# Patient Record
Sex: FEMALE | Race: WHITE | NOT HISPANIC OR LATINO | URBAN - METROPOLITAN AREA
[De-identification: names, ages, dates, MRNs, and addresses within clinical notes are randomized per-mention and may not be internally consistent; named-entity substitution may affect disease eponyms.]

---

## 2018-08-08 ENCOUNTER — INPATIENT (INPATIENT)
Facility: HOSPITAL | Age: 58
LOS: 12 days | Discharge: PSYCHIATRIC OR SUBSTANCE USE FACILITY | DRG: 948 | End: 2018-08-21
Attending: INTERNAL MEDICINE | Admitting: INTERNAL MEDICINE
Payer: COMMERCIAL

## 2018-08-08 VITALS
OXYGEN SATURATION: 97 % | RESPIRATION RATE: 18 BRPM | HEART RATE: 66 BPM | DIASTOLIC BLOOD PRESSURE: 72 MMHG | SYSTOLIC BLOOD PRESSURE: 132 MMHG | TEMPERATURE: 99 F

## 2018-08-08 DIAGNOSIS — R41.82 ALTERED MENTAL STATUS, UNSPECIFIED: ICD-10-CM

## 2018-08-08 DIAGNOSIS — I10 ESSENTIAL (PRIMARY) HYPERTENSION: ICD-10-CM

## 2018-08-08 DIAGNOSIS — R60.0 LOCALIZED EDEMA: ICD-10-CM

## 2018-08-08 DIAGNOSIS — E87.1 HYPO-OSMOLALITY AND HYPONATREMIA: ICD-10-CM

## 2018-08-08 LAB
ALBUMIN SERPL ELPH-MCNC: 4.4 G/DL — SIGNIFICANT CHANGE UP (ref 3.3–5)
ALP SERPL-CCNC: 97 U/L — SIGNIFICANT CHANGE UP (ref 40–120)
ALT FLD-CCNC: 34 U/L — SIGNIFICANT CHANGE UP (ref 10–45)
ANION GAP SERPL CALC-SCNC: 15 MMOL/L — SIGNIFICANT CHANGE UP (ref 5–17)
APAP SERPL-MCNC: <15 UG/ML — SIGNIFICANT CHANGE UP (ref 10–30)
APPEARANCE UR: CLEAR — SIGNIFICANT CHANGE UP
AST SERPL-CCNC: 29 U/L — SIGNIFICANT CHANGE UP (ref 10–40)
BACTERIA # UR AUTO: ABNORMAL /HPF
BASOPHILS # BLD AUTO: 0 K/UL — SIGNIFICANT CHANGE UP (ref 0–0.2)
BASOPHILS NFR BLD AUTO: 0.4 % — SIGNIFICANT CHANGE UP (ref 0–2)
BILIRUB SERPL-MCNC: 0.6 MG/DL — SIGNIFICANT CHANGE UP (ref 0.2–1.2)
BILIRUB UR-MCNC: NEGATIVE — SIGNIFICANT CHANGE UP
BUN SERPL-MCNC: 8 MG/DL — SIGNIFICANT CHANGE UP (ref 7–23)
CALCIUM SERPL-MCNC: 9.9 MG/DL — SIGNIFICANT CHANGE UP (ref 8.4–10.5)
CHLORIDE SERPL-SCNC: 91 MMOL/L — LOW (ref 96–108)
CO2 SERPL-SCNC: 23 MMOL/L — SIGNIFICANT CHANGE UP (ref 22–31)
COLOR SPEC: YELLOW — SIGNIFICANT CHANGE UP
CREAT SERPL-MCNC: 0.77 MG/DL — SIGNIFICANT CHANGE UP (ref 0.5–1.3)
DIFF PNL FLD: NEGATIVE — SIGNIFICANT CHANGE UP
EOSINOPHIL # BLD AUTO: 0.2 K/UL — SIGNIFICANT CHANGE UP (ref 0–0.5)
EOSINOPHIL NFR BLD AUTO: 2 % — SIGNIFICANT CHANGE UP (ref 0–6)
EPI CELLS # UR: SIGNIFICANT CHANGE UP /HPF
ETHANOL SERPL-MCNC: SIGNIFICANT CHANGE UP MG/DL (ref 0–10)
GLUCOSE SERPL-MCNC: 111 MG/DL — HIGH (ref 70–99)
GLUCOSE UR QL: NEGATIVE — SIGNIFICANT CHANGE UP
HCT VFR BLD CALC: 36.5 % — SIGNIFICANT CHANGE UP (ref 34.5–45)
HGB BLD-MCNC: 12.5 G/DL — SIGNIFICANT CHANGE UP (ref 11.5–15.5)
HYALINE CASTS # UR AUTO: ABNORMAL
KETONES UR-MCNC: NEGATIVE — SIGNIFICANT CHANGE UP
LEUKOCYTE ESTERASE UR-ACNC: NEGATIVE — SIGNIFICANT CHANGE UP
LYMPHOCYTES # BLD AUTO: 1.4 K/UL — SIGNIFICANT CHANGE UP (ref 1–3.3)
LYMPHOCYTES # BLD AUTO: 13.4 % — SIGNIFICANT CHANGE UP (ref 13–44)
MCHC RBC-ENTMCNC: 30.1 PG — SIGNIFICANT CHANGE UP (ref 27–34)
MCHC RBC-ENTMCNC: 34.2 GM/DL — SIGNIFICANT CHANGE UP (ref 32–36)
MCV RBC AUTO: 88 FL — SIGNIFICANT CHANGE UP (ref 80–100)
MONOCYTES # BLD AUTO: 1 K/UL — HIGH (ref 0–0.9)
MONOCYTES NFR BLD AUTO: 9.5 % — SIGNIFICANT CHANGE UP (ref 2–14)
NEUTROPHILS # BLD AUTO: 8 K/UL — HIGH (ref 1.8–7.4)
NEUTROPHILS NFR BLD AUTO: 74.8 % — SIGNIFICANT CHANGE UP (ref 43–77)
NITRITE UR-MCNC: NEGATIVE — SIGNIFICANT CHANGE UP
PCP SPEC-MCNC: SIGNIFICANT CHANGE UP
PH UR: 6 — SIGNIFICANT CHANGE UP (ref 5–8)
PLATELET # BLD AUTO: 406 K/UL — HIGH (ref 150–400)
POTASSIUM SERPL-MCNC: 3.4 MMOL/L — LOW (ref 3.5–5.3)
POTASSIUM SERPL-SCNC: 3.4 MMOL/L — LOW (ref 3.5–5.3)
PROT SERPL-MCNC: 7.7 G/DL — SIGNIFICANT CHANGE UP (ref 6–8.3)
PROT UR-MCNC: SIGNIFICANT CHANGE UP
RBC # BLD: 4.14 M/UL — SIGNIFICANT CHANGE UP (ref 3.8–5.2)
RBC # FLD: 12.6 % — SIGNIFICANT CHANGE UP (ref 10.3–14.5)
RBC CASTS # UR COMP ASSIST: SIGNIFICANT CHANGE UP /HPF (ref 0–2)
SALICYLATES SERPL-MCNC: <2 MG/DL — LOW (ref 15–30)
SODIUM SERPL-SCNC: 129 MMOL/L — LOW (ref 135–145)
SP GR SPEC: 1.01 — SIGNIFICANT CHANGE UP (ref 1.01–1.02)
UROBILINOGEN FLD QL: NEGATIVE — SIGNIFICANT CHANGE UP
WBC # BLD: 10.7 K/UL — HIGH (ref 3.8–10.5)
WBC # FLD AUTO: 10.7 K/UL — HIGH (ref 3.8–10.5)
WBC UR QL: SIGNIFICANT CHANGE UP /HPF (ref 0–5)

## 2018-08-08 PROCEDURE — 99285 EMERGENCY DEPT VISIT HI MDM: CPT | Mod: 25

## 2018-08-08 PROCEDURE — 71045 X-RAY EXAM CHEST 1 VIEW: CPT | Mod: 26

## 2018-08-08 PROCEDURE — 70450 CT HEAD/BRAIN W/O DYE: CPT | Mod: 26

## 2018-08-08 PROCEDURE — 93010 ELECTROCARDIOGRAM REPORT: CPT

## 2018-08-08 RX ORDER — HALOPERIDOL DECANOATE 100 MG/ML
5 INJECTION INTRAMUSCULAR ONCE
Qty: 0 | Refills: 0 | Status: COMPLETED | OUTPATIENT
Start: 2018-08-08 | End: 2018-08-08

## 2018-08-08 RX ORDER — POTASSIUM CHLORIDE 20 MEQ
20 PACKET (EA) ORAL
Qty: 0 | Refills: 0 | Status: COMPLETED | OUTPATIENT
Start: 2018-08-08 | End: 2018-08-09

## 2018-08-08 RX ORDER — ENOXAPARIN SODIUM 100 MG/ML
40 INJECTION SUBCUTANEOUS DAILY
Qty: 0 | Refills: 0 | Status: DISCONTINUED | OUTPATIENT
Start: 2018-08-08 | End: 2018-08-21

## 2018-08-08 RX ADMIN — Medication 2 MILLIGRAM(S): at 16:26

## 2018-08-08 RX ADMIN — HALOPERIDOL DECANOATE 5 MILLIGRAM(S): 100 INJECTION INTRAMUSCULAR at 16:23

## 2018-08-08 NOTE — H&P ADULT - RS GEN PE MLT RESP DETAILS PC
no intercostal retractions/no chest wall tenderness/no rales/breath sounds equal/normal/airway patent/good air movement/clear to auscultation bilaterally/respirations non-labored/no rhonchi

## 2018-08-08 NOTE — ED BEHAVIORAL HEALTH NOTE - BEHAVIORAL HEALTH NOTE
Pt evaluated at bedside. Pt is lethargic, sedated and unable to cooperate for interview. She is only oriented to herself. She received Haldol 5mg IM and Ativan 2mg IM at 16:15. Her psychiatrist, Dr. Vahe Russ, is unavailable. No contact information in sunrise except for her own number. Pt is being admitted to medicine for AMS. Psych will follow for full assessment once pt is able to participate in interview. Pt evaluated at bedside. Pt is lethargic, sedated and unable to cooperate for interview. She is only oriented to herself. She received Haldol 5mg IM and Ativan 2mg IM at 16:15. Her psychiatrist, Dr. Vahe Russ, is unavailable. No contact information in sunrise except for her own number. Pt is being admitted to medicine for AMS. Psych will follow for full assessment once pt is able to participate in interview.    Please check TSH, RPR, Vit D level.

## 2018-08-08 NOTE — ED PROVIDER NOTE - MEDICAL DECISION MAKING DETAILS
57F with altered mental status.  Patient unable to provide history and confused.  Will workup delirium for organic cause.  If labs, urine, and imaging within normal limits, then will consult Psych for eval.

## 2018-08-08 NOTE — ED PROVIDER NOTE - PROGRESS NOTE DETAILS
Patient seems to have flight of ideas and walking around ED.  Patient is however redirectable back to bed.  Call placed to Dr. Vahe Russ, prescriber noted on patient's Ziprasidone bottle.  Case discussed with Dr. Paige covering.  Dr. Paige does not know patient and unable to pull the records.  Dr. Paige reports Dr. Russ unavailable. Patient agitated and aggressive with 1:1 staff.  Patient treated with 5mg IM Haloperidol and 2mg IV Lorazepam. Patient sedated.  Imaging and labs reviewed.  Case discussed with Dr. Steve who requests neurology and psychiatry consultation before admission. Case discussed with neurology resident and psychiatrist who will evaluate patient in the ED. Psychiatry reports patient to sedated for evaluation.  Case discussed with Dr. Steve who accepts patient for admission.

## 2018-08-08 NOTE — CONSULT NOTE ADULT - SUBJECTIVE AND OBJECTIVE BOX
Neurology Consult    Name: SINDHU MUÑOZ    HPI: 58 yo woman who is brought into [facility] via police after patient was found neighbor's yard naked, reported picking out tomatoes from a vegetable garden. Per police, patient has a history of declining mental function since 's death 5 years ago. History and ROS otherwise limited due to patient's current cognitive condition. Neurology consulted at the request of admitting hospitalist for further evaluation. Psych consulted as well.     PMH/PSH:   DM2T     MEDICATIONS  (home):   * Outpatient Medication Status not yet specified    Allergies  Allergy Status Unknown    Objective:   Vital Signs Last 24 Hrs  T(C): 36.4 (08 Aug 2018 18:14), Max: 37.1 (08 Aug 2018 14:58)  T(F): 97.5 (08 Aug 2018 18:14), Max: 98.8 (08 Aug 2018 14:58)  HR: 51 (08 Aug 2018 18:14) (51 - 66)  BP: 124/73 (08 Aug 2018 18:14) (124/73 - 132/72)  RR: 18 (08 Aug 2018 18:14) (18 - 18)  SpO2: 96% (08 Aug 2018 18:14) (96% - 98%)    General Exam:   General appearance: No acute distress                   Neurological Exam:  Mental Status: AAOx3, fluent speech, follows commands    Cranial Nerves: EOMI, PERRL, V1-V3 intact, facial symmetry intact, no dysarthria, tongue midline, VFF    Motor: 5/5 throughout. No drift x4    Sensation: Intact to LT throughout    Coordination: FTN intact b/l    Reflexes: 1+ bilateral biceps, brachioradialis, patellar and ankle    Gait: normal and stable.      Labs:    08-08    129<L>  |  91<L>  |  8   ----------------------------<  111<H>  3.4<L>   |  23  |  0.77    Ca    9.9      08 Aug 2018 16:18    TPro  7.7  /  Alb  4.4  /  TBili  0.6  /  DBili  x   /  AST  29  /  ALT  34  /  AlkPhos  97  08-08    LIVER FUNCTIONS - ( 08 Aug 2018 16:18 )  Alb: 4.4 g/dL / Pro: 7.7 g/dL / ALK PHOS: 97 U/L / ALT: 34 U/L / AST: 29 U/L / GGT: x           CBC Full  -  ( 08 Aug 2018 16:18 )  WBC Count : 10.7 K/uL  Hemoglobin : 12.5 g/dL  Hematocrit : 36.5 %  Platelet Count - Automated : 406 K/uL  Mean Cell Volume : 88.0 fl  Mean Cell Hemoglobin : 30.1 pg  Mean Cell Hemoglobin Concentration : 34.2 gm/dL  Auto Neutrophil # : 8.0 K/uL  Auto Lymphocyte # : 1.4 K/uL  Auto Monocyte # : 1.0 K/uL  Auto Eosinophil # : 0.2 K/uL  Auto Basophil # : 0.0 K/uL  Auto Neutrophil % : 74.8 %  Auto Lymphocyte % : 13.4 %  Auto Monocyte % : 9.5 %  Auto Eosinophil % : 2.0 %  Auto Basophil % : 0.4 %    Radiology  < from: CT Head No Cont (08.08.18 @ 16:58) >  CLINICAL INDICATION:  Altered mental status, confusion    TECHNIQUE : Axial CT scanning of the brain was obtained from the skull   base to the vertex without the administration of intravenous contrast.      COMPARISON: None available    FINDINGS:  The ventricles are mildly prominent, this may be on the basis   of age-appropriate atrophy. The presence of mild normal pressure   hydrocephalus is not entirely excluded.    No focal mass effect, midline shift, vasogenic edema, or evidence of   acute territorial infarct. Nonspecific moderate to severe white matter   lucency.    The visualized paranasal sinuses and mastoid air cells are clear.    IMPRESSION: No acute intracranial hemorrhage, mass effect, vasogenic   edema, or evidence of acute territorial infarct.    Ventricles mildly prominent, this may be on the basis of age-appropriate   atrophy. The presence of mild normal pressure hydrocephalus is not   entirely excluded.    Nonspecific moderate to severe white matter lucency, differential   diagnosis includes white matter microvascular ischemic disease (advanced   for the patient's stated age), demyelinating disease, as well as   infectious, inflammatory, post infectious/inflammatory processes.    < end of copied text > Neurology Consult    Name: SINDHU MUÑOZ    HPI: 56 yo woman who is brought into [facility] via police after patient was found neighbor's yard naked, reported picking out tomatoes from a vegetable garden. Per police, patient has a history of declining mental function since 's death 5 years ago. History and ROS otherwise limited due to patient's current cognitive condition. Neurology consulted at the request of admitting hospitalist for further evaluation. Psych consulted as well.     PMH/PSH:   DM2T     MEDICATIONS  (home):   * Outpatient Medication Status not yet specified    Allergies  Allergy Status Unknown    Objective:   Vital Signs Last 24 Hrs  T(C): 36.4 (08 Aug 2018 18:14), Max: 37.1 (08 Aug 2018 14:58)  T(F): 97.5 (08 Aug 2018 18:14), Max: 98.8 (08 Aug 2018 14:58)  HR: 51 (08 Aug 2018 18:14) (51 - 66)  BP: 124/73 (08 Aug 2018 18:14) (124/73 - 132/72)  RR: 18 (08 Aug 2018 18:14) (18 - 18)  SpO2: 96% (08 Aug 2018 18:14) (96% - 98%)    General Exam:   General appearance: No acute distress                   Neurological Exam:  Mental Status: AAOx2 (person, place only), speaks coherently, follows simple commands    Cranial Nerves: EOMI no nystagmus, PERRL, facial symmetry intact, no dysarthria, b/l bink to threat intact     Motor: antigravity throughout. No drift x4    Sensation: Intact to LT throughout    Coordination: does not follow commands     Reflexes: 2+ bilateral biceps, brachioradialis, patellar, mild ankle clonus fatigable b/l     Gait: normal and stable.      Labs:    08-08    129<L>  |  91<L>  |  8   ----------------------------<  111<H>  3.4<L>   |  23  |  0.77    Ca    9.9      08 Aug 2018 16:18    TPro  7.7  /  Alb  4.4  /  TBili  0.6  /  DBili  x   /  AST  29  /  ALT  34  /  AlkPhos  97  08-08    LIVER FUNCTIONS - ( 08 Aug 2018 16:18 )  Alb: 4.4 g/dL / Pro: 7.7 g/dL / ALK PHOS: 97 U/L / ALT: 34 U/L / AST: 29 U/L / GGT: x           CBC Full  -  ( 08 Aug 2018 16:18 )  WBC Count : 10.7 K/uL  Hemoglobin : 12.5 g/dL  Hematocrit : 36.5 %  Platelet Count - Automated : 406 K/uL  Mean Cell Volume : 88.0 fl  Mean Cell Hemoglobin : 30.1 pg  Mean Cell Hemoglobin Concentration : 34.2 gm/dL  Auto Neutrophil # : 8.0 K/uL  Auto Lymphocyte # : 1.4 K/uL  Auto Monocyte # : 1.0 K/uL  Auto Eosinophil # : 0.2 K/uL  Auto Basophil # : 0.0 K/uL  Auto Neutrophil % : 74.8 %  Auto Lymphocyte % : 13.4 %  Auto Monocyte % : 9.5 %  Auto Eosinophil % : 2.0 %  Auto Basophil % : 0.4 %    Radiology  < from: CT Head No Cont (08.08.18 @ 16:58) >  CLINICAL INDICATION:  Altered mental status, confusion    TECHNIQUE : Axial CT scanning of the brain was obtained from the skull   base to the vertex without the administration of intravenous contrast.      COMPARISON: None available    FINDINGS:  The ventricles are mildly prominent, this may be on the basis   of age-appropriate atrophy. The presence of mild normal pressure   hydrocephalus is not entirely excluded.    No focal mass effect, midline shift, vasogenic edema, or evidence of   acute territorial infarct. Nonspecific moderate to severe white matter   lucency.    The visualized paranasal sinuses and mastoid air cells are clear.    IMPRESSION: No acute intracranial hemorrhage, mass effect, vasogenic   edema, or evidence of acute territorial infarct.    Ventricles mildly prominent, this may be on the basis of age-appropriate   atrophy. The presence of mild normal pressure hydrocephalus is not   entirely excluded.    Nonspecific moderate to severe white matter lucency, differential   diagnosis includes white matter microvascular ischemic disease (advanced   for the patient's stated age), demyelinating disease, as well as   infectious, inflammatory, post infectious/inflammatory processes.    < end of copied text > Neurology Consult    Name: SINDHU MUÑOZ    HPI: 56 yo woman who is brought into [facility] via police after patient was found neighbor's yard naked, reported picking out tomatoes from a vegetable garden. Per police, patient has a history of declining mental function since 's death 5 years ago. History and ROS otherwise limited due to patient's current cognitive condition. Neurology consulted at the request of admitting hospitalist for further evaluation. Psych consulted as well.     PMH/PSH:   DM2T     MEDICATIONS  (home):   * Outpatient Medication Status not yet specified    Allergies  Allergy Status Unknown    Objective:   Vital Signs Last 24 Hrs  T(C): 36.4 (08 Aug 2018 18:14), Max: 37.1 (08 Aug 2018 14:58)  T(F): 97.5 (08 Aug 2018 18:14), Max: 98.8 (08 Aug 2018 14:58)  HR: 51 (08 Aug 2018 18:14) (51 - 66)  BP: 124/73 (08 Aug 2018 18:14) (124/73 - 132/72)  RR: 18 (08 Aug 2018 18:14) (18 - 18)  SpO2: 96% (08 Aug 2018 18:14) (96% - 98%)    General Exam:   General appearance: No acute distress                   Neurological Exam:  Mental Status: AAOx2 (person, place only), speaks coherently, follows simple commands    Cranial Nerves: EOMI no nystagmus, PERRL, facial symmetry intact, no dysarthria, b/l bink to threat intact     Motor: antigravity throughout. No drift x4    Sensation: Intact to LT throughout    Coordination: F to N intact    Reflexes: 2+ bilateral biceps, brachioradialis, patellar, mild ankle clonus fatigable b/l     Gait: normal and stable.      Labs:    08-08    129<L>  |  91<L>  |  8   ----------------------------<  111<H>  3.4<L>   |  23  |  0.77    Ca    9.9      08 Aug 2018 16:18    TPro  7.7  /  Alb  4.4  /  TBili  0.6  /  DBili  x   /  AST  29  /  ALT  34  /  AlkPhos  97  08-08    LIVER FUNCTIONS - ( 08 Aug 2018 16:18 )  Alb: 4.4 g/dL / Pro: 7.7 g/dL / ALK PHOS: 97 U/L / ALT: 34 U/L / AST: 29 U/L / GGT: x           CBC Full  -  ( 08 Aug 2018 16:18 )  WBC Count : 10.7 K/uL  Hemoglobin : 12.5 g/dL  Hematocrit : 36.5 %  Platelet Count - Automated : 406 K/uL  Mean Cell Volume : 88.0 fl  Mean Cell Hemoglobin : 30.1 pg  Mean Cell Hemoglobin Concentration : 34.2 gm/dL  Auto Neutrophil # : 8.0 K/uL  Auto Lymphocyte # : 1.4 K/uL  Auto Monocyte # : 1.0 K/uL  Auto Eosinophil # : 0.2 K/uL  Auto Basophil # : 0.0 K/uL  Auto Neutrophil % : 74.8 %  Auto Lymphocyte % : 13.4 %  Auto Monocyte % : 9.5 %  Auto Eosinophil % : 2.0 %  Auto Basophil % : 0.4 %    Radiology  < from: CT Head No Cont (08.08.18 @ 16:58) >  CLINICAL INDICATION:  Altered mental status, confusion    TECHNIQUE : Axial CT scanning of the brain was obtained from the skull   base to the vertex without the administration of intravenous contrast.      COMPARISON: None available    FINDINGS:  The ventricles are mildly prominent, this may be on the basis   of age-appropriate atrophy. The presence of mild normal pressure   hydrocephalus is not entirely excluded.    No focal mass effect, midline shift, vasogenic edema, or evidence of   acute territorial infarct. Nonspecific moderate to severe white matter   lucency.    The visualized paranasal sinuses and mastoid air cells are clear.    IMPRESSION: No acute intracranial hemorrhage, mass effect, vasogenic   edema, or evidence of acute territorial infarct.    Ventricles mildly prominent, this may be on the basis of age-appropriate   atrophy. The presence of mild normal pressure hydrocephalus is not   entirely excluded.    Nonspecific moderate to severe white matter lucency, differential   diagnosis includes white matter microvascular ischemic disease (advanced   for the patient's stated age), demyelinating disease, as well as   infectious, inflammatory, post infectious/inflammatory processes.    < end of copied text >

## 2018-08-08 NOTE — ED PROVIDER NOTE - OBJECTIVE STATEMENT
57F presents with altered mental status.  Patient was found in her neighbor's garden naked picking tomatoes.  Police report patient's mental status has been declining since her 's death 5 years ago.  Patient is not answering any questions appropriately and seems to have flight of ideas.  Patient is walking 57F presents with altered mental status.  Patient was found in her neighbor's garden naked picking tomatoes.  Police report patient's mental status has been declining since her 's death 5 years ago.  Patient is not answering any questions appropriately and seems to have flight of ideas. 57F presents with altered mental status.  Patient was found in her neighbor's garden naked picking tomatoes.  Police report patient's mental status has been declining since her 's death 5 years ago.  Patient is not answering any questions appropriately and seems to have flight of ideas. No fever no sz by history
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
## 2018-08-08 NOTE — CONSULT NOTE ADULT - PROBLEM SELECTOR RECOMMENDATION 9
altered mental status, unspecified. Otherwise alert. Low suspicion for encephalopathy (no alteration in level of consciousness).  Plan:   -treat underlying metabolic/infectious triggers if any   -MRI brain w/ and w/o contrast   -Utox, U/a   -psych consult (patient's baseline unknown at this time)   -contact family for further information regarding PMH.   -low suspicion for seizure at this time. altered mental status, unspecified. Otherwise alert. Low suspicion for encephalopathy (no alteration in level of consciousness).  Plan:   -treat underlying metabolic/infectious triggers if any (including ammonia levels, however no asterixes on exam)   -MRI brain w/ and w/o contrast   -Utox, U/a   -psych consult (patient's baseline unknown at this time)   -contact family for further information regarding PMH.   -low suspicion for seizure at this time.
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
## 2018-08-08 NOTE — H&P ADULT - GASTROINTESTINAL DETAILS
no masses palpable/soft/nontender/no distention/no rebound tenderness/no bruit/bowel sounds normal/normal

## 2018-08-08 NOTE — H&P ADULT - ASSESSMENT
57F presents with altered mental status.  Patient was found in her neighbor's garden naked picking tomatoes.  Police report patient's mental status has been declining since her 's death 5 years ago.  Patient is not answering any questions appropriately and seems to have flight of ideas.

## 2018-08-08 NOTE — ED PROVIDER NOTE - ATTENDING CONTRIBUTION TO CARE
I have seen and evaluated this patient with the resident.   I agree with the findings  unless other wise stated.  I have made appropriate changes in documentations where needed, After my face to face bedside evaluation, I am further  notinF with altered mental status. BIBEMS unkempt no family to contact confused no signs of meningeal irritation non focal neuro exam possible organic v/s psych flare CT head no bleed no apparent sign of infection   Patient unable to provide history and confused.  Will workup delirium for organic cause.  If labs, urine, and imaging within normal limits, then will consult Psych for eval. requested 1:1 obs for pt safety will admit in hospital for definitive eval and treatment --Salazar

## 2018-08-08 NOTE — H&P ADULT - PROBLEM SELECTOR PLAN 1
Exact cause not known. Poor Historian and under sedation now. Neurology and Psychiatry following . Will Order MRI in Am . CT head noted  ?NPH .

## 2018-08-09 ENCOUNTER — TRANSCRIPTION ENCOUNTER (OUTPATIENT)
Age: 58
End: 2018-08-09

## 2018-08-09 DIAGNOSIS — E87.6 HYPOKALEMIA: ICD-10-CM

## 2018-08-09 DIAGNOSIS — F05 DELIRIUM DUE TO KNOWN PHYSIOLOGICAL CONDITION: ICD-10-CM

## 2018-08-09 LAB
ANION GAP SERPL CALC-SCNC: 13 MMOL/L — SIGNIFICANT CHANGE UP (ref 5–17)
ANION GAP SERPL CALC-SCNC: 16 MMOL/L — SIGNIFICANT CHANGE UP (ref 5–17)
APPEARANCE UR: CLEAR — SIGNIFICANT CHANGE UP
BACTERIA # UR AUTO: NEGATIVE — SIGNIFICANT CHANGE UP
BILIRUB UR-MCNC: NEGATIVE — SIGNIFICANT CHANGE UP
BUN SERPL-MCNC: 10 MG/DL — SIGNIFICANT CHANGE UP (ref 7–23)
BUN SERPL-MCNC: 8 MG/DL — SIGNIFICANT CHANGE UP (ref 7–23)
CALCIUM SERPL-MCNC: 8.8 MG/DL — SIGNIFICANT CHANGE UP (ref 8.4–10.5)
CALCIUM SERPL-MCNC: 9 MG/DL — SIGNIFICANT CHANGE UP (ref 8.4–10.5)
CHLORIDE SERPL-SCNC: 93 MMOL/L — LOW (ref 96–108)
CHLORIDE SERPL-SCNC: 94 MMOL/L — LOW (ref 96–108)
CHLORIDE UR-SCNC: <35 MMOL/L — SIGNIFICANT CHANGE UP
CO2 SERPL-SCNC: 21 MMOL/L — LOW (ref 22–31)
CO2 SERPL-SCNC: 23 MMOL/L — SIGNIFICANT CHANGE UP (ref 22–31)
COLOR SPEC: YELLOW — SIGNIFICANT CHANGE UP
CREAT SERPL-MCNC: 0.57 MG/DL — SIGNIFICANT CHANGE UP (ref 0.5–1.3)
CREAT SERPL-MCNC: 0.81 MG/DL — SIGNIFICANT CHANGE UP (ref 0.5–1.3)
DIFF PNL FLD: NEGATIVE — SIGNIFICANT CHANGE UP
EPI CELLS # UR: 1 /HPF — SIGNIFICANT CHANGE UP (ref 0–5)
GLUCOSE BLDC GLUCOMTR-MCNC: 119 MG/DL — HIGH (ref 70–99)
GLUCOSE BLDC GLUCOMTR-MCNC: 125 MG/DL — HIGH (ref 70–99)
GLUCOSE BLDC GLUCOMTR-MCNC: 134 MG/DL — HIGH (ref 70–99)
GLUCOSE BLDC GLUCOMTR-MCNC: 134 MG/DL — HIGH (ref 70–99)
GLUCOSE SERPL-MCNC: 139 MG/DL — HIGH (ref 70–99)
GLUCOSE SERPL-MCNC: 145 MG/DL — HIGH (ref 70–99)
GLUCOSE UR QL: NEGATIVE MG/DL — SIGNIFICANT CHANGE UP
HCT VFR BLD CALC: 31.1 % — LOW (ref 34.5–45)
HGB BLD-MCNC: 11 G/DL — LOW (ref 11.5–15.5)
HYALINE CASTS # UR AUTO: 0 /LPF — SIGNIFICANT CHANGE UP (ref 0–7)
KETONES UR-MCNC: NEGATIVE — SIGNIFICANT CHANGE UP
LEUKOCYTE ESTERASE UR-ACNC: ABNORMAL
MCHC RBC-ENTMCNC: 30.5 PG — SIGNIFICANT CHANGE UP (ref 27–34)
MCHC RBC-ENTMCNC: 35.4 GM/DL — SIGNIFICANT CHANGE UP (ref 32–36)
MCV RBC AUTO: 86.1 FL — SIGNIFICANT CHANGE UP (ref 80–100)
NITRITE UR-MCNC: NEGATIVE — SIGNIFICANT CHANGE UP
OSMOLALITY SERPL: 272 MOS/KG — LOW (ref 275–300)
OSMOLALITY UR: 60 MOS/KG — LOW (ref 300–900)
PH UR: 7 — SIGNIFICANT CHANGE UP (ref 5–8)
PLATELET # BLD AUTO: 251 K/UL — SIGNIFICANT CHANGE UP (ref 150–400)
POTASSIUM SERPL-MCNC: 3.5 MMOL/L — SIGNIFICANT CHANGE UP (ref 3.5–5.3)
POTASSIUM SERPL-MCNC: 4.9 MMOL/L — SIGNIFICANT CHANGE UP (ref 3.5–5.3)
POTASSIUM SERPL-SCNC: 3.5 MMOL/L — SIGNIFICANT CHANGE UP (ref 3.5–5.3)
POTASSIUM SERPL-SCNC: 4.9 MMOL/L — SIGNIFICANT CHANGE UP (ref 3.5–5.3)
POTASSIUM UR-SCNC: 5 MMOL/L — SIGNIFICANT CHANGE UP
PROT UR-MCNC: ABNORMAL MG/DL
RBC # BLD: 3.61 M/UL — LOW (ref 3.8–5.2)
RBC # FLD: 12.9 % — SIGNIFICANT CHANGE UP (ref 10.3–14.5)
RBC CASTS # UR COMP ASSIST: 1 /HPF — SIGNIFICANT CHANGE UP (ref 0–4)
SODIUM SERPL-SCNC: 129 MMOL/L — LOW (ref 135–145)
SODIUM SERPL-SCNC: 131 MMOL/L — LOW (ref 135–145)
SODIUM UR-SCNC: <20 MMOL/L — SIGNIFICANT CHANGE UP
SP GR SPEC: 1.01 — SIGNIFICANT CHANGE UP (ref 1.01–1.02)
TSH SERPL-MCNC: 1.15 UIU/ML — SIGNIFICANT CHANGE UP (ref 0.27–4.2)
TSH SERPL-MCNC: 2.62 UIU/ML — SIGNIFICANT CHANGE UP (ref 0.27–4.2)
UROBILINOGEN FLD QL: NEGATIVE MG/DL — SIGNIFICANT CHANGE UP
WBC # BLD: 8.51 K/UL — SIGNIFICANT CHANGE UP (ref 3.8–10.5)
WBC # FLD AUTO: 8.51 K/UL — SIGNIFICANT CHANGE UP (ref 3.8–10.5)
WBC UR QL: 6 /HPF — HIGH (ref 0–5)

## 2018-08-09 PROCEDURE — 99223 1ST HOSP IP/OBS HIGH 75: CPT | Mod: GC

## 2018-08-09 PROCEDURE — 99221 1ST HOSP IP/OBS SF/LOW 40: CPT

## 2018-08-09 PROCEDURE — 93970 EXTREMITY STUDY: CPT | Mod: 26

## 2018-08-09 RX ORDER — INSULIN LISPRO 100/ML
VIAL (ML) SUBCUTANEOUS
Qty: 0 | Refills: 0 | Status: DISCONTINUED | OUTPATIENT
Start: 2018-08-09 | End: 2018-08-13

## 2018-08-09 RX ORDER — INSULIN LISPRO 100/ML
VIAL (ML) SUBCUTANEOUS AT BEDTIME
Qty: 0 | Refills: 0 | Status: DISCONTINUED | OUTPATIENT
Start: 2018-08-09 | End: 2018-08-13

## 2018-08-09 RX ORDER — HALOPERIDOL DECANOATE 100 MG/ML
1 INJECTION INTRAMUSCULAR ONCE
Qty: 0 | Refills: 0 | Status: DISCONTINUED | OUTPATIENT
Start: 2018-08-09 | End: 2018-08-09

## 2018-08-09 RX ORDER — ZIPRASIDONE HYDROCHLORIDE 20 MG/1
60 CAPSULE ORAL
Qty: 0 | Refills: 0 | Status: DISCONTINUED | OUTPATIENT
Start: 2018-08-10 | End: 2018-08-13

## 2018-08-09 RX ORDER — FUROSEMIDE 40 MG
40 TABLET ORAL
Qty: 0 | Refills: 0 | Status: DISCONTINUED | OUTPATIENT
Start: 2018-08-09 | End: 2018-08-21

## 2018-08-09 RX ORDER — DEXTROSE 50 % IN WATER 50 %
15 SYRINGE (ML) INTRAVENOUS ONCE
Qty: 0 | Refills: 0 | Status: DISCONTINUED | OUTPATIENT
Start: 2018-08-09 | End: 2018-08-21

## 2018-08-09 RX ORDER — GLUCAGON INJECTION, SOLUTION 0.5 MG/.1ML
1 INJECTION, SOLUTION SUBCUTANEOUS ONCE
Qty: 0 | Refills: 0 | Status: DISCONTINUED | OUTPATIENT
Start: 2018-08-09 | End: 2018-08-21

## 2018-08-09 RX ORDER — HALOPERIDOL DECANOATE 100 MG/ML
2 INJECTION INTRAMUSCULAR ONCE
Qty: 0 | Refills: 0 | Status: COMPLETED | OUTPATIENT
Start: 2018-08-09 | End: 2018-08-09

## 2018-08-09 RX ORDER — SODIUM CHLORIDE 9 MG/ML
1000 INJECTION, SOLUTION INTRAVENOUS
Qty: 0 | Refills: 0 | Status: DISCONTINUED | OUTPATIENT
Start: 2018-08-09 | End: 2018-08-21

## 2018-08-09 RX ORDER — POTASSIUM CHLORIDE 20 MEQ
20 PACKET (EA) ORAL DAILY
Qty: 0 | Refills: 0 | Status: DISCONTINUED | OUTPATIENT
Start: 2018-08-09 | End: 2018-08-21

## 2018-08-09 RX ORDER — DEXTROSE 50 % IN WATER 50 %
12.5 SYRINGE (ML) INTRAVENOUS ONCE
Qty: 0 | Refills: 0 | Status: DISCONTINUED | OUTPATIENT
Start: 2018-08-09 | End: 2018-08-21

## 2018-08-09 RX ORDER — SODIUM CHLORIDE 9 MG/ML
1 INJECTION INTRAMUSCULAR; INTRAVENOUS; SUBCUTANEOUS THREE TIMES A DAY
Qty: 0 | Refills: 0 | Status: DISCONTINUED | OUTPATIENT
Start: 2018-08-09 | End: 2018-08-11

## 2018-08-09 RX ORDER — DEXTROSE 50 % IN WATER 50 %
25 SYRINGE (ML) INTRAVENOUS ONCE
Qty: 0 | Refills: 0 | Status: DISCONTINUED | OUTPATIENT
Start: 2018-08-09 | End: 2018-08-21

## 2018-08-09 RX ORDER — HALOPERIDOL DECANOATE 100 MG/ML
2.5 INJECTION INTRAMUSCULAR EVERY 6 HOURS
Qty: 0 | Refills: 0 | Status: DISCONTINUED | OUTPATIENT
Start: 2018-08-09 | End: 2018-08-13

## 2018-08-09 RX ORDER — LEVOTHYROXINE SODIUM 125 MCG
50 TABLET ORAL DAILY
Qty: 0 | Refills: 0 | Status: DISCONTINUED | OUTPATIENT
Start: 2018-08-09 | End: 2018-08-21

## 2018-08-09 RX ADMIN — Medication 2 MILLIGRAM(S): at 06:30

## 2018-08-09 RX ADMIN — HALOPERIDOL DECANOATE 2 MILLIGRAM(S): 100 INJECTION INTRAMUSCULAR at 05:51

## 2018-08-09 RX ADMIN — Medication 40 MILLIGRAM(S): at 16:11

## 2018-08-09 RX ADMIN — Medication 20 MILLIEQUIVALENT(S): at 03:56

## 2018-08-09 RX ADMIN — HALOPERIDOL DECANOATE 2.5 MILLIGRAM(S): 100 INJECTION INTRAMUSCULAR at 16:10

## 2018-08-09 RX ADMIN — Medication 20 MILLIEQUIVALENT(S): at 16:11

## 2018-08-09 RX ADMIN — ENOXAPARIN SODIUM 40 MILLIGRAM(S): 100 INJECTION SUBCUTANEOUS at 16:11

## 2018-08-09 RX ADMIN — SODIUM CHLORIDE 1 GRAM(S): 9 INJECTION INTRAMUSCULAR; INTRAVENOUS; SUBCUTANEOUS at 22:21

## 2018-08-09 RX ADMIN — Medication 20 MILLIEQUIVALENT(S): at 04:13

## 2018-08-09 NOTE — DISCHARGE NOTE ADULT - MEDICATION SUMMARY - MEDICATIONS TO TAKE
I will START or STAY ON the medications listed below when I get home from the hospital:    mirtazapine 7.5 mg oral tablet  -- 1 tab(s) by mouth once a day (at bedtime), As needed, insomnia  -- Indication: For Schizophrenia, unspecified type    OLANZapine 5 mg oral tablet  -- 5 tab(s) by mouth once a day (at bedtime)  -- Indication: For Schizophrenia, unspecified type    haloperidol  -- 5 milligram(s) intramuscular every 6 hours, As Needed  -- Indication: For Schizophrenia, unspecified type    metoprolol succinate 100 mg oral tablet, extended release  -- 1 tab(s) by mouth once a day  -- Indication: For HTN (hypertension)    furosemide 40 mg oral tablet  -- 1 tab(s) by mouth 2 times a day  -- Indication: For HTN (hypertension)    potassium chloride 20 mEq oral tablet, extended release  -- 1 tab(s) by mouth once a day  -- Indication: For supplement    melatonin 3 mg oral tablet  -- 1 tab(s) by mouth once a day (at bedtime), As needed, Insomnia  -- Indication: For insomnia    levothyroxine 50 mcg (0.05 mg) oral tablet  -- 1 tab(s) by mouth once a day  -- Indication: For Hyppthyroidism I will START or STAY ON the medications listed below when I get home from the hospital:    mirtazapine 7.5 mg oral tablet  -- 1 tab(s) by mouth once a day (at bedtime), As needed, insomnia  -- Indication: For Schizophrenia, unspecified type    OLANZapine 5 mg oral tablet  -- 5 tab(s) by mouth once a day (at bedtime)  -- Indication: For Schizophrenia, unspecified type    haloperidol  -- 5 milligram(s) intramuscular every 6 hours, As Needed  -- Indication: For Schizophrenia, unspecified type    metoprolol succinate 100 mg oral tablet, extended release  -- 1 tab(s) by mouth once a day  -- Indication: For HTN (hypertension)    furosemide 40 mg oral tablet  -- 1 tab(s) by mouth 2 times a day  -- Indication: For HTN (hypertension)    potassium chloride 20 mEq oral tablet, extended release  -- 1 tab(s) by mouth once a day  -- Indication: For Supplement    melatonin 3 mg oral tablet  -- 1 tab(s) by mouth once a day (at bedtime), As needed, Insomnia  -- Indication: For insomnia    levothyroxine 50 mcg (0.05 mg) oral tablet  -- 1 tab(s) by mouth once a day  -- Indication: For Hypothroid

## 2018-08-09 NOTE — CONSULT NOTE ADULT - SUBJECTIVE AND OBJECTIVE BOX
[facility] - Merle / June S /Gal/ TWILA Zuniga/ TWILA Gray/ Mat Brannon / BLANCHE Njeru  -------------------------------------------------------------------------------------------------------  The patient seen and examined today.  HPI:  57 year old female with hx of edema feet for few  months on ? " water pills" , found by police in neighbour garden in er with ams - noted to have low serum na level 129 mmol, with bilat lower ext sweliling and confusiong  history of present illness, past medical history ,family and social history can not be obtained from the patient . Information taken from chart.   overnight serum na level similar    PAST MEDICAL & SURGICAL HISTORY:  Diabetes mellitus of other type without complication    Allergies :- Allergy Status Unknown    Home Medications Reviewed  Hospital Medications:   MEDICATIONS  (STANDING):  enoxaparin Injectable 40 milliGRAM(s) SubCutaneous daily  furosemide    Tablet 40 milliGRAM(s) Oral two times a day  sodium chloride 1 Gram(s) Oral three times a day    SOCIAL HISTORY:  Denies ETOh,Smoking,   FAMILY HISTORY:    REVIEW OF SYSTEMS: not reliable  CONSTITUTIONAL: + weakness, no fever  EYES/ENT: No visual changes;  No vertigo or throat pain   NECK: No pain or stiffness  RESPIRATORY: No cough, wheezing, hemoptysis; No shortness of breath  CARDIOVASCULAR: No chest pain or palpitations.  GASTROINTESTINAL: No abdominal or epigastric pain. No nausea, vomiting, or hematemesis; No diarrhea or constipation. No melena or hematochezia.  GENITOURINARY: No dysuria, frequency, foamy urine, urinary urgency, incontinence or hematuria  NEUROLOGICAL: No numbness or weakness  SKIN: No itching, burning, rashes, or lesions   VASCULAR: + bilateral lower extremity edema.   All other review of systems is negative unless indicated above.    VITALS:  T(F): 97.9 (18 @ 07:18), Max: 98.8 (18 @ 14:58)  HR: 61 (18 @ 07:18)  BP: 130/72 (18 @ 07:18)  RR: 18 (18 @ 07:18)  SpO2: 99% (18 @ 07:18)    PHYSICAL EXAM:  Constitutional: NAD  HEENT: anicteric sclera, oropharynx clear, MMM  Neck: supple.   Respiratory: Bilateral equal breath sounds ,+ crackles  Cardiovascular: S1, S2, Regular, Murmur present.  Gastrointestinal: Bowel Sound present, soft, NT/ND  Extremities: No cyanosis or clubbing. No peripheral edema  Neurological: Alert , no focal deficits  Psychiatric: flat affact     Data:      129<L>  |  93<L>  |  10  ----------------------------<  145<H>  3.5   |  23  |  0.81    Ca    8.8      09 Aug 2018 06:32    TPro  7.7  /  Alb  4.4  /  TBili  0.6  /  DBili      /  AST  29  /  ALT  34  /  AlkPhos  97  -    Creatinine Trend: 0.81 <--, 0.77 <--                        11.0   8.51  )-----------( 251      ( 09 Aug 2018 08:27 )             31.1     Urine Studies:  Urinalysis Basic - ( 08 Aug 2018 16:18 )    Color: Yellow / Appearance: Clear / S.014 / pH:   Gluc:  / Ketone: Negative  / Bili: Negative / Urobili: Negative   Blood:  / Protein: Trace / Nitrite: Negative   Leuk Esterase: Negative / RBC: 3-5 /HPF / WBC 3-5 /HPF   Sq Epi:  / Non Sq Epi: OCC /HPF / Bacteria: Few /HPF

## 2018-08-09 NOTE — DISCHARGE NOTE ADULT - CARE PLAN
Principal Discharge DX:	Altered mental status, unspecified altered mental status type  Goal:	resolution of symptoms  Assessment and plan of treatment:	transfer to [facility] for in patient treatment  Secondary Diagnosis:	HTN (hypertension)  Assessment and plan of treatment:	Follow up with your medical doctor to establish long term blood pressure treatment goals.  Secondary Diagnosis:	Hypokalemia  Assessment and plan of treatment:	continue potassium supplements  Secondary Diagnosis:	Hyponatremia  Assessment and plan of treatment:	maintain fluid restriction of 800 ml/24 hrs  Secondary Diagnosis:	Schizophrenia, unspecified type  Assessment and plan of treatment:	in patient treatment at [facility]

## 2018-08-09 NOTE — DISCHARGE NOTE ADULT - MEDICATION SUMMARY - MEDICATIONS TO STOP TAKING
I will STOP taking the medications listed below when I get home from the hospital:    sertraline 100 mg oral tablet  -- 1 tab(s) by mouth once a day    busPIRone 10 mg oral tablet  -- 1 tab(s) by mouth 3 times a day    Cartia  mg/24 hours oral capsule, extended release  -- 1 cap(s) by mouth once a day    ziprasidone 60 mg oral capsule  -- 1 cap(s) by mouth once a day    furosemide  -- dose unknown, pharmacy states "patient uses occasionally"    metoprolol succinate 100 mg oral tablet, extended release  -- 1 tab(s) by mouth once a day    simvastatin 20 mg oral tablet  -- 1 tab(s) by mouth once a day (at bedtime)    metFORMIN 750 mg oral tablet, extended release  -- 1 tab(s) by mouth 2 times a day    losartan-hydroCHLOROthiazide 100 mg-25 mg oral tablet  -- 1 tab(s) by mouth once a day I will STOP taking the medications listed below when I get home from the hospital:    sertraline 100 mg oral tablet  -- 1 tab(s) by mouth once a day    busPIRone 10 mg oral tablet  -- 1 tab(s) by mouth 3 times a day    Cartia  mg/24 hours oral capsule, extended release  -- 1 cap(s) by mouth once a day    ziprasidone 60 mg oral capsule  -- 1 cap(s) by mouth once a day    metFORMIN 750 mg oral tablet, extended release  -- 1 tab(s) by mouth 2 times a day    losartan-hydroCHLOROthiazide 100 mg-25 mg oral tablet  -- 1 tab(s) by mouth once a day

## 2018-08-09 NOTE — CONSULT NOTE ADULT - SUBJECTIVE AND OBJECTIVE BOX
HISTORY OF PRESENT ILLNESS:  57 year old female with h/o DM with no known h/o CAD/MI presents with altered mental status.   Patient was brought in yesterday by police after being found in her neighbor's garden naked picking tomatoes.  Her mental status has reportedly been declining since her 's death 5 years ago.  Of note, though the patient is ambulatory, she had a witnessed mechanical fall in the ER when she slipped from a rolling chair and slid to the floor. There was no loss of consciousness and no apparent prodromal symptoms.  She received Ativan at 6am and when this interview took place at 8am she was calm and answering questions. Cardiology is called to assess for possible aortic stenosis murmur on exam. The patient denies any anginal symptoms, dyspnea, palpitations, syncope, near syncope or previous cardiac work up.      PAST MEDICAL & SURGICAL HISTORY:  Diabetes mellitus of other type without complication        MEDICATIONS  (STANDING):  enoxaparin Injectable 40 milliGRAM(s) SubCutaneous daily      Allergies  Allergy Status Unknown      FAMILY HISTORY:  Non-contributary for premature coronary disease or sudden cardiac death    SOCIAL HISTORY:    [ x] Non-smoker  [ ] Smoker  [x ] Alcohol      REVIEW OF SYSTEMS:  [ ]chest pain  [  ]shortness of breath  [  ]palpitations  [  ]syncope  [ ]near syncope [ ]upper extremity weakness   [ ] lower extremity weakness  [  ]diplopia  [ x ]altered mental status  [x] fall   [  ]fevers  [ ]chills [ ]nausea  [ ]vomitting  [  ]dysphagia    [ ]abdominal pain  [ ]melena  [ ]BRBPR    [  ]epistaxis  [  ]rash    [ ]lower extremity edema        [ x] All others negative	  [ ] Unable to obtain    PHYSICAL EXAM:  T(C): 36.6 (08-09-18 @ 07:18), Max: 37.1 (08-08-18 @ 14:58)  HR: 61 (08-09-18 @ 07:18) (51 - 69)  BP: 130/72 (08-09-18 @ 07:18) (110/62 - 134/66)  RR: 18 (08-09-18 @ 07:18) (17 - 18)  SpO2: 99% (08-09-18 @ 07:18) (96% - 99%)  Wt(kg): --    Appearance: Normal	  HEENT:   Normal oral mucosa, PERRL, EOMI	  Lymphatic: No lymphadenopathy , no edema  Cardiovascular: Normal S1 S2, No JVD, 2/6 JOSE ANGEL LSB , Peripheral pulses palpable 2+ bilaterally  Respiratory: Lungs clear to auscultation, normal effort 	  Gastrointestinal:  Soft, Non-tender, + BS	  Skin: No rashes, No ecchymoses, No cyanosis, warm to touch  Musculoskeletal: Normal range of motion, normal strength  Psychiatry:  Calm , responding to questions appropriately      TELEMETRY:  n/a	    ECG:  	NSR no ischemia narrow QRS    Echo: pending   NST: n/a  Cath: n/a  	  	  LABS:	 	                          12.5   10.7  )-----------( 406      ( 08 Aug 2018 16:18 )             36.5     08-09    129<L>  |  93<L>  |  10  ----------------------------<  145<H>  3.5   |  23  |  0.81    Ca    8.8      09 Aug 2018 06:32    TPro  7.7  /  Alb  4.4  /  TBili  0.6  /  DBili  x   /  AST  29  /  ALT  34  /  AlkPhos  97  08-08      TSH:  pending    CT Head No Cont (08.08.18 @ 16:58) >  IMPRESSION: No acute intracranial hemorrhage, mass effect, vasogenic   edema, or evidence of acute territorial infarct.    Ventricles mildly prominent, this may be on the basis of age-appropriate   atrophy. The presence of mild normal pressure hydrocephalus is not   entirely excluded.    Nonspecific moderate to severe white matter lucency, differential   diagnosis includes white matter microvascular ischemic disease (advanced   for the patient's stated age), demyelinating disease, as well as   infectious, inflammatory, post infectious/inflammatory processes.       Xray Chest 1 View AP/PA (08.08.18 @ 17:57) >  PRELIMINARY   INTERPRETATION:  clear lungs            ASSESSMENT/PLAN:  57 year old female with h/o DM with no known h/o CAD/MI presents with altered mental status.   Patient was brought in yesterday by police after being found in her neighbor's garden naked picking tomatoes.  Her mental status has reportedly been declining since her 's death 5 years ago.  Of note, though the patient is ambulatory, she had a witnessed mechanical fall in the ER when she slipped from a rolling chair and slid to the floor. There was no loss of consciousness and no apparent prodromal symptoms.  She received Ativan at 6am and when this interview took place at 8am she was calm and answering questions. Cardiology is called to assess for possible aortic stenosis murmur on exam. The patient denies any anginal symptoms, dyspnea, palpitations, syncope, near syncope or previous cardiac work up.        -- EKG with NSR and narrow QRS  -- check TTE to r/o structural heart disease  -- CT head with no acute/chronic CVA/mass/bleed  -- HD stable with no evidence of CHF  -- f/u lower extremity dopplers   -- final recs pending above HISTORY OF PRESENT ILLNESS:  57 year old female with h/o DM with no known h/o CAD/MI presents with altered mental status.   Patient was brought in yesterday by police after being found in her neighbor's garden naked picking tomatoes.  Her mental status has reportedly been declining since her 's death 5 years ago.  Of note, though the patient is ambulatory, she had a witnessed mechanical fall in the ER when she slipped from a rolling chair and slid to the floor. There was no loss of consciousness and no apparent prodromal symptoms.  She received Ativan at 6am and when this interview took place at 8am she was calm and answering questions. Cardiology is called to assess for possible aortic stenosis murmur on exam. The patient denies any anginal symptoms, dyspnea, palpitations, syncope, near syncope or previous cardiac work up.      PAST MEDICAL & SURGICAL HISTORY:  Diabetes mellitus of other type without complication        MEDICATIONS  (STANDING):  enoxaparin Injectable 40 milliGRAM(s) SubCutaneous daily      Allergies  Allergy Status Unknown      FAMILY HISTORY:  Non-contributary for premature coronary disease or sudden cardiac death    SOCIAL HISTORY:    [ x] Non-smoker  [ ] Smoker  [x ] Alcohol      REVIEW OF SYSTEMS:  [ ]chest pain  [  ]shortness of breath  [  ]palpitations  [  ]syncope  [ ]near syncope [ ]upper extremity weakness   [ ] lower extremity weakness  [  ]diplopia  [ x ]altered mental status  [x] fall   [  ]fevers  [ ]chills [ ]nausea  [ ]vomitting  [  ]dysphagia    [ ]abdominal pain  [ ]melena  [ ]BRBPR    [  ]epistaxis  [  ]rash    [ ]lower extremity edema        [ x] All others negative	  [ ] Unable to obtain    PHYSICAL EXAM:  T(C): 36.6 (08-09-18 @ 07:18), Max: 37.1 (08-08-18 @ 14:58)  HR: 61 (08-09-18 @ 07:18) (51 - 69)  BP: 130/72 (08-09-18 @ 07:18) (110/62 - 134/66)  RR: 18 (08-09-18 @ 07:18) (17 - 18)  SpO2: 99% (08-09-18 @ 07:18) (96% - 99%)  Wt(kg): --    Appearance: Normal	  HEENT:   Normal oral mucosa, PERRL, EOMI	  Lymphatic: No lymphadenopathy , no edema  Cardiovascular: Normal S1 S2, No JVD, 2/6 JOSE ANGEL LSB , Peripheral pulses palpable 2+ bilaterally  Respiratory: Lungs clear to auscultation, normal effort 	  Gastrointestinal:  Soft, Non-tender, + BS	  Skin: No rashes, No ecchymoses, No cyanosis, warm to touch  Musculoskeletal: Normal range of motion, normal strength  Psychiatry:  Calm , responding to questions appropriately      TELEMETRY:  n/a	    ECG:  	NSR no ischemia narrow QRS    Echo: pending   NST: n/a  Cath: n/a  	  	  LABS:	 	                          12.5   10.7  )-----------( 406      ( 08 Aug 2018 16:18 )             36.5     08-09    129<L>  |  93<L>  |  10  ----------------------------<  145<H>  3.5   |  23  |  0.81    Ca    8.8      09 Aug 2018 06:32    TPro  7.7  /  Alb  4.4  /  TBili  0.6  /  DBili  x   /  AST  29  /  ALT  34  /  AlkPhos  97  08-08      TSH:  pending    CT Head No Cont (08.08.18 @ 16:58) >  IMPRESSION: No acute intracranial hemorrhage, mass effect, vasogenic   edema, or evidence of acute territorial infarct.    Ventricles mildly prominent, this may be on the basis of age-appropriate   atrophy. The presence of mild normal pressure hydrocephalus is not   entirely excluded.    Nonspecific moderate to severe white matter lucency, differential   diagnosis includes white matter microvascular ischemic disease (advanced   for the patient's stated age), demyelinating disease, as well as   infectious, inflammatory, post infectious/inflammatory processes.       Xray Chest 1 View AP/PA (08.08.18 @ 17:57) >  PRELIMINARY   INTERPRETATION:  clear lungs            ASSESSMENT/PLAN:  57 year old female with h/o DM with no known h/o CAD/MI presents with altered mental status.   Patient was brought in yesterday by police after being found in her neighbor's garden naked picking tomatoes.  Her mental status has reportedly been declining since her 's death 5 years ago.  Of note, though the patient is ambulatory, she had a witnessed mechanical fall in the ER when she slipped from a rolling chair and slid to the floor. There was no loss of consciousness and no apparent prodromal symptoms.  She received Ativan at 6am and when this interview took place at 8am she was calm and answering questions. Cardiology is called to assess for possible aortic stenosis murmur on exam. The patient denies any anginal symptoms, dyspnea, palpitations, syncope, near syncope or previous cardiac work up.        -- EKG with NSR and narrow QRS  -- check TTE to r/o structural heart disease  -- CT head with no acute/chronic CVA/mass/bleed  -- HD stable with no evidence of CHF  -- f/u lower extremity dopplers   -- tox screen negative   -- hyponatremia- f/u medicine   -- final recs pending above

## 2018-08-09 NOTE — BEHAVIORAL HEALTH ASSESSMENT NOTE - SUMMARY
58 y/o  female, , childless, retired as a  at [employer], domiciled alone in a private residence in [place], with PPH of , no currently substance abuse, no suicide attempts or self injurious behaviors as reported by patient, with PMHx of DM, bib police with altered mental status.  Patient was found in her neighbor's garden naked picking tomatoes.  Police report patient's mental status has been declining since her 's death 5 years ago.  Patient is not answering any questions appropriately and seems to have flight of ideas.  Over the course of the night, patient was agitated and aggressive with 1:1 staff requiring doses of Haldol 5mg and Ativan 2mg x 2 doses.  Psychiatry consulted to assess for altered mental status.    Patient seen and evaluated in the ED, awake and alert, oriented to person and place ([facility]), disoriented to time- states it's Oct 20, 2018, when told correct date she responds with "not it's not!"  Reports that her neighbor "Derek" called the police on her to bring her into the hospital "because he was worried about me."  Patient unable to state events leading up to her ED visit and unable to state reasoning for her neighbor's concerns to call the police.  She reports living in a residence in [place], when asked if she lived with anyone, she states "sometimes".  Able to confirm that her  passed away 5 years ago.  Denies h/o suicide attempts and currently denies S/H/I/I/P, A/V/H, or thoughts of paranoia that people are after her.  She reports occasionally drinking 1 vodka, denies h/o withdrawals or DTs.  States that she sees an outpatient psychiatrist Dr. Russ, states she has followed with him for several years.  Patient unable to state what her psychiatric dx is.  Reports 1 prior inpatient psychiatric hospitalization, however is unable to give details about this and for what reason.  Attention/concentration impaired, unable to spell WORLD backwards.  Able to state current president is Yesi.  Patient is a poor historian at this time, need collateral information from patient's outpt psychiatrist to determine patient's baseline.  Likely this is a delirium d/t medical condition and electrolyte abnormalities.  Consider neurology workup to determine if this is cause by any organic causes her change in patient's mental status. 58 y/o  female, , childless, retired as a  at [employer], domiciled alone in a private residence in [place], with PPH of , no currently substance abuse, no suicide attempts or self injurious behaviors as reported by patient, with PMHx of DM, bib police with altered mental status.  Patient was found in her neighbor's garden naked picking tomatoes.  Police report patient's mental status has been declining since her 's death 5 years ago.  Patient is not answering any questions appropriately and seems to have flight of ideas.  Over the course of the night, patient was agitated and aggressive with 1:1 staff requiring doses of Haldol 5mg and Ativan 2mg x 2 doses.  Psychiatry consulted to assess for altered mental status.      Patient seen and evaluated in the ED, awake and alert, oriented to person and place ([facility]), disoriented to time- states it's Oct 20, 2018, when told correct date she responds with "not it's not!"  Reports that her neighbor "Derek" called the police on her to bring her into the hospital "because he was worried about me."  Patient unable to state events leading up to her ED visit and unable to state reasoning for her neighbor's concerns to call the police.  She reports living in a residence in [place], when asked if she lived with anyone, she states "sometimes".  Able to confirm that her  passed away 5 years ago.  Denies h/o suicide attempts and currently denies S/H/I/I/P, A/V/H, or thoughts of paranoia that people are after her.  She reports occasionally drinking 1 vodka, denies h/o withdrawals or DTs.  States that she sees an outpatient psychiatrist Dr. Russ, states she has followed with him for several years.  Patient unable to state what her psychiatric dx is.  Reports 1 prior inpatient psychiatric hospitalization, however is unable to give details about this and for what reason.  Attention/concentration impaired, unable to spell WORLD backwards.  Able to state current president is Yesi.  Patient is a poor historian at this time, need collateral information from patient's outpt psychiatrist to determine patient's baseline.  Likely this is a delirium d/t medical condition and electrolyte abnormalities.  Consider neurology workup to determine if this is cause by any organic causes her change in patient's mental status.

## 2018-08-09 NOTE — DISCHARGE NOTE ADULT - PLAN OF CARE
maintain fluid restriction of 800 ml/24 hrs in patient treatment at [facility] resolution of symptoms transfer to [facility] in patient treatment Follow up with your medical doctor to establish long term blood pressure treatment goals. continue potassium supplements

## 2018-08-09 NOTE — PROVIDER CONTACT NOTE (FALL NOTIFICATION) - ACTION/TREATMENT ORDERED:
VINNY Palacios notified and made aware. Louie, admin notified. will follow fall protocol. pt assisted back to bed safely with staff. will continue to monitor.

## 2018-08-09 NOTE — BEHAVIORAL HEALTH ASSESSMENT NOTE - CASE SUMMARY
58 y/o  female, , childless, retired  at [employer], domiciled alone in a private residence in [place], with unclear PPH on Geodon and Zoloft/Buspar by outpatient psychiatrist Dr. Vahe Russ, no currently substance abuse, no suicide attempts or self injurious behaviors as reported by patient, with PMHx of DM, bib police with altered mental status.  Patient was found in her neighbor's garden naked picking tomatoes.  Pt is dissheveled, alert and oriented to person and place but not date, poorly attentive, disorganized, wandering around ER.  Pt is prescribed Geodon/Zoloft/Buspar as outpatient, psychiatrist is on vacation.  No collaterals available.  Denies SI/HI, AVH, or paranoia. Was agitated in ER, required PRN medications.  Dx: Delirium 2/2 C.  Recs: 1. Hold Zoloft/Buspar.  C/w Geodon 60mg PO qhS.  2. PRN: Haldol 2.5mg IV q6h PRN severe agitation.  Monitor for QTc<500.  Melatonin 3mg PO qHS PRN insomnia.  3. Check: TSH, B12, folate, RPR, MRI brain, EEG.  F/u neuro recs.  4. Minimize use of benzos, opioids, anticholinergics, or other deliriogenic agents when possible.  Maintain sleep wake cycle.  Provide frequent reorientation and redirection.  5. CL psych will follow

## 2018-08-09 NOTE — DISCHARGE NOTE ADULT - PATIENT PORTAL LINK FT
You can access the [facility] Patient Portal, offered by [facility], by registering with the following website: [email or web address]

## 2018-08-09 NOTE — BEHAVIORAL HEALTH ASSESSMENT NOTE - HPI (INCLUDE ILLNESS QUALITY, SEVERITY, DURATION, TIMING, CONTEXT, MODIFYING FACTORS, ASSOCIATED SIGNS AND SYMPTOMS)
58 y/o  female, , childless, retired as a  at [employer], domiciled alone in a private residence in [place], with PPH of , no currently substance abuse, no suicide attempts or self injurious behaviors as reported by patient, with PMHx of DM, bib police with altered mental status.  Patient was found in her neighbor's garden naked picking tomatoes.  Police report patient's mental status has been declining since her 's death 5 years ago.  Patient is not answering any questions appropriately and seems to have flight of ideas.  Over the course of the night, patient was agitated and aggressive with 1:1 staff requiring doses of Haldol 5mg and Ativan 2mg x 2 doses.  Psychiatry consulted to assess for altered mental status.    Patient seen and evaluated in the ED, awake and alert, oriented to person and place ([facility]), disoriented to time- states it's Oct 20, 2018, when told correct date she responds with "not it's not!"  Reports that her neighbor "Derek" called the police on her to bring her into the hospital "because he was worried about me."  Patient unable to state events leading up to her ED visit and unable to state reasoning for her neighbor's concerns to call the police.  She reports living in a residence in [place], when asked if she lived with anyone, she states "sometimes".  Able to confirm that her  passed away 5 years ago.  Denies h/o suicide attempts and currently denies S/H/I/I/P, A/V/H, or thoughts of paranoia that people are after her.  She reports occasionally drinking 1 vodka, denies h/o withdrawals or DTs.  States that she sees an outpatient psychiatrist Dr. Russ, states she has followed with him for several years.  Patient unable to state what her psychiatric dx is.  Reports 1 prior inpatient psychiatric hospitalization, however is unable to give details about this and for what reason.  Attention/concentration impaired, unable to spell WORLD backwards.  Able to state current president is Yesi.    Patient provided a friend's name, Luciano Willoughby ([phone]) whom we called for collateral, however number is disconnected.  Attempted to call patient's outpt psychiatrist Dr. Vahe Russ ([phone]), however message states he is away until Aug 15th.  Left message for his covering doctor, Dr. Maria Esther Lester at [phone] for call back for collateral. 58 y/o  female, , childless, retired  at [employer], domiciled alone in a private residence in [place], with unclear PPH on Geodon and Zoloft/Buspar by outpatient psychiatrist Dr. Vahe Russ, no currently substance abuse, no suicide attempts or self injurious behaviors as reported by patient, with PMHx of DM, bib police with altered mental status.  Patient was found in her neighbor's garden naked picking tomatoes.  Per chart, police report patient's mental status has been declining since her 's death 5 years ago.  Patient is not answering any questions appropriately and seems to have flight of ideas.  Over the course of the night, patient was agitated and aggressive with 1:1 staff requiring doses of Haldol 5mg and Ativan 2mg x 2 doses.  Psychiatry consulted to assess for altered mental status.      Patient seen and evaluated in the ED, awake and alert, oriented to person and place ([facility]), disoriented to time- states it's Oct 20, 2018, when told correct date she responds with "not it's not!"  Reports that her neighbor "Derek" called the police on her to bring her into the hospital "because he was worried about me."  Patient unable to state events leading up to her ED visit and unable to state reasoning for her neighbor's concerns to call the police.  She reports living in a residence in [place], when asked if she lived with anyone, she states "sometimes".  Able to confirm that her  passed away 5 years ago.  Denies h/o suicide attempts and currently denies S/H/I/I/P, A/V/H, or thoughts of paranoia that people are after her.  She reports occasionally drinking 1 vodka, denies h/o withdrawals or DTs.  States that she sees an outpatient psychiatrist Dr. Russ, states she has followed with him for several years.  Patient unable to state what her psychiatric dx is.  Reports 1 prior inpatient psychiatric hospitalization, however is unable to give details about this and for what reason.  Attention/concentration impaired, unable to spell WORLD backwards.  Able to state current president is Trhector.      Patient provided a friend's name, Luciano Willoughby ([phone]) whom we called for collateral, however number is disconnected.  Attempted to call patient's outpt psychiatrist Dr. Vahe Russ ([phone]), however message states he is away until Aug 15th.  Left message for his covering doctor, Dr. Maria Esther Lester at [phone] for call back for collateral.

## 2018-08-09 NOTE — BEHAVIORAL HEALTH ASSESSMENT NOTE - OTHER
disheveled hair, wearing sunglasses inside, with necklace on with all tags attached and holding landline telephones brought from home

## 2018-08-09 NOTE — BEHAVIORAL HEALTH ASSESSMENT NOTE - NSBHCHARTREVIEWIMAGING_PSY_A_CORE FT
< from: CT Head No Cont (08.08.18 @ 16:58) >    EXAM:  CT BRAIN                            PROCEDURE DATE:  08/08/2018            INTERPRETATION:  Noncontrast CT of the brain.    CLINICAL INDICATION:  Altered mental status, confusion    TECHNIQUE : Axial CT scanning of the brain was obtained from the skull   base to the vertex without the administration of intravenous contrast.      COMPARISON: None available    FINDINGS:  The ventricles are mildly prominent, this may be on the basis   of age-appropriate atrophy. The presence of mild normal pressure   hydrocephalus is not entirely excluded.    No focal mass effect, midline shift, vasogenic edema, or evidence of   acute territorial infarct. Nonspecific moderate to severe white matter   lucency.    The visualized paranasal sinuses and mastoid air cells are clear.    IMPRESSION: No acute intracranial hemorrhage, mass effect, vasogenic   edema, or evidence of acute territorial infarct.    Ventricles mildly prominent, this may be on the basis of age-appropriate   atrophy. The presence of mild normal pressure hydrocephalus is not   entirely excluded.    Nonspecific moderate to severe white matter lucency, differential   diagnosis includes white matter microvascular ischemic disease (advanced   for the patient's stated age), demyelinating disease, as well as   infectious, inflammatory, post infectious/inflammatory processes.          < end of copied text >

## 2018-08-09 NOTE — PROVIDER CONTACT NOTE (FALL NOTIFICATION) - SITUATION
pt walking with PCA on 1:1 observation and sat on chair and slid on to floor. denies LOC or pain. in NAD. pt assisted back to bed w/ staff.

## 2018-08-09 NOTE — BEHAVIORAL HEALTH ASSESSMENT NOTE - OTHER PAST PSYCHIATRIC HISTORY (INCLUDE DETAILS REGARDING ONSET, COURSE OF ILLNESS, INPATIENT/OUTPATIENT TREATMENT)
follows with outpt psychiatrist Dr. Vahe Russ follows with outpt psychiatrist Dr. Vahe Russ  -- unclear history

## 2018-08-09 NOTE — PROGRESS NOTE ADULT - ASSESSMENT
57F presents with altered mental status.  Patient was found in her neighbor's garden naked picking tomatoes.  Police report patient's mental status has been declining since her 's death 5 years ago.  Patient is not answering any questions appropriately and seems to have flight of ideas.     Problem/Plan - 1:  ·  Problem:  Altered mental status, unspecified altered mental status type.  Plan: Exact cause not known.  Neurology and Psychiatry following . Will Order MRI in Am . CT head noted  ?NPH .      Problem/Plan - 2:  ·  Problem: HTN (hypertension).  Plan: Bp readings fine.      Problem/Plan - 3:  ·  Problem: Edema leg.  Plan: Will check TTE and Doppler legs to R/O DVT . Started on Lasix .      Problem/Plan - 4:  ·  Problem: Hyponatremia.  Plan: Renal helping.

## 2018-08-09 NOTE — BEHAVIORAL HEALTH ASSESSMENT NOTE - NSBHCONSULTMEDS_PSY_A_CORE FT
Continue Geodon 60mg po daily (monitor qtc <500ms on ekg) Continue Geodon 60mg po daily (monitor qtc <500ms on ekg), hold Zoloft and Buspar

## 2018-08-09 NOTE — BEHAVIORAL HEALTH ASSESSMENT NOTE - NSBHCHARTREVIEWINVESTIGATE_PSY_A_CORE FT
< from: 12 Lead ECG (08.08.18 @ 15:55) >      Ventricular Rate 63 BPM    Atrial Rate 63 BPM    P-R Interval 154 ms    QRS Duration 78 ms    Q-T Interval 426 ms    QTC Calculation(Bezet) 435 ms    R Axis 4 degrees    T Axis 34 degrees    Diagnosis Line NORMAL SINUS RHYTHM  NORMAL ECG    Confirmed by ATTENDING, ED ([id number]),  TARUN REYEZ ([id number]) on 8/9/2018 5:14:16 AM    < end of copied text >

## 2018-08-09 NOTE — BEHAVIORAL HEALTH ASSESSMENT NOTE - NSBHCONSULTRECOMMENDOTHER_PSY_A_CORE FT
1. Continue Geodon 60mg po daily (monitor qtc <500ms on ekg)    2. PRN: Haldol 2.5mg po/im/iv prn q6 for agitation (monitor qtc <500ms on ekg)    3. Check:  B12, folate, RPR; MRI of head    4. Neurology work up; eeg     6. Minimize use of benzos, opioids, anticholinergics, or other deliriogenic agents when possible.  Maintain sleep wake cycle.  Provide frequent reorientation and redirection.  Family member at bedside if possible. Assess for need for glasses and hearing aid (if applicable).

## 2018-08-09 NOTE — BEHAVIORAL HEALTH ASSESSMENT NOTE - NSBHCHARTREVIEWVS_PSY_A_CORE FT
Vital Signs Last 24 Hrs  T(C): 36.6 (09 Aug 2018 07:18), Max: 37.1 (08 Aug 2018 14:58)  T(F): 97.9 (09 Aug 2018 07:18), Max: 98.8 (08 Aug 2018 14:58)  HR: 61 (09 Aug 2018 07:18) (51 - 69)  BP: 130/72 (09 Aug 2018 07:18) (110/62 - 134/66)  BP(mean): --  RR: 18 (09 Aug 2018 07:18) (17 - 18)  SpO2: 99% (09 Aug 2018 07:18) (96% - 99%)

## 2018-08-09 NOTE — CHART NOTE - NSCHARTNOTEFT_GEN_A_CORE
Notified by RN for pt s/p fall this AM. Pt seen and examined with PCA at bedside. Pt presented to ER overnight with AMS, found in neighbors yard w/o clothes picking tomatoes. Pt now s/p mechanical fall after walking with PCA and attempting to sit in a chair that rolled out from under her. Pt fell over chair and slid down to the floor. Pt did not strike her head or extremitie Reliability of ROS limited 2/2 underlying AMS (baseline from presentation A&O1-2) Pt denies LOC, HA, change in vision, CP, back pain, SOB, difficulty ambulating, fever, and chills.    Vital Signs Last 24 Hrs  T(C): 36.6 (09 Aug 2018 06:11), Max: 37.1 (08 Aug 2018 14:58)  T(F): 97.9 (09 Aug 2018 06:11), Max: 98.8 (08 Aug 2018 14:58)  HR: 55 (09 Aug 2018 06:11) (51 - 69)  BP: 111/67 (09 Aug 2018 06:11) (110/62 - 134/66)  RR: 18 (09 Aug 2018 06:11) (17 - 18)  SpO2: 96% (09 Aug 2018 06:11) (96% - 99%)      PE    Genearl: Notified by RN for pt s/p fall this AM. Pt seen and examined with PCA at bedside. Pt presented to ER overnight with AMS, found in neighbors yard w/o clothes picking tomatoes. Pt now s/p mechanical fall after walking with PCA and attempting to sit in a chair that rolled out from under her. Pt states she has "pain" on her right foot.  Pt fell over chair and slid down to the floor. Pt did not strike her head or extremities Reliability of ROS limited 2/2 underlying AMS (baseline from presentation A&O1-2) Pt denies LOC, HA, change in vision, CP, back pain, SOB, difficulty ambulating, fever, and chills.    Vital Signs Last 24 Hrs  T(C): 36.6 (09 Aug 2018 06:11), Max: 37.1 (08 Aug 2018 14:58)  T(F): 97.9 (09 Aug 2018 06:11), Max: 98.8 (08 Aug 2018 14:58)  HR: 55 (09 Aug 2018 06:11) (51 - 69)  BP: 111/67 (09 Aug 2018 06:11) (110/62 - 134/66)  RR: 18 (09 Aug 2018 06:11) (17 - 18)  SpO2: 96% (09 Aug 2018 06:11) (96% - 99%)      PE    General: NAD, Sitting upright in bed  Cardiac: S1,S2, No murmurs appreciated  GI: Soft, nontender, +BS x 4  Neuro: Nonfocal  Skin: L thigh hematoma approximately 7cm. Compartments soft. Warm, dry.  Extremities: Full ROM all 4.       A/P    57F presents with altered mental status.  Patient was found in her neighbor's garden naked picking tomatoes.  Police report patient's mental status has been declining since her 's death 5 years ago.  Pt now s/p fall this AM. Pt did not strike her head / extremities. Found to have L thigh hematoma ( ? chronic ) and now c/o R foot pain along the lateral aspect.     1) S/p fall  - Fall precautions  - X-ray hip / pelvis, L femur, and R foot  - Frequent reorientation   - Continue 1:1  - No contact information for family members in [place], please f/u   - Case discussed with Dr. Steve  - f/u psych recommendations this AM    Vahe Sharma PA-C  Department of Medicine  [id number]

## 2018-08-09 NOTE — CONSULT NOTE ADULT - PROBLEM SELECTOR RECOMMENDATION 9
labs reviewed. na level 129  considiering highvol status , serum cl low - add salt tab 1 gm three times per day with lasix 40mg two times per day,   - recheck basic metabolic panel, serum osm, urine na, urine osm, tsh   - avoid hctz

## 2018-08-09 NOTE — BEHAVIORAL HEALTH ASSESSMENT NOTE - NSBHMEDSOTHERFT_PSY_A_CORE
Zoloft 100mgpo daily, Buspar 10mg tid, Geodon 60mg daily, Cardizem XR 300mg daily, Lasix 20mg daily, Toprol XL 100mg daily, Zocor 20mg daily, Synthroid 50mcg daily, metformin  bid- verified by patient's pharmacy [facility] [phone]

## 2018-08-09 NOTE — CONSULT NOTE ADULT - ASSESSMENT
·	hyponatremia with hypokalemia- data- serum na 129, Volume Status : high- bilat lower ext swelling, ? use of diuretics- ? hctz not sure, differential includes but not limited due to hypervolemic type hyponatremia, ? congestive heart failure, low thyroid, ? not sure if take antidepressent-  ·	hypokalemia- ? poor intake vs diuretics
58 yo woman who is brought into [facility] via police after patient was found neighbor's yard naked, reported picking out tomatoes from a vegetable garden. Per police, patient has a history of declining mental function since 's death 5 years ago. History and ROS otherwise limited due to patient's current cognitive condition. Neurology consulted at the request of admitting hospitalist for further evaluation. Psych consulted as well.

## 2018-08-09 NOTE — DISCHARGE NOTE ADULT - HOSPITAL COURSE
Summary (include case differential, formulation and patient response to therapy): 56 y/o  female, , childless, retired  at [employer], domiciled alone in a private residence in [place], with PPH schizophrenia, alcohol abuse stable on OP psych meds for many years per psychiatrist, last psych admission >15 y/a, also with h/o ETOH abuse with suspected relapse per friend, no suicide attempts or self injurious behaviors as reported by patient, with PMHx of DM, bib police with altered mental status.  Patient was found in her neighbor's garden naked picking tomatoes.      Spoke with OP psychiatrist Dr. Russ who saw pt last month, describes her as chronic schizophrenic stable on meds for years, no baseline dementia, living independently, and states he has received multiple calls from her here in the hospital, this is an acute change in mental status.      Per nursing, patient continues to have poor sleep and wanders around her room/in the hallways. She required PRN Ativan and Haldol this AM for agitation. Today, patient continues to be floridly psychotic, disorganized, with paranoid delusions towards hospital staff and irritability. She is frustrated that she is still in the hospital. Patient is currently on Zyprexa 15mg QHS.  Will recommend increasing Zyprexa to 25mg (if QTC <470) and switching to Zyprexa Zydis due to concern for potentially cheeking meds in the hospital. Recommend ordering lipid panel. If patient continues to have poor sleep, can start Remeron 7.5mg PO qHS. After stabilizing medical issues, recommend patient be transferred to inpatient psychiatry.     Risk Assessment (consider static vs modifiable risk factors and protective factors; comment on level of risk for dangerous behavior): Risk factors: h/o unclear psychiatric history, possible noncompliance with treatment, unable to care for self 2/2 psychiatric illness, , no children, lives alone, acute and chronic medical conditions.    Protective factors: no current SIIP/HIIP, h/o of outpt psychiatric treatment.    CONSULT OR INPATIENT:    Consult Follow Up or Inpatient Progress:  · Level of Observation	Constant observation  · Reason For Recommendation	Patient is grossly disorganized, intermittently agitated  · Further Diagnostic Workup Recommended	yes  · Recommended	labs; Lipid panel  · Psychiatric Standing Medications	Recommendations:     1. Continue CO 1:1 for disorganization    2. Increase Zyprexa to 25mg QHS (if QTC <470) and change to Zyprexa Zydis.     3. Start Klonopin 0.5mg PO bid.  Start Remeron 7.5mg QHS     4. F/u FLP, EKG.  F/u neuro recs.    5. PRN: Haldol 5mg IV q6h PRN severe agitation, Ativan 2mg PO PRN for agitation. Monitor for QTc<500.  Melatonin 3mg PO qHS PRN insomnia.      6. CL psych will f/u

## 2018-08-09 NOTE — CONSULT NOTE ADULT - PROBLEM SELECTOR RECOMMENDATION 2
supplement Potassium Chloride 40 meq x 1 dose, monitor serum k  repleat Potassium Chloride 20 meq daily

## 2018-08-10 LAB
ANION GAP SERPL CALC-SCNC: 12 MMOL/L — SIGNIFICANT CHANGE UP (ref 5–17)
BASOPHILS # BLD AUTO: 0.01 K/UL — SIGNIFICANT CHANGE UP (ref 0–0.2)
BASOPHILS NFR BLD AUTO: 0.1 % — SIGNIFICANT CHANGE UP (ref 0–2)
BUN SERPL-MCNC: 5 MG/DL — LOW (ref 7–23)
CALCIUM SERPL-MCNC: 9.2 MG/DL — SIGNIFICANT CHANGE UP (ref 8.4–10.5)
CHLORIDE SERPL-SCNC: 94 MMOL/L — LOW (ref 96–108)
CO2 SERPL-SCNC: 24 MMOL/L — SIGNIFICANT CHANGE UP (ref 22–31)
CREAT SERPL-MCNC: 0.56 MG/DL — SIGNIFICANT CHANGE UP (ref 0.5–1.3)
EOSINOPHIL # BLD AUTO: 0.03 K/UL — SIGNIFICANT CHANGE UP (ref 0–0.5)
EOSINOPHIL NFR BLD AUTO: 0.4 % — SIGNIFICANT CHANGE UP (ref 0–6)
GLUCOSE BLDC GLUCOMTR-MCNC: 116 MG/DL — HIGH (ref 70–99)
GLUCOSE BLDC GLUCOMTR-MCNC: 95 MG/DL — SIGNIFICANT CHANGE UP (ref 70–99)
GLUCOSE BLDC GLUCOMTR-MCNC: 96 MG/DL — SIGNIFICANT CHANGE UP (ref 70–99)
GLUCOSE BLDC GLUCOMTR-MCNC: 96 MG/DL — SIGNIFICANT CHANGE UP (ref 70–99)
GLUCOSE SERPL-MCNC: 113 MG/DL — HIGH (ref 70–99)
HBA1C BLD-MCNC: 5.6 % — SIGNIFICANT CHANGE UP (ref 4–5.6)
HCT VFR BLD CALC: 36.1 % — SIGNIFICANT CHANGE UP (ref 34.5–45)
HGB BLD-MCNC: 12.1 G/DL — SIGNIFICANT CHANGE UP (ref 11.5–15.5)
IMM GRANULOCYTES NFR BLD AUTO: 0.7 % — SIGNIFICANT CHANGE UP (ref 0–1.5)
LYMPHOCYTES # BLD AUTO: 0.66 K/UL — LOW (ref 1–3.3)
LYMPHOCYTES # BLD AUTO: 9.6 % — LOW (ref 13–44)
MAGNESIUM SERPL-MCNC: 1.6 MG/DL — SIGNIFICANT CHANGE UP (ref 1.6–2.6)
MCHC RBC-ENTMCNC: 29 PG — SIGNIFICANT CHANGE UP (ref 27–34)
MCHC RBC-ENTMCNC: 33.5 GM/DL — SIGNIFICANT CHANGE UP (ref 32–36)
MCV RBC AUTO: 86.6 FL — SIGNIFICANT CHANGE UP (ref 80–100)
MONOCYTES # BLD AUTO: 0.61 K/UL — SIGNIFICANT CHANGE UP (ref 0–0.9)
MONOCYTES NFR BLD AUTO: 8.9 % — SIGNIFICANT CHANGE UP (ref 2–14)
NEUTROPHILS # BLD AUTO: 5.48 K/UL — SIGNIFICANT CHANGE UP (ref 1.8–7.4)
NEUTROPHILS NFR BLD AUTO: 80.3 % — HIGH (ref 43–77)
PLATELET # BLD AUTO: 319 K/UL — SIGNIFICANT CHANGE UP (ref 150–400)
POTASSIUM SERPL-MCNC: 3.7 MMOL/L — SIGNIFICANT CHANGE UP (ref 3.5–5.3)
POTASSIUM SERPL-SCNC: 3.7 MMOL/L — SIGNIFICANT CHANGE UP (ref 3.5–5.3)
RBC # BLD: 4.17 M/UL — SIGNIFICANT CHANGE UP (ref 3.8–5.2)
RBC # FLD: 13.3 % — SIGNIFICANT CHANGE UP (ref 10.3–14.5)
SODIUM SERPL-SCNC: 130 MMOL/L — LOW (ref 135–145)
T PALLIDUM AB TITR SER: NEGATIVE — SIGNIFICANT CHANGE UP
WBC # BLD: 6.84 K/UL — SIGNIFICANT CHANGE UP (ref 3.8–10.5)
WBC # FLD AUTO: 6.84 K/UL — SIGNIFICANT CHANGE UP (ref 3.8–10.5)

## 2018-08-10 PROCEDURE — 99232 SBSQ HOSP IP/OBS MODERATE 35: CPT

## 2018-08-10 PROCEDURE — 73521 X-RAY EXAM HIPS BI 2 VIEWS: CPT | Mod: 26

## 2018-08-10 PROCEDURE — 73630 X-RAY EXAM OF FOOT: CPT | Mod: 26,RT

## 2018-08-10 PROCEDURE — 73551 X-RAY EXAM OF FEMUR 1: CPT | Mod: 26,LT

## 2018-08-10 RX ORDER — LANOLIN ALCOHOL/MO/W.PET/CERES
3 CREAM (GRAM) TOPICAL AT BEDTIME
Qty: 0 | Refills: 0 | Status: DISCONTINUED | OUTPATIENT
Start: 2018-08-10 | End: 2018-08-21

## 2018-08-10 RX ORDER — LANOLIN ALCOHOL/MO/W.PET/CERES
3 CREAM (GRAM) TOPICAL ONCE
Qty: 0 | Refills: 0 | Status: COMPLETED | OUTPATIENT
Start: 2018-08-10 | End: 2018-08-10

## 2018-08-10 RX ADMIN — Medication 20 MILLIEQUIVALENT(S): at 12:26

## 2018-08-10 RX ADMIN — Medication 40 MILLIGRAM(S): at 05:08

## 2018-08-10 RX ADMIN — ZIPRASIDONE HYDROCHLORIDE 60 MILLIGRAM(S): 20 CAPSULE ORAL at 10:31

## 2018-08-10 RX ADMIN — Medication 50 MICROGRAM(S): at 05:08

## 2018-08-10 RX ADMIN — ENOXAPARIN SODIUM 40 MILLIGRAM(S): 100 INJECTION SUBCUTANEOUS at 12:26

## 2018-08-10 RX ADMIN — Medication 40 MILLIGRAM(S): at 17:04

## 2018-08-10 RX ADMIN — Medication 3 MILLIGRAM(S): at 00:41

## 2018-08-10 RX ADMIN — SODIUM CHLORIDE 1 GRAM(S): 9 INJECTION INTRAMUSCULAR; INTRAVENOUS; SUBCUTANEOUS at 16:03

## 2018-08-10 RX ADMIN — SODIUM CHLORIDE 1 GRAM(S): 9 INJECTION INTRAMUSCULAR; INTRAVENOUS; SUBCUTANEOUS at 05:08

## 2018-08-10 RX ADMIN — SODIUM CHLORIDE 1 GRAM(S): 9 INJECTION INTRAMUSCULAR; INTRAVENOUS; SUBCUTANEOUS at 21:19

## 2018-08-10 RX ADMIN — Medication 3 MILLIGRAM(S): at 21:19

## 2018-08-10 NOTE — PROGRESS NOTE ADULT - ASSESSMENT
57F presents with altered mental status.  Patient was found in her neighbor's garden naked picking tomatoes.  Police report patient's mental status has been declining since her 's death 5 years ago.  Patient is not answering any questions appropriately and seems to have flight of ideas.     Problem/Plan - 1:  ·  Problem:  Altered mental status, unspecified altered mental status type.  Plan: Exact cause not known.  Neurology and Psychiatry following .MRI pending. . CT head noted  ?NPH .      Problem/Plan - 2:  ·  Problem: HTN (hypertension).  Plan: Bp readings fine.      Problem/Plan - 3:  ·  Problem: Edema leg.  Plan: Will check TTE and Doppler legs to R/O DVT . Started on Lasix .      Problem/Plan - 4:  ·  Problem: Hyponatremia.  Plan: Renal helping.

## 2018-08-10 NOTE — PROGRESS NOTE ADULT - SUBJECTIVE AND OBJECTIVE BOX
Patient seen and examined  no complaints  per pca pt keeps drinking water    Allergy Status Unknown    Hospital Medications:   MEDICATIONS  (STANDING):  dextrose 5%. 1000 milliLiter(s) (50 mL/Hr) IV Continuous <Continuous>  dextrose 50% Injectable 12.5 Gram(s) IV Push once  dextrose 50% Injectable 25 Gram(s) IV Push once  dextrose 50% Injectable 25 Gram(s) IV Push once  enoxaparin Injectable 40 milliGRAM(s) SubCutaneous daily  furosemide    Tablet 40 milliGRAM(s) Oral two times a day  insulin lispro (HumaLOG) corrective regimen sliding scale   SubCutaneous three times a day before meals  insulin lispro (HumaLOG) corrective regimen sliding scale   SubCutaneous at bedtime  levothyroxine 50 MICROGram(s) Oral daily  potassium chloride    Tablet ER 20 milliEquivalent(s) Oral daily  sodium chloride 1 Gram(s) Oral three times a day  ziprasidone 60 milliGRAM(s) Oral <User Schedule>        VITALS:  T(F): 98.1 (08-10-18 @ 12:05), Max: 98.1 (18 @ 21:17)  HR: 86 (08-10-18 @ 12:05)  BP: 144/85 (08-10-18 @ 12:05)  RR: 18 (08-10-18 @ 12:05)  SpO2: 100% (08-10-18 @ 12:05)  Wt(kg): --     @ 07:  -  08-10 @ 07:00  --------------------------------------------------------  IN: 1970 mL / OUT: 0 mL / NET: 1970 mL    08-10 @ 07:01  -  08-10 @ 12:54  --------------------------------------------------------  IN: 720 mL / OUT: 0 mL / NET: 720 mL      REVIEW OF SYSTEMS: not reliable  CONSTITUTIONAL: + weakness, no fever  EYES/ENT: No visual changes;  No vertigo or throat pain   NECK: No pain or stiffness  RESPIRATORY: No cough, wheezing, hemoptysis; No shortness of breath  CARDIOVASCULAR: No chest pain or palpitations.  GASTROINTESTINAL: No abdominal or epigastric pain. No nausea, vomiting, or hematemesis; No diarrhea or constipation. No melena or hematochezia.  GENITOURINARY: No dysuria, frequency, foamy urine, urinary urgency, incontinence or hematuria  NEUROLOGICAL: No numbness or weakness  SKIN: No itching, burning, rashes, or lesions   VASCULAR: + bilateral lower extremity edema.   All other review of systems is negative unless indicated above.    LABS:  08-10    130<L>  |  94<L>  |  5<L>  ----------------------------<  113<H>  3.7   |  24  |  0.56    Ca    9.2      10 Aug 2018 11:37  Mg     1.6     08-10    TPro  7.7  /  Alb  4.4  /  TBili  0.6  /  DBili      /  AST  29  /  ALT  34  /  AlkPhos  97      Creatinine Trend: 0.56 <--, 0.57 <--, 0.81 <--, 0.77 <--                        11.0   8.51  )-----------( 251      ( 09 Aug 2018 08:27 )             31.1     Urine Studies:  Urinalysis Basic - ( 09 Aug 2018 14:04 )    Color: Yellow / Appearance: Clear / S.010 / pH:   Gluc:  / Ketone: Negative  / Bili: Negative / Urobili: Negative mg/dL   Blood:  / Protein: Trace mg/dL / Nitrite: Negative   Leuk Esterase: Large / RBC: 1 /HPF / WBC 6 /HPF   Sq Epi:  / Non Sq Epi: 1 /HPF / Bacteria: Negative      Sodium, Random Urine: <20 mmol/L ( @ 19:15)  Osmolality, Random Urine: 60 mos/kg ( @ 19:15)  Potassium, Random Urine: 5 mmol/L ( @ 19:15)  Chloride, Random Urine: <35 mmol/L ( @ 19:15)    RADIOLOGY & ADDITIONAL STUDIES:

## 2018-08-10 NOTE — PROGRESS NOTE BEHAVIORAL HEALTH - OTHER
disheveled hair, wearing sunglasses inside, with necklace on with all tags attached and holding landline telephones brought from home disheveled hair, wearing sunglasses on her head

## 2018-08-10 NOTE — PROGRESS NOTE ADULT - ASSESSMENT
·	hyponatremia with hypokalemia- data- serum na 129, Volume Status : high- bilat lower ext swelling, ? use of diuretics- ? hctz not sure, differential includes but not limited due to hypervolemic type hyponatremia, ? congestive heart failure, low thyroid, ? not sure if take antidepressent-  ·	hypokalemia- ? poor intake vs diuretics

## 2018-08-10 NOTE — PROGRESS NOTE ADULT - SUBJECTIVE AND OBJECTIVE BOX
Subjective:  pt seen and examined, no complaints on exam.   no chest pain or palpitation on exam     dextrose 40% Gel 15 Gram(s) Oral once PRN  dextrose 5%. 1000 milliLiter(s) IV Continuous <Continuous>  dextrose 50% Injectable 12.5 Gram(s) IV Push once  dextrose 50% Injectable 25 Gram(s) IV Push once  dextrose 50% Injectable 25 Gram(s) IV Push once  enoxaparin Injectable 40 milliGRAM(s) SubCutaneous daily  furosemide    Tablet 40 milliGRAM(s) Oral two times a day  glucagon  Injectable 1 milliGRAM(s) IntraMuscular once PRN  haloperidol    Injectable 2.5 milliGRAM(s) IV Push every 6 hours PRN  insulin lispro (HumaLOG) corrective regimen sliding scale   SubCutaneous three times a day before meals  insulin lispro (HumaLOG) corrective regimen sliding scale   SubCutaneous at bedtime  levothyroxine 50 MICROGram(s) Oral daily  potassium chloride    Tablet ER 20 milliEquivalent(s) Oral daily  sodium chloride 1 Gram(s) Oral three times a day  ziprasidone 60 milliGRAM(s) Oral <User Schedule>                            11.0   8.51  )-----------( 251      ( 09 Aug 2018 08:27 )             31.1       Hemoglobin: 11.0 g/dL (08-09 @ 08:27)  Hemoglobin: 12.5 g/dL (08-08 @ 16:18)      08-09    131<L>  |  94<L>  |  8   ----------------------------<  139<H>  4.9   |  21<L>  |  0.57    Ca    9.0      09 Aug 2018 19:15    TPro  7.7  /  Alb  4.4  /  TBili  0.6  /  DBili  x   /  AST  29  /  ALT  34  /  AlkPhos  97  08-08    Creatinine Trend: 0.57<--, 0.81<--, 0.77<--    COAGS:           T(C): 36.7 (08-10-18 @ 04:51), Max: 36.7 (08-09-18 @ 21:17)  HR: 76 (08-10-18 @ 04:51) (55 - 76)  BP: 151/77 (08-10-18 @ 04:51) (97/61 - 151/77)  RR: 18 (08-10-18 @ 04:51) (18 - 18)  SpO2: 100% (08-10-18 @ 04:51) (96% - 100%)  Wt(kg): --    I&O's Summary    09 Aug 2018 07:01  -  10 Aug 2018 06:04  --------------------------------------------------------  IN: 1320 mL / OUT: 0 mL / NET: 1320 mL      Appearance: Normal	  HEENT:   Normal oral mucosa, PERRL, EOMI	  Lymphatic: No lymphadenopathy , no edema  Cardiovascular: Normal S1 S2, No JVD, No murmurs , Peripheral pulses palpable 2+ bilaterally  Respiratory: Lungs clear to auscultation, normal effort 	  Gastrointestinal:  Soft, Non-tender, + BS	  Skin: No rashes, No ecchymoses, No cyanosis, warm to touch  Musculoskeletal: Normal range of motion, normal strength  Psychiatry:  Mood & affect appropriate    TELEMETRY: 	  none    [ ] Echocardiogram:   [ ]  Catheterization:  [ ] Stress Test:    OTHER: 	  Duplex : < from: [facility] Duplex Lower Ext Vein Scan, Bilat (08.09.18 @ 08:48) >  IMPRESSION:     No evidenceof bilateral lower extremity deep venous thrombosis.      < end of copied text >        ASSESSMENT/PLAN: 	57y Female with h/o DM with no known h/o CAD/MI presents with altered mental status.   Patient was brought in yesterday by police after being found in her neighbor's garden naked picking tomatoes.  Her mental status has reportedly been declining since her 's death 5 years ago.  Of note, though the patient is ambulatory, she had a witnessed mechanical fall in the ER when she slipped from a rolling chair and slid to the floor. There was no loss of consciousness and no apparent prodromal symptoms.  She received Ativan at 6am and when this interview took place at 8am she was calm and answering questions. Cardiology is called to assess for possible aortic stenosis murmur on exam. The patient denies any anginal symptoms, dyspnea, palpitations, syncope, near syncope or previous cardiac work up.        -- check TTE to r/o structural heart disease pending   -- CT head with no acute/chronic CVA/mass/bleed  -- HD stable with no evidence of CHF  --  GI / DVT prophylaxis,  keep K>4, mag >2.0   -- further plans post echo   --  cont synthroid   -- D/W Dr Givens

## 2018-08-10 NOTE — PROGRESS NOTE BEHAVIORAL HEALTH - NSBHCHARTREVIEWVS_PSY_A_CORE FT
Vital Signs Last 24 Hrs  T(C): 36.7 (10 Aug 2018 12:05), Max: 36.7 (09 Aug 2018 21:17)  T(F): 98.1 (10 Aug 2018 12:05), Max: 98.1 (09 Aug 2018 21:17)  HR: 86 (10 Aug 2018 12:05) (63 - 86)  BP: 144/85 (10 Aug 2018 12:05) (97/61 - 151/77)  BP(mean): --  RR: 18 (10 Aug 2018 12:05) (18 - 18)  SpO2: 100% (10 Aug 2018 12:05) (99% - 100%)

## 2018-08-10 NOTE — PROGRESS NOTE BEHAVIORAL HEALTH - NSBHCONSULTRECOMMENDOTHER_PSY_A_CORE FT
1. Continue Geodon 60mg po daily (monitor qtc <500ms on ekg)    2. PRN: Haldol 2.5mg po/im/iv prn q6 for agitation (monitor qtc <500ms on ekg)    3. Check:  B12, folate, RPR; MRI of head    4. Neurology work up; eeg     6. Minimize use of benzos, opioids, anticholinergics, or other deliriogenic agents when possible.  Maintain sleep wake cycle.  Provide frequent reorientation and redirection.  Family member at bedside if possible. Assess for need for glasses and hearing aid (if applicable). 1. Continue Geodon 60mg po daily (monitor qtc <500ms on ekg)    2. PRN: Haldol 2.5mg po/im/iv prn q6 for agitation (monitor qtc <500ms on ekg)    3. Check:  B12, folate- pending results, RPR; MRI of head    4. Neurology work up; eeg     6. Minimize use of benzos, opioids, anticholinergics, or other deliriogenic agents when possible.  Maintain sleep wake cycle.  Provide frequent reorientation and redirection.  Family member at bedside if possible. Assess for need for glasses and hearing aid (if applicable).

## 2018-08-10 NOTE — PROGRESS NOTE ADULT - SUBJECTIVE AND OBJECTIVE BOX
Seen yesterday but note was missed   INTERVAL HPI/OVERNIGHT EVENTS: No new concerns.     Hospital Medications:   MEDICATIONS  (STANDING):  dextrose 5%. 1000 milliLiter(s) (50 mL/Hr) IV Continuous <Continuous>  dextrose 50% Injectable 12.5 Gram(s) IV Push once  dextrose 50% Injectable 25 Gram(s) IV Push once  dextrose 50% Injectable 25 Gram(s) IV Push once  enoxaparin Injectable 40 milliGRAM(s) SubCutaneous daily  furosemide    Tablet 40 milliGRAM(s) Oral two times a day  insulin lispro (HumaLOG) corrective regimen sliding scale   SubCutaneous three times a day before meals  insulin lispro (HumaLOG) corrective regimen sliding scale   SubCutaneous at bedtime  levothyroxine 50 MICROGram(s) Oral daily  potassium chloride    Tablet ER 20 milliEquivalent(s) Oral daily  sodium chloride 1 Gram(s) Oral three times a day  ziprasidone 60 milliGRAM(s) Oral <User Schedule>        VITALS:  T(F): 98.1 (08-10-18 @ 12:05), Max: 98.1 (18 @ 21:17)  HR: 86 (08-10-18 @ 12:05)  BP: 144/85 (08-10-18 @ 12:05)  RR: 18 (08-10-18 @ 12:05)  SpO2: 100% (08-10-18 @ 12:05)  Wt(kg): --     @ 07:  -  08-10 @ 07:00  --------------------------------------------------------  IN: 1970 mL / OUT: 0 mL / NET: 1970 mL    08-10 @ 07:01  -  08-10 @ 12:54  --------------------------------------------------------  IN: 720 mL / OUT: 0 mL / NET: 720 mL      REVIEW OF SYSTEMS: not reliable  CONSTITUTIONAL: + weakness, no fever  EYES/ENT: No visual changes;  No vertigo or throat pain   NECK: No pain or stiffness  RESPIRATORY: No cough, wheezing, hemoptysis; No shortness of breath  CARDIOVASCULAR: No chest pain or palpitations.  GASTROINTESTINAL: No abdominal or epigastric pain. No nausea, vomiting, or hematemesis; No diarrhea or constipation. No melena or hematochezia.  GENITOURINARY: No dysuria, frequency, foamy urine, urinary urgency, incontinence or hematuria  NEUROLOGICAL: No numbness or weakness  SKIN: No itching, burning, rashes, or lesions   VASCULAR: + bilateral lower extremity edema.   All other review of systems is negative unless indicated above.    LABS:  08-10    130<L>  |  94<L>  |  5<L>  ----------------------------<  113<H>  3.7   |  24  |  0.56    Ca    9.2      10 Aug 2018 11:37  Mg     1.6     08-10    TPro  7.7  /  Alb  4.4  /  TBili  0.6  /  DBili      /  AST  29  /  ALT  34  /  AlkPhos  97      Creatinine Trend: 0.56 <--, 0.57 <--, 0.81 <--, 0.77 <--                        11.0   8.51  )-----------( 251      ( 09 Aug 2018 08:27 )             31.1     Urine Studies:  Urinalysis Basic - ( 09 Aug 2018 14:04 )    Color: Yellow / Appearance: Clear / S.010 / pH:   Gluc:  / Ketone: Negative  / Bili: Negative / Urobili: Negative mg/dL   Blood:  / Protein: Trace mg/dL / Nitrite: Negative   Leuk Esterase: Large / RBC: 1 /HPF / WBC 6 /HPF   Sq Epi:  / Non Sq Epi: 1 /HPF / Bacteria: Negative      Sodium, Random Urine: <20 mmol/L ( @ 19:15)  Osmolality, Random Urine: 60 mos/kg ( @ 19:15)  Potassium, Random Urine: 5 mmol/L ( @ :15)  Chloride, Random Urine: <35 mmol/L ( @ :15)      Consultant(s) Notes Reviewed:  [x ] YES  [ ] NO    PHYSICAL EXAM:  GENERAL: NAD, well-groomed, well-developed, not in any distress ,  HEAD:  Atraumatic, Normocephalic  EYES: EOMI, PERRLA, conjunctiva and sclera clear  ENMT: No tonsillar erythema, exudates, or enlargement; Moist mucous membranes, Good dentition, No lesions  NECK: Supple, No JVD, Normal thyroid  NERVOUS SYSTEM:  Alert & Oriented X2, No focal deficit   CHEST/LUNG: Good air entry bilateral with no  rales, rhonchi, wheezing, or rubs  HEART: Regular rate and rhythm; SSM+  ABDOMEN: Soft, Nontender, Nondistended; Bowel sounds present  EXTREMITIES:  2+ Peripheral Pulses, No clubbing, cyanosis, but 2+edema  SKIN: No rashes or lesions    Care Discussed with Consultants/Other Providers [ x] YES  [ ] NO

## 2018-08-10 NOTE — PROGRESS NOTE BEHAVIORAL HEALTH - NSBHCHARTREVIEWINVESTIGATE_PSY_A_CORE FT
< from: 12 Lead ECG (08.08.18 @ 15:55) >      Ventricular Rate 63 BPM    Atrial Rate 63 BPM    P-R Interval 154 ms    QRS Duration 78 ms    Q-T Interval 426 ms    QTC Calculation(Bezet) 435 ms    R Axis 4 degrees    T Axis 34 degrees    Diagnosis Line NORMAL SINUS RHYTHM  NORMAL ECG    Confirmed by ATTENDING, ED ([id number]),  TARUN REYEZ ([id number]) on 8/9/2018 5:14:16 AM    < end of copied text >

## 2018-08-10 NOTE — PROGRESS NOTE BEHAVIORAL HEALTH - NSBHFUPINTERVALHXFT_PSY_A_CORE
Patient seen and evaluated, awake and alert, oriented to person, place ([facility]), disoriented to time (October 2018), reports this date although patient states prior to this that it's hot and not snowing outside.  Continues to report that she was brought into the hospital because "my neighbors called the " states she was taken out of her home and unable to state what reason  came to her home.  Patient provided team again with her friend "Luciano's" phone number- same number as the one provided yesterday, however then tells writer, not to contact him, "I don't give you permission, he's on vacation."  Reports we can continue to try to contact her psychiatrist.  Patient reports sleeping well and appetite to be good during breakfast.  Denies S/H/I/I/P, A/V/H, or thoughts of paranoia reports feeling safe here in the hospital.  Patient's outpt psychiatrist Dr. Russ out of the office until Aug 15 per his voicemail; spoke with his covering psychiatrist Dr. Lester who reports having no knowledge about this patient or being able to access Dr. Villarreal's records.  She reports she will email Dr. Russ to obtain information and will pass along [facility] phone number so he may contact directly for collateral. Patient seen and evaluated, awake and alert, oriented to person, place ([facility]), disoriented to time (October 2018), reports this date although patient states prior to this that it's hot and not snowing outside.  States that the current [place] President is Yesi.  Continues to report that she was brought into the hospital because "my neighbors called the " states she was taken out of her home and unable to state what reason  came to her home.  Patient provided writer again with her friend "Luciano's" phone number- same number as the one provided yesterday that was disconnected, however then tells writer, not to contact him, "I don't give you permission, he's on vacation don't bother him."  Reports we can continue to try to contact her psychiatrist.  Patient reports sleeping well and appetite to be good during breakfast.  Denies S/H/I/I/P, A/V/H, or thoughts of paranoia reports feeling safe here in the hospital.  Patient's outpt psychiatrist Dr. Russ out of the office until Aug 15 per his voicemail; spoke with his covering psychiatrist Dr. Lester who reports having no knowledge about this patient or being able to access Dr. Villarreal's records.  She reports she will email Dr. Russ to obtain information and will pass along [facility] phone number so he may contact directly for collateral. Patient seen and evaluated, awake and alert, oriented to person, place ([facility]), disoriented to time (October 2018), reports this date although patient states prior to this that it's hot and not snowing outside.  States that the current [place] President is Yesi.  Continues to report that she was brought into the hospital because "my neighbors called the " states she was taken out of her home and unable to state what reason  came to her home.  Patient provided writer again with her friend "Evons" phone number- same number as the one provided yesterday that was disconnected, however then tells writer, not to contact him, "I don't give you permission, he's on vacation don't bother him."  Reports we can continue to try to contact her psychiatrist.  Patient reports sleeping well and appetite to be good during breakfast.  Denies S/H/I/I/P, A/V/H, or thoughts of paranoia reports feeling safe here in the hospital.  Patient's outpt psychiatrist Dr. Russ out of the office until Aug 15 per his voicemail; spoke with his covering psychiatrist Dr. Lester who reports having no knowledge about this patient or being able to access Dr. Villarreal's records.  She reports she will email Dr. Russ to obtain information and will pass along [facility] phone number so he may contact directly for collateral.  Spoke with patient's outpatient endocrinologist, Dr. Luis Henriquez ([phone]) who reports that patient at baseline is "very anxious" reports that patient lives on her own, states she is at baseline not confused but "slowed", states patient has been able to care for herself at home for years following the death of her .  Reports since the passing of her  has been "a home body" but recently met a new friend whom she endorsed to him has been happy going out to meet with this friend.  Does not believe patient has any cognitive impairments at baseline.  Reports patient is able to drive to her appointments and keeps her appointments at the office. Patient seen and evaluated, awake and alert, oriented to person, place ([facility]), disoriented to time (October 2018), reports this date although patient states prior to this that it's hot and not snowing outside.  States that the current [place] President is Yesi.  Continues to report that she was brought into the hospital because "my neighbors called the " states she was taken out of her home and unable to state what reason  came to her home.  Patient provided writer again with her friend "Evons" phone number- same number as the one provided yesterday that was disconnected, however then tells writer, not to contact him, "I don't give you permission, he's on vacation don't bother him."  Reports we can continue to try to contact her psychiatrist.  Patient reports sleeping well and appetite to be good during breakfast.  Denies S/H/I/I/P, A/V/H, or thoughts of paranoia reports feeling safe here in the hospital.  Patient's outpt psychiatrist Dr. Russ out of the office until Aug 15 per his voicemail; spoke with his covering psychiatrist Dr. Lester who reports having no knowledge about this patient or being able to access Dr. Villarreal's records.  She reports she will email Dr. Russ to obtain information and will pass along [facility] phone number so he may contact directly for collateral.  Spoke with patient's outpatient endocrinologist, Dr. Luis Henriquez ([phone]) who reports that patient at baseline is "very anxious" reports that patient lives on her own, states she is at baseline not confused but "slowed" but is able to converse, states patient has been able to care for herself by herself at home for years following the death of her .  States patient has no immediate family in the area, reports he believes patient may have family in [place].  Reports since the passing of her  has been "a home body" but recently met a new friend whom she endorsed to him has been happy going out to meet with this friend.  Does not believe patient has any cognitive impairments at baseline.  Reports patient is able to drive to her appointments and keeps her appointments at the office. Patient seen and evaluated, awake and alert, oriented to person, place ([facility]), disoriented to time (October 2018), reports this date although patient states prior to this that it's hot and not snowing outside.  States that the current [place] President is Yesi.  Continues to report that she was brought into the hospital because "my neighbors called the " states she was taken out of her home and unable to state what reason  came to her home.  Patient provided writer again with her friend "Evons" phone number- same number as the one provided yesterday that was disconnected, however then tells writer, not to contact him, "I don't give you permission, he's on vacation don't bother him."  Reports we can continue to try to contact her psychiatrist.  Patient reports sleeping well and appetite to be good during breakfast.  Denies S/H/I/I/P, A/V/H, or thoughts of paranoia reports feeling safe here in the hospital.    Patient's outpt psychiatrist Dr. Russ out of the office until Aug 15 per his voicemail; spoke with his covering psychiatrist Dr. Lester who reports having no knowledge about this patient or being able to access Dr. Villarreal's records.  She reports she will email Dr. Russ to obtain information and will pass along [facility] phone number so he may contact directly for collateral.     Spoke with patient's outpatient endocrinologist, Dr. Luis Henriquez ([phone]) who reports that patient at baseline is "very anxious" reports that patient lives on her own, states she is at baseline not confused but "slowed" but is able to converse, states patient has been able to care for herself by herself at home for years following the death of her .  States patient has no immediate family in the area, reports he believes patient may have family in [place].  Reports since the passing of her  has been "a home body" but recently met a new friend whom she endorsed to him has been happy going out to meet with this friend.  Does not believe patient has any cognitive impairments at baseline.  Reports patient is able to drive to her appointments and keeps her appointments at the office.

## 2018-08-10 NOTE — PROGRESS NOTE BEHAVIORAL HEALTH - CASE SUMMARY
56 y/o  female, , childless, retired  at [employer], domiciled alone in a private residence in [place], with unclear PPH on Geodon and Zoloft/Buspar by outpatient psychiatrist Dr. Vahe Russ, no currently substance abuse, no suicide attempts or self injurious behaviors as reported by patient, with PMHx of DM, bib police with altered mental status.  Patient was found in her neighbor's garden naked picking tomatoes.  Pt is dissheveled, alert and oriented to person and place but not date, poorly attentive, disorganized, wandering around ER.  Pt is prescribed Geodon/Zoloft/Buspar as outpatient, psychiatrist is on vacation.  No collaterals available.  Denies SI/HI, AVH, or paranoia. Today pt is confused, disoriented, dismissive, poorly engaged in interview with writer.  Collateral above from OP endocrinologist suggests this is an acute deviation from baseline.  Dx: Delirium 2/2 GMC.  Recs: 1. Hold Zoloft/Buspar.  C/w Geodon 60mg PO qhS.  2. PRN: Haldol 2.5mg IV q6h PRN severe agitation.  Monitor for QTc<500.  Melatonin 3mg PO qHS PRN insomnia.  3. F/u TSH, B12, folate, RPR, MRI brain, EEG.  F/u neuro recs.  4. Minimize use of benzos, opioids, anticholinergics, or other deliriogenic agents when possible.  Maintain sleep wake cycle.  Provide frequent reorientation and redirection.  5. CL psych will follow

## 2018-08-10 NOTE — PROGRESS NOTE BEHAVIORAL HEALTH - SUMMARY
56 y/o  female, , childless, retired as a  at [employer], domiciled alone in a private residence in [place], with PPH of , no currently substance abuse, no suicide attempts or self injurious behaviors as reported by patient, with PMHx of DM, bib police with altered mental status.  Patient was found in her neighbor's garden naked picking tomatoes.  Police report patient's mental status has been declining since her 's death 5 years ago.  Patient is not answering any questions appropriately and seems to have flight of ideas.  Over the course of the night, patient was agitated and aggressive with 1:1 staff requiring doses of Haldol 5mg and Ativan 2mg x 2 doses.  Psychiatry consulted to assess for altered mental status.      Patient seen and evaluated in the ED, awake and alert, oriented to person and place ([facility]), disoriented to time- states it's Oct 20, 2018, when told correct date she responds with "not it's not!"  Reports that her neighbor "Derek" called the police on her to bring her into the hospital "because he was worried about me."  Patient unable to state events leading up to her ED visit and unable to state reasoning for her neighbor's concerns to call the police.  She reports living in a residence in [place], when asked if she lived with anyone, she states "sometimes".  Able to confirm that her  passed away 5 years ago.  Denies h/o suicide attempts and currently denies S/H/I/I/P, A/V/H, or thoughts of paranoia that people are after her.  She reports occasionally drinking 1 vodka, denies h/o withdrawals or DTs.  States that she sees an outpatient psychiatrist Dr. Russ, states she has followed with him for several years.  Patient unable to state what her psychiatric dx is.  Reports 1 prior inpatient psychiatric hospitalization, however is unable to give details about this and for what reason.  Attention/concentration impaired, unable to spell WORLD backwards.  Able to state current president is Yesi.  Patient is a poor historian at this time, need collateral information from patient's outpt psychiatrist to determine patient's baseline.  Likely this is a delirium d/t medical condition and electrolyte abnormalities.  Consider neurology workup to determine if this is cause by any organic causes her change in patient's mental status. 56 y/o  female, , childless, retired as a  at [employer], domiciled alone in a private residence in [place], with PPH of , no currently substance abuse, no suicide attempts or self injurious behaviors as reported by patient, with PMHx of DM, bib police with altered mental status.  Patient was found in her neighbor's garden naked picking tomatoes.  Police report patient's mental status has been declining since her 's death 5 years ago.  Patient is not answering any questions appropriately and seems to have flight of ideas.  Over the course of the night, patient was agitated and aggressive with 1:1 staff requiring doses of Haldol 5mg and Ativan 2mg x 2 doses.  Psychiatry consulted to assess for altered mental status.      Patient seen and evaluated in the ED, continues to be disoriented, oddly related, not able to state reason for admission.  Per staff patient is restless however not violent.  Continue same medications.  Likely this is a delirium d/t medical condition and electrolyte abnormalities.  Consider neurology workup to determine if this is cause by any organic causes her change in patient's mental status. Continue Geodon 60mg daily with PRN Haldol 2.5mg po/im/iv (monitor qtc <500ms) 56 y/o  female, , childless, retired as a  at [employer], domiciled alone in a private residence in [place], with PPH of , no currently substance abuse, no suicide attempts or self injurious behaviors as reported by patient, with PMHx of DM, bib police with altered mental status.  Patient was found in her neighbor's garden naked picking tomatoes.  Police report patient's mental status has been declining since her 's death 5 years ago.  Patient is not answering any questions appropriately and seems to have flight of ideas.  Over the course of the night, patient was agitated and aggressive with 1:1 staff requiring doses of Haldol 5mg and Ativan 2mg x 2 doses.  Psychiatry consulted to assess for altered mental status.      Patient seen and evaluated, continues to be disoriented, oddly related, not able to state reason for admission.  Per staff patient is restless however not violent.  Denies S/H/I/I/P, A/V/H or thoughts of paranoia, reports feeling safe in the hospital.  Continue same medications.  Likely this is a delirium d/t medical condition and electrolyte abnormalities.  Consider neurology workup to determine if this is cause by any organic causes her change in patient's mental status. Continue Geodon 60mg daily with PRN Haldol 2.5mg po/im/iv (monitor qtc <500ms)

## 2018-08-11 LAB
AMPHET UR-MCNC: NEGATIVE — SIGNIFICANT CHANGE UP
ANION GAP SERPL CALC-SCNC: 18 MMOL/L — HIGH (ref 5–17)
BARBITURATES, URINE.: NEGATIVE — SIGNIFICANT CHANGE UP
BENZODIAZ UR-MCNC: NEGATIVE — SIGNIFICANT CHANGE UP
BUN SERPL-MCNC: 8 MG/DL — SIGNIFICANT CHANGE UP (ref 7–23)
CALCIUM SERPL-MCNC: 9.8 MG/DL — SIGNIFICANT CHANGE UP (ref 8.4–10.5)
CHLORIDE SERPL-SCNC: 98 MMOL/L — SIGNIFICANT CHANGE UP (ref 96–108)
CO2 SERPL-SCNC: 21 MMOL/L — LOW (ref 22–31)
COCAINE METAB.OTHER UR-MCNC: NEGATIVE — SIGNIFICANT CHANGE UP
CREAT SERPL-MCNC: 0.69 MG/DL — SIGNIFICANT CHANGE UP (ref 0.5–1.3)
CREATININE, URINE THERAPEUTIC: 107.5 MG/DL — SIGNIFICANT CHANGE UP
DRUG SCREEN, SERUM: SIGNIFICANT CHANGE UP
GLUCOSE BLDC GLUCOMTR-MCNC: 108 MG/DL — HIGH (ref 70–99)
GLUCOSE BLDC GLUCOMTR-MCNC: 137 MG/DL — HIGH (ref 70–99)
GLUCOSE BLDC GLUCOMTR-MCNC: 77 MG/DL — SIGNIFICANT CHANGE UP (ref 70–99)
GLUCOSE BLDC GLUCOMTR-MCNC: 93 MG/DL — SIGNIFICANT CHANGE UP (ref 70–99)
GLUCOSE SERPL-MCNC: 111 MG/DL — HIGH (ref 70–99)
METHADONE UR-MCNC: NEGATIVE — SIGNIFICANT CHANGE UP
METHAQUALONE UR QL: NEGATIVE — SIGNIFICANT CHANGE UP
METHAQUALONE UR-MCNC: NEGATIVE — SIGNIFICANT CHANGE UP
OPIATES UR-MCNC: NEGATIVE — SIGNIFICANT CHANGE UP
PCP UR-MCNC: NEGATIVE — SIGNIFICANT CHANGE UP
POTASSIUM SERPL-MCNC: 3.5 MMOL/L — SIGNIFICANT CHANGE UP (ref 3.5–5.3)
POTASSIUM SERPL-SCNC: 3.5 MMOL/L — SIGNIFICANT CHANGE UP (ref 3.5–5.3)
PROPOXYPH UR QL: NEGATIVE — SIGNIFICANT CHANGE UP
SODIUM SERPL-SCNC: 137 MMOL/L — SIGNIFICANT CHANGE UP (ref 135–145)
THC UR QL: NEGATIVE — SIGNIFICANT CHANGE UP

## 2018-08-11 PROCEDURE — 99232 SBSQ HOSP IP/OBS MODERATE 35: CPT

## 2018-08-11 RX ADMIN — ENOXAPARIN SODIUM 40 MILLIGRAM(S): 100 INJECTION SUBCUTANEOUS at 12:05

## 2018-08-11 RX ADMIN — SODIUM CHLORIDE 1 GRAM(S): 9 INJECTION INTRAMUSCULAR; INTRAVENOUS; SUBCUTANEOUS at 05:28

## 2018-08-11 RX ADMIN — Medication 40 MILLIGRAM(S): at 17:19

## 2018-08-11 RX ADMIN — Medication 50 MICROGRAM(S): at 05:29

## 2018-08-11 RX ADMIN — Medication 40 MILLIGRAM(S): at 05:29

## 2018-08-11 RX ADMIN — Medication 20 MILLIEQUIVALENT(S): at 12:05

## 2018-08-11 RX ADMIN — ZIPRASIDONE HYDROCHLORIDE 60 MILLIGRAM(S): 20 CAPSULE ORAL at 08:57

## 2018-08-11 NOTE — PROGRESS NOTE BEHAVIORAL HEALTH - NSBHCHARTREVIEWINVESTIGATE_PSY_A_CORE FT
< from: 12 Lead ECG (08.08.18 @ 15:55) >      Ventricular Rate 63 BPM    Atrial Rate 63 BPM    P-R Interval 154 ms    QRS Duration 78 ms    Q-T Interval 426 ms    QTC Calculation(Bezet) 435 ms    R Axis 4 degrees    T Axis 34 degrees    Diagnosis Line NORMAL SINUS RHYTHM  NORMAL ECG    Confirmed by ATTENDING, ED ([id number]),  TARUN REYEZ ([id number]) on 8/9/2018 5:14:16 AM    < end of copied text >

## 2018-08-11 NOTE — PROGRESS NOTE BEHAVIORAL HEALTH - SUMMARY
56 y/o  female, , childless, retired as a  at [employer], domiciled alone in a private residence in [place], with PPH of , no currently substance abuse, no suicide attempts or self injurious behaviors as reported by patient, with PMHx of DM, bib police with altered mental status.  Patient was found in her neighbor's garden naked picking tomatoes.  Police report patient's mental status has been declining since her 's death 5 years ago.  Patient is not answering any questions appropriately and seems to have flight of ideas.  Over the course of the night, patient was agitated and aggressive with 1:1 staff requiring doses of Haldol 5mg and Ativan 2mg x 2 doses.  Psychiatry consulted to assess for altered mental status.      Patient seen and evaluated, continues to be disoriented, oddly related, not able to state reason for admission.  Per staff patient is restless however not violent.  Denies S/H/I/I/P, A/V/H or thoughts of paranoia, reports feeling safe in the hospital.  Continue same medications.  Likely this is a delirium d/t medical condition and electrolyte abnormalities.  Consider neurology workup to determine if this is cause by any organic causes her change in patient's mental status. Continue Geodon 60mg daily with PRN Haldol 2.5mg po/im/iv (monitor qtc <500ms)

## 2018-08-11 NOTE — PROGRESS NOTE BEHAVIORAL HEALTH - NSBHFUPINTERVALHXFT_PSY_A_CORE
Pt remains confused, disorganized.  Knows her name, the hospital name, but not the date or season (states it is the fall).  Currently calm, eating breakfast.  Denies AVH, parnaoia ,or SIIP/HIIP.  States she has a dog [place] the pound" but does not know whether a friend or neighbor took her dog there, states her dog may still be at home.

## 2018-08-11 NOTE — PROGRESS NOTE BEHAVIORAL HEALTH - NSBHCONSULTMEDS_PSY_A_CORE FT
1. C/w Geodon 60mg PO qhS.      2. PRN: Haldol 2.5mg IV q6h PRN severe agitation.  Monitor for QTc<500.  Melatonin 3mg PO qHS PRN insomnia.      3. F/u TSH, B12, folate, RPR, MRI brain, EEG.  F/u neuro recs.      4. Minimize use of benzos, opioids, anticholinergics, or other deliriogenic agents when possible.  Maintain sleep wake cycle.  Provide frequent reorientation and redirection.   5. CL psych will follow    6. Please have SW f/u regarding pt's pet dog at home alone

## 2018-08-11 NOTE — PROGRESS NOTE ADULT - SUBJECTIVE AND OBJECTIVE BOX
Patient seen and examined  no complaints    Allergy Status Unknown    Hospital Medications:   MEDICATIONS  (STANDING):  dextrose 5%. 1000 milliLiter(s) (50 mL/Hr) IV Continuous <Continuous>  dextrose 50% Injectable 12.5 Gram(s) IV Push once  dextrose 50% Injectable 25 Gram(s) IV Push once  dextrose 50% Injectable 25 Gram(s) IV Push once  enoxaparin Injectable 40 milliGRAM(s) SubCutaneous daily  furosemide    Tablet 40 milliGRAM(s) Oral two times a day  insulin lispro (HumaLOG) corrective regimen sliding scale   SubCutaneous three times a day before meals  insulin lispro (HumaLOG) corrective regimen sliding scale   SubCutaneous at bedtime  levothyroxine 50 MICROGram(s) Oral daily  potassium chloride    Tablet ER 20 milliEquivalent(s) Oral daily  sodium chloride 1 Gram(s) Oral three times a day  ziprasidone 60 milliGRAM(s) Oral <User Schedule>        VITALS:  T(F): 98 (18 @ 04:21), Max: 99 (08-10-18 @ 20:41)  HR: 67 (18 @ 04:21)  BP: 141/96 (18 @ 04:21)  RR: 18 (18 @ 04:21)  SpO2: 100% (18 @ 04:21)  Wt(kg): --    08-10 @ 07:01  -   @ 07:00  --------------------------------------------------------  IN: 1440 mL / OUT: 0 mL / NET: 1440 mL        PHYSICAL EXAM:  Constitutional: NAD  HEENT: anicteric sclera, oropharynx clear, MMM  Neck: No JVD  Respiratory: CTAB, no wheezes, rales or rhonchi  Cardiovascular: S1, S2, RRR  Gastrointestinal: BS+, soft, NT/ND  Extremities: 3+ peripheral edema    LABS:      137  |  98  |  8   ----------------------------<  111<H>  3.5   |  21<L>  |  0.69    Ca    9.8      11 Aug 2018 09:41  Mg     1.6     08-10      Creatinine Trend: 0.69 <--, 0.56 <--, 0.57 <--, 0.81 <--, 0.77 <--                        12.1   6.84  )-----------( 319      ( 10 Aug 2018 13:59 )             36.1     Urine Studies:  Urinalysis Basic - ( 09 Aug 2018 14:04 )    Color: Yellow / Appearance: Clear / S.010 / pH:   Gluc:  / Ketone: Negative  / Bili: Negative / Urobili: Negative mg/dL   Blood:  / Protein: Trace mg/dL / Nitrite: Negative   Leuk Esterase: Large / RBC: 1 /HPF / WBC 6 /HPF   Sq Epi:  / Non Sq Epi: 1 /HPF / Bacteria: Negative      Sodium, Random Urine: <20 mmol/L ( @ 19:15)  Osmolality, Random Urine: 60 mos/kg ( @ 19:15)  Potassium, Random Urine: 5 mmol/L ( @ 19:15)  Chloride, Random Urine: <35 mmol/L ( @ 19:15)    RADIOLOGY & ADDITIONAL STUDIES:

## 2018-08-11 NOTE — PROGRESS NOTE ADULT - ASSESSMENT
57F presents with altered mental status.  Patient was found in her neighbor's garden naked picking tomatoes.  Police report patient's mental status has been declining since her 's death 5 years ago.  Patient is not answering any questions appropriately and seems to have flight of ideas.     Problem/Plan - 1:  ·  Problem:  Altered mental status, unspecified altered mental status type.  Plan: Exact cause not known.  Neurology and Psychiatry following . MRI Head pending.      Problem/Plan - 2:  ·  Problem: HTN (hypertension).  Plan: Bp readings fine.      Problem/Plan - 3:  ·  Problem: Edema leg.  Plan: Doppler negative for DVT . TTE pending.  Started on Lasix .      Problem/Plan - 4:  ·  Problem: Hyponatremia.  Plan: Renal helping.

## 2018-08-11 NOTE — CHART NOTE - NSCHARTNOTEFT_GEN_A_CORE
Medicine NP        Patient to remain on one to one for safety precautions. No injury noted at this time.    Continuous monitoring in place. Will endorse to  primary team in the Am.   Andrei Camejo [facility]-BC # [id number]

## 2018-08-11 NOTE — PROGRESS NOTE ADULT - SUBJECTIVE AND OBJECTIVE BOX
INTERVAL HPI/OVERNIGHT EVENTS: No new concerns   Vital Signs Last 24 Hrs  T(C): 36.7 (11 Aug 2018 04:21), Max: 37.2 (10 Aug 2018 20:41)  T(F): 98 (11 Aug 2018 04:21), Max: 99 (10 Aug 2018 20:41)  HR: 67 (11 Aug 2018 04:21) (67 - 92)  BP: 141/96 (11 Aug 2018 04:21) (141/96 - 150/89)  BP(mean): --  RR: 18 (11 Aug 2018 04:21) (18 - 18)  SpO2: 100% (11 Aug 2018 04:21) (100% - 100%)  I&O's Summary    10 Aug 2018 07:01  -  11 Aug 2018 07:00  --------------------------------------------------------  IN: 1440 mL / OUT: 0 mL / NET: 1440 mL    11 Aug 2018 07:01  -  11 Aug 2018 14:09  --------------------------------------------------------  IN: 480 mL / OUT: 0 mL / NET: 480 mL      MEDICATIONS  (STANDING):  dextrose 5%. 1000 milliLiter(s) (50 mL/Hr) IV Continuous <Continuous>  dextrose 50% Injectable 12.5 Gram(s) IV Push once  dextrose 50% Injectable 25 Gram(s) IV Push once  dextrose 50% Injectable 25 Gram(s) IV Push once  enoxaparin Injectable 40 milliGRAM(s) SubCutaneous daily  furosemide    Tablet 40 milliGRAM(s) Oral two times a day  insulin lispro (HumaLOG) corrective regimen sliding scale   SubCutaneous three times a day before meals  insulin lispro (HumaLOG) corrective regimen sliding scale   SubCutaneous at bedtime  levothyroxine 50 MICROGram(s) Oral daily  potassium chloride    Tablet ER 20 milliEquivalent(s) Oral daily  ziprasidone 60 milliGRAM(s) Oral <User Schedule>    MEDICATIONS  (PRN):  dextrose 40% Gel 15 Gram(s) Oral once PRN Blood Glucose LESS THAN 70 milliGRAM(s)/deciliter  glucagon  Injectable 1 milliGRAM(s) IntraMuscular once PRN Glucose LESS THAN 70 milligrams/deciliter  haloperidol    Injectable 2.5 milliGRAM(s) IV Push every 6 hours PRN agitiation  melatonin 3 milliGRAM(s) Oral at bedtime PRN Insomnia    LABS:                        12.1   6.84  )-----------( 319      ( 10 Aug 2018 13:59 )             36.1     08-11    137  |  98  |  8   ----------------------------<  111<H>  3.5   |  21<L>  |  0.69    Ca    9.8      11 Aug 2018 09:41  Mg     1.6     08-10          CAPILLARY BLOOD GLUCOSE      POCT Blood Glucose.: 77 mg/dL (11 Aug 2018 12:06)  POCT Blood Glucose.: 93 mg/dL (11 Aug 2018 08:23)  POCT Blood Glucose.: 95 mg/dL (10 Aug 2018 21:08)  POCT Blood Glucose.: 96 mg/dL (10 Aug 2018 16:46)          Consultant(s) Notes Reviewed:  [x ] YES  [ ] NO    PHYSICAL EXAM:  GENERAL: NAD, well-groomed, well-developed,not in any distress ,  HEAD:  Atraumatic, Normocephalic  EYES: EOMI, PERRLA, conjunctiva and sclera clear  ENMT: No tonsillar erythema, exudates, or enlargement; Moist mucous membranes, Good dentition, No lesions  NECK: Supple, No JVD, Normal thyroid  NERVOUS SYSTEM:  Alert & Oriented X2, No focal deficit   CHEST/LUNG: Good air entry bilateral with no  rales, rhonchi, wheezing, or rubs  HEART: Regular rate and rhythm; No murmurs, rubs, or gallops  ABDOMEN: Soft, Nontender, Nondistended; Bowel sounds present  EXTREMITIES:  2+ Peripheral Pulses, No clubbing, cyanosis, but 2+ edema  SKIN: No rashes or lesions    Care Discussed with Consultants/Other Providers [ x] YES  [ ] NO

## 2018-08-11 NOTE — PROGRESS NOTE ADULT - SUBJECTIVE AND OBJECTIVE BOX
Subjective:  pt seen and examined, no complaints on exam.   no chest pain or palpitation on exam     dextrose 40% Gel 15 Gram(s) Oral once PRN  dextrose 5%. 1000 milliLiter(s) IV Continuous <Continuous>  dextrose 50% Injectable 12.5 Gram(s) IV Push once  dextrose 50% Injectable 25 Gram(s) IV Push once  dextrose 50% Injectable 25 Gram(s) IV Push once  enoxaparin Injectable 40 milliGRAM(s) SubCutaneous daily  furosemide    Tablet 40 milliGRAM(s) Oral two times a day  glucagon  Injectable 1 milliGRAM(s) IntraMuscular once PRN  haloperidol    Injectable 2.5 milliGRAM(s) IV Push every 6 hours PRN  insulin lispro (HumaLOG) corrective regimen sliding scale   SubCutaneous three times a day before meals  insulin lispro (HumaLOG) corrective regimen sliding scale   SubCutaneous at bedtime  levothyroxine 50 MICROGram(s) Oral daily  melatonin 3 milliGRAM(s) Oral at bedtime PRN  potassium chloride    Tablet ER 20 milliEquivalent(s) Oral daily  sodium chloride 1 Gram(s) Oral three times a day  ziprasidone 60 milliGRAM(s) Oral <User Schedule>                            12.1   6.84  )-----------( 319      ( 10 Aug 2018 13:59 )             36.1       Hemoglobin: 12.1 g/dL (08-10 @ 13:59)  Hemoglobin: 11.0 g/dL (08-09 @ 08:27)  Hemoglobin: 12.5 g/dL (08-08 @ 16:18)      08-10    130<L>  |  94<L>  |  5<L>  ----------------------------<  113<H>  3.7   |  24  |  0.56    Ca    9.2      10 Aug 2018 11:37  Mg     1.6     08-10      Creatinine Trend: 0.56<--, 0.57<--, 0.81<--, 0.77<--    COAGS:           T(C): 37.2 (08-10-18 @ 20:41), Max: 37.2 (08-10-18 @ 20:41)  HR: 92 (08-10-18 @ 20:41) (76 - 92)  BP: 144/86 (08-10-18 @ 21:11) (144/85 - 151/77)  RR: 18 (08-10-18 @ 20:41) (18 - 18)  SpO2: 100% (08-10-18 @ 20:41) (100% - 100%)  Wt(kg): --    I&O's Summary    09 Aug 2018 07:01  -  10 Aug 2018 07:00  --------------------------------------------------------  IN: 1970 mL / OUT: 0 mL / NET: 1970 mL    10 Aug 2018 07:01  -  11 Aug 2018 03:12  --------------------------------------------------------  IN: 1440 mL / OUT: 0 mL / NET: 1440 mL    Appearance: Normal	  HEENT:   Normal oral mucosa, PERRL, EOMI	  Lymphatic: No lymphadenopathy , no edema  Cardiovascular: Normal S1 S2, No JVD, No murmurs , Peripheral pulses palpable 2+ bilaterally  Respiratory: Lungs clear to auscultation, normal effort 	  Gastrointestinal:  Soft, Non-tender, + BS	  Skin: No rashes, No ecchymoses, No cyanosis, warm to touch  Musculoskeletal: Normal range of motion, normal strength  Psychiatry:  Mood & affect appropriate    TELEMETRY: 	  none    [ ] Echocardiogram:   [ ]  Catheterization:  [ ] Stress Test:    OTHER: 	  Duplex : < from: [facility] Duplex Lower Ext Vein Scan, Bilat (08.09.18 @ 08:48) >  IMPRESSION:     No evidenceof bilateral lower extremity deep venous thrombosis.      < end of copied text >        ASSESSMENT/PLAN: 	57y Female with h/o DM with no known h/o CAD/MI presents with altered mental status.   Patient was brought in yesterday by police after being found in her neighbor's garden naked picking tomatoes.  Her mental status has reportedly been declining since her 's death 5 years ago.  Of note, though the patient is ambulatory, she had a witnessed mechanical fall in the ER when she slipped from a rolling chair and slid to the floor. There was no loss of consciousness and no apparent prodromal symptoms.  She received Ativan at 6am and when this interview took place at 8am she was calm and answering questions. Cardiology is called to assess for possible aortic stenosis murmur on exam. The patient denies any anginal symptoms, dyspnea, palpitations, syncope, near syncope or previous cardiac work up.        -- check TTE to r/o structural heart disease pending , if neg no further CV work up needed,   -- CT head with no acute/chronic CVA/mass/bleed  -- HD stable with no evidence of CHF  --  GI / DVT prophylaxis,  keep K>4, mag >2.0   -- further plans post echo   --  cont synthroid   -- D/W Dr Givens

## 2018-08-11 NOTE — PROGRESS NOTE BEHAVIORAL HEALTH - NSBHCHARTREVIEWVS_PSY_A_CORE FT
T(C): 36.7 (08-11-18 @ 04:21), Max: 37.2 (08-10-18 @ 20:41)  HR: 67 (08-11-18 @ 04:21) (67 - 92)  BP: 141/96 (08-11-18 @ 04:21) (141/96 - 150/89)  RR: 18 (08-11-18 @ 04:21) (18 - 18)  SpO2: 100% (08-11-18 @ 04:21) (100% - 100%)  Wt(kg): --

## 2018-08-12 LAB
FOLATE SERPL-MCNC: >20 NG/ML — SIGNIFICANT CHANGE UP
GLUCOSE BLDC GLUCOMTR-MCNC: 115 MG/DL — HIGH (ref 70–99)
GLUCOSE BLDC GLUCOMTR-MCNC: 128 MG/DL — HIGH (ref 70–99)
GLUCOSE BLDC GLUCOMTR-MCNC: 91 MG/DL — SIGNIFICANT CHANGE UP (ref 70–99)
GLUCOSE BLDC GLUCOMTR-MCNC: 99 MG/DL — SIGNIFICANT CHANGE UP (ref 70–99)
VIT B12 SERPL-MCNC: 1498 PG/ML — HIGH (ref 232–1245)

## 2018-08-12 RX ORDER — METOPROLOL TARTRATE 50 MG
100 TABLET ORAL DAILY
Qty: 0 | Refills: 0 | Status: DISCONTINUED | OUTPATIENT
Start: 2018-08-12 | End: 2018-08-21

## 2018-08-12 RX ORDER — HALOPERIDOL DECANOATE 100 MG/ML
2 INJECTION INTRAMUSCULAR ONCE
Qty: 0 | Refills: 0 | Status: COMPLETED | OUTPATIENT
Start: 2018-08-12 | End: 2018-08-12

## 2018-08-12 RX ORDER — HALOPERIDOL DECANOATE 100 MG/ML
2.5 INJECTION INTRAMUSCULAR ONCE
Qty: 0 | Refills: 0 | Status: DISCONTINUED | OUTPATIENT
Start: 2018-08-12 | End: 2018-08-13

## 2018-08-12 RX ORDER — HALOPERIDOL DECANOATE 100 MG/ML
2.5 INJECTION INTRAMUSCULAR ONCE
Qty: 0 | Refills: 0 | Status: DISCONTINUED | OUTPATIENT
Start: 2018-08-12 | End: 2018-08-12

## 2018-08-12 RX ADMIN — Medication 50 MICROGRAM(S): at 08:12

## 2018-08-12 RX ADMIN — Medication 20 MILLIEQUIVALENT(S): at 11:05

## 2018-08-12 RX ADMIN — Medication 40 MILLIGRAM(S): at 07:24

## 2018-08-12 RX ADMIN — Medication 100 MILLIGRAM(S): at 17:00

## 2018-08-12 RX ADMIN — ENOXAPARIN SODIUM 40 MILLIGRAM(S): 100 INJECTION SUBCUTANEOUS at 11:05

## 2018-08-12 RX ADMIN — ZIPRASIDONE HYDROCHLORIDE 60 MILLIGRAM(S): 20 CAPSULE ORAL at 11:05

## 2018-08-12 RX ADMIN — Medication 40 MILLIGRAM(S): at 17:00

## 2018-08-12 RX ADMIN — HALOPERIDOL DECANOATE 2 MILLIGRAM(S): 100 INJECTION INTRAMUSCULAR at 07:24

## 2018-08-12 NOTE — PROGRESS NOTE ADULT - ASSESSMENT
57F presents with altered mental status.  Patient was found in her neighbor's garden naked picking tomatoes.  Police report patient's mental status has been declining since her 's death 5 years ago.  Patient is not answering any questions appropriately and seems to have flight of ideas.     Problem/Plan - 1:  ·  Problem:  Altered mental status, unspecified altered mental status type.  Plan: Exact cause not known.  Neurology and Psychiatry following . MRI Head and EEG  pending.      Problem/Plan - 2:  ·  Problem: HTN (hypertension).  Plan: Bp readings high so restarting her BP meds.      Problem/Plan - 3:  ·  Problem: Edema leg.  Plan: Doppler negative for DVT . TTE pending.  Started on Lasix .      Problem/Plan - 4:  ·  Problem: Hyponatremia.  Plan: Renal helping.

## 2018-08-12 NOTE — CHART NOTE - NSCHARTNOTEFT_GEN_A_CORE
Informed by RN of pt's refusal to allow staff to re-insert IV since this morning.  Pt had not received Haldol 2.5 mg IVP PRN as  recommended by Behavioral Health; RN called to try either IM or PO route.  Pt did not agree to IM route, but told RN she would take PO, after which she refused as well.    Pt seen in her room, on the telephone, heard asking the party on the other line, if she should go home tonight or stay in hosp. She agreed to stay in hosp. based on response of other party.  I had a discussion with pt., was polite, introduced herself, is alert x oriented X3. She denied having cp, sob, dizziness, abd. pain, n/v.   She then began to cry, worried about her dog, but then she stated she thinks dog is dead.  Discussed with pt. importance of taking her medication as prescribed, but when told of taking Haldol PO, she refused it because she is "pregnant", based on pregnancy test she took at home. She acknowledged feeling "good" after PO Haldol this morning, but med. makes her "trip".     Pt has flight of ideas, mood is labile, with reported periods of agitation, confusion, & is a flight risk.   In light of above, will continue Constant Observation, & await further recs. per behavioral health in AM.    Will endorse to day team  Follow Up with Attending in AM    Lucia Thrasher PA-C  #[id number]

## 2018-08-12 NOTE — PROGRESS NOTE ADULT - SUBJECTIVE AND OBJECTIVE BOX
Subjective:  pt seen and examined, no CP or SOB.  There is a 1:1 at bedside.    MEDICATIONS  (STANDING):  enoxaparin Injectable 40 milliGRAM(s) SubCutaneous daily  furosemide    Tablet 40 milliGRAM(s) Oral two times a day  insulin lispro (HumaLOG) corrective regimen sliding scale   SubCutaneous three times a day before meals  insulin lispro (HumaLOG) corrective regimen sliding scale   SubCutaneous at bedtime  levothyroxine 50 MICROGram(s) Oral daily  potassium chloride    Tablet ER 20 milliEquivalent(s) Oral daily  ziprasidone 60 milliGRAM(s) Oral <User Schedule>    MEDICATIONS  (PRN):  dextrose 40% Gel 15 Gram(s) Oral once PRN Blood Glucose LESS THAN 70 milliGRAM(s)/deciliter  glucagon  Injectable 1 milliGRAM(s) IntraMuscular once PRN Glucose LESS THAN 70 milligrams/deciliter  haloperidol    Injectable 2.5 milliGRAM(s) IV Push every 6 hours PRN agitiation  melatonin 3 milliGRAM(s) Oral at bedtime PRN Insomnia      LABS:                        12.1   6.84  )-----------( 319      ( 10 Aug 2018 13:59 )             36.1   137  |  98  |  8   ----------------------------<  111<H>  3.5   |  21<L>  |  0.69    Ca    9.8      11 Aug 2018 09:41  Mg     1.6     08-10    Creatinine Trend: 0.69<--, 0.56<--, 0.57<--, 0.81<--, 0.77<--    PHYSICAL EXAM  Vital Signs Last 24 Hrs  T(C): 37.2 (12 Aug 2018 05:37), Max: 37.2 (12 Aug 2018 05:37)  T(F): 99 (12 Aug 2018 05:37), Max: 99 (12 Aug 2018 05:37)  HR: 80 (12 Aug 2018 05:37) (80 - 94)  BP: 180/83 (12 Aug 2018 05:37) (154/85 - 180/83)  RR: 18 (12 Aug 2018 05:37) (18 - 18)  SpO2: 100% (12 Aug 2018 05:37) (97% - 100%)  	  HEENT:   Normal oral mucosa, PERRL, EOMI	  Lymphatic: No lymphadenopathy , no edema  Cardiovascular: Normal S1 S2, No JVD, No murmurs , Peripheral pulses palpable 2+ bilaterally  Respiratory: Lungs clear to auscultation, normal effort 	  Gastrointestinal:  Soft, Non-tender, + BS	  Skin: No rashes, No ecchymoses, No cyanosis, warm to touch  Musculoskeletal: Normal range of motion, normal strength  Psychiatry:  Mood & affect appropriate    TELEMETRY: 	  none  	  < from: [facility] Duplex Lower Ext Vein Scan, Bilat (08.09.18 @ 08:48) >  IMPRESSION:     No evidence of bilateral lower extremity deep venous thrombosis.    < end of copied text >    ASSESSMENT/PLAN: 	57y Female with h/o DM with no known h/o CAD/MI presents with altered mental status. Patient was brought in yesterday by police after being found in her neighbor's garden naked picking tomatoes.  Her mental status has reportedly been declining since her 's death 5 years ago.  Of note, though the patient is ambulatory, she had a witnessed mechanical fall in the ER when she slipped from a rolling chair and slid to the floor. There was no loss of consciousness and no apparent prodromal symptoms. Cardiology is called to assess for possible aortic stenosis murmur on exam. The patient denies any anginal symptoms, dyspnea, palpitations, syncope, near syncope or previous cardiac work up.      -- check TTE to r/o structural heart disease pending , if neg no further CV work up needed,   -- CT head with no acute/chronic CVA/mass/bleed  -- HD stable with no evidence of CHF  --  GI / DVT prophylaxis,  keep K>4, mag >2.0   -- further plans post echo

## 2018-08-12 NOTE — PROVIDER CONTACT NOTE (MEDICATION) - NAME OF MD/NP/PA/DO NOTIFIED:
EDIE Jamie
SLP f/u for diet consistency tolerance as needed/no significant expected improvement in functional status

## 2018-08-12 NOTE — PROGRESS NOTE ADULT - SUBJECTIVE AND OBJECTIVE BOX
INTERVAL HPI/OVERNIGHT EVENTS:No new concerns.   Vital Signs Last 24 Hrs  T(C): 37.2 (12 Aug 2018 05:37), Max: 37.2 (12 Aug 2018 05:37)  T(F): 99 (12 Aug 2018 05:37), Max: 99 (12 Aug 2018 05:37)  HR: 80 (12 Aug 2018 05:37) (80 - 94)  BP: 180/83 (12 Aug 2018 05:37) (164/98 - 180/83)  BP(mean): --  RR: 18 (12 Aug 2018 05:37) (18 - 18)  SpO2: 100% (12 Aug 2018 05:37) (100% - 100%)  I&O's Summary    11 Aug 2018 07:01  -  12 Aug 2018 07:00  --------------------------------------------------------  IN: 2760 mL / OUT: 0 mL / NET: 2760 mL    12 Aug 2018 07:01  -  12 Aug 2018 16:05  --------------------------------------------------------  IN: 480 mL / OUT: 0 mL / NET: 480 mL      MEDICATIONS  (STANDING):  dextrose 5%. 1000 milliLiter(s) (50 mL/Hr) IV Continuous <Continuous>  dextrose 50% Injectable 12.5 Gram(s) IV Push once  dextrose 50% Injectable 25 Gram(s) IV Push once  dextrose 50% Injectable 25 Gram(s) IV Push once  enoxaparin Injectable 40 milliGRAM(s) SubCutaneous daily  furosemide    Tablet 40 milliGRAM(s) Oral two times a day  insulin lispro (HumaLOG) corrective regimen sliding scale   SubCutaneous three times a day before meals  insulin lispro (HumaLOG) corrective regimen sliding scale   SubCutaneous at bedtime  levothyroxine 50 MICROGram(s) Oral daily  potassium chloride    Tablet ER 20 milliEquivalent(s) Oral daily  ziprasidone 60 milliGRAM(s) Oral <User Schedule>    MEDICATIONS  (PRN):  dextrose 40% Gel 15 Gram(s) Oral once PRN Blood Glucose LESS THAN 70 milliGRAM(s)/deciliter  glucagon  Injectable 1 milliGRAM(s) IntraMuscular once PRN Glucose LESS THAN 70 milligrams/deciliter  haloperidol    Injectable 2.5 milliGRAM(s) IV Push every 6 hours PRN agitiation  melatonin 3 milliGRAM(s) Oral at bedtime PRN Insomnia    LABS:    08-11    137  |  98  |  8   ----------------------------<  111<H>  3.5   |  21<L>  |  0.69    Ca    9.8      11 Aug 2018 09:41          CAPILLARY BLOOD GLUCOSE      POCT Blood Glucose.: 99 mg/dL (12 Aug 2018 12:17)  POCT Blood Glucose.: 128 mg/dL (12 Aug 2018 07:39)  POCT Blood Glucose.: 108 mg/dL (11 Aug 2018 21:33)  POCT Blood Glucose.: 137 mg/dL (11 Aug 2018 16:34)        Consultant(s) Notes Reviewed:  [x ] YES  [ ] NO    PHYSICAL EXAM:  GENERAL: NAD, well-groomed, well-developed,not in any distress ,  HEAD:  Atraumatic, Normocephalic  EYES: EOMI, PERRLA, conjunctiva and sclera clear  ENMT: No tonsillar erythema, exudates, or enlargement; Moist mucous membranes, Good dentition, No lesions  NECK: Supple, No JVD, Normal thyroid  NERVOUS SYSTEM:  Alert & Oriented X2, No focal deficit   CHEST/LUNG: Good air entry bilateral with no  rales, rhonchi, wheezing, or rubs  HEART: Regular rate and rhythm; No murmurs, rubs, or gallops  ABDOMEN: Soft, Nontender, Nondistended; Bowel sounds present  EXTREMITIES:  2+ Peripheral Pulses, No clubbing, cyanosis, but 2+ edema  SKIN: No rashes or lesions    Care Discussed with Consultants/Other Providers [ x] YES  [ ] NO

## 2018-08-13 LAB — GLUCOSE BLDC GLUCOMTR-MCNC: 111 MG/DL — HIGH (ref 70–99)

## 2018-08-13 PROCEDURE — 99232 SBSQ HOSP IP/OBS MODERATE 35: CPT | Mod: GC

## 2018-08-13 PROCEDURE — 93010 ELECTROCARDIOGRAM REPORT: CPT

## 2018-08-13 PROCEDURE — 95819 EEG AWAKE AND ASLEEP: CPT | Mod: 26

## 2018-08-13 RX ORDER — OLANZAPINE 15 MG/1
10 TABLET, FILM COATED ORAL AT BEDTIME
Qty: 0 | Refills: 0 | Status: DISCONTINUED | OUTPATIENT
Start: 2018-08-13 | End: 2018-08-16

## 2018-08-13 RX ORDER — THIAMINE MONONITRATE (VIT B1) 100 MG
500 TABLET ORAL EVERY 8 HOURS
Qty: 0 | Refills: 0 | Status: COMPLETED | OUTPATIENT
Start: 2018-08-13 | End: 2018-08-16

## 2018-08-13 RX ORDER — HALOPERIDOL DECANOATE 100 MG/ML
5 INJECTION INTRAMUSCULAR ONCE
Qty: 0 | Refills: 0 | Status: COMPLETED | OUTPATIENT
Start: 2018-08-13 | End: 2018-08-13

## 2018-08-13 RX ORDER — HALOPERIDOL DECANOATE 100 MG/ML
2.5 INJECTION INTRAMUSCULAR ONCE
Qty: 0 | Refills: 0 | Status: COMPLETED | OUTPATIENT
Start: 2018-08-13 | End: 2018-08-13

## 2018-08-13 RX ORDER — HALOPERIDOL DECANOATE 100 MG/ML
5 INJECTION INTRAMUSCULAR EVERY 6 HOURS
Qty: 0 | Refills: 0 | Status: DISCONTINUED | OUTPATIENT
Start: 2018-08-13 | End: 2018-08-21

## 2018-08-13 RX ADMIN — Medication 2 MILLIGRAM(S): at 14:55

## 2018-08-13 RX ADMIN — ENOXAPARIN SODIUM 40 MILLIGRAM(S): 100 INJECTION SUBCUTANEOUS at 12:33

## 2018-08-13 RX ADMIN — Medication 40 MILLIGRAM(S): at 08:01

## 2018-08-13 RX ADMIN — ZIPRASIDONE HYDROCHLORIDE 60 MILLIGRAM(S): 20 CAPSULE ORAL at 08:43

## 2018-08-13 RX ADMIN — Medication 100 MILLIGRAM(S): at 08:01

## 2018-08-13 RX ADMIN — Medication 105 MILLIGRAM(S): at 18:06

## 2018-08-13 RX ADMIN — Medication 3 MILLIGRAM(S): at 22:14

## 2018-08-13 RX ADMIN — Medication 40 MILLIGRAM(S): at 15:57

## 2018-08-13 RX ADMIN — OLANZAPINE 10 MILLIGRAM(S): 15 TABLET, FILM COATED ORAL at 23:33

## 2018-08-13 RX ADMIN — Medication 50 MICROGRAM(S): at 08:01

## 2018-08-13 RX ADMIN — Medication 20 MILLIEQUIVALENT(S): at 12:30

## 2018-08-13 RX ADMIN — HALOPERIDOL DECANOATE 5 MILLIGRAM(S): 100 INJECTION INTRAMUSCULAR at 14:54

## 2018-08-13 RX ADMIN — HALOPERIDOL DECANOATE 2.5 MILLIGRAM(S): 100 INJECTION INTRAMUSCULAR at 07:27

## 2018-08-13 NOTE — EEG REPORT - NS EEG TEXT BOX
[facility]   COMPREHENSIVE EPILEPSY CENTER   REPORT OF ROUTINE VIDEO EEG     [facility]: [address] [place] [address] , [address], [place] [address], Ph#: [phone]  [facility]: [id number], [place] [address], Ph#: [phone]  Office: [address] [place] [address], [facility] [address], [place] [address] Ph#: [phone]    Patient Name: SINDHU MUÑOZ  Age and : 57y ([birth year])  MRN #: [id number]  Location: [address]    Study Date: 18    _____________________________________________________________  TECHNICAL INFORMATION    EEG Placement and Labeling of Electrodes:  The EEG was performed utilizing 20 channels referential EEG connections (coronal over temporal over parasagittal montage) using all standard 10-20 electrode placements with EKG.  Recording was at a sampling rate of 256 samples per second per channel.  Time synchronized digital video recording was done simultaneously with EEG recording.  A low light infrared camera was used for low light recording.  Wily and seizure detection algorithms were utilized.    _____________________________________________________________  HISTORY:  Patient is a 57y old  Female who presents with a chief complaint of AMS. concern for seizures.    PERTINENT MEDICATION:  MEDICATIONS  (STANDING):  furosemide    Tablet 40 milliGRAM(s) Oral two times a day  levothyroxine 50 MICROGram(s) Oral daily  metoprolol succinate  milliGRAM(s) Oral daily  OLANZapine 10 milliGRAM(s) Oral at bedtime  potassium chloride    Tablet ER 20 milliEquivalent(s) Oral daily  thiamine IVPB 500 milliGRAM(s) IV Intermittent every 8 hours    _____________________________________________________________  STUDY INTERPRETATION    Background:  The background was continuous, spontaneously variable, reactive, and predominantly consisted of delta activities with admixed diffuse alpha and low beta activity. No wakefulness, no PDR observed.    Sleep:  Sleep was characterized by the presence of symmetric and normal vertex waves, symmetric spindles, and K-complexes.    Non-epileptiform Paroxysmal Abnormalities:  None    Interictal Epileptiform Abnormalities:   None    Ictal Abnormalities:   No clinical events.  No electrographic seizures.    Activation Procedures:   Hyperventilation was not performed.    Photic stimulation was not performed.     Artifacts:  Intermittent myogenic and movement artifacts were noted.    ECG:  The heart rate on single channel ECG was predominantly between 50-70 BPM.    _____________________________________________________________  EEG CLASSIFICATION/SUMMARY:    Normal EEG in the asleep state.  Cannot rule out diffuse slowing because no wakefulness was captured.  _____________________________________________________________  CLINICAL IMPRESSION:    Normal EEG study in the asleep state.  Diffuse slowing would indicate non-specific, diffuse cerebral dysfunction. This cannot be ruled out since the record contained only sleep.  No epileptic pattern or seizure seen.    Trung Kerr MD PhD  Attending Physician, [facility] [place]

## 2018-08-13 NOTE — PROGRESS NOTE BEHAVIORAL HEALTH - NSBHFUPINTERVALHXFT_PSY_A_CORE
Patient seen and evaluated, awake and alert to person, time (Aug 13, 2018), disoriented to place (states we are in [place]).  Attention and concentration impaired, States having a bad weekend because "they didn't give me my medication."  When asked which medication she is referring to, she states "call my doctor and find out."  Patient irritable, telling writer to get out "because it's going to snow.. no it's going to thunderstorm", telling writer to leave now so writer can make it home safely before the weather starts up.  Patient endorses good appetite.  Later in the afternoon, when psych. team met with patient, patient noted to be pacing and restless, doing jumping jacks without pants on, noted to be extremely disorganized and agitated.  Per primary patient is restless and agitated attempting to hit staff.  They report patient has had impaired sleep.  Unable to obtain MRI 2/2 patient's uncooperativeness and agitation.

## 2018-08-13 NOTE — PROGRESS NOTE BEHAVIORAL HEALTH - NSBHCHARTREVIEWINVESTIGATE_PSY_A_CORE FT
< from: 12 Lead ECG (08.08.18 @ 15:55) >      Ventricular Rate 63 BPM    Atrial Rate 63 BPM    P-R Interval 154 ms    QRS Duration 78 ms    Q-T Interval 426 ms    QTC Calculation(Bezet) 435 ms    R Axis 4 degrees    T Axis 34 degrees    Diagnosis Line NORMAL SINUS RHYTHM  NORMAL ECG    Confirmed by ATTENDING, ED ([id number]),  TARUN REYEZ ([id number]) on 8/9/2018 5:14:16 AM    < end of copied text >

## 2018-08-13 NOTE — PROGRESS NOTE ADULT - SUBJECTIVE AND OBJECTIVE BOX
Patient seen and examined  no complaints    Allergy Status Unknown    Hospital Medications:   MEDICATIONS  (STANDING):  dextrose 5%. 1000 milliLiter(s) (50 mL/Hr) IV Continuous <Continuous>  dextrose 50% Injectable 12.5 Gram(s) IV Push once  dextrose 50% Injectable 25 Gram(s) IV Push once  dextrose 50% Injectable 25 Gram(s) IV Push once  enoxaparin Injectable 40 milliGRAM(s) SubCutaneous daily  furosemide    Tablet 40 milliGRAM(s) Oral two times a day  levothyroxine 50 MICROGram(s) Oral daily  metoprolol succinate  milliGRAM(s) Oral daily  potassium chloride    Tablet ER 20 milliEquivalent(s) Oral daily        VITALS:  T(F): 98.2 (18 @ 07:52), Max: 99.1 (18 @ 16:52)  HR: 65 (18 @ 07:52)  BP: 189/95 (18 @ 07:52)  RR: 18 (18 @ 07:52)  SpO2: 100% (18 @ 07:52)  Wt(kg): --     @ 07:01  -   @ 07:00  --------------------------------------------------------  IN: 1370 mL / OUT: 0 mL / NET: 1370 mL      PHYSICAL EXAM:  Constitutional: NAD  HEENT: anicteric sclera, oropharynx clear, MMM  Neck: No JVD  Respiratory: CTAB, no wheezes, rales or rhonchi  Cardiovascular: S1, S2, RRR  Gastrointestinal: BS+, soft, NT/ND  Extremities: 3+ peripheral edema    LABS:        Creatinine Trend: 0.69 <--, 0.56 <--, 0.57 <--, 0.81 <--, 0.77 <--    Urine Studies:  Urinalysis Basic - ( 09 Aug 2018 14:04 )    Color: Yellow / Appearance: Clear / S.010 / pH:   Gluc:  / Ketone: Negative  / Bili: Negative / Urobili: Negative mg/dL   Blood:  / Protein: Trace mg/dL / Nitrite: Negative   Leuk Esterase: Large / RBC: 1 /HPF / WBC 6 /HPF   Sq Epi:  / Non Sq Epi: 1 /HPF / Bacteria: Negative      Sodium, Random Urine: <20 mmol/L ( @ 19:15)  Osmolality, Random Urine: 60 mos/kg ( @ 19:15)  Potassium, Random Urine: 5 mmol/L ( @ 19:15)  Chloride, Random Urine: <35 mmol/L ( @ :15)    RADIOLOGY & ADDITIONAL STUDIES:

## 2018-08-13 NOTE — PROGRESS NOTE BEHAVIORAL HEALTH - CASE SUMMARY
56 y/o  female, , childless, retired  at [employer], domiciled alone in a private residence in [place], with unclear PPH on Geodon and Zoloft/Buspar by outpatient psychiatrist Dr. Vahe Russ, no currently substance abuse, no suicide attempts or self injurious behaviors as reported by patient, with PMHx of DM, bib police with altered mental status.  Patient was found in her neighbor's garden naked picking tomatoes.  Pt is dissheveled, alert and oriented to person and place but not date, poorly attentive, disorganized, wandering around ER.  Pt is prescribed Geodon/Zoloft/Buspar as outpatient, psychiatrist is on vacation.  No collaterals available.  Denies SI/HI, AVH, or paranoia. Today pt is confused, disoriented, dismissive, poorly engaged in interview with writer.  Collateral above from OP endocrinologist suggests this is an acute deviation from baseline.  Pt remains disoriented, agitated, restless, inappropriate, requiring PRN medications.  Per chart review, has a prior dx of schizophrenia, alcohol dependence, and MJ abuse.  Dx: Delirium 2/2 [facility].  Recs: 1. D/c Geodon.  Start Zyprexa 10mg PO qhS.  Start Ativan 2mg PO tid.  2. PRN: Haldol 5mg IV q6h PRN severe agitation.  Monitor for QTc<500.  Melatonin 3mg PO qHS PRN insomnia.  3. MRI brain, EEG.  F/u neuro recs.  4. Minimize use of benzos, opioids, anticholinergics, or other deliriogenic agents when possible.  Maintain sleep wake cycle.  Provide frequent reorientation and redirection.  5. Start Thiamine 500mg IV tid x9 doses.  6. CL psych will f/u

## 2018-08-13 NOTE — PROGRESS NOTE BEHAVIORAL HEALTH - SUMMARY
58 y/o  female, , childless, retired as a  at [employer], domiciled alone in a private residence in [place], with PPH of , no currently substance abuse, no suicide attempts or self injurious behaviors as reported by patient, with PMHx of DM, bib police with altered mental status.  Patient was found in her neighbor's garden naked picking tomatoes.  Police report patient's mental status has been declining since her 's death 5 years ago.  Patient is not answering any questions appropriately and seems to have flight of ideas.  Over the course of the night, patient was agitated and aggressive with 1:1 staff requiring doses of Haldol 5mg and Ativan 2mg x 2 doses.  Psychiatry consulted to assess for altered mental status.      Patient seen and evaluated, continues to be disoriented, oddly related, not able to state reason for admission.  Per staff patient is restless however not violent.  Denies S/H/I/I/P, A/V/H or thoughts of paranoia, reports feeling safe in the hospital.  Continue same medications.  Likely this is a delirium d/t medical condition and electrolyte abnormalities.  Consider neurology workup to determine if this is cause by any organic causes her change in patient's mental status. Discontinue Geodon 60mg; start Zyprexa 10mg po qhs with PRN Haldol 5mg po/im/iv for agitation (monitor qtc <500ms) and Ativan 2mg po/im/iv prn q12 for agitation.

## 2018-08-13 NOTE — PROGRESS NOTE BEHAVIORAL HEALTH - NSBHCHARTREVIEWVS_PSY_A_CORE FT
Vital Signs Last 24 Hrs  T(C): 36.8 (13 Aug 2018 07:52), Max: 37.3 (12 Aug 2018 16:52)  T(F): 98.2 (13 Aug 2018 07:52), Max: 99.1 (12 Aug 2018 16:52)  HR: 65 (13 Aug 2018 07:52) (65 - 78)  BP: 189/95 (13 Aug 2018 07:52) (164/78 - 189/95)  BP(mean): --  RR: 18 (13 Aug 2018 07:52) (18 - 18)  SpO2: 100% (13 Aug 2018 07:52) (99% - 100%)

## 2018-08-13 NOTE — PROGRESS NOTE ADULT - SUBJECTIVE AND OBJECTIVE BOX
Subjective:  pt seen and examined, no CP or SOB.  There is a 1:1 at bedside.    MEDICATIONS  (STANDING):  dextrose 5%. 1000 milliLiter(s) (50 mL/Hr) IV Continuous <Continuous>  dextrose 50% Injectable 12.5 Gram(s) IV Push once  dextrose 50% Injectable 25 Gram(s) IV Push once  dextrose 50% Injectable 25 Gram(s) IV Push once  enoxaparin Injectable 40 milliGRAM(s) SubCutaneous daily  furosemide    Tablet 40 milliGRAM(s) Oral two times a day  insulin lispro (HumaLOG) corrective regimen sliding scale   SubCutaneous three times a day before meals  insulin lispro (HumaLOG) corrective regimen sliding scale   SubCutaneous at bedtime  levothyroxine 50 MICROGram(s) Oral daily  metoprolol succinate  milliGRAM(s) Oral daily  potassium chloride    Tablet ER 20 milliEquivalent(s) Oral daily  ziprasidone 60 milliGRAM(s) Oral <User Schedule>    MEDICATIONS  (PRN):  dextrose 40% Gel 15 Gram(s) Oral once PRN Blood Glucose LESS THAN 70 milliGRAM(s)/deciliter  glucagon  Injectable 1 milliGRAM(s) IntraMuscular once PRN Glucose LESS THAN 70 milligrams/deciliter  haloperidol    Injectable 2.5 milliGRAM(s) IV Push every 6 hours PRN agitiation  melatonin 3 milliGRAM(s) Oral at bedtime PRN Insomnia      LABS: PENDING    PHYSICAL EXAM  Vital Signs Last 24 Hrs  T(C): 36.8 (13 Aug 2018 07:52), Max: 37.3 (12 Aug 2018 16:52)  T(F): 98.2 (13 Aug 2018 07:52), Max: 99.1 (12 Aug 2018 16:52)  HR: 65 (13 Aug 2018 07:52) (65 - 78)  BP: 189/95 (13 Aug 2018 07:52) (164/78 - 189/95)  RR: 18 (13 Aug 2018 07:52) (18 - 18)  SpO2: 100% (13 Aug 2018 07:52) (99% - 100%)  	  HEENT:   Normal oral mucosa, PERRL, EOMI	  Lymphatic: No lymphadenopathy , no edema  Cardiovascular: Normal S1 S2, No JVD, No murmurs , Peripheral pulses palpable 2+ bilaterally  Respiratory: Lungs clear to auscultation, normal effort 	  Gastrointestinal:  Soft, Non-tender, + BS	  Skin: No rashes, No ecchymoses, No cyanosis, warm to touch  	  < from: VA Duplex Lower Ext Vein Scan, Bilat (08.09.18 @ 08:48) >  IMPRESSION:     No evidence of bilateral lower extremity deep venous thrombosis.    < end of copied text >    ASSESSMENT/PLAN: 	57y Female with h/o DM with no known h/o CAD/MI presents with altered mental status. Patient was brought in yesterday by police after being found in her neighbor's garden naked picking tomatoes.  Her mental status has reportedly been declining since her 's death 5 years ago.  Of note, though the patient is ambulatory, she had a witnessed mechanical fall in the ER when she slipped from a rolling chair and slid to the floor. There was no loss of consciousness and no apparent prodromal symptoms. Cardiology is called to assess for possible aortic stenosis murmur on exam. The patient denies any anginal symptoms, dyspnea, palpitations, syncope, near syncope or previous cardiac work up.      -- check TTE to r/o structural heart disease pending , if neg no further CV work up needed,   -- CT head with no acute/chronic CVA/mass/bleed  -- HD stable with no evidence of CHF   -- further plans post echo Subjective:  pt seen and examined, no CP or SOB.  There is a 1:1 at bedside.    MEDICATIONS  (STANDING):  dextrose 5%. 1000 milliLiter(s) (50 mL/Hr) IV Continuous <Continuous>  dextrose 50% Injectable 12.5 Gram(s) IV Push once  dextrose 50% Injectable 25 Gram(s) IV Push once  dextrose 50% Injectable 25 Gram(s) IV Push once  enoxaparin Injectable 40 milliGRAM(s) SubCutaneous daily  furosemide    Tablet 40 milliGRAM(s) Oral two times a day  insulin lispro (HumaLOG) corrective regimen sliding scale   SubCutaneous three times a day before meals  insulin lispro (HumaLOG) corrective regimen sliding scale   SubCutaneous at bedtime  levothyroxine 50 MICROGram(s) Oral daily  metoprolol succinate  milliGRAM(s) Oral daily  potassium chloride    Tablet ER 20 milliEquivalent(s) Oral daily  ziprasidone 60 milliGRAM(s) Oral <User Schedule>    MEDICATIONS  (PRN):  dextrose 40% Gel 15 Gram(s) Oral once PRN Blood Glucose LESS THAN 70 milliGRAM(s)/deciliter  glucagon  Injectable 1 milliGRAM(s) IntraMuscular once PRN Glucose LESS THAN 70 milligrams/deciliter  haloperidol    Injectable 2.5 milliGRAM(s) IV Push every 6 hours PRN agitiation  melatonin 3 milliGRAM(s) Oral at bedtime PRN Insomnia      LABS: PENDING    PHYSICAL EXAM  Vital Signs Last 24 Hrs  T(C): 36.8 (13 Aug 2018 07:52), Max: 37.3 (12 Aug 2018 16:52)  T(F): 98.2 (13 Aug 2018 07:52), Max: 99.1 (12 Aug 2018 16:52)  HR: 65 (13 Aug 2018 07:52) (65 - 78)  BP: 189/95 (13 Aug 2018 07:52) (164/78 - 189/95)  RR: 18 (13 Aug 2018 07:52) (18 - 18)  SpO2: 100% (13 Aug 2018 07:52) (99% - 100%)  	  HEENT:   Normal oral mucosa, PERRL, EOMI	  Lymphatic: No lymphadenopathy , no edema  Cardiovascular: Normal S1 S2, No JVD, No murmurs , Peripheral pulses palpable 2+ bilaterally  Respiratory: Lungs clear to auscultation, normal effort 	  Gastrointestinal:  Soft, Non-tender, + BS	  Skin: No rashes, No ecchymoses, No cyanosis, warm to touch  	  < from: VA Duplex Lower Ext Vein Scan, Bilat (08.09.18 @ 08:48) >  IMPRESSION:     No evidence of bilateral lower extremity deep venous thrombosis.    < end of copied text >    ASSESSMENT/PLAN: 	57y Female with h/o DM with no known h/o CAD/MI presents with altered mental status. Patient was brought in yesterday by police after being found in her neighbor's garden naked picking tomatoes.  Her mental status has reportedly been declining since her 's death 5 years ago.  Of note, though the patient is ambulatory, she had a witnessed mechanical fall in the ER when she slipped from a rolling chair and slid to the floor. There was no loss of consciousness and no apparent prodromal symptoms. Cardiology is called to assess for possible aortic stenosis murmur on exam. The patient denies any anginal symptoms, dyspnea, palpitations, syncope, near syncope or previous cardiac work up.      -- check TTE to r/o structural heart disease pending , if neg no further CV work up needed,   -- CT head with no acute/chronic CVA/mass/bleed  -- HD stable with no evidence of CHF

## 2018-08-13 NOTE — PROGRESS NOTE BEHAVIORAL HEALTH - NSBHCONSULTMEDS_PSY_A_CORE FT
1. Discontinue Geodon 60mg; start Zyprexa 10mg po qhs (monitor qtc <500ms on ekg)    2. PRN: Haldol 5mg PO/IM/IV q6h PRN severe agitation.  Monitor for QTc<500; Ativan 2mg po prn q12 for agitation; Melatonin 3mg PO qHS PRN insomnia.      3. F/u TSH, B12, folate, RPR;  MRI brain, EEG- please premedicate with PRN Haldol and Ativan.  F/u neuro recs.      4. Minimize use of benzos, opioids, anticholinergics, or other deliriogenic agents when possible.  Maintain sleep wake cycle.  Provide frequent reorientation and redirection.     5. CL psych will follow    6. Please have SW f/u regarding pt's pet dog at home alone

## 2018-08-13 NOTE — PROGRESS NOTE ADULT - SUBJECTIVE AND OBJECTIVE BOX
INTERVAL HPI/OVERNIGHT EVENTS: No new concerns.   Vital Signs Last 24 Hrs  T(C): 36.8 (13 Aug 2018 07:52), Max: 37.3 (12 Aug 2018 16:52)  T(F): 98.2 (13 Aug 2018 07:52), Max: 99.1 (12 Aug 2018 16:52)  HR: 65 (13 Aug 2018 07:52) (65 - 78)  BP: 113/64 (13 Aug 2018 16:00) (113/64 - 189/95)  BP(mean): --  RR: 17 (13 Aug 2018 16:00) (17 - 18)  SpO2: 100% (13 Aug 2018 07:52) (99% - 100%)  I&O's Summary    12 Aug 2018 07:01  -  13 Aug 2018 07:00  --------------------------------------------------------  IN: 1370 mL / OUT: 0 mL / NET: 1370 mL      MEDICATIONS  (STANDING):  dextrose 5%. 1000 milliLiter(s) (50 mL/Hr) IV Continuous <Continuous>  dextrose 50% Injectable 12.5 Gram(s) IV Push once  dextrose 50% Injectable 25 Gram(s) IV Push once  dextrose 50% Injectable 25 Gram(s) IV Push once  enoxaparin Injectable 40 milliGRAM(s) SubCutaneous daily  furosemide    Tablet 40 milliGRAM(s) Oral two times a day  levothyroxine 50 MICROGram(s) Oral daily  metoprolol succinate  milliGRAM(s) Oral daily  potassium chloride    Tablet ER 20 milliEquivalent(s) Oral daily  thiamine IVPB 500 milliGRAM(s) IV Intermittent every 8 hours    MEDICATIONS  (PRN):  dextrose 40% Gel 15 Gram(s) Oral once PRN Blood Glucose LESS THAN 70 milliGRAM(s)/deciliter  glucagon  Injectable 1 milliGRAM(s) IntraMuscular once PRN Glucose LESS THAN 70 milligrams/deciliter  haloperidol    Injectable 5 milliGRAM(s) IntraMuscular every 6 hours PRN agitation  LORazepam     Tablet 2 milliGRAM(s) Oral every 12 hours PRN severe agitation  melatonin 3 milliGRAM(s) Oral at bedtime PRN Insomnia    LABS:              CAPILLARY BLOOD GLUCOSE      POCT Blood Glucose.: 111 mg/dL (13 Aug 2018 07:50)  POCT Blood Glucose.: 115 mg/dL (12 Aug 2018 21:47)  POCT Blood Glucose.: 91 mg/dL (12 Aug 2018 16:48)          REVIEW OF SYSTEMS:  CONSTITUTIONAL: No fever, weight loss, or fatigue  EYES: No eye pain, visual disturbances, or discharge  ENMT:  No difficulty hearing, tinnitus, vertigo; No sinus or throat pain  NECK: No pain or stiffness  BREASTS: No pain, masses, or nipple discharge  RESPIRATORY: No cough, wheezing, chills or hemoptysis; No shortness of breath  CARDIOVASCULAR: No chest pain, palpitations, dizziness, or leg swelling  GASTROINTESTINAL: No abdominal or epigastric pain. No nausea, vomiting, or hematemesis; No diarrhea or constipation. No melena or hematochezia.  GENITOURINARY: No dysuria, frequency, hematuria, or incontinence  NEUROLOGICAL: confused   Consultant(s) Notes Reviewed:  [x ] YES  [ ] NO    PHYSICAL EXAM:  GENERAL: NAD, well-groomed, well-developed, not in any distress ,  HEAD:  Atraumatic, Normocephalic  EYES: EOMI, PERRLA, conjunctiva and sclera clear  ENMT: No tonsillar erythema, exudates, or enlargement; Moist mucous membranes, Good dentition, No lesions  NECK: Supple, No JVD, Normal thyroid  NERVOUS SYSTEM:  Alert & Oriented X1, No focal deficit   CHEST/LUNG: Good air entry bilateral with no  rales, rhonchi, wheezing, or rubs  HEART: Regular rate and rhythm; No murmurs, rubs, or gallops  ABDOMEN: Soft, Nontender, Nondistended; Bowel sounds present  EXTREMITIES:  2+ Peripheral Pulses, No clubbing, cyanosis, but 2+ edema      Care Discussed with Consultants/Other Providers [ x] YES  [ ] NO

## 2018-08-14 PROCEDURE — 77074 RADEX OSSEOUS SURVEY LMTD: CPT | Mod: 26

## 2018-08-14 PROCEDURE — 99232 SBSQ HOSP IP/OBS MODERATE 35: CPT | Mod: GC

## 2018-08-14 RX ADMIN — Medication 40 MILLIGRAM(S): at 06:13

## 2018-08-14 RX ADMIN — Medication 20 MILLIEQUIVALENT(S): at 12:24

## 2018-08-14 RX ADMIN — OLANZAPINE 10 MILLIGRAM(S): 15 TABLET, FILM COATED ORAL at 21:36

## 2018-08-14 RX ADMIN — ENOXAPARIN SODIUM 40 MILLIGRAM(S): 100 INJECTION SUBCUTANEOUS at 12:24

## 2018-08-14 RX ADMIN — Medication 3 MILLIGRAM(S): at 22:27

## 2018-08-14 RX ADMIN — Medication 105 MILLIGRAM(S): at 02:42

## 2018-08-14 RX ADMIN — Medication 100 MILLIGRAM(S): at 06:13

## 2018-08-14 RX ADMIN — Medication 2 MILLIGRAM(S): at 08:48

## 2018-08-14 RX ADMIN — Medication 2 MILLIGRAM(S): at 04:48

## 2018-08-14 RX ADMIN — Medication 105 MILLIGRAM(S): at 17:44

## 2018-08-14 RX ADMIN — Medication 40 MILLIGRAM(S): at 17:44

## 2018-08-14 RX ADMIN — Medication 50 MICROGRAM(S): at 05:14

## 2018-08-14 RX ADMIN — HALOPERIDOL DECANOATE 5 MILLIGRAM(S): 100 INJECTION INTRAMUSCULAR at 08:18

## 2018-08-14 RX ADMIN — HALOPERIDOL DECANOATE 5 MILLIGRAM(S): 100 INJECTION INTRAMUSCULAR at 17:53

## 2018-08-14 RX ADMIN — Medication 105 MILLIGRAM(S): at 09:48

## 2018-08-14 NOTE — DIETITIAN INITIAL EVALUATION ADULT. - PT NOT SOURCE
Pt admitted with AMS, answering questions, but question validity of answers received. Pt is agitated and confused, placed on 1:1, AAOx 1-2, poor historian, psych following.

## 2018-08-14 NOTE — PROGRESS NOTE BEHAVIORAL HEALTH - NSBHFUPINTERVALHXFT_PSY_A_CORE
Patient seen and evaluated, awake and alert to person, time (Aug 14, 2018), and place ([facility]).  Attention and concentration impaired, unable to spell WORLD backwards, able to state Yesi is the President, however is unable to recall 2 preceding Presidents or any current events, states "I don't watch the news."  Patient reports sleeping well and eating well.  Patient still unable to state events leading up to admission except "the  picked me up."  Patient at times tearful, states wanting to go home and that "they told me yesterday I can go."  Patient refuses to go into detail about her psychiatric history, states she sees her psychiatrist Dr. Russ ever 6 months.  Denies A/V/H or thoughts of paranoia.  Denies si/hi.  Patient then states, "I don't need to talk to you without my  here" and motions writer to leave the room.  Per PCA at bedside, patient is restless, pacing around the unit.  Reports she has been drinking large amounts of coffee today.  Per nursing staff, patient has poor sleep.  Restless today, reports patient has not been violent.

## 2018-08-14 NOTE — PROGRESS NOTE BEHAVIORAL HEALTH - NSBHCONSULTMEDS_PSY_A_CORE FT
1. Discontinue Geodon 60mg; start Zyprexa 10mg po qhs (monitor qtc <500ms on ekg)    2. PRN: Haldol 5mg PO/IM/IV q6h PRN severe agitation.  Monitor for QTc<500; Ativan 2mg po prn q12 for agitation; Melatonin 3mg PO qHS PRN insomnia.      3. MRI brain- please premedicate with PRN Haldol and Ativan.  F/u neuro recs.      4. Minimize use of benzos, opioids, anticholinergics, or other deliriogenic agents when possible.  Maintain sleep wake cycle.  Provide frequent reorientation and redirection.     5. CL psych will follow

## 2018-08-14 NOTE — PROGRESS NOTE BEHAVIORAL HEALTH - SUMMARY
56 y/o  female, , childless, retired as a  at [employer], domiciled alone in a private residence in [place], with PPH of , no currently substance abuse, no suicide attempts or self injurious behaviors as reported by patient, with PMHx of DM, bib police with altered mental status.  Patient was found in her neighbor's garden naked picking tomatoes.  Police report patient's mental status has been declining since her 's death 5 years ago.  Patient is not answering any questions appropriately and seems to have flight of ideas.  Over the course of the night, patient was agitated and aggressive with 1:1 staff requiring doses of Haldol 5mg and Ativan 2mg x 2 doses.  Psychiatry consulted to assess for altered mental status.      Patient seen and evaluated, continues to be disoriented, oddly related, not able to state reason for admission.  Per staff patient is restless however not violent.  Denies S/H/I/I/P, A/V/H or thoughts of paranoia, reports feeling safe in the hospital.  Continue same medications.  Likely this is a delirium d/t medical condition and electrolyte abnormalities.  Consider neurology workup to determine if this is cause by any organic causes her change in patient's mental status. Discontinue Geodon 60mg; start Zyprexa 10mg po qhs with PRN Haldol 5mg po/im/iv for agitation (monitor qtc <500ms) and Ativan 2mg po/im/iv prn q12 for agitation.

## 2018-08-14 NOTE — PROGRESS NOTE BEHAVIORAL HEALTH - CASE SUMMARY
56 y/o  female, , childless, retired  at [employer], domiciled alone in a private residence in [place], with unclear PPH on Geodon and Zoloft/Buspar by outpatient psychiatrist Dr. Vahe Russ, no currently substance abuse, no suicide attempts or self injurious behaviors as reported by patient, with PMHx of DM, bib police with altered mental status.  Patient was found in her neighbor's garden naked picking tomatoes.  Pt is dissheveled, alert and oriented to person and place but not date, poorly attentive, disorganized, wandering around ER.  Pt is prescribed Geodon/Zoloft/Buspar as outpatient, psychiatrist is on vacation.  No collaterals available.  Denies SI/HI, AVH, or paranoia. Today pt is confused, disoriented, dismissive, poorly engaged in interview with writer.  Collateral above from OP endocrinologist suggests this is an acute deviation from baseline.  Pt remains disoriented, agitated, restless, inappropriate, requiring PRN medications.  Per chart review, has a prior dx of schizophrenia, alcohol dependence, and MJ abuse.  Pt remains agitated and restless today.  Spoke with pt's friend Luciano who states he is caring for pt's dog.  States pt is "normal" when compliant with medications, but becomes paranoid off of them.  He found multiple liquor bottles scattered throughout her home.  He states she is not at baseline.  Dx: Delirium 2/2 [facility].  Recs: 1. C/w Zyprexa 10mg PO qhS.  Start Ativan 2mg PO tid.  2. PRN: Haldol 5mg IV q6h PRN severe agitation.  Monitor for QTc<500.  Melatonin 3mg PO qHS PRN insomnia.  3. MRI brain, EEG.  F/u neuro recs.  4. Minimize use of benzos, opioids, anticholinergics, or other deliriogenic agents when possible.  Maintain sleep wake cycle.  Provide frequent reorientation and redirection.  5. Thiamine 500mg IV tid x9 doses.  6. CL psych will f/u

## 2018-08-14 NOTE — PROGRESS NOTE ADULT - SUBJECTIVE AND OBJECTIVE BOX
Subjective:  pt seen and examined, no CP or SOB.  There is a 1:1 at bedside.    MEDICATIONS  (STANDING):  enoxaparin Injectable 40 milliGRAM(s) SubCutaneous daily  furosemide    Tablet 40 milliGRAM(s) Oral two times a day  levothyroxine 50 MICROGram(s) Oral daily  metoprolol succinate  milliGRAM(s) Oral daily  OLANZapine 10 milliGRAM(s) Oral at bedtime  potassium chloride    Tablet ER 20 milliEquivalent(s) Oral daily  thiamine IVPB 500 milliGRAM(s) IV Intermittent every 8 hours    MEDICATIONS  (PRN):  dextrose 40% Gel 15 Gram(s) Oral once PRN Blood Glucose LESS THAN 70 milliGRAM(s)/deciliter  glucagon  Injectable 1 milliGRAM(s) IntraMuscular once PRN Glucose LESS THAN 70 milligrams/deciliter  haloperidol    Injectable 5 milliGRAM(s) IntraMuscular every 6 hours PRN agitation  LORazepam     Tablet 2 milliGRAM(s) Oral two times a day PRN severe agittaion  melatonin 3 milliGRAM(s) Oral at bedtime PRN Insomnia      LABS:    Hemoglobin: 12.1 g/dL (08-10 @ 13:59)  Creatinine Trend: 0.69<--, 0.56<--, 0.57<--, 0.81<--, 0.77<--    PHYSICAL EXAM  Vital Signs Last 24 Hrs  T(C): 36.7 (14 Aug 2018 14:02), Max: 37.2 (13 Aug 2018 22:21)  T(F): 98.1 (14 Aug 2018 14:02), Max: 98.9 (13 Aug 2018 22:21)  HR: 72 (14 Aug 2018 14:02) (69 - 84)  BP: 137/78 (14 Aug 2018 14:02) (113/64 - 137/78)  RR: 18 (14 Aug 2018 14:02) (17 - 18)  SpO2: 100% (14 Aug 2018 14:02) (98% - 100%)      	  HEENT:   Normal oral mucosa, PERRL, EOMI	  Lymphatic: No lymphadenopathy , no edema  Cardiovascular: Normal S1 S2, No JVD, No murmurs , Peripheral pulses palpable 2+ bilaterally  Respiratory: Lungs clear to auscultation, normal effort 	  Gastrointestinal:  Soft, Non-tender, + BS	  Skin: No rashes, No ecchymoses, No cyanosis, warm to touch    DATA:  	  < from: VA Duplex Lower Ext Vein Scan, Bilat (08.09.18 @ 08:48) >  IMPRESSION:     No evidence of bilateral lower extremity deep venous thrombosis.  < end of copied text >      ASSESSMENT/PLAN: 	57y Female with h/o DM with no known h/o CAD/MI presents with altered mental status. Patient was brought in yesterday by police after being found in her neighbor's garden naked picking tomatoes.  Her mental status has reportedly been declining since her 's death 5 years ago.  Of note, though the patient is ambulatory, she had a witnessed mechanical fall in the ER when she slipped from a rolling chair and slid to the floor. There was no loss of consciousness and no apparent prodromal symptoms. Cardiology is called to assess for possible aortic stenosis murmur on exam. The patient denies any anginal symptoms, dyspnea, palpitations, syncope, near syncope or previous cardiac work up.      -- check TTE to r/o structural heart disease, if neg no further CV work up needed,   -- CT head with no acute/chronic CVA/mass/bleed  -- HD stable with no evidence of CHF

## 2018-08-14 NOTE — DIETITIAN INITIAL EVALUATION ADULT. - ENERGY NEEDS
Height: 65 inches (stated), Weight: 163 pounds (today)  BMI: 27 kg/m2 IBW: 125 pounds (+/-10%), %IBW: 130%  Pertinent Info: Per chart, 58 y/o female BIB police for AMS - found wondering neighbors garden naked and picking tomato. Per chart, pt with EtOH history started on IV thiamine r/o Wernicke, s/p mechanical fall in ED, remains confused, psych following. +1 bilateral feet/legs edema, no pressure ulcers noted at this time.

## 2018-08-14 NOTE — PROGRESS NOTE BEHAVIORAL HEALTH - NSBHCHARTREVIEWVS_PSY_A_CORE FT
Vital Signs Last 24 Hrs  T(C): 37 (14 Aug 2018 05:19), Max: 37.2 (13 Aug 2018 22:21)  T(F): 98.6 (14 Aug 2018 05:19), Max: 98.9 (13 Aug 2018 22:21)  HR: 84 (14 Aug 2018 05:19) (69 - 84)  BP: 133/71 (14 Aug 2018 05:19) (113/64 - 133/71)  BP(mean): --  RR: 18 (14 Aug 2018 05:19) (17 - 18)  SpO2: 98% (14 Aug 2018 05:19) (98% - 100%)

## 2018-08-14 NOTE — PROGRESS NOTE BEHAVIORAL HEALTH - NSBHCHARTREVIEWINVESTIGATE_PSY_A_CORE FT
< from: 12 Lead ECG (08.08.18 @ 15:55) >      Ventricular Rate 63 BPM    Atrial Rate 63 BPM    P-R Interval 154 ms    QRS Duration 78 ms    Q-T Interval 426 ms    QTC Calculation(Bezet) 435 ms    R Axis 4 degrees    T Axis 34 degrees    Diagnosis Line NORMAL SINUS RHYTHM  NORMAL ECG    Confirmed by ATTENDING, ED ([id number]),  TARUN REYEZ ([id number]) on 8/9/2018 5:14:16 AM    < end of copied text > In chart- EKG 8/13/18:  qtc 447ms

## 2018-08-14 NOTE — PROGRESS NOTE ADULT - ASSESSMENT
57F presents with altered mental status.  Patient was found in her neighbor's garden naked picking tomatoes.  Police report patient's mental status has been declining since her 's death 5 years ago.  Patient is not answering any questions appropriately and seems to have flight of ideas.     Problem/Plan - 1:  ·  Problem:  Altered mental status, unspecified altered mental status type.  Plan: Exact cause not known.  Neurology and Psychiatry following . MRI Head pending and EEG  noted.     Problem/Plan - 2:  ·  Problem: HTN (hypertension).  Plan: Bp readings high so restarting her BP meds.      Problem/Plan - 3:  ·  Problem: Edema leg.  Plan: Doppler negative for DVT . TTE pending.  Started on Lasix .      Problem/Plan - 4:  ·  Problem: Hyponatremia.  Plan: Renal helping.     Disposition :  Awaiting guardianship.

## 2018-08-14 NOTE — DIETITIAN INITIAL EVALUATION ADULT. - OTHER INFO
Pt seen for LOS initial assessment. Limited subjective information obtained at this time. Pt reports she had intentional wt loss of 20 pounds for wt management since 2 months ago, reports current wt as 110 pounds. Pt with no current dosing wt or ht in chart. Pt looks well nourished, bedscale calibrated with PCA at bedside- current wt measured 163 pounds today. Pt reports Ht as 5'5". Pt with good appetite and po intakes, noted 0-100% po intakes per flowsheet. PCA confirms pt eating well. No GI distress, +BM yesterday. No chewing/swallowing difficulty noted. Pt denies having food allergy, but unable to confirm given current mental status.

## 2018-08-15 DIAGNOSIS — F20.9 SCHIZOPHRENIA, UNSPECIFIED: ICD-10-CM

## 2018-08-15 DIAGNOSIS — F10.10 ALCOHOL ABUSE, UNCOMPLICATED: ICD-10-CM

## 2018-08-15 PROCEDURE — 99232 SBSQ HOSP IP/OBS MODERATE 35: CPT | Mod: GC

## 2018-08-15 RX ADMIN — Medication 100 MILLIGRAM(S): at 06:28

## 2018-08-15 RX ADMIN — Medication 50 MICROGRAM(S): at 05:53

## 2018-08-15 RX ADMIN — Medication 105 MILLIGRAM(S): at 09:08

## 2018-08-15 RX ADMIN — Medication 105 MILLIGRAM(S): at 02:40

## 2018-08-15 RX ADMIN — Medication 105 MILLIGRAM(S): at 17:38

## 2018-08-15 RX ADMIN — Medication 40 MILLIGRAM(S): at 16:21

## 2018-08-15 RX ADMIN — OLANZAPINE 10 MILLIGRAM(S): 15 TABLET, FILM COATED ORAL at 21:33

## 2018-08-15 RX ADMIN — Medication 40 MILLIGRAM(S): at 06:28

## 2018-08-15 RX ADMIN — Medication 20 MILLIEQUIVALENT(S): at 11:16

## 2018-08-15 RX ADMIN — ENOXAPARIN SODIUM 40 MILLIGRAM(S): 100 INJECTION SUBCUTANEOUS at 11:16

## 2018-08-15 RX ADMIN — Medication 2 MILLIGRAM(S): at 02:39

## 2018-08-15 NOTE — PROGRESS NOTE ADULT - SUBJECTIVE AND OBJECTIVE BOX
INTERVAL HPI/OVERNIGHT EVENTS: Confused   Vital Signs Last 24 Hrs  T(C): 36.8 (15 Aug 2018 21:05), Max: 36.8 (15 Aug 2018 05:56)  T(F): 98.2 (15 Aug 2018 21:05), Max: 98.3 (15 Aug 2018 05:56)  HR: 77 (15 Aug 2018 21:05) (73 - 79)  BP: 136/80 (15 Aug 2018 21:05) (104/61 - 136/80)  BP(mean): --  RR: 17 (15 Aug 2018 21:05) (17 - 20)  SpO2: 99% (15 Aug 2018 21:05) (97% - 100%)  I&O's Summary    14 Aug 2018 07:01  -  15 Aug 2018 07:00  --------------------------------------------------------  IN: 3940 mL / OUT: 0 mL / NET: 3940 mL    15 Aug 2018 07:01  -  15 Aug 2018 21:47  --------------------------------------------------------  IN: 1470 mL / OUT: 0 mL / NET: 1470 mL      MEDICATIONS  (STANDING):  dextrose 5%. 1000 milliLiter(s) (50 mL/Hr) IV Continuous <Continuous>  dextrose 50% Injectable 12.5 Gram(s) IV Push once  dextrose 50% Injectable 25 Gram(s) IV Push once  dextrose 50% Injectable 25 Gram(s) IV Push once  enoxaparin Injectable 40 milliGRAM(s) SubCutaneous daily  furosemide    Tablet 40 milliGRAM(s) Oral two times a day  levothyroxine 50 MICROGram(s) Oral daily  metoprolol succinate  milliGRAM(s) Oral daily  OLANZapine 10 milliGRAM(s) Oral at bedtime  potassium chloride    Tablet ER 20 milliEquivalent(s) Oral daily  thiamine IVPB 500 milliGRAM(s) IV Intermittent every 8 hours    MEDICATIONS  (PRN):  dextrose 40% Gel 15 Gram(s) Oral once PRN Blood Glucose LESS THAN 70 milliGRAM(s)/deciliter  glucagon  Injectable 1 milliGRAM(s) IntraMuscular once PRN Glucose LESS THAN 70 milligrams/deciliter  haloperidol    Injectable 5 milliGRAM(s) IntraMuscular every 6 hours PRN agitation  LORazepam     Tablet 2 milliGRAM(s) Oral two times a day PRN severe agittaion  melatonin 3 milliGRAM(s) Oral at bedtime PRN Insomnia    LABS:              CAPILLARY BLOOD GLUCOSE              Consultant(s) Notes Reviewed:  [x ] YES  [ ] NO    PHYSICAL EXAM:  GENERAL: NAD, ,not in any distress ,  HEAD:  Atraumatic, Normocephalic  EYES: EOMI, PERRLA, conjunctiva and sclera clear  ENMT: No tonsillar erythema, exudates, or enlargement; Moist mucous membranes, Good dentition, No lesions  NECK: Supple, No JVD, Normal thyroid  NERVOUS SYSTEM:  Alert & confused   CHEST/LUNG: Good air entry bilateral with no  rales, rhonchi, wheezing, or rubs  HEART: Regular rate and rhythm; No murmurs, rubs, or gallops  ABDOMEN: Soft, Nontender, Nondistended; Bowel sounds present  EXTREMITIES:  2+ Peripheral Pulses, No clubbing, cyanosis, but 2+edema  SKIN: No rashes or lesions    Care Discussed with Consultants/Other Providers [ x] YES  [ ] NO

## 2018-08-15 NOTE — PROGRESS NOTE BEHAVIORAL HEALTH - NSBHCONSULTMEDS_PSY_A_CORE FT
1. Discontinue Geodon 60mg; start Zyprexa 10mg po qhs (monitor qtc <500ms on ekg)    2. PRN: Haldol 5mg PO/IM/IV q6h PRN severe agitation.  Monitor for QTc<500; Ativan 2mg po prn q12 for agitation; Melatonin 3mg PO qHS PRN insomnia.      3. MRI brain- please premedicate with PRN Haldol and Ativan.  F/u neuro recs.      4. Minimize use of benzos, opioids, anticholinergics, or other deliriogenic agents when possible.  Maintain sleep wake cycle.  Provide frequent reorientation and redirection.     5. CL psych will follow 1. Zyprexa 10mg po qhs (monitor qtc <500ms on ekg)    2. PRN: Haldol 5mg PO/IM/IV q6h PRN severe agitation.  Monitor for QTc<500; Ativan 2mg po prn q12 for agitation; Melatonin 3mg PO qHS PRN insomnia.      3. MRI brain- please premedicate with PRN Haldol and Ativan.  F/u neuro recs.      4. Minimize use of benzos, opioids, anticholinergics, or other deliriogenic agents when possible.  Maintain sleep wake cycle.  Provide frequent reorientation and redirection.     5. CL psych will follow

## 2018-08-15 NOTE — PROGRESS NOTE ADULT - SUBJECTIVE AND OBJECTIVE BOX
Subjective:  pt seen and examined, no CP or SOB.  There is a 1:1 at bedside.    MEDICATIONS  (STANDING):  enoxaparin Injectable 40 milliGRAM(s) SubCutaneous daily  furosemide    Tablet 40 milliGRAM(s) Oral two times a day  levothyroxine 50 MICROGram(s) Oral daily  metoprolol succinate  milliGRAM(s) Oral daily  OLANZapine 10 milliGRAM(s) Oral at bedtime  potassium chloride    Tablet ER 20 milliEquivalent(s) Oral daily  thiamine IVPB 500 milliGRAM(s) IV Intermittent every 8 hours    MEDICATIONS  (PRN):  dextrose 40% Gel 15 Gram(s) Oral once PRN Blood Glucose LESS THAN 70 milliGRAM(s)/deciliter  glucagon  Injectable 1 milliGRAM(s) IntraMuscular once PRN Glucose LESS THAN 70 milligrams/deciliter  haloperidol    Injectable 5 milliGRAM(s) IntraMuscular every 6 hours PRN agitation  LORazepam     Tablet 2 milliGRAM(s) Oral two times a day PRN severe agittaion  melatonin 3 milliGRAM(s) Oral at bedtime PRN Insomnia      LABS:    PHYSICAL EXAM  Vital Signs Last 24 Hrs  T(C): 36.8 (15 Aug 2018 05:56), Max: 36.8 (14 Aug 2018 21:21)  T(F): 98.3 (15 Aug 2018 05:56), Max: 98.3 (15 Aug 2018 05:56)  HR: 79 (15 Aug 2018 05:56) (72 - 79)  BP: 125/80 (15 Aug 2018 05:56) (125/80 - 138/76)  RR: 20 (15 Aug 2018 05:56) (17 - 20)  SpO2: 97% (15 Aug 2018 05:56) (97% - 100%)  	  Cardiovascular: Normal S1 S2, No JVD, No murmurs  Respiratory: Lungs clear to auscultation, normal effort 	  Gastrointestinal:  Soft, Non-tender, + BS	  Ext: No C/C/E BL LE    DATA:  	  < from: VA Duplex Lower Ext Vein Scan, Bilat (08.09.18 @ 08:48) >  IMPRESSION:     No evidence of bilateral lower extremity deep venous thrombosis.  < end of copied text >      ASSESSMENT/PLAN: 	57y Female with h/o DM with no known h/o CAD/MI presents with altered mental status. Patient was brought in yesterday by police after being found in her neighbor's garden naked picking tomatoes.  Her mental status has reportedly been declining since her 's death 5 years ago.  Of note, though the patient is ambulatory, she had a witnessed mechanical fall in the ER when she slipped from a rolling chair and slid to the floor. There was no loss of consciousness and no apparent prodromal symptoms. Cardiology is called to assess for possible aortic stenosis murmur on exam. The patient denies any anginal symptoms, dyspnea, palpitations, syncope, near syncope or previous cardiac work up.      -- Echo can be done as OP  -- CT head with no acute/chronic CVA/mass/bleed  -- HD stable with no evidence of CHF

## 2018-08-15 NOTE — PROGRESS NOTE BEHAVIORAL HEALTH - OTHER
wearing sunglasses inside wearing sunglasses inside, with necklace with tags on them, cordless telephone from patient's home at bedside

## 2018-08-15 NOTE — PROGRESS NOTE BEHAVIORAL HEALTH - NSBHFUPINTERVALHXFT_PSY_A_CORE
Patient seen and evaluated, with acquaintance (Jazzy) at bedside  awake and alert to person, time (Aug  2018), and place (hospital).  Patient Patient seen and evaluated, with acquaintance (Jazzy) at bedside  awake and alert to person, time (Aug  2018), and place (hospital).  No side effects reports from Zyprexa, patient reports that "I am pregnant."  When asked how she had confirmed this patient reports "I took a pregnancy test but it's not showing up positive yet."  Discussed with patient about events leading up to her hospitalization and how she was found in neighbor's yard picking tomatoes.  Patient has no insight into this reports, "I wasn't naked, I was wearing shorty shorts!"  Patient denies S/H/I/I/P, A/V/H, or thoughts of paranoia.  Delusional that she is pregnant.  Discussed with patient about conversation psychiatry team had with her outpt psychiatrist about starting her on Zyprexa, patient admantly states she is not going to take this medication and feels that she needs to continue Geodon.  Patient goes on to accuse doctor that "you're not a psychiatrist! I'm not taking anything until I speak to Dr. Russ."  Patient encouraged to reach out to her outpt psychiatrist.  Patient goes from being agitated to extremely tearful reports wanting to get home to her dog who "ran away."  Patient reassured by Jazzy that her dog is being cared for by him while she is in the hospital, but patient continues to state her concerns that the dog ran away.    Jazzy, patient's friend at bedside, reports patient has liquor bottles all over the house and that patient has been drink up to 10 drinks a day, goes to sleep and wakes up to drink again.  Reports to patient "you need to go to AA."  Patient does not acknowledge the severity of her drinking, states "I only drink one vodka!  Psychiatry team spoke with patient's outpt psychiatrist, Dr. Russ, reports following patient for the last 2 years.  Denies any cognitive impairments or dementia, reports patient is a chronic schizophrenic who has been generally functioning well on her own.  Reports patient has been on Geodon for several years, along with Buspar and Zoloft.  Reports patient has been on Zoloft for a year and recently was increased to manage her OCD sx.  Reports patient's current presentation is an acute change, reports last seeing patient a month ago and was stable at that time.  Dr. Russ unaware of patient's alcohol use. Patient seen and evaluated, with acquaintance (Jazzy) at bedside  awake and alert to person, time (Aug  2018), and place (hospital).  No side effects reports from Zyprexa, patient reports that "I am pregnant."  When asked how she had confirmed this patient reports "I took a pregnancy test but it's not showing up positive yet."  Discussed with patient about events leading up to her hospitalization and how she was found in neighbor's yard picking tomatoes.  Patient has no insight into this reports, "I wasn't naked, I was wearing shorty shorts!"  Patient denies S/H/I/I/P, A/V/H, or thoughts of paranoia.  Delusional that she is pregnant.  Discussed with patient about conversation psychiatry team had with her outpt psychiatrist about starting her on Zyprexa, patient adamantly states she is not going to take this medication and feels that she needs to continue Geodon.  Patient goes on to accuse doctor that "you're not a psychiatrist! I'm not taking anything until I speak to Dr. Russ."  Patient encouraged to reach out to her outpt psychiatrist.  Patient goes from being agitated to extremely tearful reports wanting to get home to her dog who "ran away."  Patient reassured by Jazzy that her dog is being cared for by him while she is in the hospital, but patient continues to state her concerns that the dog ran away.      Jazzy, patient's friend at bedside, reports patient has liquor bottles all over the house and that patient has been drink up to 10 drinks a day, goes to sleep and wakes up to drink again.  Reports to patient "you need to go to AA."  Patient does not acknowledge the severity of her drinking, states "I only drink one vodka!    Psychiatry team spoke with patient's outpt psychiatrist, Dr. Russ, reports following patient for the last 2 years.  Denies any cognitive impairments or dementia, reports patient is a chronic schizophrenic who has been generally functioning well on her own.  Reports patient has been on Geodon for several years, along with Buspar and Zoloft.  Reports patient has been on Zoloft for a year and recently was increased to manage her OCD sx.  Reports patient's current presentation is an acute change, reports last seeing patient a month ago and was stable at that time.  Dr. Russ unaware of patient's alcohol use.

## 2018-08-15 NOTE — PROGRESS NOTE BEHAVIORAL HEALTH - SUMMARY
58 y/o  female, , childless, retired as a  at [employer], domiciled alone in a private residence in [place], with PPH of , no currently substance abuse, no suicide attempts or self injurious behaviors as reported by patient, with PMHx of DM, bib police with altered mental status.  Patient was found in her neighbor's garden naked picking tomatoes.  Police report patient's mental status has been declining since her 's death 5 years ago.  Patient is not answering any questions appropriately and seems to have flight of ideas.  Over the course of the night, patient was agitated and aggressive with 1:1 staff requiring doses of Haldol 5mg and Ativan 2mg x 2 doses.  Psychiatry consulted to assess for altered mental status.      Patient seen and evaluated, continues to be disoriented, oddly related, not able to state reason for admission.  Per staff patient is restless however not violent.  Denies S/H/I/I/P, A/V/H or thoughts of paranoia, reports feeling safe in the hospital.  Continue same medications.  Likely this is a delirium d/t medical condition and electrolyte abnormalities.  Consider neurology workup to determine if this is cause by any organic causes her change in patient's mental status. Discontinue Geodon 60mg; start Zyprexa 10mg po qhs with PRN Haldol 5mg po/im/iv for agitation (monitor qtc <500ms) and Ativan 2mg po/im/iv prn q12 for agitation. 58 y/o  female, , childless, retired as a  at [employer], domiciled alone in a private residence in [place], with PPH of schizophrenia and alcohol abuse, no currently substance abuse, no suicide attempts or self injurious behaviors as reported by patient, with PMHx of DM, bib police with altered mental status.  Patient was found in her neighbor's garden naked picking tomatoes.  Police report patient's mental status has been declining since her 's death 5 years ago.  Patient is not answering any questions appropriately and seems to have flight of ideas.  Over the course of the night, patient was agitated and aggressive with 1:1 staff requiring doses of Haldol 5mg and Ativan 2mg x 2 doses.  Psychiatry consulted to assess for altered mental status.      Patient seen and evaluated, continues to be disoriented, oddly related, not able to state reason for admission.  Per staff patient is restless however not violent.  Denies S/H/I/I/P, A/V/H or thoughts of paranoia, reports feeling safe in the hospital.  Continue same medications.  Likely this is a delirium d/t medical condition and electrolyte abnormalities.  Consider neurology workup to determine if this is cause by any organic causes her change in patient's mental status. Discontinue Geodon 60mg; start Zyprexa 10mg po qhs with PRN Haldol 5mg po/im/iv for agitation (monitor qtc <500ms) and Ativan 2mg po/im/iv prn q12 for agitation.

## 2018-08-15 NOTE — PROGRESS NOTE BEHAVIORAL HEALTH - NSBHCHARTREVIEWVS_PSY_A_CORE FT
Vital Signs Last 24 Hrs  T(C): 37 (14 Aug 2018 05:19), Max: 37.2 (13 Aug 2018 22:21)  T(F): 98.6 (14 Aug 2018 05:19), Max: 98.9 (13 Aug 2018 22:21)  HR: 84 (14 Aug 2018 05:19) (69 - 84)  BP: 133/71 (14 Aug 2018 05:19) (113/64 - 133/71)  BP(mean): --  RR: 18 (14 Aug 2018 05:19) (17 - 18)  SpO2: 98% (14 Aug 2018 05:19) (98% - 100%) Vital Signs Last 24 Hrs  T(C): 36.8 (15 Aug 2018 05:56), Max: 36.8 (14 Aug 2018 21:21)  T(F): 98.3 (15 Aug 2018 05:56), Max: 98.3 (15 Aug 2018 05:56)  HR: 79 (15 Aug 2018 05:56) (79 - 79)  BP: 125/80 (15 Aug 2018 05:56) (125/80 - 138/76)  BP(mean): --  RR: 20 (15 Aug 2018 05:56) (17 - 20)  SpO2: 97% (15 Aug 2018 05:56) (97% - 98%)

## 2018-08-15 NOTE — PROGRESS NOTE BEHAVIORAL HEALTH - PRN MEDS
Haldol 5mg and Ativan 2mg x 1 dose today Haldol 5mg IM 8/14 at 1700 and Ativan 2mg x 1 dose 8/15 at 0200 for agitation

## 2018-08-15 NOTE — PROGRESS NOTE BEHAVIORAL HEALTH - CASE SUMMARY
56 y/o  female, , childless, retired  at [employer], domiciled alone in a private residence in [place], with PPH schizophrenia, alcohol abuse stable on OP psych meds for many years per psychiatrist, last psych admission >15 y/a, also with h/o ETOH abuse with suspected relapse per friend, no suicide attempts or self injurious behaviors as reported by patient, with PMHx of DM, bib police with altered mental status.  Patient was found in her neighbor's garden naked picking tomatoes.  Pt is dissheveled, alert and oriented to person and place but not date, poorly attentive, disorganized, wandering around ER.  Denies SI/HI, AVH, or paranoia.  Today pt is restless, disorganized, perseverative but slightly more calm.  Spoke with OP psychiatrist Dr. Russ who saw pt last month, describes her as chronic schizophrenic stable on meds for years, no baseline dementia, living independently, and states he has received multiple calls from her here in the hospital, this is an acute change in mental status.  Dx: [place] 2[place]2 [facility]  Recs: 1. C/w Zyprexa 10mg PO qhS.  2. PRN: Haldol 5mg IV q6h PRN severe agitation.  Monitor for QTc<500.  Melatonin 3mg PO qHS PRN insomnia.  3. F/u MRI brain, F/u neuro recs.  4. Minimize use of benzos, opioids, anticholinergics, or other deliriogenic agents when possible.  Maintain sleep wake cycle.  Provide frequent reorientation and redirection.  5. Thiamine 500mg IV tid x9 doses.  6. CL psych will f/u

## 2018-08-16 PROCEDURE — 99232 SBSQ HOSP IP/OBS MODERATE 35: CPT | Mod: GC

## 2018-08-16 RX ORDER — OLANZAPINE 15 MG/1
15 TABLET, FILM COATED ORAL AT BEDTIME
Qty: 0 | Refills: 0 | Status: DISCONTINUED | OUTPATIENT
Start: 2018-08-16 | End: 2018-08-19

## 2018-08-16 RX ADMIN — Medication 20 MILLIEQUIVALENT(S): at 11:40

## 2018-08-16 RX ADMIN — Medication 100 MILLIGRAM(S): at 05:18

## 2018-08-16 RX ADMIN — HALOPERIDOL DECANOATE 5 MILLIGRAM(S): 100 INJECTION INTRAMUSCULAR at 17:17

## 2018-08-16 RX ADMIN — HALOPERIDOL DECANOATE 5 MILLIGRAM(S): 100 INJECTION INTRAMUSCULAR at 08:04

## 2018-08-16 RX ADMIN — OLANZAPINE 15 MILLIGRAM(S): 15 TABLET, FILM COATED ORAL at 23:07

## 2018-08-16 RX ADMIN — Medication 50 MICROGRAM(S): at 05:18

## 2018-08-16 RX ADMIN — Medication 40 MILLIGRAM(S): at 16:33

## 2018-08-16 RX ADMIN — Medication 2 MILLIGRAM(S): at 05:18

## 2018-08-16 RX ADMIN — Medication 40 MILLIGRAM(S): at 05:18

## 2018-08-16 RX ADMIN — ENOXAPARIN SODIUM 40 MILLIGRAM(S): 100 INJECTION SUBCUTANEOUS at 11:40

## 2018-08-16 RX ADMIN — Medication 105 MILLIGRAM(S): at 09:41

## 2018-08-16 RX ADMIN — Medication 105 MILLIGRAM(S): at 03:07

## 2018-08-16 NOTE — PROGRESS NOTE BEHAVIORAL HEALTH - NSBHFUPINTERVALHXFT_PSY_A_CORE
Patient seen and evaluated, awake and alert, oriented to person, place ([place]) and partially to time (August 2018- end of month).  Patient wearing sunglasses while inside, patient continues to report feeling that she is pregnant sometimes.  Restless, asking to go home.  Patient denies S/H/I/I/P, A/V/H, or thoughts of paranoia.  Reports good appetite.  1:1 CO report patient was combative in the morning, restless, pacing back and forth on the unit.  Patient required Haldol and Ativan prns doses this morning for agitation.

## 2018-08-16 NOTE — PROGRESS NOTE BEHAVIORAL HEALTH - CASE SUMMARY
58 y/o  female, , childless, retired  at [employer], domiciled alone in a private residence in [place], with PPH schizophrenia, alcohol abuse stable on OP psych meds for many years per psychiatrist, last psych admission >15 y/a, also with h/o ETOH abuse with suspected relapse per friend, no suicide attempts or self injurious behaviors as reported by patient, with PMHx of DM, bib police with altered mental status.  Patient was found in her neighbor's garden naked picking tomatoes.  Pt is dissheveled, alert and oriented to person and place but not date, poorly attentive, disorganized, wandering around ER.  Denies SI/HI, AVH, or paranoia.  Today pt is restless, disorganized, perseverative but slightly more calm.  Spoke with OP psychiatrist Dr. Russ who saw pt last month, describes her as chronic schizophrenic stable on meds for years, no baseline dementia, living independently, and states he has received multiple calls from her here in the hospital, this is an acute change in mental status.  Today pt states she is pregnant, Zyprexa causes cancer.  Appears more calm as compared to yesterday, but required multiple PRNs for agitation this AM.  Dx: Delirium 2/2 [facility].  Schizophrenia, ETOH use d/o.  Recs: 1. Increase Zyprexa to 15mg PO qhS.  2. PRN: Haldol 5mg IV q6h PRN severe agitation.  Monitor for QTc<500.  Melatonin 3mg PO qHS PRN insomnia.  3. F/u MRI brain, F/u neuro recs.  4. Minimize use of benzos, opioids, anticholinergics, or other deliriogenic agents when possible.  Maintain sleep wake cycle.  Provide frequent reorientation and redirection.  5. Thiamine 500mg IV tid x9 doses.  6. CL psych will f/u

## 2018-08-16 NOTE — PROGRESS NOTE ADULT - SUBJECTIVE AND OBJECTIVE BOX
Subjective:  pt seen and examined, no acute distress on exam      dextrose 40% Gel 15 Gram(s) Oral once PRN  dextrose 5%. 1000 milliLiter(s) IV Continuous <Continuous>  dextrose 50% Injectable 12.5 Gram(s) IV Push once  dextrose 50% Injectable 25 Gram(s) IV Push once  dextrose 50% Injectable 25 Gram(s) IV Push once  enoxaparin Injectable 40 milliGRAM(s) SubCutaneous daily  furosemide    Tablet 40 milliGRAM(s) Oral two times a day  glucagon  Injectable 1 milliGRAM(s) IntraMuscular once PRN  haloperidol    Injectable 5 milliGRAM(s) IntraMuscular every 6 hours PRN  levothyroxine 50 MICROGram(s) Oral daily  LORazepam     Tablet 2 milliGRAM(s) Oral two times a day PRN  melatonin 3 milliGRAM(s) Oral at bedtime PRN  metoprolol succinate  milliGRAM(s) Oral daily  OLANZapine 10 milliGRAM(s) Oral at bedtime  potassium chloride    Tablet ER 20 milliEquivalent(s) Oral daily  thiamine IVPB 500 milliGRAM(s) IV Intermittent every 8 hours      Creatinine Trend: 0.69<--, 0.56<--, 0.57<--, 0.81<--, 0.77<--    COAGS:           T(C): 36.8 (08-15-18 @ 21:05), Max: 36.8 (08-15-18 @ 05:56)  HR: 77 (08-15-18 @ 21:05) (73 - 79)  BP: 136/80 (08-15-18 @ 21:05) (104/61 - 136/80)  RR: 17 (08-15-18 @ 21:05) (17 - 20)  SpO2: 99% (08-15-18 @ 21:05) (97% - 100%)  Wt(kg): --    I&O's Summary    14 Aug 2018 07:01  -  15 Aug 2018 07:00  --------------------------------------------------------  IN: 3940 mL / OUT: 0 mL / NET: 3940 mL    15 Aug 2018 07:01  -  16 Aug 2018 04:46  --------------------------------------------------------  IN: 1990 mL / OUT: 0 mL / NET: 1990 mL    	  Cardiovascular: Normal S1 S2, No JVD, No murmurs  Respiratory: Lungs clear to auscultation, normal effort 	  Gastrointestinal:  Soft, Non-tender, + BS	  Ext: No C/C/E BL LE    DATA:  	  < from: [facility] Duplex Lower Ext Vein Scan, Bilat (08.09.18 @ 08:48) >  IMPRESSION:     No evidence of bilateral lower extremity deep venous thrombosis.  < end of copied text >      ASSESSMENT/PLAN: 	57y Female with h/o DM with no known h/o CAD/MI presents with altered mental status. Patient was brought in yesterday by police after being found in her neighbor's garden naked picking tomatoes.  Her mental status has reportedly been declining since her 's death 5 years ago.  Of note, though the patient is ambulatory, she had a witnessed mechanical fall in the ER when she slipped from a rolling chair and slid to the floor. There was no loss of consciousness and no apparent prodromal symptoms. Cardiology is called to assess for possible aortic stenosis murmur on exam. The patient denies any anginal symptoms, dyspnea, palpitations, syncope, near syncope or previous cardiac work up.      -- Echo can be done as OP  -- CT head with no acute/chronic CVA/mass/bleed  -- HD stable with no evidence of CHF   --  GI / DVT prophylaxis.,  keep K>4, mag >2.0   -- cont Haldol, Ativan per medicine   D/W Dr Smart

## 2018-08-16 NOTE — PROGRESS NOTE BEHAVIORAL HEALTH - NSBHCHARTREVIEWINVESTIGATE_PSY_A_CORE FT
< from: 12 Lead ECG (08.13.18 @ 13:04) >      Ventricular Rate 55 BPM    Atrial Rate 55 BPM    P-R Interval 142 ms    QRS Duration 76 ms    Q-T Interval 456 ms    QTC Calculation(Bezet) 436 ms    P Axis 43 degrees    R Axis 15 degrees    T Axis 41 degrees    Diagnosis Line SINUS BRADYCARDIA  OTHERWISE NORMAL ECG    Confirmed by NELSON GILLIAM MD ([id number]) on 8/14/2018 7:26:07 PM    < end of copied text >

## 2018-08-16 NOTE — PROGRESS NOTE BEHAVIORAL HEALTH - SUMMARY
58 y/o  female, , childless, retired as a  at [employer], domiciled alone in a private residence in [place], with PPH of schizophrenia and alcohol abuse, no currently substance abuse, no suicide attempts or self injurious behaviors as reported by patient, with PMHx of DM, bib police with altered mental status.  Patient was found in her neighbor's garden naked picking tomatoes.  Police report patient's mental status has been declining since her 's death 5 years ago.  Patient is not answering any questions appropriately and seems to have flight of ideas.  Over the course of the night, patient was agitated and aggressive with 1:1 staff requiring doses of Haldol 5mg and Ativan 2mg x 2 doses.  Psychiatry consulted to assess for altered mental status.      Patient seen and evaluated, continues to be disoriented, oddly related, not able to state reason for admission.  Per staff patient is restless however not violent.  Denies S/H/I/I/P, A/V/H or thoughts of paranoia, reports feeling safe in the hospital.  Continue same medications.  Likely this is a delirium d/t medical condition and electrolyte abnormalities.  Consider neurology workup to determine if this is cause by any organic causes her change in patient's mental status. Increase Zyprexa to 15mg po at hs from 10mg with PRN Haldol 5mg po/im/iv for agitation (monitor qtc <500ms) and Ativan 2mg po/im/iv prn q12 for agitation.  Thiamine 500mg IV tid x9 doses

## 2018-08-16 NOTE — PROGRESS NOTE BEHAVIORAL HEALTH - NSBHCONSULTMEDS_PSY_A_CORE FT
1. Increase Zyprexa to 15mg po hs from 10mg  (monitor qtc <500ms on ekg); Thiamine 500mg IV tid x9 doses    2. PRN: Haldol 5mg PO/IM/IV q6h PRN severe agitation.  Monitor for QTc<500; Ativan 2mg po prn q12 for agitation; Melatonin 3mg PO qHS PRN insomnia.      3. MRI brain- please premedicate with PRN Haldol and Ativan.  F/u neuro recs.      4. Minimize use of benzos, opioids, anticholinergics, or other deliriogenic agents when possible.  Maintain sleep wake cycle.  Provide frequent reorientation and redirection.     5. CL psych will follow 1. Increase Zyprexa to 15mg po hs from 10mg  (monitor qtc <500ms on ekg); Thiamine 500mg IV tid x9 doses    2. PRN: Haldol 5mg PO/IM/IV q6h PRN severe agitation.  Monitor for QTc<500; Ativan 2mg po prn q12 for agitation; Melatonin 3mg PO qHS PRN insomnia.      3. MRI brain- can premedicate with PRN Haldol and Ativan.  F/u neuro recs.      4. Minimize use of benzos, opioids, anticholinergics, or other deliriogenic agents when possible.  Maintain sleep wake cycle.  Provide frequent reorientation and redirection.     5. CL psych will follow

## 2018-08-16 NOTE — PROGRESS NOTE BEHAVIORAL HEALTH - NSBHCHARTREVIEWVS_PSY_A_CORE FT
Vital Signs Last 24 Hrs  T(C): 37.2 (16 Aug 2018 05:11), Max: 37.2 (16 Aug 2018 05:11)  T(F): 99 (16 Aug 2018 05:11), Max: 99 (16 Aug 2018 05:11)  HR: 69 (16 Aug 2018 05:11) (69 - 77)  BP: 139/85 (16 Aug 2018 05:11) (104/61 - 139/85)  BP(mean): --  RR: 17 (16 Aug 2018 05:11) (17 - 20)  SpO2: 100% (16 Aug 2018 05:11) (99% - 100%)

## 2018-08-16 NOTE — PROGRESS NOTE BEHAVIORAL HEALTH - NSBHCONSULTMEDAGITATION_PSY_A_CORE FT
Haldol 5mg po/im/iv prn q6 for agitation (monitor qtc <500ms on ekg); Ativan 2mg po/iv/im prn q12 for agitation
Haldol 2.5mg po/im/iv prn q6 for agitation (monitor qtc <500ms on ekg)
Haldol 5mg po/im/iv prn q6 for agitaton (monitor qtc <500ms on ekg); Ativan 2mg po/iv/im prn q12 for agitation
Haldol 5mg po/im/iv prn q6 for agitation (monitor qtc <500ms on ekg); Ativan 2mg po/iv/im prn q12 for agitation
Haldol 5mg po/im/iv prn q6 for agitation (monitor qtc <500ms on ekg); Ativan 2mg po/iv/im prn q12 for agitation

## 2018-08-16 NOTE — PROGRESS NOTE ADULT - SUBJECTIVE AND OBJECTIVE BOX
INTERVAL HPI/OVERNIGHT EVENTS: No new concersn   Vital Signs Last 24 Hrs  T(C): 36.8 (16 Aug 2018 14:09), Max: 37.2 (16 Aug 2018 05:11)  T(F): 98.2 (16 Aug 2018 14:09), Max: 99 (16 Aug 2018 05:11)  HR: 75 (16 Aug 2018 14:09) (69 - 77)  BP: 127/87 (16 Aug 2018 14:09) (127/87 - 139/85)  BP(mean): --  RR: 18 (16 Aug 2018 14:09) (17 - 18)  SpO2: 100% (16 Aug 2018 14:09) (99% - 100%)  I&O's Summary    15 Aug 2018 07:01  -  16 Aug 2018 07:00  --------------------------------------------------------  IN: 2210 mL / OUT: 0 mL / NET: 2210 mL    16 Aug 2018 07:01  -  16 Aug 2018 18:59  --------------------------------------------------------  IN: 1600 mL / OUT: 0 mL / NET: 1600 mL      MEDICATIONS  (STANDING):  dextrose 5%. 1000 milliLiter(s) (50 mL/Hr) IV Continuous <Continuous>  dextrose 50% Injectable 12.5 Gram(s) IV Push once  dextrose 50% Injectable 25 Gram(s) IV Push once  dextrose 50% Injectable 25 Gram(s) IV Push once  enoxaparin Injectable 40 milliGRAM(s) SubCutaneous daily  furosemide    Tablet 40 milliGRAM(s) Oral two times a day  levothyroxine 50 MICROGram(s) Oral daily  metoprolol succinate  milliGRAM(s) Oral daily  OLANZapine 15 milliGRAM(s) Oral at bedtime  potassium chloride    Tablet ER 20 milliEquivalent(s) Oral daily    MEDICATIONS  (PRN):  dextrose 40% Gel 15 Gram(s) Oral once PRN Blood Glucose LESS THAN 70 milliGRAM(s)/deciliter  glucagon  Injectable 1 milliGRAM(s) IntraMuscular once PRN Glucose LESS THAN 70 milligrams/deciliter  haloperidol    Injectable 5 milliGRAM(s) IntraMuscular every 6 hours PRN agitation  LORazepam     Tablet 2 milliGRAM(s) Oral two times a day PRN severe agittaion  melatonin 3 milliGRAM(s) Oral at bedtime PRN Insomnia    LABS:              CAPILLARY BLOOD GLUCOSE              REVIEW OF SYSTEMS:  CONSTITUTIONAL: No fever, weight loss, or fatigue  EYES: No eye pain, visual disturbances, or discharge  ENMT:  No difficulty hearing, tinnitus, vertigo; No sinus or throat pain  NECK: No pain or stiffness  RESPIRATORY: No cough, wheezing, chills or hemoptysis; No shortness of breath  CARDIOVASCULAR: No chest pain, palpitations, dizziness, or leg swelling  GASTROINTESTINAL: No abdominal or epigastric pain. No nausea, vomiting, or hematemesis; No diarrhea or constipation. No melena or hematochezia.  GENITOURINARY: No dysuria, frequency, hematuria, or incontinence  NEUROLOGICAL: confused S:    Consultant(s) Notes Reviewed:  [x ] YES  [ ] NO    PHYSICAL EXAM:  GENERAL: NAD, well-groomed, well-developed, not in any distress ,  HEAD:  Atraumatic, Normocephalic  EYES: EOMI, PERRLA, conjunctiva and sclera clear  ENMT: No tonsillar erythema, exudates, or enlargement; Moist mucous membranes, Good dentition, No lesions  NECK: Supple, No JVD, Normal thyroid  NERVOUS SYSTEM:  Alert & Oriented X0, No focal deficit   CHEST/LUNG: Good air entry bilateral with no  rales, rhonchi, wheezing, or rubs  HEART: Regular rate and rhythm; No murmurs, rubs, or gallops  ABDOMEN: Soft, Nontender, Nondistended; Bowel sounds present  EXTREMITIES:  2+ Peripheral Pulses, No clubbing, cyanosis, or edema  SKIN: No rashes or lesions    Care Discussed with Consultants/Other Providers [ x] YES  [ ] NO

## 2018-08-17 LAB
ALBUMIN SERPL ELPH-MCNC: 4.4 G/DL — SIGNIFICANT CHANGE UP (ref 3.3–5)
ALP SERPL-CCNC: 81 U/L — SIGNIFICANT CHANGE UP (ref 40–120)
ALT FLD-CCNC: 17 U/L — SIGNIFICANT CHANGE UP (ref 10–45)
ANION GAP SERPL CALC-SCNC: 13 MMOL/L — SIGNIFICANT CHANGE UP (ref 5–17)
AST SERPL-CCNC: 25 U/L — SIGNIFICANT CHANGE UP (ref 10–40)
BILIRUB SERPL-MCNC: 0.2 MG/DL — SIGNIFICANT CHANGE UP (ref 0.2–1.2)
BUN SERPL-MCNC: 18 MG/DL — SIGNIFICANT CHANGE UP (ref 7–23)
CALCIUM SERPL-MCNC: 9.2 MG/DL — SIGNIFICANT CHANGE UP (ref 8.4–10.5)
CHLORIDE SERPL-SCNC: 97 MMOL/L — SIGNIFICANT CHANGE UP (ref 96–108)
CO2 SERPL-SCNC: 22 MMOL/L — SIGNIFICANT CHANGE UP (ref 22–31)
CREAT SERPL-MCNC: 0.64 MG/DL — SIGNIFICANT CHANGE UP (ref 0.5–1.3)
GLUCOSE SERPL-MCNC: 96 MG/DL — SIGNIFICANT CHANGE UP (ref 70–99)
HCT VFR BLD CALC: 35.5 % — SIGNIFICANT CHANGE UP (ref 34.5–45)
HGB BLD-MCNC: 11.9 G/DL — SIGNIFICANT CHANGE UP (ref 11.5–15.5)
MCHC RBC-ENTMCNC: 29.8 PG — SIGNIFICANT CHANGE UP (ref 27–34)
MCHC RBC-ENTMCNC: 33.5 GM/DL — SIGNIFICANT CHANGE UP (ref 32–36)
MCV RBC AUTO: 88.8 FL — SIGNIFICANT CHANGE UP (ref 80–100)
PLATELET # BLD AUTO: 254 K/UL — SIGNIFICANT CHANGE UP (ref 150–400)
POTASSIUM SERPL-MCNC: 3.9 MMOL/L — SIGNIFICANT CHANGE UP (ref 3.5–5.3)
POTASSIUM SERPL-SCNC: 3.9 MMOL/L — SIGNIFICANT CHANGE UP (ref 3.5–5.3)
PROT SERPL-MCNC: 7 G/DL — SIGNIFICANT CHANGE UP (ref 6–8.3)
RBC # BLD: 4 M/UL — SIGNIFICANT CHANGE UP (ref 3.8–5.2)
RBC # FLD: 13.5 % — SIGNIFICANT CHANGE UP (ref 10.3–14.5)
SODIUM SERPL-SCNC: 132 MMOL/L — LOW (ref 135–145)
WBC # BLD: 5.25 K/UL — SIGNIFICANT CHANGE UP (ref 3.8–10.5)
WBC # FLD AUTO: 5.25 K/UL — SIGNIFICANT CHANGE UP (ref 3.8–10.5)

## 2018-08-17 PROCEDURE — 99232 SBSQ HOSP IP/OBS MODERATE 35: CPT

## 2018-08-17 PROCEDURE — 70553 MRI BRAIN STEM W/O & W/DYE: CPT | Mod: 26

## 2018-08-17 RX ORDER — HALOPERIDOL DECANOATE 100 MG/ML
5 INJECTION INTRAMUSCULAR ONCE
Qty: 0 | Refills: 0 | Status: COMPLETED | OUTPATIENT
Start: 2018-08-17 | End: 2018-08-17

## 2018-08-17 RX ADMIN — Medication 50 MICROGRAM(S): at 05:12

## 2018-08-17 RX ADMIN — Medication 2 MILLIGRAM(S): at 02:49

## 2018-08-17 RX ADMIN — ENOXAPARIN SODIUM 40 MILLIGRAM(S): 100 INJECTION SUBCUTANEOUS at 11:21

## 2018-08-17 RX ADMIN — Medication 40 MILLIGRAM(S): at 05:11

## 2018-08-17 RX ADMIN — HALOPERIDOL DECANOATE 5 MILLIGRAM(S): 100 INJECTION INTRAMUSCULAR at 04:08

## 2018-08-17 RX ADMIN — Medication 100 MILLIGRAM(S): at 05:11

## 2018-08-17 RX ADMIN — Medication 2 MILLIGRAM(S): at 18:03

## 2018-08-17 RX ADMIN — Medication 20 MILLIEQUIVALENT(S): at 11:21

## 2018-08-17 RX ADMIN — Medication 40 MILLIGRAM(S): at 16:44

## 2018-08-17 RX ADMIN — HALOPERIDOL DECANOATE 5 MILLIGRAM(S): 100 INJECTION INTRAMUSCULAR at 18:03

## 2018-08-17 NOTE — PROGRESS NOTE ADULT - SUBJECTIVE AND OBJECTIVE BOX
Patient seen and examined  no complaints  has been drinking water    Allergy Status Unknown    Hospital Medications:   MEDICATIONS  (STANDING):  dextrose 5%. 1000 milliLiter(s) (50 mL/Hr) IV Continuous <Continuous>  dextrose 50% Injectable 12.5 Gram(s) IV Push once  dextrose 50% Injectable 25 Gram(s) IV Push once  dextrose 50% Injectable 25 Gram(s) IV Push once  enoxaparin Injectable 40 milliGRAM(s) SubCutaneous daily  furosemide    Tablet 40 milliGRAM(s) Oral two times a day  levothyroxine 50 MICROGram(s) Oral daily  metoprolol succinate  milliGRAM(s) Oral daily  OLANZapine 15 milliGRAM(s) Oral at bedtime  potassium chloride    Tablet ER 20 milliEquivalent(s) Oral daily      VITALS:  T(F): 97.6 (08-17-18 @ 11:48), Max: 98.9 (08-17-18 @ 04:33)  HR: 88 (08-17-18 @ 11:48)  BP: 120/73 (08-17-18 @ 11:48)  RR: 18 (08-17-18 @ 11:48)  SpO2: 99% (08-17-18 @ 11:48)  Wt(kg): --    08-16 @ 07:01  -  08-17 @ 07:00  --------------------------------------------------------  IN: 1780 mL / OUT: 0 mL / NET: 1780 mL    08-17 @ 07:01  -  08-17 @ 13:58  --------------------------------------------------------  IN: 1200 mL / OUT: 0 mL / NET: 1200 mL        PHYSICAL EXAM:  Constitutional: NAD  HEENT: anicteric sclera, oropharynx clear, MMM  Neck: No JVD  Respiratory: CTAB, no wheezes, rales or rhonchi  Cardiovascular: S1, S2, RRR  Gastrointestinal: BS+, soft, NT/ND  Extremities: 3+ peripheral edema    LABS:  08-17    132<L>  |  97  |  18  ----------------------------<  96  3.9   |  22  |  0.64    Ca    9.2      17 Aug 2018 07:15    TPro  7.0  /  Alb  4.4  /  TBili  0.2  /  DBili      /  AST  25  /  ALT  17  /  AlkPhos  81  08-17    Creatinine Trend: 0.64 <--, 0.69 <--                        11.9   5.25  )-----------( 254      ( 17 Aug 2018 08:09 )             35.5     Urine Studies:      RADIOLOGY & ADDITIONAL STUDIES:

## 2018-08-17 NOTE — PROGRESS NOTE BEHAVIORAL HEALTH - SUMMARY
58 y/o  female, , childless, retired  at [employer], domiciled alone in a private residence in [place], with PPH schizophrenia, alcohol abuse stable on OP psych meds for many years per psychiatrist, last psych admission >15 y/a, also with h/o ETOH abuse with suspected relapse per friend, no suicide attempts or self injurious behaviors as reported by patient, with PMHx of DM, bib police with altered mental status.  Patient was found in her neighbor's garden naked picking tomatoes.  Pt is dissheveled, alert and oriented to person and place but not date, poorly attentive, disorganized, wandering around ER.  Denies SI/HI, AVH, or paranoia.  Today pt is restless, disorganized, perseverative but slightly more calm.  Spoke with OP psychiatrist Dr. Russ who saw pt last month, describes her as chronic schizophrenic stable on meds for years, no baseline dementia, living independently, and states he has received multiple calls from her here in the hospital, this is an acute change in mental status.  Today pt states she is pregnant, Zyprexa causes cancer.  Appears more calm as compared to yesterday, but required PRNs for agitation again this AM.  Dx: Delirium 2/2 G[facility].  Schizophrenia, ETOH use d/o.

## 2018-08-17 NOTE — PROGRESS NOTE ADULT - SUBJECTIVE AND OBJECTIVE BOX
INTERVAL HPI/OVERNIGHT EVENTS: Very confused   Vital Signs Last 24 Hrs  T(C): 36.4 (17 Aug 2018 11:48), Max: 37.2 (17 Aug 2018 04:33)  T(F): 97.6 (17 Aug 2018 11:48), Max: 98.9 (17 Aug 2018 04:33)  HR: 88 (17 Aug 2018 11:48) (76 - 88)  BP: 120/73 (17 Aug 2018 11:48) (120/73 - 146/76)  BP(mean): --  RR: 18 (17 Aug 2018 11:48) (17 - 18)  SpO2: 99% (17 Aug 2018 11:48) (98% - 99%)  I&O's Summary    16 Aug 2018 07:01  -  17 Aug 2018 07:00  --------------------------------------------------------  IN: 1780 mL / OUT: 0 mL / NET: 1780 mL    17 Aug 2018 07:01  -  17 Aug 2018 15:56  --------------------------------------------------------  IN: 1920 mL / OUT: 0 mL / NET: 1920 mL      MEDICATIONS  (STANDING):  dextrose 5%. 1000 milliLiter(s) (50 mL/Hr) IV Continuous <Continuous>  dextrose 50% Injectable 12.5 Gram(s) IV Push once  dextrose 50% Injectable 25 Gram(s) IV Push once  dextrose 50% Injectable 25 Gram(s) IV Push once  enoxaparin Injectable 40 milliGRAM(s) SubCutaneous daily  furosemide    Tablet 40 milliGRAM(s) Oral two times a day  levothyroxine 50 MICROGram(s) Oral daily  metoprolol succinate  milliGRAM(s) Oral daily  OLANZapine 15 milliGRAM(s) Oral at bedtime  potassium chloride    Tablet ER 20 milliEquivalent(s) Oral daily    MEDICATIONS  (PRN):  dextrose 40% Gel 15 Gram(s) Oral once PRN Blood Glucose LESS THAN 70 milliGRAM(s)/deciliter  glucagon  Injectable 1 milliGRAM(s) IntraMuscular once PRN Glucose LESS THAN 70 milligrams/deciliter  haloperidol    Injectable 5 milliGRAM(s) IntraMuscular every 6 hours PRN agitation  LORazepam     Tablet 2 milliGRAM(s) Oral two times a day PRN severe agittaion  melatonin 3 milliGRAM(s) Oral at bedtime PRN Insomnia    LABS:                        11.9   5.25  )-----------( 254      ( 17 Aug 2018 08:09 )             35.5     08-17    132<L>  |  97  |  18  ----------------------------<  96  3.9   |  22  |  0.64    Ca    9.2      17 Aug 2018 07:15    TPro  7.0  /  Alb  4.4  /  TBili  0.2  /  DBili  x   /  AST  25  /  ALT  17  /  AlkPhos  81  08-17      Consultant(s) Notes Reviewed:  [x ] YES  [ ] NO    PHYSICAL EXAM:  GENERAL: NAD, well-groomed, well-developed ,not in any distress ,  HEAD:  Atraumatic, Normocephalic  EYES: EOMI, PERRLA, conjunctiva and sclera clear  ENMT: No tonsillar erythema, exudates, or enlargement; Moist mucous membranes, Good dentition, No lesions  NECK: Supple, No JVD, Normal thyroid  NERVOUS SYSTEM:  Alert & Oriented X0, No focal deficit   CHEST/LUNG: Good air entry bilateral with no  rales, rhonchi, wheezing, or rubs  HEART: Regular rate and rhythm; No murmurs, rubs, or gallops  ABDOMEN: Soft, Nontender, Nondistended; Bowel sounds present  EXTREMITIES:  2+ Peripheral Pulses, No clubbing, cyanosis, or edema  SKIN: No rashes or lesions    Care Discussed with Consultants/Other Providers [ x] YES  [ ] NO

## 2018-08-17 NOTE — PROGRESS NOTE BEHAVIORAL HEALTH - NSBHCHARTREVIEWVS_PSY_A_CORE FT
T(C): 36.4 (08-17-18 @ 11:48), Max: 37.2 (08-17-18 @ 04:33)  HR: 88 (08-17-18 @ 11:48) (76 - 88)  BP: 120/73 (08-17-18 @ 11:48) (120/73 - 146/76)  RR: 18 (08-17-18 @ 11:48) (17 - 18)  SpO2: 99% (08-17-18 @ 11:48) (98% - 99%)  Wt(kg): --

## 2018-08-17 NOTE — PROGRESS NOTE BEHAVIORAL HEALTH - NSBHCONSULTMEDS_PSY_A_CORE FT
1. C/w Zyprexa 15mg PO qhS.      2. PRN: Haldol 5mg IV q6h PRN severe agitation.  Monitor for QTc<500.  Melatonin 3mg PO qHS PRN insomnia.      3. F/u MRI brain, F/u neuro recs.      4. Minimize use of benzos, opioids, anticholinergics, or other deliriogenic agents when possible.  Maintain sleep wake cycle.  Provide frequent reorientation and redirection.    5. [facility] psych will f/u

## 2018-08-17 NOTE — PROGRESS NOTE ADULT - SUBJECTIVE AND OBJECTIVE BOX
Subjective:  pt seen and examined, no acute distress on exam    dextrose 40% Gel 15 Gram(s) Oral once PRN  dextrose 5%. 1000 milliLiter(s) IV Continuous <Continuous>  dextrose 50% Injectable 12.5 Gram(s) IV Push once  dextrose 50% Injectable 25 Gram(s) IV Push once  dextrose 50% Injectable 25 Gram(s) IV Push once  enoxaparin Injectable 40 milliGRAM(s) SubCutaneous daily  furosemide    Tablet 40 milliGRAM(s) Oral two times a day  glucagon  Injectable 1 milliGRAM(s) IntraMuscular once PRN  haloperidol    Injectable 5 milliGRAM(s) IntraMuscular every 6 hours PRN  levothyroxine 50 MICROGram(s) Oral daily  LORazepam     Tablet 2 milliGRAM(s) Oral two times a day PRN  melatonin 3 milliGRAM(s) Oral at bedtime PRN  metoprolol succinate  milliGRAM(s) Oral daily  OLANZapine 15 milliGRAM(s) Oral at bedtime  potassium chloride    Tablet ER 20 milliEquivalent(s) Oral daily      Creatinine Trend: 0.69<--, 0.56<--, 0.57<--, 0.81<--, 0.77<--    COAGS:           T(C): 37.1 (08-16-18 @ 21:35), Max: 37.2 (08-16-18 @ 05:11)  HR: 76 (08-16-18 @ 21:35) (69 - 76)  BP: 146/76 (08-16-18 @ 21:35) (127/87 - 146/76)  RR: 17 (08-16-18 @ 21:35) (17 - 18)  SpO2: 99% (08-16-18 @ 21:35) (99% - 100%)  Wt(kg): --    I&O's Summary    15 Aug 2018 07:01  -  16 Aug 2018 07:00  --------------------------------------------------------  IN: 2210 mL / OUT: 0 mL / NET: 2210 mL    16 Aug 2018 07:01  -  17 Aug 2018 02:18  --------------------------------------------------------  IN: 1780 mL / OUT: 0 mL / NET: 1780 mL      	  Cardiovascular: Normal S1 S2, No JVD, No murmurs  Respiratory: Lungs clear to auscultation, normal effort 	  Gastrointestinal:  Soft, Non-tender, + BS	  Ext: No C/C/E BL LE    DATA:  	  < from: [facility] Duplex Lower Ext Vein Scan, Bilat (08.09.18 @ 08:48) >  IMPRESSION:     No evidence of bilateral lower extremity deep venous thrombosis.  < end of copied text >      ASSESSMENT/PLAN: 	57y Female with h/o DM with no known h/o CAD/MI presents with altered mental status. Patient was brought in yesterday by police after being found in her neighbor's garden naked picking tomatoes.  Her mental status has reportedly been declining since her 's death 5 years ago.  Of note, though the patient is ambulatory, she had a witnessed mechanical fall in the ER when she slipped from a rolling chair and slid to the floor. There was no loss of consciousness and no apparent prodromal symptoms. Cardiology is called to assess for possible aortic stenosis murmur on exam. The patient denies any anginal symptoms, dyspnea, palpitations, syncope, near syncope or previous cardiac work up.      -- Echo can be done as Outpatient   -- CT head with no acute/chronic CVA/mass/bleed  -- HD stable with no evidence of CHF   -- cont BB - bp stable   --  GI / DVT prophylaxis.,  keep K>4, mag >2.0   -- cont Haldol, Ativan per medicine   D/W Dr Smart

## 2018-08-18 LAB
ANION GAP SERPL CALC-SCNC: 13 MMOL/L — SIGNIFICANT CHANGE UP (ref 5–17)
BUN SERPL-MCNC: 20 MG/DL — SIGNIFICANT CHANGE UP (ref 7–23)
CALCIUM SERPL-MCNC: 9.7 MG/DL — SIGNIFICANT CHANGE UP (ref 8.4–10.5)
CHLORIDE SERPL-SCNC: 98 MMOL/L — SIGNIFICANT CHANGE UP (ref 96–108)
CO2 SERPL-SCNC: 25 MMOL/L — SIGNIFICANT CHANGE UP (ref 22–31)
CREAT SERPL-MCNC: 0.59 MG/DL — SIGNIFICANT CHANGE UP (ref 0.5–1.3)
GLUCOSE SERPL-MCNC: 101 MG/DL — HIGH (ref 70–99)
POTASSIUM SERPL-MCNC: 4.2 MMOL/L — SIGNIFICANT CHANGE UP (ref 3.5–5.3)
POTASSIUM SERPL-SCNC: 4.2 MMOL/L — SIGNIFICANT CHANGE UP (ref 3.5–5.3)
SODIUM SERPL-SCNC: 136 MMOL/L — SIGNIFICANT CHANGE UP (ref 135–145)

## 2018-08-18 RX ADMIN — ENOXAPARIN SODIUM 40 MILLIGRAM(S): 100 INJECTION SUBCUTANEOUS at 12:53

## 2018-08-18 RX ADMIN — Medication 100 MILLIGRAM(S): at 05:59

## 2018-08-18 RX ADMIN — Medication 40 MILLIGRAM(S): at 15:34

## 2018-08-18 RX ADMIN — Medication 50 MICROGRAM(S): at 05:59

## 2018-08-18 RX ADMIN — Medication 20 MILLIEQUIVALENT(S): at 12:53

## 2018-08-18 RX ADMIN — OLANZAPINE 15 MILLIGRAM(S): 15 TABLET, FILM COATED ORAL at 01:13

## 2018-08-18 RX ADMIN — OLANZAPINE 15 MILLIGRAM(S): 15 TABLET, FILM COATED ORAL at 21:26

## 2018-08-18 RX ADMIN — Medication 3 MILLIGRAM(S): at 21:27

## 2018-08-18 RX ADMIN — Medication 40 MILLIGRAM(S): at 05:59

## 2018-08-18 NOTE — PROGRESS NOTE ADULT - SUBJECTIVE AND OBJECTIVE BOX
INTERVAL HPI/OVERNIGHT EVENTS: I feel fine so going home .   Vital Signs Last 24 Hrs  T(C): 36.8 (18 Aug 2018 12:35), Max: 37 (17 Aug 2018 22:00)  T(F): 98.3 (18 Aug 2018 12:35), Max: 98.6 (17 Aug 2018 22:00)  HR: 78 (18 Aug 2018 12:35) (78 - 79)  BP: 133/81 (18 Aug 2018 12:35) (133/81 - 138/82)  BP(mean): --  RR: 18 (18 Aug 2018 12:35) (18 - 18)  SpO2: 96% (18 Aug 2018 12:35) (96% - 98%)  I&O's Summary    17 Aug 2018 07:01  -  18 Aug 2018 07:00  --------------------------------------------------------  IN: 2360 mL / OUT: 0 mL / NET: 2360 mL      MEDICATIONS  (STANDING):  dextrose 5%. 1000 milliLiter(s) (50 mL/Hr) IV Continuous <Continuous>  dextrose 50% Injectable 12.5 Gram(s) IV Push once  dextrose 50% Injectable 25 Gram(s) IV Push once  dextrose 50% Injectable 25 Gram(s) IV Push once  enoxaparin Injectable 40 milliGRAM(s) SubCutaneous daily  furosemide    Tablet 40 milliGRAM(s) Oral two times a day  levothyroxine 50 MICROGram(s) Oral daily  metoprolol succinate  milliGRAM(s) Oral daily  OLANZapine 15 milliGRAM(s) Oral at bedtime  potassium chloride    Tablet ER 20 milliEquivalent(s) Oral daily    MEDICATIONS  (PRN):  dextrose 40% Gel 15 Gram(s) Oral once PRN Blood Glucose LESS THAN 70 milliGRAM(s)/deciliter  glucagon  Injectable 1 milliGRAM(s) IntraMuscular once PRN Glucose LESS THAN 70 milligrams/deciliter  haloperidol    Injectable 5 milliGRAM(s) IntraMuscular every 6 hours PRN agitation  LORazepam     Tablet 2 milliGRAM(s) Oral two times a day PRN severe agittaion  melatonin 3 milliGRAM(s) Oral at bedtime PRN Insomnia    LABS:                        11.9   5.25  )-----------( 254      ( 17 Aug 2018 08:09 )             35.5     08-18    136  |  98  |  20  ----------------------------<  101<H>  4.2   |  25  |  0.59    Ca    9.7      18 Aug 2018 06:15    TPro  7.0  /  Alb  4.4  /  TBili  0.2  /  DBili  x   /  AST  25  /  ALT  17  /  AlkPhos  81  08-17          Consultant(s) Notes Reviewed:  [x ] YES  [ ] NO    PHYSICAL EXAM:  GENERAL: NAD, well-groomed, well-developed,not in any distress ,  HEAD:  Atraumatic, Normocephalic  EYES: EOMI, PERRLA, conjunctiva and sclera clear  ENMT: No tonsillar erythema, exudates, or enlargement; Moist mucous membranes, Good dentition, No lesions  NECK: Supple, No JVD, Normal thyroid  NERVOUS SYSTEM:  Alert & confused   CHEST/LUNG: Good air entry bilateral with no  rales, rhonchi, wheezing, or rubs  HEART: Regular rate and rhythm; No murmurs, rubs, or gallops  ABDOMEN: Soft, Nontender, Nondistended; Bowel sounds present  EXTREMITIES:  2+ Peripheral Pulses, No clubbing, cyanosis, or edema  SKIN: No rashes or lesions    Care Discussed with Consultants/Other Providers [ x] YES  [ ] NO

## 2018-08-18 NOTE — PROGRESS NOTE BEHAVIORAL HEALTH - NSBHCHARTREVIEWINVESTIGATE_PSY_A_CORE FT
< from: 12 Lead ECG (08.13.18 @ 13:04) >      Ventricular Rate 55 BPM    Atrial Rate 55 BPM    P-R Interval 142 ms    QRS Duration 76 ms    Q-T Interval 456 ms    QTC Calculation(Bezet) 436 ms    P Axis 43 degrees    R Axis 15 degrees    T Axis 41 degrees    Diagnosis Line SINUS BRADYCARDIA  OTHERWISE NORMAL ECG    Confirmed by NELSON GILLIAM MD ([id number]) on 8/14/2018 7:26:07 PM    < end of copied text > < from: 12 Lead ECG (08.13.18 @ 13:04) >      Ventricular Rate 55 BPM    Atrial Rate 55 BPM    P-R Interval 142 ms    QRS Duration 76 ms    Q-T Interval 456 ms    QTC Calculation(Bezet) 436 ms    P Axis 43 degrees    R Axis 15 degrees    T Axis 41 degrees    Diagnosis Line SINUS BRADYCARDIA  OTHERWISE NORMAL ECG    Confirmed by NELSON GILLIAM MD ([id number]) on 8/14/2018 7:26:07 PM        < end of copied text >

## 2018-08-18 NOTE — PROGRESS NOTE BEHAVIORAL HEALTH - CASE SUMMARY
sj: "See my legs.  I as crucified"  Talks about have a twin sister 4 years older than herself.  unclear about sleep or appetite.  Some discomfort in ankles.  Talks about a cemetary plot for her and her () .  Mentions past use of Ziprazidone (Geodon ; had taken it at 60mg).  Doesn't understand why the police took her to the hospital when she was getting dressed (was unclothed in her garden?)  Claims she was seeing her psychiatrist Dr. Russ but then talks about [facility] giving the writer a phone number and mentioning something to the effect of trouble getting her medication.  Rambling and rapid speech make content unclear at times.  states that had difficulty falling asleep.  wakes up in the middle of the night; appetite is good. Obj: seen with consulting resident  MSE:  sitting in assigned room; avoids eye contact; appears disheveled; alert, oriented, cognition intact; denies AVH or s/h i/i/p;  no tremor or rigidity.  irritable and labile.   complains of anxiety; anxious.  tc/tp no peculiarities of language use; Thinking is incoherent and disorganized   very labile becoming tearful at mention of .  ; i&j: poor; accepts treatment; however not reflective on sxs or situation; feels that others provoked the patient; ambivalent about treatments.  Very delusional with derailment and bizarre ideas  (e.g. twin is four years older than her);  circumstantial and tangential.  Speech is pressured ; has flight of ideas.  A&P:  see addendum . sj: "See my legs.  I as crucified"  Talks about have a twin sister 4 years older than herself.  unclear about sleep or appetite.  Some discomfort in ankles.  Talks about a cemetery plot for her and her () .  Mentions past use of Ziprasidone (Geodon ; had taken it at 60mg).  Doesn't understand why the police took her to the hospital when she was getting dressed (was unclothed in her garden?)  Claims she was seeing her psychiatrist Dr. Russ but then talks about [facility] giving the writer a phone number and mentioning something to the effect of trouble getting her medication.  Rambling and rapid speech make content unclear at times.  states that had difficulty falling asleep.  wakes up in the middle of the night; appetite is good. Obj: seen with consulting resident  MSE:  sitting in assigned room; avoids eye contact; appears disheveled; alert, oriented, cognition intact; denies AVH or s/h i/i/p;  no tremor or rigidity.  irritable and labile.   complains of anxiety; anxious.  tc/tp no peculiarities of language use; Thinking is incoherent and disorganized   very labile becoming tearful at mention of .  ; i&j: poor; accepts treatment; however not reflective on sxs or situation; feels that others provoked the patient; ambivalent about treatments.  Very delusional with derailment and bizarre ideas  (e.g. twin is four years older than her);  circumstantial and tangential.  Speech is pressured ; has flight of ideas.  A&P:  see addendum .

## 2018-08-18 NOTE — PROGRESS NOTE BEHAVIORAL HEALTH - SUMMARY
56 y/o  female, , childless, retired  at [employer], domiciled alone in a private residence in [place], with PPH schizophrenia, alcohol abuse stable on OP psych meds for many years per psychiatrist, last psych admission >15 y/a, also with h/o ETOH abuse with suspected relapse per friend, no suicide attempts or self injurious behaviors as reported by patient, with PMHx of DM, bib police with altered mental status.  Patient was found in her neighbor's garden naked picking tomatoes.  Pt is dissheveled, alert and oriented to person and place but not date, poorly attentive, disorganized, wandering around ER.  Denies SI/HI, AVH, or paranoia.  Today pt is restless, disorganized, perseverative but slightly more calm.  Spoke with OP psychiatrist Dr. Russ who saw pt last month, describes her as chronic schizophrenic stable on meds for years, no baseline dementia, living independently, and states he has received multiple calls from her here in the hospital, this is an acute change in mental status.  Today pt states she is pregnant, Zyprexa causes cancer.  Appears more calm as compared to yesterday, but required PRNs for agitation again this AM.  Dx: Delirium 2/2 GMC.  Schizophrenia, ETOH use d/o. 58 y/o  female, , childless, retired  at [employer], domiciled alone in a private residence in [place], with PPH schizophrenia, alcohol abuse stable on OP psych meds for many years per psychiatrist, last psych admission >15 y/a, also with h/o ETOH abuse with suspected relapse per friend, no suicide attempts or self injurious behaviors as reported by patient, with PMHx of DM, bib police with altered mental status.  Patient was found in her neighbor's garden naked picking tomatoes.      Spoke with OP psychiatrist Dr. Russ who saw pt last month, describes her as chronic schizophrenic stable on meds for years, no baseline dementia, living independently, and states he has received multiple calls from her here in the hospital, this is an acute change in mental status.      Per nursing, patient had poor sleep last night and was wandering around her room all night. She required PRN Haldol this AM for agitation. Today, patient continues to be disorganized, with paranoid delusions and mood lability; she cries when talking about her dog Shukes and about how she believes police officers are partying in her house. She complains about leg pain and about being in the hospital. Patient is currently on Zyprexa 15mg QHS. 56 y/o  female, , childless, retired  at [employer], domiciled alone in a private residence in [place], with PPH schizophrenia, alcohol abuse stable on OP psych meds for many years per psychiatrist, last psych admission >15 y/a, also with h/o ETOH abuse with suspected relapse per friend, no suicide attempts or self injurious behaviors as reported by patient, with PMHx of DM, bib police with altered mental status.  Patient was found in her neighbor's garden naked picking tomatoes.      Spoke with OP psychiatrist Dr. Russ who saw pt last month, describes her as chronic schizophrenic stable on meds for years, no baseline dementia, living independently, and states he has received multiple calls from her here in the hospital, this is an acute change in mental status.      Per nursing, patient had poor sleep last night and was wandering around her room all night. She required PRN Ativan this AM for agitation. Today, patient continues to be floridly psychotic, disorganized, with paranoid delusions and mood lability. She complains about leg pain and about being in the hospital. Patient is currently on Zyprexa 15mg QHS.  Will recommend increasing Zyprexa to 25mg (if QTC <470) and switching to Zyprexa Zydis due to concern for potentially cheeking meds in the hospital. Recommend ordering lipid panel. Considering patient's poor sleep, will recommend starting Remeron QHS. After stabilizing medical issues, recommend patient be transferred to inpatient psychiatry.

## 2018-08-18 NOTE — PROGRESS NOTE BEHAVIORAL HEALTH - NSBHFUPINTERVALHXFT_PSY_A_CORE
xxx Upon interview this AM, patient reports that she slept over 12 hours last night and today feels well-rested and good; however, per nursing, patient did not sleep at all last night, was walking around. This AM she required PRN Haldol for agitation. She continues to be disorganized, now with paranoid delusions that police are having a party in her house, that she needs a guard for her house, and that people in the hospital may try to hurt her. She does not want to say her psychiatric diagnosis out loud but says she will write it down, because she does not want anyone nearby to know her business. She believes that her roommate in hospital room has  (patient was moved into chair in hallway) and asks about her . Patient is tangential, with labile mood, and cries when talking about her dog "Karrie" who she is concerned about and believes is in the pound. She states that psychiatrist looks like her sister, repeatedly asks for psychiatrist name, and says she is "freaked out." Patient asks when she can go home to see her dog Karrie, says she does not have Schizophrenia anymore, and that Zyprexa causes cancer. She says she likes Ziprasidone. She is also concerned about her DM2 and concerned that she is not getting home medication Metformin in the hospital. Upon interview this AM, patient reports that she slept over 12 hours last night and today feels well-rested and good; however, per nursing, patient did not sleep at all last night, was walking around. This AM she required PRN Ativan 2mg PO for agitation. She continues to be floridly psychotic, disorganized, now with paranoid delusions that police are having a party in her house, that she needs a guard for her house, and that people in the hospital may try to hurt her. She does not want to say her psychiatric diagnosis out loud but says she will write it down, because she does not want anyone nearby to know her business. She believes that her roommate in hospital room has  (patient was moved into chair in hallway) and asks about her . Patient is tangential, with labile mood, and cries when talking about her dog "Karrie" who she is concerned about and believes is in the pound. She states that psychiatrist looks like her sister, repeatedly asks for psychiatrist name, and says she is "freaked out." Patient asks when she can go home to see her dog Karrie, says she does not have Schizophrenia anymore, and that Zyprexa causes cancer. She says she likes Ziprasidone. She is also concerned about her DM2 and concerned that she is not getting  Metformin in the hospital. She believes that her leg pain is due to the fact that people tried to crucify her.

## 2018-08-18 NOTE — PROGRESS NOTE BEHAVIORAL HEALTH - NSBHCONSULTMEDS_PSY_A_CORE FT
1. C/w Zyprexa 15mg PO qhS.      2. PRN: Haldol 5mg IV q6h PRN severe agitation.  Monitor for QTc<500.  Melatonin 3mg PO qHS PRN insomnia.      3. F/u MRI brain, F/u neuro recs.      4. Minimize use of benzos, opioids, anticholinergics, or other deliriogenic agents when possible.  Maintain sleep wake cycle.  Provide frequent reorientation and redirection.    5. CL psych will f/u 1. C/w Zyprexa 15mg PO qhS.      2. PRN: Haldol 5mg IV q6h PRN severe agitation.  Monitor for QTc<500.  Melatonin 3mg PO qHS PRN insomnia.      3. MRI Brain 8/17/18 WNL     4. Minimize use of benzos, opioids, anticholinergics, or other deliriogenic agents when possible.  Maintain sleep wake cycle.  Provide frequent reorientation and redirection.    5. CL psych will f/u Recommendations:     1. Continue CO: patient with history of recent PRNs for agitation and continues to be floridly psychotic     2. Increase Zyprexa to 25mg QHS (if QTC <470) and change to Zyprexa Zydis.     3. Add Remeron 7.5mg QHS for sleep.     4. Order Lipid panel; patient with history of statin use in 2014 and now starting Zyprexa.    5. PRN: Haldol 5mg IV q6h PRN severe agitation, Ativan 2mg PO PRN for agitation. Monitor for QTc<500.  Melatonin 3mg PO qHS PRN insomnia.      6. MRI Brain 8/17/18 WNL     7. Minimize use of benzos, opioids, anticholinergics, or other deliriogenic agents when possible.  Maintain sleep wake cycle.  Provide frequent reorientation and redirection.    8. CL psych will f/u, recommend transfer to inpatient psychiatry after stabilizing medical issues

## 2018-08-18 NOTE — PROGRESS NOTE BEHAVIORAL HEALTH - NSBHCHARTREVIEWVS_PSY_A_CORE FT
Vital Signs Last 24 Hrs  T(C): 37 (17 Aug 2018 22:00), Max: 37 (17 Aug 2018 22:00)  T(F): 98.6 (17 Aug 2018 22:00), Max: 98.6 (17 Aug 2018 22:00)  HR: 79 (17 Aug 2018 22:00) (79 - 88)  BP: 138/82 (17 Aug 2018 22:00) (120/73 - 138/82)  BP(mean): --  RR: 18 (17 Aug 2018 22:00) (18 - 18)  SpO2: 98% (17 Aug 2018 22:00) (98% - 99%)

## 2018-08-18 NOTE — PROGRESS NOTE ADULT - SUBJECTIVE AND OBJECTIVE BOX
Subjective:  pt seen and examined, no acute distress on exam    ROS -     dextrose 40% Gel 15 Gram(s) Oral once PRN  dextrose 5%. 1000 milliLiter(s) IV Continuous <Continuous>  dextrose 50% Injectable 12.5 Gram(s) IV Push once  dextrose 50% Injectable 25 Gram(s) IV Push once  dextrose 50% Injectable 25 Gram(s) IV Push once  enoxaparin Injectable 40 milliGRAM(s) SubCutaneous daily  furosemide    Tablet 40 milliGRAM(s) Oral two times a day  glucagon  Injectable 1 milliGRAM(s) IntraMuscular once PRN  haloperidol    Injectable 5 milliGRAM(s) IntraMuscular every 6 hours PRN  levothyroxine 50 MICROGram(s) Oral daily  LORazepam     Tablet 2 milliGRAM(s) Oral two times a day PRN  melatonin 3 milliGRAM(s) Oral at bedtime PRN  metoprolol succinate  milliGRAM(s) Oral daily  OLANZapine 15 milliGRAM(s) Oral at bedtime  potassium chloride    Tablet ER 20 milliEquivalent(s) Oral daily                            11.9   5.25  )-----------( 254      ( 17 Aug 2018 08:09 )             35.5       Hemoglobin: 11.9 g/dL (08-17 @ 08:09)      08-17    132<L>  |  97  |  18  ----------------------------<  96  3.9   |  22  |  0.64    Ca    9.2      17 Aug 2018 07:15    TPro  7.0  /  Alb  4.4  /  TBili  0.2  /  DBili  x   /  AST  25  /  ALT  17  /  AlkPhos  81  08-17    Creatinine Trend: 0.64<--, 0.69<--, 0.56<--, 0.57<--, 0.81<--, 0.77<--    COAGS:           T(C): 37 (08-17-18 @ 22:00), Max: 37 (08-17-18 @ 22:00)  HR: 79 (08-17-18 @ 22:00) (79 - 88)  BP: 138/82 (08-17-18 @ 22:00) (120/73 - 138/82)  RR: 18 (08-17-18 @ 22:00) (18 - 18)  SpO2: 98% (08-17-18 @ 22:00) (98% - 99%)  Wt(kg): --    I&O's Summary    16 Aug 2018 07:01  -  17 Aug 2018 07:00  --------------------------------------------------------  IN: 1780 mL / OUT: 0 mL / NET: 1780 mL    17 Aug 2018 07:01  -  18 Aug 2018 05:34  --------------------------------------------------------  IN: 2360 mL / OUT: 0 mL / NET: 2360 mL    	  Cardiovascular: Normal S1 S2, No JVD, No murmurs  Respiratory: Lungs clear to auscultation, normal effort 	  Gastrointestinal:  Soft, Non-tender, + BS	  Ext: No C/C/E BL LE    DATA:  	  < from: [facility] Duplex Lower Ext Vein Scan, Bilat (08.09.18 @ 08:48) >  IMPRESSION:     No evidence of bilateral lower extremity deep venous thrombosis.  < end of copied text >      ASSESSMENT/PLAN: 	57y Female with h/o DM with no known h/o CAD/MI presents with altered mental status. Patient was brought in yesterday by police after being found in her neighbor's garden naked picking tomatoes.  Her mental status has reportedly been declining since her 's death 5 years ago.  Of note, though the patient is ambulatory, she had a witnessed mechanical fall in the ER when she slipped from a rolling chair and slid to the floor. There was no loss of consciousness and no apparent prodromal symptoms. Cardiology is called to assess for possible aortic stenosis murmur on exam. The patient denies any anginal symptoms, dyspnea, palpitations, syncope, near syncope or previous cardiac work up.      -- Echo can be done as Outpatient   -- CT head with no acute/chronic CVA/mass/bleed  -- HD stable with no evidence of CHF   -- cont BB - bp stable   --  GI / DVT prophylaxis.,  keep K>4, mag >2.0   -- cont Haldol, Ativan per medicine   D/W Dr Smart

## 2018-08-18 NOTE — PROGRESS NOTE ADULT - ASSESSMENT
57F presents with altered mental status.  Patient was found in her neighbor's garden naked picking tomatoes.  Police report patient's mental status has been declining since her 's death 5 years ago.  Patient is not answering any questions appropriately and seems to have flight of ideas.     Problem/Plan - 1:  ·  Problem:  Altered mental status, unspecified altered mental status type.  Plan: Exact cause not known.  Neurology and Psychiatry following . MRI Head negative  and EEG  noted.     Problem/Plan - 2:  ·  Problem: HTN (hypertension).  Plan: Bp readings high so restarting her BP meds.      Problem/Plan - 3:  ·  Problem: Edema leg.  Plan: Doppler negative for DVT . TTE pending.  Started on Lasix .      Problem/Plan - 4:  ·  Problem: Hyponatremia.  Plan: Renal helping.     Disposition :  Awaiting guardianship.

## 2018-08-18 NOTE — PROGRESS NOTE ADULT - SUBJECTIVE AND OBJECTIVE BOX
[facility] - Merle / June MATTSON /Gal/ TWILA Zuniga/ TWILA Gray/ Mat Brannon / BLANCHE Chanu  ---------------------------------------------------------------------------------------------------------------  seen and examined today for hyponatremia  Interval : improved serum na level   VITALS:  T(F): 98.6 (08-17-18 @ 22:00), Max: 98.6 (08-17-18 @ 22:00)  HR: 79 (08-17-18 @ 22:00)  BP: 138/82 (08-17-18 @ 22:00)  RR: 18 (08-17-18 @ 22:00)  SpO2: 98% (08-17-18 @ 22:00)    08-17 @ 07:01  -  08-18 @ 07:00  --------------------------------------------------------  IN: 2360 mL / OUT: 0 mL / NET: 2360 mL    Physical Exam :-  Constitutional: NAD  Neck: Supple.  Respiratory: Bilateral equal breath sounds, + Crackles present.  Cardiovascular: S1, S2 normal, positive Murmur  Gastrointestinal: Bowel Sounds present, soft, non tender.  Extremities: + Edema Feet  Neurological: Alert and Oriented x 3, no focal deficits  Psychiatric: Normal mood, normal affect  Data:-  Allergies :   Allergy Status Unknown    Hospital Medications:   MEDICATIONS  (STANDING):  dextrose 5%. 1000 milliLiter(s) (50 mL/Hr) IV Continuous <Continuous>  dextrose 50% Injectable 12.5 Gram(s) IV Push once  dextrose 50% Injectable 25 Gram(s) IV Push once  dextrose 50% Injectable 25 Gram(s) IV Push once  enoxaparin Injectable 40 milliGRAM(s) SubCutaneous daily  furosemide    Tablet 40 milliGRAM(s) Oral two times a day  levothyroxine 50 MICROGram(s) Oral daily  metoprolol succinate  milliGRAM(s) Oral daily  OLANZapine 15 milliGRAM(s) Oral at bedtime  potassium chloride    Tablet ER 20 milliEquivalent(s) Oral daily    08-18    136  |  98  |  20  ----------------------------<  101<H>  4.2   |  25  |  0.59    Ca    9.7      18 Aug 2018 06:15    TPro  7.0  /  Alb  4.4  /  TBili  0.2  /  DBili      /  AST  25  /  ALT  17  /  AlkPhos  81  08-17    Creatinine Trend: 0.59 <--, 0.64 <--                        11.9   5.25  )-----------( 254      ( 17 Aug 2018 08:09 )             35.5

## 2018-08-19 PROCEDURE — 99232 SBSQ HOSP IP/OBS MODERATE 35: CPT | Mod: GC

## 2018-08-19 PROCEDURE — 93010 ELECTROCARDIOGRAM REPORT: CPT

## 2018-08-19 RX ORDER — MIRTAZAPINE 45 MG/1
7.5 TABLET, ORALLY DISINTEGRATING ORAL AT BEDTIME
Qty: 0 | Refills: 0 | Status: DISCONTINUED | OUTPATIENT
Start: 2018-08-19 | End: 2018-08-21

## 2018-08-19 RX ORDER — OLANZAPINE 15 MG/1
25 TABLET, FILM COATED ORAL AT BEDTIME
Qty: 0 | Refills: 0 | Status: DISCONTINUED | OUTPATIENT
Start: 2018-08-19 | End: 2018-08-21

## 2018-08-19 RX ADMIN — Medication 50 MICROGRAM(S): at 07:51

## 2018-08-19 RX ADMIN — Medication 20 MILLIEQUIVALENT(S): at 12:41

## 2018-08-19 RX ADMIN — Medication 3 MILLIGRAM(S): at 22:17

## 2018-08-19 RX ADMIN — Medication 2 MILLIGRAM(S): at 12:47

## 2018-08-19 RX ADMIN — Medication 2 MILLIGRAM(S): at 00:40

## 2018-08-19 RX ADMIN — Medication 40 MILLIGRAM(S): at 18:48

## 2018-08-19 RX ADMIN — ENOXAPARIN SODIUM 40 MILLIGRAM(S): 100 INJECTION SUBCUTANEOUS at 12:42

## 2018-08-19 RX ADMIN — Medication 100 MILLIGRAM(S): at 07:51

## 2018-08-19 RX ADMIN — Medication 40 MILLIGRAM(S): at 07:50

## 2018-08-19 RX ADMIN — OLANZAPINE 25 MILLIGRAM(S): 15 TABLET, FILM COATED ORAL at 21:34

## 2018-08-19 RX ADMIN — HALOPERIDOL DECANOATE 5 MILLIGRAM(S): 100 INJECTION INTRAMUSCULAR at 07:09

## 2018-08-19 NOTE — PROGRESS NOTE BEHAVIORAL HEALTH - MODIFICATIONS
Modifications as above and below
Pt d/w Dr Trinidad, agree with above.
see below; lipid panel; Zydis; Remeron prn.  continue CO.  For inpatient psychiatry after stabilization.

## 2018-08-19 NOTE — PROGRESS NOTE BEHAVIORAL HEALTH - NSBHFUPINTERVALHXFT_PSY_A_CORE
Patient was seen as follow up for ongoing delirium secondary to medical condition and schizophrenia. Chart was reviewed. Per nursing report, patient required Haldol and Ativan prns in the early morning due to physical disorganization. She would intermittently get out of bed and wander the unit, getting agitated, demanding to leave. She was unable to be verbally redirected. Patient took her Zyprexa with no reported or observed side effects.     Patient was irritable and distracted on interview today. At multiple points during her interview patient got out of bed and left her room to wander the hallways. She endorsed being suspicious of the treatment team and the writer stating - "I'm not going to tell you these things, you could be an actor." She denied AVH and S/I/I/P. She denied aggressive thoughts however per nursing report and chart review that patient has been aggressive with staff, requiring intervention from security. Patient was seen as follow up for ongoing delirium secondary to medical condition and schizophrenia. Chart was reviewed. Per nursing report, patient required Haldol and Ativan prns in the early morning due to physical disorganization. She would intermittently get out of bed and wander the unit, getting agitated, demanding to leave. She was unable to be verbally redirected. Patient took her Zyprexa with no reported or observed side effects.     Patient was irritable and distracted on interview today. At multiple points during her interview patient got out of bed and left her room to wander the hallways. She endorsed being suspicious of the treatment team and the writer stating - "I'm not going to tell you these things, you could be an actor." She denied AVH and S/I/I/P. She denied current or history of aggression thoughts however per nursing report and chart review that patient has been aggressive with staff in the emergency department, requiring intervention from security.

## 2018-08-19 NOTE — PROGRESS NOTE BEHAVIORAL HEALTH - NSBHCHARTREVIEWINVESTIGATE_PSY_A_CORE FT
< from: 12 Lead ECG (08.13.18 @ 13:04) >      Ventricular Rate 55 BPM    Atrial Rate 55 BPM    P-R Interval 142 ms    QRS Duration 76 ms    Q-T Interval 456 ms    QTC Calculation(Bezet) 436 ms    P Axis 43 degrees    R Axis 15 degrees    T Axis 41 degrees    Diagnosis Line SINUS BRADYCARDIA  OTHERWISE NORMAL ECG    Confirmed by NELSON GILLIAM MD ([id number]) on 8/14/2018 7:26:07 PM        < end of copied text >

## 2018-08-19 NOTE — PROGRESS NOTE ADULT - SUBJECTIVE AND OBJECTIVE BOX
INTERVAL HPI/OVERNIGHT EVENTS:  feel fine and want to go home.   Vital Signs Last 24 Hrs  T(C): 37 (19 Aug 2018 12:42), Max: 37 (19 Aug 2018 12:42)  T(F): 98.6 (19 Aug 2018 12:42), Max: 98.6 (19 Aug 2018 12:42)  HR: 86 (19 Aug 2018 12:42) (82 - 86)  BP: 130/82 (19 Aug 2018 12:42) (130/82 - 157/88)  BP(mean): --  RR: 18 (19 Aug 2018 12:42) (18 - 18)  SpO2: 100% (19 Aug 2018 12:42) (100% - 100%)  I&O's Summary    18 Aug 2018 07:01  -  19 Aug 2018 07:00  --------------------------------------------------------  IN: 720 mL / OUT: 0 mL / NET: 720 mL      MEDICATIONS  (STANDING):  dextrose 5%. 1000 milliLiter(s) (50 mL/Hr) IV Continuous <Continuous>  dextrose 50% Injectable 12.5 Gram(s) IV Push once  dextrose 50% Injectable 25 Gram(s) IV Push once  dextrose 50% Injectable 25 Gram(s) IV Push once  enoxaparin Injectable 40 milliGRAM(s) SubCutaneous daily  furosemide    Tablet 40 milliGRAM(s) Oral two times a day  levothyroxine 50 MICROGram(s) Oral daily  metoprolol succinate  milliGRAM(s) Oral daily  OLANZapine 15 milliGRAM(s) Oral at bedtime  potassium chloride    Tablet ER 20 milliEquivalent(s) Oral daily    MEDICATIONS  (PRN):  dextrose 40% Gel 15 Gram(s) Oral once PRN Blood Glucose LESS THAN 70 milliGRAM(s)/deciliter  glucagon  Injectable 1 milliGRAM(s) IntraMuscular once PRN Glucose LESS THAN 70 milligrams/deciliter  haloperidol    Injectable 5 milliGRAM(s) IntraMuscular every 6 hours PRN agitation  LORazepam     Tablet 2 milliGRAM(s) Oral two times a day PRN severe agittaion  melatonin 3 milliGRAM(s) Oral at bedtime PRN Insomnia    LABS:    08-18    136  |  98  |  20  ----------------------------<  101<H>  4.2   |  25  |  0.59    Ca    9.7      18 Aug 2018 06:15      RADIOLOGY & ADDITIONAL TESTS:    Consultant(s) Notes Reviewed:  [x ] YES  [ ] NO    PHYSICAL EXAM:  GENERAL: NAD, well-groomed, well-developed, not in any distress ,  HEAD:  Atraumatic, Normocephalic  EYES: EOMI, PERRLA, conjunctiva and sclera clear  ENMT: No tonsillar erythema, exudates, or enlargement; Moist mucous membranes, Good dentition, No lesions  NECK: Supple, No JVD, Normal thyroid  NERVOUS SYSTEM:  Alert & confused   CHEST/LUNG: Good air entry bilateral with no  rales, rhonchi, wheezing, or rubs  HEART: Regular rate and rhythm; No murmurs, rubs, or gallops  ABDOMEN: Soft, Nontender, Nondistended; Bowel sounds present  EXTREMITIES:  2+ Peripheral Pulses, No clubbing, cyanosis, or edema  SKIN: No rashes or lesions    Care Discussed with Consultants/Other Providers [ x] YES  [ ] NO

## 2018-08-19 NOTE — PROGRESS NOTE BEHAVIORAL HEALTH - NSBHCONSULTMEDS_PSY_A_CORE FT
Recommendations:     1. Continue CO: patient with history of recent PRNs for agitation and continues to be floridly psychotic     2. Increase Zyprexa to 25mg QHS (if QTC <470) and change to Zyprexa Zydis.     3. Consider Remeron 7.5mg QHS if patient continues to have poor sleep.     4. Order Lipid panel; patient with history of statin use in 2014 and now starting Zyprexa.    5. PRN: Haldol 5mg IV q6h PRN severe agitation, Ativan 2mg PO PRN for agitation. Monitor for QTc<500.  Melatonin 3mg PO qHS PRN insomnia.      6. Minimize use of benzos, opioids, anticholinergics, or other deliriogenic agents when possible.  Maintain sleep wake cycle.  Provide frequent reorientation and redirection.    7. CL psych will f/u, recommend transfer to inpatient psychiatry after stabilizing medical issues Recommendations:     1. Continue CO: patient with history of recent PRNs for agitation and continues to be floridly psychotic     2. Increase Zyprexa to 25mg QHS (if QTC <470) and change to Zyprexa Zydis.     3. Consider Remeron 7.5mg QHS if patient continues to have poor sleep.     4. Order Lipid panel; patient with history of statin use in 2014 and now starting Zyprexa.    5. Follow EKG    5. PRN: Haldol 5mg IV q6h PRN severe agitation, Ativan 2mg PO PRN for agitation. Monitor for QTc<500.  Melatonin 3mg PO qHS PRN insomnia.      6. Minimize use of benzos, opioids, anticholinergics, or other deliriogenic agents when possible.  Maintain sleep wake cycle.  Provide frequent reorientation and redirection.    7. CL psych will f/u, recommend transfer to inpatient psychiatry after stabilizing medical issues

## 2018-08-19 NOTE — PROGRESS NOTE ADULT - ASSESSMENT
57F presents with altered mental status.  Patient was found in her neighbor's garden naked picking tomatoes.  Police report patient's mental status has been declining since her 's death 5 years ago.  Patient is not answering any questions appropriately and seems to have flight of ideas.     Problem/Plan - 1:  ·  Problem:  Altered mental status, unspecified altered mental status type.  Plan: Exact cause not known.  Neurology and Psychiatry following . MRI Head negative  and EEG  noted.     Problem/Plan - 2:  ·  Problem: HTN (hypertension).  Plan: Bp readings high so restarting her BP meds.      Problem/Plan - 3:  ·  Problem: Edema leg.  Plan: Doppler negative for DVT . TTE pending.  Started on Lasix .      Problem/Plan - 4:  ·  Problem: Hyponatremia.  Plan: Corrected. Renal helping.     Disposition :  Awaiting guardianship.

## 2018-08-19 NOTE — PROGRESS NOTE BEHAVIORAL HEALTH - CASE SUMMARY
Per nursing, patient continues to have poor sleep and wanders around her room/in the hallways. She required PRN Ativan and Haldol this AM for agitation. Today, patient continues to be floridly psychotic, disorganized, with paranoid delusions towards hospital staff and irritability. She is frustrated that she is still in the hospital. Patient is currently on Zyprexa 15mg QHS.  Will recommend increasing Zyprexa to 25mg (if QTC <470) and switching to Zyprexa Zydis due to concern for potentially cheeking meds in the hospital. Recommend ordering lipid panel. If patient continues to have poor sleep, continue starting Remeron QHS.

## 2018-08-19 NOTE — PROGRESS NOTE BEHAVIORAL HEALTH - SUMMARY
58 y/o  female, , childless, retired  at [employer], domiciled alone in a private residence in [place], with PPH schizophrenia, alcohol abuse stable on OP psych meds for many years per psychiatrist, last psych admission >15 y/a, also with h/o ETOH abuse with suspected relapse per friend, no suicide attempts or self injurious behaviors as reported by patient, with PMHx of DM, bib police with altered mental status.  Patient was found in her neighbor's garden naked picking tomatoes.      Spoke with OP psychiatrist Dr. Russ who saw pt last month, describes her as chronic schizophrenic stable on meds for years, no baseline dementia, living independently, and states he has received multiple calls from her here in the hospital, this is an acute change in mental status.      Per nursing, patient had poor sleep last night and was wandering around her room all night. She required PRN Ativan this AM for agitation. Today, patient continues to be floridly psychotic, disorganized, with paranoid delusions and mood lability. She complains about leg pain and about being in the hospital. Patient is currently on Zyprexa 15mg QHS.  Will recommend increasing Zyprexa to 25mg (if QTC <470) and switching to Zyprexa Zydis due to concern for potentially cheeking meds in the hospital. Recommend ordering lipid panel. Considering patient's poor sleep, will recommend starting Remeron QHS. After stabilizing medical issues, recommend patient be transferred to inpatient psychiatry. 58 y/o  female, , childless, retired  at [employer], domiciled alone in a private residence in [place], with PPH schizophrenia, alcohol abuse stable on OP psych meds for many years per psychiatrist, last psych admission >15 y/a, also with h/o ETOH abuse with suspected relapse per friend, no suicide attempts or self injurious behaviors as reported by patient, with PMHx of DM, bib police with altered mental status.  Patient was found in her neighbor's garden naked picking tomatoes.      Spoke with OP psychiatrist Dr. Russ who saw pt last month, describes her as chronic schizophrenic stable on meds for years, no baseline dementia, living independently, and states he has received multiple calls from her here in the hospital, this is an acute change in mental status.      Per nursing, patient continues to have poor sleep and wanders around her room/in the hallways. She required PRN Ativan and Haldol this AM for agitation. Today, patient continues to be floridly psychotic, disorganized, with paranoid delusions towards hospital staff and irritability. She is frustrated that she is still in the hospital. Patient is currently on Zyprexa 15mg QHS.  Will recommend increasing Zyprexa to 25mg (if QTC <470) and switching to Zyprexa Zydis due to concern for potentially cheeking meds in the hospital. Recommend ordering lipid panel. If patient continues to have poor sleep, continue starting Remeron QHS. After stabilizing medical issues, recommend patient be transferred to inpatient psychiatry.

## 2018-08-19 NOTE — PROGRESS NOTE BEHAVIORAL HEALTH - NSBHCONSULTMEDSEVERE_PSY_A_CORE FT
PRN: Haldol 5mg IV q6h PRN severe agitation, Ativan 2mg PO PRN for agitation. Monitor for QTc<500.  Melatonin 3mg PO qHS PRN insomnia.

## 2018-08-19 NOTE — PROGRESS NOTE BEHAVIORAL HEALTH - NSBHCHARTREVIEWVS_PSY_A_CORE FT
Vital Signs Last 24 Hrs  T(C): 37 (17 Aug 2018 22:00), Max: 37 (17 Aug 2018 22:00)  T(F): 98.6 (17 Aug 2018 22:00), Max: 98.6 (17 Aug 2018 22:00)  HR: 79 (17 Aug 2018 22:00) (79 - 88)  BP: 138/82 (17 Aug 2018 22:00) (120/73 - 138/82)  BP(mean): --  RR: 18 (17 Aug 2018 22:00) (18 - 18)  SpO2: 98% (17 Aug 2018 22:00) (98% - 99%) Vital Signs Last 24 Hrs  Temp (F): 97.2 Degrees F  Temp (C) Temp (C): 36.2 Degrees C  Temp site Temp Site: oral    Heart Rate  Heart Rate Heart Rate (beats/min): 82 /min  Heart Rate Method: noninvasive blood pressure monitor    Noninvasive Blood Pressure  BP Systolic Systolic: 137 mm Hg  BP Diastolic Diastolic (mm Hg): 88 mm Hg  Blood Pressure - Site Site: right upper arm  Blood Pressure - Method Method: electronic    Respiratory/Pulse Oximetry  Respiration Rate (breaths/min) Respiration Rate (breaths/min): 18 /min  SpO2 (%) SpO2 (%): 100 %  O2 delivery Patient On: room air

## 2018-08-19 NOTE — PROGRESS NOTE ADULT - SUBJECTIVE AND OBJECTIVE BOX
[facility] - Merle / June MATTSON /Gal/ TWILA Zuniga/ TWILA Gray/ Mat Brannon / BLANCHE Chanu  ---------------------------------------------------------------------------------------------------------------  seen and examined today for low Na level   Interval : improve sodium level   VITALS:  T(F): 98.6 (08-19-18 @ 12:42), Max: 98.6 (08-19-18 @ 12:42)  HR: 86 (08-19-18 @ 12:42)  BP: 130/82 (08-19-18 @ 12:42)  RR: 18 (08-19-18 @ 12:42)  SpO2: 100% (08-19-18 @ 12:42)    08-18 @ 07:01  -  08-19 @ 07:00  --------------------------------------------------------  IN: 720 mL / OUT: 0 mL / NET: 720 mL    Physical Exam :-  Constitutional: NAD  Neck: Supple.  Respiratory: Bilateral equal breath sounds, few Crackles present.  Cardiovascular: S1, S2 normal, positive Murmur  Gastrointestinal: Bowel Sounds present, soft, non tender.  Extremities: +2 Edema Feet  Neurological: Alert   Psychiatric: Normal mood, normal affect  Data:-  Allergies :   Allergy Status Unknown    Hospital Medications:   MEDICATIONS  (STANDING):  dextrose 5%. 1000 milliLiter(s) (50 mL/Hr) IV Continuous <Continuous>  dextrose 50% Injectable 12.5 Gram(s) IV Push once  dextrose 50% Injectable 25 Gram(s) IV Push once  dextrose 50% Injectable 25 Gram(s) IV Push once  enoxaparin Injectable 40 milliGRAM(s) SubCutaneous daily  furosemide    Tablet 40 milliGRAM(s) Oral two times a day  levothyroxine 50 MICROGram(s) Oral daily  metoprolol succinate  milliGRAM(s) Oral daily  OLANZapine 15 milliGRAM(s) Oral at bedtime  potassium chloride    Tablet ER 20 milliEquivalent(s) Oral daily    08-18    136  |  98  |  20  ----------------------------<  101<H>  4.2   |  25  |  0.59    Ca    9.7      18 Aug 2018 06:15      Creatinine Trend: 0.59 <--, 0.64 <--

## 2018-08-19 NOTE — PROGRESS NOTE ADULT - SUBJECTIVE AND OBJECTIVE BOX
Subjective:  pt seen and examined, no acute distress on exam    ROS -     dextrose 40% Gel 15 Gram(s) Oral once PRN  dextrose 5%. 1000 milliLiter(s) IV Continuous <Continuous>  dextrose 50% Injectable 12.5 Gram(s) IV Push once  dextrose 50% Injectable 25 Gram(s) IV Push once  dextrose 50% Injectable 25 Gram(s) IV Push once  enoxaparin Injectable 40 milliGRAM(s) SubCutaneous daily  furosemide    Tablet 40 milliGRAM(s) Oral two times a day  glucagon  Injectable 1 milliGRAM(s) IntraMuscular once PRN  haloperidol    Injectable 5 milliGRAM(s) IntraMuscular every 6 hours PRN  levothyroxine 50 MICROGram(s) Oral daily  LORazepam     Tablet 2 milliGRAM(s) Oral two times a day PRN  melatonin 3 milliGRAM(s) Oral at bedtime PRN  metoprolol succinate  milliGRAM(s) Oral daily  OLANZapine 15 milliGRAM(s) Oral at bedtime  potassium chloride    Tablet ER 20 milliEquivalent(s) Oral daily                            11.9   5.25  )-----------( 254      ( 17 Aug 2018 08:09 )             35.5       Hemoglobin: 11.9 g/dL (08-17 @ 08:09)      08-18    136  |  98  |  20  ----------------------------<  101<H>  4.2   |  25  |  0.59    Ca    9.7      18 Aug 2018 06:15    TPro  7.0  /  Alb  4.4  /  TBili  0.2  /  DBili  x   /  AST  25  /  ALT  17  /  AlkPhos  81  08-17    Creatinine Trend: 0.59<--, 0.64<--, 0.69<--, 0.56<--, 0.57<--, 0.81<--    COAGS:           T(C): 36.5 (08-18-18 @ 21:08), Max: 36.8 (08-18-18 @ 12:35)  HR: 84 (08-18-18 @ 21:08) (78 - 84)  BP: 157/88 (08-18-18 @ 21:08) (133/81 - 157/88)  RR: 18 (08-18-18 @ 21:08) (18 - 18)  SpO2: 100% (08-18-18 @ 21:08) (96% - 100%)  Wt(kg): --    I&O's Summary    17 Aug 2018 07:01  -  18 Aug 2018 07:00  --------------------------------------------------------  IN: 2360 mL / OUT: 0 mL / NET: 2360 mL    18 Aug 2018 07:01  -  19 Aug 2018 06:00  --------------------------------------------------------  IN: 720 mL / OUT: 0 mL / NET: 720 mL    	  Cardiovascular: Normal S1 S2, No JVD, No murmurs  Respiratory: Lungs clear to auscultation, normal effort 	  Gastrointestinal:  Soft, Non-tender, + BS	  Ext: No C/C/E BL LE    DATA:  	  < from: [facility] Duplex Lower Ext Vein Scan, Bilat (08.09.18 @ 08:48) >  IMPRESSION:     No evidence of bilateral lower extremity deep venous thrombosis.  < end of copied text >      ASSESSMENT/PLAN: 	57y Female with h/o DM with no known h/o CAD/MI presents with altered mental status. Patient was brought in yesterday by police after being found in her neighbor's garden naked picking tomatoes.  Her mental status has reportedly been declining since her 's death 5 years ago.  Of note, though the patient is ambulatory, she had a witnessed mechanical fall in the ER when she slipped from a rolling chair and slid to the floor. There was no loss of consciousness and no apparent prodromal symptoms. Cardiology is called to assess for possible aortic stenosis murmur on exam. The patient denies any anginal symptoms, dyspnea, palpitations, syncope, near syncope or previous cardiac work up.      -- Echo can be done as Outpatient   -- CT head with no acute/chronic CVA/mass/bleed  -- HD stable with no evidence of CHF   -- cont BB - bp stable   --  GI / DVT prophylaxis.,  keep K>4, mag >2.0   -- cont Haldol, Ativan per medicine   -- Behavioral health follow up   D/W Dr Correa

## 2018-08-20 LAB
ANION GAP SERPL CALC-SCNC: 15 MMOL/L — SIGNIFICANT CHANGE UP (ref 5–17)
BUN SERPL-MCNC: 16 MG/DL — SIGNIFICANT CHANGE UP (ref 7–23)
CALCIUM SERPL-MCNC: 10 MG/DL — SIGNIFICANT CHANGE UP (ref 8.4–10.5)
CHLORIDE SERPL-SCNC: 96 MMOL/L — SIGNIFICANT CHANGE UP (ref 96–108)
CO2 SERPL-SCNC: 23 MMOL/L — SIGNIFICANT CHANGE UP (ref 22–31)
CREAT SERPL-MCNC: 0.59 MG/DL — SIGNIFICANT CHANGE UP (ref 0.5–1.3)
GLUCOSE SERPL-MCNC: 96 MG/DL — SIGNIFICANT CHANGE UP (ref 70–99)
HCT VFR BLD CALC: 35.6 % — SIGNIFICANT CHANGE UP (ref 34.5–45)
HGB BLD-MCNC: 11.7 G/DL — SIGNIFICANT CHANGE UP (ref 11.5–15.5)
MCHC RBC-ENTMCNC: 29.1 PG — SIGNIFICANT CHANGE UP (ref 27–34)
MCHC RBC-ENTMCNC: 32.9 GM/DL — SIGNIFICANT CHANGE UP (ref 32–36)
MCV RBC AUTO: 88.6 FL — SIGNIFICANT CHANGE UP (ref 80–100)
PLATELET # BLD AUTO: 280 K/UL — SIGNIFICANT CHANGE UP (ref 150–400)
POTASSIUM SERPL-MCNC: 3.7 MMOL/L — SIGNIFICANT CHANGE UP (ref 3.5–5.3)
POTASSIUM SERPL-SCNC: 3.7 MMOL/L — SIGNIFICANT CHANGE UP (ref 3.5–5.3)
RBC # BLD: 4.02 M/UL — SIGNIFICANT CHANGE UP (ref 3.8–5.2)
RBC # FLD: 13.5 % — SIGNIFICANT CHANGE UP (ref 10.3–14.5)
SODIUM SERPL-SCNC: 134 MMOL/L — LOW (ref 135–145)
WBC # BLD: 7 K/UL — SIGNIFICANT CHANGE UP (ref 3.8–10.5)
WBC # FLD AUTO: 7 K/UL — SIGNIFICANT CHANGE UP (ref 3.8–10.5)

## 2018-08-20 PROCEDURE — 99232 SBSQ HOSP IP/OBS MODERATE 35: CPT

## 2018-08-20 RX ADMIN — ENOXAPARIN SODIUM 40 MILLIGRAM(S): 100 INJECTION SUBCUTANEOUS at 11:58

## 2018-08-20 RX ADMIN — HALOPERIDOL DECANOATE 5 MILLIGRAM(S): 100 INJECTION INTRAMUSCULAR at 07:45

## 2018-08-20 RX ADMIN — Medication 50 MICROGRAM(S): at 05:14

## 2018-08-20 RX ADMIN — Medication 20 MILLIEQUIVALENT(S): at 11:59

## 2018-08-20 RX ADMIN — Medication 40 MILLIGRAM(S): at 17:37

## 2018-08-20 RX ADMIN — Medication 3 MILLIGRAM(S): at 21:07

## 2018-08-20 RX ADMIN — OLANZAPINE 25 MILLIGRAM(S): 15 TABLET, FILM COATED ORAL at 21:07

## 2018-08-20 RX ADMIN — Medication 100 MILLIGRAM(S): at 05:14

## 2018-08-20 RX ADMIN — Medication 40 MILLIGRAM(S): at 05:14

## 2018-08-20 RX ADMIN — Medication 2 MILLIGRAM(S): at 01:27

## 2018-08-20 RX ADMIN — Medication 2 MILLIGRAM(S): at 12:13

## 2018-08-20 NOTE — PROGRESS NOTE BEHAVIORAL HEALTH - NSBHCONSULTMEDS_PSY_A_CORE FT
Recommendations:     1. Continue CO 1:1 for disorganization    2. Increase Zyprexa to 25mg QHS (if QTC <470) and change to Zyprexa Zydis.     3. Start Klonopin 0.5mg PO bid.  Start Remeron 7.5mg QHS     4. F/u FLP, EKG.  F/u neuro recs.    5. PRN: Haldol 5mg IV q6h PRN severe agitation, Ativan 2mg PO PRN for agitation. Monitor for QTc<500.  Melatonin 3mg PO qHS PRN insomnia.      6. CL psych will f/u

## 2018-08-20 NOTE — PROGRESS NOTE BEHAVIORAL HEALTH - NSBHFUPINTERVALHXFT_PSY_A_CORE
Pt received Ativan PO overnight and haloperidol IM this morning for agitation. Per staff pt remains agitated, requiring intermittent PRNs. She continues to express desire to be discharged tonight so that she could go home to take care of her home and her dog.  Pt believes that Zyprexa, along with Haldol, causes cancer, because that’s what her psychiatrist told her, and says that she does not take Zyprexa (charts show that she has been adherent to Zyprexa during hospitalization). She says that she only takes Geodon. Pt denies any suicidal or homicidal ideation.  1:1 at bedside state pt intermittent insists she is pregnant.  AOX3.

## 2018-08-20 NOTE — PROGRESS NOTE BEHAVIORAL HEALTH - NSBHCHARTREVIEWINVESTIGATE_PSY_A_CORE FT
< from: 12 Lead ECG (08.13.18 @ 13:04) >      Ventricular Rate 55 BPM    Atrial Rate 55 BPM    P-R Interval 142 ms    QRS Duration 76 ms    Q-T Interval 456 ms    QTC Calculation(Bezet) 436 ms    P Axis 43 degrees    R Axis 15 degrees    T Axis 41 degrees    Diagnosis Line SINUS BRADYCARDIA  OTHERWISE NORMAL ECG    Confirmed by NELSON GILLIAM MD ([id number]) on 8/14/2018 7:26:07 PM        < end of copied text >

## 2018-08-20 NOTE — PROGRESS NOTE ADULT - PROBLEM SELECTOR PLAN 1
fluid restriction  - off salt tab  - cont monitor serum na  - cont lasix
Multifactorial  c/w lasix for hypervolemia  off nacl tabs  hypokalemia resolved  pt has been drinking a lot of water- needs to be strictly on 800cc free h20 restriction  watch na closely  will consider nacl tabs again
Multifactorial  na improving  off naxl tabs  f/u na today  c/w lasix   trend na daily  hypokalemia resolved
Multifactorial  na now better  d/c nacl tabs  c/w lasix   trend na daily  hypokalemia resolved
appears multifactorial  c/w nacl tabs  c/w lasix 40mg po bid given clinical volume overload  pt has been drinking jugs of water- please place on 1 liter free h20 restriction  u lytes noticed- if na not better by tomm then will likely d/c diuretics  check urine osm  correct hypokalemia
fluid restriction  - off salt tab  - cont monitor serum na  - cont lasix
fluid restriction  - off salt tab  - cont monitor serum na  - cont lasix

## 2018-08-20 NOTE — PROGRESS NOTE ADULT - SUBJECTIVE AND OBJECTIVE BOX
INTERVAL HPI/OVERNIGHT EVENTS:No new concerns.   Vital Signs Last 24 Hrs  T(C): 37 (20 Aug 2018 12:02), Max: 37 (20 Aug 2018 12:02)  T(F): 98.6 (20 Aug 2018 12:02), Max: 98.6 (20 Aug 2018 12:02)  HR: 88 (20 Aug 2018 12:02) (78 - 88)  BP: 146/72 (20 Aug 2018 12:02) (121/81 - 146/72)  BP(mean): --  RR: 18 (20 Aug 2018 12:02) (18 - 18)  SpO2: 96% (20 Aug 2018 12:02) (96% - 100%)  I&O's Summary    19 Aug 2018 07:01  -  20 Aug 2018 07:00  --------------------------------------------------------  IN: 1960 mL / OUT: 0 mL / NET: 1960 mL      MEDICATIONS  (STANDING):  dextrose 5%. 1000 milliLiter(s) (50 mL/Hr) IV Continuous <Continuous>  dextrose 50% Injectable 12.5 Gram(s) IV Push once  dextrose 50% Injectable 25 Gram(s) IV Push once  dextrose 50% Injectable 25 Gram(s) IV Push once  enoxaparin Injectable 40 milliGRAM(s) SubCutaneous daily  furosemide    Tablet 40 milliGRAM(s) Oral two times a day  levothyroxine 50 MICROGram(s) Oral daily  metoprolol succinate  milliGRAM(s) Oral daily  OLANZapine 25 milliGRAM(s) Oral at bedtime  potassium chloride    Tablet ER 20 milliEquivalent(s) Oral daily    MEDICATIONS  (PRN):  dextrose 40% Gel 15 Gram(s) Oral once PRN Blood Glucose LESS THAN 70 milliGRAM(s)/deciliter  glucagon  Injectable 1 milliGRAM(s) IntraMuscular once PRN Glucose LESS THAN 70 milligrams/deciliter  haloperidol    Injectable 5 milliGRAM(s) IntraMuscular every 6 hours PRN agitation  melatonin 3 milliGRAM(s) Oral at bedtime PRN Insomnia  mirtazapine 7.5 milliGRAM(s) Oral at bedtime PRN insomnia    LABS:                        11.7   7.00  )-----------( 280      ( 20 Aug 2018 07:52 )             35.6     08-20    134<L>  |  96  |  16  ----------------------------<  96  3.7   |  23  |  0.59    Ca    10.0      20 Aug 2018 06:38            Consultant(s) Notes Reviewed:  [x ] YES  [ ] NO    PHYSICAL EXAM:  GENERAL: NAD, well-groomed, well-developed ,not in any distress ,  HEAD:  Atraumatic, Normocephalic  EYES: EOMI, PERRLA, conjunctiva and sclera clear  ENMT: No tonsillar erythema, exudates, or enlargement; Moist mucous membranes, Good dentition, No lesions  NECK: Supple, No JVD, Normal thyroid  NERVOUS SYSTEM:  Alert & confused , No focal deficit   CHEST/LUNG: Good air entry bilateral with no  rales, rhonchi, wheezing, or rubs  HEART: Regular rate and rhythm; No murmurs, rubs, or gallops  ABDOMEN: Soft, Nontender, Nondistended; Bowel sounds present  EXTREMITIES:  2+ Peripheral Pulses, No clubbing, cyanosis, or edema  SKIN: No rashes or lesions    Care Discussed with Consultants/Other Providers [ x] YES  [ ] NO

## 2018-08-20 NOTE — PROGRESS NOTE BEHAVIORAL HEALTH - SUMMARY
58 y/o  female, , childless, retired  at [employer], domiciled alone in a private residence in [place], with PPH schizophrenia, alcohol abuse stable on OP psych meds for many years per psychiatrist, last psych admission >15 y/a, also with h/o ETOH abuse with suspected relapse per friend, no suicide attempts or self injurious behaviors as reported by patient, with PMHx of DM, bib police with altered mental status.  Patient was found in her neighbor's garden naked picking tomatoes.      Spoke with OP psychiatrist Dr. Russ who saw pt last month, describes her as chronic schizophrenic stable on meds for years, no baseline dementia, living independently, and states he has received multiple calls from her here in the hospital, this is an acute change in mental status.      Per nursing, patient continues to have poor sleep and wanders around her room/in the hallways. She required PRN Ativan and Haldol this AM for agitation. Today, patient continues to be floridly psychotic, disorganized, with paranoid delusions towards hospital staff and irritability. She is frustrated that she is still in the hospital. Patient is currently on Zyprexa 15mg QHS.  Will recommend increasing Zyprexa to 25mg (if QTC <470) and switching to Zyprexa Zydis due to concern for potentially cheeking meds in the hospital. Recommend ordering lipid panel. If patient continues to have poor sleep, can start Remeron 7.5mg PO qHS. After stabilizing medical issues, recommend patient be transferred to inpatient psychiatry.

## 2018-08-20 NOTE — PROGRESS NOTE ADULT - SUBJECTIVE AND OBJECTIVE BOX
Subjective:  pt seen and examined, no acute distress on exam  no chest pain or sob on exam    dextrose 40% Gel 15 Gram(s) Oral once PRN  dextrose 5%. 1000 milliLiter(s) IV Continuous <Continuous>  dextrose 50% Injectable 12.5 Gram(s) IV Push once  dextrose 50% Injectable 25 Gram(s) IV Push once  dextrose 50% Injectable 25 Gram(s) IV Push once  enoxaparin Injectable 40 milliGRAM(s) SubCutaneous daily  furosemide    Tablet 40 milliGRAM(s) Oral two times a day  glucagon  Injectable 1 milliGRAM(s) IntraMuscular once PRN  haloperidol    Injectable 5 milliGRAM(s) IntraMuscular every 6 hours PRN  levothyroxine 50 MICROGram(s) Oral daily  LORazepam     Tablet 2 milliGRAM(s) Oral two times a day PRN  melatonin 3 milliGRAM(s) Oral at bedtime PRN  metoprolol succinate  milliGRAM(s) Oral daily  mirtazapine 7.5 milliGRAM(s) Oral at bedtime PRN  OLANZapine 25 milliGRAM(s) Oral at bedtime  potassium chloride    Tablet ER 20 milliEquivalent(s) Oral daily          Hemoglobin: 11.9 g/dL (08-17 @ 08:09)            Creatinine Trend: 0.59<--, 0.64<--, 0.69<--, 0.56<--, 0.57<--, 0.81<--    COAGS:           T(C): 36.9 (08-20-18 @ 04:30), Max: 37 (08-19-18 @ 12:42)  HR: 86 (08-20-18 @ 04:30) (78 - 86)  BP: 142/77 (08-20-18 @ 04:30) (121/81 - 142/77)  RR: 18 (08-20-18 @ 04:30) (18 - 18)  SpO2: 100% (08-20-18 @ 04:30) (99% - 100%)  Wt(kg): --    I&O's Summary    18 Aug 2018 07:01  -  19 Aug 2018 07:00  --------------------------------------------------------  IN: 720 mL / OUT: 0 mL / NET: 720 mL    19 Aug 2018 07:01  -  20 Aug 2018 06:38  --------------------------------------------------------  IN: 1960 mL / OUT: 0 mL / NET: 1960 mL      	  Cardiovascular: Normal S1 S2, No JVD, No murmurs  Respiratory: Lungs clear to auscultation, normal effort 	  Gastrointestinal:  Soft, Non-tender, + BS	  Ext: No C/C/E BL LE    DATA:  	  < from: [facility] Duplex Lower Ext Vein Scan, Bilat (08.09.18 @ 08:48) >  IMPRESSION:     No evidence of bilateral lower extremity deep venous thrombosis.  < end of copied text >      ASSESSMENT/PLAN: 	57y Female with h/o DM with no known h/o CAD/MI presents with altered mental status. Patient was brought in yesterday by police after being found in her neighbor's garden naked picking tomatoes.  Her mental status has reportedly been declining since her 's death 5 years ago.  Of note, though the patient is ambulatory, she had a witnessed mechanical fall in the ER when she slipped from a rolling chair and slid to the floor. There was no loss of consciousness and no apparent prodromal symptoms. Cardiology is called to assess for possible aortic stenosis murmur on exam. The patient denies any anginal symptoms, dyspnea, palpitations, syncope, near syncope or previous cardiac work up.      -- Echo can be done as Outpatient   -- cont BB - bp stable   --  GI / DVT prophylaxis.,  keep K>4, mag >2.0   -- cont Haldol, Ativan per medicine   -- Behavioral health follow up   D/W Dr Correa

## 2018-08-20 NOTE — PROGRESS NOTE ADULT - PROBLEM SELECTOR PLAN 2
on po Potassium Chloride 20 meq daily  monitor serum k
improved  trend k
on po Potassium Chloride 20 meq daily  monitor serum k
on po Potassium Chloride 20 meq daily  monitor serum k

## 2018-08-20 NOTE — PROGRESS NOTE BEHAVIORAL HEALTH - NSBHCHARTREVIEWVS_PSY_A_CORE FT
T(C): 37 (08-20-18 @ 12:02), Max: 37 (08-20-18 @ 12:02)  HR: 88 (08-20-18 @ 12:02) (78 - 88)  BP: 146/72 (08-20-18 @ 12:02) (121/81 - 146/72)  RR: 18 (08-20-18 @ 12:02) (18 - 18)  SpO2: 96% (08-20-18 @ 12:02) (96% - 100%)  Wt(kg): --

## 2018-08-20 NOTE — PROGRESS NOTE ADULT - SUBJECTIVE AND OBJECTIVE BOX
Patient seen and examined  no complaints      Allergy Status Unknown    Hospital Medications:   MEDICATIONS  (STANDING):  dextrose 5%. 1000 milliLiter(s) (50 mL/Hr) IV Continuous <Continuous>  dextrose 50% Injectable 12.5 Gram(s) IV Push once  dextrose 50% Injectable 25 Gram(s) IV Push once  dextrose 50% Injectable 25 Gram(s) IV Push once  enoxaparin Injectable 40 milliGRAM(s) SubCutaneous daily  furosemide    Tablet 40 milliGRAM(s) Oral two times a day  levothyroxine 50 MICROGram(s) Oral daily  metoprolol succinate  milliGRAM(s) Oral daily  OLANZapine 25 milliGRAM(s) Oral at bedtime  potassium chloride    Tablet ER 20 milliEquivalent(s) Oral daily      VITALS:  T(F): 98.6 (08-20-18 @ 12:02), Max: 98.6 (08-20-18 @ 12:02)  HR: 88 (08-20-18 @ 12:02)  BP: 146/72 (08-20-18 @ 12:02)  RR: 18 (08-20-18 @ 12:02)  SpO2: 96% (08-20-18 @ 12:02)  Wt(kg): --    08-19 @ 07:01  -  08-20 @ 07:00  --------------------------------------------------------  IN: 1960 mL / OUT: 0 mL / NET: 1960 mL      Physical Exam :-  Constitutional: NAD  Neck: Supple.  Respiratory: Bilateral equal breath sounds, few Crackles present.  Cardiovascular: S1, S2 normal, positive Murmur  Gastrointestinal: Bowel Sounds present, soft, non tender.  Extremities: +2 Edema Feet  Neurological: Alert   Psychiatric: Normal mood, normal affect  LABS:  08-20    134<L>  |  96  |  16  ----------------------------<  96  3.7   |  23  |  0.59    Ca    10.0      20 Aug 2018 06:38      Creatinine Trend: 0.59 <--, 0.59 <--, 0.64 <--                        11.7   7.00  )-----------( 280      ( 20 Aug 2018 07:52 )             35.6     Urine Studies:      RADIOLOGY & ADDITIONAL STUDIES:

## 2018-08-20 NOTE — PROGRESS NOTE ADULT - PROBLEM SELECTOR PLAN 3
on lasix 40mg po bid  monitor

## 2018-08-20 NOTE — PROGRESS NOTE ADULT - ASSESSMENT
57F presents with altered mental status.  Patient was found in her neighbor's garden naked picking tomatoes.  Police report patient's mental status has been declining since her 's death 5 years ago.  Patient is not answering any questions appropriately and seems to have flight of ideas.     Problem/Plan - 1:  ·  Problem:  Altered mental status, unspecified altered mental status type.  Plan: Exact cause not known.  Neurology and Psychiatry following . MRI Head negative  and EEG  noted.     Problem/Plan - 2:  ·  Problem: HTN (hypertension).  Plan: Bp readings high so restarting her BP meds.      Problem/Plan - 3:  ·  Problem: Edema leg.  Plan: Doppler negative for DVT . TTE pending and can be done outpt .  Started on Lasix .      Problem/Plan - 4:  ·  Problem: Hyponatremia.  Plan: Corrected. Renal helping.     Disposition :  Medically stable for Discharge .

## 2018-08-21 ENCOUNTER — INPATIENT (INPATIENT)
Facility: HOSPITAL | Age: 58
LOS: 75 days | Discharge: HOME | End: 2018-11-05
Attending: PSYCHIATRY & NEUROLOGY | Admitting: PSYCHIATRY & NEUROLOGY
Payer: COMMERCIAL

## 2018-08-21 VITALS
OXYGEN SATURATION: 98 % | HEART RATE: 77 BPM | RESPIRATION RATE: 18 BRPM | DIASTOLIC BLOOD PRESSURE: 69 MMHG | SYSTOLIC BLOOD PRESSURE: 121 MMHG | TEMPERATURE: 98 F

## 2018-08-21 VITALS — WEIGHT: 166.01 LBS | HEIGHT: 64 IN | TEMPERATURE: 99 F

## 2018-08-21 DIAGNOSIS — F29 UNSPECIFIED PSYCHOSIS NOT DUE TO A SUBSTANCE OR KNOWN PHYSIOLOGICAL CONDITION: ICD-10-CM

## 2018-08-21 DIAGNOSIS — F20.0 PARANOID SCHIZOPHRENIA: ICD-10-CM

## 2018-08-21 PROBLEM — E13.9 OTHER SPECIFIED DIABETES MELLITUS WITHOUT COMPLICATIONS: Chronic | Status: ACTIVE | Noted: 2018-08-08

## 2018-08-21 LAB
ANION GAP SERPL CALC-SCNC: 13 MMOL/L — SIGNIFICANT CHANGE UP (ref 5–17)
BUN SERPL-MCNC: 14 MG/DL — SIGNIFICANT CHANGE UP (ref 7–23)
CALCIUM SERPL-MCNC: 9.7 MG/DL — SIGNIFICANT CHANGE UP (ref 8.4–10.5)
CHLORIDE SERPL-SCNC: 101 MMOL/L — SIGNIFICANT CHANGE UP (ref 96–108)
CO2 SERPL-SCNC: 25 MMOL/L — SIGNIFICANT CHANGE UP (ref 22–31)
CREAT SERPL-MCNC: 0.58 MG/DL — SIGNIFICANT CHANGE UP (ref 0.5–1.3)
GLUCOSE SERPL-MCNC: 98 MG/DL — SIGNIFICANT CHANGE UP (ref 70–99)
POTASSIUM SERPL-MCNC: 3.6 MMOL/L — SIGNIFICANT CHANGE UP (ref 3.5–5.3)
POTASSIUM SERPL-SCNC: 3.6 MMOL/L — SIGNIFICANT CHANGE UP (ref 3.5–5.3)
SODIUM SERPL-SCNC: 139 MMOL/L — SIGNIFICANT CHANGE UP (ref 135–145)

## 2018-08-21 PROCEDURE — 73521 X-RAY EXAM HIPS BI 2 VIEWS: CPT

## 2018-08-21 PROCEDURE — 86780 TREPONEMA PALLIDUM: CPT

## 2018-08-21 PROCEDURE — 83930 ASSAY OF BLOOD OSMOLALITY: CPT

## 2018-08-21 PROCEDURE — 80053 COMPREHEN METABOLIC PANEL: CPT

## 2018-08-21 PROCEDURE — 82607 VITAMIN B-12: CPT

## 2018-08-21 PROCEDURE — 77074 RADEX OSSEOUS SURVEY LMTD: CPT

## 2018-08-21 PROCEDURE — 99285 EMERGENCY DEPT VISIT HI MDM: CPT | Mod: 25

## 2018-08-21 PROCEDURE — 73630 X-RAY EXAM OF FOOT: CPT

## 2018-08-21 PROCEDURE — 73551 X-RAY EXAM OF FEMUR 1: CPT

## 2018-08-21 PROCEDURE — 90792 PSYCH DIAG EVAL W/MED SRVCS: CPT

## 2018-08-21 PROCEDURE — 80307 DRUG TEST PRSMV CHEM ANLYZR: CPT

## 2018-08-21 PROCEDURE — 70553 MRI BRAIN STEM W/O & W/DYE: CPT

## 2018-08-21 PROCEDURE — 82962 GLUCOSE BLOOD TEST: CPT

## 2018-08-21 PROCEDURE — 96372 THER/PROPH/DIAG INJ SC/IM: CPT | Mod: XU

## 2018-08-21 PROCEDURE — 93005 ELECTROCARDIOGRAM TRACING: CPT

## 2018-08-21 PROCEDURE — 83735 ASSAY OF MAGNESIUM: CPT

## 2018-08-21 PROCEDURE — 83935 ASSAY OF URINE OSMOLALITY: CPT

## 2018-08-21 PROCEDURE — 71045 X-RAY EXAM CHEST 1 VIEW: CPT

## 2018-08-21 PROCEDURE — 84443 ASSAY THYROID STIM HORMONE: CPT

## 2018-08-21 PROCEDURE — 85027 COMPLETE CBC AUTOMATED: CPT

## 2018-08-21 PROCEDURE — 82746 ASSAY OF FOLIC ACID SERUM: CPT

## 2018-08-21 PROCEDURE — 80048 BASIC METABOLIC PNL TOTAL CA: CPT

## 2018-08-21 PROCEDURE — 82436 ASSAY OF URINE CHLORIDE: CPT

## 2018-08-21 PROCEDURE — 84300 ASSAY OF URINE SODIUM: CPT

## 2018-08-21 PROCEDURE — 84133 ASSAY OF URINE POTASSIUM: CPT

## 2018-08-21 PROCEDURE — 93970 EXTREMITY STUDY: CPT

## 2018-08-21 PROCEDURE — 99232 SBSQ HOSP IP/OBS MODERATE 35: CPT

## 2018-08-21 PROCEDURE — 51701 INSERT BLADDER CATHETER: CPT

## 2018-08-21 PROCEDURE — 70450 CT HEAD/BRAIN W/O DYE: CPT

## 2018-08-21 PROCEDURE — 81001 URINALYSIS AUTO W/SCOPE: CPT

## 2018-08-21 PROCEDURE — A9585: CPT

## 2018-08-21 PROCEDURE — 95819 EEG AWAKE AND ASLEEP: CPT

## 2018-08-21 PROCEDURE — 83036 HEMOGLOBIN GLYCOSYLATED A1C: CPT

## 2018-08-21 RX ORDER — INSULIN LISPRO 100/ML
VIAL (ML) SUBCUTANEOUS AT BEDTIME
Qty: 0 | Refills: 0 | Status: DISCONTINUED | OUTPATIENT
Start: 2018-08-21 | End: 2018-09-05

## 2018-08-21 RX ORDER — FUROSEMIDE 40 MG
1 TABLET ORAL
Qty: 0 | Refills: 0 | COMMUNITY
Start: 2018-08-21

## 2018-08-21 RX ORDER — INSULIN LISPRO 100/ML
VIAL (ML) SUBCUTANEOUS
Qty: 0 | Refills: 0 | Status: DISCONTINUED | OUTPATIENT
Start: 2018-08-21 | End: 2018-09-18

## 2018-08-21 RX ORDER — SIMVASTATIN 20 MG/1
1 TABLET, FILM COATED ORAL
Qty: 0 | Refills: 0 | COMMUNITY

## 2018-08-21 RX ORDER — FUROSEMIDE 40 MG
40 TABLET ORAL
Qty: 0 | Refills: 0 | Status: DISCONTINUED | OUTPATIENT
Start: 2018-08-21 | End: 2018-08-21

## 2018-08-21 RX ORDER — MIRTAZAPINE 45 MG/1
1 TABLET, ORALLY DISINTEGRATING ORAL
Qty: 0 | Refills: 0 | COMMUNITY
Start: 2018-08-21

## 2018-08-21 RX ORDER — HALOPERIDOL DECANOATE 100 MG/ML
5 INJECTION INTRAMUSCULAR
Qty: 0 | Refills: 0 | COMMUNITY
Start: 2018-08-21

## 2018-08-21 RX ORDER — LEVOTHYROXINE SODIUM 125 MCG
50 TABLET ORAL DAILY
Qty: 0 | Refills: 0 | Status: DISCONTINUED | OUTPATIENT
Start: 2018-08-21 | End: 2018-11-05

## 2018-08-21 RX ORDER — LEVOTHYROXINE SODIUM 125 MCG
1 TABLET ORAL
Qty: 0 | Refills: 0 | COMMUNITY
Start: 2018-08-21

## 2018-08-21 RX ORDER — METFORMIN HYDROCHLORIDE 850 MG/1
1 TABLET ORAL
Qty: 0 | Refills: 0 | COMMUNITY

## 2018-08-21 RX ORDER — ZIPRASIDONE HYDROCHLORIDE 20 MG/1
1 CAPSULE ORAL
Qty: 0 | Refills: 0 | COMMUNITY

## 2018-08-21 RX ORDER — DILTIAZEM HCL 120 MG
1 CAPSULE, EXT RELEASE 24 HR ORAL
Qty: 0 | Refills: 0 | COMMUNITY

## 2018-08-21 RX ORDER — SERTRALINE 25 MG/1
1 TABLET, FILM COATED ORAL
Qty: 0 | Refills: 0 | COMMUNITY

## 2018-08-21 RX ORDER — OLANZAPINE 15 MG/1
25 TABLET, FILM COATED ORAL AT BEDTIME
Qty: 0 | Refills: 0 | Status: DISCONTINUED | OUTPATIENT
Start: 2018-08-21 | End: 2018-08-22

## 2018-08-21 RX ORDER — LEVOTHYROXINE SODIUM 125 MCG
1 TABLET ORAL
Qty: 0 | Refills: 0 | COMMUNITY

## 2018-08-21 RX ORDER — MIRTAZAPINE 45 MG/1
7.5 TABLET, ORALLY DISINTEGRATING ORAL AT BEDTIME
Qty: 0 | Refills: 0 | Status: DISCONTINUED | OUTPATIENT
Start: 2018-08-21 | End: 2018-08-21

## 2018-08-21 RX ORDER — LOSARTAN/HYDROCHLOROTHIAZIDE 100MG-25MG
1 TABLET ORAL
Qty: 0 | Refills: 0 | COMMUNITY

## 2018-08-21 RX ORDER — FUROSEMIDE 40 MG
0 TABLET ORAL
Qty: 0 | Refills: 0 | COMMUNITY

## 2018-08-21 RX ORDER — LANOLIN ALCOHOL/MO/W.PET/CERES
1 CREAM (GRAM) TOPICAL
Qty: 0 | Refills: 0 | COMMUNITY
Start: 2018-08-21

## 2018-08-21 RX ORDER — POTASSIUM CHLORIDE 20 MEQ
1 PACKET (EA) ORAL
Qty: 0 | Refills: 0 | COMMUNITY
Start: 2018-08-21

## 2018-08-21 RX ORDER — FUROSEMIDE 40 MG
40 TABLET ORAL
Qty: 0 | Refills: 0 | Status: DISCONTINUED | OUTPATIENT
Start: 2018-08-21 | End: 2018-08-29

## 2018-08-21 RX ORDER — METOPROLOL TARTRATE 50 MG
100 TABLET ORAL DAILY
Qty: 0 | Refills: 0 | Status: DISCONTINUED | OUTPATIENT
Start: 2018-08-21 | End: 2018-08-22

## 2018-08-21 RX ORDER — OLANZAPINE 15 MG/1
5 TABLET, FILM COATED ORAL
Qty: 0 | Refills: 0 | COMMUNITY
Start: 2018-08-21

## 2018-08-21 RX ADMIN — Medication 50 MICROGRAM(S): at 05:59

## 2018-08-21 RX ADMIN — Medication 100 MILLIGRAM(S): at 05:59

## 2018-08-21 RX ADMIN — Medication 40 MILLIGRAM(S): at 21:03

## 2018-08-21 RX ADMIN — MIRTAZAPINE 7.5 MILLIGRAM(S): 45 TABLET, ORALLY DISINTEGRATING ORAL at 00:55

## 2018-08-21 RX ADMIN — Medication 20 MILLIEQUIVALENT(S): at 12:19

## 2018-08-21 RX ADMIN — ENOXAPARIN SODIUM 40 MILLIGRAM(S): 100 INJECTION SUBCUTANEOUS at 12:19

## 2018-08-21 RX ADMIN — Medication 40 MILLIGRAM(S): at 05:59

## 2018-08-21 RX ADMIN — OLANZAPINE 25 MILLIGRAM(S): 15 TABLET, FILM COATED ORAL at 21:03

## 2018-08-21 NOTE — PROGRESS NOTE BEHAVIORAL HEALTH - NSBHCONSULTMEDS_PSY_A_CORE FT
Recommendations:     1. Continue CO 1:1 for disorganization    2. C/w Zyprexa 25mg QHS (if QTC <470) and change to Zyprexa Zydis.     3. C/w Klonopin 0.5mg PO bid    4. PRN: Haldol 5mg IV q6h PRN severe agitation, Ativan 2mg PO PRN for agitation. Monitor for QTc<500.  Melatonin 3mg PO qHS PRN insomnia.      5. 2PC to inpatient psychiatry

## 2018-08-21 NOTE — PROGRESS NOTE BEHAVIORAL HEALTH - NSBHCHARTREVIEWLAB_PSY_A_CORE FT
11.9   5.25  )-----------( 254      ( 17 Aug 2018 08:09 )             35.5     08-18    136  |  98  |  20  ----------------------------<  101<H>  4.2   |  25  |  0.59    Ca    9.7      18 Aug 2018 06:15    TPro  7.0  /  Alb  4.4  /  TBili  0.2  /  DBili  x   /  AST  25  /  ALT  17  /  AlkPhos  81  08-17
11.7   7.00  )-----------( 280      ( 20 Aug 2018 07:52 )             35.6     08-20    134<L>  |  96  |  16  ----------------------------<  96  3.7   |  23  |  0.59    Ca    10.0      20 Aug 2018 06:38
11.7   7.00  )-----------( 280      ( 20 Aug 2018 07:52 )             35.6     08-21    139  |  101  |  14  ----------------------------<  98  3.6   |  25  |  0.58    Ca    9.7      21 Aug 2018 06:49
11.9   5.25  )-----------( 254      ( 17 Aug 2018 08:09 )             35.5     08-17    132<L>  |  97  |  18  ----------------------------<  96  3.9   |  22  |  0.64    Ca    9.2      17 Aug 2018 07:15    TPro  7.0  /  Alb  4.4  /  TBili  0.2  /  DBili  x   /  AST  25  /  ALT  17  /  AlkPhos  81  08-17
11.9   5.25  )-----------( 254      ( 17 Aug 2018 08:09 )             35.5     08-18    136  |  98  |  20  ----------------------------<  101<H>  4.2   |  25  |  0.59    Ca    9.7      18 Aug 2018 06:15    TPro  7.0  /  Alb  4.4  /  TBili  0.2  /  DBili  x   /  AST  25  /  ALT  17  /  AlkPhos  81  08-17
11.0   8.51  )-----------( 251      ( 09 Aug 2018 08:27 )             31.1     08-10    130<L>  |  94<L>  |  5<L>  ----------------------------<  113<H>  3.7   |  24  |  0.56    Ca    9.2      10 Aug 2018 11:37  Mg     1.6     08-10    TPro  7.7  /  Alb  4.4  /  TBili  0.6  /  DBili  x   /  AST  29  /  ALT  34  /  AlkPhos  97  08-08    Urinalysis Basic - ( 09 Aug 2018 14:04 )    Color: Yellow / Appearance: Clear / S.010 / pH: x  Gluc: x / Ketone: Negative  / Bili: Negative / Urobili: Negative mg/dL   Blood: x / Protein: Trace mg/dL / Nitrite: Negative   Leuk Esterase: Large / RBC: 1 /HPF / WBC 6 /HPF   Sq Epi: x / Non Sq Epi: 1 /HPF / Bacteria: Negative
12.1   6.84  )-----------( 319      ( 10 Aug 2018 13:59 )             36.1     08-10    130<L>  |  94<L>  |  5<L>  ----------------------------<  113<H>  3.7   |  24  |  0.56    Ca    9.2      10 Aug 2018 11:37  Mg     1.6     08-10      Urinalysis Basic - ( 09 Aug 2018 14:04 )    Color: Yellow / Appearance: Clear / S.010 / pH: x  Gluc: x / Ketone: Negative  / Bili: Negative / Urobili: Negative mg/dL   Blood: x / Protein: Trace mg/dL / Nitrite: Negative   Leuk Esterase: Large / RBC: 1 /HPF / WBC 6 /HPF   Sq Epi: x / Non Sq Epi: 1 /HPF / Bacteria: Negative

## 2018-08-21 NOTE — PROGRESS NOTE ADULT - ASSESSMENT
57F presents with altered mental status.  Patient was found in her neighbor's garden naked picking tomatoes.  Police report patient's mental status has been declining since her 's death 5 years ago.  Patient is not answering any questions appropriately and seems to have flight of ideas.     Problem/Plan - 1:  ·  Problem:  Altered mental status, unspecified altered mental status type.  Plan: Exact cause not known.  Neurology and Psychiatry following . MRI Head negative  and EEG  noted.     Problem/Plan - 2:  ·  Problem: HTN (hypertension).  Plan: BP readings fine.      Problem/Plan - 3:  ·  Problem: Edema leg.  Plan: Doppler negative for DVT . TTE pending and can be done outpt . Started on Lasix .      Problem/Plan - 4:  ·  Problem: Hyponatremia.  Plan: Corrected. Renal helping.     Disposition :  Medically stable for Discharge .

## 2018-08-21 NOTE — PROGRESS NOTE ADULT - ATTENDING COMMENTS
Agree with above assessment and plan as outlined above.    - awaiting TTE    Baudilio Givens MD, FACC
CARDIOLOGY ATTENDING    Agree with above. Awaiting echo r/o structural heart disease. If echo unremarkable then no further cardiac workup needed. Echo can be done as OUTPATIENT if needed.
CARDIOLOGY ATTENDING    Agree with above. No further inpatient cardiac workup needed. Echo can be done as outpatient
CARDIOLOGY ATTENDING    Agree with above. No further inpatient cardiac workup needed. Echo can be done as outpatient.
CARDIOLOGY ATTENDING    Agree with above. No further inpatient cardiac workup needed. Echo can be done as outpatient.
CARDIOLOGY ATTENDING    Patient seen and examined. Agree with above. No further inpatient cardiac workup needed if echo unremarkable.
CARDIOLOGY ATTENDING    Patient seen and examined. Agree with above. No further inpatient cardiac workup needed. Echo can be done as outpatient.
CARDIOLOGY ATTENDING    Patient seen and examined. Get outpatient echo. No further inpatient cardiac workup needed.
Patient seen and examined, agree with above assessment and plan as transcribed above.    - Awaiting echo    Baudilio Givens MD, FACC
Patient seen and examined.  Agree with above.   -check TTE - can be done as outpatient    Lamar Smart MD
CARDIOLOGY ATTENDING    Patient seen and examined. Agree with above. F/U echo given murmur on exam. If echo unremarkable then no further inpatient cardiac workup needed. Echo can be done as OUTPATIENT if needed.

## 2018-08-21 NOTE — PROGRESS NOTE BEHAVIORAL HEALTH - NSBHATTESTSEENBY_PSY_A_CORE
Attending Psychiatrist supervising NP/Trainee, meeting pt...
attending Psychiatrist without NP/Trainee
Attending Psychiatrist supervising NP/Trainee, meeting pt...

## 2018-08-21 NOTE — PROGRESS NOTE BEHAVIORAL HEALTH - RISK ASSESSMENT
Risk factors: h/o unclear psychiatric history, possible noncompliance with treatment, unable to care for self 2/2 psychiatric illness, , no children, lives alone, acute and chronic medical conditions.    Protective factors: no current SIIP/HIIP, h/o of outpt psychiatric treatment

## 2018-08-21 NOTE — PROGRESS NOTE BEHAVIORAL HEALTH - NSBHCHARTREVIEWIMAGING_PSY_A_CORE FT
< from: CT Head No Cont (08.08.18 @ 16:58) >    IMPRESSION: No acute intracranial hemorrhage, mass effect, vasogenic   edema, or evidence of acute territorial infarct.    Ventricles mildly prominent, this may be on the basis of age-appropriate   atrophy. The presence of mild normal pressure hydrocephalus is not   entirely excluded.    Nonspecific moderate to severe white matter lucency, differential   diagnosis includes white matter microvascular ischemic disease (advanced   for the patient's stated age), demyelinating disease, as well as   infectious, inflammatory, post infectious/inflammatory processes.    < end of copied text >    EXAM:  MR BRAIN WAW IC                          PROCEDURE DATE:  08/17/2018        INTERPRETATION:  HISTORY: Change in mental status. Rule out CVA..    Description: MRI of the brain with and without gadolinium contrast was   performed.    COMPARISON: Head CT 08/08/2018..    Sagittal T1, axial T1, T2, FLAIR, SWI, and diffusion-weighted series were   obtained before contrast. After intravenous gadolinium contrast   administration, sagittal, coronal, and axial T1 postcontrast series were   obtained.    7.5 cc intravenous Gadovist gadolinium contrast was administered, 0 cc   contrast was discarded.    The study is markedly limited by motion. There is no gross evidence for   large intracranial enhancing mass, large acute infarct, large acute   hemorrhage, or large subdural collection. Nonspecific prominence of the   ventricular system is unchanged.    Mild to moderate patchy nonenhancing increased T2 and FLAIR signal   involves the white matter, most consistent with chronic microvascular   ischemic changes given the patient's age. Generalized cerebral volume   loss is noted.    IMPRESSION:    Motion limited study without gross evidence for large intracranial mass,   infarct, or hemorrhage.
< from: MR Head w/wo IV Cont (08.17.18 @ 19:34) >    EXAM:  MR BRAIN WAW IC                            PROCEDURE DATE:  08/17/2018            INTERPRETATION:  HISTORY: Change in mental status. Rule out CVA..    Description: MRI of the brain with and without gadolinium contrast was   performed.    COMPARISON: Head CT 08/08/2018..    Sagittal T1, axial T1, T2, FLAIR, SWI, and diffusion-weighted series were   obtained before contrast. After intravenous gadolinium contrast   administration, sagittal, coronal, and axial T1 postcontrast series were   obtained.    7.5 cc intravenous Gadovist gadolinium contrast was administered, 0 cc   contrast was discarded.    The study is markedly limited by motion. There is no gross evidence for   large intracranial enhancing mass, large acute infarct, large acute   hemorrhage, or large subdural collection. Nonspecific prominence of the   ventricular system is unchanged.    Mild to moderate patchy nonenhancing increased T2 and FLAIR signal   involves the white matter, most consistent with chronic microvascular   ischemic changes given the patient's age. Generalized cerebral volume   loss is noted.    IMPRESSION:    Motion limited study without gross evidence for large intracranial mass,   infarct, or hemorrhage.    < end of copied text >
< from: CT Head No Cont (08.08.18 @ 16:58) >    IMPRESSION: No acute intracranial hemorrhage, mass effect, vasogenic   edema, or evidence of acute territorial infarct.    Ventricles mildly prominent, this may be on the basis of age-appropriate   atrophy. The presence of mild normal pressure hydrocephalus is not   entirely excluded.    Nonspecific moderate to severe white matter lucency, differential   diagnosis includes white matter microvascular ischemic disease (advanced   for the patient's stated age), demyelinating disease, as well as   infectious, inflammatory, post infectious/inflammatory processes.      < end of copied text >
< from: MR Head w/wo IV Cont (08.17.18 @ 19:34) >    EXAM:  MR BRAIN WAW IC                            PROCEDURE DATE:  08/17/2018            INTERPRETATION:  HISTORY: Change in mental status. Rule out CVA..    Description: MRI of the brain with and without gadolinium contrast was   performed.    COMPARISON: Head CT 08/08/2018..    Sagittal T1, axial T1, T2, FLAIR, SWI, and diffusion-weighted series were   obtained before contrast. After intravenous gadolinium contrast   administration, sagittal, coronal, and axial T1 postcontrast series were   obtained.    7.5 cc intravenous Gadovist gadolinium contrast was administered, 0 cc   contrast was discarded.    The study is markedly limited by motion. There is no gross evidence for   large intracranial enhancing mass, large acute infarct, large acute   hemorrhage, or large subdural collection. Nonspecific prominence of the   ventricular system is unchanged.    Mild to moderate patchy nonenhancing increased T2 and FLAIR signal   involves the white matter, most consistent with chronic microvascular   ischemic changes given the patient's age. Generalized cerebral volume   loss is noted.    IMPRESSION:    Motion limited study without gross evidence for large intracranial mass,   infarct, or hemorrhage.    < end of copied text >
< from: CT Head No Cont (08.08.18 @ 16:58) >    IMPRESSION: No acute intracranial hemorrhage, mass effect, vasogenic   edema, or evidence of acute territorial infarct.    Ventricles mildly prominent, this may be on the basis of age-appropriate   atrophy. The presence of mild normal pressure hydrocephalus is not   entirely excluded.    Nonspecific moderate to severe white matter lucency, differential   diagnosis includes white matter microvascular ischemic disease (advanced   for the patient's stated age), demyelinating disease, as well as   infectious, inflammatory, post infectious/inflammatory processes.    < end of copied text >    EXAM:  MR BRAIN WAW IC                          PROCEDURE DATE:  08/17/2018        INTERPRETATION:  HISTORY: Change in mental status. Rule out CVA..    Description: MRI of the brain with and without gadolinium contrast was   performed.    COMPARISON: Head CT 08/08/2018..    Sagittal T1, axial T1, T2, FLAIR, SWI, and diffusion-weighted series were   obtained before contrast. After intravenous gadolinium contrast   administration, sagittal, coronal, and axial T1 postcontrast series were   obtained.    7.5 cc intravenous Gadovist gadolinium contrast was administered, 0 cc   contrast was discarded.    The study is markedly limited by motion. There is no gross evidence for   large intracranial enhancing mass, large acute infarct, large acute   hemorrhage, or large subdural collection. Nonspecific prominence of the   ventricular system is unchanged.    Mild to moderate patchy nonenhancing increased T2 and FLAIR signal   involves the white matter, most consistent with chronic microvascular   ischemic changes given the patient's age. Generalized cerebral volume   loss is noted.    IMPRESSION:    Motion limited study without gross evidence for large intracranial mass,   infarct, or hemorrhage.
< from: CT Head No Cont (08.08.18 @ 16:58) >    XAM:  CT BRAIN                            PROCEDURE DATE:  08/08/2018            INTERPRETATION:  Noncontrast CT of the brain.    CLINICAL INDICATION:  Altered mental status, confusion    TECHNIQUE : Axial CT scanning of the brain was obtained from the skull   base to the vertex without the administration of intravenous contrast.      COMPARISON: None available    FINDINGS:  The ventricles are mildly prominent, this may be on the basis   of age-appropriate atrophy. The presence of mild normal pressure   hydrocephalus is not entirely excluded.    No focal mass effect, midline shift, vasogenic edema, or evidence of   acute territorial infarct. Nonspecific moderate to severe white matter   lucency.    The visualized paranasal sinuses and mastoid air cells are clear.    IMPRESSION: No acute intracranial hemorrhage, mass effect, vasogenic   edema, or evidence of acute territorial infarct.    Ventricles mildly prominent, this may be on the basis of age-appropriate   atrophy. The presence of mild normal pressure hydrocephalus is not   entirely excluded.    Nonspecific moderate to severe white matter lucency, differential   diagnosis includes white matter microvascular ischemic disease (advanced   for the patient's stated age), demyelinating disease, as well as   infectious, inflammatory, post infectious/inflammatory processes.      < end of copied text >
EEG REPORT:   EEG Report:  · EEG Report	    Patient Name: SINDHU MUÑOZ  Age and : 57y ([birth year])  MRN #: [id number]  Location: [place] [address]    Study Date: 18    CLINICAL IMPRESSION:    Normal EEG study in the asleep state.  Diffuse slowing would indicate non-specific, diffuse cerebral dysfunction. This cannot be ruled out since the record contained only sleep.  No epileptic pattern or seizure seen.    Trung Kerr MD PhD  Attending Physician, [facility] [place]
< from: CT Head No Cont (08.08.18 @ 16:58) >    IMPRESSION: No acute intracranial hemorrhage, mass effect, vasogenic   edema, or evidence of acute territorial infarct.    Ventricles mildly prominent, this may be on the basis of age-appropriate   atrophy. The presence of mild normal pressure hydrocephalus is not   entirely excluded.    Nonspecific moderate to severe white matter lucency, differential   diagnosis includes white matter microvascular ischemic disease (advanced   for the patient's stated age), demyelinating disease, as well as   infectious, inflammatory, post infectious/inflammatory processes.      < end of copied text >
EEG REPORT:   EEG Report:  · EEG Report	    Patient Name: SINDHU MUÑOZ  Age and : 57y ([birth year])  MRN #: [id number]  Location: [place] [address]    Study Date: 18    CLINICAL IMPRESSION:    Normal EEG study in the asleep state.  Diffuse slowing would indicate non-specific, diffuse cerebral dysfunction. This cannot be ruled out since the record contained only sleep.  No epileptic pattern or seizure seen.    Trung Kerr MD PhD  Attending Physician, [facility] [place]

## 2018-08-21 NOTE — PROGRESS NOTE BEHAVIORAL HEALTH - SECONDARY DX1
Schizophrenia, unspecified type

## 2018-08-21 NOTE — PROGRESS NOTE BEHAVIORAL HEALTH - THOUGHT CONTENT
Unremarkable
Unremarkable
Delusions
Unremarkable
Delusions
Unremarkable

## 2018-08-21 NOTE — PROGRESS NOTE ADULT - PROBLEM SELECTOR PROBLEM 1
Hyponatremia
R/O Altered mental status, unspecified altered mental status type

## 2018-08-21 NOTE — PROGRESS NOTE ADULT - ASSESSMENT
58 yo woman who is brought into [facility] via police after patient was found neighbor's yard naked, reported picking out tomatoes from a vegetable garden. Per police, patient has a history of declining mental function since 's death 5 years ago. History and ROS otherwise limited due to patient's current cognitive condition. Neurology consulted for confusion.   Neurological exam is normal - tangential speech. MRI Brain shows no changes. No further neurological tests indicated. Neuro signing off, please call with questions.   Seen by psychiatry - adjusting antipsychotics. Psych recommends inpatient admission to psychiatry.  Case discussed with attending. 58 yo woman who is brought into [facility] via police after patient was found neighbor's yard naked, reported picking out tomatoes from neighbors vegetable garden. Per police, patient has a history of declining mental function since 's death 5 years ago. History and ROS otherwise limited due to patient's current cognitive condition. Neurology consulted for confusion.   Neurological exam is normal - tangential speech. MRI Brain shows no changes. No further neurological tests indicated. Neuro signing off, please call with questions.   Seen by psychiatry - adjusting antipsychotics. Psych recommends inpatient admission to psychiatry.  Case discussed with attending.

## 2018-08-21 NOTE — PROGRESS NOTE ADULT - SUBJECTIVE AND OBJECTIVE BOX
NEUROLOGY PROGRESS NOTE     56 yo woman who is brought into [facility] via police after patient was found neighbor's yard naked, reported picking out tomatoes from a vegetable garden. Per police, patient has a history of declining mental function since 's death 5 years ago. Neurology consulted at the request of admitting hospitalist for further evaluation.     S: Pt unchanged neurologically. No complaints today.       MEDICATIONS  (STANDING):  dextrose 5%. 1000 milliLiter(s) (50 mL/Hr) IV Continuous <Continuous>  dextrose 50% Injectable 12.5 Gram(s) IV Push once  dextrose 50% Injectable 25 Gram(s) IV Push once  dextrose 50% Injectable 25 Gram(s) IV Push once  enoxaparin Injectable 40 milliGRAM(s) SubCutaneous daily  furosemide    Tablet 40 milliGRAM(s) Oral two times a day  levothyroxine 50 MICROGram(s) Oral daily  metoprolol succinate  milliGRAM(s) Oral daily  OLANZapine 25 milliGRAM(s) Oral at bedtime  potassium chloride    Tablet ER 20 milliEquivalent(s) Oral daily    MEDICATIONS  (PRN):  dextrose 40% Gel 15 Gram(s) Oral once PRN Blood Glucose LESS THAN 70 milliGRAM(s)/deciliter  glucagon  Injectable 1 milliGRAM(s) IntraMuscular once PRN Glucose LESS THAN 70 milligrams/deciliter  haloperidol    Injectable 5 milliGRAM(s) IntraMuscular every 6 hours PRN agitation  melatonin 3 milliGRAM(s) Oral at bedtime PRN Insomnia  mirtazapine 7.5 milliGRAM(s) Oral at bedtime PRN insomnia        Vital Signs Last 24 Hrs  T(C): 36.4 (21 Aug 2018 05:56), Max: 37 (20 Aug 2018 12:02)  T(F): 97.6 (21 Aug 2018 05:56), Max: 98.6 (20 Aug 2018 12:02)  HR: 89 (21 Aug 2018 05:56) (82 - 89)  BP: 144/83 (21 Aug 2018 05:56) (118/85 - 146/72)  BP(mean): --  RR: 18 (21 Aug 2018 05:56) (17 - 18)  SpO2: 100% (21 Aug 2018 05:56) (96% - 100%)      General Exam:   General appearance: No acute distress                   Neurological Exam:  Mental Status: AAOx2 (person, place only), speaks coherently, follows simple commands    Cranial Nerves: EOMI no nystagmus, PERRL, facial symmetry intact, no dysarthria, b/l bink to threat intact     Motor: antigravity throughout. No drift x4    Sensation: Intact to LT throughout    Coordination: F to N intact    Reflexes: 2+ bilateral biceps, brachioradialis, patellar, mild ankle clonus fatigable b/l     Gait: normal and stable.      Labs:                          11.7   7.00  )-----------( 280      ( 20 Aug 2018 07:52 )             35.6       08-21    139  |  101  |  14  ----------------------------<  98  3.6   |  25  |  0.58    Ca    9.7      21 Aug 2018 06:49          Radiology  < from: CT Head No Cont (08.08.18 @ 16:58) >  CLINICAL INDICATION:  Altered mental status, confusion    TECHNIQUE : Axial CT scanning of the brain was obtained from the skull   base to the vertex without the administration of intravenous contrast.      COMPARISON: None available    FINDINGS:  The ventricles are mildly prominent, this may be on the basis   of age-appropriate atrophy. The presence of mild normal pressure   hydrocephalus is not entirely excluded.    No focal mass effect, midline shift, vasogenic edema, or evidence of   acute territorial infarct. Nonspecific moderate to severe white matter   lucency.    The visualized paranasal sinuses and mastoid air cells are clear.    IMPRESSION: No acute intracranial hemorrhage, mass effect, vasogenic   edema, or evidence of acute territorial infarct.    Ventricles mildly prominent, this may be on the basis of age-appropriate   atrophy. The presence of mild normal pressure hydrocephalus is not   entirely excluded.    Nonspecific moderate to severe white matter lucency, differential   diagnosis includes white matter microvascular ischemic disease (advanced   for the patient's stated age), demyelinating disease, as well as   infectious, inflammatory, post infectious/inflammatory processes.    < end of copied text >         MRI BRAIN:    The study is markedly limited by motion. There is no gross evidence for   large intracranial enhancing mass, large acute infarct, large acute   hemorrhage, or large subdural collection. Nonspecific prominence of the   ventricular system is unchanged.    Mild to moderate patchy nonenhancing increased T2 and FLAIR signal   involves the white matter, most consistent with chronic microvascular   ischemic changes given the patient's age. Generalized cerebral volume   loss is noted.    IMPRESSION:    Motion limited study without gross evidence for large intracranial mass,   infarct, or hemorrhage.

## 2018-08-21 NOTE — PROGRESS NOTE BEHAVIORAL HEALTH - NSBHFUPINTERVALHXFT_PSY_A_CORE
Pt AOx3, restless, pacing around unit.  Less agitated as compared to yesterday, still disorganized, impulsive.  States she believes she is on the "wrong medication."

## 2018-08-21 NOTE — PROGRESS NOTE BEHAVIORAL HEALTH - THOUGHT PROCESS
Disorganized/Illogical/Impaired reasoning
Illogical/Impaired reasoning/Disorganized
Disorganized/Tangential/Illogical/Impaired reasoning
Disorganized/Impaired reasoning/Illogical
Tangential/Illogical/Impaired reasoning/Disorganized
Tangential/Disorganized/Illogical/Impaired reasoning
Illogical/Impaired reasoning/Tangential/Disorganized
Disorganized/Illogical/Impaired reasoning/Tangential
Disorganized/Illogical/Impaired reasoning
Illogical/Impaired reasoning/Disorganized
Disorganized/Illogical/Impaired reasoning

## 2018-08-21 NOTE — PROGRESS NOTE ADULT - SUBJECTIVE AND OBJECTIVE BOX
INTERVAL HPI/OVERNIGHT EVENTS: No new concerns.   Vital Signs Last 24 Hrs  T(C): 36.4 (21 Aug 2018 05:56), Max: 37 (20 Aug 2018 12:02)  T(F): 97.6 (21 Aug 2018 05:56), Max: 98.6 (20 Aug 2018 12:02)  HR: 89 (21 Aug 2018 05:56) (82 - 89)  BP: 144/83 (21 Aug 2018 05:56) (118/85 - 146/72)  BP(mean): --  RR: 18 (21 Aug 2018 05:56) (17 - 18)  SpO2: 100% (21 Aug 2018 05:56) (96% - 100%)  I&O's Summary    20 Aug 2018 07:01  -  21 Aug 2018 07:00  --------------------------------------------------------  IN: 890 mL / OUT: 0 mL / NET: 890 mL      MEDICATIONS  (STANDING):  dextrose 5%. 1000 milliLiter(s) (50 mL/Hr) IV Continuous <Continuous>  dextrose 50% Injectable 12.5 Gram(s) IV Push once  dextrose 50% Injectable 25 Gram(s) IV Push once  dextrose 50% Injectable 25 Gram(s) IV Push once  enoxaparin Injectable 40 milliGRAM(s) SubCutaneous daily  furosemide    Tablet 40 milliGRAM(s) Oral two times a day  levothyroxine 50 MICROGram(s) Oral daily  metoprolol succinate  milliGRAM(s) Oral daily  OLANZapine 25 milliGRAM(s) Oral at bedtime  potassium chloride    Tablet ER 20 milliEquivalent(s) Oral daily    MEDICATIONS  (PRN):  dextrose 40% Gel 15 Gram(s) Oral once PRN Blood Glucose LESS THAN 70 milliGRAM(s)/deciliter  glucagon  Injectable 1 milliGRAM(s) IntraMuscular once PRN Glucose LESS THAN 70 milligrams/deciliter  haloperidol    Injectable 5 milliGRAM(s) IntraMuscular every 6 hours PRN agitation  melatonin 3 milliGRAM(s) Oral at bedtime PRN Insomnia  mirtazapine 7.5 milliGRAM(s) Oral at bedtime PRN insomnia    LABS:                        11.7   7.00  )-----------( 280      ( 20 Aug 2018 07:52 )             35.6     08-21    139  |  101  |  14  ----------------------------<  98  3.6   |  25  |  0.58    Ca    9.7      21 Aug 2018 06:49          CAPILLARY BLOOD GLUCOSE                  Consultant(s) Notes Reviewed:  [x ] YES  [ ] NO    PHYSICAL EXAM:  GENERAL: NAD, well-groomed, well-developed,not in any distress ,  HEAD:  Atraumatic, Normocephalic  EYES: EOMI, PERRLA, conjunctiva and sclera clear  ENMT: No tonsillar erythema, exudates, or enlargement; Moist mucous membranes, Good dentition, No lesions  NECK: Supple, No JVD, Normal thyroid  NERVOUS SYSTEM:  Alert & confused , No focal deficit   CHEST/LUNG: Good air entry bilateral with no  rales, rhonchi, wheezing, or rubs  HEART: Regular rate and rhythm; No murmurs, rubs, or gallops  ABDOMEN: Soft, Nontender, Nondistended; Bowel sounds present  EXTREMITIES:  2+ Peripheral Pulses, No clubbing, cyanosis, or edema  SKIN: No rashes or lesions    Care Discussed with Consultants/Other Providers [ x] YES  [ ] NO

## 2018-08-21 NOTE — PROGRESS NOTE ADULT - SUBJECTIVE AND OBJECTIVE BOX
Subjective:  pt seen and examined, no acute distress on exam    ROS -     dextrose 40% Gel 15 Gram(s) Oral once PRN  dextrose 5%. 1000 milliLiter(s) IV Continuous <Continuous>  dextrose 50% Injectable 12.5 Gram(s) IV Push once  dextrose 50% Injectable 25 Gram(s) IV Push once  dextrose 50% Injectable 25 Gram(s) IV Push once  enoxaparin Injectable 40 milliGRAM(s) SubCutaneous daily  furosemide    Tablet 40 milliGRAM(s) Oral two times a day  glucagon  Injectable 1 milliGRAM(s) IntraMuscular once PRN  haloperidol    Injectable 5 milliGRAM(s) IntraMuscular every 6 hours PRN  levothyroxine 50 MICROGram(s) Oral daily  melatonin 3 milliGRAM(s) Oral at bedtime PRN  metoprolol succinate  milliGRAM(s) Oral daily  mirtazapine 7.5 milliGRAM(s) Oral at bedtime PRN  OLANZapine 25 milliGRAM(s) Oral at bedtime  potassium chloride    Tablet ER 20 milliEquivalent(s) Oral daily                            11.7   7.00  )-----------( 280      ( 20 Aug 2018 07:52 )             35.6       Hemoglobin: 11.7 g/dL (08-20 @ 07:52)  Hemoglobin: 11.9 g/dL (08-17 @ 08:09)      08-20    134<L>  |  96  |  16  ----------------------------<  96  3.7   |  23  |  0.59    Ca    10.0      20 Aug 2018 06:38      Creatinine Trend: 0.59<--, 0.59<--, 0.64<--, 0.69<--, 0.56<--, 0.57<--    COAGS:           T(C): 36.4 (08-21-18 @ 05:56), Max: 37 (08-20-18 @ 12:02)  HR: 89 (08-21-18 @ 05:56) (82 - 89)  BP: 144/83 (08-21-18 @ 05:56) (118/85 - 146/72)  RR: 18 (08-21-18 @ 05:56) (17 - 18)  SpO2: 100% (08-21-18 @ 05:56) (96% - 100%)  Wt(kg): --    I&O's Summary    19 Aug 2018 07:01  -  20 Aug 2018 07:00  --------------------------------------------------------  IN: 1960 mL / OUT: 0 mL / NET: 1960 mL    20 Aug 2018 07:01  -  21 Aug 2018 06:46  --------------------------------------------------------  IN: 890 mL / OUT: 0 mL / NET: 890 mL    	  Cardiovascular: Normal S1 S2, No JVD, No murmurs  Respiratory: Lungs clear to auscultation, normal effort 	  Gastrointestinal:  Soft, Non-tender, + BS	  Ext: No C/C/E BL LE    DATA:  	  < from: [facility] Duplex Lower Ext Vein Scan, Bilat (08.09.18 @ 08:48) >  IMPRESSION:     No evidence of bilateral lower extremity deep venous thrombosis.  < end of copied text >      ASSESSMENT/PLAN: 	57y Female with h/o DM with no known h/o CAD/MI presents with altered mental status. Patient was brought in yesterday by police after being found in her neighbor's garden naked picking tomatoes.  Her mental status has reportedly been declining since her 's death 5 years ago.  Of note, though the patient is ambulatory, she had a witnessed mechanical fall in the ER when she slipped from a rolling chair and slid to the floor. There was no loss of consciousness and no apparent prodromal symptoms. Cardiology is called to assess for possible aortic stenosis murmur on exam. The patient denies any anginal symptoms, dyspnea, palpitations, syncope, near syncope or previous cardiac work up.      -- Echo can be done as Outpatient , no further CV work up needed at present   -- cont BB - bp stable   --  GI / DVT prophylaxis.,  keep K>4, mag >2.0   -- cont Haldol, Ativan per medicine   -- Behavioral health follow up   D/W Dr Correa

## 2018-08-21 NOTE — PROGRESS NOTE BEHAVIORAL HEALTH - NSBHCONSULTOBSREASON_PSY_A_CORE FT
Patient is grossly disorganized, intermittently agitated
Patient is grossly disorganized, intermittently agitated
Patient is grossly disorganized, gets up from bed and wanders the hallways

## 2018-08-21 NOTE — PROGRESS NOTE BEHAVIORAL HEALTH - NSBHCONSFOLLOWNEEDS_PSY_A_CORE
Patient needs further psychiatric safety assessment prior to discharge

## 2018-08-21 NOTE — PROGRESS NOTE ADULT - PROVIDER SPECIALTY LIST ADULT
Cardiology
Internal Medicine
Nephrology
Neurology
Internal Medicine
Nephrology

## 2018-08-21 NOTE — PROGRESS NOTE BEHAVIORAL HEALTH - NSBHCONSULTWRKUPYES_PSY_A_CORE
labs/imaging/EKG
labs/imaging
labs/imaging
labs/EKG/imaging
labs/imaging/EKG
imaging/labs
labs/EKG/imaging/urine toxicology
labs/urine toxicology/imaging/EKG
Lipid panel/labs
labs/Lipid panel
Lipid panel/labs

## 2018-08-21 NOTE — PROGRESS NOTE BEHAVIORAL HEALTH - NSBHCHARTREVIEWVS_PSY_A_CORE FT
T(C): 36.8 (08-21-18 @ 13:19), Max: 36.9 (08-20-18 @ 21:49)  HR: 77 (08-21-18 @ 13:19) (77 - 89)  BP: 121/69 (08-21-18 @ 13:19) (118/85 - 144/83)  RR: 18 (08-21-18 @ 13:19) (17 - 18)  SpO2: 98% (08-21-18 @ 13:19) (98% - 100%)  Wt(kg): --

## 2018-08-21 NOTE — PROGRESS NOTE BEHAVIORAL HEALTH - NSBHCHARTREVIEWINVESTIGATE_PSY_A_CORE FT
< from: 12 Lead ECG (08.13.18 @ 13:04) >      Ventricular Rate 55 BPM    Atrial Rate 55 BPM    P-R Interval 142 ms    QRS Duration 76 ms    Q-T Interval 456 ms    QTC Calculation(Bezet) 436 ms    P Axis 43 degrees    R Axis 15 degrees    T Axis 41 degrees    Diagnosis Line SINUS BRADYCARDIA  OTHERWISE NORMAL ECG    Confirmed by NELSON GILLIAM MD ([id number]) on 8/14/2018 7:26:07 PM        < end of copied text >

## 2018-08-21 NOTE — PROGRESS NOTE BEHAVIORAL HEALTH - SUMMARY
58 y/o  female, , childless, retired  at [employer], domiciled alone in a private residence in [place], with PPH schizophrenia, alcohol abuse stable on OP psych meds for many years per psychiatrist, last psych admission >15 y/a, also with h/o ETOH abuse with suspected relapse per friend, no suicide attempts or self injurious behaviors as reported by patient, with PMHx of DM, bib police with altered mental status.  Patient was found in her neighbor's garden naked picking tomatoes. Spoke with OP psychiatrist Dr. Russ who saw pt last month, describes her as chronic schizophrenic stable on meds for years, no baseline dementia, living independently.  Per nursing, patient continues to have poor sleep and wanders around her room/in the hallways. She required PRN Ativan and Haldol this AM for agitation. Today, patient continues to be floridly psychotic, disorganized, with paranoid delusions towards hospital staff and irritability. She is frustrated that she is still in the hospital.

## 2018-08-22 LAB
ALBUMIN SERPL ELPH-MCNC: 4.4 G/DL — SIGNIFICANT CHANGE UP (ref 3.3–5)
ALP SERPL-CCNC: 92 U/L — SIGNIFICANT CHANGE UP (ref 40–120)
ALT FLD-CCNC: 18 U/L — SIGNIFICANT CHANGE UP (ref 4–33)
AST SERPL-CCNC: 19 U/L — SIGNIFICANT CHANGE UP (ref 4–32)
BASOPHILS # BLD AUTO: 0.04 K/UL — SIGNIFICANT CHANGE UP (ref 0–0.2)
BASOPHILS NFR BLD AUTO: 0.6 % — SIGNIFICANT CHANGE UP (ref 0–2)
BILIRUB SERPL-MCNC: 0.2 MG/DL — SIGNIFICANT CHANGE UP (ref 0.2–1.2)
BUN SERPL-MCNC: 19 MG/DL — SIGNIFICANT CHANGE UP (ref 7–23)
CALCIUM SERPL-MCNC: 9.7 MG/DL — SIGNIFICANT CHANGE UP (ref 8.4–10.5)
CHLORIDE SERPL-SCNC: 101 MMOL/L — SIGNIFICANT CHANGE UP (ref 98–107)
CHOLEST SERPL-MCNC: 155 MG/DL — SIGNIFICANT CHANGE UP (ref 120–199)
CO2 SERPL-SCNC: 23 MMOL/L — SIGNIFICANT CHANGE UP (ref 22–31)
CREAT SERPL-MCNC: 0.58 MG/DL — SIGNIFICANT CHANGE UP (ref 0.5–1.3)
EOSINOPHIL # BLD AUTO: 0.12 K/UL — SIGNIFICANT CHANGE UP (ref 0–0.5)
EOSINOPHIL NFR BLD AUTO: 1.9 % — SIGNIFICANT CHANGE UP (ref 0–6)
GLUCOSE BLDC GLUCOMTR-MCNC: 139 MG/DL — HIGH (ref 70–99)
GLUCOSE BLDC GLUCOMTR-MCNC: 195 MG/DL — HIGH (ref 70–99)
GLUCOSE SERPL-MCNC: 182 MG/DL — HIGH (ref 70–99)
HCT VFR BLD CALC: 35.7 % — SIGNIFICANT CHANGE UP (ref 34.5–45)
HDLC SERPL-MCNC: 37 MG/DL — LOW (ref 45–65)
HGB BLD-MCNC: 11.9 G/DL — SIGNIFICANT CHANGE UP (ref 11.5–15.5)
IMM GRANULOCYTES # BLD AUTO: 0.02 # — SIGNIFICANT CHANGE UP
IMM GRANULOCYTES NFR BLD AUTO: 0.3 % — SIGNIFICANT CHANGE UP (ref 0–1.5)
LIPID PNL WITH DIRECT LDL SERPL: 101 MG/DL — SIGNIFICANT CHANGE UP
LYMPHOCYTES # BLD AUTO: 1.11 K/UL — SIGNIFICANT CHANGE UP (ref 1–3.3)
LYMPHOCYTES # BLD AUTO: 17.9 % — SIGNIFICANT CHANGE UP (ref 13–44)
MCHC RBC-ENTMCNC: 30.1 PG — SIGNIFICANT CHANGE UP (ref 27–34)
MCHC RBC-ENTMCNC: 33.3 % — SIGNIFICANT CHANGE UP (ref 32–36)
MCV RBC AUTO: 90.4 FL — SIGNIFICANT CHANGE UP (ref 80–100)
MONOCYTES # BLD AUTO: 0.59 K/UL — SIGNIFICANT CHANGE UP (ref 0–0.9)
MONOCYTES NFR BLD AUTO: 9.5 % — SIGNIFICANT CHANGE UP (ref 2–14)
NEUTROPHILS # BLD AUTO: 4.33 K/UL — SIGNIFICANT CHANGE UP (ref 1.8–7.4)
NEUTROPHILS NFR BLD AUTO: 69.8 % — SIGNIFICANT CHANGE UP (ref 43–77)
NRBC # FLD: 0 — SIGNIFICANT CHANGE UP
PLATELET # BLD AUTO: 248 K/UL — SIGNIFICANT CHANGE UP (ref 150–400)
PMV BLD: 10.5 FL — SIGNIFICANT CHANGE UP (ref 7–13)
POTASSIUM SERPL-MCNC: 3.8 MMOL/L — SIGNIFICANT CHANGE UP (ref 3.5–5.3)
POTASSIUM SERPL-SCNC: 3.8 MMOL/L — SIGNIFICANT CHANGE UP (ref 3.5–5.3)
PROT SERPL-MCNC: 7.1 G/DL — SIGNIFICANT CHANGE UP (ref 6–8.3)
RBC # BLD: 3.95 M/UL — SIGNIFICANT CHANGE UP (ref 3.8–5.2)
RBC # FLD: 12.9 % — SIGNIFICANT CHANGE UP (ref 10.3–14.5)
SODIUM SERPL-SCNC: 136 MMOL/L — SIGNIFICANT CHANGE UP (ref 135–145)
TRIGL SERPL-MCNC: 229 MG/DL — HIGH (ref 10–149)
WBC # BLD: 6.21 K/UL — SIGNIFICANT CHANGE UP (ref 3.8–10.5)
WBC # FLD AUTO: 6.21 K/UL — SIGNIFICANT CHANGE UP (ref 3.8–10.5)

## 2018-08-22 PROCEDURE — 99223 1ST HOSP IP/OBS HIGH 75: CPT

## 2018-08-22 PROCEDURE — 99232 SBSQ HOSP IP/OBS MODERATE 35: CPT | Mod: GC

## 2018-08-22 RX ORDER — METFORMIN HYDROCHLORIDE 850 MG/1
750 TABLET ORAL
Qty: 0 | Refills: 0 | Status: DISCONTINUED | OUTPATIENT
Start: 2018-08-22 | End: 2018-11-05

## 2018-08-22 RX ORDER — POTASSIUM CHLORIDE 20 MEQ
20 PACKET (EA) ORAL DAILY
Qty: 0 | Refills: 0 | Status: DISCONTINUED | OUTPATIENT
Start: 2018-08-22 | End: 2018-08-30

## 2018-08-22 RX ORDER — SIMVASTATIN 20 MG/1
20 TABLET, FILM COATED ORAL AT BEDTIME
Qty: 0 | Refills: 0 | Status: DISCONTINUED | OUTPATIENT
Start: 2018-08-22 | End: 2018-11-05

## 2018-08-22 RX ORDER — OLANZAPINE 15 MG/1
15 TABLET, FILM COATED ORAL AT BEDTIME
Qty: 0 | Refills: 0 | Status: DISCONTINUED | OUTPATIENT
Start: 2018-08-22 | End: 2018-08-24

## 2018-08-22 RX ORDER — OLANZAPINE 15 MG/1
2.5 TABLET, FILM COATED ORAL EVERY 6 HOURS
Qty: 0 | Refills: 0 | Status: DISCONTINUED | OUTPATIENT
Start: 2018-08-22 | End: 2018-08-30

## 2018-08-22 RX ORDER — DILTIAZEM HCL 120 MG
300 CAPSULE, EXT RELEASE 24 HR ORAL DAILY
Qty: 0 | Refills: 0 | Status: DISCONTINUED | OUTPATIENT
Start: 2018-08-22 | End: 2018-08-22

## 2018-08-22 RX ADMIN — SIMVASTATIN 20 MILLIGRAM(S): 20 TABLET, FILM COATED ORAL at 20:37

## 2018-08-22 RX ADMIN — Medication 1 MILLIGRAM(S): at 13:15

## 2018-08-22 RX ADMIN — METFORMIN HYDROCHLORIDE 750 MILLIGRAM(S): 850 TABLET ORAL at 20:37

## 2018-08-22 RX ADMIN — Medication 1 TABLET(S): at 11:41

## 2018-08-22 RX ADMIN — OLANZAPINE 15 MILLIGRAM(S): 15 TABLET, FILM COATED ORAL at 20:37

## 2018-08-22 RX ADMIN — Medication 50 MICROGRAM(S): at 09:18

## 2018-08-22 RX ADMIN — Medication 40 MILLIGRAM(S): at 09:19

## 2018-08-22 RX ADMIN — Medication 20 MILLIEQUIVALENT(S): at 15:39

## 2018-08-22 RX ADMIN — Medication 40 MILLIGRAM(S): at 20:37

## 2018-08-22 RX ADMIN — Medication 100 MILLIGRAM(S): at 09:18

## 2018-08-22 NOTE — CONSULT NOTE ADULT - SUBJECTIVE AND OBJECTIVE BOX
HPI: Pt is 57F Admitted to [facility] 8/8/18 after found confused naked in garden.  She was evaluated by neurology, EEG and MRI were unrevealing for a cause of confusion, which was thought to have psychiatric etiology.  She was hyponatremic and hypokalemic with LE edema on admission, nephrology was consulted who recommended treatment for hypervolemic hyponatremia with lasix and salt tabs.  LE dopplers were negative for DVT.  Electrolytes corrected and she was discharged to continue lasix and potassium supplement but salt tabs were dicontinued.  She was amina;zacahryed cby cardiology who recommended no inpatient cardio workup, pt to follow up as outpatient for TTE.  She was transferred to [facility] 8/21/18 for management of schizophrenic. She currently denies complaints.    PAST MEDICAL & SURGICAL HISTORY:  Diabetes mellitus of other type without complication      Review of Systems:   CONSTITUTIONAL: No fever, weight loss, or fatigue  EYES: No eye pain, visual disturbances, or discharge  ENMT:  No difficulty hearing, tinnitus, vertigo; No sinus or throat pain  NECK: No pain or stiffness  RESPIRATORY: No cough, wheezing, chills or hemoptysis; No shortness of breath  CARDIOVASCULAR: No chest pain, palpitations, dizziness, or leg swelling  GASTROINTESTINAL: No abdominal or epigastric pain. No nausea, vomiting, or hematemesis; No diarrhea or constipation. No melena or hematochezia.  GENITOURINARY: No dysuria, frequency, hematuria, or incontinence  NEUROLOGICAL: No headaches, memory loss, loss of strength, numbness, or tremors  SKIN: No itching, burning, rashes, or lesions   LYMPH NODES: No enlarged glands  ENDOCRINE: No heat or cold intolerance; No hair loss  MUSCULOSKELETAL: No joint pain or swelling; No muscle, back, or extremity pain  HEME/LYMPH: No easy bruising, or bleeding gums  ALLERY AND IMMUNOLOGIC: No hives or eczema    Allergies    Allergy Status Unknown    Intolerances        Social History: denies etoh tob idu    FAMILY HISTORY: noncontributory      MEDICATIONS  (STANDING):  furosemide    Tablet 40 milliGRAM(s) Oral two times a day  insulin lispro (HumaLOG) corrective regimen sliding scale   SubCutaneous three times a day before meals  insulin lispro (HumaLOG) corrective regimen sliding scale   SubCutaneous at bedtime  levothyroxine 50 MICROGram(s) Oral daily  metFORMIN  milliGRAM(s) Oral <User Schedule>  multivitamin 1 Tablet(s) Oral daily  OLANZapine Disintegrating Tablet 15 milliGRAM(s) Oral at bedtime  simvastatin 20 milliGRAM(s) Oral at bedtime    MEDICATIONS  (PRN):  LORazepam     Tablet 1 milliGRAM(s) Oral every 6 hours PRN Agitation  LORazepam   Injectable 2 milliGRAM(s) IntraMuscular every 6 hours PRN Agitation      Vital Signs Last 24 Hrs  T(C): 36.4 (22 Aug 2018 06:19), Max: 37.1 (21 Aug 2018 18:54)  T(F): 97.6 (22 Aug 2018 06:19), Max: 98.7 (21 Aug 2018 18:54)  HR: 88 (22 Aug 2018 06:19) (77 - 88)  BP: 147/72 (22 Aug 2018 06:19) (121/69 - 147/72)  BP(mean): --  RR: 18 (21 Aug 2018 13:19) (18 - 18)  SpO2: 98% (21 Aug 2018 13:19) (98% - 98%)  CAPILLARY BLOOD GLUCOSE      POCT Blood Glucose.: 139 mg/dL (22 Aug 2018 11:39)        PHYSICAL EXAM:  GENERAL: NAD, well-developed  HEAD:  Atraumatic, Normocephalic  EYES: EOMI, conjunctiva and sclera clear  NECK: Supple, No JVD  CHEST/LUNG: Clear to auscultation bilaterally; No wheeze  HEART: Regular rate and rhythm; No murmurs, rubs, or gallops  ABDOMEN: Soft, Nontender, Nondistended; Bowel sounds present  EXTREMITIES:  2+ Peripheral Pulses, No clubbing, cyanosis, or edema  NEUROLOGY: non-focal  SKIN: No rashes or lesions    LABS:                        11.9   6.21  )-----------( 248      ( 22 Aug 2018 09:10 )             35.7     08-22    136  |  101  |  19  ----------------------------<  182<H>  3.8   |  23  |  0.58    Ca    9.7      22 Aug 2018 09:10    TPro  7.1  /  Alb  4.4  /  TBili  0.2  /  DBili  x   /  AST  19  /  ALT  18  /  AlkPhos  92  08-22              EKG(personally reviewed): SB55 normal    RADIOLOGY & ADDITIONAL TESTS:    reviewed in sunrise    Consultant(s) Notes Reviewed:  nephrology, cardiology, neurology palpitations

## 2018-08-22 NOTE — CONSULT NOTE ADULT - ASSESSMENT
57F DM2 BLE edema admitted to [facility] 8/8/18 with AMS, transferred to [facility] 8/21/18 for management of schizophrenia.      Plan:  DM2: Continue metformin.  Continue statin for ASCVD prevention.    LE edema: Continue lasix with potassium supplement.  Will recheck electrolytes on 8/27.  Outopatient f/u with PMD for TTE.    Schizophrenia: Management per primary team

## 2018-08-23 LAB
GLUCOSE BLDC GLUCOMTR-MCNC: 102 MG/DL — HIGH (ref 70–99)
GLUCOSE BLDC GLUCOMTR-MCNC: 106 MG/DL — HIGH (ref 70–99)
GLUCOSE BLDC GLUCOMTR-MCNC: 114 MG/DL — HIGH (ref 70–99)

## 2018-08-23 PROCEDURE — 99232 SBSQ HOSP IP/OBS MODERATE 35: CPT | Mod: GC

## 2018-08-23 RX ADMIN — Medication 40 MILLIGRAM(S): at 20:59

## 2018-08-23 RX ADMIN — Medication 1 TABLET(S): at 08:35

## 2018-08-23 RX ADMIN — Medication 40 MILLIGRAM(S): at 08:34

## 2018-08-23 RX ADMIN — METFORMIN HYDROCHLORIDE 750 MILLIGRAM(S): 850 TABLET ORAL at 20:59

## 2018-08-23 RX ADMIN — SIMVASTATIN 20 MILLIGRAM(S): 20 TABLET, FILM COATED ORAL at 20:59

## 2018-08-23 RX ADMIN — METFORMIN HYDROCHLORIDE 750 MILLIGRAM(S): 850 TABLET ORAL at 08:34

## 2018-08-23 RX ADMIN — Medication 1 MILLIGRAM(S): at 13:58

## 2018-08-23 RX ADMIN — OLANZAPINE 15 MILLIGRAM(S): 15 TABLET, FILM COATED ORAL at 20:59

## 2018-08-23 RX ADMIN — Medication 50 MICROGRAM(S): at 08:34

## 2018-08-24 LAB
GLUCOSE BLDC GLUCOMTR-MCNC: 104 MG/DL — HIGH (ref 70–99)
GLUCOSE BLDC GLUCOMTR-MCNC: 123 MG/DL — HIGH (ref 70–99)
GLUCOSE BLDC GLUCOMTR-MCNC: 128 MG/DL — HIGH (ref 70–99)
GLUCOSE BLDC GLUCOMTR-MCNC: 141 MG/DL — HIGH (ref 70–99)

## 2018-08-24 PROCEDURE — 99232 SBSQ HOSP IP/OBS MODERATE 35: CPT | Mod: GC

## 2018-08-24 RX ORDER — METHOTREXATE 2.5 MG/1
5 TABLET ORAL
Qty: 0 | Refills: 0 | Status: DISCONTINUED | OUTPATIENT
Start: 2018-08-26 | End: 2018-08-29

## 2018-08-24 RX ORDER — OLANZAPINE 15 MG/1
10 TABLET, FILM COATED ORAL AT BEDTIME
Qty: 0 | Refills: 0 | Status: DISCONTINUED | OUTPATIENT
Start: 2018-08-24 | End: 2018-08-27

## 2018-08-24 RX ADMIN — OLANZAPINE 10 MILLIGRAM(S): 15 TABLET, FILM COATED ORAL at 20:59

## 2018-08-24 RX ADMIN — METFORMIN HYDROCHLORIDE 750 MILLIGRAM(S): 850 TABLET ORAL at 20:59

## 2018-08-24 RX ADMIN — METFORMIN HYDROCHLORIDE 750 MILLIGRAM(S): 850 TABLET ORAL at 08:38

## 2018-08-24 RX ADMIN — Medication 1 TABLET(S): at 08:38

## 2018-08-24 RX ADMIN — OLANZAPINE 2.5 MILLIGRAM(S): 15 TABLET, FILM COATED ORAL at 14:45

## 2018-08-24 RX ADMIN — Medication 1 MILLIGRAM(S): at 14:45

## 2018-08-24 RX ADMIN — SIMVASTATIN 20 MILLIGRAM(S): 20 TABLET, FILM COATED ORAL at 20:59

## 2018-08-24 RX ADMIN — Medication 40 MILLIGRAM(S): at 20:59

## 2018-08-24 RX ADMIN — Medication 50 MICROGRAM(S): at 08:38

## 2018-08-24 RX ADMIN — Medication 40 MILLIGRAM(S): at 08:38

## 2018-08-25 LAB
GLUCOSE BLDC GLUCOMTR-MCNC: 141 MG/DL — HIGH (ref 70–99)
GLUCOSE BLDC GLUCOMTR-MCNC: 153 MG/DL — HIGH (ref 70–99)

## 2018-08-25 PROCEDURE — 99232 SBSQ HOSP IP/OBS MODERATE 35: CPT

## 2018-08-25 RX ADMIN — METFORMIN HYDROCHLORIDE 750 MILLIGRAM(S): 850 TABLET ORAL at 21:42

## 2018-08-25 RX ADMIN — Medication 50 MICROGRAM(S): at 08:45

## 2018-08-25 RX ADMIN — Medication 40 MILLIGRAM(S): at 08:45

## 2018-08-25 RX ADMIN — METFORMIN HYDROCHLORIDE 750 MILLIGRAM(S): 850 TABLET ORAL at 08:45

## 2018-08-25 RX ADMIN — OLANZAPINE 10 MILLIGRAM(S): 15 TABLET, FILM COATED ORAL at 21:42

## 2018-08-25 RX ADMIN — Medication 1 MILLIGRAM(S): at 08:45

## 2018-08-25 RX ADMIN — Medication 20 MILLIEQUIVALENT(S): at 08:45

## 2018-08-25 RX ADMIN — OLANZAPINE 2.5 MILLIGRAM(S): 15 TABLET, FILM COATED ORAL at 08:45

## 2018-08-25 RX ADMIN — Medication 1 TABLET(S): at 08:45

## 2018-08-25 RX ADMIN — Medication 40 MILLIGRAM(S): at 21:41

## 2018-08-25 RX ADMIN — SIMVASTATIN 20 MILLIGRAM(S): 20 TABLET, FILM COATED ORAL at 21:42

## 2018-08-26 LAB
GLUCOSE BLDC GLUCOMTR-MCNC: 113 MG/DL — HIGH (ref 70–99)
GLUCOSE BLDC GLUCOMTR-MCNC: 91 MG/DL — SIGNIFICANT CHANGE UP (ref 70–99)

## 2018-08-26 PROCEDURE — 99232 SBSQ HOSP IP/OBS MODERATE 35: CPT

## 2018-08-26 RX ORDER — ZIPRASIDONE HYDROCHLORIDE 20 MG/1
20 CAPSULE ORAL ONCE
Qty: 0 | Refills: 0 | Status: COMPLETED | OUTPATIENT
Start: 2018-08-26 | End: 2018-08-26

## 2018-08-26 RX ORDER — TRAZODONE HCL 50 MG
50 TABLET ORAL ONCE
Qty: 0 | Refills: 0 | Status: DISCONTINUED | OUTPATIENT
Start: 2018-08-26 | End: 2018-10-05

## 2018-08-26 RX ADMIN — Medication 50 MICROGRAM(S): at 09:18

## 2018-08-26 RX ADMIN — METFORMIN HYDROCHLORIDE 750 MILLIGRAM(S): 850 TABLET ORAL at 20:19

## 2018-08-26 RX ADMIN — METHOTREXATE 5 MILLIGRAM(S): 2.5 TABLET ORAL at 11:11

## 2018-08-26 RX ADMIN — Medication 40 MILLIGRAM(S): at 20:19

## 2018-08-26 RX ADMIN — OLANZAPINE 2.5 MILLIGRAM(S): 15 TABLET, FILM COATED ORAL at 15:18

## 2018-08-26 RX ADMIN — OLANZAPINE 10 MILLIGRAM(S): 15 TABLET, FILM COATED ORAL at 20:19

## 2018-08-26 RX ADMIN — OLANZAPINE 2.5 MILLIGRAM(S): 15 TABLET, FILM COATED ORAL at 09:18

## 2018-08-26 RX ADMIN — Medication 1 MILLIGRAM(S): at 20:20

## 2018-08-26 RX ADMIN — METFORMIN HYDROCHLORIDE 750 MILLIGRAM(S): 850 TABLET ORAL at 09:18

## 2018-08-26 RX ADMIN — Medication 40 MILLIGRAM(S): at 09:18

## 2018-08-26 RX ADMIN — SIMVASTATIN 20 MILLIGRAM(S): 20 TABLET, FILM COATED ORAL at 20:20

## 2018-08-26 RX ADMIN — Medication 1 TABLET(S): at 09:18

## 2018-08-26 RX ADMIN — Medication 1 MILLIGRAM(S): at 15:18

## 2018-08-26 RX ADMIN — Medication 1 MILLIGRAM(S): at 09:18

## 2018-08-27 LAB
GLUCOSE BLDC GLUCOMTR-MCNC: 103 MG/DL — HIGH (ref 70–99)
GLUCOSE BLDC GLUCOMTR-MCNC: 116 MG/DL — HIGH (ref 70–99)
GLUCOSE BLDC GLUCOMTR-MCNC: 119 MG/DL — HIGH (ref 70–99)
GLUCOSE BLDC GLUCOMTR-MCNC: 154 MG/DL — HIGH (ref 70–99)

## 2018-08-27 PROCEDURE — 93010 ELECTROCARDIOGRAM REPORT: CPT

## 2018-08-27 PROCEDURE — 99232 SBSQ HOSP IP/OBS MODERATE 35: CPT | Mod: GC

## 2018-08-27 RX ORDER — SOD,AMMONIUM,POTASSIUM LACTATE
1 CREAM (GRAM) TOPICAL DAILY
Qty: 0 | Refills: 0 | Status: DISCONTINUED | OUTPATIENT
Start: 2018-08-27 | End: 2018-11-05

## 2018-08-27 RX ORDER — OLANZAPINE 15 MG/1
15 TABLET, FILM COATED ORAL AT BEDTIME
Qty: 0 | Refills: 0 | Status: DISCONTINUED | OUTPATIENT
Start: 2018-08-27 | End: 2018-09-05

## 2018-08-27 RX ADMIN — Medication 20 MILLIEQUIVALENT(S): at 08:42

## 2018-08-27 RX ADMIN — OLANZAPINE 15 MILLIGRAM(S): 15 TABLET, FILM COATED ORAL at 21:12

## 2018-08-27 RX ADMIN — Medication 40 MILLIGRAM(S): at 08:41

## 2018-08-27 RX ADMIN — Medication 40 MILLIGRAM(S): at 21:11

## 2018-08-27 RX ADMIN — METFORMIN HYDROCHLORIDE 750 MILLIGRAM(S): 850 TABLET ORAL at 08:41

## 2018-08-27 RX ADMIN — METFORMIN HYDROCHLORIDE 750 MILLIGRAM(S): 850 TABLET ORAL at 21:12

## 2018-08-27 RX ADMIN — Medication 1 TABLET(S): at 08:42

## 2018-08-27 RX ADMIN — SIMVASTATIN 20 MILLIGRAM(S): 20 TABLET, FILM COATED ORAL at 21:12

## 2018-08-27 RX ADMIN — Medication 50 MICROGRAM(S): at 08:41

## 2018-08-27 RX ADMIN — Medication 1 MILLIGRAM(S): at 15:17

## 2018-08-28 LAB
GLUCOSE BLDC GLUCOMTR-MCNC: 101 MG/DL — HIGH (ref 70–99)
GLUCOSE BLDC GLUCOMTR-MCNC: 112 MG/DL — HIGH (ref 70–99)
GLUCOSE BLDC GLUCOMTR-MCNC: 114 MG/DL — HIGH (ref 70–99)
HCG UR-SCNC: NEGATIVE — SIGNIFICANT CHANGE UP
SP GR UR: 1 — SIGNIFICANT CHANGE UP (ref 1–1.03)

## 2018-08-28 PROCEDURE — 99232 SBSQ HOSP IP/OBS MODERATE 35: CPT | Mod: GC

## 2018-08-28 RX ORDER — ZIPRASIDONE HYDROCHLORIDE 20 MG/1
40 CAPSULE ORAL
Qty: 0 | Refills: 0 | Status: DISCONTINUED | OUTPATIENT
Start: 2018-08-28 | End: 2018-08-29

## 2018-08-28 RX ADMIN — METFORMIN HYDROCHLORIDE 750 MILLIGRAM(S): 850 TABLET ORAL at 21:29

## 2018-08-28 RX ADMIN — Medication 1 APPLICATION(S): at 09:05

## 2018-08-28 RX ADMIN — Medication 1 TABLET(S): at 09:05

## 2018-08-28 RX ADMIN — OLANZAPINE 2.5 MILLIGRAM(S): 15 TABLET, FILM COATED ORAL at 15:50

## 2018-08-28 RX ADMIN — Medication 1 MILLIGRAM(S): at 04:50

## 2018-08-28 RX ADMIN — SIMVASTATIN 20 MILLIGRAM(S): 20 TABLET, FILM COATED ORAL at 21:29

## 2018-08-28 RX ADMIN — Medication 40 MILLIGRAM(S): at 21:28

## 2018-08-28 RX ADMIN — ZIPRASIDONE HYDROCHLORIDE 40 MILLIGRAM(S): 20 CAPSULE ORAL at 18:11

## 2018-08-28 RX ADMIN — Medication 1 MILLIGRAM(S): at 15:50

## 2018-08-28 RX ADMIN — Medication 20 MILLIEQUIVALENT(S): at 09:05

## 2018-08-28 RX ADMIN — OLANZAPINE 15 MILLIGRAM(S): 15 TABLET, FILM COATED ORAL at 21:29

## 2018-08-28 RX ADMIN — METFORMIN HYDROCHLORIDE 750 MILLIGRAM(S): 850 TABLET ORAL at 09:05

## 2018-08-28 RX ADMIN — Medication 50 MICROGRAM(S): at 09:05

## 2018-08-28 RX ADMIN — Medication 40 MILLIGRAM(S): at 09:05

## 2018-08-29 LAB
ALBUMIN SERPL ELPH-MCNC: 4.4 G/DL — SIGNIFICANT CHANGE UP (ref 3.3–5)
ALP SERPL-CCNC: 101 U/L — SIGNIFICANT CHANGE UP (ref 40–120)
ALT FLD-CCNC: 19 U/L — SIGNIFICANT CHANGE UP (ref 4–33)
AST SERPL-CCNC: 19 U/L — SIGNIFICANT CHANGE UP (ref 4–32)
BILIRUB SERPL-MCNC: 0.3 MG/DL — SIGNIFICANT CHANGE UP (ref 0.2–1.2)
BUN SERPL-MCNC: 15 MG/DL — SIGNIFICANT CHANGE UP (ref 7–23)
CALCIUM SERPL-MCNC: 9.7 MG/DL — SIGNIFICANT CHANGE UP (ref 8.4–10.5)
CHLORIDE SERPL-SCNC: 97 MMOL/L — LOW (ref 98–107)
CO2 SERPL-SCNC: 27 MMOL/L — SIGNIFICANT CHANGE UP (ref 22–31)
CREAT SERPL-MCNC: 0.63 MG/DL — SIGNIFICANT CHANGE UP (ref 0.5–1.3)
GLUCOSE BLDC GLUCOMTR-MCNC: 109 MG/DL — HIGH (ref 70–99)
GLUCOSE BLDC GLUCOMTR-MCNC: 115 MG/DL — HIGH (ref 70–99)
GLUCOSE BLDC GLUCOMTR-MCNC: 87 MG/DL — SIGNIFICANT CHANGE UP (ref 70–99)
GLUCOSE SERPL-MCNC: 101 MG/DL — HIGH (ref 70–99)
POTASSIUM SERPL-MCNC: 3.3 MMOL/L — LOW (ref 3.5–5.3)
POTASSIUM SERPL-SCNC: 3.3 MMOL/L — LOW (ref 3.5–5.3)
PROT SERPL-MCNC: 7.3 G/DL — SIGNIFICANT CHANGE UP (ref 6–8.3)
SODIUM SERPL-SCNC: 138 MMOL/L — SIGNIFICANT CHANGE UP (ref 135–145)

## 2018-08-29 PROCEDURE — 99232 SBSQ HOSP IP/OBS MODERATE 35: CPT | Mod: GC

## 2018-08-29 PROCEDURE — 99232 SBSQ HOSP IP/OBS MODERATE 35: CPT

## 2018-08-29 PROCEDURE — 99223 1ST HOSP IP/OBS HIGH 75: CPT | Mod: GC

## 2018-08-29 RX ORDER — ZIPRASIDONE HYDROCHLORIDE 20 MG/1
80 CAPSULE ORAL
Qty: 0 | Refills: 0 | Status: DISCONTINUED | OUTPATIENT
Start: 2018-08-30 | End: 2018-10-03

## 2018-08-29 RX ORDER — FUROSEMIDE 40 MG
40 TABLET ORAL DAILY
Qty: 0 | Refills: 0 | Status: DISCONTINUED | OUTPATIENT
Start: 2018-08-30 | End: 2018-11-05

## 2018-08-29 RX ADMIN — Medication 1 MILLIGRAM(S): at 15:45

## 2018-08-29 RX ADMIN — OLANZAPINE 15 MILLIGRAM(S): 15 TABLET, FILM COATED ORAL at 22:00

## 2018-08-29 RX ADMIN — METFORMIN HYDROCHLORIDE 750 MILLIGRAM(S): 850 TABLET ORAL at 09:25

## 2018-08-29 RX ADMIN — Medication 1 TABLET(S): at 09:25

## 2018-08-29 RX ADMIN — METFORMIN HYDROCHLORIDE 750 MILLIGRAM(S): 850 TABLET ORAL at 22:00

## 2018-08-29 RX ADMIN — ZIPRASIDONE HYDROCHLORIDE 40 MILLIGRAM(S): 20 CAPSULE ORAL at 17:23

## 2018-08-29 RX ADMIN — SIMVASTATIN 20 MILLIGRAM(S): 20 TABLET, FILM COATED ORAL at 22:00

## 2018-08-29 RX ADMIN — Medication 50 MICROGRAM(S): at 09:25

## 2018-08-29 RX ADMIN — Medication 40 MILLIGRAM(S): at 09:33

## 2018-08-29 NOTE — PROGRESS NOTE ADULT - ASSESSMENT
57F DM2 BLE edema admitted to [facility] 8/8/18 with AMS, transferred to [facility] 8/21/18 for management of schizophrenia.      Plan:    LE edema: Has resolved.  Will reduce lasix to 40mg once a day, Check labs to determine need for potassium supplement.   Outpatient f/u with PMD for TTE.    DM2: Continue metformin.  Continue statin for ASCVD prevention.    Schizophrenia: Management per primary team

## 2018-08-29 NOTE — PROGRESS NOTE ADULT - SUBJECTIVE AND OBJECTIVE BOX
CC/Reason for Consult: f/u edema    SUBJECTIVE / OVERNIGHT EVENTS: Patient refusing potassium today because it tastes bad    MEDICATIONS  (STANDING):  ammonium lactate   12% Cream 1 Application(s) Topical daily  insulin lispro (HumaLOG) corrective regimen sliding scale   SubCutaneous three times a day before meals  insulin lispro (HumaLOG) corrective regimen sliding scale   SubCutaneous at bedtime  levothyroxine 50 MICROGram(s) Oral daily  metFORMIN  milliGRAM(s) Oral <User Schedule>  multivitamin 1 Tablet(s) Oral daily  OLANZapine Disintegrating Tablet 15 milliGRAM(s) Oral at bedtime  potassium chloride   Solution 20 milliEquivalent(s) Oral daily  simvastatin 20 milliGRAM(s) Oral at bedtime  traZODone 50 milliGRAM(s) Oral once  ziprasidone 40 milliGRAM(s) Oral <User Schedule>    MEDICATIONS  (PRN):  LORazepam     Tablet 1 milliGRAM(s) Oral every 6 hours PRN Agitation  LORazepam   Injectable 2 milliGRAM(s) IntraMuscular every 6 hours PRN Agitation  OLANZapine 2.5 milliGRAM(s) Oral every 6 hours PRN agitation      Vital Signs Last 24 Hrs  T(C): 36.1 (29 Aug 2018 05:55), Max: 37.2 (28 Aug 2018 20:01)  T(F): 97 (29 Aug 2018 05:55), Max: 98.9 (28 Aug 2018 20:01)  HR: 115 (29 Aug 2018 05:55) (115 - 115)  BP: 144/84 (29 Aug 2018 05:55) (144/84 - 144/84)  BP(mean): --  RR: 16  SpO2: --  CAPILLARY BLOOD GLUCOSE      POCT Blood Glucose.: 115 mg/dL (29 Aug 2018 11:25)  POCT Blood Glucose.: 109 mg/dL (29 Aug 2018 07:44)  POCT Blood Glucose.: 101 mg/dL (28 Aug 2018 20:26)        PHYSICAL EXAM:  GENERAL: NAD, well-developed  HEAD:  Atraumatic, Normocephalic  EYES: EOMI, conjunctiva and sclera clear  NECK: Supple, No JVD  CHEST/LUNG: Clear to auscultation bilaterally; No wheeze  HEART: Regular rate and rhythm; No murmurs, rubs, or gallops  ABDOMEN: Soft, Nontender, Nondistended; Bowel sounds present  EXTREMITIES:  2+ Peripheral Pulses, No clubbing, cyanosis, or edema  PSYCH: AAOx3  NEUROLOGY: non-focal  SKIN: No rashes or lesions      Care Discussed with Consultants/Other Providers: Psych resident

## 2018-08-30 LAB
GLUCOSE BLDC GLUCOMTR-MCNC: 114 MG/DL — HIGH (ref 70–99)
GLUCOSE BLDC GLUCOMTR-MCNC: 137 MG/DL — HIGH (ref 70–99)
GLUCOSE BLDC GLUCOMTR-MCNC: 159 MG/DL — HIGH (ref 70–99)
GLUCOSE BLDC GLUCOMTR-MCNC: 97 MG/DL — SIGNIFICANT CHANGE UP (ref 70–99)

## 2018-08-30 PROCEDURE — 99232 SBSQ HOSP IP/OBS MODERATE 35: CPT | Mod: GC

## 2018-08-30 PROCEDURE — 93010 ELECTROCARDIOGRAM REPORT: CPT

## 2018-08-30 PROCEDURE — 99233 SBSQ HOSP IP/OBS HIGH 50: CPT

## 2018-08-30 RX ORDER — POTASSIUM CHLORIDE 20 MEQ
40 PACKET (EA) ORAL ONCE
Qty: 0 | Refills: 0 | Status: COMPLETED | OUTPATIENT
Start: 2018-08-30 | End: 2018-08-30

## 2018-08-30 RX ORDER — OLANZAPINE 15 MG/1
5 TABLET, FILM COATED ORAL EVERY 6 HOURS
Qty: 0 | Refills: 0 | Status: DISCONTINUED | OUTPATIENT
Start: 2018-08-30 | End: 2018-11-05

## 2018-08-30 RX ADMIN — METFORMIN HYDROCHLORIDE 750 MILLIGRAM(S): 850 TABLET ORAL at 08:03

## 2018-08-30 RX ADMIN — ZIPRASIDONE HYDROCHLORIDE 80 MILLIGRAM(S): 20 CAPSULE ORAL at 16:57

## 2018-08-30 RX ADMIN — OLANZAPINE 2.5 MILLIGRAM(S): 15 TABLET, FILM COATED ORAL at 07:40

## 2018-08-30 RX ADMIN — SIMVASTATIN 20 MILLIGRAM(S): 20 TABLET, FILM COATED ORAL at 23:42

## 2018-08-30 RX ADMIN — Medication 50 MICROGRAM(S): at 08:03

## 2018-08-30 RX ADMIN — Medication 1 MILLIGRAM(S): at 07:40

## 2018-08-30 RX ADMIN — METFORMIN HYDROCHLORIDE 750 MILLIGRAM(S): 850 TABLET ORAL at 23:42

## 2018-08-30 RX ADMIN — Medication 40 MILLIGRAM(S): at 08:02

## 2018-08-30 RX ADMIN — Medication 2 MILLIGRAM(S): at 13:12

## 2018-08-30 RX ADMIN — Medication 40 MILLIEQUIVALENT(S): at 11:35

## 2018-08-30 RX ADMIN — Medication 1 TABLET(S): at 08:03

## 2018-08-30 RX ADMIN — OLANZAPINE 15 MILLIGRAM(S): 15 TABLET, FILM COATED ORAL at 23:42

## 2018-08-30 NOTE — PROGRESS NOTE ADULT - ASSESSMENT
57F DM2 BLE edema admitted to [facility] 8/8/18 with AMS, transferred to [facility] 8/21/18 for management of schizophrenia.      Plan:  Hypokalemia: Mildly low yesterday due to lasix and refusal to take supplement. Concern for gastric slowing due to antipsychotics, regular oral K supplement in slow release tablet form may cause gastric irritation.  Will dose Kdur one time only 40Meq, check potassium, if stable on lower dose lasix may be OK without oral supplement    LE edema: Has resolved. Continue lasix to 40mg once a day.  Encourage high potassium diet. Outpatient f/u with PMD for TTE.  Recommend compression stockings.    DM2: Continue metformin.  Continue statin for ASCVD prevention.    Schizophrenia: Management per primary team

## 2018-08-30 NOTE — PROGRESS NOTE ADULT - SUBJECTIVE AND OBJECTIVE BOX
CC/Reason for Consult: Low K     SUBJECTIVE / OVERNIGHT EVENTS:  Patient refusing K supp because it tastes bad    MEDICATIONS  (STANDING):  ammonium lactate   12% Cream 1 Application(s) Topical daily  furosemide    Tablet 40 milliGRAM(s) Oral daily  insulin lispro (HumaLOG) corrective regimen sliding scale   SubCutaneous three times a day before meals  insulin lispro (HumaLOG) corrective regimen sliding scale   SubCutaneous at bedtime  levothyroxine 50 MICROGram(s) Oral daily  metFORMIN  milliGRAM(s) Oral <User Schedule>  multivitamin 1 Tablet(s) Oral daily  OLANZapine Disintegrating Tablet 15 milliGRAM(s) Oral at bedtime  simvastatin 20 milliGRAM(s) Oral at bedtime  traZODone 50 milliGRAM(s) Oral once  ziprasidone 80 milliGRAM(s) Oral <User Schedule>    MEDICATIONS  (PRN):  LORazepam     Tablet 2 milliGRAM(s) Oral every 6 hours PRN Agitation  LORazepam   Injectable 2 milliGRAM(s) IntraMuscular every 6 hours PRN Agitation  OLANZapine 5 milliGRAM(s) Oral every 6 hours PRN agitation      Vital Signs Last 24 Hrs  T(C): 37 (29 Aug 2018 20:13), Max: 37 (29 Aug 2018 20:13)  T(F): 98.6 (29 Aug 2018 20:13), Max: 98.6 (29 Aug 2018 20:13)  HR: 115 (30 Aug 2018 10:22) (115 - 115)  BP: 135/69 (30 Aug 2018 10:22) (135/69 - 135/69)  BP(mean): --  RR: 15  SpO2: --  CAPILLARY BLOOD GLUCOSE      POCT Blood Glucose.: 97 mg/dL (30 Aug 2018 11:37)  POCT Blood Glucose.: 114 mg/dL (30 Aug 2018 07:46)  POCT Blood Glucose.: 87 mg/dL (29 Aug 2018 20:58)        PHYSICAL EXAM:  GENERAL: NAD, well-developed  HEAD:  Atraumatic, Normocephalic  EYES: EOMI, conjunctiva and sclera clear  NECK: Supple, No JVD  CHEST/LUNG: Clear to auscultation bilaterally; No wheeze  HEART: Regular rate and rhythm; No murmurs, rubs, or gallops  ABDOMEN: Soft, Nontender, Nondistended; Bowel sounds present  EXTREMITIES:  2+ Peripheral Pulses, No clubbing, cyanosis,  trace edema BLE  PSYCH: AAOx3  NEUROLOGY: non-focal  SKIN: No rashes or lesions    LABS:    08-29    138  |  97<L>  |  15  ----------------------------<  101<H>  3.3<L>   |  27  |  0.63    Ca    9.7      29 Aug 2018 12:58    TPro  7.3  /  Alb  4.4  /  TBili  0.3  /  DBili  x   /  AST  19  /  ALT  19  /  AlkPhos  101  08-29        Care Discussed with Consultants/Other Providers: Psych resident

## 2018-08-31 LAB
BUN SERPL-MCNC: 14 MG/DL — SIGNIFICANT CHANGE UP (ref 7–23)
CALCIUM SERPL-MCNC: 9.5 MG/DL — SIGNIFICANT CHANGE UP (ref 8.4–10.5)
CHLORIDE SERPL-SCNC: 104 MMOL/L — SIGNIFICANT CHANGE UP (ref 98–107)
CO2 SERPL-SCNC: 22 MMOL/L — SIGNIFICANT CHANGE UP (ref 22–31)
CREAT SERPL-MCNC: 0.57 MG/DL — SIGNIFICANT CHANGE UP (ref 0.5–1.3)
GLUCOSE BLDC GLUCOMTR-MCNC: 101 MG/DL — HIGH (ref 70–99)
GLUCOSE BLDC GLUCOMTR-MCNC: 118 MG/DL — HIGH (ref 70–99)
GLUCOSE BLDC GLUCOMTR-MCNC: 132 MG/DL — HIGH (ref 70–99)
GLUCOSE BLDC GLUCOMTR-MCNC: 134 MG/DL — HIGH (ref 70–99)
GLUCOSE SERPL-MCNC: 86 MG/DL — SIGNIFICANT CHANGE UP (ref 70–99)
POTASSIUM SERPL-MCNC: 4 MMOL/L — SIGNIFICANT CHANGE UP (ref 3.5–5.3)
POTASSIUM SERPL-SCNC: 4 MMOL/L — SIGNIFICANT CHANGE UP (ref 3.5–5.3)
SODIUM SERPL-SCNC: 141 MMOL/L — SIGNIFICANT CHANGE UP (ref 135–145)

## 2018-08-31 PROCEDURE — 99232 SBSQ HOSP IP/OBS MODERATE 35: CPT | Mod: GC

## 2018-08-31 RX ADMIN — SIMVASTATIN 20 MILLIGRAM(S): 20 TABLET, FILM COATED ORAL at 20:58

## 2018-08-31 RX ADMIN — METFORMIN HYDROCHLORIDE 750 MILLIGRAM(S): 850 TABLET ORAL at 20:58

## 2018-08-31 RX ADMIN — Medication 50 MICROGRAM(S): at 09:01

## 2018-08-31 RX ADMIN — OLANZAPINE 15 MILLIGRAM(S): 15 TABLET, FILM COATED ORAL at 20:58

## 2018-08-31 RX ADMIN — METFORMIN HYDROCHLORIDE 750 MILLIGRAM(S): 850 TABLET ORAL at 09:01

## 2018-08-31 RX ADMIN — Medication 40 MILLIGRAM(S): at 09:01

## 2018-08-31 RX ADMIN — Medication 1 TABLET(S): at 09:01

## 2018-08-31 RX ADMIN — ZIPRASIDONE HYDROCHLORIDE 80 MILLIGRAM(S): 20 CAPSULE ORAL at 17:08

## 2018-08-31 RX ADMIN — Medication 2 MILLIGRAM(S): at 11:21

## 2018-08-31 NOTE — PROGRESS NOTE ADULT - ASSESSMENT
57F DM2 BLE edema admitted to [facility] 8/8/18 with AMS, transferred to [facility] 8/21/18 for management of schizophrenia.      Plan:  Hypokalemia: K normal after PO supplementation yesterday.  Concern for gastric slowing due to antipsychotics, regular oral K supplement in slow release tablet form may cause gastric irritation. Patient refuses oral K solution due to taste.  With lower lasix dose may not require K supp.  Advised pt to eat bananas.  Recheck lytes next week.      LE edema: Has resolved. Continue lasix to 40mg once a day.  Encourage high potassium diet. Outpatient f/u with PMD for TTE.  Recommend compression stockings.    DM2: Continue metformin.  Continue statin for ASCVD prevention.    Schizophrenia: Management per primary team

## 2018-09-01 LAB
GLUCOSE BLDC GLUCOMTR-MCNC: 119 MG/DL — HIGH (ref 70–99)
GLUCOSE BLDC GLUCOMTR-MCNC: 163 MG/DL — HIGH (ref 70–99)

## 2018-09-01 RX ADMIN — Medication 1 TABLET(S): at 09:07

## 2018-09-01 RX ADMIN — Medication 1 APPLICATION(S): at 09:07

## 2018-09-01 RX ADMIN — Medication 2 MILLIGRAM(S): at 04:30

## 2018-09-01 RX ADMIN — METFORMIN HYDROCHLORIDE 750 MILLIGRAM(S): 850 TABLET ORAL at 09:07

## 2018-09-01 RX ADMIN — Medication 40 MILLIGRAM(S): at 09:07

## 2018-09-01 RX ADMIN — Medication 50 MICROGRAM(S): at 09:07

## 2018-09-01 RX ADMIN — ZIPRASIDONE HYDROCHLORIDE 80 MILLIGRAM(S): 20 CAPSULE ORAL at 16:49

## 2018-09-02 LAB
GLUCOSE BLDC GLUCOMTR-MCNC: 105 MG/DL — HIGH (ref 70–99)
GLUCOSE BLDC GLUCOMTR-MCNC: 110 MG/DL — HIGH (ref 70–99)
GLUCOSE BLDC GLUCOMTR-MCNC: 171 MG/DL — HIGH (ref 70–99)

## 2018-09-02 RX ADMIN — ZIPRASIDONE HYDROCHLORIDE 80 MILLIGRAM(S): 20 CAPSULE ORAL at 17:18

## 2018-09-02 RX ADMIN — OLANZAPINE 5 MILLIGRAM(S): 15 TABLET, FILM COATED ORAL at 07:28

## 2018-09-02 RX ADMIN — Medication 2 MILLIGRAM(S): at 17:44

## 2018-09-02 RX ADMIN — Medication 40 MILLIGRAM(S): at 08:09

## 2018-09-02 RX ADMIN — Medication 50 MICROGRAM(S): at 08:09

## 2018-09-02 RX ADMIN — Medication 1 APPLICATION(S): at 08:08

## 2018-09-02 RX ADMIN — METFORMIN HYDROCHLORIDE 750 MILLIGRAM(S): 850 TABLET ORAL at 08:09

## 2018-09-02 RX ADMIN — Medication 1 TABLET(S): at 08:09

## 2018-09-02 RX ADMIN — Medication 2 MILLIGRAM(S): at 07:28

## 2018-09-02 NOTE — CHART NOTE - NSCHARTNOTEFT_GEN_A_CORE
57 year old female with PMH of Paranoid schizophrenia and DM presenting today for fall. Pt states she was in bed and rolled over fell out of bed. Pt landed on left wrist to break her fall. Denies any pain at this time. Pt denies hitting head during fall.   Upon examination, pt has active ROM intact of left wrist upon flexion, extension. radial and ulnar deviation. No signs of erythema, swelling, or bruising noted. No pain upon palpation to left wrist. Sensation to light touch intact. Radial pulse +2.   No further medical intervention needed. Will continue to monitor pt overnight.

## 2018-09-03 LAB
GLUCOSE BLDC GLUCOMTR-MCNC: 105 MG/DL — HIGH (ref 70–99)
GLUCOSE BLDC GLUCOMTR-MCNC: 107 MG/DL — HIGH (ref 70–99)
GLUCOSE BLDC GLUCOMTR-MCNC: 92 MG/DL — SIGNIFICANT CHANGE UP (ref 70–99)

## 2018-09-03 RX ADMIN — Medication 50 MICROGRAM(S): at 08:45

## 2018-09-03 RX ADMIN — Medication 40 MILLIGRAM(S): at 08:45

## 2018-09-03 RX ADMIN — METFORMIN HYDROCHLORIDE 750 MILLIGRAM(S): 850 TABLET ORAL at 08:56

## 2018-09-03 RX ADMIN — ZIPRASIDONE HYDROCHLORIDE 80 MILLIGRAM(S): 20 CAPSULE ORAL at 17:30

## 2018-09-03 RX ADMIN — Medication 1 TABLET(S): at 08:28

## 2018-09-03 RX ADMIN — METFORMIN HYDROCHLORIDE 750 MILLIGRAM(S): 850 TABLET ORAL at 21:08

## 2018-09-03 RX ADMIN — OLANZAPINE 15 MILLIGRAM(S): 15 TABLET, FILM COATED ORAL at 21:46

## 2018-09-03 RX ADMIN — Medication 1 APPLICATION(S): at 08:44

## 2018-09-03 RX ADMIN — SIMVASTATIN 20 MILLIGRAM(S): 20 TABLET, FILM COATED ORAL at 21:46

## 2018-09-04 LAB
BUN SERPL-MCNC: 12 MG/DL — SIGNIFICANT CHANGE UP (ref 7–23)
CALCIUM SERPL-MCNC: 9.7 MG/DL — SIGNIFICANT CHANGE UP (ref 8.4–10.5)
CHLORIDE SERPL-SCNC: 98 MMOL/L — SIGNIFICANT CHANGE UP (ref 98–107)
CO2 SERPL-SCNC: 27 MMOL/L — SIGNIFICANT CHANGE UP (ref 22–31)
CREAT SERPL-MCNC: 0.61 MG/DL — SIGNIFICANT CHANGE UP (ref 0.5–1.3)
GLUCOSE BLDC GLUCOMTR-MCNC: 105 MG/DL — HIGH (ref 70–99)
GLUCOSE BLDC GLUCOMTR-MCNC: 114 MG/DL — HIGH (ref 70–99)
GLUCOSE BLDC GLUCOMTR-MCNC: 117 MG/DL — HIGH (ref 70–99)
GLUCOSE BLDC GLUCOMTR-MCNC: 218 MG/DL — HIGH (ref 70–99)
GLUCOSE SERPL-MCNC: 98 MG/DL — SIGNIFICANT CHANGE UP (ref 70–99)
POTASSIUM SERPL-MCNC: 3.9 MMOL/L — SIGNIFICANT CHANGE UP (ref 3.5–5.3)
POTASSIUM SERPL-SCNC: 3.9 MMOL/L — SIGNIFICANT CHANGE UP (ref 3.5–5.3)
SODIUM SERPL-SCNC: 138 MMOL/L — SIGNIFICANT CHANGE UP (ref 135–145)

## 2018-09-04 PROCEDURE — 99232 SBSQ HOSP IP/OBS MODERATE 35: CPT

## 2018-09-04 PROCEDURE — 99232 SBSQ HOSP IP/OBS MODERATE 35: CPT | Mod: GC

## 2018-09-04 PROCEDURE — 93010 ELECTROCARDIOGRAM REPORT: CPT

## 2018-09-04 RX ORDER — ZIPRASIDONE HYDROCHLORIDE 20 MG/1
40 CAPSULE ORAL
Qty: 0 | Refills: 0 | Status: DISCONTINUED | OUTPATIENT
Start: 2018-09-05 | End: 2018-09-05

## 2018-09-04 RX ADMIN — METFORMIN HYDROCHLORIDE 750 MILLIGRAM(S): 850 TABLET ORAL at 09:13

## 2018-09-04 RX ADMIN — Medication 40 MILLIGRAM(S): at 09:13

## 2018-09-04 RX ADMIN — Medication 50 MICROGRAM(S): at 09:13

## 2018-09-04 RX ADMIN — Medication 1 TABLET(S): at 09:13

## 2018-09-04 RX ADMIN — Medication 0.5 MILLIGRAM(S): at 00:50

## 2018-09-04 RX ADMIN — ZIPRASIDONE HYDROCHLORIDE 80 MILLIGRAM(S): 20 CAPSULE ORAL at 17:46

## 2018-09-04 RX ADMIN — Medication 2 MILLIGRAM(S): at 10:30

## 2018-09-04 NOTE — PROGRESS NOTE ADULT - SUBJECTIVE AND OBJECTIVE BOX
Patient is a 57y old  Female who presents with a chief complaint of LE swelling     SUBJECTIVE / OVERNIGHT EVENTS:    Patient is feeling well, denies CP/SOB/f/c  Reports she is going home today (I don't know if this is true)  Reports her legs are less swollen than previously, no pain in legs  Reports they get swollen when she stands for very long periods   Report zyprexa is making her fat     MEDICATIONS  (STANDING):  ammonium lactate   12% Cream 1 Application(s) Topical daily  furosemide    Tablet 40 milliGRAM(s) Oral daily  insulin lispro (HumaLOG) corrective regimen sliding scale   SubCutaneous three times a day before meals  insulin lispro (HumaLOG) corrective regimen sliding scale   SubCutaneous at bedtime  levothyroxine 50 MICROGram(s) Oral daily  metFORMIN  milliGRAM(s) Oral <User Schedule>  multivitamin 1 Tablet(s) Oral daily  OLANZapine Disintegrating Tablet 15 milliGRAM(s) Oral at bedtime  simvastatin 20 milliGRAM(s) Oral at bedtime  traZODone 50 milliGRAM(s) Oral once  ziprasidone 80 milliGRAM(s) Oral <User Schedule>    MEDICATIONS  (PRN):  LORazepam     Tablet 1 milliGRAM(s) Oral every 6 hours PRN Agitation  LORazepam   Injectable 1 milliGRAM(s) IntraMuscular every 6 hours PRN Agitation  OLANZapine 5 milliGRAM(s) Oral every 6 hours PRN agitation      T(C): 36.1 (09-04-18 @ 06:50), Max: 36.5 (09-03-18 @ 19:33)  HR: 109 (09-04-18 @ 06:50) (109 - 109)  BP: 152/90 (09-04-18 @ 06:50) (152/90 - 152/90)    CAPILLARY BLOOD GLUCOSE  POCT Blood Glucose.: 105 mg/dL (04 Sep 2018 11:41)  POCT Blood Glucose.: 117 mg/dL (04 Sep 2018 08:01)  POCT Blood Glucose.: 107 mg/dL (03 Sep 2018 16:09)    PHYSICAL EXAM:  GENERAL: NAD, well-developed  HEAD:  Atraumatic, Normocephalic  EYES: EOMI, conjunctiva and sclera clear  NECK: Supple, No JVD  CHEST/LUNG: Clear to auscultation bilaterally; No wheeze  HEART: Regular rate and rhythm; No murmurs, rubs, or gallops  ABDOMEN: Soft, Nontender, Nondistended; Bowel sounds present  EXTREMITIES:  No clubbing, cyanosis, or edema  PSYCH: AAOx3  NEUROLOGY: non-focal  SKIN: No rashes or lesions on visible skin     LABS:    9/4 lab still not collected       Care Discussed with Consultants/Other Providers: psych Patient is a 57y old  Female who presents with a chief complaint of LE swelling     SUBJECTIVE / OVERNIGHT EVENTS:    Patient is feeling well, denies CP/SOB/f/c  Reports she is going home today (I don't know if this is true)  Reports her legs are less swollen than previously, no pain in legs  Reports they get swollen when she stands for very long periods   Report zyprexa is making her fat     MEDICATIONS  (STANDING):  ammonium lactate   12% Cream 1 Application(s) Topical daily  furosemide    Tablet 40 milliGRAM(s) Oral daily  insulin lispro (HumaLOG) corrective regimen sliding scale   SubCutaneous three times a day before meals  insulin lispro (HumaLOG) corrective regimen sliding scale   SubCutaneous at bedtime  levothyroxine 50 MICROGram(s) Oral daily  metFORMIN  milliGRAM(s) Oral <User Schedule>  multivitamin 1 Tablet(s) Oral daily  OLANZapine Disintegrating Tablet 15 milliGRAM(s) Oral at bedtime  simvastatin 20 milliGRAM(s) Oral at bedtime  traZODone 50 milliGRAM(s) Oral once  ziprasidone 80 milliGRAM(s) Oral <User Schedule>    MEDICATIONS  (PRN):  LORazepam     Tablet 1 milliGRAM(s) Oral every 6 hours PRN Agitation  LORazepam   Injectable 1 milliGRAM(s) IntraMuscular every 6 hours PRN Agitation  OLANZapine 5 milliGRAM(s) Oral every 6 hours PRN agitation      T(C): 36.1 (09-04-18 @ 06:50), Max: 36.5 (09-03-18 @ 19:33)  HR: 109 (09-04-18 @ 06:50) (109 - 109)  BP: 152/90 (09-04-18 @ 06:50) (152/90 - 152/90)    CAPILLARY BLOOD GLUCOSE  POCT Blood Glucose.: 105 mg/dL (04 Sep 2018 11:41)  POCT Blood Glucose.: 117 mg/dL (04 Sep 2018 08:01)  POCT Blood Glucose.: 107 mg/dL (03 Sep 2018 16:09)    PHYSICAL EXAM:  GENERAL: NAD, well-developed  HEAD:  Atraumatic, Normocephalic  EYES: EOMI, conjunctiva and sclera clear  NECK: Supple, No JVD  CHEST/LUNG: Clear to auscultation bilaterally; No wheeze  HEART: Regular rate and rhythm; No murmurs, rubs, or gallops  ABDOMEN: Soft, Nontender, Nondistended; Bowel sounds present  EXTREMITIES:  No clubbing, cyanosis, or edema  PSYCH: AAOx3  NEUROLOGY: non-focal  SKIN: No rashes or lesions on visible skin     LABS:      LABS:    09-04    138  |  98  |  12  ----------------------------<  98  3.9   |  27  |  0.61    Ca    9.7      04 Sep 2018 14:00        Care Discussed with Consultants/Other Providers: psych

## 2018-09-04 NOTE — PROGRESS NOTE ADULT - ASSESSMENT
58 yo F with DM2 on metformin BLE edema admitted to [facility] 8/8/18 with AMS, transferred to [facility] 8/21/18 for management of schizophrenia.      Plan:    # Hypokalemia:  - last K wnl, due for labs today unclear if refused     # LE edema: likley due to pacing and insufficiency, edema has now  resolved.   - c/w lasix to 40mg once a day . f/u BMP  - encourage high potassium diet  - Outpatient f/u with PMD for TTE.    - recommend compression stockings if standing for prolonged periods     # Elevated blood pressure readings:  intermittent, may be situational  - monitor BP  - if consistently >150/90 can add lisinopril 5 mg daily given DM2 history, BMP will need to be check 1 wk s/p initiation     # DM2:   - Continue metformin  - Continue statin for ASCVD prevention.    # Schizophrenia: Management per primary team 56 yo F with DM2 on metformin BLE edema admitted to [facility] 8/8/18 with AMS, transferred to [facility] 8/21/18 for management of schizophrenia.      Plan:    # Hypokalemia:  - K remains stable at 3.9 on lasix  - no need for repletion   - continue to encourage K rich diet     # LE edema: likely due to pacing and insufficiency, edema has now  resolved.   - c/w lasix to 40mg once a day   - Outpatient f/u with PMD for TTE.    - recommend compression stockings if standing for prolonged periods     # Elevated blood pressure readings:  intermittent, may be situational  - monitor BP  - if consistently >150/90 can add lisinopril 5 mg daily given DM2 history, BMP will need to be check 1 wk s/p initiation     # DM2:   - Continue metformin  - Continue statin for ASCVD prevention.    # Schizophrenia: Management per primary team

## 2018-09-05 LAB
GLUCOSE BLDC GLUCOMTR-MCNC: 110 MG/DL — HIGH (ref 70–99)
GLUCOSE BLDC GLUCOMTR-MCNC: 126 MG/DL — HIGH (ref 70–99)
GLUCOSE BLDC GLUCOMTR-MCNC: 147 MG/DL — HIGH (ref 70–99)

## 2018-09-05 PROCEDURE — 99232 SBSQ HOSP IP/OBS MODERATE 35: CPT | Mod: GC

## 2018-09-05 RX ORDER — OLANZAPINE 15 MG/1
5 TABLET, FILM COATED ORAL ONCE
Qty: 0 | Refills: 0 | Status: COMPLETED | OUTPATIENT
Start: 2018-09-05 | End: 2018-09-05

## 2018-09-05 RX ORDER — ZIPRASIDONE HYDROCHLORIDE 20 MG/1
80 CAPSULE ORAL
Qty: 0 | Refills: 0 | Status: DISCONTINUED | OUTPATIENT
Start: 2018-09-06 | End: 2018-10-26

## 2018-09-05 RX ADMIN — OLANZAPINE 5 MILLIGRAM(S): 15 TABLET, FILM COATED ORAL at 04:56

## 2018-09-05 RX ADMIN — Medication 50 MICROGRAM(S): at 08:06

## 2018-09-05 RX ADMIN — Medication 1 MILLIGRAM(S): at 08:13

## 2018-09-05 RX ADMIN — ZIPRASIDONE HYDROCHLORIDE 80 MILLIGRAM(S): 20 CAPSULE ORAL at 16:55

## 2018-09-05 RX ADMIN — Medication 40 MILLIGRAM(S): at 08:06

## 2018-09-05 RX ADMIN — Medication 1 TABLET(S): at 08:06

## 2018-09-05 RX ADMIN — ZIPRASIDONE HYDROCHLORIDE 40 MILLIGRAM(S): 20 CAPSULE ORAL at 08:06

## 2018-09-05 RX ADMIN — Medication 1 MILLIGRAM(S): at 04:56

## 2018-09-05 RX ADMIN — METFORMIN HYDROCHLORIDE 750 MILLIGRAM(S): 850 TABLET ORAL at 08:06

## 2018-09-05 RX ADMIN — OLANZAPINE 5 MILLIGRAM(S): 15 TABLET, FILM COATED ORAL at 08:13

## 2018-09-06 LAB
GLUCOSE BLDC GLUCOMTR-MCNC: 100 MG/DL — HIGH (ref 70–99)
GLUCOSE BLDC GLUCOMTR-MCNC: 149 MG/DL — HIGH (ref 70–99)
TROPONIN T, HIGH SENSITIVITY: 9 NG/L — SIGNIFICANT CHANGE UP (ref ?–14)

## 2018-09-06 PROCEDURE — 99232 SBSQ HOSP IP/OBS MODERATE 35: CPT | Mod: GC

## 2018-09-06 PROCEDURE — 93010 ELECTROCARDIOGRAM REPORT: CPT

## 2018-09-06 RX ORDER — CALCIUM CARBONATE 500(1250)
1 TABLET ORAL
Qty: 0 | Refills: 0 | Status: DISCONTINUED | OUTPATIENT
Start: 2018-09-06 | End: 2018-11-05

## 2018-09-06 RX ADMIN — Medication 40 MILLIGRAM(S): at 08:04

## 2018-09-06 RX ADMIN — OLANZAPINE 5 MILLIGRAM(S): 15 TABLET, FILM COATED ORAL at 08:04

## 2018-09-06 RX ADMIN — METFORMIN HYDROCHLORIDE 750 MILLIGRAM(S): 850 TABLET ORAL at 08:04

## 2018-09-06 RX ADMIN — Medication 50 MICROGRAM(S): at 08:04

## 2018-09-06 RX ADMIN — Medication 1 MILLIGRAM(S): at 08:04

## 2018-09-06 RX ADMIN — ZIPRASIDONE HYDROCHLORIDE 80 MILLIGRAM(S): 20 CAPSULE ORAL at 08:37

## 2018-09-06 RX ADMIN — ZIPRASIDONE HYDROCHLORIDE 80 MILLIGRAM(S): 20 CAPSULE ORAL at 16:52

## 2018-09-06 RX ADMIN — METFORMIN HYDROCHLORIDE 750 MILLIGRAM(S): 850 TABLET ORAL at 22:31

## 2018-09-06 RX ADMIN — Medication 1 TABLET(S): at 08:04

## 2018-09-06 RX ADMIN — SIMVASTATIN 20 MILLIGRAM(S): 20 TABLET, FILM COATED ORAL at 22:31

## 2018-09-07 LAB
GLUCOSE BLDC GLUCOMTR-MCNC: 101 MG/DL — HIGH (ref 70–99)
GLUCOSE BLDC GLUCOMTR-MCNC: 116 MG/DL — HIGH (ref 70–99)
GLUCOSE BLDC GLUCOMTR-MCNC: 96 MG/DL — SIGNIFICANT CHANGE UP (ref 70–99)
GLUCOSE BLDC GLUCOMTR-MCNC: 99 MG/DL — SIGNIFICANT CHANGE UP (ref 70–99)

## 2018-09-07 PROCEDURE — 99232 SBSQ HOSP IP/OBS MODERATE 35: CPT | Mod: GC

## 2018-09-07 RX ORDER — OLANZAPINE 15 MG/1
5 TABLET, FILM COATED ORAL ONCE
Qty: 0 | Refills: 0 | Status: COMPLETED | OUTPATIENT
Start: 2018-09-07 | End: 2018-09-07

## 2018-09-07 RX ORDER — OLANZAPINE 15 MG/1
5 TABLET, FILM COATED ORAL ONCE
Qty: 0 | Refills: 0 | Status: DISCONTINUED | OUTPATIENT
Start: 2018-09-07 | End: 2018-11-05

## 2018-09-07 RX ADMIN — OLANZAPINE 5 MILLIGRAM(S): 15 TABLET, FILM COATED ORAL at 21:07

## 2018-09-07 RX ADMIN — Medication 1 TABLET(S): at 09:22

## 2018-09-07 RX ADMIN — OLANZAPINE 5 MILLIGRAM(S): 15 TABLET, FILM COATED ORAL at 08:35

## 2018-09-07 RX ADMIN — Medication 1 MILLIGRAM(S): at 13:10

## 2018-09-07 RX ADMIN — Medication 40 MILLIGRAM(S): at 09:22

## 2018-09-07 RX ADMIN — METFORMIN HYDROCHLORIDE 750 MILLIGRAM(S): 850 TABLET ORAL at 09:22

## 2018-09-07 RX ADMIN — ZIPRASIDONE HYDROCHLORIDE 80 MILLIGRAM(S): 20 CAPSULE ORAL at 09:23

## 2018-09-07 RX ADMIN — Medication 1 APPLICATION(S): at 09:22

## 2018-09-07 RX ADMIN — Medication 50 MICROGRAM(S): at 09:22

## 2018-09-07 RX ADMIN — ZIPRASIDONE HYDROCHLORIDE 80 MILLIGRAM(S): 20 CAPSULE ORAL at 16:47

## 2018-09-08 LAB
GLUCOSE BLDC GLUCOMTR-MCNC: 107 MG/DL — HIGH (ref 70–99)
GLUCOSE BLDC GLUCOMTR-MCNC: 151 MG/DL — HIGH (ref 70–99)
GLUCOSE BLDC GLUCOMTR-MCNC: 97 MG/DL — SIGNIFICANT CHANGE UP (ref 70–99)
GLUCOSE BLDC GLUCOMTR-MCNC: 98 MG/DL — SIGNIFICANT CHANGE UP (ref 70–99)
VIT B1 SERPL-MCNC: 179.1 NMOL/L — SIGNIFICANT CHANGE UP (ref 66.5–200)

## 2018-09-08 RX ADMIN — Medication 1 MILLIGRAM(S): at 00:55

## 2018-09-08 RX ADMIN — Medication 1 MILLIGRAM(S): at 10:34

## 2018-09-08 RX ADMIN — Medication 50 MICROGRAM(S): at 09:08

## 2018-09-08 RX ADMIN — SIMVASTATIN 20 MILLIGRAM(S): 20 TABLET, FILM COATED ORAL at 00:56

## 2018-09-08 RX ADMIN — Medication 40 MILLIGRAM(S): at 09:08

## 2018-09-08 RX ADMIN — METFORMIN HYDROCHLORIDE 750 MILLIGRAM(S): 850 TABLET ORAL at 00:56

## 2018-09-08 RX ADMIN — SIMVASTATIN 20 MILLIGRAM(S): 20 TABLET, FILM COATED ORAL at 22:22

## 2018-09-08 RX ADMIN — ZIPRASIDONE HYDROCHLORIDE 80 MILLIGRAM(S): 20 CAPSULE ORAL at 09:08

## 2018-09-08 RX ADMIN — Medication 1 TABLET(S): at 09:08

## 2018-09-08 RX ADMIN — METFORMIN HYDROCHLORIDE 750 MILLIGRAM(S): 850 TABLET ORAL at 09:08

## 2018-09-08 RX ADMIN — METFORMIN HYDROCHLORIDE 750 MILLIGRAM(S): 850 TABLET ORAL at 22:22

## 2018-09-08 RX ADMIN — ZIPRASIDONE HYDROCHLORIDE 80 MILLIGRAM(S): 20 CAPSULE ORAL at 17:27

## 2018-09-09 LAB
GLUCOSE BLDC GLUCOMTR-MCNC: 106 MG/DL — HIGH (ref 70–99)
GLUCOSE BLDC GLUCOMTR-MCNC: 93 MG/DL — SIGNIFICANT CHANGE UP (ref 70–99)
GLUCOSE BLDC GLUCOMTR-MCNC: 96 MG/DL — SIGNIFICANT CHANGE UP (ref 70–99)
GLUCOSE BLDC GLUCOMTR-MCNC: 97 MG/DL — SIGNIFICANT CHANGE UP (ref 70–99)

## 2018-09-09 RX ADMIN — OLANZAPINE 5 MILLIGRAM(S): 15 TABLET, FILM COATED ORAL at 08:14

## 2018-09-09 RX ADMIN — METFORMIN HYDROCHLORIDE 750 MILLIGRAM(S): 850 TABLET ORAL at 22:09

## 2018-09-09 RX ADMIN — OLANZAPINE 5 MILLIGRAM(S): 15 TABLET, FILM COATED ORAL at 17:23

## 2018-09-09 RX ADMIN — Medication 1 MILLIGRAM(S): at 22:19

## 2018-09-09 RX ADMIN — Medication 1 TABLET(S): at 08:32

## 2018-09-09 RX ADMIN — OLANZAPINE 5 MILLIGRAM(S): 15 TABLET, FILM COATED ORAL at 22:19

## 2018-09-09 RX ADMIN — ZIPRASIDONE HYDROCHLORIDE 80 MILLIGRAM(S): 20 CAPSULE ORAL at 08:32

## 2018-09-09 RX ADMIN — Medication 50 MICROGRAM(S): at 08:32

## 2018-09-09 RX ADMIN — ZIPRASIDONE HYDROCHLORIDE 80 MILLIGRAM(S): 20 CAPSULE ORAL at 17:08

## 2018-09-09 RX ADMIN — SIMVASTATIN 20 MILLIGRAM(S): 20 TABLET, FILM COATED ORAL at 22:09

## 2018-09-09 RX ADMIN — Medication 40 MILLIGRAM(S): at 08:32

## 2018-09-09 RX ADMIN — METFORMIN HYDROCHLORIDE 750 MILLIGRAM(S): 850 TABLET ORAL at 08:32

## 2018-09-10 LAB
GLUCOSE BLDC GLUCOMTR-MCNC: 127 MG/DL — HIGH (ref 70–99)
GLUCOSE BLDC GLUCOMTR-MCNC: 138 MG/DL — HIGH (ref 70–99)
GLUCOSE BLDC GLUCOMTR-MCNC: 158 MG/DL — HIGH (ref 70–99)
GLUCOSE BLDC GLUCOMTR-MCNC: 96 MG/DL — SIGNIFICANT CHANGE UP (ref 70–99)

## 2018-09-10 PROCEDURE — 99232 SBSQ HOSP IP/OBS MODERATE 35: CPT | Mod: GC

## 2018-09-10 RX ADMIN — OLANZAPINE 5 MILLIGRAM(S): 15 TABLET, FILM COATED ORAL at 13:45

## 2018-09-10 RX ADMIN — METFORMIN HYDROCHLORIDE 750 MILLIGRAM(S): 850 TABLET ORAL at 08:48

## 2018-09-10 RX ADMIN — Medication 50 MICROGRAM(S): at 08:48

## 2018-09-10 RX ADMIN — Medication 1 APPLICATION(S): at 09:04

## 2018-09-10 RX ADMIN — Medication 40 MILLIGRAM(S): at 08:48

## 2018-09-10 RX ADMIN — SIMVASTATIN 20 MILLIGRAM(S): 20 TABLET, FILM COATED ORAL at 20:26

## 2018-09-10 RX ADMIN — METFORMIN HYDROCHLORIDE 750 MILLIGRAM(S): 850 TABLET ORAL at 20:26

## 2018-09-10 RX ADMIN — Medication 1 MILLIGRAM(S): at 13:45

## 2018-09-10 RX ADMIN — ZIPRASIDONE HYDROCHLORIDE 80 MILLIGRAM(S): 20 CAPSULE ORAL at 08:48

## 2018-09-10 RX ADMIN — ZIPRASIDONE HYDROCHLORIDE 80 MILLIGRAM(S): 20 CAPSULE ORAL at 17:30

## 2018-09-10 RX ADMIN — Medication 1 TABLET(S): at 08:48

## 2018-09-11 LAB
GLUCOSE BLDC GLUCOMTR-MCNC: 101 MG/DL — HIGH (ref 70–99)
GLUCOSE BLDC GLUCOMTR-MCNC: 115 MG/DL — HIGH (ref 70–99)
GLUCOSE BLDC GLUCOMTR-MCNC: 117 MG/DL — HIGH (ref 70–99)
GLUCOSE BLDC GLUCOMTR-MCNC: 161 MG/DL — HIGH (ref 70–99)

## 2018-09-11 PROCEDURE — 99232 SBSQ HOSP IP/OBS MODERATE 35: CPT | Mod: GC

## 2018-09-11 RX ADMIN — ZIPRASIDONE HYDROCHLORIDE 80 MILLIGRAM(S): 20 CAPSULE ORAL at 16:09

## 2018-09-11 RX ADMIN — METFORMIN HYDROCHLORIDE 750 MILLIGRAM(S): 850 TABLET ORAL at 21:27

## 2018-09-11 RX ADMIN — OLANZAPINE 5 MILLIGRAM(S): 15 TABLET, FILM COATED ORAL at 16:08

## 2018-09-11 RX ADMIN — OLANZAPINE 5 MILLIGRAM(S): 15 TABLET, FILM COATED ORAL at 22:40

## 2018-09-11 RX ADMIN — Medication 1 MILLIGRAM(S): at 18:10

## 2018-09-11 RX ADMIN — Medication 50 MICROGRAM(S): at 08:48

## 2018-09-11 RX ADMIN — METFORMIN HYDROCHLORIDE 750 MILLIGRAM(S): 850 TABLET ORAL at 08:48

## 2018-09-11 RX ADMIN — ZIPRASIDONE HYDROCHLORIDE 80 MILLIGRAM(S): 20 CAPSULE ORAL at 08:48

## 2018-09-11 RX ADMIN — Medication 40 MILLIGRAM(S): at 08:48

## 2018-09-11 RX ADMIN — Medication 1 APPLICATION(S): at 08:48

## 2018-09-11 RX ADMIN — Medication 1 TABLET(S): at 08:48

## 2018-09-11 RX ADMIN — Medication 1 MILLIGRAM(S): at 12:05

## 2018-09-11 RX ADMIN — SIMVASTATIN 20 MILLIGRAM(S): 20 TABLET, FILM COATED ORAL at 21:27

## 2018-09-11 RX ADMIN — Medication 1: at 08:15

## 2018-09-12 LAB
GLUCOSE BLDC GLUCOMTR-MCNC: 102 MG/DL — HIGH (ref 70–99)
GLUCOSE BLDC GLUCOMTR-MCNC: 112 MG/DL — HIGH (ref 70–99)
GLUCOSE BLDC GLUCOMTR-MCNC: 132 MG/DL — HIGH (ref 70–99)
GLUCOSE BLDC GLUCOMTR-MCNC: 92 MG/DL — SIGNIFICANT CHANGE UP (ref 70–99)

## 2018-09-12 PROCEDURE — 99232 SBSQ HOSP IP/OBS MODERATE 35: CPT | Mod: GC

## 2018-09-12 PROCEDURE — 93010 ELECTROCARDIOGRAM REPORT: CPT

## 2018-09-12 RX ADMIN — SIMVASTATIN 20 MILLIGRAM(S): 20 TABLET, FILM COATED ORAL at 20:51

## 2018-09-12 RX ADMIN — Medication 1 TABLET(S): at 09:05

## 2018-09-12 RX ADMIN — ZIPRASIDONE HYDROCHLORIDE 80 MILLIGRAM(S): 20 CAPSULE ORAL at 17:02

## 2018-09-12 RX ADMIN — Medication 50 MICROGRAM(S): at 09:05

## 2018-09-12 RX ADMIN — Medication 30 MILLILITER(S): at 06:44

## 2018-09-12 RX ADMIN — Medication 1 MILLIGRAM(S): at 04:55

## 2018-09-12 RX ADMIN — ZIPRASIDONE HYDROCHLORIDE 80 MILLIGRAM(S): 20 CAPSULE ORAL at 09:05

## 2018-09-12 RX ADMIN — METFORMIN HYDROCHLORIDE 750 MILLIGRAM(S): 850 TABLET ORAL at 20:51

## 2018-09-12 RX ADMIN — OLANZAPINE 5 MILLIGRAM(S): 15 TABLET, FILM COATED ORAL at 21:01

## 2018-09-12 RX ADMIN — Medication 1 MILLIGRAM(S): at 21:01

## 2018-09-12 RX ADMIN — METFORMIN HYDROCHLORIDE 750 MILLIGRAM(S): 850 TABLET ORAL at 09:05

## 2018-09-12 RX ADMIN — Medication 40 MILLIGRAM(S): at 09:04

## 2018-09-13 LAB
GLUCOSE BLDC GLUCOMTR-MCNC: 101 MG/DL — HIGH (ref 70–99)
GLUCOSE BLDC GLUCOMTR-MCNC: 136 MG/DL — HIGH (ref 70–99)
GLUCOSE BLDC GLUCOMTR-MCNC: 99 MG/DL — SIGNIFICANT CHANGE UP (ref 70–99)

## 2018-09-13 PROCEDURE — 99232 SBSQ HOSP IP/OBS MODERATE 35: CPT | Mod: GC

## 2018-09-13 RX ADMIN — METFORMIN HYDROCHLORIDE 750 MILLIGRAM(S): 850 TABLET ORAL at 09:01

## 2018-09-13 RX ADMIN — Medication 40 MILLIGRAM(S): at 09:01

## 2018-09-13 RX ADMIN — ZIPRASIDONE HYDROCHLORIDE 80 MILLIGRAM(S): 20 CAPSULE ORAL at 17:27

## 2018-09-13 RX ADMIN — Medication 50 MICROGRAM(S): at 09:01

## 2018-09-13 RX ADMIN — Medication 1 MILLIGRAM(S): at 17:27

## 2018-09-13 RX ADMIN — ZIPRASIDONE HYDROCHLORIDE 80 MILLIGRAM(S): 20 CAPSULE ORAL at 09:01

## 2018-09-14 LAB
GLUCOSE BLDC GLUCOMTR-MCNC: 100 MG/DL — HIGH (ref 70–99)
GLUCOSE BLDC GLUCOMTR-MCNC: 151 MG/DL — HIGH (ref 70–99)
GLUCOSE BLDC GLUCOMTR-MCNC: 91 MG/DL — SIGNIFICANT CHANGE UP (ref 70–99)
GLUCOSE BLDC GLUCOMTR-MCNC: 99 MG/DL — SIGNIFICANT CHANGE UP (ref 70–99)

## 2018-09-14 PROCEDURE — 99232 SBSQ HOSP IP/OBS MODERATE 35: CPT | Mod: GC

## 2018-09-14 RX ADMIN — METFORMIN HYDROCHLORIDE 750 MILLIGRAM(S): 850 TABLET ORAL at 20:16

## 2018-09-14 RX ADMIN — Medication 1 TABLET(S): at 08:59

## 2018-09-14 RX ADMIN — ZIPRASIDONE HYDROCHLORIDE 80 MILLIGRAM(S): 20 CAPSULE ORAL at 17:16

## 2018-09-14 RX ADMIN — SIMVASTATIN 20 MILLIGRAM(S): 20 TABLET, FILM COATED ORAL at 20:16

## 2018-09-14 RX ADMIN — ZIPRASIDONE HYDROCHLORIDE 80 MILLIGRAM(S): 20 CAPSULE ORAL at 09:40

## 2018-09-14 RX ADMIN — Medication 40 MILLIGRAM(S): at 08:58

## 2018-09-14 RX ADMIN — Medication 1 MILLIGRAM(S): at 16:38

## 2018-09-14 RX ADMIN — Medication 1 APPLICATION(S): at 09:16

## 2018-09-14 RX ADMIN — METFORMIN HYDROCHLORIDE 750 MILLIGRAM(S): 850 TABLET ORAL at 08:59

## 2018-09-14 RX ADMIN — Medication 50 MICROGRAM(S): at 08:59

## 2018-09-15 LAB
GLUCOSE BLDC GLUCOMTR-MCNC: 108 MG/DL — HIGH (ref 70–99)
GLUCOSE BLDC GLUCOMTR-MCNC: 111 MG/DL — HIGH (ref 70–99)
GLUCOSE BLDC GLUCOMTR-MCNC: 83 MG/DL — SIGNIFICANT CHANGE UP (ref 70–99)
GLUCOSE BLDC GLUCOMTR-MCNC: 94 MG/DL — SIGNIFICANT CHANGE UP (ref 70–99)

## 2018-09-15 RX ADMIN — Medication 50 MICROGRAM(S): at 09:06

## 2018-09-15 RX ADMIN — ZIPRASIDONE HYDROCHLORIDE 80 MILLIGRAM(S): 20 CAPSULE ORAL at 16:32

## 2018-09-15 RX ADMIN — ZIPRASIDONE HYDROCHLORIDE 80 MILLIGRAM(S): 20 CAPSULE ORAL at 09:07

## 2018-09-15 RX ADMIN — METFORMIN HYDROCHLORIDE 750 MILLIGRAM(S): 850 TABLET ORAL at 09:06

## 2018-09-15 RX ADMIN — Medication 1 MILLIGRAM(S): at 20:30

## 2018-09-15 RX ADMIN — Medication 40 MILLIGRAM(S): at 09:06

## 2018-09-15 RX ADMIN — Medication 1 APPLICATION(S): at 09:06

## 2018-09-15 RX ADMIN — METFORMIN HYDROCHLORIDE 750 MILLIGRAM(S): 850 TABLET ORAL at 20:44

## 2018-09-15 RX ADMIN — SIMVASTATIN 20 MILLIGRAM(S): 20 TABLET, FILM COATED ORAL at 20:44

## 2018-09-15 RX ADMIN — Medication 1 TABLET(S): at 09:06

## 2018-09-15 RX ADMIN — OLANZAPINE 5 MILLIGRAM(S): 15 TABLET, FILM COATED ORAL at 20:30

## 2018-09-16 LAB
GLUCOSE BLDC GLUCOMTR-MCNC: 104 MG/DL — HIGH (ref 70–99)
GLUCOSE BLDC GLUCOMTR-MCNC: 78 MG/DL — SIGNIFICANT CHANGE UP (ref 70–99)
GLUCOSE BLDC GLUCOMTR-MCNC: 95 MG/DL — SIGNIFICANT CHANGE UP (ref 70–99)

## 2018-09-16 RX ORDER — FUROSEMIDE 40 MG
40 TABLET ORAL ONCE
Qty: 0 | Refills: 0 | Status: COMPLETED | OUTPATIENT
Start: 2018-09-16 | End: 2018-09-16

## 2018-09-16 RX ORDER — LEVOTHYROXINE SODIUM 125 MCG
50 TABLET ORAL ONCE
Qty: 0 | Refills: 0 | Status: COMPLETED | OUTPATIENT
Start: 2018-09-16 | End: 2018-09-16

## 2018-09-16 RX ADMIN — Medication 30 MILLILITER(S): at 08:01

## 2018-09-16 RX ADMIN — OLANZAPINE 5 MILLIGRAM(S): 15 TABLET, FILM COATED ORAL at 22:00

## 2018-09-16 RX ADMIN — ZIPRASIDONE HYDROCHLORIDE 80 MILLIGRAM(S): 20 CAPSULE ORAL at 09:46

## 2018-09-16 RX ADMIN — METFORMIN HYDROCHLORIDE 750 MILLIGRAM(S): 850 TABLET ORAL at 20:50

## 2018-09-16 RX ADMIN — SIMVASTATIN 20 MILLIGRAM(S): 20 TABLET, FILM COATED ORAL at 20:50

## 2018-09-16 RX ADMIN — Medication 50 MICROGRAM(S): at 15:18

## 2018-09-16 RX ADMIN — Medication 40 MILLIGRAM(S): at 15:18

## 2018-09-16 RX ADMIN — Medication 1 MILLIGRAM(S): at 22:00

## 2018-09-16 RX ADMIN — METFORMIN HYDROCHLORIDE 750 MILLIGRAM(S): 850 TABLET ORAL at 09:46

## 2018-09-17 LAB
GLUCOSE BLDC GLUCOMTR-MCNC: 114 MG/DL — HIGH (ref 70–99)
GLUCOSE BLDC GLUCOMTR-MCNC: 124 MG/DL — HIGH (ref 70–99)
GLUCOSE BLDC GLUCOMTR-MCNC: 93 MG/DL — SIGNIFICANT CHANGE UP (ref 70–99)
GLUCOSE BLDC GLUCOMTR-MCNC: 98 MG/DL — SIGNIFICANT CHANGE UP (ref 70–99)

## 2018-09-17 PROCEDURE — 99232 SBSQ HOSP IP/OBS MODERATE 35: CPT | Mod: GC

## 2018-09-17 RX ADMIN — ZIPRASIDONE HYDROCHLORIDE 80 MILLIGRAM(S): 20 CAPSULE ORAL at 17:51

## 2018-09-17 RX ADMIN — OLANZAPINE 5 MILLIGRAM(S): 15 TABLET, FILM COATED ORAL at 16:21

## 2018-09-17 RX ADMIN — Medication 1 TABLET(S): at 08:55

## 2018-09-17 RX ADMIN — Medication 1 MILLIGRAM(S): at 20:39

## 2018-09-17 RX ADMIN — METFORMIN HYDROCHLORIDE 750 MILLIGRAM(S): 850 TABLET ORAL at 08:54

## 2018-09-17 RX ADMIN — METFORMIN HYDROCHLORIDE 750 MILLIGRAM(S): 850 TABLET ORAL at 20:39

## 2018-09-17 RX ADMIN — ZIPRASIDONE HYDROCHLORIDE 80 MILLIGRAM(S): 20 CAPSULE ORAL at 08:55

## 2018-09-17 RX ADMIN — Medication 1 APPLICATION(S): at 08:54

## 2018-09-17 RX ADMIN — Medication 40 MILLIGRAM(S): at 08:54

## 2018-09-17 RX ADMIN — Medication 50 MICROGRAM(S): at 08:54

## 2018-09-17 RX ADMIN — SIMVASTATIN 20 MILLIGRAM(S): 20 TABLET, FILM COATED ORAL at 20:39

## 2018-09-18 LAB
GLUCOSE BLDC GLUCOMTR-MCNC: 112 MG/DL — HIGH (ref 70–99)
GLUCOSE BLDC GLUCOMTR-MCNC: 129 MG/DL — HIGH (ref 70–99)
GLUCOSE BLDC GLUCOMTR-MCNC: 174 MG/DL — HIGH (ref 70–99)
GLUCOSE BLDC GLUCOMTR-MCNC: 95 MG/DL — SIGNIFICANT CHANGE UP (ref 70–99)

## 2018-09-18 PROCEDURE — 99232 SBSQ HOSP IP/OBS MODERATE 35: CPT | Mod: GC

## 2018-09-18 RX ORDER — BENZTROPINE MESYLATE 1 MG
0.5 TABLET ORAL
Qty: 0 | Refills: 0 | Status: DISCONTINUED | OUTPATIENT
Start: 2018-09-18 | End: 2018-11-05

## 2018-09-18 RX ORDER — INSULIN LISPRO 100/ML
VIAL (ML) SUBCUTANEOUS
Qty: 0 | Refills: 0 | Status: DISCONTINUED | OUTPATIENT
Start: 2018-09-18 | End: 2018-11-05

## 2018-09-18 RX ADMIN — Medication 50 MICROGRAM(S): at 09:18

## 2018-09-18 RX ADMIN — Medication 1 MILLIGRAM(S): at 22:30

## 2018-09-18 RX ADMIN — METFORMIN HYDROCHLORIDE 750 MILLIGRAM(S): 850 TABLET ORAL at 20:59

## 2018-09-18 RX ADMIN — ZIPRASIDONE HYDROCHLORIDE 80 MILLIGRAM(S): 20 CAPSULE ORAL at 17:42

## 2018-09-18 RX ADMIN — Medication 40 MILLIGRAM(S): at 09:18

## 2018-09-18 RX ADMIN — OLANZAPINE 5 MILLIGRAM(S): 15 TABLET, FILM COATED ORAL at 09:06

## 2018-09-18 RX ADMIN — SIMVASTATIN 20 MILLIGRAM(S): 20 TABLET, FILM COATED ORAL at 20:59

## 2018-09-18 RX ADMIN — Medication 0.5 MILLIGRAM(S): at 20:59

## 2018-09-18 RX ADMIN — ZIPRASIDONE HYDROCHLORIDE 80 MILLIGRAM(S): 20 CAPSULE ORAL at 09:18

## 2018-09-18 RX ADMIN — METFORMIN HYDROCHLORIDE 750 MILLIGRAM(S): 850 TABLET ORAL at 09:18

## 2018-09-18 RX ADMIN — Medication 1 MILLIGRAM(S): at 16:00

## 2018-09-18 RX ADMIN — OLANZAPINE 5 MILLIGRAM(S): 15 TABLET, FILM COATED ORAL at 21:30

## 2018-09-18 RX ADMIN — Medication 1 TABLET(S): at 12:54

## 2018-09-19 LAB
GLUCOSE BLDC GLUCOMTR-MCNC: 107 MG/DL — HIGH (ref 70–99)
GLUCOSE BLDC GLUCOMTR-MCNC: 107 MG/DL — HIGH (ref 70–99)
GLUCOSE BLDC GLUCOMTR-MCNC: 114 MG/DL — HIGH (ref 70–99)
GLUCOSE BLDC GLUCOMTR-MCNC: 125 MG/DL — HIGH (ref 70–99)

## 2018-09-19 PROCEDURE — 99232 SBSQ HOSP IP/OBS MODERATE 35: CPT

## 2018-09-19 PROCEDURE — 99233 SBSQ HOSP IP/OBS HIGH 50: CPT

## 2018-09-19 RX ORDER — ACETAMINOPHEN 500 MG
650 TABLET ORAL EVERY 6 HOURS
Qty: 0 | Refills: 0 | Status: DISCONTINUED | OUTPATIENT
Start: 2018-09-19 | End: 2018-11-05

## 2018-09-19 RX ADMIN — Medication 650 MILLIGRAM(S): at 11:45

## 2018-09-19 RX ADMIN — Medication 650 MILLIGRAM(S): at 11:12

## 2018-09-19 RX ADMIN — ZIPRASIDONE HYDROCHLORIDE 80 MILLIGRAM(S): 20 CAPSULE ORAL at 18:04

## 2018-09-19 RX ADMIN — SIMVASTATIN 20 MILLIGRAM(S): 20 TABLET, FILM COATED ORAL at 20:33

## 2018-09-19 RX ADMIN — Medication 1 MILLIGRAM(S): at 20:34

## 2018-09-19 RX ADMIN — ZIPRASIDONE HYDROCHLORIDE 80 MILLIGRAM(S): 20 CAPSULE ORAL at 09:33

## 2018-09-19 RX ADMIN — Medication 50 MICROGRAM(S): at 09:33

## 2018-09-19 RX ADMIN — Medication 1 TABLET(S): at 09:33

## 2018-09-19 RX ADMIN — Medication 40 MILLIGRAM(S): at 09:33

## 2018-09-19 RX ADMIN — METFORMIN HYDROCHLORIDE 750 MILLIGRAM(S): 850 TABLET ORAL at 09:33

## 2018-09-19 RX ADMIN — Medication 0.5 MILLIGRAM(S): at 20:33

## 2018-09-19 RX ADMIN — METFORMIN HYDROCHLORIDE 750 MILLIGRAM(S): 850 TABLET ORAL at 20:33

## 2018-09-19 RX ADMIN — Medication 0.5 MILLIGRAM(S): at 09:33

## 2018-09-19 NOTE — PROGRESS NOTE ADULT - SUBJECTIVE AND OBJECTIVE BOX
CC/Reason for Consult: "I snapped my leg"    SUBJECTIVE / OVERNIGHT EVENTS: Pt reports that she was walking and twisted her right leg to the side, now has pain on her upper outer right thigh/outer right hip.  She is asking for tylenol and says "it's not a big deal".  She says she did not fall.  Able to walk without difficulty.    MEDICATIONS  (STANDING):  ammonium lactate   12% Cream 1 Application(s) Topical daily  benztropine 0.5 milliGRAM(s) Oral two times a day  furosemide    Tablet 40 milliGRAM(s) Oral daily  insulin lispro (HumaLOG) corrective regimen sliding scale   SubCutaneous two times a day before meals  levothyroxine 50 MICROGram(s) Oral daily  metFORMIN  milliGRAM(s) Oral <User Schedule>  multivitamin 1 Tablet(s) Oral daily  simvastatin 20 milliGRAM(s) Oral at bedtime  traZODone 50 milliGRAM(s) Oral once  ziprasidone 80 milliGRAM(s) Oral <User Schedule>  ziprasidone 80 milliGRAM(s) Oral <User Schedule>    MEDICATIONS  (PRN):  acetaminophen   Tablet .. 650 milliGRAM(s) Oral every 6 hours PRN Mild Pain (1 - 3)  aluminum hydroxide/magnesium hydroxide/simethicone Suspension 30 milliLiter(s) Oral every 6 hours PRN Dyspepsia  calcium carbonate    500 mG (Tums) Chewable 1 Tablet(s) Chew four times a day PRN Heartburn  LORazepam     Tablet 1 milliGRAM(s) Oral every 6 hours PRN Agitation  LORazepam   Injectable 1 milliGRAM(s) IntraMuscular every 6 hours PRN Agitation  OLANZapine 5 milliGRAM(s) Oral every 6 hours PRN agitation  OLANZapine Injectable 5 milliGRAM(s) IntraMuscular once PRN agitation, severe dangerous disorganization      Vital Signs Last 24 Hrs  T(C): 36.4 (19 Sep 2018 09:12), Max: 36.9 (18 Sep 2018 19:40)  T(F): 97.5 (19 Sep 2018 09:12), Max: 98.5 (18 Sep 2018 19:40)  HR: 93 (19 Sep 2018 09:12) (93 - 93)  BP: 131/77 (19 Sep 2018 09:12) (131/77 - 131/77)  BP(mean): --  RR: 16  SpO2: --  CAPILLARY BLOOD GLUCOSE      POCT Blood Glucose.: 125 mg/dL (19 Sep 2018 11:42)  POCT Blood Glucose.: 107 mg/dL (19 Sep 2018 07:55)  POCT Blood Glucose.: 129 mg/dL (18 Sep 2018 20:13)  POCT Blood Glucose.: 112 mg/dL (18 Sep 2018 16:20)        PHYSICAL EXAM:  GENERAL: NAD, well-developed  HEAD:  Atraumatic, Normocephalic  EYES: EOMI, conjunctiva and sclera clear  NECK: Supple, No JVD  CHEST/LUNG: Clear to auscultation bilaterally; No wheeze  HEART: Regular rate and rhythm; No murmurs, rubs, or gallops  ABDOMEN: Soft, Nontender, Nondistended; Bowel sounds present  EXTREMITIES:  2+ Peripheral Pulses, No clubbing, cyanosis, or edema  MSK: Full ROM right hip without pain.  Some TTP outer right hip  PSYCH: AAOx3  NEUROLOGY: non-focal, normal gait observed  SKIN: No rashes or lesions    Care Discussed with Consultants/Other Providers: Dr Hart

## 2018-09-19 NOTE — PROGRESS NOTE ADULT - ASSESSMENT
58 yo F with DM2 on metformin BLE edema admitted to [facility] 8/8/18 with AMS, transferred to [facility] 8/21/18 for management of schizophrenia.      Plan:    # Right leg pain: No evidence serioud inuury or need for imaging.  Tylenol prn. Consider PT eval if persists.    # LE edema: likely due to pacing and insufficiency, edema has now  resolved.   - c/w lasix to 40mg once a day   - Outpatient f/u with PMD for TTE.    - recommend compression stockings if standing for prolonged periods     # DM2:   - Continue metformin  - Continue statin for ASCVD prevention.    # Schizophrenia: Management per primary team

## 2018-09-20 LAB — GLUCOSE BLDC GLUCOMTR-MCNC: 94 MG/DL — SIGNIFICANT CHANGE UP (ref 70–99)

## 2018-09-20 PROCEDURE — 99232 SBSQ HOSP IP/OBS MODERATE 35: CPT | Mod: GC

## 2018-09-20 RX ADMIN — SIMVASTATIN 20 MILLIGRAM(S): 20 TABLET, FILM COATED ORAL at 21:11

## 2018-09-20 RX ADMIN — Medication 0.5 MILLIGRAM(S): at 09:04

## 2018-09-20 RX ADMIN — Medication 50 MICROGRAM(S): at 09:04

## 2018-09-20 RX ADMIN — Medication 1 TABLET(S): at 09:04

## 2018-09-20 RX ADMIN — ZIPRASIDONE HYDROCHLORIDE 80 MILLIGRAM(S): 20 CAPSULE ORAL at 17:14

## 2018-09-20 RX ADMIN — Medication 40 MILLIGRAM(S): at 09:04

## 2018-09-20 RX ADMIN — Medication 0.5 MILLIGRAM(S): at 21:11

## 2018-09-20 RX ADMIN — METFORMIN HYDROCHLORIDE 750 MILLIGRAM(S): 850 TABLET ORAL at 08:15

## 2018-09-20 RX ADMIN — ZIPRASIDONE HYDROCHLORIDE 80 MILLIGRAM(S): 20 CAPSULE ORAL at 09:04

## 2018-09-20 RX ADMIN — METFORMIN HYDROCHLORIDE 750 MILLIGRAM(S): 850 TABLET ORAL at 21:11

## 2018-09-20 RX ADMIN — Medication 1 APPLICATION(S): at 09:03

## 2018-09-21 LAB
GLUCOSE BLDC GLUCOMTR-MCNC: 87 MG/DL — SIGNIFICANT CHANGE UP (ref 70–99)
GLUCOSE BLDC GLUCOMTR-MCNC: 93 MG/DL — SIGNIFICANT CHANGE UP (ref 70–99)

## 2018-09-21 PROCEDURE — 99232 SBSQ HOSP IP/OBS MODERATE 35: CPT | Mod: GC

## 2018-09-21 PROCEDURE — 93010 ELECTROCARDIOGRAM REPORT: CPT

## 2018-09-21 RX ADMIN — Medication 0.5 MILLIGRAM(S): at 20:44

## 2018-09-21 RX ADMIN — METFORMIN HYDROCHLORIDE 750 MILLIGRAM(S): 850 TABLET ORAL at 10:22

## 2018-09-21 RX ADMIN — Medication 40 MILLIGRAM(S): at 10:22

## 2018-09-21 RX ADMIN — Medication 1 TABLET(S): at 09:49

## 2018-09-21 RX ADMIN — ZIPRASIDONE HYDROCHLORIDE 80 MILLIGRAM(S): 20 CAPSULE ORAL at 17:28

## 2018-09-21 RX ADMIN — SIMVASTATIN 20 MILLIGRAM(S): 20 TABLET, FILM COATED ORAL at 20:44

## 2018-09-21 RX ADMIN — Medication 50 MICROGRAM(S): at 10:22

## 2018-09-21 RX ADMIN — Medication 0.5 MILLIGRAM(S): at 10:22

## 2018-09-21 RX ADMIN — ZIPRASIDONE HYDROCHLORIDE 80 MILLIGRAM(S): 20 CAPSULE ORAL at 10:22

## 2018-09-21 RX ADMIN — METFORMIN HYDROCHLORIDE 750 MILLIGRAM(S): 850 TABLET ORAL at 20:44

## 2018-09-21 RX ADMIN — OLANZAPINE 5 MILLIGRAM(S): 15 TABLET, FILM COATED ORAL at 10:22

## 2018-09-21 RX ADMIN — Medication 1 MILLIGRAM(S): at 20:44

## 2018-09-22 LAB
GLUCOSE BLDC GLUCOMTR-MCNC: 82 MG/DL — SIGNIFICANT CHANGE UP (ref 70–99)
GLUCOSE BLDC GLUCOMTR-MCNC: 87 MG/DL — SIGNIFICANT CHANGE UP (ref 70–99)

## 2018-09-22 RX ADMIN — Medication 1 MILLIGRAM(S): at 21:22

## 2018-09-22 RX ADMIN — Medication 40 MILLIGRAM(S): at 09:25

## 2018-09-22 RX ADMIN — ZIPRASIDONE HYDROCHLORIDE 80 MILLIGRAM(S): 20 CAPSULE ORAL at 09:25

## 2018-09-22 RX ADMIN — Medication 0.5 MILLIGRAM(S): at 09:25

## 2018-09-22 RX ADMIN — Medication 0.5 MILLIGRAM(S): at 21:22

## 2018-09-22 RX ADMIN — Medication 1 APPLICATION(S): at 09:24

## 2018-09-22 RX ADMIN — Medication 1 TABLET(S): at 09:25

## 2018-09-22 RX ADMIN — SIMVASTATIN 20 MILLIGRAM(S): 20 TABLET, FILM COATED ORAL at 21:22

## 2018-09-22 RX ADMIN — Medication 50 MICROGRAM(S): at 09:25

## 2018-09-22 RX ADMIN — METFORMIN HYDROCHLORIDE 750 MILLIGRAM(S): 850 TABLET ORAL at 09:25

## 2018-09-22 RX ADMIN — ZIPRASIDONE HYDROCHLORIDE 80 MILLIGRAM(S): 20 CAPSULE ORAL at 17:34

## 2018-09-22 RX ADMIN — METFORMIN HYDROCHLORIDE 750 MILLIGRAM(S): 850 TABLET ORAL at 21:22

## 2018-09-23 LAB
GLUCOSE BLDC GLUCOMTR-MCNC: 101 MG/DL — HIGH (ref 70–99)
GLUCOSE BLDC GLUCOMTR-MCNC: 77 MG/DL — SIGNIFICANT CHANGE UP (ref 70–99)
GLUCOSE BLDC GLUCOMTR-MCNC: 85 MG/DL — SIGNIFICANT CHANGE UP (ref 70–99)

## 2018-09-23 RX ADMIN — Medication 0.5 MILLIGRAM(S): at 21:09

## 2018-09-23 RX ADMIN — Medication 1 MILLIGRAM(S): at 21:09

## 2018-09-23 RX ADMIN — METFORMIN HYDROCHLORIDE 750 MILLIGRAM(S): 850 TABLET ORAL at 10:23

## 2018-09-23 RX ADMIN — SIMVASTATIN 20 MILLIGRAM(S): 20 TABLET, FILM COATED ORAL at 21:09

## 2018-09-23 RX ADMIN — ZIPRASIDONE HYDROCHLORIDE 80 MILLIGRAM(S): 20 CAPSULE ORAL at 10:23

## 2018-09-23 RX ADMIN — Medication 1 TABLET(S): at 10:23

## 2018-09-23 RX ADMIN — METFORMIN HYDROCHLORIDE 750 MILLIGRAM(S): 850 TABLET ORAL at 21:09

## 2018-09-23 RX ADMIN — Medication 0.5 MILLIGRAM(S): at 10:23

## 2018-09-23 RX ADMIN — ZIPRASIDONE HYDROCHLORIDE 80 MILLIGRAM(S): 20 CAPSULE ORAL at 17:23

## 2018-09-23 RX ADMIN — Medication 50 MICROGRAM(S): at 10:23

## 2018-09-23 RX ADMIN — Medication 40 MILLIGRAM(S): at 10:23

## 2018-09-24 LAB
GLUCOSE BLDC GLUCOMTR-MCNC: 131 MG/DL — HIGH (ref 70–99)
GLUCOSE BLDC GLUCOMTR-MCNC: 94 MG/DL — SIGNIFICANT CHANGE UP (ref 70–99)

## 2018-09-24 PROCEDURE — 99232 SBSQ HOSP IP/OBS MODERATE 35: CPT | Mod: GC

## 2018-09-24 RX ADMIN — SIMVASTATIN 20 MILLIGRAM(S): 20 TABLET, FILM COATED ORAL at 21:02

## 2018-09-24 RX ADMIN — Medication 0.5 MILLIGRAM(S): at 10:11

## 2018-09-24 RX ADMIN — Medication 50 MICROGRAM(S): at 10:11

## 2018-09-24 RX ADMIN — ZIPRASIDONE HYDROCHLORIDE 80 MILLIGRAM(S): 20 CAPSULE ORAL at 10:11

## 2018-09-24 RX ADMIN — Medication 40 MILLIGRAM(S): at 10:11

## 2018-09-24 RX ADMIN — ZIPRASIDONE HYDROCHLORIDE 80 MILLIGRAM(S): 20 CAPSULE ORAL at 17:39

## 2018-09-24 RX ADMIN — Medication 1 MILLIGRAM(S): at 21:02

## 2018-09-24 RX ADMIN — Medication 1 TABLET(S): at 10:11

## 2018-09-24 RX ADMIN — Medication 0.5 MILLIGRAM(S): at 21:02

## 2018-09-24 RX ADMIN — METFORMIN HYDROCHLORIDE 750 MILLIGRAM(S): 850 TABLET ORAL at 10:11

## 2018-09-24 RX ADMIN — METFORMIN HYDROCHLORIDE 750 MILLIGRAM(S): 850 TABLET ORAL at 21:02

## 2018-09-25 LAB
GLUCOSE BLDC GLUCOMTR-MCNC: 135 MG/DL — HIGH (ref 70–99)
GLUCOSE BLDC GLUCOMTR-MCNC: 93 MG/DL — SIGNIFICANT CHANGE UP (ref 70–99)
GLUCOSE BLDC GLUCOMTR-MCNC: 96 MG/DL — SIGNIFICANT CHANGE UP (ref 70–99)

## 2018-09-25 PROCEDURE — 99232 SBSQ HOSP IP/OBS MODERATE 35: CPT | Mod: GC

## 2018-09-25 RX ORDER — QUETIAPINE FUMARATE 200 MG/1
50 TABLET, FILM COATED ORAL AT BEDTIME
Qty: 0 | Refills: 0 | Status: DISCONTINUED | OUTPATIENT
Start: 2018-09-25 | End: 2018-09-27

## 2018-09-25 RX ADMIN — SIMVASTATIN 20 MILLIGRAM(S): 20 TABLET, FILM COATED ORAL at 22:39

## 2018-09-25 RX ADMIN — Medication 50 MICROGRAM(S): at 11:36

## 2018-09-25 RX ADMIN — Medication 1 TABLET(S): at 11:37

## 2018-09-25 RX ADMIN — ZIPRASIDONE HYDROCHLORIDE 80 MILLIGRAM(S): 20 CAPSULE ORAL at 17:45

## 2018-09-25 RX ADMIN — Medication 0.5 MILLIGRAM(S): at 11:36

## 2018-09-25 RX ADMIN — ZIPRASIDONE HYDROCHLORIDE 80 MILLIGRAM(S): 20 CAPSULE ORAL at 11:37

## 2018-09-25 RX ADMIN — Medication 40 MILLIGRAM(S): at 11:36

## 2018-09-25 RX ADMIN — Medication 0.5 MILLIGRAM(S): at 22:39

## 2018-09-25 RX ADMIN — METFORMIN HYDROCHLORIDE 750 MILLIGRAM(S): 850 TABLET ORAL at 22:39

## 2018-09-25 RX ADMIN — QUETIAPINE FUMARATE 50 MILLIGRAM(S): 200 TABLET, FILM COATED ORAL at 22:39

## 2018-09-25 RX ADMIN — METFORMIN HYDROCHLORIDE 750 MILLIGRAM(S): 850 TABLET ORAL at 11:37

## 2018-09-26 LAB
ALBUMIN SERPL ELPH-MCNC: 4.3 G/DL — SIGNIFICANT CHANGE UP (ref 3.3–5)
ALP SERPL-CCNC: 91 U/L — SIGNIFICANT CHANGE UP (ref 40–120)
ALT FLD-CCNC: 13 U/L — SIGNIFICANT CHANGE UP (ref 4–33)
AST SERPL-CCNC: 14 U/L — SIGNIFICANT CHANGE UP (ref 4–32)
BILIRUB SERPL-MCNC: 0.3 MG/DL — SIGNIFICANT CHANGE UP (ref 0.2–1.2)
BUN SERPL-MCNC: 23 MG/DL — SIGNIFICANT CHANGE UP (ref 7–23)
CALCIUM SERPL-MCNC: 9.7 MG/DL — SIGNIFICANT CHANGE UP (ref 8.4–10.5)
CHLORIDE SERPL-SCNC: 103 MMOL/L — SIGNIFICANT CHANGE UP (ref 98–107)
CO2 SERPL-SCNC: 28 MMOL/L — SIGNIFICANT CHANGE UP (ref 22–31)
CREAT SERPL-MCNC: 0.69 MG/DL — SIGNIFICANT CHANGE UP (ref 0.5–1.3)
GLUCOSE BLDC GLUCOMTR-MCNC: 105 MG/DL — HIGH (ref 70–99)
GLUCOSE BLDC GLUCOMTR-MCNC: 86 MG/DL — SIGNIFICANT CHANGE UP (ref 70–99)
GLUCOSE BLDC GLUCOMTR-MCNC: 88 MG/DL — SIGNIFICANT CHANGE UP (ref 70–99)
GLUCOSE BLDC GLUCOMTR-MCNC: 89 MG/DL — SIGNIFICANT CHANGE UP (ref 70–99)
GLUCOSE SERPL-MCNC: 86 MG/DL — SIGNIFICANT CHANGE UP (ref 70–99)
HCT VFR BLD CALC: 36.7 % — SIGNIFICANT CHANGE UP (ref 34.5–45)
HGB BLD-MCNC: 12.1 G/DL — SIGNIFICANT CHANGE UP (ref 11.5–15.5)
MCHC RBC-ENTMCNC: 30 PG — SIGNIFICANT CHANGE UP (ref 27–34)
MCHC RBC-ENTMCNC: 33 % — SIGNIFICANT CHANGE UP (ref 32–36)
MCV RBC AUTO: 90.8 FL — SIGNIFICANT CHANGE UP (ref 80–100)
NRBC # FLD: 0 — SIGNIFICANT CHANGE UP
PLATELET # BLD AUTO: 216 K/UL — SIGNIFICANT CHANGE UP (ref 150–400)
PMV BLD: 10.6 FL — SIGNIFICANT CHANGE UP (ref 7–13)
POTASSIUM SERPL-MCNC: 3.9 MMOL/L — SIGNIFICANT CHANGE UP (ref 3.5–5.3)
POTASSIUM SERPL-SCNC: 3.9 MMOL/L — SIGNIFICANT CHANGE UP (ref 3.5–5.3)
PROT SERPL-MCNC: 6.9 G/DL — SIGNIFICANT CHANGE UP (ref 6–8.3)
RBC # BLD: 4.04 M/UL — SIGNIFICANT CHANGE UP (ref 3.8–5.2)
RBC # FLD: 12.5 % — SIGNIFICANT CHANGE UP (ref 10.3–14.5)
SODIUM SERPL-SCNC: 142 MMOL/L — SIGNIFICANT CHANGE UP (ref 135–145)
WBC # BLD: 6.84 K/UL — SIGNIFICANT CHANGE UP (ref 3.8–10.5)
WBC # FLD AUTO: 6.84 K/UL — SIGNIFICANT CHANGE UP (ref 3.8–10.5)

## 2018-09-26 PROCEDURE — 93010 ELECTROCARDIOGRAM REPORT: CPT

## 2018-09-26 PROCEDURE — 99232 SBSQ HOSP IP/OBS MODERATE 35: CPT | Mod: GC

## 2018-09-26 RX ORDER — CALCIUM CARBONATE 500(1250)
2 TABLET ORAL ONCE
Qty: 0 | Refills: 0 | Status: COMPLETED | OUTPATIENT
Start: 2018-09-26 | End: 2018-09-26

## 2018-09-26 RX ADMIN — Medication 1 TABLET(S): at 08:55

## 2018-09-26 RX ADMIN — Medication 2 TABLET(S): at 10:41

## 2018-09-26 RX ADMIN — SIMVASTATIN 20 MILLIGRAM(S): 20 TABLET, FILM COATED ORAL at 22:45

## 2018-09-26 RX ADMIN — Medication 0.5 MILLIGRAM(S): at 22:43

## 2018-09-26 RX ADMIN — ZIPRASIDONE HYDROCHLORIDE 80 MILLIGRAM(S): 20 CAPSULE ORAL at 08:55

## 2018-09-26 RX ADMIN — Medication 50 MICROGRAM(S): at 08:55

## 2018-09-26 RX ADMIN — Medication 40 MILLIGRAM(S): at 08:55

## 2018-09-26 RX ADMIN — ZIPRASIDONE HYDROCHLORIDE 80 MILLIGRAM(S): 20 CAPSULE ORAL at 16:29

## 2018-09-26 RX ADMIN — QUETIAPINE FUMARATE 50 MILLIGRAM(S): 200 TABLET, FILM COATED ORAL at 22:44

## 2018-09-26 RX ADMIN — Medication 1 APPLICATION(S): at 08:55

## 2018-09-26 RX ADMIN — METFORMIN HYDROCHLORIDE 750 MILLIGRAM(S): 850 TABLET ORAL at 08:55

## 2018-09-26 RX ADMIN — Medication 0.5 MILLIGRAM(S): at 08:55

## 2018-09-26 RX ADMIN — METFORMIN HYDROCHLORIDE 750 MILLIGRAM(S): 850 TABLET ORAL at 22:42

## 2018-09-27 LAB — GLUCOSE BLDC GLUCOMTR-MCNC: 106 MG/DL — HIGH (ref 70–99)

## 2018-09-27 PROCEDURE — 99232 SBSQ HOSP IP/OBS MODERATE 35: CPT

## 2018-09-27 RX ORDER — QUETIAPINE FUMARATE 200 MG/1
200 TABLET, FILM COATED ORAL AT BEDTIME
Qty: 0 | Refills: 0 | Status: DISCONTINUED | OUTPATIENT
Start: 2018-09-27 | End: 2018-09-27

## 2018-09-27 RX ORDER — QUETIAPINE FUMARATE 200 MG/1
100 TABLET, FILM COATED ORAL AT BEDTIME
Qty: 0 | Refills: 0 | Status: DISCONTINUED | OUTPATIENT
Start: 2018-09-27 | End: 2018-09-28

## 2018-09-27 RX ADMIN — METFORMIN HYDROCHLORIDE 750 MILLIGRAM(S): 850 TABLET ORAL at 09:30

## 2018-09-27 RX ADMIN — Medication 40 MILLIGRAM(S): at 09:30

## 2018-09-27 RX ADMIN — Medication 50 MICROGRAM(S): at 09:30

## 2018-09-27 RX ADMIN — METFORMIN HYDROCHLORIDE 750 MILLIGRAM(S): 850 TABLET ORAL at 20:53

## 2018-09-27 RX ADMIN — Medication 0.5 MILLIGRAM(S): at 20:52

## 2018-09-27 RX ADMIN — Medication 0.5 MILLIGRAM(S): at 09:30

## 2018-09-27 RX ADMIN — ZIPRASIDONE HYDROCHLORIDE 80 MILLIGRAM(S): 20 CAPSULE ORAL at 09:31

## 2018-09-27 RX ADMIN — ZIPRASIDONE HYDROCHLORIDE 80 MILLIGRAM(S): 20 CAPSULE ORAL at 17:23

## 2018-09-27 RX ADMIN — QUETIAPINE FUMARATE 100 MILLIGRAM(S): 200 TABLET, FILM COATED ORAL at 20:53

## 2018-09-27 RX ADMIN — Medication 1 TABLET(S): at 09:30

## 2018-09-27 RX ADMIN — Medication 1 APPLICATION(S): at 09:30

## 2018-09-27 RX ADMIN — SIMVASTATIN 20 MILLIGRAM(S): 20 TABLET, FILM COATED ORAL at 20:53

## 2018-09-28 LAB
GLUCOSE BLDC GLUCOMTR-MCNC: 116 MG/DL — HIGH (ref 70–99)
GLUCOSE BLDC GLUCOMTR-MCNC: 116 MG/DL — HIGH (ref 70–99)
GLUCOSE BLDC GLUCOMTR-MCNC: 98 MG/DL — SIGNIFICANT CHANGE UP (ref 70–99)

## 2018-09-28 PROCEDURE — 99232 SBSQ HOSP IP/OBS MODERATE 35: CPT | Mod: GC

## 2018-09-28 RX ORDER — QUETIAPINE FUMARATE 200 MG/1
150 TABLET, FILM COATED ORAL AT BEDTIME
Qty: 0 | Refills: 0 | Status: DISCONTINUED | OUTPATIENT
Start: 2018-09-30 | End: 2018-10-01

## 2018-09-28 RX ORDER — QUETIAPINE FUMARATE 200 MG/1
100 TABLET, FILM COATED ORAL AT BEDTIME
Qty: 0 | Refills: 0 | Status: COMPLETED | OUTPATIENT
Start: 2018-09-28 | End: 2018-09-29

## 2018-09-28 RX ADMIN — Medication 0.5 MILLIGRAM(S): at 10:15

## 2018-09-28 RX ADMIN — ZIPRASIDONE HYDROCHLORIDE 80 MILLIGRAM(S): 20 CAPSULE ORAL at 17:05

## 2018-09-28 RX ADMIN — QUETIAPINE FUMARATE 100 MILLIGRAM(S): 200 TABLET, FILM COATED ORAL at 21:27

## 2018-09-28 RX ADMIN — SIMVASTATIN 20 MILLIGRAM(S): 20 TABLET, FILM COATED ORAL at 21:27

## 2018-09-28 RX ADMIN — ZIPRASIDONE HYDROCHLORIDE 80 MILLIGRAM(S): 20 CAPSULE ORAL at 09:55

## 2018-09-28 RX ADMIN — Medication 40 MILLIGRAM(S): at 10:15

## 2018-09-28 RX ADMIN — METFORMIN HYDROCHLORIDE 750 MILLIGRAM(S): 850 TABLET ORAL at 10:15

## 2018-09-28 RX ADMIN — Medication 0.5 MILLIGRAM(S): at 21:27

## 2018-09-28 RX ADMIN — Medication 50 MICROGRAM(S): at 10:15

## 2018-09-28 RX ADMIN — Medication 1 TABLET(S): at 10:15

## 2018-09-28 RX ADMIN — METFORMIN HYDROCHLORIDE 750 MILLIGRAM(S): 850 TABLET ORAL at 21:27

## 2018-09-29 LAB
GLUCOSE BLDC GLUCOMTR-MCNC: 102 MG/DL — HIGH (ref 70–99)
GLUCOSE BLDC GLUCOMTR-MCNC: 132 MG/DL — HIGH (ref 70–99)

## 2018-09-29 RX ADMIN — Medication 40 MILLIGRAM(S): at 09:42

## 2018-09-29 RX ADMIN — Medication 0.5 MILLIGRAM(S): at 21:14

## 2018-09-29 RX ADMIN — SIMVASTATIN 20 MILLIGRAM(S): 20 TABLET, FILM COATED ORAL at 21:14

## 2018-09-29 RX ADMIN — Medication 0.5 MILLIGRAM(S): at 09:41

## 2018-09-29 RX ADMIN — METFORMIN HYDROCHLORIDE 750 MILLIGRAM(S): 850 TABLET ORAL at 09:43

## 2018-09-29 RX ADMIN — QUETIAPINE FUMARATE 100 MILLIGRAM(S): 200 TABLET, FILM COATED ORAL at 21:14

## 2018-09-29 RX ADMIN — Medication 1 TABLET(S): at 09:43

## 2018-09-29 RX ADMIN — OLANZAPINE 5 MILLIGRAM(S): 15 TABLET, FILM COATED ORAL at 16:05

## 2018-09-29 RX ADMIN — METFORMIN HYDROCHLORIDE 750 MILLIGRAM(S): 850 TABLET ORAL at 21:14

## 2018-09-29 RX ADMIN — ZIPRASIDONE HYDROCHLORIDE 80 MILLIGRAM(S): 20 CAPSULE ORAL at 17:43

## 2018-09-29 RX ADMIN — Medication 50 MICROGRAM(S): at 09:43

## 2018-09-29 RX ADMIN — ZIPRASIDONE HYDROCHLORIDE 80 MILLIGRAM(S): 20 CAPSULE ORAL at 09:44

## 2018-09-30 LAB
GLUCOSE BLDC GLUCOMTR-MCNC: 102 MG/DL — HIGH (ref 70–99)
GLUCOSE BLDC GLUCOMTR-MCNC: 107 MG/DL — HIGH (ref 70–99)

## 2018-09-30 RX ADMIN — METFORMIN HYDROCHLORIDE 750 MILLIGRAM(S): 850 TABLET ORAL at 21:14

## 2018-09-30 RX ADMIN — SIMVASTATIN 20 MILLIGRAM(S): 20 TABLET, FILM COATED ORAL at 21:14

## 2018-09-30 RX ADMIN — ZIPRASIDONE HYDROCHLORIDE 80 MILLIGRAM(S): 20 CAPSULE ORAL at 16:56

## 2018-09-30 RX ADMIN — Medication 1 APPLICATION(S): at 09:08

## 2018-09-30 RX ADMIN — Medication 0.5 MILLIGRAM(S): at 21:14

## 2018-09-30 RX ADMIN — Medication 40 MILLIGRAM(S): at 08:33

## 2018-09-30 RX ADMIN — ZIPRASIDONE HYDROCHLORIDE 80 MILLIGRAM(S): 20 CAPSULE ORAL at 09:08

## 2018-09-30 RX ADMIN — METFORMIN HYDROCHLORIDE 750 MILLIGRAM(S): 850 TABLET ORAL at 08:32

## 2018-09-30 RX ADMIN — Medication 50 MICROGRAM(S): at 08:32

## 2018-09-30 RX ADMIN — QUETIAPINE FUMARATE 150 MILLIGRAM(S): 200 TABLET, FILM COATED ORAL at 21:14

## 2018-09-30 RX ADMIN — Medication 1 TABLET(S): at 08:32

## 2018-09-30 RX ADMIN — Medication 0.5 MILLIGRAM(S): at 08:33

## 2018-10-01 LAB
GLUCOSE BLDC GLUCOMTR-MCNC: 118 MG/DL — HIGH (ref 70–99)
GLUCOSE BLDC GLUCOMTR-MCNC: 82 MG/DL — SIGNIFICANT CHANGE UP (ref 70–99)

## 2018-10-01 PROCEDURE — 99232 SBSQ HOSP IP/OBS MODERATE 35: CPT

## 2018-10-01 RX ORDER — QUETIAPINE FUMARATE 200 MG/1
200 TABLET, FILM COATED ORAL AT BEDTIME
Qty: 0 | Refills: 0 | Status: DISCONTINUED | OUTPATIENT
Start: 2018-10-01 | End: 2018-10-05

## 2018-10-01 RX ADMIN — Medication 1 APPLICATION(S): at 10:25

## 2018-10-01 RX ADMIN — Medication 0.5 MILLIGRAM(S): at 10:25

## 2018-10-01 RX ADMIN — ZIPRASIDONE HYDROCHLORIDE 80 MILLIGRAM(S): 20 CAPSULE ORAL at 10:25

## 2018-10-01 RX ADMIN — Medication 1 TABLET(S): at 10:25

## 2018-10-01 RX ADMIN — METFORMIN HYDROCHLORIDE 750 MILLIGRAM(S): 850 TABLET ORAL at 10:25

## 2018-10-01 RX ADMIN — Medication 50 MICROGRAM(S): at 10:25

## 2018-10-01 RX ADMIN — Medication 40 MILLIGRAM(S): at 10:25

## 2018-10-02 LAB
GLUCOSE BLDC GLUCOMTR-MCNC: 101 MG/DL — HIGH (ref 70–99)
GLUCOSE BLDC GLUCOMTR-MCNC: 90 MG/DL — SIGNIFICANT CHANGE UP (ref 70–99)

## 2018-10-02 PROCEDURE — 99232 SBSQ HOSP IP/OBS MODERATE 35: CPT | Mod: GC

## 2018-10-02 RX ADMIN — ZIPRASIDONE HYDROCHLORIDE 80 MILLIGRAM(S): 20 CAPSULE ORAL at 09:25

## 2018-10-02 RX ADMIN — Medication 1 APPLICATION(S): at 09:24

## 2018-10-02 RX ADMIN — OLANZAPINE 5 MILLIGRAM(S): 15 TABLET, FILM COATED ORAL at 09:26

## 2018-10-02 RX ADMIN — METFORMIN HYDROCHLORIDE 750 MILLIGRAM(S): 850 TABLET ORAL at 22:49

## 2018-10-02 RX ADMIN — Medication 0.5 MILLIGRAM(S): at 22:49

## 2018-10-02 RX ADMIN — QUETIAPINE FUMARATE 200 MILLIGRAM(S): 200 TABLET, FILM COATED ORAL at 22:50

## 2018-10-02 RX ADMIN — Medication 50 MICROGRAM(S): at 09:25

## 2018-10-02 RX ADMIN — Medication 1 TABLET(S): at 09:25

## 2018-10-02 RX ADMIN — METFORMIN HYDROCHLORIDE 750 MILLIGRAM(S): 850 TABLET ORAL at 09:25

## 2018-10-03 LAB
GLUCOSE BLDC GLUCOMTR-MCNC: 109 MG/DL — HIGH (ref 70–99)
GLUCOSE BLDC GLUCOMTR-MCNC: 91 MG/DL — SIGNIFICANT CHANGE UP (ref 70–99)

## 2018-10-03 PROCEDURE — 99232 SBSQ HOSP IP/OBS MODERATE 35: CPT | Mod: GC

## 2018-10-03 RX ORDER — ZIPRASIDONE HYDROCHLORIDE 20 MG/1
80 CAPSULE ORAL
Qty: 0 | Refills: 0 | Status: DISCONTINUED | OUTPATIENT
Start: 2018-10-03 | End: 2018-10-26

## 2018-10-03 RX ADMIN — Medication 0.5 MILLIGRAM(S): at 08:56

## 2018-10-03 RX ADMIN — Medication 0.5 MILLIGRAM(S): at 21:37

## 2018-10-03 RX ADMIN — Medication 40 MILLIGRAM(S): at 08:56

## 2018-10-03 RX ADMIN — Medication 1 TABLET(S): at 08:56

## 2018-10-03 RX ADMIN — QUETIAPINE FUMARATE 200 MILLIGRAM(S): 200 TABLET, FILM COATED ORAL at 21:37

## 2018-10-03 RX ADMIN — Medication 50 MICROGRAM(S): at 08:56

## 2018-10-03 RX ADMIN — METFORMIN HYDROCHLORIDE 750 MILLIGRAM(S): 850 TABLET ORAL at 21:37

## 2018-10-03 RX ADMIN — ZIPRASIDONE HYDROCHLORIDE 80 MILLIGRAM(S): 20 CAPSULE ORAL at 21:37

## 2018-10-03 RX ADMIN — ZIPRASIDONE HYDROCHLORIDE 80 MILLIGRAM(S): 20 CAPSULE ORAL at 08:57

## 2018-10-03 RX ADMIN — SIMVASTATIN 20 MILLIGRAM(S): 20 TABLET, FILM COATED ORAL at 21:36

## 2018-10-03 RX ADMIN — METFORMIN HYDROCHLORIDE 750 MILLIGRAM(S): 850 TABLET ORAL at 08:56

## 2018-10-04 LAB
GLUCOSE BLDC GLUCOMTR-MCNC: 117 MG/DL — HIGH (ref 70–99)
GLUCOSE BLDC GLUCOMTR-MCNC: 92 MG/DL — SIGNIFICANT CHANGE UP (ref 70–99)

## 2018-10-04 PROCEDURE — 93010 ELECTROCARDIOGRAM REPORT: CPT

## 2018-10-04 PROCEDURE — 99232 SBSQ HOSP IP/OBS MODERATE 35: CPT | Mod: GC

## 2018-10-04 RX ORDER — MICONAZOLE NITRATE 2 %
1 CREAM (GRAM) TOPICAL
Qty: 0 | Refills: 0 | Status: DISCONTINUED | OUTPATIENT
Start: 2018-10-04 | End: 2018-11-05

## 2018-10-04 RX ADMIN — METFORMIN HYDROCHLORIDE 750 MILLIGRAM(S): 850 TABLET ORAL at 21:02

## 2018-10-04 RX ADMIN — Medication 0.5 MILLIGRAM(S): at 21:02

## 2018-10-04 RX ADMIN — Medication 40 MILLIGRAM(S): at 08:26

## 2018-10-04 RX ADMIN — ZIPRASIDONE HYDROCHLORIDE 80 MILLIGRAM(S): 20 CAPSULE ORAL at 21:03

## 2018-10-04 RX ADMIN — Medication 1 TABLET(S): at 08:26

## 2018-10-04 RX ADMIN — ZIPRASIDONE HYDROCHLORIDE 80 MILLIGRAM(S): 20 CAPSULE ORAL at 08:26

## 2018-10-04 RX ADMIN — QUETIAPINE FUMARATE 200 MILLIGRAM(S): 200 TABLET, FILM COATED ORAL at 21:02

## 2018-10-04 RX ADMIN — Medication 0.5 MILLIGRAM(S): at 08:25

## 2018-10-04 RX ADMIN — METFORMIN HYDROCHLORIDE 750 MILLIGRAM(S): 850 TABLET ORAL at 08:26

## 2018-10-04 RX ADMIN — Medication 50 MICROGRAM(S): at 08:26

## 2018-10-04 RX ADMIN — Medication 1 APPLICATION(S): at 21:37

## 2018-10-05 LAB
GLUCOSE BLDC GLUCOMTR-MCNC: 163 MG/DL — HIGH (ref 70–99)
GLUCOSE BLDC GLUCOMTR-MCNC: 88 MG/DL — SIGNIFICANT CHANGE UP (ref 70–99)

## 2018-10-05 PROCEDURE — 99232 SBSQ HOSP IP/OBS MODERATE 35: CPT | Mod: GC,25

## 2018-10-05 RX ORDER — QUETIAPINE FUMARATE 200 MG/1
200 TABLET, FILM COATED ORAL AT BEDTIME
Qty: 0 | Refills: 0 | Status: DISCONTINUED | OUTPATIENT
Start: 2018-10-05 | End: 2018-10-08

## 2018-10-05 RX ORDER — QUETIAPINE FUMARATE 200 MG/1
250 TABLET, FILM COATED ORAL AT BEDTIME
Qty: 0 | Refills: 0 | Status: DISCONTINUED | OUTPATIENT
Start: 2018-10-05 | End: 2018-10-05

## 2018-10-05 RX ADMIN — ZIPRASIDONE HYDROCHLORIDE 80 MILLIGRAM(S): 20 CAPSULE ORAL at 08:38

## 2018-10-05 RX ADMIN — Medication 1 TABLET(S): at 08:38

## 2018-10-05 RX ADMIN — Medication 1 APPLICATION(S): at 09:04

## 2018-10-05 RX ADMIN — ZIPRASIDONE HYDROCHLORIDE 80 MILLIGRAM(S): 20 CAPSULE ORAL at 21:08

## 2018-10-05 RX ADMIN — QUETIAPINE FUMARATE 200 MILLIGRAM(S): 200 TABLET, FILM COATED ORAL at 21:07

## 2018-10-05 RX ADMIN — METFORMIN HYDROCHLORIDE 750 MILLIGRAM(S): 850 TABLET ORAL at 08:38

## 2018-10-05 RX ADMIN — Medication 40 MILLIGRAM(S): at 08:38

## 2018-10-05 RX ADMIN — METFORMIN HYDROCHLORIDE 750 MILLIGRAM(S): 850 TABLET ORAL at 21:07

## 2018-10-05 RX ADMIN — Medication 0.5 MILLIGRAM(S): at 21:07

## 2018-10-05 RX ADMIN — SIMVASTATIN 20 MILLIGRAM(S): 20 TABLET, FILM COATED ORAL at 21:08

## 2018-10-05 RX ADMIN — Medication 1: at 16:27

## 2018-10-06 LAB — GLUCOSE BLDC GLUCOMTR-MCNC: 121 MG/DL — HIGH (ref 70–99)

## 2018-10-06 RX ADMIN — SIMVASTATIN 20 MILLIGRAM(S): 20 TABLET, FILM COATED ORAL at 20:52

## 2018-10-06 RX ADMIN — Medication 1 TABLET(S): at 09:54

## 2018-10-06 RX ADMIN — OLANZAPINE 5 MILLIGRAM(S): 15 TABLET, FILM COATED ORAL at 18:53

## 2018-10-06 RX ADMIN — Medication 0.5 MILLIGRAM(S): at 20:52

## 2018-10-06 RX ADMIN — Medication 0.5 MILLIGRAM(S): at 09:53

## 2018-10-06 RX ADMIN — METFORMIN HYDROCHLORIDE 750 MILLIGRAM(S): 850 TABLET ORAL at 09:53

## 2018-10-06 RX ADMIN — METFORMIN HYDROCHLORIDE 750 MILLIGRAM(S): 850 TABLET ORAL at 20:52

## 2018-10-06 RX ADMIN — Medication 50 MICROGRAM(S): at 09:53

## 2018-10-06 RX ADMIN — ZIPRASIDONE HYDROCHLORIDE 80 MILLIGRAM(S): 20 CAPSULE ORAL at 09:54

## 2018-10-06 RX ADMIN — ZIPRASIDONE HYDROCHLORIDE 80 MILLIGRAM(S): 20 CAPSULE ORAL at 20:52

## 2018-10-07 LAB
GLUCOSE BLDC GLUCOMTR-MCNC: 104 MG/DL — HIGH (ref 70–99)
GLUCOSE BLDC GLUCOMTR-MCNC: 104 MG/DL — HIGH (ref 70–99)
GLUCOSE BLDC GLUCOMTR-MCNC: 119 MG/DL — HIGH (ref 70–99)
GLUCOSE BLDC GLUCOMTR-MCNC: 87 MG/DL — SIGNIFICANT CHANGE UP (ref 70–99)

## 2018-10-07 RX ADMIN — Medication 50 MICROGRAM(S): at 09:42

## 2018-10-07 RX ADMIN — SIMVASTATIN 20 MILLIGRAM(S): 20 TABLET, FILM COATED ORAL at 21:43

## 2018-10-07 RX ADMIN — Medication 0.5 MILLIGRAM(S): at 21:43

## 2018-10-07 RX ADMIN — QUETIAPINE FUMARATE 200 MILLIGRAM(S): 200 TABLET, FILM COATED ORAL at 21:43

## 2018-10-07 RX ADMIN — ZIPRASIDONE HYDROCHLORIDE 80 MILLIGRAM(S): 20 CAPSULE ORAL at 09:42

## 2018-10-07 RX ADMIN — Medication 40 MILLIGRAM(S): at 09:42

## 2018-10-07 RX ADMIN — Medication 0.5 MILLIGRAM(S): at 09:42

## 2018-10-07 RX ADMIN — Medication 1 APPLICATION(S): at 22:14

## 2018-10-07 RX ADMIN — METFORMIN HYDROCHLORIDE 750 MILLIGRAM(S): 850 TABLET ORAL at 21:43

## 2018-10-07 RX ADMIN — ZIPRASIDONE HYDROCHLORIDE 80 MILLIGRAM(S): 20 CAPSULE ORAL at 21:43

## 2018-10-07 RX ADMIN — OLANZAPINE 5 MILLIGRAM(S): 15 TABLET, FILM COATED ORAL at 11:29

## 2018-10-07 RX ADMIN — METFORMIN HYDROCHLORIDE 750 MILLIGRAM(S): 850 TABLET ORAL at 09:42

## 2018-10-08 LAB
GLUCOSE BLDC GLUCOMTR-MCNC: 125 MG/DL — HIGH (ref 70–99)
GLUCOSE BLDC GLUCOMTR-MCNC: 80 MG/DL — SIGNIFICANT CHANGE UP (ref 70–99)

## 2018-10-08 PROCEDURE — 99232 SBSQ HOSP IP/OBS MODERATE 35: CPT | Mod: GC

## 2018-10-08 RX ORDER — QUETIAPINE FUMARATE 200 MG/1
225 TABLET, FILM COATED ORAL AT BEDTIME
Qty: 0 | Refills: 0 | Status: DISCONTINUED | OUTPATIENT
Start: 2018-10-08 | End: 2018-10-12

## 2018-10-08 RX ADMIN — Medication 50 MICROGRAM(S): at 09:09

## 2018-10-08 RX ADMIN — METFORMIN HYDROCHLORIDE 750 MILLIGRAM(S): 850 TABLET ORAL at 21:29

## 2018-10-08 RX ADMIN — ZIPRASIDONE HYDROCHLORIDE 80 MILLIGRAM(S): 20 CAPSULE ORAL at 09:10

## 2018-10-08 RX ADMIN — METFORMIN HYDROCHLORIDE 750 MILLIGRAM(S): 850 TABLET ORAL at 09:10

## 2018-10-08 RX ADMIN — Medication 0.5 MILLIGRAM(S): at 21:29

## 2018-10-08 RX ADMIN — ZIPRASIDONE HYDROCHLORIDE 80 MILLIGRAM(S): 20 CAPSULE ORAL at 21:29

## 2018-10-08 RX ADMIN — Medication 1 APPLICATION(S): at 09:09

## 2018-10-08 RX ADMIN — Medication 1 APPLICATION(S): at 09:10

## 2018-10-08 RX ADMIN — Medication 40 MILLIGRAM(S): at 09:09

## 2018-10-08 RX ADMIN — SIMVASTATIN 20 MILLIGRAM(S): 20 TABLET, FILM COATED ORAL at 21:29

## 2018-10-08 RX ADMIN — Medication 1 TABLET(S): at 09:10

## 2018-10-08 RX ADMIN — Medication 0.5 MILLIGRAM(S): at 09:09

## 2018-10-09 LAB
GLUCOSE BLDC GLUCOMTR-MCNC: 80 MG/DL — SIGNIFICANT CHANGE UP (ref 70–99)
GLUCOSE BLDC GLUCOMTR-MCNC: 96 MG/DL — SIGNIFICANT CHANGE UP (ref 70–99)

## 2018-10-09 PROCEDURE — 99232 SBSQ HOSP IP/OBS MODERATE 35: CPT

## 2018-10-09 RX ADMIN — Medication 1 APPLICATION(S): at 08:54

## 2018-10-09 RX ADMIN — Medication 1 TABLET(S): at 08:55

## 2018-10-09 RX ADMIN — Medication 1 APPLICATION(S): at 08:55

## 2018-10-09 RX ADMIN — Medication 40 MILLIGRAM(S): at 08:55

## 2018-10-09 RX ADMIN — Medication 0.5 MILLIGRAM(S): at 08:55

## 2018-10-09 RX ADMIN — ZIPRASIDONE HYDROCHLORIDE 80 MILLIGRAM(S): 20 CAPSULE ORAL at 21:26

## 2018-10-09 RX ADMIN — Medication 50 MICROGRAM(S): at 08:55

## 2018-10-10 LAB
GLUCOSE BLDC GLUCOMTR-MCNC: 114 MG/DL — HIGH (ref 70–99)
GLUCOSE BLDC GLUCOMTR-MCNC: 99 MG/DL — SIGNIFICANT CHANGE UP (ref 70–99)

## 2018-10-10 PROCEDURE — 99232 SBSQ HOSP IP/OBS MODERATE 35: CPT | Mod: GC

## 2018-10-10 RX ADMIN — Medication 40 MILLIGRAM(S): at 12:50

## 2018-10-10 RX ADMIN — METFORMIN HYDROCHLORIDE 750 MILLIGRAM(S): 850 TABLET ORAL at 21:01

## 2018-10-10 RX ADMIN — SIMVASTATIN 20 MILLIGRAM(S): 20 TABLET, FILM COATED ORAL at 21:01

## 2018-10-10 RX ADMIN — QUETIAPINE FUMARATE 225 MILLIGRAM(S): 200 TABLET, FILM COATED ORAL at 21:01

## 2018-10-10 RX ADMIN — ZIPRASIDONE HYDROCHLORIDE 80 MILLIGRAM(S): 20 CAPSULE ORAL at 11:58

## 2018-10-10 RX ADMIN — Medication 0.5 MILLIGRAM(S): at 12:50

## 2018-10-10 RX ADMIN — Medication 0.5 MILLIGRAM(S): at 21:00

## 2018-10-10 RX ADMIN — METFORMIN HYDROCHLORIDE 750 MILLIGRAM(S): 850 TABLET ORAL at 12:50

## 2018-10-10 RX ADMIN — Medication 1 TABLET(S): at 12:50

## 2018-10-10 RX ADMIN — Medication 50 MICROGRAM(S): at 12:50

## 2018-10-10 RX ADMIN — ZIPRASIDONE HYDROCHLORIDE 80 MILLIGRAM(S): 20 CAPSULE ORAL at 21:01

## 2018-10-10 RX ADMIN — Medication 0.5 MILLIGRAM(S): at 21:01

## 2018-10-11 LAB
GLUCOSE BLDC GLUCOMTR-MCNC: 100 MG/DL — HIGH (ref 70–99)
GLUCOSE BLDC GLUCOMTR-MCNC: 110 MG/DL — HIGH (ref 70–99)
GLUCOSE BLDC GLUCOMTR-MCNC: 95 MG/DL — SIGNIFICANT CHANGE UP (ref 70–99)

## 2018-10-11 PROCEDURE — 99232 SBSQ HOSP IP/OBS MODERATE 35: CPT

## 2018-10-11 RX ADMIN — Medication 40 MILLIGRAM(S): at 08:45

## 2018-10-11 RX ADMIN — METFORMIN HYDROCHLORIDE 750 MILLIGRAM(S): 850 TABLET ORAL at 08:45

## 2018-10-11 RX ADMIN — Medication 1 APPLICATION(S): at 08:45

## 2018-10-11 RX ADMIN — Medication 1 APPLICATION(S): at 22:02

## 2018-10-11 RX ADMIN — ZIPRASIDONE HYDROCHLORIDE 80 MILLIGRAM(S): 20 CAPSULE ORAL at 08:46

## 2018-10-11 RX ADMIN — Medication 0.5 MILLIGRAM(S): at 08:45

## 2018-10-11 RX ADMIN — Medication 1 TABLET(S): at 08:46

## 2018-10-11 RX ADMIN — Medication 50 MICROGRAM(S): at 08:45

## 2018-10-12 LAB
GLUCOSE BLDC GLUCOMTR-MCNC: 127 MG/DL — HIGH (ref 70–99)
GLUCOSE BLDC GLUCOMTR-MCNC: 96 MG/DL — SIGNIFICANT CHANGE UP (ref 70–99)

## 2018-10-12 PROCEDURE — 99232 SBSQ HOSP IP/OBS MODERATE 35: CPT | Mod: GC

## 2018-10-12 RX ORDER — OLANZAPINE 15 MG/1
5 TABLET, FILM COATED ORAL AT BEDTIME
Qty: 0 | Refills: 0 | Status: DISCONTINUED | OUTPATIENT
Start: 2018-10-12 | End: 2018-10-12

## 2018-10-12 RX ORDER — FLUPHENAZINE HYDROCHLORIDE 1 MG/1
5 TABLET, FILM COATED ORAL AT BEDTIME
Qty: 0 | Refills: 0 | Status: DISCONTINUED | OUTPATIENT
Start: 2018-10-12 | End: 2018-10-25

## 2018-10-12 RX ORDER — FLUPHENAZINE HYDROCHLORIDE 1 MG/1
2.5 TABLET, FILM COATED ORAL DAILY
Qty: 0 | Refills: 0 | Status: DISCONTINUED | OUTPATIENT
Start: 2018-10-12 | End: 2018-10-16

## 2018-10-12 RX ADMIN — ZIPRASIDONE HYDROCHLORIDE 80 MILLIGRAM(S): 20 CAPSULE ORAL at 20:47

## 2018-10-12 RX ADMIN — METFORMIN HYDROCHLORIDE 750 MILLIGRAM(S): 850 TABLET ORAL at 20:47

## 2018-10-12 RX ADMIN — SIMVASTATIN 20 MILLIGRAM(S): 20 TABLET, FILM COATED ORAL at 20:47

## 2018-10-12 RX ADMIN — Medication 0.5 MILLIGRAM(S): at 20:47

## 2018-10-12 RX ADMIN — ZIPRASIDONE HYDROCHLORIDE 80 MILLIGRAM(S): 20 CAPSULE ORAL at 11:23

## 2018-10-13 LAB
GLUCOSE BLDC GLUCOMTR-MCNC: 149 MG/DL — HIGH (ref 70–99)
GLUCOSE BLDC GLUCOMTR-MCNC: 84 MG/DL — SIGNIFICANT CHANGE UP (ref 70–99)
GLUCOSE BLDC GLUCOMTR-MCNC: 93 MG/DL — SIGNIFICANT CHANGE UP (ref 70–99)

## 2018-10-13 RX ADMIN — FLUPHENAZINE HYDROCHLORIDE 2.5 MILLIGRAM(S): 1 TABLET, FILM COATED ORAL at 09:22

## 2018-10-13 RX ADMIN — METFORMIN HYDROCHLORIDE 750 MILLIGRAM(S): 850 TABLET ORAL at 20:28

## 2018-10-13 RX ADMIN — Medication 1 APPLICATION(S): at 09:21

## 2018-10-13 RX ADMIN — ZIPRASIDONE HYDROCHLORIDE 80 MILLIGRAM(S): 20 CAPSULE ORAL at 20:28

## 2018-10-13 RX ADMIN — Medication 50 MICROGRAM(S): at 09:22

## 2018-10-13 RX ADMIN — METFORMIN HYDROCHLORIDE 750 MILLIGRAM(S): 850 TABLET ORAL at 09:22

## 2018-10-13 RX ADMIN — Medication 650 MILLIGRAM(S): at 22:26

## 2018-10-13 RX ADMIN — Medication 650 MILLIGRAM(S): at 23:42

## 2018-10-13 RX ADMIN — Medication 40 MILLIGRAM(S): at 09:22

## 2018-10-13 RX ADMIN — Medication 0.5 MILLIGRAM(S): at 09:21

## 2018-10-13 RX ADMIN — Medication 0.5 MILLIGRAM(S): at 22:27

## 2018-10-13 RX ADMIN — Medication 0.5 MILLIGRAM(S): at 20:28

## 2018-10-13 RX ADMIN — ZIPRASIDONE HYDROCHLORIDE 80 MILLIGRAM(S): 20 CAPSULE ORAL at 09:22

## 2018-10-13 RX ADMIN — Medication 1 APPLICATION(S): at 09:22

## 2018-10-13 RX ADMIN — Medication 1 TABLET(S): at 09:22

## 2018-10-13 RX ADMIN — SIMVASTATIN 20 MILLIGRAM(S): 20 TABLET, FILM COATED ORAL at 20:28

## 2018-10-14 LAB
GLUCOSE BLDC GLUCOMTR-MCNC: 143 MG/DL — HIGH (ref 70–99)
GLUCOSE BLDC GLUCOMTR-MCNC: 85 MG/DL — SIGNIFICANT CHANGE UP (ref 70–99)

## 2018-10-14 RX ADMIN — Medication 50 MICROGRAM(S): at 08:35

## 2018-10-14 RX ADMIN — Medication 1 APPLICATION(S): at 12:16

## 2018-10-14 RX ADMIN — Medication 0.5 MILLIGRAM(S): at 20:41

## 2018-10-14 RX ADMIN — FLUPHENAZINE HYDROCHLORIDE 5 MILLIGRAM(S): 1 TABLET, FILM COATED ORAL at 20:41

## 2018-10-14 RX ADMIN — ZIPRASIDONE HYDROCHLORIDE 80 MILLIGRAM(S): 20 CAPSULE ORAL at 08:35

## 2018-10-14 RX ADMIN — SIMVASTATIN 20 MILLIGRAM(S): 20 TABLET, FILM COATED ORAL at 20:41

## 2018-10-14 RX ADMIN — Medication 40 MILLIGRAM(S): at 08:36

## 2018-10-14 RX ADMIN — Medication 0.5 MILLIGRAM(S): at 08:36

## 2018-10-14 RX ADMIN — METFORMIN HYDROCHLORIDE 750 MILLIGRAM(S): 850 TABLET ORAL at 08:35

## 2018-10-14 RX ADMIN — FLUPHENAZINE HYDROCHLORIDE 2.5 MILLIGRAM(S): 1 TABLET, FILM COATED ORAL at 12:15

## 2018-10-14 RX ADMIN — METFORMIN HYDROCHLORIDE 750 MILLIGRAM(S): 850 TABLET ORAL at 20:41

## 2018-10-14 RX ADMIN — Medication 1 TABLET(S): at 08:36

## 2018-10-14 RX ADMIN — ZIPRASIDONE HYDROCHLORIDE 80 MILLIGRAM(S): 20 CAPSULE ORAL at 20:41

## 2018-10-14 NOTE — PROGRESS NOTE BEHAVIORAL HEALTH - ATTENTION / CONCENTRATION
Discharge Summary


Reason for Hosp/Final Diag:  


(1) Appendicitis


Status:  Acute


Hospital Course & Plan:  Pt recommended to undergo laparoscopic appendectomy. 


Proc, risks, benefits discussed with pt.  She understands risk of bleeding, 


leak, abscess, damage to viscera, need for secondary intervention.  Informed 


consent signed.





10/11/2018: POD#1 stable progress, cont Zosyn.  Cont full liquids. Pulm toilet 


and mobilize OOB.  Cont MIGDALIA.





10/12/2018: POD#2  Stable.Cont Zosyn.  Adv to full liq slow as manoj.  DC MIGDALIA given


low output.  WBC improved.





10/13/2018  9am:  POD#3  Continue to look good.  WBCs normal and afebrile.  


Convert to oral antibiotics and probably home tomorrow with 7 day (total) course


of antibiotics.  Pathology noted.  Also note elevated glucose.





10/14/2018 10:40am  POD#4:  Remains afebrile and feels much better than when I 


saw her yesterday afternoon.  tolerating oral antibiotics well which we will 


continue for a total of 7 days.  Discharge instructions given to the patient 


including diet (regular), pain control (Norco but convert to anti-inflammatories


or tylenol alone as soon as possible), mobility and activity ( no lifting >20# 


until 2 weeks after surgery), follow up (Dr. J. Ullrich 7-10 days after surgery,


please call for appointment).  Patient may shower and anticipate discharge this 


afternoon.





Departure


Discharge to:  Home





Discharge Instructions


Home Meds


Active Scripts


Hydrocodone Bit/Acetaminophen (HYDROCODON-ACETAMINOPHEN 5-325) 1 Each Tablet, 1 


EACH PO Q6HR PRN for MODERATE PAIN for 7 Days, #14 TAB


   Prov:LESTER SAENZ MD         10/14/18


Reported Medications


Escitalopram Oxalate (LEXAPRO) 10 Mg Tab, 10 MG PO HS, TAB


   10/10/18


Diet:  Regular


Activity:  No Heavy Lifting ( greater than 20# until 2 weeks after surgery)


Copies to:   ULLRICH,JOHN A MD ;





Problem Qualifiers





(1) Appendicitis:  


Appendicitis type:  acute appendicitis  Acute appendicitis type:  with localized


peritonitis  Appendicitis perforation presence:  with perforation  Appendicitis 


abscess presence:  without abscess








LESTER SAENZ MD             Oct 14, 2018 10:54
Impaired
Normal
Impaired

## 2018-10-15 LAB
GLUCOSE BLDC GLUCOMTR-MCNC: 117 MG/DL — HIGH (ref 70–99)
GLUCOSE BLDC GLUCOMTR-MCNC: 91 MG/DL — SIGNIFICANT CHANGE UP (ref 70–99)

## 2018-10-15 PROCEDURE — 93010 ELECTROCARDIOGRAM REPORT: CPT

## 2018-10-15 PROCEDURE — 99232 SBSQ HOSP IP/OBS MODERATE 35: CPT | Mod: GC

## 2018-10-15 RX ADMIN — FLUPHENAZINE HYDROCHLORIDE 2.5 MILLIGRAM(S): 1 TABLET, FILM COATED ORAL at 08:58

## 2018-10-15 RX ADMIN — Medication 1 TABLET(S): at 08:58

## 2018-10-15 RX ADMIN — Medication 40 MILLIGRAM(S): at 08:58

## 2018-10-15 RX ADMIN — Medication 1 APPLICATION(S): at 09:16

## 2018-10-15 RX ADMIN — Medication 0.5 MILLIGRAM(S): at 08:58

## 2018-10-15 RX ADMIN — METFORMIN HYDROCHLORIDE 750 MILLIGRAM(S): 850 TABLET ORAL at 08:58

## 2018-10-15 RX ADMIN — ZIPRASIDONE HYDROCHLORIDE 80 MILLIGRAM(S): 20 CAPSULE ORAL at 08:58

## 2018-10-15 RX ADMIN — Medication 50 MICROGRAM(S): at 08:58

## 2018-10-16 LAB
GLUCOSE BLDC GLUCOMTR-MCNC: 107 MG/DL — HIGH (ref 70–99)
GLUCOSE BLDC GLUCOMTR-MCNC: 116 MG/DL — HIGH (ref 70–99)

## 2018-10-16 PROCEDURE — 99232 SBSQ HOSP IP/OBS MODERATE 35: CPT | Mod: GC

## 2018-10-16 PROCEDURE — 93010 ELECTROCARDIOGRAM REPORT: CPT

## 2018-10-16 RX ORDER — FLUPHENAZINE HYDROCHLORIDE 1 MG/1
5 TABLET, FILM COATED ORAL ONCE
Qty: 0 | Refills: 0 | Status: COMPLETED | OUTPATIENT
Start: 2018-10-16 | End: 2018-10-16

## 2018-10-16 RX ORDER — FLUPHENAZINE HYDROCHLORIDE 1 MG/1
5 TABLET, FILM COATED ORAL DAILY
Qty: 0 | Refills: 0 | Status: DISCONTINUED | OUTPATIENT
Start: 2018-10-16 | End: 2018-10-25

## 2018-10-16 RX ADMIN — Medication 0.5 MILLIGRAM(S): at 23:47

## 2018-10-16 RX ADMIN — SIMVASTATIN 20 MILLIGRAM(S): 20 TABLET, FILM COATED ORAL at 23:48

## 2018-10-16 RX ADMIN — Medication 1 APPLICATION(S): at 23:48

## 2018-10-16 RX ADMIN — METFORMIN HYDROCHLORIDE 750 MILLIGRAM(S): 850 TABLET ORAL at 23:47

## 2018-10-16 RX ADMIN — FLUPHENAZINE HYDROCHLORIDE 5 MILLIGRAM(S): 1 TABLET, FILM COATED ORAL at 23:47

## 2018-10-16 RX ADMIN — ZIPRASIDONE HYDROCHLORIDE 80 MILLIGRAM(S): 20 CAPSULE ORAL at 23:48

## 2018-10-17 LAB
GLUCOSE BLDC GLUCOMTR-MCNC: 140 MG/DL — HIGH (ref 70–99)
GLUCOSE BLDC GLUCOMTR-MCNC: 67 MG/DL — LOW (ref 70–99)
GLUCOSE BLDC GLUCOMTR-MCNC: 82 MG/DL — SIGNIFICANT CHANGE UP (ref 70–99)

## 2018-10-17 PROCEDURE — 99232 SBSQ HOSP IP/OBS MODERATE 35: CPT | Mod: GC

## 2018-10-17 RX ADMIN — ZIPRASIDONE HYDROCHLORIDE 80 MILLIGRAM(S): 20 CAPSULE ORAL at 08:14

## 2018-10-17 RX ADMIN — Medication 40 MILLIGRAM(S): at 08:14

## 2018-10-17 RX ADMIN — FLUPHENAZINE HYDROCHLORIDE 5 MILLIGRAM(S): 1 TABLET, FILM COATED ORAL at 08:14

## 2018-10-17 RX ADMIN — Medication 1 APPLICATION(S): at 12:54

## 2018-10-17 RX ADMIN — METFORMIN HYDROCHLORIDE 750 MILLIGRAM(S): 850 TABLET ORAL at 08:14

## 2018-10-17 RX ADMIN — Medication 0.5 MILLIGRAM(S): at 08:14

## 2018-10-17 RX ADMIN — Medication 50 MICROGRAM(S): at 08:14

## 2018-10-17 RX ADMIN — Medication 1 TABLET(S): at 08:14

## 2018-10-18 LAB
GLUCOSE BLDC GLUCOMTR-MCNC: 129 MG/DL — HIGH (ref 70–99)
GLUCOSE BLDC GLUCOMTR-MCNC: 148 MG/DL — HIGH (ref 70–99)
GLUCOSE BLDC GLUCOMTR-MCNC: 89 MG/DL — SIGNIFICANT CHANGE UP (ref 70–99)

## 2018-10-18 PROCEDURE — 99232 SBSQ HOSP IP/OBS MODERATE 35: CPT | Mod: GC

## 2018-10-18 RX ORDER — FLUPHENAZINE HYDROCHLORIDE 1 MG/1
12.5 TABLET, FILM COATED ORAL ONCE
Qty: 0 | Refills: 0 | Status: COMPLETED | OUTPATIENT
Start: 2018-10-18 | End: 2018-10-18

## 2018-10-18 RX ADMIN — ZIPRASIDONE HYDROCHLORIDE 80 MILLIGRAM(S): 20 CAPSULE ORAL at 21:21

## 2018-10-18 RX ADMIN — Medication 40 MILLIGRAM(S): at 09:03

## 2018-10-18 RX ADMIN — METFORMIN HYDROCHLORIDE 750 MILLIGRAM(S): 850 TABLET ORAL at 21:21

## 2018-10-18 RX ADMIN — Medication 0.5 MILLIGRAM(S): at 21:21

## 2018-10-18 RX ADMIN — Medication 50 MICROGRAM(S): at 09:03

## 2018-10-18 RX ADMIN — FLUPHENAZINE HYDROCHLORIDE 5 MILLIGRAM(S): 1 TABLET, FILM COATED ORAL at 21:44

## 2018-10-18 RX ADMIN — Medication 0.5 MILLIGRAM(S): at 09:03

## 2018-10-18 RX ADMIN — SIMVASTATIN 20 MILLIGRAM(S): 20 TABLET, FILM COATED ORAL at 21:21

## 2018-10-18 RX ADMIN — Medication 1 APPLICATION(S): at 22:03

## 2018-10-18 RX ADMIN — Medication 1 APPLICATION(S): at 09:03

## 2018-10-18 RX ADMIN — ZIPRASIDONE HYDROCHLORIDE 80 MILLIGRAM(S): 20 CAPSULE ORAL at 09:04

## 2018-10-18 RX ADMIN — METFORMIN HYDROCHLORIDE 750 MILLIGRAM(S): 850 TABLET ORAL at 09:03

## 2018-10-18 RX ADMIN — Medication 0.5 MILLIGRAM(S): at 21:44

## 2018-10-19 LAB
GLUCOSE BLDC GLUCOMTR-MCNC: 127 MG/DL — HIGH (ref 70–99)
GLUCOSE BLDC GLUCOMTR-MCNC: 94 MG/DL — SIGNIFICANT CHANGE UP (ref 70–99)

## 2018-10-19 PROCEDURE — 99232 SBSQ HOSP IP/OBS MODERATE 35: CPT | Mod: GC,25

## 2018-10-19 RX ORDER — FLUPHENAZINE HYDROCHLORIDE 1 MG/1
12.5 TABLET, FILM COATED ORAL ONCE
Qty: 0 | Refills: 0 | Status: COMPLETED | OUTPATIENT
Start: 2018-10-19 | End: 2018-10-19

## 2018-10-19 RX ADMIN — Medication 1 TABLET(S): at 08:41

## 2018-10-19 RX ADMIN — ZIPRASIDONE HYDROCHLORIDE 80 MILLIGRAM(S): 20 CAPSULE ORAL at 08:41

## 2018-10-19 RX ADMIN — Medication 1 APPLICATION(S): at 10:58

## 2018-10-19 RX ADMIN — METFORMIN HYDROCHLORIDE 750 MILLIGRAM(S): 850 TABLET ORAL at 21:07

## 2018-10-19 RX ADMIN — Medication 0.5 MILLIGRAM(S): at 08:41

## 2018-10-19 RX ADMIN — METFORMIN HYDROCHLORIDE 750 MILLIGRAM(S): 850 TABLET ORAL at 08:41

## 2018-10-19 RX ADMIN — Medication 0.5 MILLIGRAM(S): at 21:07

## 2018-10-19 RX ADMIN — FLUPHENAZINE HYDROCHLORIDE 5 MILLIGRAM(S): 1 TABLET, FILM COATED ORAL at 21:07

## 2018-10-19 RX ADMIN — Medication 40 MILLIGRAM(S): at 08:41

## 2018-10-19 RX ADMIN — ZIPRASIDONE HYDROCHLORIDE 80 MILLIGRAM(S): 20 CAPSULE ORAL at 21:09

## 2018-10-19 RX ADMIN — Medication 50 MICROGRAM(S): at 08:41

## 2018-10-19 RX ADMIN — FLUPHENAZINE HYDROCHLORIDE 12.5 MILLIGRAM(S): 1 TABLET, FILM COATED ORAL at 15:30

## 2018-10-19 RX ADMIN — SIMVASTATIN 20 MILLIGRAM(S): 20 TABLET, FILM COATED ORAL at 21:09

## 2018-10-19 RX ADMIN — FLUPHENAZINE HYDROCHLORIDE 5 MILLIGRAM(S): 1 TABLET, FILM COATED ORAL at 08:41

## 2018-10-20 LAB
GLUCOSE BLDC GLUCOMTR-MCNC: 103 MG/DL — HIGH (ref 70–99)
GLUCOSE BLDC GLUCOMTR-MCNC: 90 MG/DL — SIGNIFICANT CHANGE UP (ref 70–99)

## 2018-10-20 RX ADMIN — Medication 40 MILLIGRAM(S): at 09:48

## 2018-10-20 RX ADMIN — Medication 0.5 MILLIGRAM(S): at 09:48

## 2018-10-20 RX ADMIN — Medication 50 MICROGRAM(S): at 09:48

## 2018-10-20 RX ADMIN — FLUPHENAZINE HYDROCHLORIDE 5 MILLIGRAM(S): 1 TABLET, FILM COATED ORAL at 09:48

## 2018-10-20 RX ADMIN — ZIPRASIDONE HYDROCHLORIDE 80 MILLIGRAM(S): 20 CAPSULE ORAL at 09:48

## 2018-10-20 RX ADMIN — Medication 1 APPLICATION(S): at 09:48

## 2018-10-20 RX ADMIN — METFORMIN HYDROCHLORIDE 750 MILLIGRAM(S): 850 TABLET ORAL at 09:48

## 2018-10-20 RX ADMIN — Medication 1 TABLET(S): at 09:48

## 2018-10-21 LAB
GLUCOSE BLDC GLUCOMTR-MCNC: 104 MG/DL — HIGH (ref 70–99)
GLUCOSE BLDC GLUCOMTR-MCNC: 113 MG/DL — HIGH (ref 70–99)

## 2018-10-21 RX ADMIN — Medication 1 TABLET(S): at 09:40

## 2018-10-21 RX ADMIN — METFORMIN HYDROCHLORIDE 750 MILLIGRAM(S): 850 TABLET ORAL at 22:29

## 2018-10-21 RX ADMIN — ZIPRASIDONE HYDROCHLORIDE 80 MILLIGRAM(S): 20 CAPSULE ORAL at 09:40

## 2018-10-21 RX ADMIN — ZIPRASIDONE HYDROCHLORIDE 80 MILLIGRAM(S): 20 CAPSULE ORAL at 22:29

## 2018-10-21 RX ADMIN — Medication 0.5 MILLIGRAM(S): at 22:30

## 2018-10-21 RX ADMIN — METFORMIN HYDROCHLORIDE 750 MILLIGRAM(S): 850 TABLET ORAL at 09:40

## 2018-10-21 RX ADMIN — FLUPHENAZINE HYDROCHLORIDE 5 MILLIGRAM(S): 1 TABLET, FILM COATED ORAL at 22:30

## 2018-10-21 RX ADMIN — Medication 0.5 MILLIGRAM(S): at 09:39

## 2018-10-21 RX ADMIN — Medication 1 TABLET(S): at 16:59

## 2018-10-21 RX ADMIN — SIMVASTATIN 20 MILLIGRAM(S): 20 TABLET, FILM COATED ORAL at 22:29

## 2018-10-21 RX ADMIN — Medication 0.5 MILLIGRAM(S): at 22:29

## 2018-10-21 RX ADMIN — FLUPHENAZINE HYDROCHLORIDE 5 MILLIGRAM(S): 1 TABLET, FILM COATED ORAL at 09:39

## 2018-10-21 RX ADMIN — Medication 50 MICROGRAM(S): at 09:39

## 2018-10-21 RX ADMIN — Medication 40 MILLIGRAM(S): at 09:39

## 2018-10-22 LAB
GLUCOSE BLDC GLUCOMTR-MCNC: 139 MG/DL — HIGH (ref 70–99)
GLUCOSE BLDC GLUCOMTR-MCNC: 87 MG/DL — SIGNIFICANT CHANGE UP (ref 70–99)

## 2018-10-22 PROCEDURE — 99232 SBSQ HOSP IP/OBS MODERATE 35: CPT | Mod: 25

## 2018-10-22 PROCEDURE — 90853 GROUP PSYCHOTHERAPY: CPT

## 2018-10-22 RX ADMIN — OLANZAPINE 5 MILLIGRAM(S): 15 TABLET, FILM COATED ORAL at 17:09

## 2018-10-22 RX ADMIN — METFORMIN HYDROCHLORIDE 750 MILLIGRAM(S): 850 TABLET ORAL at 08:29

## 2018-10-22 RX ADMIN — ZIPRASIDONE HYDROCHLORIDE 80 MILLIGRAM(S): 20 CAPSULE ORAL at 20:42

## 2018-10-22 RX ADMIN — FLUPHENAZINE HYDROCHLORIDE 5 MILLIGRAM(S): 1 TABLET, FILM COATED ORAL at 08:29

## 2018-10-22 RX ADMIN — Medication 1 TABLET(S): at 08:30

## 2018-10-22 RX ADMIN — SIMVASTATIN 20 MILLIGRAM(S): 20 TABLET, FILM COATED ORAL at 20:42

## 2018-10-22 RX ADMIN — FLUPHENAZINE HYDROCHLORIDE 5 MILLIGRAM(S): 1 TABLET, FILM COATED ORAL at 20:42

## 2018-10-22 RX ADMIN — Medication 0.5 MILLIGRAM(S): at 20:42

## 2018-10-22 RX ADMIN — Medication 0.5 MILLIGRAM(S): at 08:29

## 2018-10-22 RX ADMIN — Medication 50 MICROGRAM(S): at 08:29

## 2018-10-22 RX ADMIN — ZIPRASIDONE HYDROCHLORIDE 80 MILLIGRAM(S): 20 CAPSULE ORAL at 08:29

## 2018-10-22 RX ADMIN — Medication 40 MILLIGRAM(S): at 08:29

## 2018-10-22 RX ADMIN — METFORMIN HYDROCHLORIDE 750 MILLIGRAM(S): 850 TABLET ORAL at 20:42

## 2018-10-23 LAB
ALBUMIN SERPL ELPH-MCNC: 4.2 G/DL — SIGNIFICANT CHANGE UP (ref 3.3–5)
ALP SERPL-CCNC: 82 U/L — SIGNIFICANT CHANGE UP (ref 40–120)
ALT FLD-CCNC: 14 U/L — SIGNIFICANT CHANGE UP (ref 4–33)
AST SERPL-CCNC: 15 U/L — SIGNIFICANT CHANGE UP (ref 4–32)
BILIRUB SERPL-MCNC: 0.3 MG/DL — SIGNIFICANT CHANGE UP (ref 0.2–1.2)
BUN SERPL-MCNC: 17 MG/DL — SIGNIFICANT CHANGE UP (ref 7–23)
CALCIUM SERPL-MCNC: 9.9 MG/DL — SIGNIFICANT CHANGE UP (ref 8.4–10.5)
CHLORIDE SERPL-SCNC: 100 MMOL/L — SIGNIFICANT CHANGE UP (ref 98–107)
CO2 SERPL-SCNC: 26 MMOL/L — SIGNIFICANT CHANGE UP (ref 22–31)
CREAT SERPL-MCNC: 0.62 MG/DL — SIGNIFICANT CHANGE UP (ref 0.5–1.3)
GLUCOSE BLDC GLUCOMTR-MCNC: 79 MG/DL — SIGNIFICANT CHANGE UP (ref 70–99)
GLUCOSE BLDC GLUCOMTR-MCNC: 86 MG/DL — SIGNIFICANT CHANGE UP (ref 70–99)
GLUCOSE SERPL-MCNC: 77 MG/DL — SIGNIFICANT CHANGE UP (ref 70–99)
HCT VFR BLD CALC: 39.5 % — SIGNIFICANT CHANGE UP (ref 34.5–45)
HGB BLD-MCNC: 13.3 G/DL — SIGNIFICANT CHANGE UP (ref 11.5–15.5)
MCHC RBC-ENTMCNC: 30.3 PG — SIGNIFICANT CHANGE UP (ref 27–34)
MCHC RBC-ENTMCNC: 33.7 % — SIGNIFICANT CHANGE UP (ref 32–36)
MCV RBC AUTO: 90 FL — SIGNIFICANT CHANGE UP (ref 80–100)
NRBC # FLD: 0 — SIGNIFICANT CHANGE UP
PLATELET # BLD AUTO: 207 K/UL — SIGNIFICANT CHANGE UP (ref 150–400)
PMV BLD: 10.7 FL — SIGNIFICANT CHANGE UP (ref 7–13)
POTASSIUM SERPL-MCNC: 3.8 MMOL/L — SIGNIFICANT CHANGE UP (ref 3.5–5.3)
POTASSIUM SERPL-SCNC: 3.8 MMOL/L — SIGNIFICANT CHANGE UP (ref 3.5–5.3)
PROT SERPL-MCNC: 7 G/DL — SIGNIFICANT CHANGE UP (ref 6–8.3)
RBC # BLD: 4.39 M/UL — SIGNIFICANT CHANGE UP (ref 3.8–5.2)
RBC # FLD: 11.8 % — SIGNIFICANT CHANGE UP (ref 10.3–14.5)
SODIUM SERPL-SCNC: 141 MMOL/L — SIGNIFICANT CHANGE UP (ref 135–145)
TSH SERPL-MCNC: 2.02 UIU/ML — SIGNIFICANT CHANGE UP (ref 0.27–4.2)
WBC # BLD: 5.92 K/UL — SIGNIFICANT CHANGE UP (ref 3.8–10.5)
WBC # FLD AUTO: 5.92 K/UL — SIGNIFICANT CHANGE UP (ref 3.8–10.5)

## 2018-10-23 PROCEDURE — 99232 SBSQ HOSP IP/OBS MODERATE 35: CPT

## 2018-10-23 RX ADMIN — METFORMIN HYDROCHLORIDE 750 MILLIGRAM(S): 850 TABLET ORAL at 20:51

## 2018-10-23 RX ADMIN — SIMVASTATIN 20 MILLIGRAM(S): 20 TABLET, FILM COATED ORAL at 20:51

## 2018-10-23 RX ADMIN — Medication 1 APPLICATION(S): at 08:53

## 2018-10-23 RX ADMIN — ZIPRASIDONE HYDROCHLORIDE 80 MILLIGRAM(S): 20 CAPSULE ORAL at 08:54

## 2018-10-23 RX ADMIN — Medication 0.5 MILLIGRAM(S): at 08:53

## 2018-10-23 RX ADMIN — Medication 40 MILLIGRAM(S): at 08:53

## 2018-10-23 RX ADMIN — FLUPHENAZINE HYDROCHLORIDE 5 MILLIGRAM(S): 1 TABLET, FILM COATED ORAL at 20:50

## 2018-10-23 RX ADMIN — Medication 1 TABLET(S): at 08:53

## 2018-10-23 RX ADMIN — FLUPHENAZINE HYDROCHLORIDE 5 MILLIGRAM(S): 1 TABLET, FILM COATED ORAL at 08:53

## 2018-10-23 RX ADMIN — METFORMIN HYDROCHLORIDE 750 MILLIGRAM(S): 850 TABLET ORAL at 08:53

## 2018-10-23 RX ADMIN — Medication 0.5 MILLIGRAM(S): at 20:50

## 2018-10-23 RX ADMIN — Medication 50 MICROGRAM(S): at 08:53

## 2018-10-23 RX ADMIN — ZIPRASIDONE HYDROCHLORIDE 80 MILLIGRAM(S): 20 CAPSULE ORAL at 20:51

## 2018-10-24 LAB
GLUCOSE BLDC GLUCOMTR-MCNC: 104 MG/DL — HIGH (ref 70–99)
GLUCOSE BLDC GLUCOMTR-MCNC: 84 MG/DL — SIGNIFICANT CHANGE UP (ref 70–99)

## 2018-10-24 PROCEDURE — 99232 SBSQ HOSP IP/OBS MODERATE 35: CPT

## 2018-10-24 RX ADMIN — Medication 1 APPLICATION(S): at 08:56

## 2018-10-24 RX ADMIN — FLUPHENAZINE HYDROCHLORIDE 5 MILLIGRAM(S): 1 TABLET, FILM COATED ORAL at 20:47

## 2018-10-24 RX ADMIN — SIMVASTATIN 20 MILLIGRAM(S): 20 TABLET, FILM COATED ORAL at 20:47

## 2018-10-24 RX ADMIN — ZIPRASIDONE HYDROCHLORIDE 80 MILLIGRAM(S): 20 CAPSULE ORAL at 20:47

## 2018-10-24 RX ADMIN — METFORMIN HYDROCHLORIDE 750 MILLIGRAM(S): 850 TABLET ORAL at 20:47

## 2018-10-24 RX ADMIN — FLUPHENAZINE HYDROCHLORIDE 5 MILLIGRAM(S): 1 TABLET, FILM COATED ORAL at 08:56

## 2018-10-24 RX ADMIN — Medication 0.5 MILLIGRAM(S): at 20:47

## 2018-10-24 RX ADMIN — Medication 40 MILLIGRAM(S): at 08:56

## 2018-10-24 RX ADMIN — OLANZAPINE 5 MILLIGRAM(S): 15 TABLET, FILM COATED ORAL at 15:15

## 2018-10-24 RX ADMIN — Medication 1 TABLET(S): at 08:57

## 2018-10-24 RX ADMIN — Medication 0.5 MILLIGRAM(S): at 08:56

## 2018-10-24 RX ADMIN — Medication 50 MICROGRAM(S): at 08:57

## 2018-10-24 RX ADMIN — METFORMIN HYDROCHLORIDE 750 MILLIGRAM(S): 850 TABLET ORAL at 08:57

## 2018-10-24 RX ADMIN — Medication 1 APPLICATION(S): at 08:57

## 2018-10-24 RX ADMIN — ZIPRASIDONE HYDROCHLORIDE 80 MILLIGRAM(S): 20 CAPSULE ORAL at 08:57

## 2018-10-25 LAB
GLUCOSE BLDC GLUCOMTR-MCNC: 123 MG/DL — HIGH (ref 70–99)
GLUCOSE BLDC GLUCOMTR-MCNC: 88 MG/DL — SIGNIFICANT CHANGE UP (ref 70–99)

## 2018-10-25 PROCEDURE — 99232 SBSQ HOSP IP/OBS MODERATE 35: CPT

## 2018-10-25 RX ORDER — FLUPHENAZINE HYDROCHLORIDE 1 MG/1
5 TABLET, FILM COATED ORAL AT BEDTIME
Qty: 0 | Refills: 0 | Status: COMPLETED | OUTPATIENT
Start: 2018-10-25 | End: 2018-10-25

## 2018-10-25 RX ORDER — FLUPHENAZINE HYDROCHLORIDE 1 MG/1
10 TABLET, FILM COATED ORAL AT BEDTIME
Qty: 0 | Refills: 0 | Status: DISCONTINUED | OUTPATIENT
Start: 2018-10-26 | End: 2018-11-05

## 2018-10-25 RX ADMIN — ZIPRASIDONE HYDROCHLORIDE 80 MILLIGRAM(S): 20 CAPSULE ORAL at 08:15

## 2018-10-25 RX ADMIN — Medication 50 MICROGRAM(S): at 08:15

## 2018-10-25 RX ADMIN — ZIPRASIDONE HYDROCHLORIDE 80 MILLIGRAM(S): 20 CAPSULE ORAL at 20:49

## 2018-10-25 RX ADMIN — Medication 0.5 MILLIGRAM(S): at 08:15

## 2018-10-25 RX ADMIN — METFORMIN HYDROCHLORIDE 750 MILLIGRAM(S): 850 TABLET ORAL at 08:15

## 2018-10-25 RX ADMIN — SIMVASTATIN 20 MILLIGRAM(S): 20 TABLET, FILM COATED ORAL at 20:49

## 2018-10-25 RX ADMIN — METFORMIN HYDROCHLORIDE 750 MILLIGRAM(S): 850 TABLET ORAL at 20:49

## 2018-10-25 RX ADMIN — Medication 1 APPLICATION(S): at 08:45

## 2018-10-25 RX ADMIN — Medication 0.5 MILLIGRAM(S): at 20:49

## 2018-10-25 RX ADMIN — FLUPHENAZINE HYDROCHLORIDE 5 MILLIGRAM(S): 1 TABLET, FILM COATED ORAL at 20:49

## 2018-10-25 RX ADMIN — FLUPHENAZINE HYDROCHLORIDE 5 MILLIGRAM(S): 1 TABLET, FILM COATED ORAL at 08:15

## 2018-10-25 RX ADMIN — Medication 40 MILLIGRAM(S): at 08:15

## 2018-10-25 RX ADMIN — Medication 1 APPLICATION(S): at 23:07

## 2018-10-25 RX ADMIN — Medication 1 TABLET(S): at 08:15

## 2018-10-26 LAB
GLUCOSE BLDC GLUCOMTR-MCNC: 167 MG/DL — HIGH (ref 70–99)
GLUCOSE BLDC GLUCOMTR-MCNC: 85 MG/DL — SIGNIFICANT CHANGE UP (ref 70–99)
GLUCOSE BLDC GLUCOMTR-MCNC: 91 MG/DL — SIGNIFICANT CHANGE UP (ref 70–99)

## 2018-10-26 PROCEDURE — 90853 GROUP PSYCHOTHERAPY: CPT

## 2018-10-26 PROCEDURE — 99232 SBSQ HOSP IP/OBS MODERATE 35: CPT | Mod: 25

## 2018-10-26 RX ORDER — ZIPRASIDONE HYDROCHLORIDE 20 MG/1
40 CAPSULE ORAL
Qty: 0 | Refills: 0 | Status: DISCONTINUED | OUTPATIENT
Start: 2018-10-26 | End: 2018-10-29

## 2018-10-26 RX ADMIN — FLUPHENAZINE HYDROCHLORIDE 10 MILLIGRAM(S): 1 TABLET, FILM COATED ORAL at 21:44

## 2018-10-26 RX ADMIN — METFORMIN HYDROCHLORIDE 750 MILLIGRAM(S): 850 TABLET ORAL at 08:41

## 2018-10-26 RX ADMIN — METFORMIN HYDROCHLORIDE 750 MILLIGRAM(S): 850 TABLET ORAL at 21:44

## 2018-10-26 RX ADMIN — Medication 0.5 MILLIGRAM(S): at 09:02

## 2018-10-26 RX ADMIN — Medication 40 MILLIGRAM(S): at 09:02

## 2018-10-26 RX ADMIN — SIMVASTATIN 20 MILLIGRAM(S): 20 TABLET, FILM COATED ORAL at 21:44

## 2018-10-26 RX ADMIN — Medication 1: at 16:51

## 2018-10-26 RX ADMIN — Medication 0.5 MILLIGRAM(S): at 21:44

## 2018-10-26 RX ADMIN — Medication 1 TABLET(S): at 09:02

## 2018-10-26 RX ADMIN — ZIPRASIDONE HYDROCHLORIDE 80 MILLIGRAM(S): 20 CAPSULE ORAL at 09:02

## 2018-10-26 RX ADMIN — ZIPRASIDONE HYDROCHLORIDE 40 MILLIGRAM(S): 20 CAPSULE ORAL at 21:44

## 2018-10-26 RX ADMIN — Medication 1 APPLICATION(S): at 09:02

## 2018-10-26 RX ADMIN — Medication 1 APPLICATION(S): at 09:03

## 2018-10-26 RX ADMIN — Medication 0.5 MILLIGRAM(S): at 21:45

## 2018-10-26 RX ADMIN — Medication 50 MICROGRAM(S): at 09:02

## 2018-10-27 LAB
GLUCOSE BLDC GLUCOMTR-MCNC: 123 MG/DL — HIGH (ref 70–99)
GLUCOSE BLDC GLUCOMTR-MCNC: 91 MG/DL — SIGNIFICANT CHANGE UP (ref 70–99)

## 2018-10-27 RX ORDER — POLYETHYLENE GLYCOL 3350 17 G/17G
17 POWDER, FOR SOLUTION ORAL DAILY
Qty: 0 | Refills: 0 | Status: DISCONTINUED | OUTPATIENT
Start: 2018-10-27 | End: 2018-11-05

## 2018-10-27 RX ADMIN — Medication 50 MICROGRAM(S): at 09:22

## 2018-10-27 RX ADMIN — Medication 0.5 MILLIGRAM(S): at 21:10

## 2018-10-27 RX ADMIN — Medication 1 APPLICATION(S): at 09:22

## 2018-10-27 RX ADMIN — METFORMIN HYDROCHLORIDE 750 MILLIGRAM(S): 850 TABLET ORAL at 21:10

## 2018-10-27 RX ADMIN — METFORMIN HYDROCHLORIDE 750 MILLIGRAM(S): 850 TABLET ORAL at 09:04

## 2018-10-27 RX ADMIN — Medication 1 TABLET(S): at 09:22

## 2018-10-27 RX ADMIN — ZIPRASIDONE HYDROCHLORIDE 40 MILLIGRAM(S): 20 CAPSULE ORAL at 09:22

## 2018-10-27 RX ADMIN — FLUPHENAZINE HYDROCHLORIDE 10 MILLIGRAM(S): 1 TABLET, FILM COATED ORAL at 21:10

## 2018-10-27 RX ADMIN — Medication 0.5 MILLIGRAM(S): at 09:22

## 2018-10-27 RX ADMIN — ZIPRASIDONE HYDROCHLORIDE 40 MILLIGRAM(S): 20 CAPSULE ORAL at 21:11

## 2018-10-27 RX ADMIN — SIMVASTATIN 20 MILLIGRAM(S): 20 TABLET, FILM COATED ORAL at 21:10

## 2018-10-27 RX ADMIN — Medication 40 MILLIGRAM(S): at 09:22

## 2018-10-27 RX ADMIN — POLYETHYLENE GLYCOL 3350 17 GRAM(S): 17 POWDER, FOR SOLUTION ORAL at 10:00

## 2018-10-28 LAB
ALBUMIN SERPL ELPH-MCNC: 4.4 G/DL — SIGNIFICANT CHANGE UP (ref 3.3–5)
ALP SERPL-CCNC: 89 U/L — SIGNIFICANT CHANGE UP (ref 40–120)
ALT FLD-CCNC: 10 U/L — SIGNIFICANT CHANGE UP (ref 4–33)
AST SERPL-CCNC: 14 U/L — SIGNIFICANT CHANGE UP (ref 4–32)
BILIRUB SERPL-MCNC: 0.2 MG/DL — SIGNIFICANT CHANGE UP (ref 0.2–1.2)
BUN SERPL-MCNC: 20 MG/DL — SIGNIFICANT CHANGE UP (ref 7–23)
CALCIUM SERPL-MCNC: 10.1 MG/DL — SIGNIFICANT CHANGE UP (ref 8.4–10.5)
CHLORIDE SERPL-SCNC: 102 MMOL/L — SIGNIFICANT CHANGE UP (ref 98–107)
CO2 SERPL-SCNC: 24 MMOL/L — SIGNIFICANT CHANGE UP (ref 22–31)
CREAT SERPL-MCNC: 0.63 MG/DL — SIGNIFICANT CHANGE UP (ref 0.5–1.3)
GLUCOSE BLDC GLUCOMTR-MCNC: 116 MG/DL — HIGH (ref 70–99)
GLUCOSE BLDC GLUCOMTR-MCNC: 92 MG/DL — SIGNIFICANT CHANGE UP (ref 70–99)
GLUCOSE SERPL-MCNC: 73 MG/DL — SIGNIFICANT CHANGE UP (ref 70–99)
POTASSIUM SERPL-MCNC: 3.8 MMOL/L — SIGNIFICANT CHANGE UP (ref 3.5–5.3)
POTASSIUM SERPL-SCNC: 3.8 MMOL/L — SIGNIFICANT CHANGE UP (ref 3.5–5.3)
PROT SERPL-MCNC: 7.1 G/DL — SIGNIFICANT CHANGE UP (ref 6–8.3)
SODIUM SERPL-SCNC: 141 MMOL/L — SIGNIFICANT CHANGE UP (ref 135–145)

## 2018-10-28 RX ADMIN — FLUPHENAZINE HYDROCHLORIDE 10 MILLIGRAM(S): 1 TABLET, FILM COATED ORAL at 22:06

## 2018-10-28 RX ADMIN — Medication 50 MICROGRAM(S): at 08:27

## 2018-10-28 RX ADMIN — SIMVASTATIN 20 MILLIGRAM(S): 20 TABLET, FILM COATED ORAL at 22:06

## 2018-10-28 RX ADMIN — ZIPRASIDONE HYDROCHLORIDE 40 MILLIGRAM(S): 20 CAPSULE ORAL at 22:06

## 2018-10-28 RX ADMIN — Medication 0.5 MILLIGRAM(S): at 08:27

## 2018-10-28 RX ADMIN — METFORMIN HYDROCHLORIDE 750 MILLIGRAM(S): 850 TABLET ORAL at 08:27

## 2018-10-28 RX ADMIN — METFORMIN HYDROCHLORIDE 750 MILLIGRAM(S): 850 TABLET ORAL at 22:06

## 2018-10-28 RX ADMIN — ZIPRASIDONE HYDROCHLORIDE 40 MILLIGRAM(S): 20 CAPSULE ORAL at 08:27

## 2018-10-28 RX ADMIN — Medication 0.5 MILLIGRAM(S): at 22:06

## 2018-10-28 RX ADMIN — Medication 40 MILLIGRAM(S): at 08:27

## 2018-10-28 RX ADMIN — Medication 1 TABLET(S): at 08:27

## 2018-10-28 RX ADMIN — Medication 1 APPLICATION(S): at 22:06

## 2018-10-29 LAB
GLUCOSE BLDC GLUCOMTR-MCNC: 127 MG/DL — HIGH (ref 70–99)
GLUCOSE BLDC GLUCOMTR-MCNC: 99 MG/DL — SIGNIFICANT CHANGE UP (ref 70–99)

## 2018-10-29 PROCEDURE — 90853 GROUP PSYCHOTHERAPY: CPT

## 2018-10-29 PROCEDURE — 99232 SBSQ HOSP IP/OBS MODERATE 35: CPT | Mod: 25

## 2018-10-29 RX ORDER — FLUPHENAZINE HYDROCHLORIDE 1 MG/1
12.5 TABLET, FILM COATED ORAL ONCE
Qty: 0 | Refills: 0 | Status: COMPLETED | OUTPATIENT
Start: 2018-11-01 | End: 2018-11-01

## 2018-10-29 RX ORDER — ZIPRASIDONE HYDROCHLORIDE 20 MG/1
20 CAPSULE ORAL
Qty: 0 | Refills: 0 | Status: DISCONTINUED | OUTPATIENT
Start: 2018-10-29 | End: 2018-10-31

## 2018-10-29 RX ADMIN — ZIPRASIDONE HYDROCHLORIDE 20 MILLIGRAM(S): 20 CAPSULE ORAL at 20:57

## 2018-10-29 RX ADMIN — Medication 50 MICROGRAM(S): at 08:50

## 2018-10-29 RX ADMIN — Medication 0.5 MILLIGRAM(S): at 20:56

## 2018-10-29 RX ADMIN — Medication 40 MILLIGRAM(S): at 08:50

## 2018-10-29 RX ADMIN — FLUPHENAZINE HYDROCHLORIDE 10 MILLIGRAM(S): 1 TABLET, FILM COATED ORAL at 20:56

## 2018-10-29 RX ADMIN — Medication 1 TABLET(S): at 08:50

## 2018-10-29 RX ADMIN — METFORMIN HYDROCHLORIDE 750 MILLIGRAM(S): 850 TABLET ORAL at 20:56

## 2018-10-29 RX ADMIN — Medication 0.5 MILLIGRAM(S): at 08:49

## 2018-10-29 RX ADMIN — METFORMIN HYDROCHLORIDE 750 MILLIGRAM(S): 850 TABLET ORAL at 08:50

## 2018-10-29 RX ADMIN — ZIPRASIDONE HYDROCHLORIDE 40 MILLIGRAM(S): 20 CAPSULE ORAL at 08:50

## 2018-10-29 RX ADMIN — SIMVASTATIN 20 MILLIGRAM(S): 20 TABLET, FILM COATED ORAL at 20:57

## 2018-10-30 LAB
GLUCOSE BLDC GLUCOMTR-MCNC: 97 MG/DL — SIGNIFICANT CHANGE UP (ref 70–99)
GLUCOSE BLDC GLUCOMTR-MCNC: 99 MG/DL — SIGNIFICANT CHANGE UP (ref 70–99)

## 2018-10-30 PROCEDURE — 90853 GROUP PSYCHOTHERAPY: CPT

## 2018-10-30 PROCEDURE — 99232 SBSQ HOSP IP/OBS MODERATE 35: CPT | Mod: GC,25

## 2018-10-30 RX ADMIN — Medication 0.5 MILLIGRAM(S): at 21:25

## 2018-10-30 RX ADMIN — Medication 1 APPLICATION(S): at 09:45

## 2018-10-30 RX ADMIN — Medication 40 MILLIGRAM(S): at 09:45

## 2018-10-30 RX ADMIN — METFORMIN HYDROCHLORIDE 750 MILLIGRAM(S): 850 TABLET ORAL at 21:25

## 2018-10-30 RX ADMIN — Medication 1 TABLET(S): at 09:45

## 2018-10-30 RX ADMIN — FLUPHENAZINE HYDROCHLORIDE 10 MILLIGRAM(S): 1 TABLET, FILM COATED ORAL at 21:25

## 2018-10-30 RX ADMIN — METFORMIN HYDROCHLORIDE 750 MILLIGRAM(S): 850 TABLET ORAL at 09:45

## 2018-10-30 RX ADMIN — ZIPRASIDONE HYDROCHLORIDE 20 MILLIGRAM(S): 20 CAPSULE ORAL at 09:45

## 2018-10-30 RX ADMIN — SIMVASTATIN 20 MILLIGRAM(S): 20 TABLET, FILM COATED ORAL at 21:25

## 2018-10-30 RX ADMIN — ZIPRASIDONE HYDROCHLORIDE 20 MILLIGRAM(S): 20 CAPSULE ORAL at 21:25

## 2018-10-30 RX ADMIN — Medication 50 MICROGRAM(S): at 09:45

## 2018-10-30 RX ADMIN — Medication 0.5 MILLIGRAM(S): at 09:45

## 2018-10-30 RX ADMIN — POLYETHYLENE GLYCOL 3350 17 GRAM(S): 17 POWDER, FOR SOLUTION ORAL at 15:51

## 2018-10-31 LAB
GLUCOSE BLDC GLUCOMTR-MCNC: 102 MG/DL — HIGH (ref 70–99)
GLUCOSE BLDC GLUCOMTR-MCNC: 106 MG/DL — HIGH (ref 70–99)
GLUCOSE BLDC GLUCOMTR-MCNC: 92 MG/DL — SIGNIFICANT CHANGE UP (ref 70–99)

## 2018-10-31 PROCEDURE — 99232 SBSQ HOSP IP/OBS MODERATE 35: CPT | Mod: GC

## 2018-10-31 RX ADMIN — Medication 1 APPLICATION(S): at 09:22

## 2018-10-31 RX ADMIN — Medication 0.5 MILLIGRAM(S): at 20:32

## 2018-10-31 RX ADMIN — Medication 50 MICROGRAM(S): at 09:22

## 2018-10-31 RX ADMIN — Medication 1 TABLET(S): at 09:22

## 2018-10-31 RX ADMIN — FLUPHENAZINE HYDROCHLORIDE 10 MILLIGRAM(S): 1 TABLET, FILM COATED ORAL at 20:32

## 2018-10-31 RX ADMIN — Medication 0.5 MILLIGRAM(S): at 09:22

## 2018-10-31 RX ADMIN — METFORMIN HYDROCHLORIDE 750 MILLIGRAM(S): 850 TABLET ORAL at 20:32

## 2018-10-31 RX ADMIN — POLYETHYLENE GLYCOL 3350 17 GRAM(S): 17 POWDER, FOR SOLUTION ORAL at 20:37

## 2018-10-31 RX ADMIN — Medication 40 MILLIGRAM(S): at 09:22

## 2018-10-31 RX ADMIN — SIMVASTATIN 20 MILLIGRAM(S): 20 TABLET, FILM COATED ORAL at 20:33

## 2018-10-31 RX ADMIN — ZIPRASIDONE HYDROCHLORIDE 20 MILLIGRAM(S): 20 CAPSULE ORAL at 09:22

## 2018-10-31 RX ADMIN — METFORMIN HYDROCHLORIDE 750 MILLIGRAM(S): 850 TABLET ORAL at 09:22

## 2018-11-01 LAB
GLUCOSE BLDC GLUCOMTR-MCNC: 96 MG/DL — SIGNIFICANT CHANGE UP (ref 70–99)
GLUCOSE BLDC GLUCOMTR-MCNC: 98 MG/DL — SIGNIFICANT CHANGE UP (ref 70–99)

## 2018-11-01 PROCEDURE — 99232 SBSQ HOSP IP/OBS MODERATE 35: CPT

## 2018-11-01 RX ADMIN — METFORMIN HYDROCHLORIDE 750 MILLIGRAM(S): 850 TABLET ORAL at 08:39

## 2018-11-01 RX ADMIN — Medication 1 APPLICATION(S): at 09:43

## 2018-11-01 RX ADMIN — Medication 0.5 MILLIGRAM(S): at 08:39

## 2018-11-01 RX ADMIN — FLUPHENAZINE HYDROCHLORIDE 10 MILLIGRAM(S): 1 TABLET, FILM COATED ORAL at 20:56

## 2018-11-01 RX ADMIN — SIMVASTATIN 20 MILLIGRAM(S): 20 TABLET, FILM COATED ORAL at 20:57

## 2018-11-01 RX ADMIN — Medication 1 TABLET(S): at 08:39

## 2018-11-01 RX ADMIN — Medication 40 MILLIGRAM(S): at 08:39

## 2018-11-01 RX ADMIN — Medication 0.5 MILLIGRAM(S): at 20:56

## 2018-11-01 RX ADMIN — Medication 50 MICROGRAM(S): at 08:39

## 2018-11-01 RX ADMIN — FLUPHENAZINE HYDROCHLORIDE 12.5 MILLIGRAM(S): 1 TABLET, FILM COATED ORAL at 16:51

## 2018-11-01 RX ADMIN — METFORMIN HYDROCHLORIDE 750 MILLIGRAM(S): 850 TABLET ORAL at 20:57

## 2018-11-02 LAB
GLUCOSE BLDC GLUCOMTR-MCNC: 90 MG/DL — SIGNIFICANT CHANGE UP (ref 70–99)
GLUCOSE BLDC GLUCOMTR-MCNC: 94 MG/DL — SIGNIFICANT CHANGE UP (ref 70–99)

## 2018-11-02 PROCEDURE — 99232 SBSQ HOSP IP/OBS MODERATE 35: CPT | Mod: 25

## 2018-11-02 RX ORDER — METFORMIN HYDROCHLORIDE 850 MG/1
1 TABLET ORAL
Qty: 28 | Refills: 0 | OUTPATIENT
Start: 2018-11-02 | End: 2018-11-15

## 2018-11-02 RX ORDER — SIMVASTATIN 20 MG/1
1 TABLET, FILM COATED ORAL
Qty: 14 | Refills: 0 | OUTPATIENT
Start: 2018-11-02 | End: 2018-11-15

## 2018-11-02 RX ORDER — BENZTROPINE MESYLATE 1 MG
1 TABLET ORAL
Qty: 28 | Refills: 0 | OUTPATIENT
Start: 2018-11-02 | End: 2018-11-15

## 2018-11-02 RX ORDER — FUROSEMIDE 40 MG
1 TABLET ORAL
Qty: 14 | Refills: 0 | OUTPATIENT
Start: 2018-11-02 | End: 2018-11-15

## 2018-11-02 RX ORDER — LEVOTHYROXINE SODIUM 125 MCG
1 TABLET ORAL
Qty: 14 | Refills: 0 | OUTPATIENT
Start: 2018-11-02 | End: 2018-11-15

## 2018-11-02 RX ORDER — FLUPHENAZINE HYDROCHLORIDE 1 MG/1
1 TABLET, FILM COATED ORAL
Qty: 14 | Refills: 0 | OUTPATIENT
Start: 2018-11-02 | End: 2018-11-15

## 2018-11-02 RX ORDER — POLYETHYLENE GLYCOL 3350 17 G/17G
17 POWDER, FOR SOLUTION ORAL
Qty: 1 | Refills: 0 | OUTPATIENT
Start: 2018-11-02 | End: 2018-11-15

## 2018-11-02 RX ORDER — METOPROLOL TARTRATE 50 MG
1 TABLET ORAL
Qty: 0 | Refills: 0 | COMMUNITY

## 2018-11-02 RX ADMIN — FLUPHENAZINE HYDROCHLORIDE 10 MILLIGRAM(S): 1 TABLET, FILM COATED ORAL at 21:23

## 2018-11-02 RX ADMIN — Medication 1 TABLET(S): at 09:36

## 2018-11-02 RX ADMIN — Medication 50 MICROGRAM(S): at 09:35

## 2018-11-02 RX ADMIN — Medication 0.5 MILLIGRAM(S): at 21:23

## 2018-11-02 RX ADMIN — METFORMIN HYDROCHLORIDE 750 MILLIGRAM(S): 850 TABLET ORAL at 09:35

## 2018-11-02 RX ADMIN — SIMVASTATIN 20 MILLIGRAM(S): 20 TABLET, FILM COATED ORAL at 21:23

## 2018-11-02 RX ADMIN — Medication 0.5 MILLIGRAM(S): at 09:35

## 2018-11-02 RX ADMIN — Medication 1 APPLICATION(S): at 09:35

## 2018-11-02 RX ADMIN — Medication 40 MILLIGRAM(S): at 09:35

## 2018-11-02 RX ADMIN — METFORMIN HYDROCHLORIDE 750 MILLIGRAM(S): 850 TABLET ORAL at 21:23

## 2018-11-03 LAB
GLUCOSE BLDC GLUCOMTR-MCNC: 126 MG/DL — HIGH (ref 70–99)
GLUCOSE BLDC GLUCOMTR-MCNC: 88 MG/DL — SIGNIFICANT CHANGE UP (ref 70–99)
GLUCOSE BLDC GLUCOMTR-MCNC: 88 MG/DL — SIGNIFICANT CHANGE UP (ref 70–99)

## 2018-11-03 RX ADMIN — METFORMIN HYDROCHLORIDE 750 MILLIGRAM(S): 850 TABLET ORAL at 10:25

## 2018-11-03 RX ADMIN — Medication 1 TABLET(S): at 10:26

## 2018-11-03 RX ADMIN — Medication 0.5 MILLIGRAM(S): at 10:25

## 2018-11-03 RX ADMIN — Medication 40 MILLIGRAM(S): at 10:25

## 2018-11-03 RX ADMIN — Medication 50 MICROGRAM(S): at 10:25

## 2018-11-03 RX ADMIN — Medication 1 APPLICATION(S): at 10:25

## 2018-11-03 RX ADMIN — METFORMIN HYDROCHLORIDE 750 MILLIGRAM(S): 850 TABLET ORAL at 20:51

## 2018-11-03 RX ADMIN — Medication 0.5 MILLIGRAM(S): at 20:51

## 2018-11-03 RX ADMIN — FLUPHENAZINE HYDROCHLORIDE 10 MILLIGRAM(S): 1 TABLET, FILM COATED ORAL at 20:51

## 2018-11-03 RX ADMIN — SIMVASTATIN 20 MILLIGRAM(S): 20 TABLET, FILM COATED ORAL at 20:51

## 2018-11-03 RX ADMIN — Medication 1 APPLICATION(S): at 10:26

## 2018-11-04 LAB
GLUCOSE BLDC GLUCOMTR-MCNC: 107 MG/DL — HIGH (ref 70–99)
GLUCOSE BLDC GLUCOMTR-MCNC: 123 MG/DL — HIGH (ref 70–99)
GLUCOSE BLDC GLUCOMTR-MCNC: 91 MG/DL — SIGNIFICANT CHANGE UP (ref 70–99)

## 2018-11-04 RX ADMIN — Medication 0.5 MILLIGRAM(S): at 21:10

## 2018-11-04 RX ADMIN — Medication 50 MICROGRAM(S): at 09:29

## 2018-11-04 RX ADMIN — SIMVASTATIN 20 MILLIGRAM(S): 20 TABLET, FILM COATED ORAL at 21:11

## 2018-11-04 RX ADMIN — Medication 1 APPLICATION(S): at 09:30

## 2018-11-04 RX ADMIN — Medication 0.5 MILLIGRAM(S): at 09:30

## 2018-11-04 RX ADMIN — METFORMIN HYDROCHLORIDE 750 MILLIGRAM(S): 850 TABLET ORAL at 21:11

## 2018-11-04 RX ADMIN — Medication 40 MILLIGRAM(S): at 09:30

## 2018-11-04 RX ADMIN — METFORMIN HYDROCHLORIDE 750 MILLIGRAM(S): 850 TABLET ORAL at 09:29

## 2018-11-04 RX ADMIN — Medication 0.5 MILLIGRAM(S): at 21:11

## 2018-11-04 RX ADMIN — FLUPHENAZINE HYDROCHLORIDE 10 MILLIGRAM(S): 1 TABLET, FILM COATED ORAL at 21:10

## 2018-11-04 RX ADMIN — Medication 1 TABLET(S): at 09:29

## 2018-11-05 VITALS — TEMPERATURE: 97 F

## 2018-11-05 LAB — GLUCOSE BLDC GLUCOMTR-MCNC: 91 MG/DL — SIGNIFICANT CHANGE UP (ref 70–99)

## 2018-11-05 PROCEDURE — 99239 HOSP IP/OBS DSCHRG MGMT >30: CPT

## 2018-11-05 RX ORDER — INFLUENZA VIRUS VACCINE 15; 15; 15; 15 UG/.5ML; UG/.5ML; UG/.5ML; UG/.5ML
0.5 SUSPENSION INTRAMUSCULAR ONCE
Qty: 0 | Refills: 0 | Status: DISCONTINUED | OUTPATIENT
Start: 2018-11-05 | End: 2018-11-05

## 2018-11-05 RX ORDER — INFLUENZA VIRUS VACCINE 15; 15; 15; 15 UG/.5ML; UG/.5ML; UG/.5ML; UG/.5ML
0.5 SUSPENSION INTRAMUSCULAR ONCE
Qty: 0 | Refills: 0 | Status: COMPLETED | OUTPATIENT
Start: 2018-11-05 | End: 2018-11-05

## 2018-11-05 RX ADMIN — Medication 0.5 MILLIGRAM(S): at 09:15

## 2018-11-05 RX ADMIN — Medication 1 TABLET(S): at 09:16

## 2018-11-05 RX ADMIN — Medication 40 MILLIGRAM(S): at 09:15

## 2018-11-05 RX ADMIN — Medication 1 APPLICATION(S): at 09:16

## 2018-11-05 RX ADMIN — INFLUENZA VIRUS VACCINE 0.5 MILLILITER(S): 15; 15; 15; 15 SUSPENSION INTRAMUSCULAR at 11:00

## 2018-11-05 RX ADMIN — Medication 50 MICROGRAM(S): at 09:16

## 2018-11-05 RX ADMIN — METFORMIN HYDROCHLORIDE 750 MILLIGRAM(S): 850 TABLET ORAL at 09:16

## 2018-11-05 RX ADMIN — Medication 1 APPLICATION(S): at 08:49

## 2018-11-26 ENCOUNTER — OUTPATIENT (OUTPATIENT)
Dept: OUTPATIENT SERVICES | Facility: HOSPITAL | Age: 58
LOS: 1 days | Discharge: HOME | End: 2018-11-26
Payer: COMMERCIAL

## 2018-11-27 DIAGNOSIS — F10.10 ALCOHOL ABUSE, UNCOMPLICATED: ICD-10-CM

## 2018-11-27 DIAGNOSIS — E11.9 TYPE 2 DIABETES MELLITUS WITHOUT COMPLICATIONS: ICD-10-CM

## 2018-11-27 DIAGNOSIS — E78.5 HYPERLIPIDEMIA, UNSPECIFIED: ICD-10-CM

## 2018-11-27 DIAGNOSIS — F20.2 CATATONIC SCHIZOPHRENIA: ICD-10-CM

## 2018-11-27 DIAGNOSIS — E03.9 HYPOTHYROIDISM, UNSPECIFIED: ICD-10-CM

## 2019-04-04 ENCOUNTER — RESULT REVIEW (OUTPATIENT)
Age: 59
End: 2019-04-04

## 2019-04-12 ENCOUNTER — EMERGENCY (EMERGENCY)
Facility: HOSPITAL | Age: 59
LOS: 1 days | Discharge: HOME | End: 2019-04-12
Attending: EMERGENCY MEDICINE
Payer: COMMERCIAL

## 2019-04-12 VITALS
DIASTOLIC BLOOD PRESSURE: 72 MMHG | RESPIRATION RATE: 22 BRPM | HEART RATE: 88 BPM | SYSTOLIC BLOOD PRESSURE: 145 MMHG | OXYGEN SATURATION: 98 %

## 2019-04-12 LAB
ALBUMIN SERPL ELPH-MCNC: 4.7 G/DL — SIGNIFICANT CHANGE UP (ref 3.3–5)
ALP SERPL-CCNC: 83 U/L — SIGNIFICANT CHANGE UP (ref 40–120)
ALT FLD-CCNC: 19 U/L — SIGNIFICANT CHANGE UP (ref 10–45)
ANION GAP SERPL CALC-SCNC: 14 MMOL/L — SIGNIFICANT CHANGE UP (ref 5–17)
APPEARANCE UR: CLEAR — SIGNIFICANT CHANGE UP
APTT BLD: 25.5 SEC — LOW (ref 27.5–36.3)
AST SERPL-CCNC: 40 U/L — SIGNIFICANT CHANGE UP (ref 10–40)
BASOPHILS # BLD AUTO: 0.1 K/UL — SIGNIFICANT CHANGE UP (ref 0–0.2)
BASOPHILS NFR BLD AUTO: 0.7 % — SIGNIFICANT CHANGE UP (ref 0–2)
BILIRUB SERPL-MCNC: 0.2 MG/DL — SIGNIFICANT CHANGE UP (ref 0.2–1.2)
BILIRUB UR-MCNC: NEGATIVE — SIGNIFICANT CHANGE UP
BLD GP AB SCN SERPL QL: NEGATIVE — SIGNIFICANT CHANGE UP
BUN SERPL-MCNC: 9 MG/DL — SIGNIFICANT CHANGE UP (ref 7–23)
CALCIUM SERPL-MCNC: 9.4 MG/DL — SIGNIFICANT CHANGE UP (ref 8.4–10.5)
CHLORIDE SERPL-SCNC: 91 MMOL/L — LOW (ref 96–108)
CK SERPL-CCNC: 114 U/L — SIGNIFICANT CHANGE UP (ref 25–170)
CO2 SERPL-SCNC: 24 MMOL/L — SIGNIFICANT CHANGE UP (ref 22–31)
COLOR SPEC: COLORLESS — SIGNIFICANT CHANGE UP
CREAT SERPL-MCNC: 0.43 MG/DL — LOW (ref 0.5–1.3)
DIFF PNL FLD: NEGATIVE — SIGNIFICANT CHANGE UP
EOSINOPHIL # BLD AUTO: 0.1 K/UL — SIGNIFICANT CHANGE UP (ref 0–0.5)
EOSINOPHIL NFR BLD AUTO: 1.3 % — SIGNIFICANT CHANGE UP (ref 0–6)
ETHANOL SERPL-MCNC: 257 MG/DL — HIGH (ref 0–10)
GAS PNL BLDV: SIGNIFICANT CHANGE UP
GAS PNL BLDV: SIGNIFICANT CHANGE UP
GLUCOSE SERPL-MCNC: 103 MG/DL — HIGH (ref 70–99)
GLUCOSE UR QL: NEGATIVE — SIGNIFICANT CHANGE UP
HCT VFR BLD CALC: 40 % — SIGNIFICANT CHANGE UP (ref 34.5–45)
HGB BLD-MCNC: 13.3 G/DL — SIGNIFICANT CHANGE UP (ref 11.5–15.5)
INR BLD: 0.89 RATIO — SIGNIFICANT CHANGE UP (ref 0.88–1.16)
KETONES UR-MCNC: NEGATIVE — SIGNIFICANT CHANGE UP
LEUKOCYTE ESTERASE UR-ACNC: ABNORMAL
LIDOCAIN IGE QN: 57 U/L — SIGNIFICANT CHANGE UP (ref 7–60)
LYMPHOCYTES # BLD AUTO: 2.5 K/UL — SIGNIFICANT CHANGE UP (ref 1–3.3)
LYMPHOCYTES # BLD AUTO: 25 % — SIGNIFICANT CHANGE UP (ref 13–44)
MCHC RBC-ENTMCNC: 29.3 PG — SIGNIFICANT CHANGE UP (ref 27–34)
MCHC RBC-ENTMCNC: 33.4 GM/DL — SIGNIFICANT CHANGE UP (ref 32–36)
MCV RBC AUTO: 87.7 FL — SIGNIFICANT CHANGE UP (ref 80–100)
MONOCYTES # BLD AUTO: 0.7 K/UL — SIGNIFICANT CHANGE UP (ref 0–0.9)
MONOCYTES NFR BLD AUTO: 7.2 % — SIGNIFICANT CHANGE UP (ref 2–14)
NEUTROPHILS # BLD AUTO: 6.6 K/UL — SIGNIFICANT CHANGE UP (ref 1.8–7.4)
NEUTROPHILS NFR BLD AUTO: 65.8 % — SIGNIFICANT CHANGE UP (ref 43–77)
NITRITE UR-MCNC: NEGATIVE — SIGNIFICANT CHANGE UP
PH UR: 6.5 — SIGNIFICANT CHANGE UP (ref 5–8)
PLATELET # BLD AUTO: 407 K/UL — HIGH (ref 150–400)
POTASSIUM SERPL-MCNC: 4.4 MMOL/L — SIGNIFICANT CHANGE UP (ref 3.5–5.3)
POTASSIUM SERPL-SCNC: 4.4 MMOL/L — SIGNIFICANT CHANGE UP (ref 3.5–5.3)
PROT SERPL-MCNC: 7.7 G/DL — SIGNIFICANT CHANGE UP (ref 6–8.3)
PROT UR-MCNC: NEGATIVE — SIGNIFICANT CHANGE UP
PROTHROM AB SERPL-ACNC: 10.2 SEC — SIGNIFICANT CHANGE UP (ref 10–12.9)
RBC # BLD: 4.56 M/UL — SIGNIFICANT CHANGE UP (ref 3.8–5.2)
RBC # FLD: 11.3 % — SIGNIFICANT CHANGE UP (ref 10.3–14.5)
RH IG SCN BLD-IMP: POSITIVE — SIGNIFICANT CHANGE UP
SODIUM SERPL-SCNC: 129 MMOL/L — LOW (ref 135–145)
SP GR SPEC: 1 — LOW (ref 1.01–1.02)
UROBILINOGEN FLD QL: NEGATIVE — SIGNIFICANT CHANGE UP
WBC # BLD: 10 K/UL — SIGNIFICANT CHANGE UP (ref 3.8–10.5)
WBC # FLD AUTO: 10 K/UL — SIGNIFICANT CHANGE UP (ref 3.8–10.5)

## 2019-04-12 PROCEDURE — 70486 CT MAXILLOFACIAL W/O DYE: CPT | Mod: 26

## 2019-04-12 PROCEDURE — 72125 CT NECK SPINE W/O DYE: CPT | Mod: 26

## 2019-04-12 PROCEDURE — 93010 ELECTROCARDIOGRAM REPORT: CPT | Mod: 59

## 2019-04-12 PROCEDURE — 99284 EMERGENCY DEPT VISIT MOD MDM: CPT

## 2019-04-12 PROCEDURE — 76377 3D RENDER W/INTRP POSTPROCES: CPT | Mod: 26

## 2019-04-12 PROCEDURE — 70450 CT HEAD/BRAIN W/O DYE: CPT | Mod: 26

## 2019-04-12 PROCEDURE — 71045 X-RAY EXAM CHEST 1 VIEW: CPT | Mod: 26

## 2019-04-12 PROCEDURE — 12011 RPR F/E/E/N/L/M 2.5 CM/<: CPT

## 2019-04-12 PROCEDURE — 99291 CRITICAL CARE FIRST HOUR: CPT | Mod: 25

## 2019-04-12 PROCEDURE — 72170 X-RAY EXAM OF PELVIS: CPT | Mod: 26

## 2019-04-12 PROCEDURE — 73562 X-RAY EXAM OF KNEE 3: CPT | Mod: 26,LT

## 2019-04-12 RX ORDER — SODIUM CHLORIDE 9 MG/ML
1000 INJECTION INTRAMUSCULAR; INTRAVENOUS; SUBCUTANEOUS ONCE
Qty: 0 | Refills: 0 | Status: COMPLETED | OUTPATIENT
Start: 2019-04-12 | End: 2019-04-12

## 2019-04-12 RX ORDER — TETANUS TOXOID, REDUCED DIPHTHERIA TOXOID AND ACELLULAR PERTUSSIS VACCINE, ADSORBED 5; 2.5; 8; 8; 2.5 [IU]/.5ML; [IU]/.5ML; UG/.5ML; UG/.5ML; UG/.5ML
0.5 SUSPENSION INTRAMUSCULAR ONCE
Qty: 0 | Refills: 0 | Status: COMPLETED | OUTPATIENT
Start: 2019-04-12 | End: 2019-04-12

## 2019-04-12 RX ORDER — CEFTRIAXONE 500 MG/1
1 INJECTION, POWDER, FOR SOLUTION INTRAMUSCULAR; INTRAVENOUS ONCE
Qty: 0 | Refills: 0 | Status: COMPLETED | OUTPATIENT
Start: 2019-04-12 | End: 2019-04-12

## 2019-04-12 RX ADMIN — SODIUM CHLORIDE 2000 MILLILITER(S): 9 INJECTION INTRAMUSCULAR; INTRAVENOUS; SUBCUTANEOUS at 19:46

## 2019-04-12 RX ADMIN — SODIUM CHLORIDE 2000 MILLILITER(S): 9 INJECTION INTRAMUSCULAR; INTRAVENOUS; SUBCUTANEOUS at 22:19

## 2019-04-12 RX ADMIN — CEFTRIAXONE 100 GRAM(S): 500 INJECTION, POWDER, FOR SOLUTION INTRAMUSCULAR; INTRAVENOUS at 22:19

## 2019-04-12 RX ADMIN — TETANUS TOXOID, REDUCED DIPHTHERIA TOXOID AND ACELLULAR PERTUSSIS VACCINE, ADSORBED 0.5 MILLILITER(S): 5; 2.5; 8; 8; 2.5 SUSPENSION INTRAMUSCULAR at 19:46

## 2019-04-12 NOTE — ED ADULT NURSE REASSESSMENT NOTE - NS ED NURSE REASSESS COMMENT FT1
pt requesting that staff call her boyfriend Luciano at [phone]. Luciano called and informed that pt was in ED but no medical information given. Luciano reports that pt has a hx of anxiety, schizophrenia, and ETOH abuse with a recent admission to rehab 3 months ago. unknown medications.

## 2019-04-12 NOTE — ED PROCEDURE NOTE - ATTENDING CONTRIBUTION TO CARE
***Porfirio Martin MD [facility]*** Attending physician was available for the key components of the procedure, the patient tolerated well. There were no complications with the procedure.

## 2019-04-12 NOTE — ED PROVIDER NOTE - SHIFT CHANGE DETAILS
Porfirio Martin MD [facility] note of transfer at the usual time of sign out: Receiving team will follow up on labs, analgesia, any clinical imaging, reassess and disposition as clinically indicated.  Details of patient and plan conveyed to receiving physician and conveyed back for understanding.  There were no questions at this time about the patient's status, disposition, and plan. Patient's care to be taken over by receiving physician at this time, all decisions regarding the progression of care will be made at their discretion.

## 2019-04-12 NOTE — ED PROVIDER NOTE - CARE PLAN
Principal Discharge DX:	UTI (urinary tract infection)  Secondary Diagnosis:	Fall Principal Discharge DX:	UTI (urinary tract infection)  Goal:	dental fracture of 8th tooth and foreign body in the upper lip, initial encounter  Secondary Diagnosis:	Fall  Secondary Diagnosis:	Altered mental state
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
## 2019-04-12 NOTE — ED PROVIDER NOTE - PHYSICAL EXAMINATION
Resident:   Gen: no acute distress  Head: NC, AT  ENT: PERRL  Neck: c-collar in place  Chest: CTAB, no retractions, rate normal, appears to breathe comfortably  Heart: RRR S1S2, No peripheral edema, bilateral pulses in arms and legs  Abd: Soft non-tender, no rebound or guarding  Back: No spinal deformity, no CVAT  Ext: Moving all 4 extremities without obvious impairment to ROM, no obvious weakness  Neuro: slurred speech  Psych: No anxiety, depression or pressured speech noted  Skin: forehead hematoma and laceration
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
## 2019-04-12 NOTE — ED ADULT NURSE NOTE - NSIMPLEMENTINTERV_GEN_ALL_ED
Implemented All Fall Risk Interventions:  [place] to call system. Call bell, personal items and telephone within reach. Instruct patient to call for assistance. Room bathroom lighting operational. Non-slip footwear when patient is off stretcher. Physically safe environment: no spills, clutter or unnecessary equipment. Stretcher in lowest position, wheels locked, appropriate side rails in place. Provide visual cue, wrist band, yellow gown, etc. Monitor gait and stability. Monitor for mental status changes and reorient to person, place, and time. Review medications for side effects contributing to fall risk. Reinforce activity limits and safety measures with patient and family.
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
## 2019-04-12 NOTE — ED PROVIDER NOTE - PROGRESS NOTE DETAILS
Juan Foster MD, PGY3: Patient received at resident sign out. Paged dental for [place] type 2 or 3 fracture a/w lip laceration. ZACK Gasca MD : endorsed to me pending reassess for clinical sobriety.   pt clinically sober, shira collar cleared by me, pt has no money for cab or anyone to pick her up, will await SW in ED for help getting back to place of residence. Juan Foster MD, PGY3: Pt required 2mg versed for anxiolysis as she could not tolerate dental exam. Pt improved, lacerations closed, pt clinically sober. Left voice messages for .
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
## 2019-04-12 NOTE — ED ADULT NURSE REASSESSMENT NOTE - NS ED NURSE REASSESS COMMENT FT1
pt increasingly agitated attempting to get out of bed, attempting to take off her collar. pt stating she has to go to the bathroom, pt placed on  a bedpan x 2 each time with approx 250mL of clear, yellow urine. 1:1 initiated for safety.

## 2019-04-13 VITALS
HEART RATE: 98 BPM | TEMPERATURE: 98 F | SYSTOLIC BLOOD PRESSURE: 143 MMHG | RESPIRATION RATE: 18 BRPM | DIASTOLIC BLOOD PRESSURE: 92 MMHG | OXYGEN SATURATION: 100 %

## 2019-04-13 PROCEDURE — 70450 CT HEAD/BRAIN W/O DYE: CPT

## 2019-04-13 PROCEDURE — 84132 ASSAY OF SERUM POTASSIUM: CPT

## 2019-04-13 PROCEDURE — 85730 THROMBOPLASTIN TIME PARTIAL: CPT

## 2019-04-13 PROCEDURE — G0390: CPT

## 2019-04-13 PROCEDURE — 73562 X-RAY EXAM OF KNEE 3: CPT

## 2019-04-13 PROCEDURE — 83690 ASSAY OF LIPASE: CPT

## 2019-04-13 PROCEDURE — 80307 DRUG TEST PRSMV CHEM ANLYZR: CPT

## 2019-04-13 PROCEDURE — 86901 BLOOD TYPING SEROLOGIC RH(D): CPT

## 2019-04-13 PROCEDURE — 12011 RPR F/E/E/N/L/M 2.5 CM/<: CPT

## 2019-04-13 PROCEDURE — 99291 CRITICAL CARE FIRST HOUR: CPT | Mod: 25

## 2019-04-13 PROCEDURE — 86900 BLOOD TYPING SEROLOGIC ABO: CPT

## 2019-04-13 PROCEDURE — 85014 HEMATOCRIT: CPT

## 2019-04-13 PROCEDURE — 81001 URINALYSIS AUTO W/SCOPE: CPT

## 2019-04-13 PROCEDURE — 80053 COMPREHEN METABOLIC PANEL: CPT

## 2019-04-13 PROCEDURE — 82435 ASSAY OF BLOOD CHLORIDE: CPT

## 2019-04-13 PROCEDURE — 90471 IMMUNIZATION ADMIN: CPT

## 2019-04-13 PROCEDURE — 85027 COMPLETE CBC AUTOMATED: CPT

## 2019-04-13 PROCEDURE — 72125 CT NECK SPINE W/O DYE: CPT

## 2019-04-13 PROCEDURE — 84295 ASSAY OF SERUM SODIUM: CPT

## 2019-04-13 PROCEDURE — 86850 RBC ANTIBODY SCREEN: CPT

## 2019-04-13 PROCEDURE — 82803 BLOOD GASES ANY COMBINATION: CPT

## 2019-04-13 PROCEDURE — 72170 X-RAY EXAM OF PELVIS: CPT

## 2019-04-13 PROCEDURE — 70486 CT MAXILLOFACIAL W/O DYE: CPT

## 2019-04-13 PROCEDURE — 96375 TX/PRO/DX INJ NEW DRUG ADDON: CPT | Mod: XU

## 2019-04-13 PROCEDURE — 76377 3D RENDER W/INTRP POSTPROCES: CPT

## 2019-04-13 PROCEDURE — 82550 ASSAY OF CK (CPK): CPT

## 2019-04-13 PROCEDURE — 82947 ASSAY GLUCOSE BLOOD QUANT: CPT

## 2019-04-13 PROCEDURE — 71045 X-RAY EXAM CHEST 1 VIEW: CPT

## 2019-04-13 PROCEDURE — 85610 PROTHROMBIN TIME: CPT

## 2019-04-13 PROCEDURE — 90715 TDAP VACCINE 7 YRS/> IM: CPT

## 2019-04-13 PROCEDURE — 82330 ASSAY OF CALCIUM: CPT

## 2019-04-13 PROCEDURE — 96374 THER/PROPH/DIAG INJ IV PUSH: CPT | Mod: XU

## 2019-04-13 PROCEDURE — 83605 ASSAY OF LACTIC ACID: CPT

## 2019-04-13 PROCEDURE — 93005 ELECTROCARDIOGRAM TRACING: CPT | Mod: XU

## 2019-04-13 RX ORDER — CEPHALEXIN 500 MG
1 CAPSULE ORAL
Qty: 10 | Refills: 0 | OUTPATIENT
Start: 2019-04-13 | End: 2019-04-17

## 2019-04-13 RX ORDER — MIDAZOLAM HYDROCHLORIDE 1 MG/ML
2 INJECTION, SOLUTION INTRAMUSCULAR; INTRAVENOUS ONCE
Qty: 0 | Refills: 0 | Status: DISCONTINUED | OUTPATIENT
Start: 2019-04-13 | End: 2019-04-13

## 2019-04-13 RX ADMIN — MIDAZOLAM HYDROCHLORIDE 2 MILLIGRAM(S): 1 INJECTION, SOLUTION INTRAMUSCULAR; INTRAVENOUS at 00:26
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
## 2019-04-13 NOTE — ED ADULT NURSE REASSESSMENT NOTE - NS ED NURSE REASSESS COMMENT FT1
pt awake and ambulating in ed on 1:1. NAD. awaiting dispo. condition appears improved ,she is awake and alert. behaving appropriately.
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
## 2019-04-13 NOTE — ED ADULT NURSE REASSESSMENT NOTE - NS ED NURSE REASSESS COMMENT FT1
patient is resting comfortably in bed in no acute distress. 1:1 in place for safety. patient is AAOx3. lung sounds clear. cap refill <3sec. patient denies pain at this time. VSS. waiting for MD to reassess.
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
## 2019-04-16 ENCOUNTER — EMERGENCY (EMERGENCY)
Facility: HOSPITAL | Age: 59
LOS: 1 days | Discharge: HOME | End: 2019-04-16
Attending: EMERGENCY MEDICINE | Admitting: EMERGENCY MEDICINE
Payer: COMMERCIAL

## 2019-04-16 VITALS
TEMPERATURE: 99 F | SYSTOLIC BLOOD PRESSURE: 156 MMHG | OXYGEN SATURATION: 100 % | RESPIRATION RATE: 20 BRPM | HEART RATE: 100 BPM | DIASTOLIC BLOOD PRESSURE: 91 MMHG

## 2019-04-16 VITALS
HEART RATE: 98 BPM | SYSTOLIC BLOOD PRESSURE: 153 MMHG | OXYGEN SATURATION: 99 % | TEMPERATURE: 98 F | DIASTOLIC BLOOD PRESSURE: 91 MMHG | RESPIRATION RATE: 17 BRPM

## 2019-04-16 DIAGNOSIS — F20.9 SCHIZOPHRENIA, UNSPECIFIED: ICD-10-CM

## 2019-04-16 DIAGNOSIS — F10.929 ALCOHOL USE, UNSPECIFIED WITH INTOXICATION, UNSPECIFIED: ICD-10-CM

## 2019-04-16 DIAGNOSIS — F10.10 ALCOHOL ABUSE, UNCOMPLICATED: ICD-10-CM

## 2019-04-16 DIAGNOSIS — R69 ILLNESS, UNSPECIFIED: ICD-10-CM

## 2019-04-16 LAB
ALBUMIN SERPL ELPH-MCNC: 4.7 G/DL — SIGNIFICANT CHANGE UP (ref 3.3–5)
ALP SERPL-CCNC: 82 U/L — SIGNIFICANT CHANGE UP (ref 40–120)
ALT FLD-CCNC: 17 U/L — SIGNIFICANT CHANGE UP (ref 4–33)
ANION GAP SERPL CALC-SCNC: 17 MMO/L — HIGH (ref 7–14)
APAP SERPL-MCNC: < 15 UG/ML — LOW (ref 15–25)
AST SERPL-CCNC: 26 U/L — SIGNIFICANT CHANGE UP (ref 4–32)
BASOPHILS # BLD AUTO: 0.07 K/UL — SIGNIFICANT CHANGE UP (ref 0–0.2)
BASOPHILS NFR BLD AUTO: 0.8 % — SIGNIFICANT CHANGE UP (ref 0–2)
BILIRUB SERPL-MCNC: < 0.2 MG/DL — LOW (ref 0.2–1.2)
BUN SERPL-MCNC: 7 MG/DL — SIGNIFICANT CHANGE UP (ref 7–23)
CALCIUM SERPL-MCNC: 9.3 MG/DL — SIGNIFICANT CHANGE UP (ref 8.4–10.5)
CHLORIDE SERPL-SCNC: 104 MMOL/L — SIGNIFICANT CHANGE UP (ref 98–107)
CO2 SERPL-SCNC: 21 MMOL/L — LOW (ref 22–31)
CREAT SERPL-MCNC: 0.57 MG/DL — SIGNIFICANT CHANGE UP (ref 0.5–1.3)
EOSINOPHIL # BLD AUTO: 0.17 K/UL — SIGNIFICANT CHANGE UP (ref 0–0.5)
EOSINOPHIL NFR BLD AUTO: 2 % — SIGNIFICANT CHANGE UP (ref 0–6)
ETHANOL BLD-MCNC: 219 MG/DL — HIGH
GLUCOSE SERPL-MCNC: 87 MG/DL — SIGNIFICANT CHANGE UP (ref 70–99)
HCT VFR BLD CALC: 40.9 % — SIGNIFICANT CHANGE UP (ref 34.5–45)
HGB BLD-MCNC: 13.5 G/DL — SIGNIFICANT CHANGE UP (ref 11.5–15.5)
HIV COMBO RESULT: SIGNIFICANT CHANGE UP
HIV1+2 AB SPEC QL: SIGNIFICANT CHANGE UP
IMM GRANULOCYTES NFR BLD AUTO: 0.3 % — SIGNIFICANT CHANGE UP (ref 0–1.5)
LYMPHOCYTES # BLD AUTO: 2.62 K/UL — SIGNIFICANT CHANGE UP (ref 1–3.3)
LYMPHOCYTES # BLD AUTO: 30.5 % — SIGNIFICANT CHANGE UP (ref 13–44)
MCHC RBC-ENTMCNC: 29.9 PG — SIGNIFICANT CHANGE UP (ref 27–34)
MCHC RBC-ENTMCNC: 33 % — SIGNIFICANT CHANGE UP (ref 32–36)
MCV RBC AUTO: 90.7 FL — SIGNIFICANT CHANGE UP (ref 80–100)
MONOCYTES # BLD AUTO: 0.37 K/UL — SIGNIFICANT CHANGE UP (ref 0–0.9)
MONOCYTES NFR BLD AUTO: 4.3 % — SIGNIFICANT CHANGE UP (ref 2–14)
NEUTROPHILS # BLD AUTO: 5.33 K/UL — SIGNIFICANT CHANGE UP (ref 1.8–7.4)
NEUTROPHILS NFR BLD AUTO: 62.1 % — SIGNIFICANT CHANGE UP (ref 43–77)
NRBC # FLD: 0 K/UL — SIGNIFICANT CHANGE UP (ref 0–0)
PLATELET # BLD AUTO: 349 K/UL — SIGNIFICANT CHANGE UP (ref 150–400)
PMV BLD: 9.1 FL — SIGNIFICANT CHANGE UP (ref 7–13)
POTASSIUM SERPL-MCNC: 3.9 MMOL/L — SIGNIFICANT CHANGE UP (ref 3.5–5.3)
POTASSIUM SERPL-SCNC: 3.9 MMOL/L — SIGNIFICANT CHANGE UP (ref 3.5–5.3)
PROT SERPL-MCNC: 7.7 G/DL — SIGNIFICANT CHANGE UP (ref 6–8.3)
RBC # BLD: 4.51 M/UL — SIGNIFICANT CHANGE UP (ref 3.8–5.2)
RBC # FLD: 12.2 % — SIGNIFICANT CHANGE UP (ref 10.3–14.5)
SALICYLATES SERPL-MCNC: < 5 MG/DL — LOW (ref 15–30)
SODIUM SERPL-SCNC: 142 MMOL/L — SIGNIFICANT CHANGE UP (ref 135–145)
TSH SERPL-MCNC: 1.31 UIU/ML — SIGNIFICANT CHANGE UP (ref 0.27–4.2)
WBC # BLD: 8.59 K/UL — SIGNIFICANT CHANGE UP (ref 3.8–10.5)
WBC # FLD AUTO: 8.59 K/UL — SIGNIFICANT CHANGE UP (ref 3.8–10.5)

## 2019-04-16 PROCEDURE — 99285 EMERGENCY DEPT VISIT HI MDM: CPT

## 2019-04-16 PROCEDURE — 90792 PSYCH DIAG EVAL W/MED SRVCS: CPT

## 2019-04-16 RX ORDER — HALOPERIDOL DECANOATE 100 MG/ML
5 INJECTION INTRAMUSCULAR ONCE
Qty: 0 | Refills: 0 | Status: COMPLETED | OUTPATIENT
Start: 2019-04-16 | End: 2019-04-16

## 2019-04-16 RX ADMIN — HALOPERIDOL DECANOATE 5 MILLIGRAM(S): 100 INJECTION INTRAMUSCULAR at 15:00

## 2019-04-16 RX ADMIN — Medication 2 MILLIGRAM(S): at 15:00
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
## 2019-04-16 NOTE — ED BEHAVIORAL HEALTH ASSESSMENT NOTE - DESCRIPTION
see hpi During course of ED visit patient was disorganized, laughing inappropriately, pacing and appeared to be responding to internal stimuli. She was given IM medication Haldol 5mg, Ativan 2mg by EM.    Vital Signs Last 24 Hrs  T(C): 37.2 (16 Apr 2019 11:35), Max: 37.2 (16 Apr 2019 11:35)  T(F): 98.9 (16 Apr 2019 11:35), Max: 98.9 (16 Apr 2019 11:35)  HR: 100 (16 Apr 2019 11:35) (100 - 100)  BP: 156/91 (16 Apr 2019 11:35) (156/91 - 156/91)  BP(mean): --  RR: 20 (16 Apr 2019 11:35) (20 - 20)  SpO2: 100% (16 Apr 2019 11:35) (100% - 100%) DM2, HTN, HLD, Hypothyroidism, hx hyponatremia and recent UTI dx 4/12/19 at [facility] During course of ED visit patient was disorganized, laughing inappropriately and pacing. She was given IM medication Haldol 5mg, Ativan 2mg by EM.    Vital Signs Last 24 Hrs  T(C): 37.2 (16 Apr 2019 11:35), Max: 37.2 (16 Apr 2019 11:35)  T(F): 98.9 (16 Apr 2019 11:35), Max: 98.9 (16 Apr 2019 11:35)  HR: 100 (16 Apr 2019 11:35) (100 - 100)  BP: 156/91 (16 Apr 2019 11:35) (156/91 - 156/91)  BP(mean): --  RR: 20 (16 Apr 2019 11:35) (20 - 20)  SpO2: 100% (16 Apr 2019 11:35) (100% - 100%)

## 2019-04-16 NOTE — ED BEHAVIORAL HEALTH ASSESSMENT NOTE - ORIENTATION OTHER
patient unable to fully participate in evaluation due to severity of symptoms patient unable to fully participate in evaluation due to severity of symptoms vs. intox
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
## 2019-04-16 NOTE — ED ADULT NURSE REASSESSMENT NOTE - NS ED NURSE REASSESS COMMENT FT1
Dr Carey evaluated this pt, as per Dr. Carey pt is to been seen in  as not psychiatrically stable for transfer to [facility]
Received pt laying in stretcher sedated s/p IM meds received prior for psychosis. Pt remains drowsy, arousable to tactile stimuli and able to follow verbal command. Pt not answering questions appropriately, + disorganized thought process. VSS. Pending MC and psych reassessment. Will continue to monitor for safety

## 2019-04-16 NOTE — ED BEHAVIORAL HEALTH NOTE - BEHAVIORAL HEALTH NOTE
Re-evaluated patient at 23:00. Patient was noted to be falling asleep during evaluation. She continued to be unable to answer questions and appeared sedated, most likely secondary to medication received (Haldol and Ativan). Unable to engage in meaningful interview at this time. Will re-evaluate

## 2019-04-16 NOTE — ED BEHAVIORAL HEALTH ASSESSMENT NOTE - SUBSTANCE ISSUES AND PLAN (INCLUDE STANDING AND PRN MEDICATION)
symptom trigger CIWA Informed Dr. Carey would recommend symptom trigger CIWA as it is unclear how much alcohol patient has been drinking Informed Dr. Carey would recommend symptom trigger CIWA as it is unclear how much alcohol patient has been drinking- Dr. Barker agreeing with this plan

## 2019-04-16 NOTE — ED BEHAVIORAL HEALTH NOTE - BEHAVIORAL HEALTH NOTE
Spoke with Dr. Cleary ([phone]) at [facility]- He reports he last saw patient 3/29/19. Patient has a history of non compliance with medication and injections. She received her injection 3/29/19. Described patient disposition he stated presentation is similar but she appears more decompensated than usual. He stated she has no known history of trauma but it is possible or possibly related to delusions.     Called [facility] [phone]- Current medications- Benztropine 0.5mg BID, Furosemide 20mg Daily, Simvastatin 20mg QHS, Levothyroxine 50mcg daily, Cardizem CD 300mg Daily. Spoke with Dr. Cleary ([phone]) at [facility]- He reports he last saw patient 3/29/19. Patient has a history of non compliance with medication and injections. She received her injection 3/29/19. He stated in the past patient will miss injections but will not appear as decompensated as the current disposition being described. He stated she has no known history of trauma but it is possible or possibly related to delusions. He is aware of history of alcohol use. Patient is chronically non compliant.     Called [facility] [phone]- Current medications- Benztropine 0.5mg BID, Furosemide 20mg Daily, Simvastatin 20mg QHS, Levothyroxine 50mcg daily, Cardizem CD 300mg Daily. No Metformin listed or picked up since 11/18. Spoke with Dr. Cleary ([phone]) at [facility]- He reports he last saw patient 3/29/19. Patient has a history of non compliance with medication and injections. She received her injection 3/29/19. He stated in the past patient will miss injections but will not appear as decompensated as the current disposition being described. He stated she has no known history of trauma but it is possible or possibly related to delusions. He is aware of history of alcohol use. Patient has history of being non compliant.     Called [facility] [phone]- Current medications- Benztropine 0.5mg BID, Furosemide 20mg Daily, Simvastatin 20mg QHS, Levothyroxine 50mcg daily, Cardizem CD 300mg Daily. Medicine filled yesterday but not yet picked up. No Metformin listed or picked up since 11/18.
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
## 2019-04-16 NOTE — ED ADULT NURSE REASSESSMENT NOTE - DESCRIPTION
1400-Patient agitated, remains psychotic, banging on glass window and yelling at staff. Pt unable to verbally deescalate. MD Carey notified. Patient medicated for safety and stabilization. will continue to monitor.

## 2019-04-16 NOTE — ED BEHAVIORAL HEALTH ASSESSMENT NOTE - MEDICAL ISSUES AND PLAN (INCLUDE STANDING AND PRN MEDICATION)
none needed none needed at this time per Dr. Carey- would recommend repeat urine as patient dx 4/12/19 with UTI

## 2019-04-16 NOTE — ED PROVIDER NOTE - PMH
Diabetes mellitus of other type without complication Alcohol abuse    Diabetes mellitus of other type without complication    Psychosis

## 2019-04-16 NOTE — ED BEHAVIORAL HEALTH ASSESSMENT NOTE - OTHER PAST PSYCHIATRIC HISTORY (INCLUDE DETAILS REGARDING ONSET, COURSE OF ILLNESS, INPATIENT/OUTPATIENT TREATMENT)
Patient is a 59 y/o  female, domiciled alone, on SSD, and is a non caregiver.  She has a past psychiatric history of  Schizophrenia, Alcohol Abuse,  and a total of 3 psychiatric hospitalizations but no history of SI, SA, or HI.  Last hospitalized at [facility] from 8/21-11/5/18. Sees Dr. Cleary at [facility]- last seen 3/29/19 She has a past psychiatric history of  Schizophrenia, Alcohol Abuse. 3 psychiatric hospitalizations but no history of SI, SA, or HI.  Last hospitalized at [facility] from 8/21-11/5/18. Sees Dr. Cleary at [facility]- last seen 3/29/19

## 2019-04-16 NOTE — ED BEHAVIORAL HEALTH ASSESSMENT NOTE - DIFFERENTIAL
substance induced psychotic disorder  acute stress reaction vs. schizophrenia decompensation acute stress reaction vs. schizophrenia decompensation  intox vs substance induced psychotic disorder schizophrenia with acute decompensation vs alcohol intox

## 2019-04-16 NOTE — ED BEHAVIORAL HEALTH ASSESSMENT NOTE - CURRENT MEDICATION
Benztropine 0.5mg BID, Furosemide 20mg Daily, Simvastatin 20mg QHS, Levothyroxine 50mcg daily, Cardizem CD 300mg Daily. Benztropine 0.5mg BID, Furosemide 20mg Daily, Simvastatin 20mg QHS, Levothyroxine 50mcg daily, Cardizem CD 300mg Daily.    Prescribed Cephalexin BID x 5 days from [place] [facility] 4/13/19 Benztropine 0.5mg BID, Furosemide 20mg Daily, Simvastatin 20mg QHS, Levothyroxine 50mcg daily, Cardizem CD 300mg Daily, Prolixin Dec 12.5mg Q2 weeks (last given 3/29/19)    Prescribed Cephalexin BID x 5 days from [place] [facility] 4/13/19 Benztropine 0.5mg BID, Furosemide 20mg Daily, Simvastatin 20mg QHS, Levothyroxine 50mcg daily, Cardizem CD 300mg Daily, Prolixin Dec 12.5mg Q2 weeks (last given 3/29/19), Ativan 0.5mg PRN QD    Prescribed Cephalexin BID x 5 days from [place] [facility] 4/13/19    [facility] WNL Reference #: [id number] #30 1/29/19 by Dr. Cleary
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
## 2019-04-16 NOTE — ED BEHAVIORAL HEALTH ASSESSMENT NOTE - PSYCHIATRIC ISSUES AND PLAN (INCLUDE STANDING AND PRN MEDICATION)
PRN Prolixin 5mg IM PRN Haldol 5mg/Ativan 2mg IM for agitation. PRN Haldol 5mg/Ativan 2mg IM for agitation; obtain EKG as part of medical clearance

## 2019-04-16 NOTE — ED BEHAVIORAL HEALTH ASSESSMENT NOTE - PRIMARY DX
Delirium due to another medical condition Schizophrenia Deferred condition on axis II Alcohol intoxication

## 2019-04-16 NOTE — ED BEHAVIORAL HEALTH ASSESSMENT NOTE - DESCRIPTION (FIRST USE, LAST USE, QUANTITY, FREQUENCY, DURATION)
today 4/16/19 and 257 4/12/19; unknown how much or how often patient drinking due to current presentation remote history of cannabis use per chart; unknown recent
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
## 2019-04-16 NOTE — ED BEHAVIORAL HEALTH NOTE - BEHAVIORAL HEALTH NOTE
Worker zee searched patient and was unable to find recipient on site. Worker called Luciano Barrowcharleen ([phone]) patient's friend and left a voicemail for call back. Worker then called number listed in sunrise for Gurwinder Eckert ([phone]) and left message for call back.

## 2019-04-16 NOTE — ED PROVIDER NOTE - PHYSICAL EXAMINATION
agitated, sporadic make-up on face, multiple rings on different fingers, sunglasses, hat, dishevelled appearing and disoriented, multiple well healing facial laceratons present with sutures in place agitated, sporadic make-up on face, multiple rings on same/different fingers, sunglasses, hat, dishevelled appearing and disoriented, multiple well healing facial laceratons present with sutures in place - do not appear ready to be removed    External pelvic exam (chaperone: BREE Allison) - normal appearing external labia without erythema, swelling, bleeding, or tears.

## 2019-04-16 NOTE — ED ADULT NURSE NOTE - NSIMPLEMENTINTERV_GEN_ALL_ED
Implemented All Universal Safety Interventions:  [place] to call system. Call bell, personal items and telephone within reach. Instruct patient to call for assistance. Room bathroom lighting operational. Non-slip footwear when patient is off stretcher. Physically safe environment: no spills, clutter or unnecessary equipment. Stretcher in lowest position, wheels locked, appropriate side rails in place.
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
## 2019-04-17 ENCOUNTER — INPATIENT (INPATIENT)
Facility: HOSPITAL | Age: 59
LOS: 15 days | Discharge: HOME | End: 2019-05-03
Attending: PSYCHIATRY & NEUROLOGY | Admitting: PSYCHIATRY & NEUROLOGY
Payer: MEDICARE

## 2019-04-17 VITALS
DIASTOLIC BLOOD PRESSURE: 67 MMHG | SYSTOLIC BLOOD PRESSURE: 142 MMHG | TEMPERATURE: 96 F | RESPIRATION RATE: 20 BRPM | HEART RATE: 62 BPM

## 2019-04-17 DIAGNOSIS — F33.9 MAJOR DEPRESSIVE DISORDER, RECURRENT, UNSPECIFIED: ICD-10-CM

## 2019-04-17 DIAGNOSIS — F10.20 ALCOHOL DEPENDENCE, UNCOMPLICATED: ICD-10-CM

## 2019-04-17 DIAGNOSIS — F20.9 SCHIZOPHRENIA, UNSPECIFIED: ICD-10-CM

## 2019-04-17 LAB
ALBUMIN SERPL ELPH-MCNC: 4.4 G/DL — SIGNIFICANT CHANGE UP (ref 3.3–5)
ALP SERPL-CCNC: 80 U/L — SIGNIFICANT CHANGE UP (ref 40–120)
ALT FLD-CCNC: 14 U/L — SIGNIFICANT CHANGE UP (ref 4–33)
AMPHET UR-MCNC: NEGATIVE — SIGNIFICANT CHANGE UP
ANION GAP SERPL CALC-SCNC: 20 MMO/L — HIGH (ref 7–14)
APAP SERPL-MCNC: < 15 UG/ML — LOW (ref 15–25)
APPEARANCE UR: CLEAR — SIGNIFICANT CHANGE UP
AST SERPL-CCNC: 29 U/L — SIGNIFICANT CHANGE UP (ref 4–32)
BARBITURATES UR SCN-MCNC: NEGATIVE — SIGNIFICANT CHANGE UP
BASE EXCESS BLDV CALC-SCNC: 0.3 MMOL/L — SIGNIFICANT CHANGE UP
BASOPHILS # BLD AUTO: 0.05 K/UL — SIGNIFICANT CHANGE UP (ref 0–0.2)
BASOPHILS NFR BLD AUTO: 0.7 % — SIGNIFICANT CHANGE UP (ref 0–2)
BENZODIAZ UR-MCNC: NEGATIVE — SIGNIFICANT CHANGE UP
BILIRUB SERPL-MCNC: 0.5 MG/DL — SIGNIFICANT CHANGE UP (ref 0.2–1.2)
BILIRUB UR-MCNC: NEGATIVE — SIGNIFICANT CHANGE UP
BLOOD GAS VENOUS - CREATININE: 0.51 MG/DL — SIGNIFICANT CHANGE UP (ref 0.5–1.3)
BLOOD UR QL VISUAL: NEGATIVE — SIGNIFICANT CHANGE UP
BUN SERPL-MCNC: 14 MG/DL — SIGNIFICANT CHANGE UP (ref 7–23)
CALCIUM SERPL-MCNC: 9.5 MG/DL — SIGNIFICANT CHANGE UP (ref 8.4–10.5)
CANNABINOIDS UR-MCNC: NEGATIVE — SIGNIFICANT CHANGE UP
CHLORIDE BLDV-SCNC: 106 MMOL/L — SIGNIFICANT CHANGE UP (ref 96–108)
CHLORIDE SERPL-SCNC: 104 MMOL/L — SIGNIFICANT CHANGE UP (ref 98–107)
CO2 SERPL-SCNC: 16 MMOL/L — LOW (ref 22–31)
COCAINE METAB.OTHER UR-MCNC: NEGATIVE — SIGNIFICANT CHANGE UP
COLOR SPEC: YELLOW — SIGNIFICANT CHANGE UP
CREAT SERPL-MCNC: 0.44 MG/DL — LOW (ref 0.5–1.3)
EOSINOPHIL # BLD AUTO: 0.03 K/UL — SIGNIFICANT CHANGE UP (ref 0–0.5)
EOSINOPHIL NFR BLD AUTO: 0.4 % — SIGNIFICANT CHANGE UP (ref 0–6)
ETHANOL BLD-MCNC: < 10 MG/DL — SIGNIFICANT CHANGE UP
GAS PNL BLDV: 140 MMOL/L — SIGNIFICANT CHANGE UP (ref 136–146)
GLUCOSE BLDV-MCNC: 113 — HIGH (ref 70–99)
GLUCOSE SERPL-MCNC: 113 MG/DL — HIGH (ref 70–99)
GLUCOSE UR-MCNC: NEGATIVE — SIGNIFICANT CHANGE UP
HCO3 BLDV-SCNC: 24 MMOL/L — SIGNIFICANT CHANGE UP (ref 20–27)
HCT VFR BLD CALC: 38.9 % — SIGNIFICANT CHANGE UP (ref 34.5–45)
HCT VFR BLDV CALC: 38.6 % — SIGNIFICANT CHANGE UP (ref 34.5–45)
HGB BLD-MCNC: 12.4 G/DL — SIGNIFICANT CHANGE UP (ref 11.5–15.5)
HGB BLDV-MCNC: 12.5 G/DL — SIGNIFICANT CHANGE UP (ref 11.5–15.5)
IMM GRANULOCYTES NFR BLD AUTO: 0.4 % — SIGNIFICANT CHANGE UP (ref 0–1.5)
KETONES UR-MCNC: 80 — SIGNIFICANT CHANGE UP
LACTATE BLDV-MCNC: 2.3 MMOL/L — HIGH (ref 0.5–2)
LEUKOCYTE ESTERASE UR-ACNC: HIGH
LYMPHOCYTES # BLD AUTO: 0.71 K/UL — LOW (ref 1–3.3)
LYMPHOCYTES # BLD AUTO: 9.4 % — LOW (ref 13–44)
MCHC RBC-ENTMCNC: 29.7 PG — SIGNIFICANT CHANGE UP (ref 27–34)
MCHC RBC-ENTMCNC: 31.9 % — LOW (ref 32–36)
MCV RBC AUTO: 93.3 FL — SIGNIFICANT CHANGE UP (ref 80–100)
METHADONE UR-MCNC: NEGATIVE — SIGNIFICANT CHANGE UP
MONOCYTES # BLD AUTO: 0.24 K/UL — SIGNIFICANT CHANGE UP (ref 0–0.9)
MONOCYTES NFR BLD AUTO: 3.2 % — SIGNIFICANT CHANGE UP (ref 2–14)
NEUTROPHILS # BLD AUTO: 6.46 K/UL — SIGNIFICANT CHANGE UP (ref 1.8–7.4)
NEUTROPHILS NFR BLD AUTO: 85.9 % — HIGH (ref 43–77)
NITRITE UR-MCNC: NEGATIVE — SIGNIFICANT CHANGE UP
NRBC # FLD: 0 K/UL — SIGNIFICANT CHANGE UP (ref 0–0)
OPIATES UR-MCNC: NEGATIVE — SIGNIFICANT CHANGE UP
OXYCODONE UR-MCNC: NEGATIVE — SIGNIFICANT CHANGE UP
PCO2 BLDV: 46 MMHG — SIGNIFICANT CHANGE UP (ref 41–51)
PCP UR-MCNC: NEGATIVE — SIGNIFICANT CHANGE UP
PH BLDV: 7.36 PH — SIGNIFICANT CHANGE UP (ref 7.32–7.43)
PH UR: 7 — SIGNIFICANT CHANGE UP (ref 5–8)
PLATELET # BLD AUTO: 264 K/UL — SIGNIFICANT CHANGE UP (ref 150–400)
PMV BLD: 9.2 FL — SIGNIFICANT CHANGE UP (ref 7–13)
PO2 BLDV: 36 MMHG — SIGNIFICANT CHANGE UP (ref 35–40)
POTASSIUM BLDV-SCNC: 3.4 MMOL/L — SIGNIFICANT CHANGE UP (ref 3.4–4.5)
POTASSIUM SERPL-MCNC: 4 MMOL/L — SIGNIFICANT CHANGE UP (ref 3.5–5.3)
POTASSIUM SERPL-SCNC: 4 MMOL/L — SIGNIFICANT CHANGE UP (ref 3.5–5.3)
PROT SERPL-MCNC: 7.2 G/DL — SIGNIFICANT CHANGE UP (ref 6–8.3)
PROT UR-MCNC: 100 — HIGH
RBC # BLD: 4.17 M/UL — SIGNIFICANT CHANGE UP (ref 3.8–5.2)
RBC # FLD: 12.1 % — SIGNIFICANT CHANGE UP (ref 10.3–14.5)
SALICYLATES SERPL-MCNC: < 5 MG/DL — LOW (ref 15–30)
SAO2 % BLDV: 60.2 % — SIGNIFICANT CHANGE UP (ref 60–85)
SODIUM SERPL-SCNC: 140 MMOL/L — SIGNIFICANT CHANGE UP (ref 135–145)
SP GR SPEC: 1.02 — SIGNIFICANT CHANGE UP (ref 1–1.04)
UROBILINOGEN FLD QL: NORMAL — SIGNIFICANT CHANGE UP
WBC # BLD: 7.52 K/UL — SIGNIFICANT CHANGE UP (ref 3.8–10.5)
WBC # FLD AUTO: 7.52 K/UL — SIGNIFICANT CHANGE UP (ref 3.8–10.5)
WBC UR QL: SIGNIFICANT CHANGE UP (ref 0–?)

## 2019-04-17 PROCEDURE — 72170 X-RAY EXAM OF PELVIS: CPT | Mod: 26

## 2019-04-17 PROCEDURE — 73562 X-RAY EXAM OF KNEE 3: CPT | Mod: 26,LT

## 2019-04-17 PROCEDURE — 73610 X-RAY EXAM OF ANKLE: CPT | Mod: 26,LT

## 2019-04-17 PROCEDURE — 70450 CT HEAD/BRAIN W/O DYE: CPT | Mod: 26

## 2019-04-17 PROCEDURE — 99285 EMERGENCY DEPT VISIT HI MDM: CPT | Mod: GC

## 2019-04-17 PROCEDURE — 73552 X-RAY EXAM OF FEMUR 2/>: CPT | Mod: 26,LT

## 2019-04-17 PROCEDURE — 73590 X-RAY EXAM OF LOWER LEG: CPT | Mod: 26,LT

## 2019-04-17 RX ORDER — DIPHENHYDRAMINE HCL 50 MG
50 CAPSULE ORAL ONCE
Qty: 0 | Refills: 0 | Status: DISCONTINUED | OUTPATIENT
Start: 2019-04-17 | End: 2019-05-03

## 2019-04-17 RX ORDER — FOLIC ACID 0.8 MG
1 TABLET ORAL DAILY
Qty: 0 | Refills: 0 | Status: COMPLETED | OUTPATIENT
Start: 2019-04-17 | End: 2019-04-24

## 2019-04-17 RX ORDER — LEVOTHYROXINE SODIUM 125 MCG
50 TABLET ORAL DAILY
Qty: 0 | Refills: 0 | Status: DISCONTINUED | OUTPATIENT
Start: 2019-04-17 | End: 2019-05-03

## 2019-04-17 RX ORDER — HALOPERIDOL DECANOATE 100 MG/ML
5 INJECTION INTRAMUSCULAR EVERY 6 HOURS
Qty: 0 | Refills: 0 | Status: DISCONTINUED | OUTPATIENT
Start: 2019-04-17 | End: 2019-05-03

## 2019-04-17 RX ORDER — FUROSEMIDE 40 MG
40 TABLET ORAL DAILY
Qty: 0 | Refills: 0 | Status: DISCONTINUED | OUTPATIENT
Start: 2019-04-17 | End: 2019-05-03

## 2019-04-17 RX ORDER — HALOPERIDOL DECANOATE 100 MG/ML
5 INJECTION INTRAMUSCULAR ONCE
Qty: 0 | Refills: 0 | Status: DISCONTINUED | OUTPATIENT
Start: 2019-04-17 | End: 2019-05-03

## 2019-04-17 RX ORDER — CEPHALEXIN 500 MG
500 CAPSULE ORAL EVERY 12 HOURS
Qty: 0 | Refills: 0 | Status: COMPLETED | OUTPATIENT
Start: 2019-04-17 | End: 2019-04-19

## 2019-04-17 RX ORDER — DIPHENHYDRAMINE HCL 50 MG
50 CAPSULE ORAL EVERY 6 HOURS
Qty: 0 | Refills: 0 | Status: DISCONTINUED | OUTPATIENT
Start: 2019-04-17 | End: 2019-05-03

## 2019-04-17 RX ORDER — METFORMIN HYDROCHLORIDE 850 MG/1
750 TABLET ORAL
Qty: 0 | Refills: 0 | Status: DISCONTINUED | OUTPATIENT
Start: 2019-04-17 | End: 2019-05-03

## 2019-04-17 RX ORDER — SIMVASTATIN 20 MG/1
20 TABLET, FILM COATED ORAL AT BEDTIME
Qty: 0 | Refills: 0 | Status: DISCONTINUED | OUTPATIENT
Start: 2019-04-17 | End: 2019-05-03

## 2019-04-17 RX ORDER — BENZTROPINE MESYLATE 1 MG
0.5 TABLET ORAL
Qty: 0 | Refills: 0 | Status: DISCONTINUED | OUTPATIENT
Start: 2019-04-17 | End: 2019-04-30

## 2019-04-17 RX ORDER — HALOPERIDOL DECANOATE 100 MG/ML
5 INJECTION INTRAMUSCULAR ONCE
Qty: 0 | Refills: 0 | Status: DISCONTINUED | OUTPATIENT
Start: 2019-04-17 | End: 2019-04-17

## 2019-04-17 RX ORDER — THIAMINE MONONITRATE (VIT B1) 100 MG
100 TABLET ORAL DAILY
Qty: 0 | Refills: 0 | Status: COMPLETED | OUTPATIENT
Start: 2019-04-17 | End: 2019-04-20

## 2019-04-17 RX ADMIN — Medication 0.5 MILLIGRAM(S): at 21:40

## 2019-04-17 RX ADMIN — Medication 0.5 MILLIGRAM(S): at 20:24

## 2019-04-17 RX ADMIN — SIMVASTATIN 20 MILLIGRAM(S): 20 TABLET, FILM COATED ORAL at 21:41

## 2019-04-17 RX ADMIN — Medication 50 MICROGRAM(S): at 21:40

## 2019-04-17 RX ADMIN — METFORMIN HYDROCHLORIDE 750 MILLIGRAM(S): 850 TABLET ORAL at 21:40

## 2019-04-17 RX ADMIN — Medication 2 MILLIGRAM(S): at 12:16

## 2019-04-17 RX ADMIN — Medication 40 MILLIGRAM(S): at 21:40

## 2019-04-17 NOTE — ED BEHAVIORAL HEALTH ASSESSMENT NOTE - CASE SUMMARY
Patient is a 59 y/o   female, domiciled alone, on SSD, non caregiver, with psychiatric history of Schizophrenia and Alcohol Abuse, 3 psychiatric hospitalizations last at [facility] from 8/2018-11/2018, no history of SI, SA, or HI. No history of aggression or legal issues, current daily alcohol use, no known history of detox/rehab/withdrawal symptoms/DTs or seizures, PMHx DM2, HTN, HLD, Hypothyroidism, hx hyponatremia and recent UTI dx 4/12/19 at [facility] where patient was admitted for facial lacerations with , then presented again to ED for intoxication 4/16/19 cleared medically and psychiatrically after patient was reevaluated to be sober and rational, now BIB EMS 6 hrs post discharge from ED after patient was found in a stranger's bushes.     Patient returns to ED shortly following discharge, no longer intoxicated, but remains disorganized, delusional, multiple recent ED visits for intoxication, with recent non compliance with Prolixin Injection (last given 3/29/19). Presentation more consistent with decompensation from psychotic illness, unable to care for herself after recently discharged from ED. Patient unable to participate in full evaluation 2/2 sedation, but will likely require inpatient hospitalization once medically cleared.     On re-evaluation, patient able to provide some additional history, but remains disorganized and delusional with inconsistent story and few supports outside of the hospital. Patient has been non-compliant with treatment recently likely secondary to psychotic disorganization and substance use. Requires inpatient hospitalization for inability to care for self, safety, and stabilization.
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
## 2019-04-17 NOTE — ED PROVIDER NOTE - ATTENDING CONTRIBUTION TO CARE
Dr. Posadas: I performed a face to face bedside interview with patient regarding history of present illness, review of symptoms and past medical history. I completed an independent physical exam.  I have discussed patient's plan of care with PA.   I agree with note as stated above, having amended the EMR as needed to reflect my findings.   This includes HISTORY OF PRESENT ILLNESS, HIV, PAST MEDICAL/SURGICAL/FAMILY/SOCIAL HISTORY, ALLERGIES AND HOME MEDICATIONS, REVIEW OF SYSTEMS, PHYSICAL EXAM, and any PROGRESS NOTES during the time I functioned as the attending physician for this patient. HPI above as by me. PE above as by me. DDX AMS found down PLAN ct head, cbc, cmp, serum tox, urine tox, ua, ucx. PLAN if intoxicated, observe for sobriety, when sober reassess for delusional statements and consult Psych. Unclear if patient did not return to her home 2/2 immediate alcohol use or psychosis.

## 2019-04-17 NOTE — ED BEHAVIORAL HEALTH ASSESSMENT NOTE - DIFFERENTIAL
schizophrenia with acute decompensation in setting of medication non-compliance schizophrenia with acute decompensation in setting of medication non-compliance and substance use
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
## 2019-04-17 NOTE — ED BEHAVIORAL HEALTH ASSESSMENT NOTE - OTHER PAST PSYCHIATRIC HISTORY (INCLUDE DETAILS REGARDING ONSET, COURSE OF ILLNESS, INPATIENT/OUTPATIENT TREATMENT)
She has a past psychiatric history of  Schizophrenia, Alcohol Abuse. 3 psychiatric hospitalizations but no history of SI, SA, or HI.  Last hospitalized at [facility] from 8/21-11/5/18. Sees Dr. Cleary at [facility]- last seen 3/29/19

## 2019-04-17 NOTE — ED BEHAVIORAL HEALTH ASSESSMENT NOTE - PSYCHIATRIC ISSUES AND PLAN (INCLUDE STANDING AND PRN MEDICATION)
Patient overdue for Prolixin Dec 12.5mg; consider need for supplementation although decompensation likely 2/2 noncompliance, PRN haldol/ativan/benadryl PO/IM for agitation Patient overdue for Prolixin Dec 12.5mg; consider need for supplementation although decompensation likely 2/2 noncompliance, Benztropine 0.5mg BID, PRN haldol/ativan/benadryl PO/IM for agitation Patient overdue for Prolixin Dec 12.5mg Q2 weeks (last given 3/29/19); consider need for supplementation although decompensation likely 2/2 noncompliance, Benztropine 0.5mg BID, PRN haldol/ativan/benadryl PO/IM for agitation

## 2019-04-17 NOTE — ED ADULT TRIAGE NOTE - CHIEF COMPLAINT QUOTE
pt was discharged from  this AM at 0330 found this morning in a strangers bushes. pt was here yesterday for AMS, intox and agitation. today pt is shivering and oral temp 96.1. pt is in  pants concerning for exposure outdoors. pt cold to touch.

## 2019-04-17 NOTE — ED PROVIDER NOTE - CARE PLAN
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

## 2019-04-17 NOTE — ED BEHAVIORAL HEALTH ASSESSMENT NOTE - SUBSTANCE ISSUES AND PLAN (INCLUDE STANDING AND PRN MEDICATION)
no signs of withdrawals presently ISIDORO as patient is at risk for alcohol withdrawal given daily use ISIDORO as patient is at risk for alcohol withdrawal given daily use, thiamine, folic acid, multivitamin

## 2019-04-17 NOTE — ED PROVIDER NOTE - CONSTITUTIONAL MOOD
Cycles between falling asleep, answering coherently, answering incoherently, and laughing to herself. Yellowish discoloration around eyes unclear if healing ecchymoses or makeup

## 2019-04-17 NOTE — ED BEHAVIORAL HEALTH ASSESSMENT NOTE - CURRENT MEDICATION
Benztropine 0.5mg BID, Furosemide 20mg Daily, Simvastatin 20mg QHS, Levothyroxine 50mcg daily, Cardizem CD 300mg Daily, Prolixin Dec 12.5mg Q2 weeks (last given 3/29/19), Ativan 0.5mg PRN QD    Prescribed Cephalexin BID x 5 days from [facility] 4/13/19; unclear whether patient has been taking    follows with Dr. Cleary

## 2019-04-17 NOTE — ED PROVIDER NOTE - CLINICAL SUMMARY MEDICAL DECISION MAKING FREE TEXT BOX
AMS found down PLAN ct head, cbc, cmp, serum tox, urine tox, ua, ucx. PLAN if intoxicated, observe for sobriety, when sober reassess for delusional statements and consult Psych. Unclear if patient did not return to her home 2/2 immediate alcohol use or psychosis.

## 2019-04-17 NOTE — ED BEHAVIORAL HEALTH ASSESSMENT NOTE - RISK ASSESSMENT
Patient is at elevated risk of harm to self and others and unable to care for herself in her current psychotic state. Her chronic risk factors include history of inpatient admissions, non compliance with Prolixin injection, and ongoing substance abuse/dependence. Her acute risk factors include ongoing substance use, recent non-compliance with treatment, missing appointments and injections, multiple recent presentations to ED, currently disorganized and psychotic. Her protective factors are limited although patient identifies two supportive friends and is domiciled on SSD.

## 2019-04-17 NOTE — ED BEHAVIORAL HEALTH ASSESSMENT NOTE - DETAILS
fatigue wearing sunglasses for light sensitivity lacerations on face aware of plan to admit BETTY Nolasco

## 2019-04-17 NOTE — ED BEHAVIORAL HEALTH ASSESSMENT NOTE - GROOMING
REVIEWED TECHNICIAN'S NOTE AND I CONCURR.  IN ADDITION THE PATIENT REPORTS THE FOLLOWING.    CHIEF COMPLAINT:  BLURRY    HPI: THE PATIENT HAD AN EPISODE OF VERY BLURRY VISION.  IT SEEMS BACK TO NORMAL NOW BUT FOR A DAY OR SO IT SEEMED VERY BLURRY.  COULDN'T SEE PEOPLE'S FACES AT [employer] ON SUNDAY.      External exam: normal    Awake, Alert and Oriented x 3    Slit Lamp Exam:       Lids and Lashes:  normal  //  normal       Conjunctiva:  normal  //  normal       Cornea:  normal  //  normal       Tear Film:  normal  //  normal       Anterior Chamber:  deep and quiet  //  deep and quiet       Iris:  Round and regular  //  Round and regular       Lens:  3+ NS //  IOL    Assessment and Plan:  1.  BLURRY VISION EPISODE:  TODAY VISION SEEMS BASELINE AND IOP GOOD.  EXAM NORMAL.  ETIOLOGY UNCERTAIN.  POSSIBLY DRY EYE.  DISCUSSED.  F/U PRN    2.  CATARACT OD:  PLANNING RT [place] IN 6 WKS.    3.  GLAUCOMA:  DOING WELL WITH DROPS.  IOP GOOD TODAY.       Poor

## 2019-04-17 NOTE — ED ADULT NURSE REASSESSMENT NOTE - GENERAL PATIENT STATE
comfortable appearance/cooperative/remains awake, a&ox3, calm. Denies s/i h/i. Reports feeling safe to return home.
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
## 2019-04-17 NOTE — ED ADULT NURSE REASSESSMENT NOTE - COMFORT CARE
assisted to bedside commode
po fluids offered/ambulates with steady gait unassisted
warm blanket provided

## 2019-04-17 NOTE — ED BEHAVIORAL HEALTH ASSESSMENT NOTE - SUMMARY
Patient is a 57 y/o   female, domiciled alone, on SSD, non caregiver, with psychiatric history of Schizophrenia and Alcohol Abuse, 3 psychiatric hospitalizations last at [facility] from 8/2018-11/2018, no history of SI, SA, or HI. No history of aggression or legal issues, current daily alcohol use, no known history of detox/rehab/withdrawal symptoms/DTs or seizures, PMHx DM2, HTN, HLD, Hypothyroidism, hx hyponatremia and recent UTI dx 4/12/19 at [facility] where patient was admitted for facial lacerations with , then presented again to ED for intoxication 4/16/19 cleared medically and psychiatrically after patient was reevaluated to be sober and rational, now BIB EMS 6 hrs post discharge from ED after patient was found in a stranger's bushes.     Patient returns to ED shortly following discharge, no longer intoxicated, but remains disorganized, delusional, multiple recent ED visits for intoxication, with recent non compliance with Prolixin Injection (last given 3/29/19). Presentation more consistent with decompensation from psychotic illness, unable to care for herself after recently discharged from ED. Patient unable to participate in full evaluation 2/2 sedation, but will likely require inpatient hospitalization once medically cleared. Will re-evaluated patient following medical evaluation for admission. Patient is a 59 y/o   female, domiciled alone, on SSD, non caregiver, with psychiatric history of Schizophrenia and Alcohol Abuse, 3 psychiatric hospitalizations last at [facility] from 8/2018-11/2018, no history of SI, SA, or HI. No history of aggression or legal issues, current daily alcohol use, no known history of detox/rehab/withdrawal symptoms/DTs or seizures, PMHx DM2, HTN, HLD, Hypothyroidism, hx hyponatremia and recent UTI dx 4/12/19 at [place] [facility] where patient was admitted for facial lacerations with , then presented again to ED for intoxication 4/16/19 cleared medically and psychiatrically after patient was reevaluated to be sober and rational, now BIB EMS 6 hrs post discharge from ED after patient was found in a stranger's bushes.     Patient returns to ED shortly following discharge, no longer intoxicated, but remains disorganized, delusional, multiple recent ED visits for intoxication, with recent non compliance with Prolixin Injection (last given 3/29/19). Presentation more consistent with decompensation from psychotic illness, unable to care for herself after recently discharged from ED. Patient unable to participate in full evaluation 2/2 sedation, but will likely require inpatient hospitalization once medically cleared. Will re-evaluate patient following medical workup/evaluation and once sedation wears off for likely admission. Patient is a 57 y/o   female, domiciled alone, on SSD, non caregiver, with psychiatric history of Schizophrenia and Alcohol Abuse, 3 psychiatric hospitalizations last at [facility] from 8/2018-11/2018, no history of SI, SA, or HI. No history of aggression or legal issues, current daily alcohol use, no known history of detox/rehab/withdrawal symptoms/DTs or seizures, PMHx DM2, HTN, HLD, Hypothyroidism, hx hyponatremia and recent UTI dx 4/12/19 at [place] [facility] where patient was admitted for facial lacerations with , then presented again to ED for intoxication 4/16/19 cleared medically and psychiatrically after patient was reevaluated to be sober and rational, now BIB EMS 6 hrs post discharge from ED after patient was found in a stranger's bushes.     Patient returns to ED shortly following discharge, no longer intoxicated, but remains disorganized, delusional, multiple recent ED visits for intoxication, with recent non compliance with Prolixin Injection (last given 3/29/19). Presentation more consistent with decompensation from psychotic illness, unable to care for herself after recently discharged from ED. Patient unable to participate in full evaluation 2/2 sedation, but will likely require inpatient hospitalization once medically cleared.     On re-evaluation, patient able to provide some additional history, but remains disorganized and delusional with inconsistent story and few supports outside of the hospital. Patient has been non-compliant with treatment recently likely secondary to psychotic disorganization and substance use. Requires inpatient hospitalization for inability to care for self, safety, and stabilization.

## 2019-04-17 NOTE — ED PROVIDER NOTE - PRINCIPAL DIAGNOSIS
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
## 2019-04-17 NOTE — ED BEHAVIORAL HEALTH ASSESSMENT NOTE - HPI (INCLUDE ILLNESS QUALITY, SEVERITY, DURATION, TIMING, CONTEXT, MODIFYING FACTORS, ASSOCIATED SIGNS AND SYMPTOMS)
Patient is a 59 y/o   female, domiciled alone, on SSD, non caregiver, with psychiatric history of Schizophrenia and Alcohol Abuse, 3 psychiatric hospitalizations last at [facility] from 8/2018-11/2018, no history of SI, SA, or HI. No history of aggression or legal issues, current daily alcohol use, no known history of detox/rehab/withdrawal symptoms/DTs or seizures, PMHx DM2, HTN, HLD, Hypothyroidism, hx hyponatremia and recent UTI dx 4/12/19 at [facility] where patient was admitted for facial lacerations with , then presented again to ED for intoxication 4/16/19 cleared medically and psychiatrically after patient was reevaluated to be sober and rational, now BIB EMS 6 hrs post discharge from ED after patient was found in a stranger's bushes.     Patient is sedated during interview following administration of haldol 5mg, ativan 2mg IM and then additional ativan 2mg IV for agitation and refusing to cooperate with staff who were attempting to obtain necessary labs and imaging. Patient is wearing a straw hat and sunglasses, only able to remain awake for brief periods and limited evaluation. On awakening patient states "the thoughts" when asked to elaborate begins remarking on a dream she was having before she woke up. Patient states that she is here in the hospital because she just gave birth to a baby and believes everything went well with the delivery. She is unable to account for the last 6 hours of interval history after leaving the hospital. She reports that she left and went home and that "you're embarrassing me", "I just want to get dressed and walk home." Patient believes she walked herself to the hospital and requesting to leave, stating she will follow up with Dr. Cleary tomorrow. Patient is unsteady on her feet after attempting to get out of bed and requesting her clothing.    She is unable to engage in full psychiatric review of symptoms, states that she takes medications and sees psychiatrist for anxiety. She denies low mood, denies AVH. Continues to fall asleep intermittently during interview. Endorses daily alcohol use 1 drink/day. States she has a friend Carlton who helps her at home. Patient unable to state her medications aside from furosemide. Patient is a 57 y/o   female, domiciled alone, on SSD, non caregiver, with psychiatric history of Schizophrenia and Alcohol Abuse, 3 psychiatric hospitalizations last at [facility] from 8/2018-11/2018, no history of SI, SA, or HI. No history of aggression or legal issues, current daily alcohol use, no known history of detox/rehab/withdrawal symptoms/DTs or seizures, PMHx DM2, HTN, HLD, Hypothyroidism, hx hyponatremia and recent UTI dx 4/12/19 at [facility] where patient was admitted for facial lacerations with , then presented again to ED for intoxication 4/16/19 cleared medically and psychiatrically after patient was reevaluated to be sober and rational, now BIB EMS 6 hrs post discharge from ED after patient was found in a stranger's bushes.     Patient is sedated during interview following administration of haldol 5mg, ativan 2mg IM and then additional ativan 2mg IV for agitation and refusing to cooperate with staff who were attempting to obtain necessary labs and imaging. Patient is wearing a straw hat and sunglasses, only able to remain awake for brief periods and limited evaluation. On awakening patient states "the thoughts" when asked to elaborate begins remarking on a dream she was having before she woke up. Patient states that she is here in the hospital because she just gave birth to a baby and believes everything went well with the delivery. She is unable to account for the last 6 hours of interval history after leaving the hospital. She reports that she left and went home and that "you're embarrassing me", "I just want to get dressed and walk home." Patient believes she walked herself to the hospital and requesting to leave, stating she will follow up with Dr. Cleary tomorrow but denies having appointment and unable to provide plan for obtaining appointment or getting medications, denies that she has missed injections despite provider reviewing dates of most recent injection and frequency.     She is unable to engage in full psychiatric review of symptoms, states that she takes medications and sees psychiatrist for anxiety. She denies low mood, denies AVH. Continues to fall asleep intermittently during interview. Endorses daily alcohol use 1 drink/day. States she has a friend Carlton who helps her at home. Patient unable to state her medications aside from furosemide. Patient is a 57 y/o   female, domiciled alone, on SSD, non caregiver, with psychiatric history of Schizophrenia and Alcohol Abuse, 3 psychiatric hospitalizations last at [facility] from 8/2018-11/2018, no history of SI, SA, or HI. No history of aggression or legal issues, current daily alcohol use, no known history of detox/rehab/withdrawal symptoms/DTs or seizures, PMHx DM2, HTN, HLD, Hypothyroidism, hx hyponatremia and recent UTI dx 4/12/19 at [facility] where patient was admitted for facial lacerations with , then presented again to ED for intoxication 4/16/19 cleared medically and psychiatrically after patient was reevaluated to be sober and rational, now BIB EMS 6 hrs post discharge from ED after patient was found in a stranger's bushes.     Patient is sedated during interview following administration of haldol 5mg, ativan 2mg IM and then additional ativan 2mg IV for agitation and refusing to cooperate with staff who were attempting to obtain necessary labs and imaging. Patient is wearing a straw hat and sunglasses, only able to remain awake for brief periods and limited evaluation. On awakening patient states "the thoughts" when asked to elaborate begins remarking on a dream she was having before she woke up. Patient states that she is here in the hospital because she just gave birth to a baby and believes everything went well with the delivery. She is unable to account for the last 6 hours of interval history after leaving the hospital. She reports that she left and went home and that "you're embarrassing me", "I just want to get dressed and walk home." Patient believes she walked herself to the hospital and requesting to leave, stating she will follow up with Dr. Cleary tomorrow but denies having appointment and unable to provide plan for obtaining appointment or getting medications, denies that she has missed injections despite provider reviewing dates of most recent injection and frequency.     She is unable to engage in full psychiatric review of symptoms, states that she takes medications and sees psychiatrist for anxiety. She denies low mood, denies AVH. Continues to fall asleep intermittently during interview. Endorses daily alcohol use 1 drink/day. States she has a friend Luciano who helps her at home. Patient unable to state her medications aside from furosemide.    Patient re-evaluated following medical work-up, awaiting results of imaging. Patient no longer sedated, able to engage in interview more, reports that her sister passed away yesterday and that she has been sad, drinking 1-2 drinks/day. States that when she was discharged from ED yesterday, she took a cab home, but didn't have her keys on her and was unable to get into her house, and ended up falling in the bushes after she became dizzy. Patient says she has been trying to reach Luciano who may be able to help her. Patient remains delusional that she has just given birth to a baby and Luciano's wife is currently caring for it, first stating that she is not , then reporting that she needs to speak to her . She remains disorganized during interview, walking in and out of the hallway. Unable to provide a list of her medications, believes she received prolixin shot yesterday but is overdue for shot per records, she is unable to engage in safety plan or state how she will be able to return home and get into her house. Believes Joseid, who per collateral obtained yesterday, is a  who helped re-do her floors is her /boyfriend. Patient has been non-compliant with recent treatment and appointments due to psychotic disorganization, decompensation, and alcohol use.

## 2019-04-17 NOTE — ED BEHAVIORAL HEALTH ASSESSMENT NOTE - MEDICAL ISSUES AND PLAN (INCLUDE STANDING AND PRN MEDICATION)
patient awaiting head CT and xrays continue home meds Benztropine 0.5mg BID, Furosemide 20mg Daily, Simvastatin 20mg QHS, Levothyroxine 50mcg daily, Cardizem CD 300mg Daily, Prolixin Dec 12.5mg Q2 weeks (last given 3/29/19), Ativan 0.5mg PRN QD continue home meds Furosemide 20mg Daily, Simvastatin 20mg QHS, Levothyroxine 50mcg daily, Cardizem CD 300mg Daily, Prolixin Dec 12.5mg Q2 weeks (last given 3/29/19), Ativan 0.5mg PRN QD; patient unable to give DM2 medication regimen, will need to confirm with pharmacy/collaterals continue home meds Furosemide 40mg Daily, Simvastatin 20mg QHS, Levothyroxine 50mcg daily, Cardizem CD 300mg Daily; Metformin 750mg BID, Keflex 500mg BID to complete 5 day course started on 4/13 but likely missed 2 days secondary to ED visits continue home meds Furosemide 40mg Daily, Simvastatin 20mg QHS, Levothyroxine 50mcg daily, confirm whether patient takes Cardizem CD 300mg Daily; Metformin 750mg BID, Keflex 500mg BID to complete 5 day course started on 4/13 but likely missed 2 days secondary to ED visits

## 2019-04-18 LAB
CHOLEST SERPL-MCNC: 158 MG/DL — SIGNIFICANT CHANGE UP (ref 120–199)
HBA1C BLD-MCNC: 5.5 % — SIGNIFICANT CHANGE UP (ref 4–5.6)
HDLC SERPL-MCNC: 53 MG/DL — SIGNIFICANT CHANGE UP (ref 45–65)
LIPID PNL WITH DIRECT LDL SERPL: 107 MG/DL — SIGNIFICANT CHANGE UP
TRIGL SERPL-MCNC: 71 MG/DL — SIGNIFICANT CHANGE UP (ref 10–149)

## 2019-04-18 PROCEDURE — 99223 1ST HOSP IP/OBS HIGH 75: CPT

## 2019-04-18 RX ORDER — DIPHENHYDRAMINE HCL 50 MG
50 CAPSULE ORAL ONCE
Qty: 0 | Refills: 0 | Status: COMPLETED | OUTPATIENT
Start: 2019-04-18 | End: 2019-04-18

## 2019-04-18 RX ORDER — HALOPERIDOL DECANOATE 100 MG/ML
5 INJECTION INTRAMUSCULAR ONCE
Qty: 0 | Refills: 0 | Status: COMPLETED | OUTPATIENT
Start: 2019-04-18 | End: 2019-04-18

## 2019-04-18 RX ADMIN — Medication 50 MILLIGRAM(S): at 12:57

## 2019-04-18 RX ADMIN — Medication 2 MILLIGRAM(S): at 01:15

## 2019-04-18 RX ADMIN — Medication 0.5 MILLIGRAM(S): at 09:36

## 2019-04-18 RX ADMIN — METFORMIN HYDROCHLORIDE 750 MILLIGRAM(S): 850 TABLET ORAL at 09:36

## 2019-04-18 RX ADMIN — Medication 50 MILLIGRAM(S): at 01:15

## 2019-04-18 RX ADMIN — Medication 1 MILLIGRAM(S): at 09:36

## 2019-04-18 RX ADMIN — Medication 100 MILLIGRAM(S): at 09:36

## 2019-04-18 RX ADMIN — Medication 0.5 MILLIGRAM(S): at 21:23

## 2019-04-18 RX ADMIN — Medication 50 MICROGRAM(S): at 09:36

## 2019-04-18 RX ADMIN — Medication 1 TABLET(S): at 09:36

## 2019-04-18 RX ADMIN — SIMVASTATIN 20 MILLIGRAM(S): 20 TABLET, FILM COATED ORAL at 21:23

## 2019-04-18 RX ADMIN — HALOPERIDOL DECANOATE 5 MILLIGRAM(S): 100 INJECTION INTRAMUSCULAR at 12:57

## 2019-04-18 RX ADMIN — Medication 500 MILLIGRAM(S): at 12:16

## 2019-04-18 RX ADMIN — Medication 40 MILLIGRAM(S): at 09:36

## 2019-04-18 RX ADMIN — HALOPERIDOL DECANOATE 5 MILLIGRAM(S): 100 INJECTION INTRAMUSCULAR at 01:15

## 2019-04-19 LAB
BACTERIA UR CULT: SIGNIFICANT CHANGE UP
SPECIMEN SOURCE: SIGNIFICANT CHANGE UP

## 2019-04-19 PROCEDURE — 99233 SBSQ HOSP IP/OBS HIGH 50: CPT

## 2019-04-19 PROCEDURE — 99232 SBSQ HOSP IP/OBS MODERATE 35: CPT

## 2019-04-19 RX ORDER — FLUPHENAZINE HYDROCHLORIDE 1 MG/1
5 TABLET, FILM COATED ORAL AT BEDTIME
Qty: 0 | Refills: 0 | Status: DISCONTINUED | OUTPATIENT
Start: 2019-04-19 | End: 2019-04-22

## 2019-04-19 RX ORDER — HALOPERIDOL DECANOATE 100 MG/ML
5 INJECTION INTRAMUSCULAR ONCE
Qty: 0 | Refills: 0 | Status: COMPLETED | OUTPATIENT
Start: 2019-04-19 | End: 2019-04-20

## 2019-04-19 RX ORDER — IBUPROFEN 200 MG
400 TABLET ORAL ONCE
Qty: 0 | Refills: 0 | Status: COMPLETED | OUTPATIENT
Start: 2019-04-19 | End: 2019-04-19

## 2019-04-19 RX ADMIN — Medication 500 MILLIGRAM(S): at 08:38

## 2019-04-19 RX ADMIN — Medication 100 MILLIGRAM(S): at 08:38

## 2019-04-19 RX ADMIN — Medication 0.5 MILLIGRAM(S): at 08:38

## 2019-04-19 RX ADMIN — Medication 400 MILLIGRAM(S): at 03:13

## 2019-04-19 RX ADMIN — Medication 40 MILLIGRAM(S): at 08:38

## 2019-04-19 RX ADMIN — Medication 1 TABLET(S): at 08:38

## 2019-04-19 RX ADMIN — Medication 400 MILLIGRAM(S): at 03:04

## 2019-04-19 RX ADMIN — Medication 1 MILLIGRAM(S): at 08:38

## 2019-04-19 RX ADMIN — METFORMIN HYDROCHLORIDE 750 MILLIGRAM(S): 850 TABLET ORAL at 08:38

## 2019-04-19 RX ADMIN — Medication 50 MICROGRAM(S): at 08:38

## 2019-04-19 RX ADMIN — Medication 50 MILLIGRAM(S): at 17:20

## 2019-04-19 NOTE — PROGRESS NOTE ADULT - SUBJECTIVE AND OBJECTIVE BOX
CC/Reason for Consult: Left lower extremity Edema    SUBJECTIVE / OVERNIGHT EVENTS:  Patient with recent fall (on left side), primary team requesting evaluation for Left lower extremity edema. Patient with history of b/l LE edema on lasix.     Patient seen ambulating independently around unit, able to bear weight on left lower extremity, denies pain, tenderness, loss of sensation. No fever/chills, CP or SOB. States she remembered falling. Did chip her front teeth, states her dentist is Dr. Vahe Wylie in [place]. Is adamant that she is going home today, requesting her shoes and transportation. Is carrying around a paper bag with her things in it. (Per d/w Dr. Borrego, patient is not going home, needs further psychiatric management).     MEDICATIONS  (STANDING):  benztropine 0.5 milliGRAM(s) Oral two times a day  fluPHENAZine 5 milliGRAM(s) Oral at bedtime  folic acid 1 milliGRAM(s) Oral daily  furosemide    Tablet 40 milliGRAM(s) Oral daily  levothyroxine 50 MICROGram(s) Oral daily  metFORMIN Extended-Release 750 milliGRAM(s) Oral <User Schedule>  multivitamin 1 Tablet(s) Oral daily  simvastatin 20 milliGRAM(s) Oral at bedtime  thiamine 100 milliGRAM(s) Oral daily    MEDICATIONS  (PRN):  diphenhydrAMINE 50 milliGRAM(s) Oral every 6 hours PRN Extrapyramidal prophylaxis  diphenhydrAMINE   Injectable 50 milliGRAM(s) IntraMuscular Once PRN Extrapyramidal prophylaxis  haloperidol     Tablet 5 milliGRAM(s) Oral every 6 hours PRN agitation  haloperidol    Injectable 5 milliGRAM(s) IntraMuscular once PRN Agitation  LORazepam     Tablet 2 milliGRAM(s) Oral every 2 hours PRN CIWA score increase by 2 points and current CIWA score GREATER THAN 9  LORazepam     Tablet 0.5 milliGRAM(s) Oral at bedtime PRN anxiety/agitation  LORazepam     Tablet 1 milliGRAM(s) Oral once PRN Agitation  LORazepam   Injectable 2 milliGRAM(s) IntraMuscular Once PRN Agitation      Vital Signs Last 24 Hrs  T(C): 36.8 (19 Apr 2019 06:58), Max: 36.8 (19 Apr 2019 06:58)  T(F): 98.3 (19 Apr 2019 06:58), Max: 98.3 (19 Apr 2019 06:58)  HR: 105 (19 Apr 2019 06:58) (105 - 105)  BP: 152/88 (19 Apr 2019 06:58) (152/88 - 152/88)  BP(mean): --  RR: 16 (18 Apr 2019 20:57) (16 - 16)  SpO2: --  CAPILLARY BLOOD GLUCOSE    PHYSICAL EXAM:  GENERAL: NAD, ambulating around unit  HEAD:  +forehead laceration s/p sutures  ENMT: chipped front teeth  EYES: EOMI, conjunctiva and sclera clear  NECK: Supple, No JVD  CHEST/LUNG: Comfortable on RA, speaking in full sentences  ABDOMEN: Soft, Nontender  EXTREMITIES: No clubbing, cyanosis, +LE edema, LLE no pain/tenderness on palpation, no warmth, full AROM ankle, able to bear weight on LLE  PSYCH: insists that she is living today, and calling her boyfriend  NEUROLOGY: non-focal, SILT grossly  SKIN: as above    LABS:    Hemoglobin A1C, Whole Blood (04.18.19 @ 07:55)    Hemoglobin A1C, Whole Blood: 5.5:     Lipid Profile (04.18.19 @ 07:55)    Cholesterol, Serum: 158 mg/dL    Triglycerides, Serum: 71 mg/dL    HDL Cholesterol, Serum: 53 mg/dL    Direct LDL: 107:     Comprehensive Metabolic Panel (04.17.19 @ 11:02)    Sodium, Serum: 140 mmol/L    Potassium, Serum: 4.0: SPECIMEN MILDLY HEMOLYZED mmol/L    Chloride, Serum: 104 mmol/L    Carbon Dioxide, Serum: 16 mmol/L    Anion Gap, Serum: 20 mmo/L    Blood Urea Nitrogen, Serum: 14:     Creatinine, Serum: 0.44 mg/dL    Glucose, Serum: 113 mg/dL    Calcium, Total Serum: 9.5 mg/dL    Protein Total, Serum: 7.2: SPECIMEN MILDLY HEMOLYZED g/dL    Albumin, Serum: 4.4 g/dL    Bilirubin Total, Serum: 0.5: Delta: < 0.2 on 04/16/  Delta: < 0.2 on 04/16/ mg/dL    Alkaline Phosphatase, Serum: 80 u/L    Aspartate Aminotransferase (AST/SGOT): 29: SPECIMEN MILDLY HEMOLYZED u/L    Alanine Aminotransferase (ALT/SGPT): 14: SPECIMEN MILDLY HEMOLYZED u/L    eGFR if Non : 110 mL/min    eGFR if : 127 mL/min    RADIOLOGY & ADDITIONAL TESTS:    Imaging Personally Reviewed:  CT head: no acute intracranial pathology  Xray L ankle/tib/fib: +soft tissue swelling, no subQ gas or evidence of OM, no fracture or dislocation    Consultant(s) Notes Reviewed:  ED BH    Care Discussed with Consultants/Other Providers: Dr. Borrego

## 2019-04-19 NOTE — PROGRESS NOTE ADULT - ASSESSMENT
59 yo F w/ DM2 on metformin, b/l LE edema on lasix, hypothyroid on synthroid, schizophrenia and etoh abuse, admitted to [facility] for further psychiatric management.     # LE edema  - imaging without evidence of fracture/dislocation/OM or subQ gas  - area without warmth/tenderness on exam, patient is able to do full active ROM of L ankle and is able to bear weight and ambulate without difficulty  - c/w lasix, encourage leg elevation, continue with supportive care    # DM2  - A1C 5.5, controlled  - c/w metformin, diabetic diet    # Hypothyroid  - c/w synthroid    # Etoh abuse  - CIWA/MV/folic acid/thiamine as per psych    # Schizophrenia  - safety precautions and medication management as per psych

## 2019-04-20 PROCEDURE — 99232 SBSQ HOSP IP/OBS MODERATE 35: CPT

## 2019-04-20 RX ADMIN — Medication 1 MILLIGRAM(S): at 09:13

## 2019-04-20 RX ADMIN — Medication 2 MILLIGRAM(S): at 00:15

## 2019-04-20 RX ADMIN — Medication 50 MICROGRAM(S): at 09:13

## 2019-04-20 RX ADMIN — Medication 50 MILLIGRAM(S): at 21:31

## 2019-04-20 RX ADMIN — Medication 40 MILLIGRAM(S): at 09:13

## 2019-04-20 RX ADMIN — METFORMIN HYDROCHLORIDE 750 MILLIGRAM(S): 850 TABLET ORAL at 09:13

## 2019-04-20 RX ADMIN — HALOPERIDOL DECANOATE 5 MILLIGRAM(S): 100 INJECTION INTRAMUSCULAR at 00:15

## 2019-04-20 RX ADMIN — Medication 100 MILLIGRAM(S): at 09:13

## 2019-04-20 RX ADMIN — Medication 0.5 MILLIGRAM(S): at 09:13

## 2019-04-20 RX ADMIN — Medication 1 TABLET(S): at 09:13

## 2019-04-21 PROCEDURE — 99232 SBSQ HOSP IP/OBS MODERATE 35: CPT

## 2019-04-21 RX ADMIN — HALOPERIDOL DECANOATE 5 MILLIGRAM(S): 100 INJECTION INTRAMUSCULAR at 11:28

## 2019-04-21 RX ADMIN — Medication 0.5 MILLIGRAM(S): at 22:55

## 2019-04-21 RX ADMIN — METFORMIN HYDROCHLORIDE 750 MILLIGRAM(S): 850 TABLET ORAL at 08:50

## 2019-04-21 RX ADMIN — Medication 50 MICROGRAM(S): at 08:50

## 2019-04-21 RX ADMIN — Medication 0.5 MILLIGRAM(S): at 11:28

## 2019-04-21 RX ADMIN — SIMVASTATIN 20 MILLIGRAM(S): 20 TABLET, FILM COATED ORAL at 22:55

## 2019-04-21 RX ADMIN — METFORMIN HYDROCHLORIDE 750 MILLIGRAM(S): 850 TABLET ORAL at 22:55

## 2019-04-21 RX ADMIN — Medication 1 TABLET(S): at 08:50

## 2019-04-21 RX ADMIN — Medication 1 MILLIGRAM(S): at 08:50

## 2019-04-21 RX ADMIN — FLUPHENAZINE HYDROCHLORIDE 5 MILLIGRAM(S): 1 TABLET, FILM COATED ORAL at 22:55

## 2019-04-21 RX ADMIN — Medication 40 MILLIGRAM(S): at 08:50

## 2019-04-21 RX ADMIN — Medication 50 MILLIGRAM(S): at 22:56

## 2019-04-21 RX ADMIN — Medication 0.5 MILLIGRAM(S): at 08:50

## 2019-04-22 PROCEDURE — 99232 SBSQ HOSP IP/OBS MODERATE 35: CPT

## 2019-04-22 RX ORDER — FLUPHENAZINE HYDROCHLORIDE 1 MG/1
7.5 TABLET, FILM COATED ORAL AT BEDTIME
Qty: 0 | Refills: 0 | Status: DISCONTINUED | OUTPATIENT
Start: 2019-04-22 | End: 2019-04-23

## 2019-04-22 RX ADMIN — FLUPHENAZINE HYDROCHLORIDE 7.5 MILLIGRAM(S): 1 TABLET, FILM COATED ORAL at 20:53

## 2019-04-22 RX ADMIN — Medication 1 TABLET(S): at 08:57

## 2019-04-22 RX ADMIN — Medication 1 MILLIGRAM(S): at 08:57

## 2019-04-22 RX ADMIN — Medication 0.5 MILLIGRAM(S): at 20:53

## 2019-04-22 RX ADMIN — Medication 0.5 MILLIGRAM(S): at 08:57

## 2019-04-22 RX ADMIN — Medication 40 MILLIGRAM(S): at 08:57

## 2019-04-22 RX ADMIN — HALOPERIDOL DECANOATE 5 MILLIGRAM(S): 100 INJECTION INTRAMUSCULAR at 11:34

## 2019-04-22 RX ADMIN — Medication 50 MILLIGRAM(S): at 17:38

## 2019-04-22 RX ADMIN — Medication 50 MILLIGRAM(S): at 11:34

## 2019-04-22 RX ADMIN — METFORMIN HYDROCHLORIDE 750 MILLIGRAM(S): 850 TABLET ORAL at 20:53

## 2019-04-22 RX ADMIN — HALOPERIDOL DECANOATE 5 MILLIGRAM(S): 100 INJECTION INTRAMUSCULAR at 17:38

## 2019-04-22 RX ADMIN — SIMVASTATIN 20 MILLIGRAM(S): 20 TABLET, FILM COATED ORAL at 20:53

## 2019-04-22 RX ADMIN — Medication 50 MICROGRAM(S): at 08:57

## 2019-04-22 RX ADMIN — METFORMIN HYDROCHLORIDE 750 MILLIGRAM(S): 850 TABLET ORAL at 08:57

## 2019-04-22 NOTE — PROGRESS NOTE ADULT - SUBJECTIVE AND OBJECTIVE BOX
CC/Reason for Consult: LLE edema    SUBJECTIVE / OVERNIGHT EVENTS:  Reports that leg swelling is about the same, worse when she walks around a lot.    MEDICATIONS  (STANDING):  benztropine 0.5 milliGRAM(s) Oral two times a day  fluPHENAZine 7.5 milliGRAM(s) Oral at bedtime  folic acid 1 milliGRAM(s) Oral daily  furosemide    Tablet 40 milliGRAM(s) Oral daily  levothyroxine 50 MICROGram(s) Oral daily  metFORMIN Extended-Release 750 milliGRAM(s) Oral <User Schedule>  multivitamin 1 Tablet(s) Oral daily  simvastatin 20 milliGRAM(s) Oral at bedtime    MEDICATIONS  (PRN):  diphenhydrAMINE 50 milliGRAM(s) Oral every 6 hours PRN Extrapyramidal prophylaxis  diphenhydrAMINE   Injectable 50 milliGRAM(s) IntraMuscular Once PRN Extrapyramidal prophylaxis  haloperidol     Tablet 5 milliGRAM(s) Oral every 6 hours PRN agitation  haloperidol    Injectable 5 milliGRAM(s) IntraMuscular once PRN Agitation  LORazepam     Tablet 2 milliGRAM(s) Oral every 2 hours PRN CIWA score increase by 2 points and current CIWA score GREATER THAN 9  LORazepam     Tablet 0.5 milliGRAM(s) Oral at bedtime PRN anxiety/agitation  LORazepam     Tablet 1 milliGRAM(s) Oral once PRN Agitation  LORazepam     Tablet 2 milliGRAM(s) Oral once PRN Agitation  LORazepam   Injectable 2 milliGRAM(s) IntraMuscular Once PRN Agitation      Vital Signs Last 24 Hrs  T(C): 36.9 (22 Apr 2019 06:40), Max: 36.9 (22 Apr 2019 06:40)  T(F): 98.4 (22 Apr 2019 06:40), Max: 98.4 (22 Apr 2019 06:40)  HR: 87 (22 Apr 2019 06:40) (87 - 87)  BP: 173/83 (22 Apr 2019 06:40) (173/83 - 173/83)  BP(mean): --  RR: --  SpO2: --  CAPILLARY BLOOD GLUCOSE            PHYSICAL EXAM:  GENERAL: NAD, well-developed  HEAD:  Atraumatic, Normocephalic  EYES: EOMI, conjunctiva and sclera clear  NECK: Supple, No JVD  CHEST/LUNG: Clear to auscultation bilaterally; No wheeze  HEART: Regular rate and rhythm; No murmurs, rubs, or gallops  ABDOMEN: Soft, Nontender, Nondistended; Bowel sounds present  EXTREMITIES:  2+ Peripheral Pulses, 1+ edema BLE, L>R, unchanged from past exams (known to me chronic L>R leg swelling)  PSYCH: AAOx3  NEUROLOGY: non-focal  SKIN: No rashes or lesions    LABS:        Care Discussed with Consultants/Other Providers: Dr Borrego

## 2019-04-22 NOTE — PROGRESS NOTE ADULT - ASSESSMENT
57 yo F w/ DM2 on metformin, b/l LE edema (L>R) on lasix, hypothyroid on synthroid, schizophrenia and etoh abuse, admitted to [facility] for further psychiatric management.     # LE edema  - imaging without evidence of fracture/dislocation/OM or subQ gas  - area without warmth/tenderness on exam, patient is able to do full active ROM of L ankle and is able to bear weight and ambulate without difficulty  - c/w lasix, encourage leg elevation, continue with supportive care  - would benefit from compression stockings for this chronic problem    # DM2  - A1C 5.5, controlled  - c/w metformin, diabetic diet    # Hypothyroid  - c/w synthroid    # Etoh abuse  - CIWA/MV/folic acid/thiamine as per psych    # Schizophrenia  - safety precautions and medication management as per psych

## 2019-04-23 PROCEDURE — 99232 SBSQ HOSP IP/OBS MODERATE 35: CPT

## 2019-04-23 PROCEDURE — 99233 SBSQ HOSP IP/OBS HIGH 50: CPT

## 2019-04-23 RX ORDER — DILTIAZEM HCL 120 MG
300 CAPSULE, EXT RELEASE 24 HR ORAL DAILY
Qty: 0 | Refills: 0 | Status: DISCONTINUED | OUTPATIENT
Start: 2019-04-23 | End: 2019-05-03

## 2019-04-23 RX ORDER — FLUPHENAZINE HYDROCHLORIDE 1 MG/1
10 TABLET, FILM COATED ORAL AT BEDTIME
Qty: 0 | Refills: 0 | Status: DISCONTINUED | OUTPATIENT
Start: 2019-04-23 | End: 2019-05-03

## 2019-04-23 RX ADMIN — METFORMIN HYDROCHLORIDE 750 MILLIGRAM(S): 850 TABLET ORAL at 09:15

## 2019-04-23 RX ADMIN — Medication 1 MILLIGRAM(S): at 09:15

## 2019-04-23 RX ADMIN — Medication 1 TABLET(S): at 09:15

## 2019-04-23 RX ADMIN — Medication 50 MICROGRAM(S): at 09:15

## 2019-04-23 RX ADMIN — Medication 50 MILLIGRAM(S): at 09:00

## 2019-04-23 RX ADMIN — Medication 50 MILLIGRAM(S): at 16:57

## 2019-04-23 RX ADMIN — FLUPHENAZINE HYDROCHLORIDE 10 MILLIGRAM(S): 1 TABLET, FILM COATED ORAL at 20:59

## 2019-04-23 RX ADMIN — HALOPERIDOL DECANOATE 5 MILLIGRAM(S): 100 INJECTION INTRAMUSCULAR at 09:00

## 2019-04-23 RX ADMIN — HALOPERIDOL DECANOATE 5 MILLIGRAM(S): 100 INJECTION INTRAMUSCULAR at 00:27

## 2019-04-23 RX ADMIN — SIMVASTATIN 20 MILLIGRAM(S): 20 TABLET, FILM COATED ORAL at 20:59

## 2019-04-23 RX ADMIN — Medication 0.5 MILLIGRAM(S): at 20:59

## 2019-04-23 RX ADMIN — Medication 50 MILLIGRAM(S): at 00:27

## 2019-04-23 RX ADMIN — Medication 40 MILLIGRAM(S): at 09:15

## 2019-04-23 RX ADMIN — Medication 0.5 MILLIGRAM(S): at 09:15

## 2019-04-23 RX ADMIN — METFORMIN HYDROCHLORIDE 750 MILLIGRAM(S): 850 TABLET ORAL at 20:59

## 2019-04-23 RX ADMIN — Medication 0.5 MILLIGRAM(S): at 00:27

## 2019-04-23 NOTE — PROGRESS NOTE ADULT - ASSESSMENT
57 yo F w/ DM2 on metformin, HTN,  b/l LE edema (L>R) on lasix, hypothyroid on synthroid, schizophrenia and etoh abuse, admitted to [facility] for further psychiatric management.     # HTN: Patient has been taking cardizem CD 300mg daily. This medication was inadvertently not continued on admission.  Resumed, follow BP.    # LE edema: Stable with some improvement, chronic problem, left has had more swelling than right chronically  - imaging without evidence of fracture/dislocation/OM or subQ gas  - area without warmth/tenderness on exam, patient is able to do full active ROM of L ankle and is able to bear weight and ambulate without difficulty  - c/w lasix, encourage leg elevation, continue with supportive care  - would benefit from compression stockings for this chronic problem    # DM2  - A1C 5.5, controlled  - c/w metformin, diabetic diet    # Hypothyroid  - c/w synthroid    # Etoh abuse  - CIWA/MV/folic acid/thiamine as per psych    # Schizophrenia  - safety precautions and medication management as per psych

## 2019-04-23 NOTE — PROGRESS NOTE ADULT - SUBJECTIVE AND OBJECTIVE BOX
CC/Reason for Consult: HTN, edema    SUBJECTIVE / OVERNIGHT EVENTS:  Feels well, thinks swelling has gone down a bit    MEDICATIONS  (STANDING):  benztropine 0.5 milliGRAM(s) Oral two times a day  diltiazem    milliGRAM(s) Oral daily  fluPHENAZine 10 milliGRAM(s) Oral at bedtime  folic acid 1 milliGRAM(s) Oral daily  furosemide    Tablet 40 milliGRAM(s) Oral daily  levothyroxine 50 MICROGram(s) Oral daily  metFORMIN Extended-Release 750 milliGRAM(s) Oral <User Schedule>  multivitamin 1 Tablet(s) Oral daily  simvastatin 20 milliGRAM(s) Oral at bedtime    MEDICATIONS  (PRN):  diphenhydrAMINE 50 milliGRAM(s) Oral every 6 hours PRN Extrapyramidal prophylaxis  diphenhydrAMINE   Injectable 50 milliGRAM(s) IntraMuscular Once PRN Extrapyramidal prophylaxis  haloperidol     Tablet 5 milliGRAM(s) Oral every 6 hours PRN agitation  haloperidol    Injectable 5 milliGRAM(s) IntraMuscular once PRN Agitation  LORazepam     Tablet 2 milliGRAM(s) Oral once PRN Agitation  LORazepam     Tablet 0.5 milliGRAM(s) Oral at bedtime PRN anxiety/agitation  LORazepam     Tablet 1 milliGRAM(s) Oral once PRN Agitation  LORazepam   Injectable 2 milliGRAM(s) IntraMuscular Once PRN Agitation      Vital Signs Last 24 Hrs  T(C): 37.2 (23 Apr 2019 10:53), Max: 37.2 (23 Apr 2019 10:53)  T(F): 99 (23 Apr 2019 10:53), Max: 99 (23 Apr 2019 10:53)  HR: 93 (23 Apr 2019 10:53) (93 - 93)  BP: 169/94 (23 Apr 2019 10:53) (169/94 - 169/94)  BP(mean): --  RR: 18 (23 Apr 2019 10:53) (18 - 18)  SpO2: 100% (23 Apr 2019 10:53) (100% - 100%)  CAPILLARY BLOOD GLUCOSE            PHYSICAL EXAM:  GENERAL: NAD, well-developed  HEAD:  Atraumatic, Normocephalic  EYES: EOMI, conjunctiva and sclera clear  NECK: Supple, No JVD  CHEST/LUNG: Clear to auscultation bilaterally; No wheeze  HEART: Regular rate and rhythm; No murmurs, rubs, or gallops  ABDOMEN: Soft, Nontender, Nondistended; Bowel sounds present  EXTREMITIES:  2+ Peripheral Pulses, No clubbing, cyanosis, 1+  edemaRLE, 2+ edema LLE  PSYCH: AAOx3  NEUROLOGY: non-focal  SKIN: No rashes or lesions    LABS:        Care Discussed with Consultants/Other Providers: Dr Borrego

## 2019-04-24 PROCEDURE — 99232 SBSQ HOSP IP/OBS MODERATE 35: CPT

## 2019-04-24 RX ORDER — ACETAMINOPHEN 500 MG
650 TABLET ORAL ONCE
Qty: 0 | Refills: 0 | Status: COMPLETED | OUTPATIENT
Start: 2019-04-24 | End: 2019-04-24

## 2019-04-24 RX ORDER — HALOPERIDOL DECANOATE 100 MG/ML
5 INJECTION INTRAMUSCULAR ONCE
Qty: 0 | Refills: 0 | Status: DISCONTINUED | OUTPATIENT
Start: 2019-04-24 | End: 2019-05-03

## 2019-04-24 RX ADMIN — Medication 0.5 MILLIGRAM(S): at 21:40

## 2019-04-24 RX ADMIN — Medication 0.5 MILLIGRAM(S): at 05:15

## 2019-04-24 RX ADMIN — Medication 50 MILLIGRAM(S): at 15:40

## 2019-04-24 RX ADMIN — Medication 2 MILLIGRAM(S): at 05:45

## 2019-04-24 RX ADMIN — HALOPERIDOL DECANOATE 5 MILLIGRAM(S): 100 INJECTION INTRAMUSCULAR at 21:40

## 2019-04-24 RX ADMIN — METFORMIN HYDROCHLORIDE 750 MILLIGRAM(S): 850 TABLET ORAL at 21:40

## 2019-04-24 RX ADMIN — Medication 50 MICROGRAM(S): at 10:36

## 2019-04-24 RX ADMIN — Medication 1 MILLIGRAM(S): at 10:35

## 2019-04-24 RX ADMIN — Medication 300 MILLIGRAM(S): at 10:35

## 2019-04-24 RX ADMIN — Medication 1 MILLIGRAM(S): at 15:41

## 2019-04-24 RX ADMIN — HALOPERIDOL DECANOATE 5 MILLIGRAM(S): 100 INJECTION INTRAMUSCULAR at 15:40

## 2019-04-24 RX ADMIN — SIMVASTATIN 20 MILLIGRAM(S): 20 TABLET, FILM COATED ORAL at 21:40

## 2019-04-24 RX ADMIN — Medication 650 MILLIGRAM(S): at 06:23

## 2019-04-24 RX ADMIN — Medication 40 MILLIGRAM(S): at 10:35

## 2019-04-24 RX ADMIN — HALOPERIDOL DECANOATE 5 MILLIGRAM(S): 100 INJECTION INTRAMUSCULAR at 05:15

## 2019-04-24 RX ADMIN — Medication 50 MILLIGRAM(S): at 01:50

## 2019-04-24 RX ADMIN — Medication 50 MILLIGRAM(S): at 21:40

## 2019-04-24 RX ADMIN — FLUPHENAZINE HYDROCHLORIDE 10 MILLIGRAM(S): 1 TABLET, FILM COATED ORAL at 21:40

## 2019-04-24 RX ADMIN — METFORMIN HYDROCHLORIDE 750 MILLIGRAM(S): 850 TABLET ORAL at 10:36

## 2019-04-24 RX ADMIN — Medication 0.5 MILLIGRAM(S): at 10:35

## 2019-04-24 RX ADMIN — Medication 1 TABLET(S): at 10:36

## 2019-04-24 RX ADMIN — Medication 650 MILLIGRAM(S): at 05:45
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
## 2019-04-25 PROCEDURE — 99232 SBSQ HOSP IP/OBS MODERATE 35: CPT

## 2019-04-25 RX ORDER — HALOPERIDOL DECANOATE 100 MG/ML
5 INJECTION INTRAMUSCULAR ONCE
Qty: 0 | Refills: 0 | Status: COMPLETED | OUTPATIENT
Start: 2019-04-25 | End: 2019-04-25

## 2019-04-25 RX ORDER — IBUPROFEN 200 MG
400 TABLET ORAL EVERY 6 HOURS
Qty: 0 | Refills: 0 | Status: DISCONTINUED | OUTPATIENT
Start: 2019-04-25 | End: 2019-05-03

## 2019-04-25 RX ORDER — DIPHENHYDRAMINE HCL 50 MG
50 CAPSULE ORAL ONCE
Qty: 0 | Refills: 0 | Status: COMPLETED | OUTPATIENT
Start: 2019-04-25 | End: 2019-04-25

## 2019-04-25 RX ORDER — FLUPHENAZINE HYDROCHLORIDE 1 MG/1
2.5 TABLET, FILM COATED ORAL DAILY
Qty: 0 | Refills: 0 | Status: DISCONTINUED | OUTPATIENT
Start: 2019-04-25 | End: 2019-04-29

## 2019-04-25 RX ADMIN — Medication 400 MILLIGRAM(S): at 13:00

## 2019-04-25 RX ADMIN — Medication 0.5 MILLIGRAM(S): at 21:54

## 2019-04-25 RX ADMIN — HALOPERIDOL DECANOATE 5 MILLIGRAM(S): 100 INJECTION INTRAMUSCULAR at 08:15

## 2019-04-25 RX ADMIN — METFORMIN HYDROCHLORIDE 750 MILLIGRAM(S): 850 TABLET ORAL at 09:03

## 2019-04-25 RX ADMIN — Medication 50 MILLIGRAM(S): at 17:36

## 2019-04-25 RX ADMIN — Medication 300 MILLIGRAM(S): at 09:02

## 2019-04-25 RX ADMIN — Medication 0.5 MILLIGRAM(S): at 09:02

## 2019-04-25 RX ADMIN — Medication 50 MILLIGRAM(S): at 08:15

## 2019-04-25 RX ADMIN — HALOPERIDOL DECANOATE 5 MILLIGRAM(S): 100 INJECTION INTRAMUSCULAR at 21:54

## 2019-04-25 RX ADMIN — FLUPHENAZINE HYDROCHLORIDE 10 MILLIGRAM(S): 1 TABLET, FILM COATED ORAL at 20:41

## 2019-04-25 RX ADMIN — Medication 0.5 MILLIGRAM(S): at 20:45

## 2019-04-25 RX ADMIN — Medication 40 MILLIGRAM(S): at 09:03

## 2019-04-25 RX ADMIN — Medication 50 MICROGRAM(S): at 09:03

## 2019-04-25 RX ADMIN — Medication 50 MILLIGRAM(S): at 21:54

## 2019-04-25 RX ADMIN — SIMVASTATIN 20 MILLIGRAM(S): 20 TABLET, FILM COATED ORAL at 20:41

## 2019-04-25 RX ADMIN — METFORMIN HYDROCHLORIDE 750 MILLIGRAM(S): 850 TABLET ORAL at 20:41

## 2019-04-25 RX ADMIN — FLUPHENAZINE HYDROCHLORIDE 2.5 MILLIGRAM(S): 1 TABLET, FILM COATED ORAL at 09:03

## 2019-04-25 RX ADMIN — Medication 0.5 MILLIGRAM(S): at 20:40

## 2019-04-25 RX ADMIN — HALOPERIDOL DECANOATE 5 MILLIGRAM(S): 100 INJECTION INTRAMUSCULAR at 17:36

## 2019-04-26 PROCEDURE — 99232 SBSQ HOSP IP/OBS MODERATE 35: CPT

## 2019-04-26 RX ORDER — LANOLIN ALCOHOL/MO/W.PET/CERES
3 CREAM (GRAM) TOPICAL AT BEDTIME
Qty: 0 | Refills: 0 | Status: DISCONTINUED | OUTPATIENT
Start: 2019-04-26 | End: 2019-05-03

## 2019-04-26 RX ADMIN — Medication 50 MILLIGRAM(S): at 20:32

## 2019-04-26 RX ADMIN — Medication 40 MILLIGRAM(S): at 09:01

## 2019-04-26 RX ADMIN — METFORMIN HYDROCHLORIDE 750 MILLIGRAM(S): 850 TABLET ORAL at 20:32

## 2019-04-26 RX ADMIN — Medication 0.5 MILLIGRAM(S): at 09:01

## 2019-04-26 RX ADMIN — FLUPHENAZINE HYDROCHLORIDE 2.5 MILLIGRAM(S): 1 TABLET, FILM COATED ORAL at 09:01

## 2019-04-26 RX ADMIN — HALOPERIDOL DECANOATE 5 MILLIGRAM(S): 100 INJECTION INTRAMUSCULAR at 20:32

## 2019-04-26 RX ADMIN — SIMVASTATIN 20 MILLIGRAM(S): 20 TABLET, FILM COATED ORAL at 20:32

## 2019-04-26 RX ADMIN — Medication 0.5 MILLIGRAM(S): at 20:32

## 2019-04-26 RX ADMIN — FLUPHENAZINE HYDROCHLORIDE 10 MILLIGRAM(S): 1 TABLET, FILM COATED ORAL at 20:32

## 2019-04-26 RX ADMIN — METFORMIN HYDROCHLORIDE 750 MILLIGRAM(S): 850 TABLET ORAL at 09:01

## 2019-04-26 RX ADMIN — Medication 300 MILLIGRAM(S): at 09:01

## 2019-04-26 RX ADMIN — Medication 3 MILLIGRAM(S): at 20:32

## 2019-04-26 RX ADMIN — Medication 50 MICROGRAM(S): at 09:01

## 2019-04-27 RX ADMIN — Medication 300 MILLIGRAM(S): at 08:41

## 2019-04-27 RX ADMIN — FLUPHENAZINE HYDROCHLORIDE 10 MILLIGRAM(S): 1 TABLET, FILM COATED ORAL at 21:11

## 2019-04-27 RX ADMIN — Medication 0.5 MILLIGRAM(S): at 08:39

## 2019-04-27 RX ADMIN — METFORMIN HYDROCHLORIDE 750 MILLIGRAM(S): 850 TABLET ORAL at 21:11

## 2019-04-27 RX ADMIN — Medication 50 MICROGRAM(S): at 08:41

## 2019-04-27 RX ADMIN — Medication 0.5 MILLIGRAM(S): at 21:11

## 2019-04-27 RX ADMIN — Medication 40 MILLIGRAM(S): at 08:41

## 2019-04-27 RX ADMIN — FLUPHENAZINE HYDROCHLORIDE 2.5 MILLIGRAM(S): 1 TABLET, FILM COATED ORAL at 08:41

## 2019-04-27 RX ADMIN — SIMVASTATIN 20 MILLIGRAM(S): 20 TABLET, FILM COATED ORAL at 21:11

## 2019-04-27 RX ADMIN — Medication 3 MILLIGRAM(S): at 21:11

## 2019-04-27 RX ADMIN — METFORMIN HYDROCHLORIDE 750 MILLIGRAM(S): 850 TABLET ORAL at 08:41

## 2019-04-28 RX ADMIN — FLUPHENAZINE HYDROCHLORIDE 2.5 MILLIGRAM(S): 1 TABLET, FILM COATED ORAL at 09:02

## 2019-04-28 RX ADMIN — METFORMIN HYDROCHLORIDE 750 MILLIGRAM(S): 850 TABLET ORAL at 09:02

## 2019-04-28 RX ADMIN — Medication 0.5 MILLIGRAM(S): at 20:57

## 2019-04-28 RX ADMIN — Medication 300 MILLIGRAM(S): at 09:02

## 2019-04-28 RX ADMIN — Medication 50 MILLIGRAM(S): at 13:45

## 2019-04-28 RX ADMIN — Medication 50 MILLIGRAM(S): at 01:20

## 2019-04-28 RX ADMIN — Medication 40 MILLIGRAM(S): at 09:02

## 2019-04-28 RX ADMIN — Medication 3 MILLIGRAM(S): at 20:57

## 2019-04-28 RX ADMIN — Medication 0.5 MILLIGRAM(S): at 09:02

## 2019-04-28 RX ADMIN — HALOPERIDOL DECANOATE 5 MILLIGRAM(S): 100 INJECTION INTRAMUSCULAR at 13:45

## 2019-04-28 RX ADMIN — HALOPERIDOL DECANOATE 5 MILLIGRAM(S): 100 INJECTION INTRAMUSCULAR at 01:20

## 2019-04-28 RX ADMIN — FLUPHENAZINE HYDROCHLORIDE 10 MILLIGRAM(S): 1 TABLET, FILM COATED ORAL at 20:57

## 2019-04-28 RX ADMIN — HALOPERIDOL DECANOATE 5 MILLIGRAM(S): 100 INJECTION INTRAMUSCULAR at 20:57

## 2019-04-28 RX ADMIN — Medication 50 MILLIGRAM(S): at 20:57

## 2019-04-28 RX ADMIN — Medication 0.5 MILLIGRAM(S): at 01:20

## 2019-04-28 RX ADMIN — METFORMIN HYDROCHLORIDE 750 MILLIGRAM(S): 850 TABLET ORAL at 20:57

## 2019-04-28 RX ADMIN — SIMVASTATIN 20 MILLIGRAM(S): 20 TABLET, FILM COATED ORAL at 20:57

## 2019-04-28 RX ADMIN — Medication 50 MICROGRAM(S): at 09:02

## 2019-04-28 NOTE — CHART NOTE - NSCHARTNOTEFT_GEN_A_CORE
Notified by BETTY, pt with sutures on forehead x 16 days needing removal. Was not marked on calendar. According to ED note 4/12/19- 5 simple interrupted sutures were placed. Pt seen and examined. Site inspected, 3 sutures intact, skin nonerythematous, nonedematous, dry and intact, healed nicely. Sutures removed with no complications.

## 2019-04-29 PROCEDURE — 99232 SBSQ HOSP IP/OBS MODERATE 35: CPT

## 2019-04-29 RX ORDER — FLUPHENAZINE HYDROCHLORIDE 1 MG/1
5 TABLET, FILM COATED ORAL DAILY
Qty: 0 | Refills: 0 | Status: DISCONTINUED | OUTPATIENT
Start: 2019-04-29 | End: 2019-04-30

## 2019-04-29 RX ADMIN — HALOPERIDOL DECANOATE 5 MILLIGRAM(S): 100 INJECTION INTRAMUSCULAR at 20:43

## 2019-04-29 RX ADMIN — Medication 50 MILLIGRAM(S): at 09:23

## 2019-04-29 RX ADMIN — METFORMIN HYDROCHLORIDE 750 MILLIGRAM(S): 850 TABLET ORAL at 08:30

## 2019-04-29 RX ADMIN — HALOPERIDOL DECANOATE 5 MILLIGRAM(S): 100 INJECTION INTRAMUSCULAR at 09:23

## 2019-04-29 RX ADMIN — METFORMIN HYDROCHLORIDE 750 MILLIGRAM(S): 850 TABLET ORAL at 20:42

## 2019-04-29 RX ADMIN — Medication 0.5 MILLIGRAM(S): at 20:42

## 2019-04-29 RX ADMIN — Medication 3 MILLIGRAM(S): at 20:42

## 2019-04-29 RX ADMIN — Medication 0.5 MILLIGRAM(S): at 09:23

## 2019-04-29 RX ADMIN — FLUPHENAZINE HYDROCHLORIDE 5 MILLIGRAM(S): 1 TABLET, FILM COATED ORAL at 08:30

## 2019-04-29 RX ADMIN — Medication 50 MICROGRAM(S): at 08:30

## 2019-04-29 RX ADMIN — Medication 0.5 MILLIGRAM(S): at 08:30

## 2019-04-29 RX ADMIN — Medication 300 MILLIGRAM(S): at 08:30

## 2019-04-29 RX ADMIN — FLUPHENAZINE HYDROCHLORIDE 10 MILLIGRAM(S): 1 TABLET, FILM COATED ORAL at 20:42

## 2019-04-29 RX ADMIN — Medication 40 MILLIGRAM(S): at 08:30

## 2019-04-29 RX ADMIN — Medication 50 MILLIGRAM(S): at 20:42

## 2019-04-29 RX ADMIN — SIMVASTATIN 20 MILLIGRAM(S): 20 TABLET, FILM COATED ORAL at 20:42

## 2019-04-30 PROCEDURE — 99232 SBSQ HOSP IP/OBS MODERATE 35: CPT

## 2019-04-30 RX ORDER — FLUPHENAZINE HYDROCHLORIDE 1 MG/1
50 TABLET, FILM COATED ORAL ONCE
Qty: 0 | Refills: 0 | Status: COMPLETED | OUTPATIENT
Start: 2019-04-30 | End: 2019-04-30

## 2019-04-30 RX ORDER — FLUPHENAZINE HYDROCHLORIDE 1 MG/1
10 TABLET, FILM COATED ORAL DAILY
Qty: 0 | Refills: 0 | Status: DISCONTINUED | OUTPATIENT
Start: 2019-04-30 | End: 2019-05-03

## 2019-04-30 RX ORDER — INSULIN LISPRO 100/ML
VIAL (ML) SUBCUTANEOUS
Qty: 0 | Refills: 0 | Status: DISCONTINUED | OUTPATIENT
Start: 2019-04-30 | End: 2019-05-03

## 2019-04-30 RX ORDER — BENZTROPINE MESYLATE 1 MG
1 TABLET ORAL
Qty: 0 | Refills: 0 | Status: DISCONTINUED | OUTPATIENT
Start: 2019-04-30 | End: 2019-05-03

## 2019-04-30 RX ADMIN — Medication 50 MILLIGRAM(S): at 20:10

## 2019-04-30 RX ADMIN — Medication 1 MILLIGRAM(S): at 09:05

## 2019-04-30 RX ADMIN — HALOPERIDOL DECANOATE 5 MILLIGRAM(S): 100 INJECTION INTRAMUSCULAR at 20:10

## 2019-04-30 RX ADMIN — FLUPHENAZINE HYDROCHLORIDE 50 MILLIGRAM(S): 1 TABLET, FILM COATED ORAL at 12:38

## 2019-04-30 RX ADMIN — Medication 40 MILLIGRAM(S): at 09:05

## 2019-04-30 RX ADMIN — Medication 300 MILLIGRAM(S): at 09:05

## 2019-04-30 RX ADMIN — SIMVASTATIN 20 MILLIGRAM(S): 20 TABLET, FILM COATED ORAL at 20:08

## 2019-04-30 RX ADMIN — METFORMIN HYDROCHLORIDE 750 MILLIGRAM(S): 850 TABLET ORAL at 20:08

## 2019-04-30 RX ADMIN — FLUPHENAZINE HYDROCHLORIDE 10 MILLIGRAM(S): 1 TABLET, FILM COATED ORAL at 20:08

## 2019-04-30 RX ADMIN — METFORMIN HYDROCHLORIDE 750 MILLIGRAM(S): 850 TABLET ORAL at 09:05

## 2019-04-30 RX ADMIN — Medication 1 MILLIGRAM(S): at 20:08

## 2019-04-30 RX ADMIN — FLUPHENAZINE HYDROCHLORIDE 10 MILLIGRAM(S): 1 TABLET, FILM COATED ORAL at 09:05

## 2019-04-30 RX ADMIN — Medication 3 MILLIGRAM(S): at 20:08

## 2019-04-30 RX ADMIN — Medication 50 MICROGRAM(S): at 09:05

## 2019-05-01 PROCEDURE — 99232 SBSQ HOSP IP/OBS MODERATE 35: CPT

## 2019-05-01 RX ADMIN — SIMVASTATIN 20 MILLIGRAM(S): 20 TABLET, FILM COATED ORAL at 21:12

## 2019-05-01 RX ADMIN — Medication 1 MILLIGRAM(S): at 21:11

## 2019-05-01 RX ADMIN — Medication 50 MICROGRAM(S): at 08:26

## 2019-05-01 RX ADMIN — Medication 3 MILLIGRAM(S): at 21:12

## 2019-05-01 RX ADMIN — Medication 300 MILLIGRAM(S): at 08:26

## 2019-05-01 RX ADMIN — METFORMIN HYDROCHLORIDE 750 MILLIGRAM(S): 850 TABLET ORAL at 08:26

## 2019-05-01 RX ADMIN — Medication 50 MILLIGRAM(S): at 22:22

## 2019-05-01 RX ADMIN — Medication 40 MILLIGRAM(S): at 08:26

## 2019-05-01 RX ADMIN — METFORMIN HYDROCHLORIDE 750 MILLIGRAM(S): 850 TABLET ORAL at 21:12

## 2019-05-01 RX ADMIN — FLUPHENAZINE HYDROCHLORIDE 10 MILLIGRAM(S): 1 TABLET, FILM COATED ORAL at 21:11

## 2019-05-01 RX ADMIN — FLUPHENAZINE HYDROCHLORIDE 10 MILLIGRAM(S): 1 TABLET, FILM COATED ORAL at 08:26

## 2019-05-01 RX ADMIN — Medication 1 MILLIGRAM(S): at 08:26

## 2019-05-02 PROCEDURE — 99232 SBSQ HOSP IP/OBS MODERATE 35: CPT

## 2019-05-02 PROCEDURE — 90853 GROUP PSYCHOTHERAPY: CPT

## 2019-05-02 RX ORDER — FUROSEMIDE 40 MG
1 TABLET ORAL
Qty: 30 | Refills: 0
Start: 2019-05-02 | End: 2019-05-31

## 2019-05-02 RX ORDER — LANOLIN ALCOHOL/MO/W.PET/CERES
1 CREAM (GRAM) TOPICAL
Qty: 30 | Refills: 0
Start: 2019-05-02 | End: 2019-05-31

## 2019-05-02 RX ORDER — BENZTROPINE MESYLATE 1 MG
1 TABLET ORAL
Qty: 60 | Refills: 0
Start: 2019-05-02 | End: 2019-05-31

## 2019-05-02 RX ORDER — SIMVASTATIN 20 MG/1
1 TABLET, FILM COATED ORAL
Qty: 30 | Refills: 0
Start: 2019-05-02 | End: 2019-05-31

## 2019-05-02 RX ORDER — FLUPHENAZINE HYDROCHLORIDE 1 MG/1
1 TABLET, FILM COATED ORAL
Qty: 30 | Refills: 0
Start: 2019-05-02 | End: 2019-05-16

## 2019-05-02 RX ORDER — METFORMIN HYDROCHLORIDE 850 MG/1
1 TABLET ORAL
Qty: 60 | Refills: 0
Start: 2019-05-02 | End: 2019-05-31

## 2019-05-02 RX ORDER — LEVOTHYROXINE SODIUM 125 MCG
1 TABLET ORAL
Qty: 30 | Refills: 0
Start: 2019-05-02 | End: 2019-05-31

## 2019-05-02 RX ORDER — DILTIAZEM HCL 120 MG
1 CAPSULE, EXT RELEASE 24 HR ORAL
Qty: 30 | Refills: 0
Start: 2019-05-02 | End: 2019-05-31

## 2019-05-02 RX ADMIN — Medication 400 MILLIGRAM(S): at 00:35

## 2019-05-02 RX ADMIN — Medication 40 MILLIGRAM(S): at 10:25

## 2019-05-02 RX ADMIN — Medication 3 MILLIGRAM(S): at 20:37

## 2019-05-02 RX ADMIN — Medication 400 MILLIGRAM(S): at 01:24

## 2019-05-02 RX ADMIN — Medication 50 MICROGRAM(S): at 10:26

## 2019-05-02 RX ADMIN — Medication 1 MILLIGRAM(S): at 10:25

## 2019-05-02 RX ADMIN — METFORMIN HYDROCHLORIDE 750 MILLIGRAM(S): 850 TABLET ORAL at 20:36

## 2019-05-02 RX ADMIN — Medication 1 MILLIGRAM(S): at 20:37

## 2019-05-02 RX ADMIN — SIMVASTATIN 20 MILLIGRAM(S): 20 TABLET, FILM COATED ORAL at 20:36

## 2019-05-02 RX ADMIN — FLUPHENAZINE HYDROCHLORIDE 10 MILLIGRAM(S): 1 TABLET, FILM COATED ORAL at 20:37

## 2019-05-02 RX ADMIN — FLUPHENAZINE HYDROCHLORIDE 10 MILLIGRAM(S): 1 TABLET, FILM COATED ORAL at 10:25

## 2019-05-02 RX ADMIN — Medication 300 MILLIGRAM(S): at 10:25

## 2019-05-02 RX ADMIN — METFORMIN HYDROCHLORIDE 750 MILLIGRAM(S): 850 TABLET ORAL at 10:26

## 2019-05-03 VITALS — TEMPERATURE: 98 F

## 2019-05-03 LAB
CHOLEST SERPL-MCNC: 127 MG/DL — SIGNIFICANT CHANGE UP (ref 120–199)
HBA1C BLD-MCNC: 5.2 % — SIGNIFICANT CHANGE UP (ref 4–5.6)
HDLC SERPL-MCNC: 41 MG/DL — LOW (ref 45–65)
LIPID PNL WITH DIRECT LDL SERPL: 81 MG/DL — SIGNIFICANT CHANGE UP
TRIGL SERPL-MCNC: 102 MG/DL — SIGNIFICANT CHANGE UP (ref 10–149)

## 2019-05-03 PROCEDURE — 99238 HOSP IP/OBS DSCHRG MGMT 30/<: CPT

## 2019-05-03 RX ADMIN — METFORMIN HYDROCHLORIDE 750 MILLIGRAM(S): 850 TABLET ORAL at 08:11

## 2019-05-03 RX ADMIN — Medication 1 MILLIGRAM(S): at 08:11

## 2019-05-03 RX ADMIN — Medication 300 MILLIGRAM(S): at 08:11

## 2019-05-03 RX ADMIN — Medication 50 MICROGRAM(S): at 08:11

## 2019-05-03 RX ADMIN — Medication 40 MILLIGRAM(S): at 08:11

## 2019-05-03 RX ADMIN — FLUPHENAZINE HYDROCHLORIDE 10 MILLIGRAM(S): 1 TABLET, FILM COATED ORAL at 08:11

## 2019-05-16 PROBLEM — F10.10 ALCOHOL ABUSE, UNCOMPLICATED: Chronic | Status: ACTIVE | Noted: 2019-04-16

## 2019-05-24 ENCOUNTER — RX RENEWAL (OUTPATIENT)
Age: 59
End: 2019-05-24

## 2019-06-03 ENCOUNTER — APPOINTMENT (OUTPATIENT)
Dept: CARDIOLOGY | Facility: CLINIC | Age: 59
End: 2019-06-03

## 2019-06-03 ENCOUNTER — MEDICATION RENEWAL (OUTPATIENT)
Age: 59
End: 2019-06-03

## 2019-06-11 NOTE — ED PROVIDER NOTE - RESPIRATORY NEGATIVE STATEMENT, MLM
FIRST PROVIDER CONTACT ASSESSMENT NOTE      Department of Emergency Medicine   Admit Date: No admission date for patient encounter. Chief Complaint: Other (Pt called by PCP for INR of 7 and increased K. Pt has no complaints. )      History of Present Illness:    Renée Izaguirre is a 66 y.o. female who presents to the ED for called by her doctor that her labwork was off. The patient was noted to have elevated potassium as well as elevated INR. Patient with no complaints.  Patient directed to come to ER.            -----------------END OF FIRST PROVIDER CONTACT ASSESSMENT NOTE--------------  Electronically signed by Criselda Astorga PA-C   DD: 6/10/19               Criselda Astorga PA-C  06/10/19 2002 no chest pain, no cough, and no shortness of breath.

## 2019-06-26 ENCOUNTER — APPOINTMENT (OUTPATIENT)
Dept: CARDIOLOGY | Facility: CLINIC | Age: 59
End: 2019-06-26

## 2019-07-05 ENCOUNTER — MEDICATION RENEWAL (OUTPATIENT)
Age: 59
End: 2019-07-05

## 2019-07-08 ENCOUNTER — RX RENEWAL (OUTPATIENT)
Age: 59
End: 2019-07-08

## 2019-07-24 ENCOUNTER — APPOINTMENT (OUTPATIENT)
Dept: ENDOCRINOLOGY | Facility: CLINIC | Age: 59
End: 2019-07-24
Payer: MEDICARE

## 2019-07-24 ENCOUNTER — RX RENEWAL (OUTPATIENT)
Age: 59
End: 2019-07-24

## 2019-07-24 VITALS
BODY MASS INDEX: 30.82 KG/M2 | HEIGHT: 65 IN | OXYGEN SATURATION: 98 % | HEART RATE: 84 BPM | RESPIRATION RATE: 15 BRPM | SYSTOLIC BLOOD PRESSURE: 140 MMHG | WEIGHT: 185 LBS | DIASTOLIC BLOOD PRESSURE: 82 MMHG

## 2019-07-24 VITALS
BODY MASS INDEX: 30.82 KG/M2 | HEART RATE: 84 BPM | SYSTOLIC BLOOD PRESSURE: 170 MMHG | HEIGHT: 65 IN | DIASTOLIC BLOOD PRESSURE: 80 MMHG | WEIGHT: 185 LBS | OXYGEN SATURATION: 99 %

## 2019-07-24 PROBLEM — Z78.9 NON-SMOKER: Status: ACTIVE | Noted: 2019-07-24

## 2019-07-24 LAB — HBA1C MFR BLD HPLC: 5.6

## 2019-07-24 PROCEDURE — 82962 GLUCOSE BLOOD TEST: CPT

## 2019-07-24 PROCEDURE — 82044 UR ALBUMIN SEMIQUANTITATIVE: CPT | Mod: QW

## 2019-07-24 PROCEDURE — 36415 COLL VENOUS BLD VENIPUNCTURE: CPT

## 2019-07-24 PROCEDURE — 83036 HEMOGLOBIN GLYCOSYLATED A1C: CPT | Mod: QW

## 2019-07-24 NOTE — HISTORY OF PRESENT ILLNESS
[FreeTextEntry1] : Ms. MUÑOZ  is a 58 year  year old female  who presents for endocrine evaluation. She presents with regard to a history of type 2 diabetes mellitus. She is currently taking metformin 500 mg BID. States she is testing her BG once daily. In the morning, BG's average 100-110; in the afternoon and evening, avg. 130's. \par Additional medical history includes that of hypertension and hyperlipidemia. Taking simvastatin 20 mg QD.\par Still having diffciulty with anxiety-follows with psychiatrist.\par \par A1c today 5.6%.

## 2019-07-30 ENCOUNTER — NON-APPOINTMENT (OUTPATIENT)
Age: 59
End: 2019-07-30

## 2019-07-30 ENCOUNTER — APPOINTMENT (OUTPATIENT)
Dept: CARDIOLOGY | Facility: CLINIC | Age: 59
End: 2019-07-30
Payer: MEDICAID

## 2019-07-30 VITALS
HEART RATE: 74 BPM | WEIGHT: 185 LBS | SYSTOLIC BLOOD PRESSURE: 150 MMHG | HEIGHT: 65 IN | BODY MASS INDEX: 30.82 KG/M2 | OXYGEN SATURATION: 98 % | DIASTOLIC BLOOD PRESSURE: 84 MMHG

## 2019-07-30 DIAGNOSIS — Z78.9 OTHER SPECIFIED HEALTH STATUS: ICD-10-CM

## 2019-07-30 DIAGNOSIS — Z86.79 PERSONAL HISTORY OF OTHER DISEASES OF THE CIRCULATORY SYSTEM: ICD-10-CM

## 2019-07-30 DIAGNOSIS — Z86.39 PERSONAL HISTORY OF OTHER ENDOCRINE, NUTRITIONAL AND METABOLIC DISEASE: ICD-10-CM

## 2019-07-30 PROCEDURE — 93000 ELECTROCARDIOGRAM COMPLETE: CPT

## 2019-07-30 PROCEDURE — 99214 OFFICE O/P EST MOD 30 MIN: CPT

## 2019-07-31 NOTE — PHYSICAL EXAM
[No Acute Distress] : no acute distress [Alert] : alert [Well Nourished] : well nourished [Normal Sclera/Conjunctiva] : normal sclera/conjunctiva [Well Developed] : well developed [EOMI] : extra ocular movement intact [No Proptosis] : no proptosis [Normal Oropharynx] : the oropharynx was normal [No Thyroid Nodules] : there were no palpable thyroid nodules [Thyroid Not Enlarged] : the thyroid was not enlarged [No Respiratory Distress] : no respiratory distress [Clear to Auscultation] : lungs were clear to auscultation bilaterally [No Accessory Muscle Use] : no accessory muscle use [Normal S1, S2] : normal S1 and S2 [Normal Rate] : heart rate was normal  [Regular Rhythm] : with a regular rhythm [Pedal Pulses Normal] : the pedal pulses are present [No Edema] : there was no peripheral edema [Normal Bowel Sounds] : normal bowel sounds [Not Tender] : non-tender [Soft] : abdomen soft [Not Distended] : not distended [Post Cervical Nodes] : posterior cervical nodes [Anterior Cervical Nodes] : anterior cervical nodes [Normal] : normal and non tender [Axillary Nodes] : axillary nodes [No Spinal Tenderness] : no spinal tenderness [Spine Straight] : spine straight [No Stigmata of Cushings Syndrome] : no stigmata of cushings syndrome [Normal Gait] : normal gait [Normal Strength/Tone] : muscle strength and tone were normal [No Rash] : no rash [Normal Reflexes] : deep tendon reflexes were 2+ and symmetric [No Tremors] : no tremors [Oriented x3] : oriented to person, place, and time [General Appearance - Well Developed] : well developed [Normal Appearance] : normal appearance [Well Groomed] : well groomed [General Appearance - Well Nourished] : well nourished [No Deformities] : no deformities [General Appearance - In No Acute Distress] : no acute distress [Normal Conjunctiva] : the conjunctiva exhibited no abnormalities [Eyelids - No Xanthelasma] : the eyelids demonstrated no xanthelasmas [Normal Oral Mucosa] : normal oral mucosa [No Oral Pallor] : no oral pallor [No Oral Cyanosis] : no oral cyanosis [Normal Jugular Venous A Waves Present] : normal jugular venous A waves present [Normal Jugular Venous V Waves Present] : normal jugular venous V waves present [No Jugular Venous Eduardo A Waves] : no jugular venous eduardo A waves [Respiration, Rhythm And Depth] : normal respiratory rhythm and effort [Exaggerated Use Of Accessory Muscles For Inspiration] : no accessory muscle use [Auscultation Breath Sounds / Voice Sounds] : lungs were clear to auscultation bilaterally [Abdomen Soft] : soft [Abdomen Tenderness] : non-tender [Abdomen Mass (___ Cm)] : no abdominal mass palpated [Abnormal Walk] : normal gait [Gait - Sufficient For Exercise Testing] : the gait was sufficient for exercise testing [Skin Color & Pigmentation] : normal skin color and pigmentation [No Venous Stasis] : no venous stasis [Skin Lesions] : no skin lesions [No Skin Ulcers] : no skin ulcer [No Xanthoma] : no  xanthoma was observed [Oriented To Time, Place, And Person] : oriented to person, place, and time [Affect] : the affect was normal [Mood] : the mood was normal [No Anxiety] : not feeling anxious [Nail Clubbing] : no clubbing of the fingernails [Cyanosis, Localized] : no localized cyanosis [] : no ischemic changes [Petechial Hemorrhages (___cm)] : no petechial hemorrhages [FreeTextEntry1] :  Regular rate and rhythm,  normal S1, S2 , Non-displaced PMI, chest non-tender,no rubs, or gallops noted, Grade 1/6 systolic murmur @ LSB w/o radiation.

## 2019-07-31 NOTE — REASON FOR VISIT
[FreeTextEntry1] : The patient presents for evaluation of high blood pressure. Patient is currently tolerating the current  antihypertensive regime and they deny headaches, stiff neck, visual changes, pedal Edema or PND. They also are here for follow-up of elevated cholesterol. Patient is currently tolerating medication and denies muscle pain, joint pain, back pain, tea colored urine ,nausea, vomiting, abdominal pain or diarrhea. The patient is trying to follow a low cholesterol diet.\par pt complains of constipation .\par pt takes psycho tropic medication.

## 2019-08-02 ENCOUNTER — RX RENEWAL (OUTPATIENT)
Age: 59
End: 2019-08-02

## 2019-08-02 ENCOUNTER — MEDICATION RENEWAL (OUTPATIENT)
Age: 59
End: 2019-08-02

## 2019-08-06 LAB — GLUCOSE BLDC GLUCOMTR-MCNC: 135

## 2019-08-12 LAB
25(OH)D3 SERPL-MCNC: 32.6 NG/ML
ALBUMIN SERPL ELPH-MCNC: 5.2 G/DL
ALP BLD-CCNC: 107 U/L
ALT SERPL-CCNC: 15 U/L
ANION GAP SERPL CALC-SCNC: 16 MMOL/L
AST SERPL-CCNC: 14 U/L
BILIRUB SERPL-MCNC: 0.3 MG/DL
BUN SERPL-MCNC: 13 MG/DL
CALCIUM SERPL-MCNC: 10.1 MG/DL
CHLORIDE SERPL-SCNC: 104 MMOL/L
CO2 SERPL-SCNC: 21 MMOL/L
CREAT SERPL-MCNC: 0.59 MG/DL
CREAT SPEC-SCNC: 6 MG/DL
ESTIMATED AVERAGE GLUCOSE: 111 MG/DL
FRUCTOSAMINE SERPL-MCNC: 249 UMOL/L
GLUCOSE SERPL-MCNC: 97 MG/DL
GLYCOMARK.: 28.8 UG/ML
HBA1C MFR BLD HPLC: 5.5 %
HDLC SERPL-MCNC: 47 MG/DL
LDLC SERPL DIRECT ASSAY-MCNC: 89 MG/DL
MICROALBUMIN 24H UR DL<=1MG/L-MCNC: <1.2 MG/DL
MICROALBUMIN/CREAT 24H UR-RTO: NORMAL MG/G
POTASSIUM SERPL-SCNC: 3.9 MMOL/L
PROT SERPL-MCNC: 7.8 G/DL
SODIUM SERPL-SCNC: 141 MMOL/L
T4 FREE SERPL-MCNC: 1.4 NG/DL
TRIGL SERPL-MCNC: 126 MG/DL
TSH SERPL-ACNC: 1.25 UIU/ML

## 2019-08-27 ENCOUNTER — MEDICATION RENEWAL (OUTPATIENT)
Age: 59
End: 2019-08-27

## 2019-09-05 ENCOUNTER — MEDICATION RENEWAL (OUTPATIENT)
Age: 59
End: 2019-09-05

## 2019-09-06 ENCOUNTER — APPOINTMENT (OUTPATIENT)
Dept: CARDIOLOGY | Facility: CLINIC | Age: 59
End: 2019-09-06

## 2019-09-06 RX ORDER — LOSARTAN POTASSIUM 25 MG/1
25 TABLET, FILM COATED ORAL DAILY
Qty: 90 | Refills: 3 | Status: DISCONTINUED | COMMUNITY
Start: 2019-07-30 | End: 2019-09-06

## 2019-09-13 ENCOUNTER — INBOUND DOCUMENT (OUTPATIENT)
Age: 59
End: 2019-09-13

## 2019-09-17 ENCOUNTER — OTHER (OUTPATIENT)
Age: 59
End: 2019-09-17

## 2019-10-11 ENCOUNTER — INBOUND DOCUMENT (OUTPATIENT)
Age: 59
End: 2019-10-11

## 2019-11-08 ENCOUNTER — MEDICATION RENEWAL (OUTPATIENT)
Age: 59
End: 2019-11-08

## 2019-12-16 ENCOUNTER — MEDICATION RENEWAL (OUTPATIENT)
Age: 59
End: 2019-12-16

## 2019-12-16 ENCOUNTER — RX RENEWAL (OUTPATIENT)
Age: 59
End: 2019-12-16

## 2019-12-16 RX ORDER — BLOOD-GLUCOSE METER
KIT MISCELLANEOUS
Qty: 1 | Refills: 0 | Status: ACTIVE | COMMUNITY
Start: 2019-12-16 | End: 1900-01-01

## 2020-02-27 NOTE — ED BEHAVIORAL HEALTH ASSESSMENT NOTE - RECENT MEMORY
POD #1 from a L3-S1 PSF. A&Ox4. CMS tingling in left foot. Bowel sounds hypoactive, not passing flatus, tolerating clear liquid diet. VSS. Incision LEANDER WDL. Up with A1/turn on flat bedrest overnight. Vu patent. C/o 4/10 pain, decreased with PCA dilaudid. Pt scoring green on the Aggression Stop Light Tool. Continue to monitor.    Impaired

## 2020-05-05 ENCOUNTER — APPOINTMENT (OUTPATIENT)
Dept: ENDOCRINOLOGY | Facility: CLINIC | Age: 60
End: 2020-05-05
Payer: MEDICAID

## 2020-05-05 VITALS
WEIGHT: 179 LBS | TEMPERATURE: 98.5 F | OXYGEN SATURATION: 95 % | HEART RATE: 90 BPM | BODY MASS INDEX: 29.82 KG/M2 | HEIGHT: 65 IN | DIASTOLIC BLOOD PRESSURE: 78 MMHG | SYSTOLIC BLOOD PRESSURE: 123 MMHG

## 2020-05-05 DIAGNOSIS — Z00.00 ENCOUNTER FOR GENERAL ADULT MEDICAL EXAMINATION W/OUT ABNORMAL FINDINGS: ICD-10-CM

## 2020-05-05 DIAGNOSIS — K59.00 CONSTIPATION, UNSPECIFIED: ICD-10-CM

## 2020-05-05 LAB
GLUCOSE BLDC GLUCOMTR-MCNC: 148
HBA1C MFR BLD HPLC: 5.6

## 2020-05-05 PROCEDURE — 82962 GLUCOSE BLOOD TEST: CPT

## 2020-05-05 PROCEDURE — 83036 HEMOGLOBIN GLYCOSYLATED A1C: CPT | Mod: QW

## 2020-05-05 PROCEDURE — 99214 OFFICE O/P EST MOD 30 MIN: CPT

## 2020-05-06 ENCOUNTER — RX RENEWAL (OUTPATIENT)
Age: 60
End: 2020-05-06

## 2020-05-06 PROBLEM — K59.00 CONSTIPATION: Status: ACTIVE | Noted: 2019-07-30

## 2020-05-06 PROBLEM — Z00.00 ENCOUNTER FOR PREVENTIVE HEALTH EXAMINATION: Status: ACTIVE | Noted: 2019-05-10

## 2020-05-06 NOTE — HISTORY OF PRESENT ILLNESS
[FreeTextEntry1] : Ms. MUÑOZ  is a 59 year  year old female  who presents for endocrine evaluation. She presents with regard to a history of type 2 diabetes mellitus. She is currently taking metformin 500 mg BID. States she is testing her BG once daily. In the morning, BG's average 100-110; in the afternoon and evening, avg. 130's. \par Additional medical history includes that of hypertension and hyperlipidemia. Taking Cartia and simvastatin 20 mg QD.\par Still having difficulty with anxiety-follows with psychiatrist.\par A1c today 5.6%.-same as prior

## 2020-05-06 NOTE — PHYSICAL EXAM
[Alert] : alert [Well Nourished] : well nourished [No Acute Distress] : no acute distress [Well Developed] : well developed [Normal Sclera/Conjunctiva] : normal sclera/conjunctiva [EOMI] : extra ocular movement intact [No Proptosis] : no proptosis [Normal Oropharynx] : the oropharynx was normal [Thyroid Not Enlarged] : the thyroid was not enlarged [No Thyroid Nodules] : no palpable thyroid nodules [Well Healed Scar] : well healed scar [No Respiratory Distress] : no respiratory distress [No Accessory Muscle Use] : no accessory muscle use [Clear to Auscultation] : lungs were clear to auscultation bilaterally [Normal S1, S2] : normal S1 and S2 [Normal Rate] : heart rate was normal [Regular Rhythm] : with a regular rhythm [No Edema] : no peripheral edema [Pedal Pulses Normal] : the pedal pulses are present [Normal Bowel Sounds] : normal bowel sounds [Not Tender] : non-tender [Not Distended] : not distended [Soft] : abdomen soft [Normal Anterior Cervical Nodes] : no anterior cervical lymphadenopathy [Normal Posterior Cervical Nodes] : no posterior cervical lymphadenopathy [No Spinal Tenderness] : no spinal tenderness [Spine Straight] : spine straight [No Stigmata of Cushings Syndrome] : no stigmata of Cushings Syndrome [Normal Gait] : normal gait [Normal Strength/Tone] : muscle strength and tone were normal [No Rash] : no rash [Normal Reflexes] : deep tendon reflexes were 2+ and symmetric [No Tremors] : no tremors [Oriented x3] : oriented to person, place, and time [Acanthosis Nigricans] : no acanthosis nigricans

## 2020-05-12 LAB
25(OH)D3 SERPL-MCNC: 44 NG/ML
ALBUMIN SERPL ELPH-MCNC: 5 G/DL
ALP BLD-CCNC: 107 U/L
ALT SERPL-CCNC: 9 U/L
ANION GAP SERPL CALC-SCNC: 15 MMOL/L
AST SERPL-CCNC: 13 U/L
BASOPHILS # BLD AUTO: 0.07 K/UL
BASOPHILS NFR BLD AUTO: 0.8 %
BILIRUB SERPL-MCNC: 0.3 MG/DL
BUN SERPL-MCNC: 15 MG/DL
CALCIUM SERPL-MCNC: 10.4 MG/DL
CHLORIDE SERPL-SCNC: 100 MMOL/L
CHOLEST SERPL-MCNC: 146 MG/DL
CO2 SERPL-SCNC: 26 MMOL/L
CREAT SERPL-MCNC: 0.63 MG/DL
CREAT SPEC-SCNC: 53 MG/DL
EOSINOPHIL # BLD AUTO: 0.21 K/UL
EOSINOPHIL NFR BLD AUTO: 2.4 %
ESTIMATED AVERAGE GLUCOSE: 117 MG/DL
FRUCTOSAMINE SERPL-MCNC: 250 UMOL/L
GLUCOSE SERPL-MCNC: 124 MG/DL
GLYCOMARK.: 28.2 UG/ML
HBA1C MFR BLD HPLC: 5.7 %
HCT VFR BLD CALC: 39.7 %
HDLC SERPL-MCNC: 39 MG/DL
HGB BLD-MCNC: 13.3 G/DL
IMM GRANULOCYTES NFR BLD AUTO: 0.3 %
LDLC SERPL DIRECT ASSAY-MCNC: 81 MG/DL
LYMPHOCYTES # BLD AUTO: 2.05 K/UL
LYMPHOCYTES NFR BLD AUTO: 23.7 %
MAN DIFF?: NORMAL
MCHC RBC-ENTMCNC: 28.7 PG
MCHC RBC-ENTMCNC: 33.5 GM/DL
MCV RBC AUTO: 85.6 FL
MICROALBUMIN 24H UR DL<=1MG/L-MCNC: 3.3 MG/DL
MICROALBUMIN/CREAT 24H UR-RTO: 63 MG/G
MONOCYTES # BLD AUTO: 0.88 K/UL
MONOCYTES NFR BLD AUTO: 10.2 %
NEUTROPHILS # BLD AUTO: 5.42 K/UL
NEUTROPHILS NFR BLD AUTO: 62.6 %
PLATELET # BLD AUTO: 334 K/UL
POTASSIUM SERPL-SCNC: 3.9 MMOL/L
PROT SERPL-MCNC: 7.7 G/DL
RBC # BLD: 4.64 M/UL
RBC # FLD: 12.9 %
SODIUM SERPL-SCNC: 140 MMOL/L
T3FREE SERPL-MCNC: 2.7 PG/ML
T4 FREE SERPL-MCNC: 1.4 NG/DL
TRIGL SERPL-MCNC: 191 MG/DL
TSH SERPL-ACNC: 1.7 UIU/ML
WBC # FLD AUTO: 8.66 K/UL

## 2020-07-20 ENCOUNTER — TRANSCRIPTION ENCOUNTER (OUTPATIENT)
Age: 60
End: 2020-07-20

## 2020-08-04 RX ORDER — METFORMIN ER 750 MG 750 MG/1
750 TABLET ORAL DAILY
Qty: 180 | Refills: 0 | Status: DISCONTINUED | COMMUNITY
Start: 2019-07-24 | End: 2020-08-04

## 2021-01-07 ENCOUNTER — APPOINTMENT (OUTPATIENT)
Dept: ENDOCRINOLOGY | Facility: CLINIC | Age: 61
End: 2021-01-07
Payer: MEDICAID

## 2021-01-07 VITALS
SYSTOLIC BLOOD PRESSURE: 132 MMHG | RESPIRATION RATE: 16 BRPM | HEART RATE: 84 BPM | WEIGHT: 170 LBS | TEMPERATURE: 98.6 F | HEIGHT: 65 IN | BODY MASS INDEX: 28.32 KG/M2 | OXYGEN SATURATION: 99 % | DIASTOLIC BLOOD PRESSURE: 80 MMHG

## 2021-01-07 PROCEDURE — 99214 OFFICE O/P EST MOD 30 MIN: CPT | Mod: 25

## 2021-01-07 PROCEDURE — 82962 GLUCOSE BLOOD TEST: CPT

## 2021-01-07 PROCEDURE — 99072 ADDL SUPL MATRL&STAF TM PHE: CPT

## 2021-01-07 PROCEDURE — 83036 HEMOGLOBIN GLYCOSYLATED A1C: CPT | Mod: QW

## 2021-01-07 PROCEDURE — 36415 COLL VENOUS BLD VENIPUNCTURE: CPT

## 2021-01-07 NOTE — HISTORY OF PRESENT ILLNESS
[FreeTextEntry1] : Ms. MUÑOZ  is a 59 year  year old female  who presents for endocrine evaluation. She presents with regard to a history of type 2 diabetes mellitus. She is currently taking metformin 850 mg BID. States she is testing her BG once daily. In the morning, BG's average 100-110; in the afternoon and evening, avg. 130's. \par Additional medical history includes that of hypertension and hyperlipidemia. Taking Cartia and simvastatin 20 mg QD.\par Still having difficulty with anxiety-follows with psychiatrist.\par has lsot more wt  225-175\par Lower carbs\par A1c at 5.2

## 2021-01-11 ENCOUNTER — NON-APPOINTMENT (OUTPATIENT)
Age: 61
End: 2021-01-11

## 2021-01-12 PROCEDURE — 99442: CPT

## 2021-02-23 NOTE — ED ADULT NURSE NOTE - NSFALLRSKINDICATORS_ED_ALL_ED
Okay for new order CPAP 8 cmH2O
Order faxed [facility].  Patient scheduled for 31-90 day 5/27/21
yes

## 2021-02-24 LAB
25(OH)D3 SERPL-MCNC: 49 NG/ML
ALBUMIN SERPL ELPH-MCNC: 5 G/DL
ALP BLD-CCNC: 99 U/L
ALT SERPL-CCNC: 18 U/L
ANION GAP SERPL CALC-SCNC: 15 MMOL/L
AST SERPL-CCNC: 14 U/L
BILIRUB SERPL-MCNC: 0.4 MG/DL
BUN SERPL-MCNC: 13 MG/DL
CALCIUM SERPL-MCNC: 10.3 MG/DL
CHLORIDE SERPL-SCNC: 101 MMOL/L
CHOLEST SERPL-MCNC: 177 MG/DL
CO2 SERPL-SCNC: 21 MMOL/L
CREAT SERPL-MCNC: 0.6 MG/DL
CREAT SPEC-SCNC: 113 MG/DL
ESTIMATED AVERAGE GLUCOSE: 114 MG/DL
FRUCTOSAMINE SERPL-MCNC: 237 UMOL/L
GLUCOSE BLDC GLUCOMTR-MCNC: 106
GLUCOSE SERPL-MCNC: 101 MG/DL
GLYCOMARK.: 23 UG/ML
HBA1C MFR BLD HPLC: 5.3
HBA1C MFR BLD HPLC: 5.6 %
HDLC SERPL-MCNC: 42 MG/DL
LDLC SERPL DIRECT ASSAY-MCNC: 111 MG/DL
MICROALBUMIN 24H UR DL<=1MG/L-MCNC: 6 MG/DL
MICROALBUMIN/CREAT 24H UR-RTO: 53 MG/G
POTASSIUM SERPL-SCNC: 4 MMOL/L
PROT SERPL-MCNC: 8.3 G/DL
SODIUM SERPL-SCNC: 137 MMOL/L
T3FREE SERPL-MCNC: 2.69 PG/ML
T4 FREE SERPL-MCNC: 1.5 NG/DL
TRIGL SERPL-MCNC: 144 MG/DL
TSH SERPL-ACNC: 1.42 UIU/ML

## 2021-03-16 PROCEDURE — 99214 OFFICE O/P EST MOD 30 MIN: CPT

## 2021-04-09 ENCOUNTER — RX RENEWAL (OUTPATIENT)
Age: 61
End: 2021-04-09

## 2021-04-20 PROCEDURE — 99442: CPT

## 2021-04-22 ENCOUNTER — LABORATORY RESULT (OUTPATIENT)
Age: 61
End: 2021-04-22

## 2021-04-22 ENCOUNTER — NON-APPOINTMENT (OUTPATIENT)
Age: 61
End: 2021-04-22

## 2021-04-22 ENCOUNTER — APPOINTMENT (OUTPATIENT)
Dept: CARDIOLOGY | Facility: CLINIC | Age: 61
End: 2021-04-22
Payer: MEDICAID

## 2021-04-22 VITALS
HEIGHT: 65 IN | SYSTOLIC BLOOD PRESSURE: 168 MMHG | HEART RATE: 93 BPM | DIASTOLIC BLOOD PRESSURE: 72 MMHG | WEIGHT: 170 LBS | BODY MASS INDEX: 28.32 KG/M2 | OXYGEN SATURATION: 97 %

## 2021-04-22 PROCEDURE — 93000 ELECTROCARDIOGRAM COMPLETE: CPT

## 2021-04-22 PROCEDURE — 99214 OFFICE O/P EST MOD 30 MIN: CPT

## 2021-04-22 PROCEDURE — 99072 ADDL SUPL MATRL&STAF TM PHE: CPT

## 2021-04-22 RX ORDER — FUROSEMIDE 20 MG/1
20 TABLET ORAL DAILY
Qty: 90 | Refills: 3 | Status: DISCONTINUED | COMMUNITY
Start: 2019-05-10 | End: 2021-04-22

## 2021-04-22 RX ORDER — SIMVASTATIN 20 MG/1
20 TABLET, FILM COATED ORAL DAILY
Qty: 30 | Refills: 0 | Status: DISCONTINUED | COMMUNITY
Start: 2019-05-02 | End: 2021-04-22

## 2021-04-22 RX ORDER — MELOXICAM 7.5 MG/1
7.5 TABLET ORAL
Qty: 7 | Refills: 0 | Status: DISCONTINUED | COMMUNITY
Start: 2021-04-06 | End: 2021-04-22

## 2021-04-22 RX ORDER — DILTIAZEM HYDROCHLORIDE 300 MG/1
300 CAPSULE, EXTENDED RELEASE ORAL
Qty: 14 | Refills: 0 | Status: DISCONTINUED | COMMUNITY
Start: 2019-08-02 | End: 2021-04-22

## 2021-04-22 NOTE — PROGRESS NOTE BEHAVIORAL HEALTH - NSBHCONSULTMEDPRN_PSY_A_CORE
Cardiology Progress Note - Robert Carroll 42 y o  male MRN: [id number]    Unit/Bed#: [facility] 512-01 Encounter: [id number]      Assessment:  Principal Problem:    Left groin mass  Active Problems:    CAD (coronary artery disease)    Class 3 severe obesity due to excess calories with serious comorbidity and body mass index (BMI) of 50 0 to 59 9 in adult [facility])      Plan:  Patient is seen postoperatively  He appears comfortable  There was no chest pain or significant dyspnea  His vital signs are stable  His pulse is well controlled and regular  BMP today with potassium of 4 1 and creatinine of 0 9  Hemoglobin 9 7  Will continue his current cardiac regimen  Subjective:   Patient seen and examined  No significant events overnight   negative  Objective:     Vitals: Blood pressure 116/69, pulse 59, temperature 98 °F (36 7 °C), temperature source Oral, resp  rate 20, height 5' 6 75" (1 695 m), weight (!) 160 kg (353 lb 9 9 oz), SpO2 (!) 88 % , Body mass index is 55 8 kg/m² ,   Orthostatic Blood Pressures      Most Recent Value   Blood Pressure  116/69 filed at 04/22/2021 1508   Patient Position - Orthostatic VS  Lying filed at 04/21/2021 1620      ,      Intake/Output Summary (Last 24 hours) at 4/22/2021 1552  Last data filed at 4/22/2021 1439  Gross per 24 hour   Intake 1464 8 ml   Output 3075 ml   Net -1610 2 ml             Physical Exam:    GEN: Robert Carroll appears well, alert and oriented x 3, pleasant and cooperative   NECK: supple, no carotid bruits, no JVD or HJR  HEART: normal rate, regular rhythm, normal S1 and S2, no murmurs, clicks, gallops or rubs   LUNGS: clear to auscultation bilaterally; no wheezes, rales, or rhonchi   ABDOMEN: normal bowel sounds, soft, no tenderness, no distention    Labs & Results:    No results displayed because visit has over 200 results            Cta Chest Wo W Contrast    Result Date: 4/7/2021  Narrative: CTA - CHEST WITH IV CONTRAST - PULMONARY ANGIOGRAM INDICATION:
"r/o PE "  Postop excision of spinal mass on 4/6/2021  Difficult intubation and hypoxia throughout most of the case  History of coronary artery disease  COMPARISON: Chest radiograph from 4/6/2021 and abdomen CT from 2/22/2021  TECHNIQUE: CT angiogram timed for optimal opacification of the pulmonary arteries  Axial, sagittal, and coronal 2D reformats created from source data  Coronal 3D MIP postprocessing on the acquisition scanner  Radiation dose length product (DLP):  1594 69 mGy-cm   Radiation dose exposure minimized using iterative reconstruction and automated exposure control  IV Contrast:  100 mL of iohexol (OMNIPAQUE)  FINDINGS: PULMONARY ARTERIES:  With moderate compromised by respiratory motion producing artifactual filling defects in the vessels of the upper and mid lungs, no definite acute pulmonary embolus  LUNGS:  Evaluation mildly compromised by respiratory motion  Complete left lower lobe and partial right lower lobe atelectasis  Partial atelectasis of the right middle lobe  Triangular nonenhancing area of juxtapleural ground glass opacity in the posterior basal right lower lobe (2/130 - 153; 601/139)  AIRWAYS: No significant filling defects  PLEURA:  Unremarkable  HEART/GREAT VESSELS:  Mild cardiomegaly  Mild coronary artery calcification indicating atherosclerotic heart disease  MEDIASTINUM AND SLAVA:  ET tube 5 cm above the nicolasa  CHEST WALL AND LOWER NECK: 9 mm left thyroid nodule (2/18)  No follow-up is needed  UPPER ABDOMEN:  NG tube in stomach  OSSEOUS STRUCTURES:  Mild degenerative disease in the spine  Impression: With moderate compromise by respiratory motion, particularly in the upper lungs, no definite acute pulmonary embolus  Nonenhancing peripheral wedge-shaped area of groundglass opacity in the right lower lobe which has the appearance of a pulmonary infarct  Complete left lower lobe atelectasis and partial right lower lobe and right middle lobe atelectasis   Mild coronary
artery calcification compatible with known coronary artery disease  I personally discussed this study with Shirley Palomares on 4/7/2021 at 8:38 AM  Workstation performed: [id number]     Stat Cxr Icu    Result Date: 4/6/2021  Narrative: CHEST INDICATION:   ETT positioning  Patient has suspected COVID-19  COMPARISON:  11/29/2020 EXAM PERFORMED/VIEWS:  XR CHEST PORTABLE ICU FINDINGS:  Endotracheal tube is present, in satisfactory position with its tip above the level of the nicolasa  Enteric tube is present with its tip extending below the left hemidiaphragm  Cardiomediastinal silhouette appears unremarkable  Patchy densities at the right lung base noted  Osseous structures appear within normal limits for patient age  Impression: 1  Interval intubation  2   Right basilar opacities possibly atelectasis versus pneumonia or aspiration  Workstation performed: [id number]     Vas Lower Limb Venous Duplex Study, Complete Bilateral    Result Date: 4/7/2021  Narrative:  [facility] REPORT CLINICAL: Indications: Patient presents with left lower extremity pain s/p left thigh mass excision and recent pulmonary infarct  Patient is currently receiving Heparin  Operative History: Coronary angioplasty with stent placement Risk Factors The patient has history of Obesity, HTN, Diabetes, HLD, CAD, MI and smoking (current) 1-2 ppd  FINDINGS:  Segment       Right            Left                        Impression       Impression       CFV           Normal (Patent)  Normal (Patent)  GSV Inguinal  Normal (Patent)  Normal (Patent)     CONCLUSION:  Impression:  RIGHT LOWER LIMB: No evidence of acute or chronic deep vein thrombosis  No evidence of superficial thrombophlebitis noted  Doppler evaluation shows a normal response to augmentation maneuvers  Popliteal, posterior tibial and anterior tibial arterial Doppler waveforms are triphasic  LEFT LOWER LIMB: No evidence of acute or chronic deep vein thrombosis   No evidence of
superficial thrombophlebitis noted  Doppler evaluation shows a normal response to augmentation maneuvers  Popliteal, posterior tibial and anterior tibial arterial Doppler waveforms are triphasic  Technical findings were faxed to chart  SIGNATURE: Electronically Signed by: Cary Melendez on 2021-04-07 05:32:49 PM    Us Bedside Procedure    Result Date: 4/6/2021  Narrative: 1 2 840 [id number] 2 323 [id number] 3    Us Bedside Procedure    Result Date: 4/6/2021  Narrative: 1 2 840 [id number] 2 323 [id number] 2      EKG personally reviewed by Krzysztof Sauceda MD      Counseling / Coordination of Care  Total floor / unit time spent today 30 minutes  Greater than 50% of total time was spent with the patient and / or family counseling and / or coordination of care 
yes

## 2021-04-23 LAB
25(OH)D3 SERPL-MCNC: 51.9 NG/ML
ALBUMIN SERPL ELPH-MCNC: 4.8 G/DL
ALP BLD-CCNC: 98 U/L
ALT SERPL-CCNC: 15 U/L
ANION GAP SERPL CALC-SCNC: 13 MMOL/L
AST SERPL-CCNC: 17 U/L
BASOPHILS # BLD AUTO: 0.05 K/UL
BASOPHILS NFR BLD AUTO: 0.6 %
BILIRUB DIRECT SERPL-MCNC: 0.1 MG/DL
BILIRUB INDIRECT SERPL-MCNC: 0.1 MG/DL
BILIRUB SERPL-MCNC: 0.2 MG/DL
BUN SERPL-MCNC: 15 MG/DL
CALCIUM SERPL-MCNC: 10.3 MG/DL
CHLORIDE SERPL-SCNC: 101 MMOL/L
CHOLEST SERPL-MCNC: 146 MG/DL
CO2 SERPL-SCNC: 23 MMOL/L
COVID-19 NUCLEOCAPSID  GAM ANTIBODY INTERPRETATION: NEGATIVE
CREAT SERPL-MCNC: 0.58 MG/DL
EOSINOPHIL # BLD AUTO: 0.13 K/UL
EOSINOPHIL NFR BLD AUTO: 1.6 %
ESTIMATED AVERAGE GLUCOSE: 114 MG/DL
GLUCOSE SERPL-MCNC: 85 MG/DL
HBA1C MFR BLD HPLC: 5.6 %
HCT VFR BLD CALC: 40.2 %
HDLC SERPL-MCNC: 42 MG/DL
HGB BLD-MCNC: 13.1 G/DL
IMM GRANULOCYTES NFR BLD AUTO: 0.4 %
LDLC SERPL CALC-MCNC: 82 MG/DL
LYMPHOCYTES # BLD AUTO: 1.76 K/UL
LYMPHOCYTES NFR BLD AUTO: 21.1 %
MAGNESIUM SERPL-MCNC: 1.9 MG/DL
MAN DIFF?: NORMAL
MCHC RBC-ENTMCNC: 28 PG
MCHC RBC-ENTMCNC: 32.6 GM/DL
MCV RBC AUTO: 85.9 FL
MONOCYTES # BLD AUTO: 0.64 K/UL
MONOCYTES NFR BLD AUTO: 7.7 %
NEUTROPHILS # BLD AUTO: 5.72 K/UL
NEUTROPHILS NFR BLD AUTO: 68.6 %
NONHDLC SERPL-MCNC: 104 MG/DL
OSMOLALITY SERPL: 287 MOSMOL/KG
PHOSPHATE SERPL-MCNC: 3.6 MG/DL
PLATELET # BLD AUTO: 310 K/UL
POTASSIUM SERPL-SCNC: 4 MMOL/L
PROT SERPL-MCNC: 8 G/DL
RBC # BLD: 4.68 M/UL
RBC # FLD: 12.9 %
SARS-COV-2 AB SERPL QL IA: 0.15 INDEX
SODIUM SERPL-SCNC: 137 MMOL/L
T3RU NFR SERPL: 1.1 TBI
T4 FREE SERPL-MCNC: 1.4 NG/DL
T4 SERPL-MCNC: 8 UG/DL
TRIGL SERPL-MCNC: 107 MG/DL
TSH SERPL-ACNC: 1.28 UIU/ML
URATE SERPL-MCNC: 4 MG/DL
WBC # FLD AUTO: 8.33 K/UL

## 2021-04-26 LAB
APPEARANCE: CLEAR
BACTERIA: ABNORMAL
BILIRUBIN URINE: NEGATIVE
BLOOD URINE: NEGATIVE
COLOR: NORMAL
GLUCOSE QUALITATIVE U: NEGATIVE
HYALINE CASTS: 0 /LPF
KETONES URINE: NEGATIVE
LEUKOCYTE ESTERASE URINE: ABNORMAL
MICROSCOPIC-UA: NORMAL
NITRITE URINE: POSITIVE
PH URINE: 6.5
PROTEIN URINE: NEGATIVE
RED BLOOD CELLS URINE: 2 /HPF
SPECIFIC GRAVITY URINE: 1.01
SQUAMOUS EPITHELIAL CELLS: 1 /HPF
UROBILINOGEN URINE: NORMAL
WHITE BLOOD CELLS URINE: 26 /HPF

## 2021-04-30 NOTE — HISTORY OF PRESENT ILLNESS
[FreeTextEntry1] : The patient presents for evaluation of high blood pressure. Patient is currently tolerating the current  antihypertensive regime and they deny headaches, stiff neck, visual changes, pedal Edema or PND. They also are here for follow-up of elevated cholesterol. Patient is currently tolerating medication and and does not complain of new  muscle pain, joint pain, back pain,urinary changes ,nausea, vomiting, abdominal pain or diarrhea. The patient is trying to follow a low cholesterol diet. \par BP elevated in office, pt states she has been taking her BP meds every night. \par The patient also presents for continued care of diabetes mellitus. Patient is following the diabetic regimen. Patient is tolerating hypoglycemic agents and following the diet. Patient denies any visual changes, blood in the urine, abdominal pain, nausea, vomiting, myalgias, arthralgias, paresthesia’s and syncope. The patient denies any chest discomfort, shortness of breath or new neurologic signs or symptoms. Patient denies any polyuria, worsening nocturia, or polydipsia. \par Pt states today that they had both covid 19 vaccine . pt had the COVID-19 vaccine without major side effects. The patient did experience mild fatigue headache and arm soreness. The patient denied any fever over 100.5. pt denies any recent sore throat, fever, chills loss of taste or smell.

## 2021-04-30 NOTE — PHYSICAL EXAM
[General Appearance - Well Developed] : well developed [Normal Appearance] : normal appearance [Well Groomed] : well groomed [General Appearance - Well Nourished] : well nourished [No Deformities] : no deformities [General Appearance - In No Acute Distress] : no acute distress [Normal Conjunctiva] : the conjunctiva exhibited no abnormalities [Eyelids - No Xanthelasma] : the eyelids demonstrated no xanthelasmas [Normal Oral Mucosa] : normal oral mucosa [No Oral Pallor] : no oral pallor [No Oral Cyanosis] : no oral cyanosis [Normal Jugular Venous A Waves Present] : normal jugular venous A waves present [Normal Jugular Venous V Waves Present] : normal jugular venous V waves present [No Jugular Venous Eduardo A Waves] : no jugular venous eduardo A waves [Respiration, Rhythm And Depth] : normal respiratory rhythm and effort [Exaggerated Use Of Accessory Muscles For Inspiration] : no accessory muscle use [Auscultation Breath Sounds / Voice Sounds] : lungs were clear to auscultation bilaterally [Abdomen Soft] : soft [Abdomen Tenderness] : non-tender [Abdomen Mass (___ Cm)] : no abdominal mass palpated [Gait - Sufficient For Exercise Testing] : the gait was sufficient for exercise testing [Abnormal Walk] : normal gait [Cyanosis, Localized] : no localized cyanosis [Nail Clubbing] : no clubbing of the fingernails [Petechial Hemorrhages (___cm)] : no petechial hemorrhages [Skin Color & Pigmentation] : normal skin color and pigmentation [] : no rash [No Venous Stasis] : no venous stasis [Skin Lesions] : no skin lesions [No Skin Ulcers] : no skin ulcer [No Xanthoma] : no  xanthoma was observed [Oriented To Time, Place, And Person] : oriented to person, place, and time [Affect] : the affect was normal [Mood] : the mood was normal [No Anxiety] : not feeling anxious [FreeTextEntry1] : Regular rate and rhythm, NL S1, S2, non-displaced PMI, chest non-tender; no rubs,heaves  or gallops a  Grade 2/6 systolic murmur noted at the LSB

## 2021-05-18 PROCEDURE — 99214 OFFICE O/P EST MOD 30 MIN: CPT

## 2021-06-03 ENCOUNTER — APPOINTMENT (OUTPATIENT)
Dept: CARDIOLOGY | Facility: CLINIC | Age: 61
End: 2021-06-03
Payer: MEDICAID

## 2021-06-03 VITALS
HEIGHT: 65 IN | TEMPERATURE: 97.6 F | WEIGHT: 170 LBS | SYSTOLIC BLOOD PRESSURE: 125 MMHG | OXYGEN SATURATION: 97 % | DIASTOLIC BLOOD PRESSURE: 82 MMHG | BODY MASS INDEX: 28.32 KG/M2

## 2021-06-03 DIAGNOSIS — Z23 ENCOUNTER FOR IMMUNIZATION: ICD-10-CM

## 2021-06-03 DIAGNOSIS — N39.0 URINARY TRACT INFECTION, SITE NOT SPECIFIED: ICD-10-CM

## 2021-06-03 PROCEDURE — 93306 TTE W/DOPPLER COMPLETE: CPT

## 2021-06-03 PROCEDURE — 93000 ELECTROCARDIOGRAM COMPLETE: CPT

## 2021-06-03 PROCEDURE — 99214 OFFICE O/P EST MOD 30 MIN: CPT

## 2021-06-07 LAB — BACTERIA UR CULT: ABNORMAL

## 2021-06-07 RX ORDER — CIPROFLOXACIN HYDROCHLORIDE 500 MG/1
500 TABLET, FILM COATED ORAL
Qty: 6 | Refills: 0 | Status: DISCONTINUED | COMMUNITY
Start: 2021-04-26 | End: 2021-06-07

## 2021-06-15 ENCOUNTER — RX RENEWAL (OUTPATIENT)
Age: 61
End: 2021-06-15

## 2021-06-16 ENCOUNTER — RX RENEWAL (OUTPATIENT)
Age: 61
End: 2021-06-16

## 2021-06-18 NOTE — HISTORY OF PRESENT ILLNESS
[FreeTextEntry1] : The patient presents for evaluation of high blood pressure. Patient is currently tolerating the current  antihypertensive regime and they deny headaches, stiff neck, visual changes, pedal Edema or PND. They also are here for follow-up of elevated cholesterol. Patient is currently tolerating medication and and does not complain of new  muscle pain, joint pain, back pain,urinary changes ,nausea, vomiting, abdominal pain or diarrhea. The patient is trying to follow a low cholesterol diet. \par The patient also presents for continued care of diabetes mellitus. Patient is following the diabetic regimen. Patient is tolerating hypoglycemic agents and following the diet. Patient denies any visual changes, blood in the urine, abdominal pain, nausea, vomiting, myalgias, arthralgias, paresthesia’s and syncope. The patient denies any chest discomfort, shortness of breath or new neurologic signs or symptoms. Patient denies any polyuria, worsening nocturia, or polydipsia. \par Pt was started on Toprol 50 mg at last visit. BP in office improved, pt states she does not check BPs at home\par Pt w/ UTI at last visit, completed antibiotics, needs to repeat culture today

## 2021-07-27 PROCEDURE — 99214 OFFICE O/P EST MOD 30 MIN: CPT

## 2021-10-21 PROCEDURE — 99214 OFFICE O/P EST MOD 30 MIN: CPT | Mod: 95

## 2021-11-19 ENCOUNTER — RX RENEWAL (OUTPATIENT)
Age: 61
End: 2021-11-19

## 2021-11-23 PROCEDURE — 99214 OFFICE O/P EST MOD 30 MIN: CPT

## 2022-01-14 NOTE — ED PROVIDER NOTE - PRINCIPAL DIAGNOSIS
Mychart message sent with MD recommendations Altered mental status, unspecified altered mental status type

## 2022-01-18 PROCEDURE — 99442: CPT

## 2022-03-22 PROCEDURE — 99214 OFFICE O/P EST MOD 30 MIN: CPT | Mod: 95

## 2022-04-19 PROCEDURE — 99442: CPT

## 2022-05-24 PROCEDURE — 90833 PSYTX W PT W E/M 30 MIN: CPT

## 2022-05-24 PROCEDURE — 99213 OFFICE O/P EST LOW 20 MIN: CPT

## 2022-05-31 ENCOUNTER — RX RENEWAL (OUTPATIENT)
Age: 62
End: 2022-05-31

## 2022-05-31 NOTE — ED BEHAVIORAL HEALTH ASSESSMENT NOTE - NS ED [facility] MED ROS RESPIRATORY
NOT VISUALLY SIGNIFICANT: Informed patient that their cataract is not visually significant or does not meet the criteria for cataract surgery. Recommend attention to cataract symptoms monitoring by regular examinations. Patient instructed to call with changes in vision. No complaints

## 2022-07-05 PROCEDURE — 99214 OFFICE O/P EST MOD 30 MIN: CPT

## 2022-07-05 PROCEDURE — 90833 PSYTX W PT W E/M 30 MIN: CPT

## 2022-08-02 PROCEDURE — 99214 OFFICE O/P EST MOD 30 MIN: CPT

## 2022-08-09 NOTE — CONSULT NOTE ADULT - ATTENDING COMMENTS
Patient seen and examined, agree with above assessment and plan as transcribed above.    - f/u gabriela Givens MD, FACC
Patient seen and examined along with residents and medical students.  MMSE 23/30, speech mildly dysarthric, poor effort on exam. No apparent distress.  plan:  MRI Brain with and without mando  EEG  b12, folate, TSH
contact with question   cell [phone]  [facility]- [phone]
[place] Pregnancy And Lactation Text: This medication is not safe during pregnancy. It is unknown if this medication is excreted in breast milk.

## 2022-08-24 ENCOUNTER — APPOINTMENT (OUTPATIENT)
Dept: ENDOCRINOLOGY | Facility: CLINIC | Age: 62
End: 2022-08-24

## 2022-08-24 VITALS
TEMPERATURE: 97.8 F | HEIGHT: 65 IN | DIASTOLIC BLOOD PRESSURE: 82 MMHG | WEIGHT: 190.38 LBS | OXYGEN SATURATION: 97 % | SYSTOLIC BLOOD PRESSURE: 142 MMHG | BODY MASS INDEX: 31.72 KG/M2 | HEART RATE: 85 BPM

## 2022-08-24 VITALS — DIASTOLIC BLOOD PRESSURE: 82 MMHG | SYSTOLIC BLOOD PRESSURE: 132 MMHG

## 2022-08-24 LAB
GLUCOSE BLDC GLUCOMTR-MCNC: 100
HBA1C MFR BLD HPLC: 6

## 2022-08-24 PROCEDURE — 99214 OFFICE O/P EST MOD 30 MIN: CPT

## 2022-08-24 PROCEDURE — 82962 GLUCOSE BLOOD TEST: CPT

## 2022-08-24 PROCEDURE — 83036 HEMOGLOBIN GLYCOSYLATED A1C: CPT | Mod: QW

## 2022-08-24 NOTE — HISTORY OF PRESENT ILLNESS
[FreeTextEntry1] : Ms. MUÑOZ is a 61 year old female who presents for endocrine evaluation. She presents with regard to a history of type 2 diabetes mellitus. She denies any history of retinopathy or nephropathy. With regard to neuropathy,she denies symptoms/signs   \par \par She is currently taking metformin 850 mg once daily\par \par States she is testing her BG once daily in the morning only. Home glucose values have been running in the 120s.\par \par \par POCT A1C returned today 6.0 % ; previously 5.6 % on 4/22/21\par POCT glucose returned today at 100 mg/dl.\par \par She denies polyuria, polydipsia, or any visual changes. She too denies any skin lesions, skin breakdown or non-healing areas of skin. She too denies any podiatric concerns. Ophthalmologic evaluation is up to date with no diabetic retinopathy s/s noted.\par \par Additional medical history includes that of hypertension and hyperlipidemia. \par Taking Cartia and simvastatin 20 mg QD.\par Still having difficulty with anxiety-follows with psychiatrist.

## 2022-09-05 ENCOUNTER — TRANSCRIPTION ENCOUNTER (OUTPATIENT)
Age: 62
End: 2022-09-05

## 2022-09-08 ENCOUNTER — NON-APPOINTMENT (OUTPATIENT)
Age: 62
End: 2022-09-08

## 2022-09-08 ENCOUNTER — APPOINTMENT (OUTPATIENT)
Dept: CARDIOLOGY | Facility: CLINIC | Age: 62
End: 2022-09-08

## 2022-09-08 VITALS
DIASTOLIC BLOOD PRESSURE: 62 MMHG | OXYGEN SATURATION: 98 % | HEART RATE: 75 BPM | HEIGHT: 65 IN | BODY MASS INDEX: 31.65 KG/M2 | SYSTOLIC BLOOD PRESSURE: 112 MMHG | WEIGHT: 190 LBS | TEMPERATURE: 98.8 F

## 2022-09-08 DIAGNOSIS — M25.561 PAIN IN RIGHT KNEE: ICD-10-CM

## 2022-09-08 DIAGNOSIS — Z00.00 ENCOUNTER FOR GENERAL ADULT MEDICAL EXAMINATION W/OUT ABNORMAL FINDINGS: ICD-10-CM

## 2022-09-08 PROCEDURE — 93000 ELECTROCARDIOGRAM COMPLETE: CPT

## 2022-09-08 PROCEDURE — 90686 IIV4 VACC NO PRSV 0.5 ML IM: CPT

## 2022-09-08 PROCEDURE — G0008: CPT

## 2022-09-08 RX ORDER — DILTIAZEM HYDROCHLORIDE 300 MG/1
300 CAPSULE, EXTENDED RELEASE ORAL
Qty: 90 | Refills: 0 | Status: DISCONTINUED | COMMUNITY
Start: 2020-05-06 | End: 2022-09-08

## 2022-09-08 RX ORDER — NITROFURANTOIN (MONOHYDRATE/MACROCRYSTALS) 25; 75 MG/1; MG/1
100 CAPSULE ORAL TWICE DAILY
Qty: 10 | Refills: 0 | Status: DISCONTINUED | COMMUNITY
Start: 2021-06-07 | End: 2022-09-08

## 2022-09-09 ENCOUNTER — TRANSCRIPTION ENCOUNTER (OUTPATIENT)
Age: 62
End: 2022-09-09

## 2022-09-09 LAB
ALBUMIN SERPL ELPH-MCNC: 4.7 G/DL
ALP BLD-CCNC: 95 U/L
ALT SERPL-CCNC: 10 U/L
ANION GAP SERPL CALC-SCNC: 13 MMOL/L
APPEARANCE: CLEAR
AST SERPL-CCNC: 16 U/L
BACTERIA: NEGATIVE
BASOPHILS # BLD AUTO: 0.07 K/UL
BASOPHILS NFR BLD AUTO: 0.9 %
BILIRUB SERPL-MCNC: 0.2 MG/DL
BILIRUBIN URINE: NEGATIVE
BLOOD URINE: NEGATIVE
BUN SERPL-MCNC: 14 MG/DL
CALCIUM SERPL-MCNC: 10.2 MG/DL
CHLORIDE SERPL-SCNC: 99 MMOL/L
CHOLEST SERPL-MCNC: 230 MG/DL
CO2 SERPL-SCNC: 21 MMOL/L
COLOR: NORMAL
COVID-19 NUCLEOCAPSID  GAM ANTIBODY INTERPRETATION: NEGATIVE
COVID-19 SPIKE DOMAIN ANTIBODY INTERPRETATION: POSITIVE
CREAT SERPL-MCNC: 0.53 MG/DL
DEPRECATED D DIMER PPP IA-ACNC: 404 NG/ML DDU
EGFR: 104 ML/MIN/1.73M2
EOSINOPHIL # BLD AUTO: 0.19 K/UL
EOSINOPHIL NFR BLD AUTO: 2.4 %
ESTIMATED AVERAGE GLUCOSE: 131 MG/DL
GLUCOSE QUALITATIVE U: NEGATIVE
GLUCOSE SERPL-MCNC: 105 MG/DL
HBA1C MFR BLD HPLC: 6.2 %
HCT VFR BLD CALC: 37 %
HDLC SERPL-MCNC: 42 MG/DL
HGB BLD-MCNC: 12.7 G/DL
HYALINE CASTS: 0 /LPF
IMM GRANULOCYTES NFR BLD AUTO: 0.5 %
KETONES URINE: NEGATIVE
LDLC SERPL CALC-MCNC: 145 MG/DL
LEUKOCYTE ESTERASE URINE: ABNORMAL
LYMPHOCYTES # BLD AUTO: 1.58 K/UL
LYMPHOCYTES NFR BLD AUTO: 20.3 %
MAGNESIUM SERPL-MCNC: 1.7 MG/DL
MAN DIFF?: NORMAL
MCHC RBC-ENTMCNC: 28.4 PG
MCHC RBC-ENTMCNC: 34.3 GM/DL
MCV RBC AUTO: 82.8 FL
MICROSCOPIC-UA: NORMAL
MONOCYTES # BLD AUTO: 0.62 K/UL
MONOCYTES NFR BLD AUTO: 8 %
NEUTROPHILS # BLD AUTO: 5.28 K/UL
NEUTROPHILS NFR BLD AUTO: 67.9 %
NITRITE URINE: NEGATIVE
NONHDLC SERPL-MCNC: 188 MG/DL
PH URINE: 7.5
PHOSPHATE SERPL-MCNC: 3.5 MG/DL
PLATELET # BLD AUTO: 295 K/UL
POTASSIUM SERPL-SCNC: 3.9 MMOL/L
PROT SERPL-MCNC: 8 G/DL
PROTEIN URINE: NEGATIVE
RBC # BLD: 4.47 M/UL
RBC # FLD: 13.1 %
RED BLOOD CELLS URINE: 1 /HPF
SARS-COV-2 AB SERPL IA-ACNC: >250 U/ML
SARS-COV-2 AB SERPL QL IA: 0.15 INDEX
SODIUM SERPL-SCNC: 133 MMOL/L
SPECIFIC GRAVITY URINE: 1.01
SQUAMOUS EPITHELIAL CELLS: 3 /HPF
T4 FREE SERPL-MCNC: 1.3 NG/DL
TRIGL SERPL-MCNC: 215 MG/DL
TSH SERPL-ACNC: 1.36 UIU/ML
URATE SERPL-MCNC: 3.9 MG/DL
UROBILINOGEN URINE: NORMAL
WBC # FLD AUTO: 7.78 K/UL
WHITE BLOOD CELLS URINE: 8 /HPF

## 2022-09-10 ENCOUNTER — APPOINTMENT (OUTPATIENT)
Dept: CARDIOLOGY | Facility: CLINIC | Age: 62
End: 2022-09-10

## 2022-09-10 PROCEDURE — 93970 EXTREMITY STUDY: CPT

## 2022-09-10 NOTE — HISTORY OF PRESENT ILLNESS
[FreeTextEntry1] : This patient presents today for general medical exam and to follow up for any medical issues. They deny any new health problems or new family medical history. The patient denies heat/cold intolerance, weight gain or loss, changes in their hair pattern, alteration in sleep habits, or change in their mood patterns. They also deny joint pain, rash, change in vision, or alteration in their bowel patterns.\par The patient presents for evaluation of high blood pressure. Patient is currently tolerating the current antihypertensive regime and they deny headaches, stiff neck, visual changes, pedal Edema or PND. They also are here for follow-up of elevated cholesterol. Patient is currently tolerating medication and and does not complain of new muscle pain, joint pain, back pain,urinary changes ,nausea, vomiting, abdominal pain or diarrhea. The patient is trying to follow a low cholesterol diet. Pt stopped her simvastatin the last few days due to her leg pain. \par The patient also presents for continued care of diabetes mellitus. Patient is following the diabetic regimen. Patient is tolerating hypoglycemic agents and following the diet. Patient denies any visual changes, blood in the urine, abdominal pain, nausea, vomiting, myalgias, arthralgias, paresthesia’s and syncope. The patient denies any chest discomfort, shortness of breath or new neurologic signs or symptoms. Patient denies any polyuria, worsening nocturia, or polydipsia. \par Patient also presents today for follow-up of obesity. The patient states they have not lost significant weight since the last visit. Patient is currently obese and remains sedentary. The patient has not been compliant with medical follow-up instructions for weight-loss and exercise since the last visit.\par \par Pt has had right leg pain in the morning for the pain few days. Pain is relieved with tylenol. No trauma, injury

## 2022-09-12 ENCOUNTER — NON-APPOINTMENT (OUTPATIENT)
Age: 62
End: 2022-09-12

## 2022-09-13 PROCEDURE — 99214 OFFICE O/P EST MOD 30 MIN: CPT

## 2022-09-14 ENCOUNTER — APPOINTMENT (OUTPATIENT)
Dept: CARDIOLOGY | Facility: CLINIC | Age: 62
End: 2022-09-14

## 2022-09-14 VITALS — SYSTOLIC BLOOD PRESSURE: 178 MMHG | DIASTOLIC BLOOD PRESSURE: 80 MMHG

## 2022-09-14 VITALS
BODY MASS INDEX: 31.65 KG/M2 | WEIGHT: 190 LBS | HEIGHT: 65 IN | HEART RATE: 87 BPM | DIASTOLIC BLOOD PRESSURE: 76 MMHG | SYSTOLIC BLOOD PRESSURE: 182 MMHG | OXYGEN SATURATION: 98 %

## 2022-09-14 VITALS — DIASTOLIC BLOOD PRESSURE: 80 MMHG | SYSTOLIC BLOOD PRESSURE: 142 MMHG

## 2022-09-14 PROCEDURE — 99214 OFFICE O/P EST MOD 30 MIN: CPT

## 2022-09-15 ENCOUNTER — APPOINTMENT (OUTPATIENT)
Dept: CARDIOLOGY | Facility: CLINIC | Age: 62
End: 2022-09-15

## 2022-09-15 PROCEDURE — 93306 TTE W/DOPPLER COMPLETE: CPT

## 2022-09-15 NOTE — HISTORY OF PRESENT ILLNESS
[FreeTextEntry1] : Pt presents for follow up for leg pain. Pt states when she wakes up in the morning she has severe pain in her right leg that radiates down the back of her knee into her calf. Pt states she has been taking Tylenol which helps with the pain. She also states the pain subsides after she is awake for about a half hour and she walks around. Pt denies any numbness or tingling. Denies any lower back pain.   \par \par BP very elevated today in office, pt states that she is very anxious and does not take her anxiety medication when she has to drive. \par \par Pt had lower extremity duplex this past Saturday which was negative. \par D-dimer on 9/9 was 404, pt was started on Xarelto\par \par The patient presents for evaluation of high blood pressure. Patient is currently tolerating the current  antihypertensive regime and they deny headaches, stiff neck, visual changes, pedal Edema or PND. They also are here for follow-up of elevated cholesterol. Patient is currently tolerating medication and and does not complain of new  muscle pain, joint pain, back pain,urinary changes ,nausea, vomiting, abdominal pain or diarrhea. The patient is trying to follow a low cholesterol diet.

## 2022-09-19 ENCOUNTER — APPOINTMENT (OUTPATIENT)
Dept: CARDIOLOGY | Facility: CLINIC | Age: 62
End: 2022-09-19

## 2022-09-22 ENCOUNTER — APPOINTMENT (OUTPATIENT)
Dept: ORTHOPEDIC SURGERY | Facility: CLINIC | Age: 62
End: 2022-09-22

## 2022-09-22 VITALS
WEIGHT: 190 LBS | DIASTOLIC BLOOD PRESSURE: 86 MMHG | SYSTOLIC BLOOD PRESSURE: 176 MMHG | BODY MASS INDEX: 31.65 KG/M2 | HEIGHT: 65 IN | OXYGEN SATURATION: 98 % | HEART RATE: 91 BPM

## 2022-09-22 PROCEDURE — 99203 OFFICE O/P NEW LOW 30 MIN: CPT

## 2022-09-22 PROCEDURE — 72100 X-RAY EXAM L-S SPINE 2/3 VWS: CPT

## 2022-09-22 NOTE — HISTORY OF PRESENT ILLNESS
[de-identified] : Ms. Acosta is a 61 year old female who presented to the office for evaluation of her right leg pain. Patient has been experiencing right leg pain for a few weeks. She states that the pain occurs in the morning when she wakes up and then gradually goes away after about 30 minutes. Pain is located in the posterior thigh and radiates down the leg and into the foot. She describes it as a sharp pain that runs down the leg. She reports some numbness and tingling in the foot. No reported weakness. No hip or groin pain. No reported knee pain. Patient is able to ambulate well. There is no pain or difficulty with ambulation. She has a history of lumbar back pain that improved with anti-inflammatories. No recent trauma. No fevers/chills. Tylenol provides some relief of pain. She states that she is unable to take NSAIDs because she is on Xarelto. Patient has some difficulty recalling her history including the indications she is on Xarelto. Chart review found that she was evaluated for DVT due to the right leg pain, finding an elevated D-Dimer. She was then placed on Xarelto. Ultrasound Duplex of the lower extremities were negative. Patient is due for a repeat D-Dimer per Cardiology.

## 2022-09-22 NOTE — PHYSICAL EXAM
[de-identified] : Exam Performed with Chaperone:\par \par \par Constitutional: 61 year old female, alert, cooperative, in no acute distress.\par \par HEENT \par NC/AT.  Appearance: symmetric\par \par Neck/Back\par Straight without deformity or instability.  Good ROM.\par \par Chest/Respiratory \par Respiratory effort: no intercostal retractions or use of accessory muscles. Nonlabored Breathing\par \par Mental Status: \par Judgment, insight: intact\par Orientation: oriented to time, place, and person\par \par Spine Exam:\par No gross deformity\par No bony step offs\par Mild lumbar paraspinal tenderness.\par Mild pain with Straight Leg Raise. Patient with right hamstring contracture\par Patient with resting tremor in right hand. States that is her baseline\par \par Motor: \par                C5               C6               C7               C8               T1 \par R            5/5               5/5              5/5               5/5              5/5\par L            5/5               5/5              5/5               5/5              5/5\par \par                L2               L3               L4               L5               S1\par R            5/5              5/5              5/5               5/5              5/5\par L            5/5              5/5              5/5               5/5              5/5\par \par Sensory:\par                C5          C6          C7          C8          T1        (0=absent, 1=impaired, 2=normal, NT=not testable)\par R              2            2             2            2            2\par L              2            2             2            2            2\par \par                L2          L3          L4           L5          S1        (0=absent, 1=impaired, 2=normal, NT=not testable)\par R              2            2             2            2            2\par L              2            2             2            2            2  [de-identified] : XRay:  XRays of the Lumbar Spine (2 Views) taken in the office today and discussed with the patient. XRays demonstrate no obvious fracture or dislocation. (my personal interpretation).\par

## 2022-09-22 NOTE — DISCUSSION/SUMMARY
[de-identified] : Ms. Acosta is a 61 year old female who presented to the office for evaluation of her right leg pain. Patient has been experiencing right leg pain located in the posterior thigh that radiates down the leg. She also reports some tingling in the right foot. Patient was previously evaluated for the leg pain by her cardiologist, who obtained D-Dimer and US Duplex. The D-Dimer was elevated so the patient was started on Xarelto. US Duplex was negative. XRays in the office showed no fractures. Examination showed patient is neurologically intact with no sensory or motor deficits. Discussed that patient's pain is possibly secondary to lumbar radiculopathy. Discussed that management of radiculopathy can include physical therapy and anti-inflammatories. Patient is unable to take NSAIDs due to Xarelto use. She was given a referral to physical therapy. Patient was also given a referral to Dr. Richards for further evaluation of her radiculopathy. Patient understanding and in agreement with plan. All questions answered.\par \par Plan:\par - Physical Therapy for radiculopathy and hamstring contractures\par - Follow up with Dr. Richards for radiculopathy\par - Follow up with Cardiology regarding Xarelto and if patient can take NSAIDs

## 2022-09-22 NOTE — REVIEW OF SYSTEMS
[Joint Pain] : no joint pain [Joint Stiffness] : no joint stiffness [Joint Swelling] : no joint swelling [FreeTextEntry5] : Patient on Xarelto for DVT prophylaxis due to elevated D-Dimer [FreeTextEntry7] : No history of stomach issues [FreeTextEntry8] : No history of kidney issues [de-identified] : No fevers/chills [de-identified] : Reports some tingling in the right foot [de-identified] : History of Diabetes [de-identified] : On Xarelto currently

## 2022-09-27 LAB — DEPRECATED D DIMER PPP IA-ACNC: 367 NG/ML DDU

## 2022-10-04 LAB — DEPRECATED D DIMER PPP IA-ACNC: 390 NG/ML DDU

## 2022-10-09 ENCOUNTER — NON-APPOINTMENT (OUTPATIENT)
Age: 62
End: 2022-10-09

## 2022-10-13 ENCOUNTER — NON-APPOINTMENT (OUTPATIENT)
Age: 62
End: 2022-10-13

## 2022-10-16 DIAGNOSIS — M54.50 LOW BACK PAIN, UNSPECIFIED: ICD-10-CM

## 2022-10-17 ENCOUNTER — APPOINTMENT (OUTPATIENT)
Dept: ORTHOPEDIC SURGERY | Facility: CLINIC | Age: 62
End: 2022-10-17

## 2022-10-17 VITALS
SYSTOLIC BLOOD PRESSURE: 174 MMHG | DIASTOLIC BLOOD PRESSURE: 102 MMHG | BODY MASS INDEX: 31.65 KG/M2 | HEIGHT: 65 IN | WEIGHT: 190 LBS

## 2022-10-17 DIAGNOSIS — M54.16 RADICULOPATHY, LUMBAR REGION: ICD-10-CM

## 2022-10-17 PROCEDURE — 99214 OFFICE O/P EST MOD 30 MIN: CPT

## 2022-10-17 PROCEDURE — 72100 X-RAY EXAM L-S SPINE 2/3 VWS: CPT

## 2022-10-17 NOTE — PHYSICAL EXAM
[de-identified] : Lumbar Physical Exam\par \par Gait - Normal\par \par Station - Normal\par \par Sagittal balance - Normal\par \par Compensatory mechanism? - None\par \par Heel walk - Normal\par \par Toe walk - Normal\par \par Reflexes\par Patellar - normal\par Gastroc - normal\par Clonus - No\par \par Hip Exam - Normal\par \par Straight leg raise - none\par \par Pulses - 2+ dp/pt\par \par Range of motion - normal\par \par Sensation \par Sensation intact to light touch in L1, L2, L3, L4, L5 and S1 dermatomes bilaterally\par \par Motor\par 	IP	Quad	HS	TA	Gastroc	EHL\par Right	5/5	5/5	5/5	5/5	5/5	5/5\par Left	5/5	5/5	5/5	5/5	5/5	5/5 [de-identified] : Lumbar radiographs\par L4-L5 spondylolisthesis noted\par Facet arthropathy noted

## 2022-10-17 NOTE — ASSESSMENT
[FreeTextEntry1] : I had a lengthy discussion with the patient in regards to treatment plan and diagnosis. There are no red flag findings on imaging nor are there any red flag findings on clinical exam.  Therefore we will proceed with a course of conservative treatment.  This would include physical therapy/home exercise program, Tylenol, NSAIDs as medically indicated.  The patient will follow up with me in approximately 6 weeks.  I encouraged the patient to follow-up sooner if there are any new or worsening symptoms.

## 2022-10-17 NOTE — HISTORY OF PRESENT ILLNESS
[de-identified] : This is a 62-year-old female here today for evaluation of her right leg pain.  She does state that she has pain over her right anterior lateral thigh.  She states this has been ongoing for 2 to 3 weeks.  She denies any bowel bladder issues.  She denies any saddle anesthesia.  She does state that the symptoms can be disabling at times.  She is walking without any assistive devices.

## 2022-10-25 PROCEDURE — 99214 OFFICE O/P EST MOD 30 MIN: CPT

## 2022-11-07 NOTE — ED ADULT NURSE REASSESSMENT NOTE - CONDITION
You were seen today for RA  Continue to taper prednisone by 1 mg every month  Cont methotrexte 6 pills weekly and FA daily  Blood work in Feb 2022  Seem in march unchanged

## 2022-11-21 ENCOUNTER — APPOINTMENT (OUTPATIENT)
Dept: ORTHOPEDIC SURGERY | Facility: CLINIC | Age: 62
End: 2022-11-21

## 2022-12-06 NOTE — PROGRESS NOTE BEHAVIORAL HEALTH - NS ED BHA MSE SPEECH ARTICULATION
Normal Oral Minoxidil Counseling- I discussed with the patient the risks of oral minoxidil including but not limited to shortness of breath, swelling of the feet or ankles, dizziness, lightheadedness, unwanted hair growth and allergic reaction.  The patient verbalized understanding of the proper use and possible adverse effects of oral minoxidil.  All of the patient's questions and concerns were addressed.

## 2022-12-28 NOTE — PROGRESS NOTE ADULT - SUBJECTIVE AND OBJECTIVE BOX
INTERVAL HPI/OVERNIGHT EVENTS: No new concerns .   Vital Signs Last 24 Hrs  T(C): 36.7 (14 Aug 2018 14:02), Max: 37.2 (13 Aug 2018 22:21)  T(F): 98.1 (14 Aug 2018 14:02), Max: 98.9 (13 Aug 2018 22:21)  HR: 72 (14 Aug 2018 14:02) (69 - 84)  BP: 137/78 (14 Aug 2018 14:02) (117/79 - 137/78)  BP(mean): --  RR: 18 (14 Aug 2018 14:02) (18 - 18)  SpO2: 100% (14 Aug 2018 14:02) (98% - 100%)  I&O's Summary    13 Aug 2018 07:01  -  14 Aug 2018 07:00  --------------------------------------------------------  IN: 860 mL / OUT: 0 mL / NET: 860 mL    14 Aug 2018 07:01  -  14 Aug 2018 17:59  --------------------------------------------------------  IN: 1440 mL / OUT: 0 mL / NET: 1440 mL      MEDICATIONS  (STANDING):  dextrose 5%. 1000 milliLiter(s) (50 mL/Hr) IV Continuous <Continuous>  dextrose 50% Injectable 12.5 Gram(s) IV Push once  dextrose 50% Injectable 25 Gram(s) IV Push once  dextrose 50% Injectable 25 Gram(s) IV Push once  enoxaparin Injectable 40 milliGRAM(s) SubCutaneous daily  furosemide    Tablet 40 milliGRAM(s) Oral two times a day  levothyroxine 50 MICROGram(s) Oral daily  metoprolol succinate  milliGRAM(s) Oral daily  OLANZapine 10 milliGRAM(s) Oral at bedtime  potassium chloride    Tablet ER 20 milliEquivalent(s) Oral daily  thiamine IVPB 500 milliGRAM(s) IV Intermittent every 8 hours    MEDICATIONS  (PRN):  dextrose 40% Gel 15 Gram(s) Oral once PRN Blood Glucose LESS THAN 70 milliGRAM(s)/deciliter  glucagon  Injectable 1 milliGRAM(s) IntraMuscular once PRN Glucose LESS THAN 70 milligrams/deciliter  haloperidol    Injectable 5 milliGRAM(s) IntraMuscular every 6 hours PRN agitation  LORazepam     Tablet 2 milliGRAM(s) Oral two times a day PRN severe agittaion  melatonin 3 milliGRAM(s) Oral at bedtime PRN Insomnia    LABS:              CAPILLARY BLOOD GLUCOSE            Consultant(s) Notes Reviewed:  [x ] YES  [ ] NO    PHYSICAL EXAM:  GENERAL: NAD, well-groomed, well-developed, not in any distress ,  HEAD:  Atraumatic, Normocephalic  EYES: EOMI, PERRLA, conjunctiva and sclera clear  ENMT: No tonsillar erythema, exudates, or enlargement; Moist mucous membranes, Good dentition, No lesions  NECK: Supple, No JVD, Normal thyroid  NERVOUS SYSTEM:  Alert & Oriented X0, No focal deficit   CHEST/LUNG: Good air entry bilateral with no  rales, rhonchi, wheezing, or rubs  HEART: Regular rate and rhythm; No murmurs, rubs, or gallops  ABDOMEN: Soft, Nontender, Nondistended; Bowel sounds present  EXTREMITIES:  2+ Peripheral Pulses, No clubbing, cyanosis, or edema  SKIN: No rashes or lesions    Care Discussed with Consultants/Other Providers [ x] YES  [ ] NO 24

## 2023-01-11 ENCOUNTER — INPATIENT (INPATIENT)
Facility: HOSPITAL | Age: 63
LOS: 8 days | Discharge: NURSING, REHAB OR LONG-TERM CARE | End: 2023-01-20
Attending: HOSPITALIST | Admitting: HOSPITALIST
Payer: MEDICARE

## 2023-01-11 VITALS — RESPIRATION RATE: 18 BRPM | SYSTOLIC BLOOD PRESSURE: 119 MMHG | DIASTOLIC BLOOD PRESSURE: 22 MMHG | HEART RATE: 86 BPM

## 2023-01-11 DIAGNOSIS — A41.9 SEPSIS, UNSPECIFIED ORGANISM: ICD-10-CM

## 2023-01-11 LAB
ALBUMIN SERPL ELPH-MCNC: 3.8 G/DL — SIGNIFICANT CHANGE UP (ref 3.3–5)
ALBUMIN SERPL ELPH-MCNC: 4.6 G/DL — SIGNIFICANT CHANGE UP (ref 3.3–5)
ALP SERPL-CCNC: 73 U/L — SIGNIFICANT CHANGE UP (ref 40–120)
ALP SERPL-CCNC: 93 U/L — SIGNIFICANT CHANGE UP (ref 40–120)
ALT FLD-CCNC: 52 U/L — HIGH (ref 4–33)
ALT FLD-CCNC: 53 U/L — HIGH (ref 4–33)
AMPHET UR-MCNC: NEGATIVE — SIGNIFICANT CHANGE UP
ANION GAP SERPL CALC-SCNC: 25 MMOL/L — HIGH (ref 7–14)
ANION GAP SERPL CALC-SCNC: 33 MMOL/L — HIGH (ref 7–14)
APAP SERPL-MCNC: <10 UG/ML — LOW (ref 15–25)
APAP SERPL-MCNC: <10 UG/ML — LOW (ref 15–25)
APPEARANCE UR: ABNORMAL
APTT BLD: 30.5 SEC — SIGNIFICANT CHANGE UP (ref 27–36.3)
AST SERPL-CCNC: 37 U/L — HIGH (ref 4–32)
AST SERPL-CCNC: 51 U/L — HIGH (ref 4–32)
BACTERIA # UR AUTO: ABNORMAL
BARBITURATES UR SCN-MCNC: NEGATIVE — SIGNIFICANT CHANGE UP
BASE EXCESS BLDV CALC-SCNC: -3.3 MMOL/L — LOW (ref -2–3)
BASOPHILS # BLD AUTO: 0.03 K/UL — SIGNIFICANT CHANGE UP (ref 0–0.2)
BASOPHILS NFR BLD AUTO: 0.1 % — SIGNIFICANT CHANGE UP (ref 0–2)
BENZODIAZ UR-MCNC: NEGATIVE — SIGNIFICANT CHANGE UP
BILIRUB SERPL-MCNC: 0.5 MG/DL — SIGNIFICANT CHANGE UP (ref 0.2–1.2)
BILIRUB SERPL-MCNC: 0.6 MG/DL — SIGNIFICANT CHANGE UP (ref 0.2–1.2)
BILIRUB UR-MCNC: NEGATIVE — SIGNIFICANT CHANGE UP
BLOOD GAS VENOUS COMPREHENSIVE RESULT: SIGNIFICANT CHANGE UP
BLOOD GAS VENOUS COMPREHENSIVE RESULT: SIGNIFICANT CHANGE UP
BUN SERPL-MCNC: 144 MG/DL — HIGH (ref 7–23)
BUN SERPL-MCNC: 145 MG/DL — HIGH (ref 7–23)
CALCIUM SERPL-MCNC: 10 MG/DL — SIGNIFICANT CHANGE UP (ref 8.4–10.5)
CALCIUM SERPL-MCNC: 12 MG/DL — HIGH (ref 8.4–10.5)
CHLORIDE BLDV-SCNC: 111 MMOL/L — HIGH (ref 96–108)
CHLORIDE SERPL-SCNC: 108 MMOL/L — HIGH (ref 98–107)
CHLORIDE SERPL-SCNC: 118 MMOL/L — HIGH (ref 98–107)
CK MB BLD-MCNC: 4.3 % — HIGH (ref 0–2.5)
CK MB BLD-MCNC: 5.2 % — HIGH (ref 0–2.5)
CK MB CFR SERPL CALC: 13.2 NG/ML — HIGH
CK MB CFR SERPL CALC: 13.7 NG/ML — HIGH
CK SERPL-CCNC: 252 U/L — HIGH (ref 25–170)
CK SERPL-CCNC: 315 U/L — HIGH (ref 25–170)
CO2 BLDV-SCNC: 24 MMOL/L — SIGNIFICANT CHANGE UP (ref 22–26)
CO2 SERPL-SCNC: 15 MMOL/L — LOW (ref 22–31)
CO2 SERPL-SCNC: 16 MMOL/L — LOW (ref 22–31)
COCAINE METAB.OTHER UR-MCNC: NEGATIVE — SIGNIFICANT CHANGE UP
COLOR SPEC: YELLOW — SIGNIFICANT CHANGE UP
CREAT SERPL-MCNC: 4.69 MG/DL — HIGH (ref 0.5–1.3)
CREAT SERPL-MCNC: 5.81 MG/DL — HIGH (ref 0.5–1.3)
CREATININE URINE RESULT, DAU: 192 MG/DL — SIGNIFICANT CHANGE UP
DIFF PNL FLD: ABNORMAL
EGFR: 10 ML/MIN/1.73M2 — LOW
EGFR: 8 ML/MIN/1.73M2 — LOW
EOSINOPHIL # BLD AUTO: 0 K/UL — SIGNIFICANT CHANGE UP (ref 0–0.5)
EOSINOPHIL NFR BLD AUTO: 0 % — SIGNIFICANT CHANGE UP (ref 0–6)
EPI CELLS # UR: 3 /HPF — SIGNIFICANT CHANGE UP (ref 0–5)
ETHANOL SERPL-MCNC: <10 MG/DL — SIGNIFICANT CHANGE UP
ETHANOL SERPL-MCNC: <10 MG/DL — SIGNIFICANT CHANGE UP
FLUAV AG NPH QL: SIGNIFICANT CHANGE UP
FLUBV AG NPH QL: SIGNIFICANT CHANGE UP
GAS PNL BLDV: 152 MMOL/L — HIGH (ref 136–145)
GLUCOSE BLDC GLUCOMTR-MCNC: 123 MG/DL — HIGH (ref 70–99)
GLUCOSE BLDC GLUCOMTR-MCNC: 137 MG/DL — HIGH (ref 70–99)
GLUCOSE BLDV-MCNC: 172 MG/DL — HIGH (ref 70–99)
GLUCOSE SERPL-MCNC: 140 MG/DL — HIGH (ref 70–99)
GLUCOSE SERPL-MCNC: 171 MG/DL — HIGH (ref 70–99)
GLUCOSE UR QL: NEGATIVE — SIGNIFICANT CHANGE UP
HCO3 BLDV-SCNC: 23 MMOL/L — SIGNIFICANT CHANGE UP (ref 22–29)
HCT VFR BLD CALC: 40.3 % — SIGNIFICANT CHANGE UP (ref 34.5–45)
HCT VFR BLD CALC: 49 % — HIGH (ref 34.5–45)
HCT VFR BLDA CALC: 46 % — SIGNIFICANT CHANGE UP (ref 34.5–46.5)
HGB BLD CALC-MCNC: 15.2 G/DL — SIGNIFICANT CHANGE UP (ref 11.5–15.5)
HGB BLD-MCNC: 12.9 G/DL — SIGNIFICANT CHANGE UP (ref 11.5–15.5)
HGB BLD-MCNC: 15.7 G/DL — HIGH (ref 11.5–15.5)
HYALINE CASTS # UR AUTO: 1 /LPF — SIGNIFICANT CHANGE UP (ref 0–7)
IANC: 16.29 K/UL — HIGH (ref 1.8–7.4)
IMM GRANULOCYTES NFR BLD AUTO: 0.9 % — SIGNIFICANT CHANGE UP (ref 0–0.9)
INR BLD: 1.11 RATIO — SIGNIFICANT CHANGE UP (ref 0.88–1.16)
KETONES UR-MCNC: NEGATIVE — SIGNIFICANT CHANGE UP
LACTATE BLDV-MCNC: 3.5 MMOL/L — HIGH (ref 0.5–2)
LEUKOCYTE ESTERASE UR-ACNC: ABNORMAL
LYMPHOCYTES # BLD AUTO: 2.01 K/UL — SIGNIFICANT CHANGE UP (ref 1–3.3)
LYMPHOCYTES # BLD AUTO: 9.8 % — LOW (ref 13–44)
MAGNESIUM SERPL-MCNC: 2.8 MG/DL — HIGH (ref 1.6–2.6)
MAGNESIUM SERPL-MCNC: 3.4 MG/DL — HIGH (ref 1.6–2.6)
MCHC RBC-ENTMCNC: 27.9 PG — SIGNIFICANT CHANGE UP (ref 27–34)
MCHC RBC-ENTMCNC: 28.4 PG — SIGNIFICANT CHANGE UP (ref 27–34)
MCHC RBC-ENTMCNC: 32 GM/DL — SIGNIFICANT CHANGE UP (ref 32–36)
MCHC RBC-ENTMCNC: 32 GM/DL — SIGNIFICANT CHANGE UP (ref 32–36)
MCV RBC AUTO: 87.2 FL — SIGNIFICANT CHANGE UP (ref 80–100)
MCV RBC AUTO: 88.8 FL — SIGNIFICANT CHANGE UP (ref 80–100)
METHADONE UR-MCNC: NEGATIVE — SIGNIFICANT CHANGE UP
MONOCYTES # BLD AUTO: 2.08 K/UL — HIGH (ref 0–0.9)
MONOCYTES NFR BLD AUTO: 10.1 % — SIGNIFICANT CHANGE UP (ref 2–14)
NEUTROPHILS # BLD AUTO: 16.29 K/UL — HIGH (ref 1.8–7.4)
NEUTROPHILS NFR BLD AUTO: 79.1 % — HIGH (ref 43–77)
NITRITE UR-MCNC: NEGATIVE — SIGNIFICANT CHANGE UP
NRBC # BLD: 0 /100 WBCS — SIGNIFICANT CHANGE UP (ref 0–0)
NRBC # BLD: 0 /100 WBCS — SIGNIFICANT CHANGE UP (ref 0–0)
NRBC # FLD: 0 K/UL — SIGNIFICANT CHANGE UP (ref 0–0)
NRBC # FLD: 0 K/UL — SIGNIFICANT CHANGE UP (ref 0–0)
OPIATES UR-MCNC: NEGATIVE — SIGNIFICANT CHANGE UP
OXYCODONE UR-MCNC: NEGATIVE — SIGNIFICANT CHANGE UP
PCO2 BLDV: 43 MMHG — HIGH (ref 39–42)
PCP SPEC-MCNC: SIGNIFICANT CHANGE UP
PCP UR-MCNC: NEGATIVE — SIGNIFICANT CHANGE UP
PH BLDV: 7.33 — SIGNIFICANT CHANGE UP (ref 7.32–7.43)
PH UR: 6 — SIGNIFICANT CHANGE UP (ref 5–8)
PHOSPHATE SERPL-MCNC: 6.3 MG/DL — HIGH (ref 2.5–4.5)
PHOSPHATE SERPL-MCNC: 8.4 MG/DL — HIGH (ref 2.5–4.5)
PLATELET # BLD AUTO: 439 K/UL — HIGH (ref 150–400)
PLATELET # BLD AUTO: 512 K/UL — HIGH (ref 150–400)
PO2 BLDV: 36 MMHG — SIGNIFICANT CHANGE UP
POTASSIUM BLDV-SCNC: 3.8 MMOL/L — SIGNIFICANT CHANGE UP (ref 3.5–5.1)
POTASSIUM SERPL-MCNC: 3.3 MMOL/L — LOW (ref 3.5–5.3)
POTASSIUM SERPL-MCNC: 4 MMOL/L — SIGNIFICANT CHANGE UP (ref 3.5–5.3)
POTASSIUM SERPL-SCNC: 3.3 MMOL/L — LOW (ref 3.5–5.3)
POTASSIUM SERPL-SCNC: 4 MMOL/L — SIGNIFICANT CHANGE UP (ref 3.5–5.3)
PROT SERPL-MCNC: 6.6 G/DL — SIGNIFICANT CHANGE UP (ref 6–8.3)
PROT SERPL-MCNC: 7.9 G/DL — SIGNIFICANT CHANGE UP (ref 6–8.3)
PROT UR-MCNC: ABNORMAL
PROTHROM AB SERPL-ACNC: 12.9 SEC — SIGNIFICANT CHANGE UP (ref 10.5–13.4)
RBC # BLD: 4.54 M/UL — SIGNIFICANT CHANGE UP (ref 3.8–5.2)
RBC # BLD: 5.62 M/UL — HIGH (ref 3.8–5.2)
RBC # FLD: 13 % — SIGNIFICANT CHANGE UP (ref 10.3–14.5)
RBC # FLD: 13.2 % — SIGNIFICANT CHANGE UP (ref 10.3–14.5)
RBC CASTS # UR COMP ASSIST: 4 /HPF — SIGNIFICANT CHANGE UP (ref 0–4)
RSV RNA NPH QL NAA+NON-PROBE: SIGNIFICANT CHANGE UP
SALICYLATES SERPL-MCNC: <0.3 MG/DL — LOW (ref 15–30)
SALICYLATES SERPL-MCNC: <0.3 MG/DL — LOW (ref 15–30)
SAO2 % BLDV: 44.8 % — SIGNIFICANT CHANGE UP
SARS-COV-2 RNA SPEC QL NAA+PROBE: SIGNIFICANT CHANGE UP
SODIUM SERPL-SCNC: 156 MMOL/L — HIGH (ref 135–145)
SODIUM SERPL-SCNC: 159 MMOL/L — HIGH (ref 135–145)
SP GR SPEC: 1.02 — SIGNIFICANT CHANGE UP (ref 1.01–1.05)
THC UR QL: NEGATIVE — SIGNIFICANT CHANGE UP
TOXICOLOGY SCREEN, DRUGS OF ABUSE, SERUM RESULT: SIGNIFICANT CHANGE UP
TOXICOLOGY SCREEN, DRUGS OF ABUSE, SERUM RESULT: SIGNIFICANT CHANGE UP
TROPONIN T, HIGH SENSITIVITY RESULT: 122 NG/L — CRITICAL HIGH
TROPONIN T, HIGH SENSITIVITY RESULT: 125 NG/L — CRITICAL HIGH
TROPONIN T, HIGH SENSITIVITY RESULT: 152 NG/L — CRITICAL HIGH
UROBILINOGEN FLD QL: SIGNIFICANT CHANGE UP
WBC # BLD: 20.59 K/UL — HIGH (ref 3.8–10.5)
WBC # BLD: 24.45 K/UL — HIGH (ref 3.8–10.5)
WBC # FLD AUTO: 20.59 K/UL — HIGH (ref 3.8–10.5)
WBC # FLD AUTO: 24.45 K/UL — HIGH (ref 3.8–10.5)
WBC UR QL: 545 /HPF — HIGH (ref 0–5)

## 2023-01-11 PROCEDURE — 72170 X-RAY EXAM OF PELVIS: CPT | Mod: 26

## 2023-01-11 PROCEDURE — 71045 X-RAY EXAM CHEST 1 VIEW: CPT | Mod: 26

## 2023-01-11 PROCEDURE — 99291 CRITICAL CARE FIRST HOUR: CPT

## 2023-01-11 PROCEDURE — 70450 CT HEAD/BRAIN W/O DYE: CPT | Mod: 26

## 2023-01-11 PROCEDURE — 99291 CRITICAL CARE FIRST HOUR: CPT | Mod: GC

## 2023-01-11 PROCEDURE — 72125 CT NECK SPINE W/O DYE: CPT | Mod: 26

## 2023-01-11 RX ORDER — BENZTROPINE MESYLATE 1 MG
2 TABLET ORAL DAILY
Refills: 0 | Status: DISCONTINUED | OUTPATIENT
Start: 2023-01-11 | End: 2023-01-13

## 2023-01-11 RX ORDER — DILTIAZEM HCL 120 MG
300 CAPSULE, EXT RELEASE 24 HR ORAL DAILY
Refills: 0 | Status: DISCONTINUED | OUTPATIENT
Start: 2023-01-11 | End: 2023-01-12

## 2023-01-11 RX ORDER — RIVAROXABAN 15 MG-20MG
15 KIT ORAL
Refills: 0 | Status: DISCONTINUED | OUTPATIENT
Start: 2023-01-11 | End: 2023-01-11

## 2023-01-11 RX ORDER — CEFTRIAXONE 500 MG/1
2000 INJECTION, POWDER, FOR SOLUTION INTRAMUSCULAR; INTRAVENOUS ONCE
Refills: 0 | Status: COMPLETED | OUTPATIENT
Start: 2023-01-11 | End: 2023-01-11

## 2023-01-11 RX ORDER — CLONAZEPAM 1 MG
0.5 TABLET ORAL DAILY
Refills: 0 | Status: DISCONTINUED | OUTPATIENT
Start: 2023-01-11 | End: 2023-01-13

## 2023-01-11 RX ORDER — SODIUM CHLORIDE 9 MG/ML
2000 INJECTION INTRAMUSCULAR; INTRAVENOUS; SUBCUTANEOUS ONCE
Refills: 0 | Status: COMPLETED | OUTPATIENT
Start: 2023-01-11 | End: 2023-01-11

## 2023-01-11 RX ORDER — NOREPINEPHRINE BITARTRATE/D5W 8 MG/250ML
0.05 PLASTIC BAG, INJECTION (ML) INTRAVENOUS
Qty: 8 | Refills: 0 | Status: DISCONTINUED | OUTPATIENT
Start: 2023-01-11 | End: 2023-01-12

## 2023-01-11 RX ORDER — LEVOTHYROXINE SODIUM 125 MCG
50 TABLET ORAL DAILY
Refills: 0 | Status: DISCONTINUED | OUTPATIENT
Start: 2023-01-11 | End: 2023-01-20

## 2023-01-11 RX ORDER — CEFTRIAXONE 500 MG/1
1000 INJECTION, POWDER, FOR SOLUTION INTRAMUSCULAR; INTRAVENOUS EVERY 24 HOURS
Refills: 0 | Status: COMPLETED | OUTPATIENT
Start: 2023-01-12 | End: 2023-01-18

## 2023-01-11 RX ORDER — POTASSIUM CHLORIDE 20 MEQ
10 PACKET (EA) ORAL
Refills: 0 | Status: COMPLETED | OUTPATIENT
Start: 2023-01-11 | End: 2023-01-12

## 2023-01-11 RX ORDER — POTASSIUM CHLORIDE 20 MEQ
40 PACKET (EA) ORAL ONCE
Refills: 0 | Status: DISCONTINUED | OUTPATIENT
Start: 2023-01-11 | End: 2023-01-11

## 2023-01-11 RX ORDER — SODIUM CHLORIDE 9 MG/ML
1000 INJECTION, SOLUTION INTRAVENOUS ONCE
Refills: 0 | Status: COMPLETED | OUTPATIENT
Start: 2023-01-11 | End: 2023-01-11

## 2023-01-11 RX ORDER — ATORVASTATIN CALCIUM 80 MG/1
40 TABLET, FILM COATED ORAL AT BEDTIME
Refills: 0 | Status: DISCONTINUED | OUTPATIENT
Start: 2023-01-11 | End: 2023-01-20

## 2023-01-11 RX ORDER — DILTIAZEM HCL 120 MG
300 CAPSULE, EXT RELEASE 24 HR ORAL DAILY
Refills: 0 | Status: DISCONTINUED | OUTPATIENT
Start: 2023-01-11 | End: 2023-01-11

## 2023-01-11 RX ORDER — CHLORHEXIDINE GLUCONATE 213 G/1000ML
1 SOLUTION TOPICAL
Refills: 0 | Status: DISCONTINUED | OUTPATIENT
Start: 2023-01-11 | End: 2023-01-13

## 2023-01-11 RX ORDER — SODIUM CHLORIDE 9 MG/ML
1000 INJECTION INTRAMUSCULAR; INTRAVENOUS; SUBCUTANEOUS ONCE
Refills: 0 | Status: COMPLETED | OUTPATIENT
Start: 2023-01-11 | End: 2023-01-11

## 2023-01-11 RX ADMIN — Medication 100 MILLIEQUIVALENT(S): at 21:52

## 2023-01-11 RX ADMIN — Medication 6.56 MICROGRAM(S)/KG/MIN: at 14:13

## 2023-01-11 RX ADMIN — CEFTRIAXONE 100 MILLIGRAM(S): 500 INJECTION, POWDER, FOR SOLUTION INTRAMUSCULAR; INTRAVENOUS at 13:49

## 2023-01-11 RX ADMIN — SODIUM CHLORIDE 1000 MILLILITER(S): 9 INJECTION INTRAMUSCULAR; INTRAVENOUS; SUBCUTANEOUS at 13:49

## 2023-01-11 RX ADMIN — SODIUM CHLORIDE 2000 MILLILITER(S): 9 INJECTION INTRAMUSCULAR; INTRAVENOUS; SUBCUTANEOUS at 13:37

## 2023-01-11 NOTE — ED PROVIDER NOTE - CARE PLAN
1 Principal Discharge DX:	Septic shock  Secondary Diagnosis:	KODY (acute kidney injury)  Secondary Diagnosis:	Hypernatremia

## 2023-01-11 NOTE — ED ADULT NURSE NOTE - OBJECTIVE STATEMENT
Ginny RN: Pt received to tr B brought in by EMS from home. Pt was found on the floor after a fall 3 days ago arrives A&ox3 soiled in urine. As per EMS pts neighbor called after not hearing from the patient for a few days. Pt has a hx of afib and alcohol abuse. Pt noted to be hypothermic and placed on warming blanket. Pt noted to have redness to sacral area. Pt denies chest pain, sob, n/v/d, fever. IV access placed in L had 20g and 20g L AC, labs drawn and sent. will continue to monitor. report given to primary RN

## 2023-01-11 NOTE — H&P ADULT - HISTORY OF PRESENT ILLNESS
Patient is a 63 y/o F with DM2, hypothyroidism not taking medications, schizophrenia, prior psychiatric hospitalizations on ZHH, ETOH abuse, afib on xarelto, who was found by EMS lying on hardwood floor after having not been seen for 3 days.  Patient was hypothermic to 93.8 and hypotensive to 56/45 on presentation and unresponsive.  Patient cold to touch with dry mucous membranes, equally constricted pupils on exam in ED.    Patient noted to have multiple prior hospitalizations for psychosis (found naked in a neighbor's yard picking tomatoes) and ETOH abuse without SI, SA, or HI.      In the ED, patient was noted to be hypotensive to 56/45 and hypothermic to 93.8.  Patient was started on levo 0.1 resulting in BP of 84/49.  Labs notable for , Cr 5.8, WBC to 20.59, bicarb 15.  Troponins elevated to 152.  At time of interview, patient AOx3.  Denies _____________.  Endorses _________________.       Patient is a 63 y/o F with DM2, hypothyroidism not taking medications, schizophrenia, prior psychiatric hospitalizations on ZHH, ETOH abuse, Afib on xarelto, who was found by EMS lying on hardwood floor after having not been seen for 3 days.  Patient was hypothermic to 93.8F and hypotensive to 56/45 on presentation and unresponsive.  Patient cold to touch with dry mucous membranes, equally constricted pupils on exam in ED.    Patient noted to have multiple prior hospitalizations for psychosis (found naked in a neighbor's yard picking tomatoes) and ETOH abuse without SI, SA, or HI.      In the ED, patient was noted to be hypotensive to 56/45 and hypothermic to 93.8. Pt s/p 3L bolus and MAP persistently <65. CTX 2g given. Patient was started on levo 0.1 resulting in BP of 84/49.  Labs notable for , Cr 5.8, WBC to 20.59, bicarb 15. Troponins elevated to 152. At time of interview, patient AOx3. Limited ROS due to confusion and mumbling speech. Pt denies chest pain, palpitations, dizziness/lightheadedness, dysuria, diarrhea, abdominal pain. Pt endorses hunger and thirst.

## 2023-01-11 NOTE — H&P ADULT - ATTENDING COMMENTS
Patient examined and case reviewed in detail on bedside rounds  Critically ill and unstable on pressors with urosepsis and severe metabolic disarray  Frequent bedside visits with therapy change today.   I have personally provided 35+ minutes of critical care time concurrently with the resident/fellow; this excludes time spent on separate procedures.

## 2023-01-11 NOTE — ED PROVIDER NOTE - NS ED MD DISPO SPECIAL CONSIDERATION1
I reviewed the H&P, I examined the patient, and there are no changes in the patient's condition.   None

## 2023-01-11 NOTE — H&P ADULT - ASSESSMENT
Patient is a 61 y/o F with DM2, hypothyroidism not taking medications, schizophrenia, prior psychiatric hospitalizations on ZHH, ETOH abuse, afib on xarelto admitted to MICU for hypothermia and hypotension requiring pressors. Patient is a 61 y/o F with DM2, hypothyroidism not taking medications, schizophrenia, prior psychiatric hospitalizations on ZHH, ETOH abuse, afib on xarelto admitted to MICU for hypothermia and hypotension requiring pressors.    Patient is 62yFemale with PMHx of *** who presents with ***.     NEURO:  #Mental status: baseline/altered/non altered. Likely 2/2 ____ (structural (bleed or stroke), encephalitis, meningitis, non convulsive status epilepticus, metabolic cause (endogenous vs exogenous)).  -Currently A&O x 3  -Eligible for early mobilization and immediate physical therapy?    CV:  #AFib:   - CHADSVASC score:  - HAS-BLED score:  - a/c: on home Xarelto.   -Coag panel WNL on admission, trend  - No hx of TTE, will obtain this admission  - Pt currently rate controlled  - Monitor telemetry    #Hemodynamically unstable:  - On pressors due to suspected septic shock    PULM:  No issues on RA    RENAL:  #KODY: Cr elevated to 5.81, , pt anuric at this time  -likely 2/2 prerenal KODY due to hypotension and suspected poor PO intake in the past 3 days,   -Thomason: will place for strict I/O in critically ill pt    GI:  #Liver enzymes WNL at this time  #Diet: NPO until swallow eval  #Bowel regiment: hold for now    ENDO:  #Hypothyroidism  -Takes synthroid 50 mcg QD at home  -TSH 1.66 on admission, f/u T3 T4    HEMATOLOGIC:  #CBC results show leukocytosis to 20.59, Hgb 15.7, Plt 512  #Coag panel WNL on admission  #DVT prophylaxis with     ID:  #UTI  -Pt with UA showing +leuk est (-) nitrites, +.   -In the setting of profound hypotension, tachycardia, hypothermia, will treat for sepsis 2/2 likely UTI  -F/u blood and urine cultures sent to the lab  -s/p CTX 2g in ED, no recent hx of resistant cultures in past 10 years per [place] record  -Will continue CTX 1g QD for empiric coverage for now    SKIN:  #Lines: 1 PIVs  #Decubitus ulcers: will examine per nursing protocol Patient is a 61 y/o F with DM2, hypothyroidism not taking medications, schizophrenia, prior psychiatric hospitalizations on ZHH, ETOH abuse, afib on xarelto admitted to MICU for hypothermia and hypotension requiring pressors.     NEURO:  #Mental status: baseline A/Ox3 but lethargic likely 2/2 septic shock requiring pressors  -restart home antipsychotics    CV:  #AFib:   - CHADSVASC score: 3  - a/c: on home Xarelto.   -restart Xarelto if Cr Clearance >15, and if CT head negative  -Coag panel WNL on admission, trend  - No hx of TTE, will obtain this admission  - Pt currently rate controlled  - Monitor telemetry  -restart home antiarrhythmics and rate control medications    #Hemodynamically unstable:  - On pressors due to suspected septic shock    PULM:  No issues on RA    RENAL:  #KODY: Cr elevated to 5.81, , pt anuric at this time  -likely 2/2 prerenal KODY due to hypotension and suspected poor PO intake in the past 3 days,   -Thomason: will place for strict I/O in critically ill pt  -f/u EKG    GI:  #Liver enzymes WNL at this time  #Diet: NPO until swallow eval  #Bowel regiment: hold for now    ENDO:  #Hypothyroidism  -Takes synthroid 50 mcg QD at home  -TSH 1.66 on admission, f/u T3 T4    HEMATOLOGIC:  #CBC results show leukocytosis to 20.59, Hgb 15.7, Plt 512  #Coag panel WNL on admission  #DVT prophylaxis with     ID:  #UTI  -Pt with UA showing +leuk est (-) nitrites, +.   -In the setting of profound hypotension, tachycardia, hypothermia, will treat for sepsis 2/2 likely UTI  -F/u blood and urine cultures sent to the lab  -s/p CTX 2g in ED, no recent hx of resistant cultures in past 10 years per [place] record  -Will continue CTX 1g QD for empiric coverage for now    SKIN:  #Lines: 1 PIVs  #Decubitus ulcers: will examine per nursing protocol

## 2023-01-11 NOTE — ED ADULT TRIAGE NOTE - CHIEF COMPLAINT QUOTE
p/t with hx alcohol abuse, a fib, on Xarelto, last seen normal 3 days ago, p/t found on the floor, possible fall 3 days ago

## 2023-01-11 NOTE — ED PROVIDER NOTE - ATTENDING CONTRIBUTION TO CARE
Dr. Epperson:  I have personally performed a face to face bedside history and physical examination of this patient. I have discussed the history, examination, review of systems, assessment and plan of management with the resident. I have reviewed the electronic medical record and amended it to reflect my history, review of systems, physical exam, assessment and plan.    62F h/o  dm, psychosis, etoh abuse, afib on Xarelto, ?hypothyroidism, BIBEMS after not being heard from in 3 days.  Friend states they usually speak on the phone every morning, but has not heard from patient in 3 days.  EMS found patient lying on hardwood floor.  Pt unable to give any history.  Hypothermic upon presentation.    Exam:  - trembling diffusely, cold to touch  - dry mucus membranes  - pupils equal, constricted  - tachy  - grossly clear  - abd soft  - no obvious signs of trauma    A/P  - patient found on ground, eval for head bleed, infection, rhabdo, cardiac disease, thyroid abnl  - cbc, cmp, trop, bnp, vbg, coags, ck, tsh, tox labs, ekg, CT head and c-spine, CXR, XR pelvis Dr. Epperson:  I have personally performed a face to face bedside history and physical examination of this patient. I have discussed the history, examination, review of systems, assessment and plan of management with the resident. I have reviewed the electronic medical record and amended it to reflect my history, review of systems, physical exam, assessment and plan.    62F h/o  dm, psychosis, etoh abuse, afib on Xarelto, ?hypothyroidism, BIBEMS after not being heard from in 3 days.  Friend states they usually speak on the phone every morning, but has not heard from patient in 3 days.  EMS found patient lying on hardwood floor.  Pt unable to give any history.  Hypothermic and hypotensive upon presentation.    Exam:  - trembling diffusely, cold to touch  - dry mucus membranes  - pupils equal, constricted  - tachy  - grossly clear  - abd soft  - no obvious signs of trauma    A/P  - patient found on ground, dehydrated; eval for head bleed, infection, rhabdo, cardiac disease, thyroid   - cbc, cmp, trop, bnp, vbg, coags, ck, tsh, tox labs, ekg, CT head and c-spine, CXR, XR pelvis

## 2023-01-11 NOTE — ED ADULT NURSE REASSESSMENT NOTE - NS ED NURSE REASSESS COMMENT FT1
Indwelling urinary catheter placed using sterile technique, tolerated well. 100cc dark yellow urine output. MICU resident aware. Will continue to monitor.

## 2023-01-11 NOTE — ED PROVIDER NOTE - CLINICAL SUMMARY MEDICAL DECISION MAKING FREE TEXT BOX
Rupesh Guzman, PGY4: 62 year old female found down x3 days. DDx as above. Will eval for infection, CHF, CAD, trauma, other. Admit.

## 2023-01-11 NOTE — H&P ADULT - NSHPREVIEWOFSYSTEMS_GEN_ALL_CORE
Limited ROS due to confusion and mumbling speech. Pt denies chest pain, palpitations, dizziness/lightheadedness, dysuria, diarrhea, abdominal pain. Pt endorses hunger and thirst.

## 2023-01-11 NOTE — ED PROVIDER NOTE - NSICDXPASTMEDICALHX_GEN_ALL_CORE_FT
PAST MEDICAL HISTORY:  Alcohol abuse     Atrial fibrillation     Diabetes mellitus of other type without complication     Hypothyroidism     Schizophrenia

## 2023-01-11 NOTE — H&P ADULT - NSHPPHYSICALEXAM_GEN_ALL_CORE
General:  HEENT:  Neck:  Chest:  Pulm:  Abdomen:  Extremities: ICU Vital Signs Last 24 Hrs  T(C): 34.3 (11 Jan 2023 13:50), Max: 34.3 (11 Jan 2023 12:40)  T(F): 93.8 (11 Jan 2023 13:50), Max: 93.8 (11 Jan 2023 12:40)  HR: 82 (11 Jan 2023 14:40) (79 - 91)  BP: 98/55 (11 Jan 2023 14:40) (56/45 - 119/22)  BP(mean): 68 (11 Jan 2023 14:40) (51 - 83)  RR: 20 (11 Jan 2023 14:40) (18 - 23)  SpO2: 100% (11 Jan 2023 14:40) (98% - 100%)    O2 Parameters below as of 11 Jan 2023 14:40  Patient On (Oxygen Delivery Method): room air      GENERAL: NAD,   HEAD:  Atraumatic, Normocephalic  EYES: EOMI, conjunctiva and sclera clear  ENT: Moist mucous membranes  NECK: Supple, No JVD  CHEST/LUNG: Clear to auscultation bilaterally; No rales, rhonchi, wheezing, or rubs. Unlabored respirations  HEART: Regular rate and rhythm; No murmurs, rubs, or gallops  ABDOMEN: Bowel sounds present; Soft, Nontender, Nondistended.  EXTREMITIES: +1 pitting edema b/l extremities. 2+ Peripheral Pulses, brisk capillary refill. No clubbing, cyanosis.  NERVOUS SYSTEM:  Alert & Oriented X3, mumbling speech clear. Following simple commands. Moving all extremities b/l   MSK: FROM all 4 extremities, full and equal strength  SKIN: No rashes or lesions

## 2023-01-11 NOTE — ED ADULT NURSE NOTE - NSIMPLEMENTINTERV_GEN_ALL_ED
Implemented All Fall with Harm Risk Interventions:  [place] to call system. Call bell, personal items and telephone within reach. Instruct patient to call for assistance. Room bathroom lighting operational. Non-slip footwear when patient is off stretcher. Physically safe environment: no spills, clutter or unnecessary equipment. Stretcher in lowest position, wheels locked, appropriate side rails in place. Provide visual cue, wrist band, yellow gown, etc. Monitor gait and stability. Monitor for mental status changes and reorient to person, place, and time. Review medications for side effects contributing to fall risk. Reinforce activity limits and safety measures with patient and family. Provide visual clues: red socks.

## 2023-01-11 NOTE — ED PROVIDER NOTE - OBJECTIVE STATEMENT
62 year old female PMH hypothyroid, DM, CHF, afib, ETOH abuse, presents to ED for AMS. Patient had fall at home 3 days ago, was found on the floor. Patient altered, unable to obtain additional history.

## 2023-01-11 NOTE — ED PROVIDER NOTE - PHYSICAL EXAMINATION
GENERAL: eyes open, no acute distress, shivering  HEENT: NCAT, oral mucosa dry, normal conjunctiva  RESP: CTAB, no respiratory distress, no wheezes/rhonchi/rales  CV: RRR, no murmurs/rubs/gallops  ABDOMEN: soft, non-tender, non-distended, no guarding  MSK: no visible deformities  NEURO: follows intermittent commands  SKIN: cool to touch, normal color, well perfused, no rash  PSYCH: depressed affect

## 2023-01-11 NOTE — ED ADULT NURSE NOTE - NS ED NURSE TRANSPORT WITH
Cardiac Monitor/Defib/ACLS/Rescue Kit/O2/BVM/IV pump/ACLS Rescue Kit RN, TECH/Cardiac Monitor/Defib/ACLS/Rescue Kit/O2/BVM/IV pump/ACLS Rescue Kit

## 2023-01-11 NOTE — H&P ADULT - NSHPLABSRESULTS_GEN_ALL_CORE
01-11    156<H>  |  108<H>  |  145<H>  ----------------------------<  171<H>  4.0   |  15<L>  |  5.81<H>    Ca    12.0<H>      11 Jan 2023 13:19  Phos  8.4     01-11  Mg     3.40     01-11    TPro  7.9  /  Alb  4.6  /  TBili  0.6  /  DBili  x   /  AST  37<H>  /  ALT  53<H>  /  AlkPhos  93  01-11                        15.7   20.59 )-----------( 512      ( 11 Jan 2023 13:19 )             49.0   VBG - ( 11 Jan 2023 13:19 )  pH: 7.33  /  pCO2: 43    /  pO2: 36    / HCO3: 23    / Base Excess: -3.3  /  SvO2: 44.8  / Lactate: 3.5

## 2023-01-12 LAB
ALBUMIN SERPL ELPH-MCNC: 3.3 G/DL — SIGNIFICANT CHANGE UP (ref 3.3–5)
ALBUMIN SERPL ELPH-MCNC: 3.3 G/DL — SIGNIFICANT CHANGE UP (ref 3.3–5)
ALBUMIN SERPL ELPH-MCNC: 3.6 G/DL — SIGNIFICANT CHANGE UP (ref 3.3–5)
ALBUMIN SERPL ELPH-MCNC: 3.9 G/DL — SIGNIFICANT CHANGE UP (ref 3.3–5)
ALP SERPL-CCNC: 64 U/L — SIGNIFICANT CHANGE UP (ref 40–120)
ALP SERPL-CCNC: 66 U/L — SIGNIFICANT CHANGE UP (ref 40–120)
ALP SERPL-CCNC: 75 U/L — SIGNIFICANT CHANGE UP (ref 40–120)
ALP SERPL-CCNC: 76 U/L — SIGNIFICANT CHANGE UP (ref 40–120)
ALT FLD-CCNC: 48 U/L — HIGH (ref 4–33)
ALT FLD-CCNC: 51 U/L — HIGH (ref 4–33)
ALT FLD-CCNC: 57 U/L — HIGH (ref 4–33)
ALT FLD-CCNC: 59 U/L — HIGH (ref 4–33)
AMMONIA BLD-MCNC: 26 UMOL/L — SIGNIFICANT CHANGE UP (ref 11–55)
ANION GAP SERPL CALC-SCNC: 14 MMOL/L — SIGNIFICANT CHANGE UP (ref 7–14)
ANION GAP SERPL CALC-SCNC: 15 MMOL/L — HIGH (ref 7–14)
ANION GAP SERPL CALC-SCNC: 18 MMOL/L — HIGH (ref 7–14)
ANION GAP SERPL CALC-SCNC: 23 MMOL/L — HIGH (ref 7–14)
APTT BLD: 102.4 SEC — HIGH (ref 27–36.3)
APTT BLD: 121.3 SEC — CRITICAL HIGH (ref 27–36.3)
APTT BLD: 29.4 SEC — SIGNIFICANT CHANGE UP (ref 27–36.3)
AST SERPL-CCNC: 41 U/L — HIGH (ref 4–32)
AST SERPL-CCNC: 42 U/L — HIGH (ref 4–32)
AST SERPL-CCNC: 43 U/L — HIGH (ref 4–32)
AST SERPL-CCNC: 45 U/L — HIGH (ref 4–32)
B-OH-BUTYR SERPL-SCNC: 2 MMOL/L — HIGH (ref 0–0.4)
BASE EXCESS BLDV CALC-SCNC: -5.9 MMOL/L — LOW (ref -2–3)
BASOPHILS # BLD AUTO: 0.02 K/UL — SIGNIFICANT CHANGE UP (ref 0–0.2)
BASOPHILS NFR BLD AUTO: 0.1 % — SIGNIFICANT CHANGE UP (ref 0–2)
BILIRUB SERPL-MCNC: 0.3 MG/DL — SIGNIFICANT CHANGE UP (ref 0.2–1.2)
BILIRUB SERPL-MCNC: 0.3 MG/DL — SIGNIFICANT CHANGE UP (ref 0.2–1.2)
BILIRUB SERPL-MCNC: 0.4 MG/DL — SIGNIFICANT CHANGE UP (ref 0.2–1.2)
BILIRUB SERPL-MCNC: 0.4 MG/DL — SIGNIFICANT CHANGE UP (ref 0.2–1.2)
BLD GP AB SCN SERPL QL: NEGATIVE — SIGNIFICANT CHANGE UP
BLOOD GAS ARTERIAL COMPREHENSIVE RESULT: SIGNIFICANT CHANGE UP
BLOOD GAS VENOUS COMPREHENSIVE RESULT: SIGNIFICANT CHANGE UP
BUN SERPL-MCNC: 102 MG/DL — HIGH (ref 7–23)
BUN SERPL-MCNC: 109 MG/DL — HIGH (ref 7–23)
BUN SERPL-MCNC: 119 MG/DL — HIGH (ref 7–23)
BUN SERPL-MCNC: 139 MG/DL — HIGH (ref 7–23)
CALCIUM SERPL-MCNC: 10.3 MG/DL — SIGNIFICANT CHANGE UP (ref 8.4–10.5)
CALCIUM SERPL-MCNC: 10.3 MG/DL — SIGNIFICANT CHANGE UP (ref 8.4–10.5)
CALCIUM SERPL-MCNC: 9.2 MG/DL — SIGNIFICANT CHANGE UP (ref 8.4–10.5)
CALCIUM SERPL-MCNC: 9.5 MG/DL — SIGNIFICANT CHANGE UP (ref 8.4–10.5)
CHLORIDE BLDV-SCNC: 120 MMOL/L — HIGH (ref 96–108)
CHLORIDE SERPL-SCNC: 118 MMOL/L — HIGH (ref 98–107)
CHLORIDE SERPL-SCNC: 120 MMOL/L — HIGH (ref 98–107)
CHLORIDE SERPL-SCNC: 123 MMOL/L — HIGH (ref 98–107)
CHLORIDE SERPL-SCNC: 124 MMOL/L — HIGH (ref 98–107)
CO2 BLDV-SCNC: 19.6 MMOL/L — LOW (ref 22–26)
CO2 SERPL-SCNC: 17 MMOL/L — LOW (ref 22–31)
CO2 SERPL-SCNC: 17 MMOL/L — LOW (ref 22–31)
CO2 SERPL-SCNC: 18 MMOL/L — LOW (ref 22–31)
CO2 SERPL-SCNC: 18 MMOL/L — LOW (ref 22–31)
CREAT SERPL-MCNC: 2.13 MG/DL — HIGH (ref 0.5–1.3)
CREAT SERPL-MCNC: 2.6 MG/DL — HIGH (ref 0.5–1.3)
CREAT SERPL-MCNC: 3.12 MG/DL — HIGH (ref 0.5–1.3)
CREAT SERPL-MCNC: 4.38 MG/DL — HIGH (ref 0.5–1.3)
EGFR: 11 ML/MIN/1.73M2 — LOW
EGFR: 16 ML/MIN/1.73M2 — LOW
EGFR: 20 ML/MIN/1.73M2 — LOW
EGFR: 25 ML/MIN/1.73M2 — LOW
EOSINOPHIL # BLD AUTO: 0 K/UL — SIGNIFICANT CHANGE UP (ref 0–0.5)
EOSINOPHIL NFR BLD AUTO: 0 % — SIGNIFICANT CHANGE UP (ref 0–6)
GAS PNL BLDV: 154 MMOL/L — HIGH (ref 136–145)
GLUCOSE BLDV-MCNC: 148 MG/DL — HIGH (ref 70–99)
GLUCOSE SERPL-MCNC: 146 MG/DL — HIGH (ref 70–99)
GLUCOSE SERPL-MCNC: 147 MG/DL — HIGH (ref 70–99)
GLUCOSE SERPL-MCNC: 154 MG/DL — HIGH (ref 70–99)
GLUCOSE SERPL-MCNC: 192 MG/DL — HIGH (ref 70–99)
HCO3 BLDV-SCNC: 19 MMOL/L — LOW (ref 22–29)
HCT VFR BLD CALC: 32.6 % — LOW (ref 34.5–45)
HCT VFR BLD CALC: 38.3 % — SIGNIFICANT CHANGE UP (ref 34.5–45)
HCT VFR BLDA CALC: 39 % — SIGNIFICANT CHANGE UP (ref 34.5–46.5)
HGB BLD CALC-MCNC: 12.9 G/DL — SIGNIFICANT CHANGE UP (ref 11.5–15.5)
HGB BLD-MCNC: 10.4 G/DL — LOW (ref 11.5–15.5)
HGB BLD-MCNC: 12.3 G/DL — SIGNIFICANT CHANGE UP (ref 11.5–15.5)
IANC: 13.8 K/UL — HIGH (ref 1.8–7.4)
IMM GRANULOCYTES NFR BLD AUTO: 1.1 % — HIGH (ref 0–0.9)
INR BLD: 1.24 RATIO — HIGH (ref 0.88–1.16)
LACTATE BLDV-MCNC: 1.7 MMOL/L — SIGNIFICANT CHANGE UP (ref 0.5–2)
LYMPHOCYTES # BLD AUTO: 1.58 K/UL — SIGNIFICANT CHANGE UP (ref 1–3.3)
LYMPHOCYTES # BLD AUTO: 9 % — LOW (ref 13–44)
MAGNESIUM SERPL-MCNC: 2.4 MG/DL — SIGNIFICANT CHANGE UP (ref 1.6–2.6)
MAGNESIUM SERPL-MCNC: 2.8 MG/DL — HIGH (ref 1.6–2.6)
MAGNESIUM SERPL-MCNC: 3 MG/DL — HIGH (ref 1.6–2.6)
MCHC RBC-ENTMCNC: 28.1 PG — SIGNIFICANT CHANGE UP (ref 27–34)
MCHC RBC-ENTMCNC: 28.5 PG — SIGNIFICANT CHANGE UP (ref 27–34)
MCHC RBC-ENTMCNC: 31.9 GM/DL — LOW (ref 32–36)
MCHC RBC-ENTMCNC: 32.1 GM/DL — SIGNIFICANT CHANGE UP (ref 32–36)
MCV RBC AUTO: 87.6 FL — SIGNIFICANT CHANGE UP (ref 80–100)
MCV RBC AUTO: 89.3 FL — SIGNIFICANT CHANGE UP (ref 80–100)
MONOCYTES # BLD AUTO: 1.87 K/UL — HIGH (ref 0–0.9)
MONOCYTES NFR BLD AUTO: 10.7 % — SIGNIFICANT CHANGE UP (ref 2–14)
NEUTROPHILS # BLD AUTO: 13.8 K/UL — HIGH (ref 1.8–7.4)
NEUTROPHILS NFR BLD AUTO: 79.1 % — HIGH (ref 43–77)
NRBC # BLD: 0 /100 WBCS — SIGNIFICANT CHANGE UP (ref 0–0)
NRBC # BLD: 0 /100 WBCS — SIGNIFICANT CHANGE UP (ref 0–0)
NRBC # FLD: 0 K/UL — SIGNIFICANT CHANGE UP (ref 0–0)
NRBC # FLD: 0 K/UL — SIGNIFICANT CHANGE UP (ref 0–0)
PCO2 BLDV: 33 MMHG — LOW (ref 39–42)
PH BLDV: 7.36 — SIGNIFICANT CHANGE UP (ref 7.32–7.43)
PHOSPHATE SERPL-MCNC: 3.3 MG/DL — SIGNIFICANT CHANGE UP (ref 2.5–4.5)
PHOSPHATE SERPL-MCNC: 5 MG/DL — HIGH (ref 2.5–4.5)
PHOSPHATE SERPL-MCNC: 6.5 MG/DL — HIGH (ref 2.5–4.5)
PLATELET # BLD AUTO: 257 K/UL — SIGNIFICANT CHANGE UP (ref 150–400)
PLATELET # BLD AUTO: 360 K/UL — SIGNIFICANT CHANGE UP (ref 150–400)
PO2 BLDV: 58 MMHG — SIGNIFICANT CHANGE UP
POTASSIUM BLDV-SCNC: 3.4 MMOL/L — LOW (ref 3.5–5.1)
POTASSIUM SERPL-MCNC: 3.3 MMOL/L — LOW (ref 3.5–5.3)
POTASSIUM SERPL-MCNC: 3.3 MMOL/L — LOW (ref 3.5–5.3)
POTASSIUM SERPL-MCNC: 3.4 MMOL/L — LOW (ref 3.5–5.3)
POTASSIUM SERPL-MCNC: 3.5 MMOL/L — SIGNIFICANT CHANGE UP (ref 3.5–5.3)
POTASSIUM SERPL-SCNC: 3.3 MMOL/L — LOW (ref 3.5–5.3)
POTASSIUM SERPL-SCNC: 3.3 MMOL/L — LOW (ref 3.5–5.3)
POTASSIUM SERPL-SCNC: 3.4 MMOL/L — LOW (ref 3.5–5.3)
POTASSIUM SERPL-SCNC: 3.5 MMOL/L — SIGNIFICANT CHANGE UP (ref 3.5–5.3)
PROT SERPL-MCNC: 5.5 G/DL — LOW (ref 6–8.3)
PROT SERPL-MCNC: 5.5 G/DL — LOW (ref 6–8.3)
PROT SERPL-MCNC: 6.2 G/DL — SIGNIFICANT CHANGE UP (ref 6–8.3)
PROT SERPL-MCNC: 6.3 G/DL — SIGNIFICANT CHANGE UP (ref 6–8.3)
PROTHROM AB SERPL-ACNC: 14.4 SEC — HIGH (ref 10.5–13.4)
RBC # BLD: 3.65 M/UL — LOW (ref 3.8–5.2)
RBC # BLD: 4.37 M/UL — SIGNIFICANT CHANGE UP (ref 3.8–5.2)
RBC # FLD: 13 % — SIGNIFICANT CHANGE UP (ref 10.3–14.5)
RBC # FLD: 13.2 % — SIGNIFICANT CHANGE UP (ref 10.3–14.5)
RH IG SCN BLD-IMP: POSITIVE — SIGNIFICANT CHANGE UP
SAO2 % BLDV: 82.4 % — SIGNIFICANT CHANGE UP
SODIUM SERPL-SCNC: 152 MMOL/L — HIGH (ref 135–145)
SODIUM SERPL-SCNC: 157 MMOL/L — HIGH (ref 135–145)
SODIUM SERPL-SCNC: 158 MMOL/L — HIGH (ref 135–145)
SODIUM SERPL-SCNC: 158 MMOL/L — HIGH (ref 135–145)
WBC # BLD: 10.32 K/UL — SIGNIFICANT CHANGE UP (ref 3.8–10.5)
WBC # BLD: 17.46 K/UL — HIGH (ref 3.8–10.5)
WBC # FLD AUTO: 10.32 K/UL — SIGNIFICANT CHANGE UP (ref 3.8–10.5)
WBC # FLD AUTO: 17.46 K/UL — HIGH (ref 3.8–10.5)

## 2023-01-12 PROCEDURE — 36620 INSERTION CATHETER ARTERY: CPT

## 2023-01-12 PROCEDURE — 93308 TTE F-UP OR LMTD: CPT | Mod: 26,GC

## 2023-01-12 PROCEDURE — 76604 US EXAM CHEST: CPT | Mod: 26,GC

## 2023-01-12 PROCEDURE — 99291 CRITICAL CARE FIRST HOUR: CPT | Mod: GC,25

## 2023-01-12 PROCEDURE — 76770 US EXAM ABDO BACK WALL COMP: CPT | Mod: 26

## 2023-01-12 RX ORDER — THIAMINE MONONITRATE (VIT B1) 100 MG
500 TABLET ORAL EVERY 8 HOURS
Refills: 0 | Status: DISCONTINUED | OUTPATIENT
Start: 2023-01-12 | End: 2023-01-12

## 2023-01-12 RX ORDER — CHLORHEXIDINE GLUCONATE 213 G/1000ML
1 SOLUTION TOPICAL DAILY
Refills: 0 | Status: DISCONTINUED | OUTPATIENT
Start: 2023-01-12 | End: 2023-01-13

## 2023-01-12 RX ORDER — THIAMINE MONONITRATE (VIT B1) 100 MG
500 TABLET ORAL EVERY 8 HOURS
Refills: 0 | Status: COMPLETED | OUTPATIENT
Start: 2023-01-12 | End: 2023-01-15

## 2023-01-12 RX ORDER — SODIUM CHLORIDE 9 MG/ML
1000 INJECTION, SOLUTION INTRAVENOUS
Refills: 0 | Status: DISCONTINUED | OUTPATIENT
Start: 2023-01-12 | End: 2023-01-12

## 2023-01-12 RX ORDER — POTASSIUM CHLORIDE 20 MEQ
10 PACKET (EA) ORAL
Refills: 0 | Status: COMPLETED | OUTPATIENT
Start: 2023-01-12 | End: 2023-01-12

## 2023-01-12 RX ORDER — HEPARIN SODIUM 5000 [USP'U]/ML
1200 INJECTION INTRAVENOUS; SUBCUTANEOUS
Qty: 25000 | Refills: 0 | Status: DISCONTINUED | OUTPATIENT
Start: 2023-01-12 | End: 2023-01-14

## 2023-01-12 RX ORDER — PHENYLEPHRINE HYDROCHLORIDE 10 MG/ML
0.2 INJECTION INTRAVENOUS
Qty: 160 | Refills: 0 | Status: DISCONTINUED | OUTPATIENT
Start: 2023-01-12 | End: 2023-01-13

## 2023-01-12 RX ORDER — SODIUM CHLORIDE 9 MG/ML
1000 INJECTION, SOLUTION INTRAVENOUS
Refills: 0 | Status: DISCONTINUED | OUTPATIENT
Start: 2023-01-12 | End: 2023-01-13

## 2023-01-12 RX ORDER — SODIUM CHLORIDE 9 MG/ML
1000 INJECTION, SOLUTION INTRAVENOUS ONCE
Refills: 0 | Status: COMPLETED | OUTPATIENT
Start: 2023-01-12 | End: 2023-01-12

## 2023-01-12 RX ORDER — FOLIC ACID 0.8 MG
1 TABLET ORAL DAILY
Refills: 0 | Status: DISCONTINUED | OUTPATIENT
Start: 2023-01-12 | End: 2023-01-20

## 2023-01-12 RX ORDER — HEPARIN SODIUM 5000 [USP'U]/ML
3000 INJECTION INTRAVENOUS; SUBCUTANEOUS EVERY 6 HOURS
Refills: 0 | Status: DISCONTINUED | OUTPATIENT
Start: 2023-01-12 | End: 2023-01-14

## 2023-01-12 RX ORDER — HEPARIN SODIUM 5000 [USP'U]/ML
6500 INJECTION INTRAVENOUS; SUBCUTANEOUS ONCE
Refills: 0 | Status: DISCONTINUED | OUTPATIENT
Start: 2023-01-12 | End: 2023-01-12

## 2023-01-12 RX ORDER — HEPARIN SODIUM 5000 [USP'U]/ML
6500 INJECTION INTRAVENOUS; SUBCUTANEOUS EVERY 6 HOURS
Refills: 0 | Status: DISCONTINUED | OUTPATIENT
Start: 2023-01-12 | End: 2023-01-14

## 2023-01-12 RX ADMIN — HEPARIN SODIUM 1200 UNIT(S)/HR: 5000 INJECTION INTRAVENOUS; SUBCUTANEOUS at 09:55

## 2023-01-12 RX ADMIN — Medication 100 MILLIEQUIVALENT(S): at 14:59

## 2023-01-12 RX ADMIN — Medication 0.5 MILLIGRAM(S): at 12:25

## 2023-01-12 RX ADMIN — SODIUM CHLORIDE 250 MILLILITER(S): 9 INJECTION, SOLUTION INTRAVENOUS at 09:54

## 2023-01-12 RX ADMIN — Medication 100 MILLIEQUIVALENT(S): at 16:22

## 2023-01-12 RX ADMIN — Medication 100 MILLIEQUIVALENT(S): at 12:02

## 2023-01-12 RX ADMIN — Medication 2 MILLIGRAM(S): at 12:25

## 2023-01-12 RX ADMIN — Medication 105 MILLIGRAM(S): at 22:50

## 2023-01-12 RX ADMIN — SODIUM CHLORIDE 100 MILLILITER(S): 9 INJECTION, SOLUTION INTRAVENOUS at 03:36

## 2023-01-12 RX ADMIN — CHLORHEXIDINE GLUCONATE 1 APPLICATION(S): 213 SOLUTION TOPICAL at 05:23

## 2023-01-12 RX ADMIN — Medication 15 MILLIGRAM(S): at 17:17

## 2023-01-12 RX ADMIN — SODIUM CHLORIDE 1000 MILLILITER(S): 9 INJECTION, SOLUTION INTRAVENOUS at 02:00

## 2023-01-12 RX ADMIN — Medication 1 TABLET(S): at 17:20

## 2023-01-12 RX ADMIN — SODIUM CHLORIDE 100 MILLILITER(S): 9 INJECTION, SOLUTION INTRAVENOUS at 20:03

## 2023-01-12 RX ADMIN — Medication 105 MILLIGRAM(S): at 16:21

## 2023-01-12 RX ADMIN — HEPARIN SODIUM 1000 UNIT(S)/HR: 5000 INJECTION INTRAVENOUS; SUBCUTANEOUS at 20:03

## 2023-01-12 RX ADMIN — HEPARIN SODIUM 1000 UNIT(S)/HR: 5000 INJECTION INTRAVENOUS; SUBCUTANEOUS at 17:16

## 2023-01-12 RX ADMIN — CHLORHEXIDINE GLUCONATE 1 APPLICATION(S): 213 SOLUTION TOPICAL at 12:26

## 2023-01-12 RX ADMIN — Medication 100 MILLIEQUIVALENT(S): at 00:00

## 2023-01-12 RX ADMIN — Medication 1 MILLIGRAM(S): at 17:20

## 2023-01-12 RX ADMIN — Medication 100 MILLIEQUIVALENT(S): at 02:04

## 2023-01-12 RX ADMIN — Medication 100 MILLIEQUIVALENT(S): at 17:17

## 2023-01-12 RX ADMIN — CEFTRIAXONE 100 MILLIGRAM(S): 500 INJECTION, POWDER, FOR SOLUTION INTRAMUSCULAR; INTRAVENOUS at 12:26

## 2023-01-12 RX ADMIN — ATORVASTATIN CALCIUM 40 MILLIGRAM(S): 80 TABLET, FILM COATED ORAL at 22:50

## 2023-01-12 NOTE — DIETITIAN INITIAL EVALUATION ADULT - REASON FOR ADMISSION
Per chart review, patient is a "63 y/o F with DM2, hypothyroidism, schizophrenia, prior psychiatric hospitalizations on ZHH, ETOH abuse, Afib on Xarelto, admitted to MICU for hypovolemic and septic shock 2/2 UTI requiring pressors".

## 2023-01-12 NOTE — CHART NOTE - NSCHARTNOTEFT_GEN_A_CORE
: Dr. Pearl/ Dr. Randolph    INDICATION: shock/AMS    PROCEDURE:  [ X] LIMITED ECHO  [ X] LIMITED CHEST  [X ] LIMITED RETROPERITONEAL  [ ] LIMITED ABDOMINAL  [ ] LIMITED DVT  [ ] NEEDLE GUIDANCE VASCULAR  [ ] NEEDLE GUIDANCE THORACENTESIS  [ ] NEEDLE GUIDANCE PARACENTESIS  [ ] NEEDLE GUIDANCE PERICARDIOCENTESIS  [ ] OTHER    FINDINGS:  A lines throughout, no Pleff, no consolidations  Normal LV function, RV<LV  IVC small  no hydronephrosis; bladder collapsed around urinary cath    INTERPRETATION:  Normal aeration  For volume resuscitation given small IVC  No hydropnephrosis      Images uploaded to [employer] : Dr. Pearl/ Dr. Randolph    INDICATION: shock/AMS    PROCEDURE:  [ X] LIMITED ECHO  [ X] LIMITED CHEST  [X ] LIMITED RETROPERITONEAL  [ ] LIMITED ABDOMINAL  [ ] LIMITED DVT  [ ] NEEDLE GUIDANCE VASCULAR  [ ] NEEDLE GUIDANCE THORACENTESIS  [ ] NEEDLE GUIDANCE PARACENTESIS  [ ] NEEDLE GUIDANCE PERICARDIOCENTESIS  [ ] OTHER    FINDINGS:  A lines throughout, no Pleff, no consolidations  Normal LV function, RV<LV  IVC small  no hydronephrosis; bladder collapsed around urinary cath    INTERPRETATION:  Normal aeration  For volume resuscitation given small IVC  No hydronephrosis    I was present during the key portions of the procedure and immediately available during the entire procedure.  Agustín DE LA TORRE  Attending                Images uploaded to [facility]

## 2023-01-12 NOTE — DIETITIAN INITIAL EVALUATION ADULT - PERTINENT LABORATORY DATA
01-12    158<H>  |  123<H>  |  119<H>  ----------------------------<  147<H>  3.3<L>   |  17<L>  |  3.12<H>    Ca    10.3      12 Jan 2023 10:05  Phos  5.0     01-12  Mg     2.80     01-12    TPro  6.2  /  Alb  3.6  /  TBili  0.4  /  DBili  x   /  AST  43<H>  /  ALT  59<H>  /  AlkPhos  75  01-12  POCT Blood Glucose.: 137 mg/dL (01-11-23 @ 22:17)   1/12  Sodium (high) 158  Potassium (low) 3.3  Chloride (high) 123  BUN (high) 119  Creatinine (high) 3.12  Glucose (high) 147  CAPILLARY BLOOD GLUCOSE  POCT Blood Glucose.: 137 mg/dL (11 Jan 2023 22:17)  POCT Blood Glucose.: 123 mg/dL (11 Jan 2023 16:56)  POCT Blood Glucose.: 158 mg/dL (11 Jan 2023 12:44)

## 2023-01-12 NOTE — PROCEDURE NOTE - NSSITEPREP_SKIN_A_CORE
alcohol/chlorhexidine
alcohol/chlorhexidine/Adherence to aseptic technique: hand hygiene prior to donning barriers (gown, gloves), don cap and mask, sterile drape over patient

## 2023-01-12 NOTE — DIETITIAN INITIAL EVALUATION ADULT - ORAL INTAKE PTA/DIET HISTORY
Patient awake but lethargic at time of assessment. Collateral obtained from patient and chart review.   Patient reports living alone at home and occasionally cooks. Reports eating 3 meals a day which can consist of fast food/pre-cooked meals majority of the time.  Patient awake but lethargic at time of assessment. Collateral obtained from patient, chart review and nurse.   Patient reports living alone at home and occasionally cooks. Reports eating 3 meals a day which can consist of fast food/pre-cooked meals majority of the time. Reports that her UBW is 180 pounds, however, cannot provide a time frame. Patient reports that she has NKFA.  Patient awake but lethargic at time of assessment. Collateral obtained from patient, chart review and nurse.   - Patient reports living alone at home and occasionally cooks. Reports eating 3 meals a day which can consist of fast food/pre-cooked meals majority of the time.   - Reports that her UBW is 180 pounds, however, cannot provide a time frame.  - Patient reports that she has NKFA.

## 2023-01-12 NOTE — DIETITIAN INITIAL EVALUATION ADULT - OTHER INFO
- Renal: KODY; Cr lab is high 3.12 and BUN is high 119. Noted at time of assessment, Cr was 5.81 and BUN was 145. Noted "...likely 2/2/ prerenal KODY due to hypotension and suspected poor PO intake in the past 3 days".   - Noted patient hemodynamically unstable; patient was ordered for norepinephrine infusion; D/c 1/12  - Per H&P patient Afib; on Xarelto at home  - Noted patient with active order for thiamin, sodium 0.45%,  - Renal: KODY; Cr lab is high 3.12 and BUN is high 119. Noted at time of admission, Cr was 5.81 and BUN was 145. Noted "...likely 2/2/ prerenal KODY due to hypotension and suspected poor PO intake in the past 3 days".   - Noted patient hemodynamically unstable; patient was ordered for norepinephrine infusion; D/c 1/12  - Per H&P patient Afib; on Xarelto at home  - Noted patient with active order for thiamin, sodium 0.45%,  - Renal: KODY; Cr lab is high 3.12 and BUN is high 119. Noted at time of admission, Cr was 5.81 and BUN was 145 -  "...likely 2/2/ prerenal KODY due to hypotension and suspected poor PO intake in the past 3 days".   - Noted patient hemodynamically unstable; patient was ordered for norepinephrine infusion; D/c 1/12  - Per H&P patient Afib; on Xarelto at home  - Noted patient with active order for thiamin, sodium 0.45%,  - Renal: KODY; Cr lab is high 3.12 and BUN is high 119. Noted at time of admission, Cr was 5.81 and BUN was 145 -  "...likely 2/2 prerenal KODY due to hypotension and suspected poor PO intake in the past 3 days".   - Noted patient hemodynamically unstable; patient was ordered for norepinephrine infusion; D/c 1/12  - Per H&P patient Afib; on Xarelto at home  - Noted patient with active order for thiamin, sodium 0.45%,  - Renal: KODY; Cr lab is high 3.12 and BUN is high 119. Noted at time of admission, Cr was 5.81 and BUN was 145 -  "...likely 2/2 prerenal KODY due to hypotension and suspected poor PO intake in the past 3 days".  - Possible refeeding syndrome identified    - Noted patient hemodynamically unstable; patient was ordered for norepinephrine infusion; D/c 1/12  - Per H&P patient Afib; on Xarelto at home  - Noted patient with active order for thiamin, sodium 0.45%, potassium being repleted

## 2023-01-12 NOTE — PROGRESS NOTE ADULT - SUBJECTIVE AND OBJECTIVE BOX
COVERING RESIDENT: Zoraida Guo    INTERVAL HPI/OVERNIGHT EVENTS: Pt on 1.2 levo this AM. s/p 4L bolus in ED. Bedside pocus reveals small IVC.     SUBJECTIVE: Patient seen and examined at bedside.     OBJECTIVE:    VITAL SIGNS:  ICU Vital Signs Last 24 Hrs  T(C): 37.2 (2023 04:00), Max: 37.2 (2023 04:00)  T(F): 99 (2023 04:00), Max: 99 (2023 04:00)  HR: 90 (2023 08:00) (79 - 106)  BP: 96/48 (2023 02:00) (56/45 - 166/114)  BP(mean): 61 (2023 02:00) (47 - 127)  ABP: 116/53 (2023 08:00) (116/53 - 130/54)  ABP(mean): 74 (2023 08:00) (74 - 80)  RR: 17 (2023 08:00) (16 - 24)  SpO2: 95% (2023 08:00) (94% - 100%)    O2 Parameters below as of 2023 08:00  Patient On (Oxygen Delivery Method): room air     @ 07:01  -  -12 @ 07:00  --------------------------------------------------------  IN: 963 mL / OUT: 515 mL / NET: 448 mL      CAPILLARY BLOOD GLUCOSE  POCT Blood Glucose.: 137 mg/dL (2023 22:17)    GENERAL: NAD,   HEAD:  Atraumatic, Normocephalic  EYES: EOMI, conjunctiva and sclera clear  ENT: Moist mucous membranes  NECK: Supple, No JVD  CHEST/LUNG: Clear to auscultation bilaterally; No rales, rhonchi, wheezing, or rubs. Unlabored respirations  HEART: Regular rate and rhythm; No murmurs, rubs, or gallops  ABDOMEN: Bowel sounds present; Soft, Nontender, Nondistended.  EXTREMITIES: no sign of pitting edema b/l extremities. 2+ Peripheral Pulses, brisk capillary refill. No clubbing, cyanosis.  NERVOUS SYSTEM:  Alert & Oriented X3, speech clear. Following all commands. Moving all extremities b/l   MSK: FROM all 4 extremities, full and equal strength  SKIN: No rashes or lesions    MEDICATIONS:  MEDICATIONS  (STANDING):  atorvastatin 40 milliGRAM(s) Oral at bedtime  benztropine 2 milliGRAM(s) Oral daily  busPIRone 15 milliGRAM(s) Oral every 12 hours  cefTRIAXone   IVPB 1000 milliGRAM(s) IV Intermittent every 24 hours  chlorhexidine 2% Cloths 1 Application(s) Topical daily  chlorhexidine 4% Liquid 1 Application(s) Topical <User Schedule>  clonazePAM  Tablet 0.5 milliGRAM(s) Oral daily  heparin  Infusion. 1200 Unit(s)/Hr (12 mL/Hr) IV Continuous <Continuous>  levothyroxine 50 MICROGram(s) Oral daily  phenylephrine    Infusion 0.2 MICROgram(s)/kG/Min (2.92 mL/Hr) IV Continuous <Continuous>  sodium chloride 0.45%. 1000 milliLiter(s) (250 mL/Hr) IV Continuous <Continuous>  thiamine IVPB 500 milliGRAM(s) IV Intermittent every 8 hours    MEDICATIONS  (PRN):  heparin   Injectable 6500 Unit(s) IV Push every 6 hours PRN For aPTT less than 40  heparin   Injectable 3000 Unit(s) IV Push every 6 hours PRN For aPTT between 40 - 57    ALLERGIES:  Allergies  No Known Allergies  Intolerances      LABS:                        12.3   17.46 )-----------( 360      ( 2023 00:25 )             38.3         158<H>  |  118<H>  |  139<H>  ----------------------------<  146<H>  3.4<L>   |  17<L>  |  4.38<H>    Ca    10.3      2023 00:25  Phos  6.5     -  Mg     3.00     -    TPro  6.3  /  Alb  3.9  /  TBili  0.4  /  DBili  x   /  AST  45<H>  /  ALT  57<H>  /  AlkPhos  76      PT/INR - ( 2023 00:25 )   PT: 14.4 sec;   INR: 1.24 ratio         PTT - ( 2023 00:25 )  PTT:29.4 sec  Urinalysis Basic - ( 2023 13:19 )    Color: Yellow / Appearance: Turbid / S.018 / pH: x  Gluc: x / Ketone: Negative  / Bili: Negative / Urobili: <2 mg/dL   Blood: x / Protein: 30 mg/dL / Nitrite: Negative   Leuk Esterase: Large / RBC: 4 /HPF /  /HPF   Sq Epi: x / Non Sq Epi: 3 /HPF / Bacteria: Many    RADIOLOGY & ADDITIONAL TESTS: Reviewed. COVERING RESIDENT: Zoraida Guo    INTERVAL HPI/OVERNIGHT EVENTS: Pt on 1.2 levo this AM. s/p 4L bolus in ED. Bedside pocus reveals small IVC.     SUBJECTIVE: Patient seen and examined at bedside. A/Ox3. Complaining of urge to have BM today. Not complaining of headache, SOB, palpitations, dizziness/lightheadedness, syncope.     OBJECTIVE:     VITAL SIGNS:  ICU Vital Signs Last 24 Hrs  T(C): 37.2 (2023 04:00), Max: 37.2 (2023 04:00)  T(F): 99 (2023 04:00), Max: 99 (2023 04:00)  HR: 90 (2023 08:00) (79 - 106)  BP: 96/48 (2023 02:00) (56/45 - 166/114)  BP(mean): 61 (2023 02:00) (47 - 127)  ABP: 116/53 (2023 08:00) (116/53 - 130/54)  ABP(mean): 74 (2023 08:00) (74 - 80)  RR: 17 (2023 08:00) (16 - 24)  SpO2: 95% (2023 08:00) (94% - 100%)    O2 Parameters below as of 2023 08:00  Patient On (Oxygen Delivery Method): room air     @ 07:01  -  -12 @ 07:00  --------------------------------------------------------  IN: 963 mL / OUT: 515 mL / NET: 448 mL      CAPILLARY BLOOD GLUCOSE  POCT Blood Glucose.: 137 mg/dL (2023 22:17)    GENERAL: NAD,   HEAD:  Atraumatic, Normocephalic  EYES: EOMI, conjunctiva and sclera clear  ENT: Moist mucous membranes  NECK: Supple, No JVD  CHEST/LUNG: Clear to auscultation bilaterally; No rales, rhonchi, wheezing, or rubs. Unlabored respirations  HEART: Regular rate and rhythm; No murmurs, rubs, or gallops  ABDOMEN: Bowel sounds present; Soft, Nontender, Nondistended.  EXTREMITIES: no sign of pitting edema b/l extremities. 2+ Peripheral Pulses, brisk capillary refill. No clubbing, cyanosis.  NERVOUS SYSTEM:  Alert & Oriented X3, speech clear. Following all commands. Moving all extremities b/l   MSK: FROM all 4 extremities, full and equal strength  SKIN: No rashes or lesions    MEDICATIONS:  MEDICATIONS  (STANDING):  atorvastatin 40 milliGRAM(s) Oral at bedtime  benztropine 2 milliGRAM(s) Oral daily  busPIRone 15 milliGRAM(s) Oral every 12 hours  cefTRIAXone   IVPB 1000 milliGRAM(s) IV Intermittent every 24 hours  chlorhexidine 2% Cloths 1 Application(s) Topical daily  chlorhexidine 4% Liquid 1 Application(s) Topical <User Schedule>  clonazePAM  Tablet 0.5 milliGRAM(s) Oral daily  heparin  Infusion. 1200 Unit(s)/Hr (12 mL/Hr) IV Continuous <Continuous>  levothyroxine 50 MICROGram(s) Oral daily  phenylephrine    Infusion 0.2 MICROgram(s)/kG/Min (2.92 mL/Hr) IV Continuous <Continuous>  sodium chloride 0.45%. 1000 milliLiter(s) (250 mL/Hr) IV Continuous <Continuous>  thiamine IVPB 500 milliGRAM(s) IV Intermittent every 8 hours    MEDICATIONS  (PRN):  heparin   Injectable 6500 Unit(s) IV Push every 6 hours PRN For aPTT less than 40  heparin   Injectable 3000 Unit(s) IV Push every 6 hours PRN For aPTT between 40 - 57    ALLERGIES:  Allergies  No Known Allergies  Intolerances      LABS:                        12.3   17.46 )-----------( 360      ( 2023 00:25 )             38.3     01-12    158<H>  |  118<H>  |  139<H>  ----------------------------<  146<H>  3.4<L>   |  17<L>  |  4.38<H>    Ca    10.3      2023 00:25  Phos  6.5     01-12  Mg     3.00     01-12    TPro  6.3  /  Alb  3.9  /  TBili  0.4  /  DBili  x   /  AST  45<H>  /  ALT  57<H>  /  AlkPhos  76  01-12    PT/INR - ( 2023 00:25 )   PT: 14.4 sec;   INR: 1.24 ratio         PTT - ( 2023 00:25 )  PTT:29.4 sec  Urinalysis Basic - ( 2023 13:19 )    Color: Yellow / Appearance: Turbid / S.018 / pH: x  Gluc: x / Ketone: Negative  / Bili: Negative / Urobili: <2 mg/dL   Blood: x / Protein: 30 mg/dL / Nitrite: Negative   Leuk Esterase: Large / RBC: 4 /HPF /  /HPF   Sq Epi: x / Non Sq Epi: 3 /HPF / Bacteria: Many    RADIOLOGY & ADDITIONAL TESTS: Reviewed.

## 2023-01-12 NOTE — PROGRESS NOTE ADULT - ASSESSMENT
63 y/o F with DM2, hypothyroidism, schizophrenia, prior psychiatric hospitalizations on ZHH, ETOH abuse, Afib on Xarelto, admitted to MICU for hypovolemic and septic shock 2/2 UTI requiring pressors.     NEURO:  #Mental status: baseline A/Ox3 but lethargic likely 2/2 septic shock requiring pressors  -restart home antipsychotics when pt able to tolerate PO: benztropine, buspirone, clonazepam  -CT head 1/11 shows cerebral volume loss with ventricular prominence which has mildly progressed. The possibility of normal pressure hydrocephalus is raised.  -consulted neuro 1/12, asked to defer consult till discharge for outpatient workup due to requirement of high-volume LP  -per neuro, acute deterioration less likely 2/2 NPH due to long standing nature of ventricle enlargement per CT scan read (last normal CT head in 2019)    CV:  #AFib:   - CHADSVASC score: 3  - a/c: on home Xarelto  - due to significant renal dysfunction hold off on restarting Xarelto, CT head negative for hemorrhagic CVA  - start heparin drip with renal adjustment for initial loading dose   - No hx of TTE, will obtain this admission after adequate volume resuscitation   - Pt currently rate controlled   - Monitor telemetry  - Restart home antiarrhythmics and rate control medications    #Hemodynamically unstable:  - On pressors due to suspected septic shock, goal MAP 75 due to low circulatory volume  - on 1/2 NS 250cc/h for volume resuscitation and correction of hypernatremia    PULM:  No issues on RA, CTM    RENAL:  #KODY: Cr elevated to 5.81, , pt anuric at this time  -likely 2/2 prerenal KODY due to hypotension and suspected poor PO intake in the past 3 days,   -Thomason: will place for strict I/O in critically ill pt  -EKG negative for ischemic ST-T wave changes    GI:  #Liver enzymes WNL at this time  #Diet: NPO until swallow eval  #Bowel regiment: hold for now    ENDO:  #Hypothyroidism  -Takes synthroid 50 mcg QD at home  -TSH 1.66 on admission, f/u T3 T4    HEMATOLOGIC:  #CBC results show leukocytosis to 20.59, Hgb 15.7, Plt 512  #Coag panel WNL on admission  #DVT prophylaxis with heparin drip as above    ID:  #UTI  -Pt with UA showing +leuk est (-) nitrites, +.   -In the setting of profound hypotension, tachycardia, hypothermia, will treat for sepsis 2/2 likely UTI  -F/u blood and urine cultures sent to the lab  -s/p CTX 2g in ED, no recent hx of resistant cultures in past 10 years per [place] record  -Will continue CTX 1g QD for empiric coverage for UTI, f/u cutlure    SKIN:  #Lines: 1 PIVs  #Decubitus ulcers: stage 2 sacral decubitus  61 y/o F with DM2, hypothyroidism, schizophrenia, prior psychiatric hospitalizations on ZHH, ETOH abuse, Afib on Xarelto, admitted to MICU for hypovolemic and septic shock 2/2 UTI requiring pressors.     NEURO:  #Mental status: baseline A/Ox3 but lethargic likely 2/2 septic shock requiring pressors  -restart home antipsychotics when pt able to tolerate PO: benztropine, buspirone, clonazepam  -CT head 1/11 shows cerebral volume loss with ventricular prominence which has mildly progressed. The possibility of normal pressure hydrocephalus is raised.  -consulted neuro 1/12, asked to defer consult till discharge for outpatient workup due to requirement of high-volume LP  -per neuro, acute deterioration less likely 2/2 NPH due to long standing nature of ventricle enlargement per CT scan read (last normal CT head in 2019)  -unlikely alcohol withdrawal, pt drinks 1-2 alcohol per day  -F/u ammonia level     CV:  #AFib:   - CHADSVASC score: 3  - a/c: on home Xarelto  - due to significant renal dysfunction hold off on restarting Xarelto, CT head negative for hemorrhagic CVA  - start heparin drip with renal adjustment for initial loading dose   - No hx of TTE, will obtain this admission after adequate volume resuscitation   - Pt currently rate controlled   - restart home medications: metoprolol succinate and diltiazem 300mg ER QD when able to tolerate  - Monitor telemetry  - Restart home antiarrhythmics and rate control medications    #Hemodynamically unstable:  - On pressors due to suspected septic shock, goal MAP 75 due to low circulatory volume  - on 1/2 NS 250cc/h for volume resuscitation and correction of hypernatremia    PULM:  No issues on RA, CTM    RENAL:  #KODY: Cr elevated to 5.81, , pt anuric at this time  -likely 2/2 prerenal KODY due to hypotension and suspected poor PO intake in the past 3 days, Cr downtrending, s/p 4L fluid resuscitation in ED  -Thomason: will place for strict I/O in critically ill pt  -EKG negative for ischemic ST-T wave changes  -Replete lytes cautiously, currently     #Elevated AG to 33 upon admission   -likely 2/2 starvation ketoacidosis and less likely alcoholic ketoacidosis. Pt did not meet criteria for diabetic ketoacidosis.   -Will treat for starvation ketoacidosis with high dose thiamine then restarting diet, monitoring for refeeding syndrome    #Hypernatremia  -Na 157 on admission, currently Na 158 s/p 4 L isotonic fluid for fluid resucitation due to profound hypotension from hypovolemia  -Now that pt is off pressors, will treat hypernatremia with hypotonic fluids 1/2NS at 250cc/h  -Trend lytes Q6 monitoring for Na correction no more than 12 meq/ 24 hours    GI:  #Liver enzymes WNL at this time  #Diet: NPO until swallow eval  #Bowel regiment: hold for now    ENDO:  #Hypothyroidism  -Takes synthroid 50 mcg QD at home  -TSH 1.66 on admission, f/u T3 T4    HEMATOLOGIC:  #CBC results show leukocytosis to 20.59, Hgb 15.7, Plt 512 on admission, trend  #Coag panel WNL on admission  #DVT prophylaxis with heparin drip as above    ID:  #UTI  -Pt with UA showing +leuk est (-) nitrites, +.   -In the setting of profound hypotension, tachycardia, hypothermia, will treat for sepsis 2/2 likely UTI  -F/u blood and urine cultures sent to the lab  -s/p CTX 2g in ED, no recent hx of resistant cultures in past 10 years per [place] record  -Will continue CTX 1g QD for empiric coverage for UTI, f/u culture    SKIN:  #Lines: 1 PIVs  #Decubitus ulcers: stage 2 sacral decubitus on admission

## 2023-01-12 NOTE — DIETITIAN INITIAL EVALUATION ADULT - ADD RECOMMEND
- Pending SLP evaluation, recommend consistent carbohydrate diet with evening snack with additional Promote oral nutrition supplement 1x daily. Defer diet consistency to team.   - Monitor labs, skin integrity, GI tolerance, weights, and bowel movement regularity   - Recommend zinc, and vitamin C to aid in wound healing pending no medical contraindications  - Recommend multivitamin for micronutrient support pending no medical contraindications  - Patient at risk for malnutrition; monitor PO intake and weight status   - Pending SLP evaluation, recommend consistent carbohydrate diet with evening snack with additional Promote oral nutrition supplement 1x daily. Defer diet consistency to team.   - Monitor labs, skin integrity, GI tolerance, weights, and bowel movement regularity   - Recommend zinc, and vitamin C to aid in wound healing pending no medical contraindications  - Recommend multivitamin for micronutrient support pending no medical contraindications

## 2023-01-12 NOTE — DIETITIAN INITIAL EVALUATION ADULT - NSFNSGIIOFT_GEN_A_CORE
Patient reports no vomiting, diarrhea or constipation, however reports nausea.  Noted per chart review, no active bowel regimen ordered

## 2023-01-12 NOTE — DIETITIAN INITIAL EVALUATION ADULT - NSFNSPHYEXAMSKINFT_GEN_A_CORE
Pressure Injury 1: sacrum, Stage II  Pressure Injury 2: none, none  Pressure Injury 3: none, none  Pressure Injury 4: none, none  Pressure Injury 5: none, none  Pressure Injury 6: none, none  Pressure Injury 7: none, none  Pressure Injury 8: none, none  Pressure Injury 9: none, none  Pressure Injury 10: none, none  Pressure Injury 11: none, none Pressure Injury 1: sacrum, Stage II per chart review

## 2023-01-12 NOTE — PROGRESS NOTE ADULT - ATTENDING COMMENTS
Patient examined and case reviewed in detail on bedside rounds  Critically ill and unstable with severe metabolic disarray/urosepsis/KODY/MS change  Frequent bedside visits with therapy change today.   I have personally provided 35+ minutes of critical care time concurrently with the resident/fellow; this excludes time spent on separate procedures.      This patient had a goal directed echo within 24 hours of admission to the ICU  The physician staff has had a goals of care discussion with this patient and/or their family  This patient is on DVT prophylaxis

## 2023-01-12 NOTE — DIETITIAN INITIAL EVALUATION ADULT - NSFNSNUTRCHEWSWALLOWFT_GEN_A_CORE
Patient reports having no difficulty with chewing but having difficulty with swallowing.   Nurse reports that patient tends to keep pills in mouth.

## 2023-01-12 NOTE — DIETITIAN INITIAL EVALUATION ADULT - PERTINENT MEDS FT
MEDICATIONS  (STANDING):  atorvastatin 40 milliGRAM(s) Oral at bedtime  benztropine 2 milliGRAM(s) Oral daily  busPIRone 15 milliGRAM(s) Oral every 12 hours  cefTRIAXone   IVPB 1000 milliGRAM(s) IV Intermittent every 24 hours  chlorhexidine 2% Cloths 1 Application(s) Topical daily  chlorhexidine 4% Liquid 1 Application(s) Topical <User Schedule>  clonazePAM  Tablet 0.5 milliGRAM(s) Oral daily  heparin  Infusion. 1200 Unit(s)/Hr (12 mL/Hr) IV Continuous <Continuous>  levothyroxine 50 MICROGram(s) Oral daily  phenylephrine    Infusion 0.2 MICROgram(s)/kG/Min (2.92 mL/Hr) IV Continuous <Continuous>  potassium chloride  10 mEq/100 mL IVPB 10 milliEquivalent(s) IV Intermittent every 1 hour  sodium chloride 0.45%. 1000 milliLiter(s) (250 mL/Hr) IV Continuous <Continuous>  thiamine IVPB 500 milliGRAM(s) IV Intermittent every 8 hours    MEDICATIONS  (PRN):  heparin   Injectable 6500 Unit(s) IV Push every 6 hours PRN For aPTT less than 40  heparin   Injectable 3000 Unit(s) IV Push every 6 hours PRN For aPTT between 40 - 57   Nutrition Pertinent Medications (standing)  atorvastatin   heparin  Infusion  levothyroxine   potassium chloride    sodium chloride 0.45%.  thiamine IV

## 2023-01-12 NOTE — DIETITIAN INITIAL EVALUATION ADULT - ENERGY INTAKE
Patient reports eating 3 meals a day but unable to report consumption amount. - Per chart review, patient currently does not have an active diet order; unable to assess PO intake at time of assessment  - Patient reports NPO until swallow eval is administered   Patient reports eating 3 meals a day but unable to report intake amount. - Per chart review, patient currently does not have an active diet order; unable to assess PO intake at time of assessment  - Per chart review, patient remains NPO until swallow eval is administered

## 2023-01-12 NOTE — PROCEDURE NOTE - NSINDICATIONS_GEN_A_CORE
arterial puncture to obtain ABG's/blood sampling/critical patient/monitoring purposes
emergency venous access

## 2023-01-12 NOTE — DIETITIAN INITIAL EVALUATION ADULT - NS FNS REASON FOR WEIGHT CHANG
Weight history: 1/12/22, 77.9 kg  UBW: 81.64 kg (Patient unable to report time frame of weight loss)

## 2023-01-13 DIAGNOSIS — I48.20 CHRONIC ATRIAL FIBRILLATION, UNSPECIFIED: ICD-10-CM

## 2023-01-13 DIAGNOSIS — N17.9 ACUTE KIDNEY FAILURE, UNSPECIFIED: ICD-10-CM

## 2023-01-13 DIAGNOSIS — N39.0 URINARY TRACT INFECTION, SITE NOT SPECIFIED: ICD-10-CM

## 2023-01-13 DIAGNOSIS — A41.9 SEPSIS, UNSPECIFIED ORGANISM: ICD-10-CM

## 2023-01-13 DIAGNOSIS — G92.8 OTHER TOXIC ENCEPHALOPATHY: ICD-10-CM

## 2023-01-13 DIAGNOSIS — E13.9 OTHER SPECIFIED DIABETES MELLITUS WITHOUT COMPLICATIONS: ICD-10-CM

## 2023-01-13 DIAGNOSIS — G91.2 (IDIOPATHIC) NORMAL PRESSURE HYDROCEPHALUS: ICD-10-CM

## 2023-01-13 DIAGNOSIS — F20.9 SCHIZOPHRENIA, UNSPECIFIED: ICD-10-CM

## 2023-01-13 DIAGNOSIS — E03.9 HYPOTHYROIDISM, UNSPECIFIED: ICD-10-CM

## 2023-01-13 LAB
ALBUMIN SERPL ELPH-MCNC: 3.2 G/DL — LOW (ref 3.3–5)
ALP SERPL-CCNC: 65 U/L — SIGNIFICANT CHANGE UP (ref 40–120)
ALT FLD-CCNC: 48 U/L — HIGH (ref 4–33)
AMMONIA BLD-MCNC: 17 UMOL/L — SIGNIFICANT CHANGE UP (ref 11–55)
ANION GAP SERPL CALC-SCNC: 9 MMOL/L — SIGNIFICANT CHANGE UP (ref 7–14)
APTT BLD: 140.1 SEC — CRITICAL HIGH (ref 27–36.3)
APTT BLD: 36.5 SEC — HIGH (ref 27–36.3)
APTT BLD: 80.3 SEC — HIGH (ref 27–36.3)
AST SERPL-CCNC: 41 U/L — HIGH (ref 4–32)
B-OH-BUTYR SERPL-SCNC: <0 MMOL/L — SIGNIFICANT CHANGE UP (ref 0–0.4)
BASOPHILS # BLD AUTO: 0.02 K/UL — SIGNIFICANT CHANGE UP (ref 0–0.2)
BASOPHILS NFR BLD AUTO: 0.2 % — SIGNIFICANT CHANGE UP (ref 0–2)
BILIRUB SERPL-MCNC: 0.3 MG/DL — SIGNIFICANT CHANGE UP (ref 0.2–1.2)
BLOOD GAS ARTERIAL COMPREHENSIVE RESULT: SIGNIFICANT CHANGE UP
BUN SERPL-MCNC: 94 MG/DL — HIGH (ref 7–23)
CALCIUM SERPL-MCNC: 9.4 MG/DL — SIGNIFICANT CHANGE UP (ref 8.4–10.5)
CHLORIDE SERPL-SCNC: 117 MMOL/L — HIGH (ref 98–107)
CO2 SERPL-SCNC: 21 MMOL/L — LOW (ref 22–31)
CREAT SERPL-MCNC: 1.99 MG/DL — HIGH (ref 0.5–1.3)
EGFR: 28 ML/MIN/1.73M2 — LOW
EOSINOPHIL # BLD AUTO: 0.02 K/UL — SIGNIFICANT CHANGE UP (ref 0–0.5)
EOSINOPHIL NFR BLD AUTO: 0.2 % — SIGNIFICANT CHANGE UP (ref 0–6)
GLUCOSE BLDC GLUCOMTR-MCNC: 132 MG/DL — HIGH (ref 70–99)
GLUCOSE BLDC GLUCOMTR-MCNC: 152 MG/DL — HIGH (ref 70–99)
GLUCOSE SERPL-MCNC: 176 MG/DL — HIGH (ref 70–99)
HCT VFR BLD CALC: 31.1 % — LOW (ref 34.5–45)
HGB BLD-MCNC: 9.9 G/DL — LOW (ref 11.5–15.5)
IANC: 6.71 K/UL — SIGNIFICANT CHANGE UP (ref 1.8–7.4)
IMM GRANULOCYTES NFR BLD AUTO: 2 % — HIGH (ref 0–0.9)
LYMPHOCYTES # BLD AUTO: 1.99 K/UL — SIGNIFICANT CHANGE UP (ref 1–3.3)
LYMPHOCYTES # BLD AUTO: 19.9 % — SIGNIFICANT CHANGE UP (ref 13–44)
MAGNESIUM SERPL-MCNC: 2.4 MG/DL — SIGNIFICANT CHANGE UP (ref 1.6–2.6)
MCHC RBC-ENTMCNC: 28.1 PG — SIGNIFICANT CHANGE UP (ref 27–34)
MCHC RBC-ENTMCNC: 31.8 GM/DL — LOW (ref 32–36)
MCV RBC AUTO: 88.4 FL — SIGNIFICANT CHANGE UP (ref 80–100)
MONOCYTES # BLD AUTO: 1.04 K/UL — HIGH (ref 0–0.9)
MONOCYTES NFR BLD AUTO: 10.4 % — SIGNIFICANT CHANGE UP (ref 2–14)
NEUTROPHILS # BLD AUTO: 6.71 K/UL — SIGNIFICANT CHANGE UP (ref 1.8–7.4)
NEUTROPHILS NFR BLD AUTO: 67.3 % — SIGNIFICANT CHANGE UP (ref 43–77)
NRBC # BLD: 0 /100 WBCS — SIGNIFICANT CHANGE UP (ref 0–0)
NRBC # FLD: 0 K/UL — SIGNIFICANT CHANGE UP (ref 0–0)
PHOSPHATE SERPL-MCNC: 2.5 MG/DL — SIGNIFICANT CHANGE UP (ref 2.5–4.5)
PLATELET # BLD AUTO: 231 K/UL — SIGNIFICANT CHANGE UP (ref 150–400)
POTASSIUM SERPL-MCNC: 3.3 MMOL/L — LOW (ref 3.5–5.3)
POTASSIUM SERPL-SCNC: 3.3 MMOL/L — LOW (ref 3.5–5.3)
PROT SERPL-MCNC: 5.4 G/DL — LOW (ref 6–8.3)
RBC # BLD: 3.52 M/UL — LOW (ref 3.8–5.2)
RBC # FLD: 13.2 % — SIGNIFICANT CHANGE UP (ref 10.3–14.5)
SODIUM SERPL-SCNC: 147 MMOL/L — HIGH (ref 135–145)
WBC # BLD: 9.98 K/UL — SIGNIFICANT CHANGE UP (ref 3.8–10.5)
WBC # FLD AUTO: 9.98 K/UL — SIGNIFICANT CHANGE UP (ref 3.8–10.5)

## 2023-01-13 PROCEDURE — 99233 SBSQ HOSP IP/OBS HIGH 50: CPT

## 2023-01-13 RX ORDER — ACETAMINOPHEN 500 MG
650 TABLET ORAL EVERY 6 HOURS
Refills: 0 | Status: DISCONTINUED | OUTPATIENT
Start: 2023-01-13 | End: 2023-01-20

## 2023-01-13 RX ORDER — CLONAZEPAM 1 MG
0.5 TABLET ORAL DAILY
Refills: 0 | Status: DISCONTINUED | OUTPATIENT
Start: 2023-01-14 | End: 2023-01-19

## 2023-01-13 RX ORDER — LANOLIN ALCOHOL/MO/W.PET/CERES
3 CREAM (GRAM) TOPICAL AT BEDTIME
Refills: 0 | Status: DISCONTINUED | OUTPATIENT
Start: 2023-01-13 | End: 2023-01-20

## 2023-01-13 RX ORDER — CHLORHEXIDINE GLUCONATE 213 G/1000ML
1 SOLUTION TOPICAL
Refills: 0 | Status: DISCONTINUED | OUTPATIENT
Start: 2023-01-13 | End: 2023-01-20

## 2023-01-13 RX ORDER — BENZTROPINE MESYLATE 1 MG
2 TABLET ORAL AT BEDTIME
Refills: 0 | Status: DISCONTINUED | OUTPATIENT
Start: 2023-01-14 | End: 2023-01-20

## 2023-01-13 RX ORDER — INSULIN LISPRO 100/ML
VIAL (ML) SUBCUTANEOUS
Refills: 0 | Status: DISCONTINUED | OUTPATIENT
Start: 2023-01-13 | End: 2023-01-20

## 2023-01-13 RX ORDER — SODIUM CHLORIDE 9 MG/ML
1000 INJECTION, SOLUTION INTRAVENOUS
Refills: 0 | Status: DISCONTINUED | OUTPATIENT
Start: 2023-01-13 | End: 2023-01-20

## 2023-01-13 RX ORDER — GLUCAGON INJECTION, SOLUTION 0.5 MG/.1ML
1 INJECTION, SOLUTION SUBCUTANEOUS ONCE
Refills: 0 | Status: DISCONTINUED | OUTPATIENT
Start: 2023-01-13 | End: 2023-01-20

## 2023-01-13 RX ORDER — DEXTROSE 50 % IN WATER 50 %
15 SYRINGE (ML) INTRAVENOUS ONCE
Refills: 0 | Status: DISCONTINUED | OUTPATIENT
Start: 2023-01-13 | End: 2023-01-20

## 2023-01-13 RX ORDER — INSULIN LISPRO 100/ML
VIAL (ML) SUBCUTANEOUS AT BEDTIME
Refills: 0 | Status: DISCONTINUED | OUTPATIENT
Start: 2023-01-13 | End: 2023-01-20

## 2023-01-13 RX ORDER — DEXTROSE 50 % IN WATER 50 %
25 SYRINGE (ML) INTRAVENOUS ONCE
Refills: 0 | Status: DISCONTINUED | OUTPATIENT
Start: 2023-01-13 | End: 2023-01-20

## 2023-01-13 RX ORDER — DEXTROSE 50 % IN WATER 50 %
12.5 SYRINGE (ML) INTRAVENOUS ONCE
Refills: 0 | Status: DISCONTINUED | OUTPATIENT
Start: 2023-01-13 | End: 2023-01-20

## 2023-01-13 RX ORDER — ONDANSETRON 8 MG/1
4 TABLET, FILM COATED ORAL EVERY 8 HOURS
Refills: 0 | Status: DISCONTINUED | OUTPATIENT
Start: 2023-01-13 | End: 2023-01-20

## 2023-01-13 RX ADMIN — CEFTRIAXONE 100 MILLIGRAM(S): 500 INJECTION, POWDER, FOR SOLUTION INTRAMUSCULAR; INTRAVENOUS at 13:11

## 2023-01-13 RX ADMIN — HEPARIN SODIUM 800 UNIT(S)/HR: 5000 INJECTION INTRAVENOUS; SUBCUTANEOUS at 16:45

## 2023-01-13 RX ADMIN — HEPARIN SODIUM 800 UNIT(S)/HR: 5000 INJECTION INTRAVENOUS; SUBCUTANEOUS at 23:47

## 2023-01-13 RX ADMIN — Medication 105 MILLIGRAM(S): at 13:29

## 2023-01-13 RX ADMIN — HEPARIN SODIUM 800 UNIT(S)/HR: 5000 INJECTION INTRAVENOUS; SUBCUTANEOUS at 03:59

## 2023-01-13 RX ADMIN — HEPARIN SODIUM 0 UNIT(S)/HR: 5000 INJECTION INTRAVENOUS; SUBCUTANEOUS at 15:31

## 2023-01-13 RX ADMIN — Medication 50 MICROGRAM(S): at 06:06

## 2023-01-13 RX ADMIN — Medication 1 TABLET(S): at 13:12

## 2023-01-13 RX ADMIN — Medication 105 MILLIGRAM(S): at 06:08

## 2023-01-13 RX ADMIN — HEPARIN SODIUM 800 UNIT(S)/HR: 5000 INJECTION INTRAVENOUS; SUBCUTANEOUS at 19:30

## 2023-01-13 RX ADMIN — HEPARIN SODIUM 6500 UNIT(S): 5000 INJECTION INTRAVENOUS; SUBCUTANEOUS at 08:17

## 2023-01-13 RX ADMIN — Medication 15 MILLIGRAM(S): at 06:05

## 2023-01-13 RX ADMIN — HEPARIN SODIUM 800 UNIT(S)/HR: 5000 INJECTION INTRAVENOUS; SUBCUTANEOUS at 00:04

## 2023-01-13 RX ADMIN — Medication 1 MILLIGRAM(S): at 13:12

## 2023-01-13 RX ADMIN — HEPARIN SODIUM 800 UNIT(S)/HR: 5000 INJECTION INTRAVENOUS; SUBCUTANEOUS at 07:35

## 2023-01-13 RX ADMIN — HEPARIN SODIUM 1100 UNIT(S)/HR: 5000 INJECTION INTRAVENOUS; SUBCUTANEOUS at 08:13

## 2023-01-13 RX ADMIN — HEPARIN SODIUM 1100 UNIT(S)/HR: 5000 INJECTION INTRAVENOUS; SUBCUTANEOUS at 13:27

## 2023-01-13 RX ADMIN — CHLORHEXIDINE GLUCONATE 1 APPLICATION(S): 213 SOLUTION TOPICAL at 13:11

## 2023-01-13 RX ADMIN — Medication 105 MILLIGRAM(S): at 21:15

## 2023-01-13 RX ADMIN — ATORVASTATIN CALCIUM 40 MILLIGRAM(S): 80 TABLET, FILM COATED ORAL at 21:19

## 2023-01-13 RX ADMIN — SODIUM CHLORIDE 50 MILLILITER(S): 9 INJECTION, SOLUTION INTRAVENOUS at 06:05

## 2023-01-13 NOTE — PROGRESS NOTE ADULT - PROBLEM SELECTOR PLAN 9
on hep drip for now due to KODY, resume xarelto upon resolution of KODY  pt home dose metoprolol and diltiazem on hold due to septic shock and now recovering  once BP more stable can resume with hold parameters

## 2023-01-13 NOTE — PROGRESS NOTE ADULT - PROBLEM SELECTOR PLAN 7
pt to f/u with neuro as outpt for large volume LP  need to resolve current state with infection prior to addressing this, d/w Dr Wolf, he is aware and in agreement FS QID  sliding scale  NCS diet FS QID  sliding scale  NCS diet  outpt metformin, may need to reconsider outpt regimen if KODY does not resolve

## 2023-01-13 NOTE — PROGRESS NOTE ADULT - ASSESSMENT
61 y/o F with DM2, hypothyroidism, schizophrenia, prior psychiatric hospitalizations on [facility], ETOH abuse, Afib on Xarelto, admitted to MICU for hypovolemic and septic shock 2/2 UTI requiring pressors.     NEURO:  #Mental status: baseline A/Ox3 but lethargic likely 2/2 septic shock requiring pressors  -restart home antipsychotics when pt able to tolerate PO: benztropine, buspirone, clonazepam  -CT head 1/11 shows cerebral volume loss with ventricular prominence which has mildly progressed. The possibility of normal pressure hydrocephalus is raised.  -consulted neuro 1/12, asked to defer consult till discharge for outpatient workup due to requirement of high-volume LP  -per neuro, acute deterioration less likely 2/2 NPH due to long standing nature of ventricle enlargement per CT scan read (last normal CT head in 2019)  -unlikely alcohol withdrawal, pt drinks 1-2 alcohol per day  -F/u ammonia level     CV:  #AFib:   - CHADSVASC score: 3  - a/c: on home Xarelto  - due to significant renal dysfunction hold off on restarting Xarelto, CT head negative for hemorrhagic CVA  - start heparin drip with renal adjustment for initial loading dose   - No hx of TTE, will obtain this admission after adequate volume resuscitation   - Pt currently rate controlled   - restart home medications: metoprolol succinate and diltiazem 300mg ER QD when able to tolerate  - Monitor telemetry  - Restart home antiarrhythmics and rate control medications    #Hemodynamically unstable:  - On pressors due to suspected septic shock, goal MAP 75 due to low circulatory volume  - on 1/2 NS 250cc/h for volume resuscitation and correction of hypernatremia    PULM:  No issues on RA, CTM    RENAL:  #KODY: Cr elevated to 5.81, , pt anuric at this time  -likely 2/2 prerenal KODY due to hypotension and suspected poor PO intake in the past 3 days, Cr downtrending, s/p 4L fluid resuscitation in ED  -Thomason: will place for strict I/O in critically ill pt  -EKG negative for ischemic ST-T wave changes  -Replete lytes cautiously, currently     #Elevated AG to 33 upon admission   -likely 2/2 starvation ketoacidosis and less likely alcoholic ketoacidosis. Pt did not meet criteria for diabetic ketoacidosis.   -Will treat for starvation ketoacidosis with high dose thiamine then restarting diet, monitoring for refeeding syndrome    #Hypernatremia  -Na 157 on admission, currently Na 158 s/p 4 L isotonic fluid for fluid resucitation due to profound hypotension from hypovolemia  -Now that pt is off pressors, will treat hypernatremia with hypotonic fluids 1/2NS at 250cc/h  -Trend lytes Q6 monitoring for Na correction no more than 12 meq/ 24 hours    GI:  #Liver enzymes WNL at this time  #Diet: NPO until swallow eval  #Bowel regiment: hold for now    ENDO:  #Hypothyroidism  -Takes synthroid 50 mcg QD at home  -TSH 1.66 on admission, f/u T3 T4    HEMATOLOGIC:  #CBC results show leukocytosis to 20.59, Hgb 15.7, Plt 512 on admission, trend  #Coag panel WNL on admission  #DVT prophylaxis with heparin drip as above    ID:  #[email or web address]  -Pt with UA showing +leuk est (-) nitrites, +.   -In the setting of profound hypotension, tachycardia, hypothermia, will treat for sepsis 2/2 likely UTI  -F/u blood and urine cultures sent to the lab  -s/p CTX 2g in ED, no recent hx of resistant cultures in past 10 years per [facility] record  -Will continue CTX 1g QD for empiric coverage for UTI, f/u culture    SKIN:  #Lines: 1 PIVs  #Decubitus ulcers: stage 2 sacral decubitus on admission 61 y/o F with DM2, hypothyroidism, schizophrenia, prior psychiatric hospitalizations on [facility], ETOH abuse, Afib on Xarelto, admitted to MICU for hypovolemic and septic shock 2/2 UTI requiring pressors.

## 2023-01-13 NOTE — ADVANCED PRACTICE NURSE CONSULT - RECOMMEDATIONS
Topical recommendations:     B/L buttocks: Cleanse with skin cleanser, pat dry. Apply Critic-aid moisture barrier cream twice a day and PRN with incontinent episodes.     B/l breasts/abdominal pannus/BL inner groin: Cleanse with skin cleanser, pat dry. Place Interdry textile sheeting, under intertriginous folds leaving 2 inches exposed at ends to wick, remove to wash & dry affected area, then replace. Individual sheeting may be used for up to 5 days unless soiled. Inspect skin daily.     Continue low air loss bed therapy, continue heel elevation, continue to turn & reposition as per protocol, soft pillow between bony prominences, continue moisture management with barrier creams & single breathable pad, continue measures to decrease friction/shear/pressure. Continue with nutritional support as per dietary/orders.     Plan of care discussed with Primary RN Tejinder at beside. Plan of care discussed with NP Radha Hopkins.    Please contact Wound/Ostomy Care Service Line if we can be of further assistance (ext [phone]).  Topical recommendations:     B/L buttocks: Cleanse with skin cleanser, pat dry. Apply Critic-aid moisture barrier cream twice a day and PRN with incontinent episodes.     B/l breasts/abdominal pannus/BL inner groin: Cleanse with skin cleanser, pat dry. Place Interdry textile sheeting, under intertriginous folds leaving 2 inches exposed at ends to wick, remove to wash & dry affected area, then replace. Individual sheeting may be used for up to 5 days unless soiled. Inspect skin daily.     Continue low air loss bed therapy, continue heel elevation, continue to turn & reposition as per protocol, soft pillow between bony prominences, continue moisture management with barrier creams & single breathable pad, continue measures to decrease friction/shear/pressure. Continue with nutritional support as per dietary/orders.     Plan of care discussed with Primary RN Santiago and BREE Small at beside. Plan of care discussed with NP Radha Hopkins.    Please contact Wound/Ostomy Care Service Line if we can be of further assistance (ext [phone]).

## 2023-01-13 NOTE — ADVANCED PRACTICE NURSE CONSULT - ASSESSMENT
A&Ox2, able to turn with 2x assistance, incontinent of urine and stool. Female external urinary device (Primafit) on patient. Incontinence care provided during assessment. Skin warm, dry with increased moisture in intertriginous folds, scattered areas of hyperpigmentation and hypopigmentation, scattered areas of ecchymosis without hematoma on bilateral upper extremities. Blanchable erythema on bilateral heels. Risk for moisture associated dermatitis to b/l breast, abdominal pannus, and groin.    B/L buttocks: Incontinence associated dermatitis complicated by moisture as evidenced by blanchable erythema and denudations. Impaired skin with irregular borders, exposing red-moist dermis. Does not overly armen prominences. Goals of care: monitor for tissue type changes and continue measures to decrease friction/shear/pressure.

## 2023-01-13 NOTE — PROGRESS NOTE ADULT - SUBJECTIVE AND OBJECTIVE BOX
[facility] Division of Hospital Medicine  Neha Dinh MD  Pager [phone]    61 y/o F with DM2, hypothyroidism, schizophrenia, prior psychiatric hospitalizations on [facility], ETOH abuse, Afib on Xarelto, admitted to MICU for hypovolemic and septic shock 2/2 UTI requiring pressors.           SUBJECTIVE / OVERNIGHT EVENTS: downgraded from ICU over night; pt able to tell me she fell after getting up from the couch but not much more about events that led her to the hospital; she knows she's in the hospital and it's ; unable to identify a family member or friend; then able to identify [place];  called Mr Wolf listed as emergency contact in computer; he was the one who found the pt on the floor; Mr Wolf states he speaks with pt daily at 6pm and she missed 2 days of calls, so the third day he went to check on her and found her on the floor; states she has been having trouble walking and thinks this led to her fall and subseq sepsis; he states pt has been previously able to take care of herself until her walking has become more troublesome      MEDICATIONS  (STANDING):  atorvastatin 40 milliGRAM(s) Oral at bedtime  benztropine 2 milliGRAM(s) Oral daily  busPIRone 15 milliGRAM(s) Oral every 12 hours  cefTRIAXone   IVPB 1000 milliGRAM(s) IV Intermittent every 24 hours  chlorhexidine 2% Cloths 1 Application(s) Topical <User Schedule>  clonazePAM  Tablet 0.5 milliGRAM(s) Oral daily  dextrose 5%. 1000 milliLiter(s) (50 mL/Hr) IV Continuous <Continuous>  folic acid 1 milliGRAM(s) Oral daily  heparin  Infusion. 1200 Unit(s)/Hr (12 mL/Hr) IV Continuous <Continuous>  levothyroxine 50 MICROGram(s) Oral daily  multivitamin 1 Tablet(s) Oral daily  thiamine IVPB 500 milliGRAM(s) IV Intermittent every 8 hours    MEDICATIONS  (PRN):  acetaminophen     Tablet .. 650 milliGRAM(s) Oral every 6 hours PRN Temp greater or equal to 38C (100.4F), Mild Pain (1 - 3)  aluminum hydroxide/magnesium hydroxide/simethicone Suspension 30 milliLiter(s) Oral every 4 hours PRN Dyspepsia  heparin   Injectable 6500 Unit(s) IV Push every 6 hours PRN For aPTT less than 40  heparin   Injectable 3000 Unit(s) IV Push every 6 hours PRN For aPTT between 40 - 57  melatonin 3 milliGRAM(s) Oral at bedtime PRN Insomnia  ondansetron Injectable 4 milliGRAM(s) IV Push every 8 hours PRN Nausea and/or Vomiting      PHYSICAL EXAM:  Vital Signs Last 24 Hrs  T(F): 98.3 (2023 04:00), Max: 98.3 (2023 04:00)  HR: 83 (2023 04:00) (83 - 95)  BP: 98/55 (2023 04:00) (98/55 - 98/55)  RR: 18 (2023 04:00) (16 - 24)  SpO2: 100% (2023 04:00) (100% - 100%)    Parameters below as of 2023 04:00  Patient On (Oxygen Delivery Method): room air        CONSTITUTIONAL: NAD, appears comfortable  EYES: PERRLA; conjunctiva and sclera clear  ENMT: Moist oral mucosa; normal dentition  RESPIRATORY: Normal respiratory effort; grossly b/l AE  CARDIOVASCULAR: irreg; No lower extremity edema;   ABDOMEN: Nontender to palpation, normoactive bowel sounds  MUSCULOSKELETAL:  no clubbing or cyanosis of digits; no joint swelling or tenderness to palpation  PSYCH: oriented to hospital, , self; pleasant  NEUROLOGY: intention tremor  SKIN: No rashes; no palpable lesions    LABS:                        9.9    9.98  )-----------( 231      ( 2023 00:45 )             31.1         147<H>  |  117<H>  |  94<H>  ----------------------------<  176<H>  3.3<L>   |  21<L>  |  1.99<H>    Ca    9.4      2023 00:45  Phos  2.5       Mg     2.40         TPro  5.4<L>  /  Alb  3.2<L>  /  TBili  0.3  /  DBili  x   /  AST  41<H>  /  ALT  48<H>  /  AlkPhos  65      PT/INR - ( 2023 00:25 )   PT: 14.4 sec;   INR: 1.24 ratio         PTT - ( 2023 06:31 )  PTT:36.5 sec  CARDIAC MARKERS ( 2023 18:15 )  x     / x     / 315 U/L / x     / 13.7 ng/mL  CARDIAC MARKERS ( 2023 13:19 )  x     / x     / 252 U/L / x     / 13.2 ng/mL      Urinalysis Basic - ( 2023 13:19 )    Color: Yellow / Appearance: Turbid / S.018 / pH: x  Gluc: x / Ketone: Negative  / Bili: Negative / Urobili: <2 mg/dL   Blood: x / Protein: 30 mg/dL / Nitrite: Negative   Leuk Esterase: Large / RBC: 4 /HPF /  /HPF   Sq Epi: x / Non Sq Epi: 3 /HPF / Bacteria: Many        Culture - Blood (collected 2023 12:50)  Source: .Blood Blood-Venous  Preliminary Report (2023 16:02):    No growth to date.    Culture - Blood (collected 2023 12:50)  Source: .Blood Blood-Peripheral  Preliminary Report (2023 16:02):    No growth to date.    Culture - Urine (collected 2023 12:35)  Source: Clean Catch Clean Catch (Midstream)  Preliminary Report (2023 18:35):    >100,000 CFU/ml Escherichia coli

## 2023-01-13 NOTE — ADVANCED PRACTICE NURSE CONSULT - REASON FOR CONSULT
Patient seen on skin care rounds after wound care referral received for assessment of skin impairment and recommendations of topical management of sacrum stage 2. As per H&P, Patient is a 61 y/o F with DM2, hypothyroidism, schizophrenia, prior psychiatric hospitalizations on [facility], ETOH abuse, Afib on Xarelto, admitted to MICU for hypovolemic and septic shock 2/2 UTI requiring pressors, now downgraded to medicine 1/13. Chart reviewed: WBC 9.98, H/H 9.9/31.1, Platelets 231, INR 1.24, Serum albumin 3.2, BMI 28.6, Negrito 12.

## 2023-01-13 NOTE — PROGRESS NOTE ADULT - PROBLEM SELECTOR PLAN 3
pt found on floor unresponsive, hypothermic, hypotensive  now more clear mentally however not at baseline  cont to monitor

## 2023-01-13 NOTE — CHART NOTE - NSCHARTNOTEFT_GEN_A_CORE
MAR Accept Note  Transfer to: [facility]  Accepting Attending Physician: Dr. Jeffrey  Assigned Room: [address]    Labs and data reviewed.   No findings precluding transfer of service.     HPI/MICU COURSE:   Please refer to MICU transfer note for full details. Briefly, per primary team notation, 62F T2DM, hypothyroidism, AFib (AC), schizophrenia (prior [facility] admit), EtOH use, admit to MICU iso septic shock 2/2 UTI tx w/ leveophed and CTX, course c/b KODY, hyperNa, AG and BHB elevation presumed 2/2 starvation ketosis, neuro c/s iso NPH on CTH     FOR FOLLOW-UP:  [ ] trend hypernatremia- on 1/2 NS for free water and intravascular volume resuscitation. Trend lytes Q6 monitoring for Na correction no more than 12 meq/ 24 hours.  [ ] continue high dose thiamine for questionable nutrition status, states that she takes 1-2 drinks/day  [ ] consulted neuro 1/12, asked to defer consult till pr ready for discharge for outpatient follow up due to requirement of high-volume LP in an outpatient setting. Please set pt up for neurology appt prior to dc for workup  [ ] progress diet when able to tolerate  [ ] restart metoprolol succinate 50 QD and diltiazem 300mg ER QD once pt able to tolerate  [ ] continue CTX for 7 days for UTI, follow up urine culture, can change to oral once pt ready for dc  [ ] monitor on heparin drip for AC, safe to restart Xarelto when Cr clearance improves >30  [ ] consider when to restart antipsychotics  [ ] f/u wound care iso decubiti    Maryellen Sosa MD PGY-3  Internal Medicine Resident  [facility] MAR [id number] MAR Accept Note  Transfer to: [facility]  Accepting Attending Physician: Dr. Jeffrey  Assigned Room: [address]    Patient seen and examined.   Labs and data reviewed.   No findings precluding transfer of service.     HPI/MICU COURSE:   Please refer to MICU transfer note for full details. Briefly, per primary team notation, 62F T2DM, hypothyroidism, AFib (AC), schizophrenia (prior [facility] admit), EtOH use, admit to MICU iso septic shock 2/2 UTI tx w/ leveophed and CTX, course c/b KODY, hyperNa, AG and BHB elevation presumed 2/2 starvation ketosis, neuro c/s iso NPH on CTH     FOR FOLLOW-UP:  [ ] trend hypernatremia- on 1/2 NS for free water and intravascular volume resuscitation. Trend lytes Q6 monitoring for Na correction no more than 12 meq/ 24 hours.  [ ] continue high dose thiamine for questionable nutrition status, states that she takes 1-2 drinks/day  [ ] consulted neuro 1/12, asked to defer consult till pr ready for discharge for outpatient follow up due to requirement of high-volume LP in an outpatient setting. Please set pt up for neurology appt prior to dc for workup  [ ] progress diet when able to tolerate  [ ] restart metoprolol succinate 50 QD and diltiazem 300mg ER QD once pt able to tolerate  [ ] continue CTX for 7 days for UTI, follow up urine culture, can change to oral once pt ready for dc  [ ] monitor on heparin drip for AC, safe to restart Xarelto when Cr clearance improves >30  [ ] consider when to restart antipsychotics  [ ] f/u wound care iso decubiti    Maryellen Sosa MD PGY-3  Internal Medicine Resident  [facility] MAR [id number]

## 2023-01-13 NOTE — PROGRESS NOTE ADULT - PROBLEM SELECTOR PLAN 5
meds verified with outpt pharmacy. adjustments made  pt currently calm, coop  no evidence of psychosis at this time

## 2023-01-14 LAB
-  AMIKACIN: SIGNIFICANT CHANGE UP
-  AMOXICILLIN/CLAVULANIC ACID: SIGNIFICANT CHANGE UP
-  AMPICILLIN/SULBACTAM: SIGNIFICANT CHANGE UP
-  AMPICILLIN: SIGNIFICANT CHANGE UP
-  AZTREONAM: SIGNIFICANT CHANGE UP
-  CEFAZOLIN: SIGNIFICANT CHANGE UP
-  CEFEPIME: SIGNIFICANT CHANGE UP
-  CEFOXITIN: SIGNIFICANT CHANGE UP
-  CEFTRIAXONE: SIGNIFICANT CHANGE UP
-  CEFUROXIME: SIGNIFICANT CHANGE UP
-  CIPROFLOXACIN: SIGNIFICANT CHANGE UP
-  ERTAPENEM: SIGNIFICANT CHANGE UP
-  GENTAMICIN: SIGNIFICANT CHANGE UP
-  IMIPENEM: SIGNIFICANT CHANGE UP
-  LEVOFLOXACIN: SIGNIFICANT CHANGE UP
-  MEROPENEM: SIGNIFICANT CHANGE UP
-  NITROFURANTOIN: SIGNIFICANT CHANGE UP
-  PIPERACILLIN/TAZOBACTAM: SIGNIFICANT CHANGE UP
-  TOBRAMYCIN: SIGNIFICANT CHANGE UP
-  TRIMETHOPRIM/SULFAMETHOXAZOLE: SIGNIFICANT CHANGE UP
A1C WITH ESTIMATED AVERAGE GLUCOSE RESULT: 6.1 % — HIGH (ref 4–5.6)
ANION GAP SERPL CALC-SCNC: 9 MMOL/L — SIGNIFICANT CHANGE UP (ref 7–14)
APTT BLD: 77.7 SEC — HIGH (ref 27–36.3)
BUN SERPL-MCNC: 45 MG/DL — HIGH (ref 7–23)
CALCIUM SERPL-MCNC: 9.6 MG/DL — SIGNIFICANT CHANGE UP (ref 8.4–10.5)
CHLORIDE SERPL-SCNC: 115 MMOL/L — HIGH (ref 98–107)
CO2 SERPL-SCNC: 23 MMOL/L — SIGNIFICANT CHANGE UP (ref 22–31)
CREAT SERPL-MCNC: 1.23 MG/DL — SIGNIFICANT CHANGE UP (ref 0.5–1.3)
CULTURE RESULTS: SIGNIFICANT CHANGE UP
EGFR: 49 ML/MIN/1.73M2 — LOW
ESTIMATED AVERAGE GLUCOSE: 128 — SIGNIFICANT CHANGE UP
GLUCOSE BLDC GLUCOMTR-MCNC: 114 MG/DL — HIGH (ref 70–99)
GLUCOSE BLDC GLUCOMTR-MCNC: 134 MG/DL — HIGH (ref 70–99)
GLUCOSE BLDC GLUCOMTR-MCNC: 141 MG/DL — HIGH (ref 70–99)
GLUCOSE BLDC GLUCOMTR-MCNC: 160 MG/DL — HIGH (ref 70–99)
GLUCOSE SERPL-MCNC: 127 MG/DL — HIGH (ref 70–99)
HCT VFR BLD CALC: 32.4 % — LOW (ref 34.5–45)
HGB BLD-MCNC: 10.3 G/DL — LOW (ref 11.5–15.5)
MAGNESIUM SERPL-MCNC: 2 MG/DL — SIGNIFICANT CHANGE UP (ref 1.6–2.6)
MCHC RBC-ENTMCNC: 28.3 PG — SIGNIFICANT CHANGE UP (ref 27–34)
MCHC RBC-ENTMCNC: 31.8 GM/DL — LOW (ref 32–36)
MCV RBC AUTO: 89 FL — SIGNIFICANT CHANGE UP (ref 80–100)
METHOD TYPE: SIGNIFICANT CHANGE UP
NRBC # BLD: 0 /100 WBCS — SIGNIFICANT CHANGE UP (ref 0–0)
NRBC # FLD: 0 K/UL — SIGNIFICANT CHANGE UP (ref 0–0)
ORGANISM # SPEC MICROSCOPIC CNT: SIGNIFICANT CHANGE UP
ORGANISM # SPEC MICROSCOPIC CNT: SIGNIFICANT CHANGE UP
PHOSPHATE SERPL-MCNC: 1.9 MG/DL — LOW (ref 2.5–4.5)
PLATELET # BLD AUTO: 182 K/UL — SIGNIFICANT CHANGE UP (ref 150–400)
POTASSIUM SERPL-MCNC: 3.7 MMOL/L — SIGNIFICANT CHANGE UP (ref 3.5–5.3)
POTASSIUM SERPL-SCNC: 3.7 MMOL/L — SIGNIFICANT CHANGE UP (ref 3.5–5.3)
RBC # BLD: 3.64 M/UL — LOW (ref 3.8–5.2)
RBC # FLD: 12.7 % — SIGNIFICANT CHANGE UP (ref 10.3–14.5)
SODIUM SERPL-SCNC: 147 MMOL/L — HIGH (ref 135–145)
SPECIMEN SOURCE: SIGNIFICANT CHANGE UP
WBC # BLD: 7.99 K/UL — SIGNIFICANT CHANGE UP (ref 3.8–10.5)
WBC # FLD AUTO: 7.99 K/UL — SIGNIFICANT CHANGE UP (ref 3.8–10.5)

## 2023-01-14 PROCEDURE — 99233 SBSQ HOSP IP/OBS HIGH 50: CPT

## 2023-01-14 RX ORDER — RIVAROXABAN 15 MG-20MG
20 KIT ORAL DAILY
Refills: 0 | Status: DISCONTINUED | OUTPATIENT
Start: 2023-01-14 | End: 2023-01-20

## 2023-01-14 RX ORDER — RIVAROXABAN 15 MG-20MG
20 KIT ORAL DAILY
Refills: 0 | Status: DISCONTINUED | OUTPATIENT
Start: 2023-01-14 | End: 2023-01-14

## 2023-01-14 RX ORDER — RIVAROXABAN 15 MG-20MG
15 KIT ORAL
Refills: 0 | Status: DISCONTINUED | OUTPATIENT
Start: 2023-01-14 | End: 2023-01-14

## 2023-01-14 RX ORDER — SODIUM,POTASSIUM PHOSPHATES 278-250MG
1 POWDER IN PACKET (EA) ORAL ONCE
Refills: 0 | Status: COMPLETED | OUTPATIENT
Start: 2023-01-14 | End: 2023-01-14

## 2023-01-14 RX ORDER — SODIUM CHLORIDE 9 MG/ML
500 INJECTION, SOLUTION INTRAVENOUS
Refills: 0 | Status: DISCONTINUED | OUTPATIENT
Start: 2023-01-14 | End: 2023-01-15

## 2023-01-14 RX ADMIN — ATORVASTATIN CALCIUM 40 MILLIGRAM(S): 80 TABLET, FILM COATED ORAL at 22:12

## 2023-01-14 RX ADMIN — Medication 105 MILLIGRAM(S): at 14:55

## 2023-01-14 RX ADMIN — Medication 1 MILLIGRAM(S): at 12:58

## 2023-01-14 RX ADMIN — CHLORHEXIDINE GLUCONATE 1 APPLICATION(S): 213 SOLUTION TOPICAL at 12:57

## 2023-01-14 RX ADMIN — Medication 0.5 MILLIGRAM(S): at 01:24

## 2023-01-14 RX ADMIN — Medication 105 MILLIGRAM(S): at 22:11

## 2023-01-14 RX ADMIN — Medication 1 TABLET(S): at 12:58

## 2023-01-14 RX ADMIN — Medication 1 PACKET(S): at 13:02

## 2023-01-14 RX ADMIN — HEPARIN SODIUM 800 UNIT(S)/HR: 5000 INJECTION INTRAVENOUS; SUBCUTANEOUS at 09:35

## 2023-01-14 RX ADMIN — Medication 15 MILLIGRAM(S): at 22:13

## 2023-01-14 RX ADMIN — CEFTRIAXONE 100 MILLIGRAM(S): 500 INJECTION, POWDER, FOR SOLUTION INTRAMUSCULAR; INTRAVENOUS at 13:02

## 2023-01-14 RX ADMIN — Medication 50 MICROGRAM(S): at 05:08

## 2023-01-14 RX ADMIN — Medication 105 MILLIGRAM(S): at 05:06

## 2023-01-14 RX ADMIN — Medication 2 MILLIGRAM(S): at 22:12

## 2023-01-14 RX ADMIN — SODIUM CHLORIDE 50 MILLILITER(S): 9 INJECTION, SOLUTION INTRAVENOUS at 17:04

## 2023-01-14 RX ADMIN — HEPARIN SODIUM 800 UNIT(S)/HR: 5000 INJECTION INTRAVENOUS; SUBCUTANEOUS at 07:28

## 2023-01-14 RX ADMIN — SODIUM CHLORIDE 50 MILLILITER(S): 9 INJECTION, SOLUTION INTRAVENOUS at 22:11

## 2023-01-14 RX ADMIN — Medication 1: at 08:25

## 2023-01-14 RX ADMIN — RIVAROXABAN 20 MILLIGRAM(S): KIT at 19:38

## 2023-01-14 NOTE — PROGRESS NOTE ADULT - PROBLEM SELECTOR PLAN 7
FS QID  sliding scale  NCS diet  outpt metformin, may need to reconsider outpt regimen if KODY does not resolve

## 2023-01-14 NOTE — PROGRESS NOTE ADULT - PROBLEM SELECTOR PLAN 5
meds verified with outpt pharmacy. adjustments made  pt currently calm, cooperative  no evidence of psychosis at this time

## 2023-01-14 NOTE — PROGRESS NOTE ADULT - ASSESSMENT
61 y/o F with DM2, hypothyroidism, schizophrenia, prior psychiatric hospitalizations on [facility], ETOH abuse, Afib on Xarelto, admitted to MICU for hypovolemic and septic shock 2/2 UTI requiring pressors.

## 2023-01-14 NOTE — PROGRESS NOTE ADULT - SUBJECTIVE AND OBJECTIVE BOX
[facility] Division of Hospital Medicine  John Broussard   Available via MS Teams  In house pager [phone]    SUBJECTIVE / OVERNIGHT EVENTS:  No acute events overnight.  Patient asking for coca cola. Denies acute complaints.    ADDITIONAL REVIEW OF SYSTEMS:    MEDICATIONS  (STANDING):  atorvastatin 40 milliGRAM(s) Oral at bedtime  benztropine 2 milliGRAM(s) Oral at bedtime  busPIRone 15 milliGRAM(s) Oral <User Schedule>  cefTRIAXone   IVPB 1000 milliGRAM(s) IV Intermittent every 24 hours  chlorhexidine 2% Cloths 1 Application(s) Topical <User Schedule>  dextrose 5%. 1000 milliLiter(s) (100 mL/Hr) IV Continuous <Continuous>  dextrose 5%. 1000 milliLiter(s) (50 mL/Hr) IV Continuous <Continuous>  dextrose 50% Injectable 25 Gram(s) IV Push once  dextrose 50% Injectable 12.5 Gram(s) IV Push once  dextrose 50% Injectable 25 Gram(s) IV Push once  folic acid 1 milliGRAM(s) Oral daily  glucagon  Injectable 1 milliGRAM(s) IntraMuscular once  heparin  Infusion. 1200 Unit(s)/Hr (12 mL/Hr) IV Continuous <Continuous>  insulin lispro (ADMELOG) corrective regimen sliding scale   SubCutaneous at bedtime  insulin lispro (ADMELOG) corrective regimen sliding scale   SubCutaneous three times a day before meals  levothyroxine 50 MICROGram(s) Oral daily  multivitamin 1 Tablet(s) Oral daily  thiamine IVPB 500 milliGRAM(s) IV Intermittent every 8 hours    MEDICATIONS  (PRN):  acetaminophen     Tablet .. 650 milliGRAM(s) Oral every 6 hours PRN Temp greater or equal to 38C (100.4F), Mild Pain (1 - 3)  aluminum hydroxide/magnesium hydroxide/simethicone Suspension 30 milliLiter(s) Oral every 4 hours PRN Dyspepsia  clonazePAM  Tablet 0.5 milliGRAM(s) Oral daily PRN anxiety/restless  dextrose Oral Gel 15 Gram(s) Oral once PRN Blood Glucose LESS THAN 70 milliGRAM(s)/deciliter  heparin   Injectable 6500 Unit(s) IV Push every 6 hours PRN For aPTT less than 40  heparin   Injectable 3000 Unit(s) IV Push every 6 hours PRN For aPTT between 40 - 57  melatonin 3 milliGRAM(s) Oral at bedtime PRN Insomnia  ondansetron Injectable 4 milliGRAM(s) IV Push every 8 hours PRN Nausea and/or Vomiting      I&O's Summary    13 Jan 2023 07:01  -  14 Jan 2023 07:00  --------------------------------------------------------  IN: 0 mL / OUT: 1000 mL / NET: -1000 mL        PHYSICAL EXAM:  Vital Signs Last 24 Hrs  T(C): 36.9 (14 Jan 2023 05:11), Max: 37.1 (13 Jan 2023 21:22)  T(F): 98.4 (14 Jan 2023 05:11), Max: 98.8 (13 Jan 2023 21:22)  HR: 80 (14 Jan 2023 05:11) (80 - 80)  BP: 107/60 (14 Jan 2023 05:11) (106/63 - 107/60)  BP(mean): --  RR: 18 (14 Jan 2023 05:11) (18 - 18)  SpO2: 100% (14 Jan 2023 05:11) (100% - 100%)    Parameters below as of 14 Jan 2023 05:11  Patient On (Oxygen Delivery Method): room air      CONSTITUTIONAL: NAD, well-developed, well-groomed, obese  EYES: PERRLA; conjunctiva and sclera clear  ENMT: Moist oral mucosa, no pharyngeal injection or exudates; normal dentition  NECK: Supple, no palpable masses; no thyromegaly  RESPIRATORY: Normal respiratory effort; lungs are clear to auscultation bilaterally  CARDIOVASCULAR: Regular rate and rhythm, normal S1 and S2, no murmur/rub/gallop; No lower extremity edema; Peripheral pulses are 2+ bilaterally  ABDOMEN: Nontender to palpation, normoactive bowel sounds, no rebound/guarding; No hepatosplenomegaly  MUSCULOSKELETAL:  Normal gait; no clubbing or cyanosis of digits; no joint swelling or tenderness to palpation; 1/5 strength bilateral lower extremities  PSYCH: A+O to person, place, and time; affect odd  NEUROLOGY: CN 2-12 are intact and symmetric; no gross sensory deficits   SKIN: No rashes; no palpable lesions    LABS:                        10.3   7.99  )-----------( 182      ( 14 Jan 2023 08:40 )             32.4     01-14    147<H>  |  115<H>  |  45<H>  ----------------------------<  127<H>  3.7   |  23  |  1.23    Ca    9.6      14 Jan 2023 08:40  Phos  1.9     01-14  Mg     2.00     01-14    TPro  5.4<L>  /  Alb  3.2<L>  /  TBili  0.3  /  DBili  x   /  AST  41<H>  /  ALT  48<H>  /  AlkPhos  65  01-13    PTT - ( 14 Jan 2023 08:40 )  PTT:77.7 sec

## 2023-01-15 DIAGNOSIS — D69.6 THROMBOCYTOPENIA, UNSPECIFIED: ICD-10-CM

## 2023-01-15 LAB
ANION GAP SERPL CALC-SCNC: 11 MMOL/L — SIGNIFICANT CHANGE UP (ref 7–14)
APTT BLD: 36.8 SEC — HIGH (ref 27–36.3)
BUN SERPL-MCNC: 25 MG/DL — HIGH (ref 7–23)
CALCIUM SERPL-MCNC: 9.8 MG/DL — SIGNIFICANT CHANGE UP (ref 8.4–10.5)
CHLORIDE SERPL-SCNC: 107 MMOL/L — SIGNIFICANT CHANGE UP (ref 98–107)
CO2 SERPL-SCNC: 23 MMOL/L — SIGNIFICANT CHANGE UP (ref 22–31)
CREAT SERPL-MCNC: 0.95 MG/DL — SIGNIFICANT CHANGE UP (ref 0.5–1.3)
EGFR: 67 ML/MIN/1.73M2 — SIGNIFICANT CHANGE UP
GLUCOSE BLDC GLUCOMTR-MCNC: 102 MG/DL — HIGH (ref 70–99)
GLUCOSE BLDC GLUCOMTR-MCNC: 110 MG/DL — HIGH (ref 70–99)
GLUCOSE BLDC GLUCOMTR-MCNC: 110 MG/DL — HIGH (ref 70–99)
GLUCOSE BLDC GLUCOMTR-MCNC: 159 MG/DL — HIGH (ref 70–99)
GLUCOSE SERPL-MCNC: 109 MG/DL — HIGH (ref 70–99)
HCT VFR BLD CALC: 35.2 % — SIGNIFICANT CHANGE UP (ref 34.5–45)
HGB BLD-MCNC: 11.3 G/DL — LOW (ref 11.5–15.5)
MAGNESIUM SERPL-MCNC: 1.7 MG/DL — SIGNIFICANT CHANGE UP (ref 1.6–2.6)
MCHC RBC-ENTMCNC: 28.7 PG — SIGNIFICANT CHANGE UP (ref 27–34)
MCHC RBC-ENTMCNC: 32.1 GM/DL — SIGNIFICANT CHANGE UP (ref 32–36)
MCV RBC AUTO: 89.3 FL — SIGNIFICANT CHANGE UP (ref 80–100)
NRBC # BLD: 0 /100 WBCS — SIGNIFICANT CHANGE UP (ref 0–0)
NRBC # FLD: 0 K/UL — SIGNIFICANT CHANGE UP (ref 0–0)
PHOSPHATE SERPL-MCNC: 2.4 MG/DL — LOW (ref 2.5–4.5)
PLATELET # BLD AUTO: 139 K/UL — LOW (ref 150–400)
POTASSIUM SERPL-MCNC: 3.5 MMOL/L — SIGNIFICANT CHANGE UP (ref 3.5–5.3)
POTASSIUM SERPL-SCNC: 3.5 MMOL/L — SIGNIFICANT CHANGE UP (ref 3.5–5.3)
RBC # BLD: 3.94 M/UL — SIGNIFICANT CHANGE UP (ref 3.8–5.2)
RBC # FLD: 12.1 % — SIGNIFICANT CHANGE UP (ref 10.3–14.5)
SARS-COV-2 RNA SPEC QL NAA+PROBE: SIGNIFICANT CHANGE UP
SODIUM SERPL-SCNC: 141 MMOL/L — SIGNIFICANT CHANGE UP (ref 135–145)
WBC # BLD: 8.99 K/UL — SIGNIFICANT CHANGE UP (ref 3.8–10.5)
WBC # FLD AUTO: 8.99 K/UL — SIGNIFICANT CHANGE UP (ref 3.8–10.5)

## 2023-01-15 PROCEDURE — 99233 SBSQ HOSP IP/OBS HIGH 50: CPT

## 2023-01-15 RX ORDER — SODIUM,POTASSIUM PHOSPHATES 278-250MG
1 POWDER IN PACKET (EA) ORAL ONCE
Refills: 0 | Status: COMPLETED | OUTPATIENT
Start: 2023-01-15 | End: 2023-01-15

## 2023-01-15 RX ORDER — METOPROLOL TARTRATE 50 MG
50 TABLET ORAL DAILY
Refills: 0 | Status: DISCONTINUED | OUTPATIENT
Start: 2023-01-15 | End: 2023-01-20

## 2023-01-15 RX ADMIN — Medication 1 PACKET(S): at 13:08

## 2023-01-15 RX ADMIN — Medication 50 MICROGRAM(S): at 05:39

## 2023-01-15 RX ADMIN — Medication 1 MILLIGRAM(S): at 12:59

## 2023-01-15 RX ADMIN — Medication 15 MILLIGRAM(S): at 21:47

## 2023-01-15 RX ADMIN — ATORVASTATIN CALCIUM 40 MILLIGRAM(S): 80 TABLET, FILM COATED ORAL at 21:47

## 2023-01-15 RX ADMIN — Medication 1: at 08:39

## 2023-01-15 RX ADMIN — RIVAROXABAN 20 MILLIGRAM(S): KIT at 12:59

## 2023-01-15 RX ADMIN — CHLORHEXIDINE GLUCONATE 1 APPLICATION(S): 213 SOLUTION TOPICAL at 12:59

## 2023-01-15 RX ADMIN — Medication 2 MILLIGRAM(S): at 21:47

## 2023-01-15 RX ADMIN — Medication 105 MILLIGRAM(S): at 05:39

## 2023-01-15 RX ADMIN — CEFTRIAXONE 100 MILLIGRAM(S): 500 INJECTION, POWDER, FOR SOLUTION INTRAMUSCULAR; INTRAVENOUS at 13:08

## 2023-01-15 RX ADMIN — Medication 1 TABLET(S): at 12:59

## 2023-01-15 NOTE — PROGRESS NOTE ADULT - PROBLEM SELECTOR PLAN 2
cont ctx for now  Cx with Ecoli - sensitive to ceftriaxone cont ctx for now  Cx with Ecoli - sensitive to ceftriaxone  - currenrtly on day 4 out of 7

## 2023-01-15 NOTE — PROGRESS NOTE ADULT - PROBLEM SELECTOR PLAN 5
meds verified with outpt pharmacy. adjustments made  pt currently calm, cooperative  no evidence of psychosis at this time meds verified with outpt pharmacy. adjustments made  pt currently calm, cooperative  no evidence of psychosis at this time, denies any hallucinations this AM

## 2023-01-15 NOTE — PROGRESS NOTE ADULT - PROBLEM SELECTOR PLAN 3
pt found on floor unresponsive, hypothermic, hypotensive  now more clear mentally however not at baseline  cont to monitor - pt found on floor unresponsive, hypothermic, hypotensive, now resolved   - patient appears AAOx3 with slowed speech   - will cont to monitor

## 2023-01-15 NOTE — PROGRESS NOTE ADULT - PROBLEM SELECTOR PLAN 7
FS QID  sliding scale  NCS diet  outpt metformin, may need to reconsider outpt regimen if KODY does not resolve FS QID  sliding scale  NCS diet

## 2023-01-15 NOTE — PROGRESS NOTE ADULT - SUBJECTIVE AND OBJECTIVE BOX
[facility]  Mckenzieradha Roger CARPIO  Pager [phone]  Available via MS Teams    SUBJECTIVE / OVERNIGHT EVENTS: NO acute events overnight     ADDITIONAL REVIEW OF SYSTEMS:    MEDICATIONS  (STANDING):  atorvastatin 40 milliGRAM(s) Oral at bedtime  benztropine 2 milliGRAM(s) Oral at bedtime  busPIRone 15 milliGRAM(s) Oral <User Schedule>  cefTRIAXone   IVPB 1000 milliGRAM(s) IV Intermittent every 24 hours  chlorhexidine 2% Cloths 1 Application(s) Topical <User Schedule>  dextrose 5%. 1000 milliLiter(s) (50 mL/Hr) IV Continuous <Continuous>  dextrose 5%. 1000 milliLiter(s) (100 mL/Hr) IV Continuous <Continuous>  dextrose 5%. 500 milliLiter(s) (50 mL/Hr) IV Continuous <Continuous>  dextrose 50% Injectable 25 Gram(s) IV Push once  dextrose 50% Injectable 12.5 Gram(s) IV Push once  dextrose 50% Injectable 25 Gram(s) IV Push once  folic acid 1 milliGRAM(s) Oral daily  glucagon  Injectable 1 milliGRAM(s) IntraMuscular once  insulin lispro (ADMELOG) corrective regimen sliding scale   SubCutaneous at bedtime  insulin lispro (ADMELOG) corrective regimen sliding scale   SubCutaneous three times a day before meals  levothyroxine 50 MICROGram(s) Oral daily  multivitamin 1 Tablet(s) Oral daily  potassium phosphate / sodium phosphate Powder (PHOS-NaK) 1 Packet(s) Oral once  rivaroxaban 20 milliGRAM(s) Oral daily    MEDICATIONS  (PRN):  acetaminophen     Tablet .. 650 milliGRAM(s) Oral every 6 hours PRN Temp greater or equal to 38C (100.4F), Mild Pain (1 - 3)  aluminum hydroxide/magnesium hydroxide/simethicone Suspension 30 milliLiter(s) Oral every 4 hours PRN Dyspepsia  clonazePAM  Tablet 0.5 milliGRAM(s) Oral daily PRN anxiety/restless  dextrose Oral Gel 15 Gram(s) Oral once PRN Blood Glucose LESS THAN 70 milliGRAM(s)/deciliter  melatonin 3 milliGRAM(s) Oral at bedtime PRN Insomnia  ondansetron Injectable 4 milliGRAM(s) IV Push every 8 hours PRN Nausea and/or Vomiting      I&O's Summary      PHYSICAL EXAM:  Vital Signs Last 24 Hrs  T(C): 36.9 (15 Phil 2023 05:00), Max: 37 (14 Jan 2023 14:10)  T(F): 98.5 (15 Phil 2023 05:00), Max: 98.6 (14 Jan 2023 14:10)  HR: 85 (15 Phil 2023 05:00) (74 - 85)  BP: 108/76 (15 Phil 2023 05:00) (108/76 - 140/62)  BP(mean): --  RR: 18 (15 Phil 2023 05:00) (18 - 18)  SpO2: 98% (15 Phil 2023 05:00) (98% - 100%)    Parameters below as of 15 Phil 2023 05:00  Patient On (Oxygen Delivery Method): room air      CONSTITUTIONAL: NAD, well-developed, well-groomed  EYES: PERRLA; conjunctiva and sclera clear  ENMT: Moist oral mucosa, no pharyngeal injection or exudates; normal dentition  NECK: Supple, no palpable masses; no thyromegaly  RESPIRATORY: Normal respiratory effort; lungs are clear to auscultation bilaterally  CARDIOVASCULAR: Regular rate and rhythm, normal S1 and S2, no murmur/rub/gallop; No lower extremity edema; Peripheral pulses are 2+ bilaterally  ABDOMEN: Nontender to palpation, normoactive bowel sounds, no rebound/guarding; No hepatosplenomegaly  MUSCULOSKELETAL:  Normal gait; no clubbing or cyanosis of digits; no joint swelling or tenderness to palpation  PSYCH: A+O to person, place, and time; affect appropriate  NEUROLOGY: CN 2-12 are intact and symmetric; no gross sensory deficits   SKIN: No rashes; no palpable lesions    LABS:                        11.3   8.99  )-----------( 139      ( 15 Phil 2023 05:27 )             35.2     01-15    141  |  107  |  25<H>  ----------------------------<  109<H>  3.5   |  23  |  0.95    Ca    9.8      15 Phil 2023 05:27  Phos  2.4     01-15  Mg     1.70     01-15      PTT - ( 15 Phil 2023 05:27 )  PTT:36.8 sec              RADIOLOGY & ADDITIONAL TESTS:  New Results Reviewed Today:   New Imaging Personally Reviewed Today:  New Electrocardiogram Personally Reviewed Today:  Prior or Outpatient Records Reviewed Today:    COMMUNICATION:  Care Discussed with Consultants/Other Providers and Details of Discussion:  Discussions with Patient/Family:  PCP Communication:     [facility]  Zbigniewcha Roger CARPIO  Pager [phone]  Available via MS Teams    SUBJECTIVE / OVERNIGHT EVENTS: No acute events overnight. Patient stating that he has heel pain.     ADDITIONAL REVIEW OF SYSTEMS:    MEDICATIONS  (STANDING):  atorvastatin 40 milliGRAM(s) Oral at bedtime  benztropine 2 milliGRAM(s) Oral at bedtime  busPIRone 15 milliGRAM(s) Oral <User Schedule>  cefTRIAXone   IVPB 1000 milliGRAM(s) IV Intermittent every 24 hours  chlorhexidine 2% Cloths 1 Application(s) Topical <User Schedule>  dextrose 5%. 1000 milliLiter(s) (50 mL/Hr) IV Continuous <Continuous>  dextrose 5%. 1000 milliLiter(s) (100 mL/Hr) IV Continuous <Continuous>  dextrose 5%. 500 milliLiter(s) (50 mL/Hr) IV Continuous <Continuous>  dextrose 50% Injectable 25 Gram(s) IV Push once  dextrose 50% Injectable 12.5 Gram(s) IV Push once  dextrose 50% Injectable 25 Gram(s) IV Push once  folic acid 1 milliGRAM(s) Oral daily  glucagon  Injectable 1 milliGRAM(s) IntraMuscular once  insulin lispro (ADMELOG) corrective regimen sliding scale   SubCutaneous at bedtime  insulin lispro (ADMELOG) corrective regimen sliding scale   SubCutaneous three times a day before meals  levothyroxine 50 MICROGram(s) Oral daily  multivitamin 1 Tablet(s) Oral daily  potassium phosphate / sodium phosphate Powder (PHOS-NaK) 1 Packet(s) Oral once  rivaroxaban 20 milliGRAM(s) Oral daily    MEDICATIONS  (PRN):  acetaminophen     Tablet .. 650 milliGRAM(s) Oral every 6 hours PRN Temp greater or equal to 38C (100.4F), Mild Pain (1 - 3)  aluminum hydroxide/magnesium hydroxide/simethicone Suspension 30 milliLiter(s) Oral every 4 hours PRN Dyspepsia  clonazePAM  Tablet 0.5 milliGRAM(s) Oral daily PRN anxiety/restless  dextrose Oral Gel 15 Gram(s) Oral once PRN Blood Glucose LESS THAN 70 milliGRAM(s)/deciliter  melatonin 3 milliGRAM(s) Oral at bedtime PRN Insomnia  ondansetron Injectable 4 milliGRAM(s) IV Push every 8 hours PRN Nausea and/or Vomiting      I&O's Summary      PHYSICAL EXAM:  Vital Signs Last 24 Hrs  T(C): 36.9 (15 Phil 2023 05:00), Max: 37 (14 Jan 2023 14:10)  T(F): 98.5 (15 Phil 2023 05:00), Max: 98.6 (14 Jan 2023 14:10)  HR: 85 (15 Phil 2023 05:00) (74 - 85)  BP: 108/76 (15 Phil 2023 05:00) (108/76 - 140/62)  BP(mean): --  RR: 18 (15 Phil 2023 05:00) (18 - 18)  SpO2: 98% (15 Phil 2023 05:00) (98% - 100%)    Parameters below as of 15 Phil 2023 05:00  Patient On (Oxygen Delivery Method): room air      CONSTITUTIONAL: NAD, laying in bed. appears weak   EYES: PERRLA; conjunctiva and sclera clear  ENMT: Moist oral mucosa, no pharyngeal injection or exudates;   NECK: Supple, no palpable masses; no thyromegaly  RESPIRATORY: Normal respiratory effort; lungs are clear to auscultation bilaterally  CARDIOVASCULAR: Regular rate and rhythm, normal S1 and S2, no murmur/rub/gallop; No lower extremity edema  ABDOMEN: Nontender to palpation, normoactive bowel sounds, no rebound/guarding; No hepatosplenomegaly  MUSCULOSKELETAL: no clubbing or cyanosis of digits; no joint swelling or tenderness to palpation  PSYCH: A+O to person, place, and time; flat affect   NEUROLOGY: CN 2-12 are intact and symmetric; no gross sensory deficits   SKIN: No rashes; no palpable lesions    LABS:                        11.3   8.99  )-----------( 139      ( 15 Phil 2023 05:27 )             35.2     01-15    141  |  107  |  25<H>  ----------------------------<  109<H>  3.5   |  23  |  0.95    Ca    9.8      15 Phil 2023 05:27  Phos  2.4     01-15  Mg     1.70     01-15      PTT - ( 15 Phil 2023 05:27 )  PTT:36.8 sec              RADIOLOGY & ADDITIONAL TESTS:  New Results Reviewed Today: Reviwed all CBC, CMP and vitals this AM  New Imaging Personally Reviewed Today: no new images todat

## 2023-01-15 NOTE — PROGRESS NOTE ADULT - PROBLEM SELECTOR PLAN 4
likely due to ATN secondary to sepsis  improving  cont to monitor likely due to ATN secondary to sepsis  improving   cont to monitor

## 2023-01-15 NOTE — PHYSICAL THERAPY INITIAL EVALUATION ADULT - PERTINENT HX OF CURRENT PROBLEM, REHAB EVAL
Patient is 62 year old female with DM2, hypothyroidism, schizophrenia, prior psychiatric hospitalizations on [facility], ETOH abuse, Afib on Xarelto, admitted to MICU for hypovolemic and septic shock 2/2 UTI requiring pressors. Now downgrade to floors, BPs stable.

## 2023-01-15 NOTE — PROGRESS NOTE ADULT - PROBLEM SELECTOR PLAN 1
s/p ICU stay on pressors  due to E coli UTI  now hemodynamically stable off pressors - s/p ICU stay on pressors due to E coli UTI  - now hemodynamically stable   - currently on CTX  - Bcx NGTD, UCX Ecoli sensitive to CTX, currently on day day 4 of 7

## 2023-01-15 NOTE — PROGRESS NOTE ADULT - ASSESSMENT
61 y/o F with DM2, hypothyroidism, schizophrenia, prior psychiatric hospitalizations on [facility], ETOH abuse, Afib on Xarelto, admitted to MICU for hypovolemic and septic shock 2/2 UTI requiring pressors. Now downgrade to floors, BPs stable.

## 2023-01-16 LAB
ANION GAP SERPL CALC-SCNC: 11 MMOL/L — SIGNIFICANT CHANGE UP (ref 7–14)
BUN SERPL-MCNC: 17 MG/DL — SIGNIFICANT CHANGE UP (ref 7–23)
CALCIUM SERPL-MCNC: 9.3 MG/DL — SIGNIFICANT CHANGE UP (ref 8.4–10.5)
CHLORIDE SERPL-SCNC: 106 MMOL/L — SIGNIFICANT CHANGE UP (ref 98–107)
CLOSURE TME COLL+EPINEP BLD: SIGNIFICANT CHANGE UP K/UL (ref 150–400)
CO2 SERPL-SCNC: 23 MMOL/L — SIGNIFICANT CHANGE UP (ref 22–31)
CREAT SERPL-MCNC: 0.78 MG/DL — SIGNIFICANT CHANGE UP (ref 0.5–1.3)
CULTURE RESULTS: SIGNIFICANT CHANGE UP
CULTURE RESULTS: SIGNIFICANT CHANGE UP
EGFR: 85 ML/MIN/1.73M2 — SIGNIFICANT CHANGE UP
GLUCOSE BLDC GLUCOMTR-MCNC: 112 MG/DL — HIGH (ref 70–99)
GLUCOSE BLDC GLUCOMTR-MCNC: 117 MG/DL — HIGH (ref 70–99)
GLUCOSE BLDC GLUCOMTR-MCNC: 89 MG/DL — SIGNIFICANT CHANGE UP (ref 70–99)
GLUCOSE BLDC GLUCOMTR-MCNC: 90 MG/DL — SIGNIFICANT CHANGE UP (ref 70–99)
GLUCOSE SERPL-MCNC: 99 MG/DL — SIGNIFICANT CHANGE UP (ref 70–99)
HCT VFR BLD CALC: 31.3 % — LOW (ref 34.5–45)
HGB BLD-MCNC: 10.2 G/DL — LOW (ref 11.5–15.5)
MAGNESIUM SERPL-MCNC: 1.7 MG/DL — SIGNIFICANT CHANGE UP (ref 1.6–2.6)
MCHC RBC-ENTMCNC: 28.5 PG — SIGNIFICANT CHANGE UP (ref 27–34)
MCHC RBC-ENTMCNC: 32.6 GM/DL — SIGNIFICANT CHANGE UP (ref 32–36)
MCV RBC AUTO: 87.4 FL — SIGNIFICANT CHANGE UP (ref 80–100)
NRBC # BLD: 0 /100 WBCS — SIGNIFICANT CHANGE UP (ref 0–0)
NRBC # FLD: 0 K/UL — SIGNIFICANT CHANGE UP (ref 0–0)
PHOSPHATE SERPL-MCNC: 2.8 MG/DL — SIGNIFICANT CHANGE UP (ref 2.5–4.5)
PLATELET # BLD AUTO: 150 K/UL — SIGNIFICANT CHANGE UP (ref 150–400)
POTASSIUM SERPL-MCNC: 3.1 MMOL/L — LOW (ref 3.5–5.3)
POTASSIUM SERPL-SCNC: 3.1 MMOL/L — LOW (ref 3.5–5.3)
RBC # BLD: 3.58 M/UL — LOW (ref 3.8–5.2)
RBC # FLD: 12.1 % — SIGNIFICANT CHANGE UP (ref 10.3–14.5)
SODIUM SERPL-SCNC: 140 MMOL/L — SIGNIFICANT CHANGE UP (ref 135–145)
SPECIMEN SOURCE: SIGNIFICANT CHANGE UP
SPECIMEN SOURCE: SIGNIFICANT CHANGE UP
WBC # BLD: 10.18 K/UL — SIGNIFICANT CHANGE UP (ref 3.8–10.5)
WBC # FLD AUTO: 10.18 K/UL — SIGNIFICANT CHANGE UP (ref 3.8–10.5)

## 2023-01-16 PROCEDURE — 99232 SBSQ HOSP IP/OBS MODERATE 35: CPT

## 2023-01-16 RX ORDER — POTASSIUM CHLORIDE 20 MEQ
40 PACKET (EA) ORAL EVERY 4 HOURS
Refills: 0 | Status: COMPLETED | OUTPATIENT
Start: 2023-01-16 | End: 2023-01-16

## 2023-01-16 RX ORDER — GABAPENTIN 400 MG/1
100 CAPSULE ORAL AT BEDTIME
Refills: 0 | Status: DISCONTINUED | OUTPATIENT
Start: 2023-01-16 | End: 2023-01-20

## 2023-01-16 RX ORDER — POTASSIUM CHLORIDE 20 MEQ
10 PACKET (EA) ORAL
Refills: 0 | Status: COMPLETED | OUTPATIENT
Start: 2023-01-16 | End: 2023-01-16

## 2023-01-16 RX ORDER — POTASSIUM CHLORIDE 20 MEQ
40 PACKET (EA) ORAL EVERY 4 HOURS
Refills: 0 | Status: DISCONTINUED | OUTPATIENT
Start: 2023-01-16 | End: 2023-01-16

## 2023-01-16 RX ADMIN — GABAPENTIN 100 MILLIGRAM(S): 400 CAPSULE ORAL at 22:00

## 2023-01-16 RX ADMIN — Medication 50 MILLIGRAM(S): at 05:38

## 2023-01-16 RX ADMIN — RIVAROXABAN 20 MILLIGRAM(S): KIT at 11:28

## 2023-01-16 RX ADMIN — Medication 50 MICROGRAM(S): at 05:38

## 2023-01-16 RX ADMIN — Medication 1 TABLET(S): at 11:28

## 2023-01-16 RX ADMIN — CEFTRIAXONE 100 MILLIGRAM(S): 500 INJECTION, POWDER, FOR SOLUTION INTRAMUSCULAR; INTRAVENOUS at 11:35

## 2023-01-16 RX ADMIN — CHLORHEXIDINE GLUCONATE 1 APPLICATION(S): 213 SOLUTION TOPICAL at 12:24

## 2023-01-16 RX ADMIN — Medication 15 MILLIGRAM(S): at 22:01

## 2023-01-16 RX ADMIN — Medication 100 MILLIEQUIVALENT(S): at 08:56

## 2023-01-16 RX ADMIN — Medication 40 MILLIEQUIVALENT(S): at 14:27

## 2023-01-16 RX ADMIN — Medication 2 MILLIGRAM(S): at 22:01

## 2023-01-16 RX ADMIN — Medication 100 MILLIEQUIVALENT(S): at 11:27

## 2023-01-16 RX ADMIN — Medication 3 MILLIGRAM(S): at 22:02

## 2023-01-16 RX ADMIN — Medication 1 MILLIGRAM(S): at 11:28

## 2023-01-16 RX ADMIN — ATORVASTATIN CALCIUM 40 MILLIGRAM(S): 80 TABLET, FILM COATED ORAL at 22:00

## 2023-01-16 RX ADMIN — Medication 40 MILLIEQUIVALENT(S): at 11:27

## 2023-01-16 RX ADMIN — Medication 40 MILLIEQUIVALENT(S): at 08:56

## 2023-01-16 NOTE — PROGRESS NOTE ADULT - PROBLEM SELECTOR PLAN 3
- pt found on floor unresponsive, hypothermic, hypotensive, now resolved   - patient appears AAOx3 with slowed speech   - will cont to monitor

## 2023-01-16 NOTE — PROGRESS NOTE ADULT - PROBLEM SELECTOR PLAN 9
- resumed back on Xarelto 20mg   - will resume home metoprolol succinate 50mg qd this AM for Afib, with hold parameters     Dispo: pending PT recs, patient appears debilitated will likely need rehab

## 2023-01-16 NOTE — PROGRESS NOTE ADULT - SUBJECTIVE AND OBJECTIVE BOX
[facility] Division of Hospital Medicine  Martha Duque MD   Pager [phone]  Available via MS Teams    SUBJECTIVE / OVERNIGHT EVENTS: No acute events overnight. Patient stating that she has plantar foot pain x 3 weeks. ROS otherwise negative.     ADDITIONAL REVIEW OF SYSTEMS:    MEDICATIONS  (STANDING):  atorvastatin 40 milliGRAM(s) Oral at bedtime  benztropine 2 milliGRAM(s) Oral at bedtime  busPIRone 15 milliGRAM(s) Oral <User Schedule>  cefTRIAXone   IVPB 1000 milliGRAM(s) IV Intermittent every 24 hours  chlorhexidine 2% Cloths 1 Application(s) Topical <User Schedule>  dextrose 5%. 1000 milliLiter(s) (50 mL/Hr) IV Continuous <Continuous>  dextrose 5%. 1000 milliLiter(s) (100 mL/Hr) IV Continuous <Continuous>  dextrose 50% Injectable 25 Gram(s) IV Push once  dextrose 50% Injectable 12.5 Gram(s) IV Push once  dextrose 50% Injectable 25 Gram(s) IV Push once  folic acid 1 milliGRAM(s) Oral daily  gabapentin 100 milliGRAM(s) Oral at bedtime  glucagon  Injectable 1 milliGRAM(s) IntraMuscular once  insulin lispro (ADMELOG) corrective regimen sliding scale   SubCutaneous at bedtime  insulin lispro (ADMELOG) corrective regimen sliding scale   SubCutaneous three times a day before meals  levothyroxine 50 MICROGram(s) Oral daily  metoprolol succinate ER 50 milliGRAM(s) Oral daily  multivitamin 1 Tablet(s) Oral daily  rivaroxaban 20 milliGRAM(s) Oral daily    MEDICATIONS  (PRN):  acetaminophen     Tablet .. 650 milliGRAM(s) Oral every 6 hours PRN Temp greater or equal to 38C (100.4F), Mild Pain (1 - 3)  aluminum hydroxide/magnesium hydroxide/simethicone Suspension 30 milliLiter(s) Oral every 4 hours PRN Dyspepsia  clonazePAM  Tablet 0.5 milliGRAM(s) Oral daily PRN anxiety/restless  dextrose Oral Gel 15 Gram(s) Oral once PRN Blood Glucose LESS THAN 70 milliGRAM(s)/deciliter  melatonin 3 milliGRAM(s) Oral at bedtime PRN Insomnia  ondansetron Injectable 4 milliGRAM(s) IV Push every 8 hours PRN Nausea and/or Vomiting    Vital Signs Last 24 Hrs  T(C): 36.7 (16 Jan 2023 15:01), Max: 36.9 (15 Phil 2023 21:34)  T(F): 98.1 (16 Jan 2023 15:01), Max: 98.5 (15 Phil 2023 21:34)  HR: 70 (16 Jan 2023 15:01) (70 - 76)  BP: 139/74 (16 Jan 2023 15:01) (134/60 - 142/75)  BP(mean): --  RR: 18 (16 Jan 2023 05:30) (18 - 18)  SpO2: 100% (16 Jan 2023 15:01) (100% - 100%)    Parameters below as of 16 Jan 2023 05:30  Patient On (Oxygen Delivery Method): room air    CONSTITUTIONAL: NAD, laying in bed. appears weak   EYES: PERRLA; conjunctiva and sclera clear  ENMT: Moist oral mucosa, no pharyngeal injection or exudates;   NECK: Supple, no palpable masses; no thyromegaly  RESPIRATORY: Normal respiratory effort; lungs are clear to auscultation bilaterally  CARDIOVASCULAR: Regular rate and rhythm, normal S1 and S2, no murmur/rub/gallop; No lower extremity edema  ABDOMEN: Nontender to palpation, normoactive bowel sounds, no rebound/guarding; No hepatosplenomegaly  MUSCULOSKELETAL: no clubbing or cyanosis of digits; no joint swelling or tenderness to palpation  PSYCH: A+O to person, place, and time; flat affect   NEUROLOGY: CN 2-12 are intact and symmetric; no gross sensory deficits   SKIN: No rashes; no palpable lesions    LABS:                         10.2   10.18 )-----------( 150      ( 16 Jan 2023 05:32 )             31.3     01-16    140  |  106  |  17  ----------------------------<  99  3.1<L>   |  23  |  0.78    Ca    9.3      16 Jan 2023 05:32  Phos  2.8     01-16  Mg     1.70     01-16      PTT - ( 15 Phil 2023 05:27 )  PTT:36.8 sec              RADIOLOGY, EKG & ADDITIONAL TESTS: Reviewed.

## 2023-01-16 NOTE — PROGRESS NOTE ADULT - PROBLEM SELECTOR PLAN 5
meds verified with outpt pharmacy. adjustments made  pt currently calm, cooperative  no evidence of psychosis at this time, denies any hallucinations this AM

## 2023-01-16 NOTE — PROGRESS NOTE ADULT - PROBLEM SELECTOR PLAN 1
- s/p ICU stay on pressors due to E coli UTI  - now hemodynamically stable   - currently on CTX  - Bcx NGTD, UCX Ecoli sensitive to CTX, currently on day day 6 of 7

## 2023-01-17 LAB
ANION GAP SERPL CALC-SCNC: 14 MMOL/L — SIGNIFICANT CHANGE UP (ref 7–14)
BUN SERPL-MCNC: 14 MG/DL — SIGNIFICANT CHANGE UP (ref 7–23)
CALCIUM SERPL-MCNC: 9.3 MG/DL — SIGNIFICANT CHANGE UP (ref 8.4–10.5)
CHLORIDE SERPL-SCNC: 102 MMOL/L — SIGNIFICANT CHANGE UP (ref 98–107)
CO2 SERPL-SCNC: 19 MMOL/L — LOW (ref 22–31)
CREAT SERPL-MCNC: 0.71 MG/DL — SIGNIFICANT CHANGE UP (ref 0.5–1.3)
EGFR: 95 ML/MIN/1.73M2 — SIGNIFICANT CHANGE UP
GLUCOSE BLDC GLUCOMTR-MCNC: 128 MG/DL — HIGH (ref 70–99)
GLUCOSE BLDC GLUCOMTR-MCNC: 138 MG/DL — HIGH (ref 70–99)
GLUCOSE BLDC GLUCOMTR-MCNC: 88 MG/DL — SIGNIFICANT CHANGE UP (ref 70–99)
GLUCOSE BLDC GLUCOMTR-MCNC: 92 MG/DL — SIGNIFICANT CHANGE UP (ref 70–99)
GLUCOSE SERPL-MCNC: 79 MG/DL — SIGNIFICANT CHANGE UP (ref 70–99)
MAGNESIUM SERPL-MCNC: 1.5 MG/DL — LOW (ref 1.6–2.6)
PHOSPHATE SERPL-MCNC: 3.1 MG/DL — SIGNIFICANT CHANGE UP (ref 2.5–4.5)
POTASSIUM SERPL-MCNC: 3.9 MMOL/L — SIGNIFICANT CHANGE UP (ref 3.5–5.3)
POTASSIUM SERPL-SCNC: 3.9 MMOL/L — SIGNIFICANT CHANGE UP (ref 3.5–5.3)
SODIUM SERPL-SCNC: 135 MMOL/L — SIGNIFICANT CHANGE UP (ref 135–145)

## 2023-01-17 PROCEDURE — 99232 SBSQ HOSP IP/OBS MODERATE 35: CPT

## 2023-01-17 RX ORDER — FLUPHENAZINE HYDROCHLORIDE 1 MG/1
125 TABLET, FILM COATED ORAL ONCE
Refills: 0 | Status: COMPLETED | OUTPATIENT
Start: 2023-01-17 | End: 2023-01-17

## 2023-01-17 RX ORDER — FLUPHENAZINE HYDROCHLORIDE 1 MG/1
125 TABLET, FILM COATED ORAL ONCE
Refills: 0 | Status: DISCONTINUED | OUTPATIENT
Start: 2023-01-17 | End: 2023-01-17

## 2023-01-17 RX ORDER — NYSTATIN CREAM 100000 [USP'U]/G
1 CREAM TOPICAL
Refills: 0 | Status: DISCONTINUED | OUTPATIENT
Start: 2023-01-17 | End: 2023-01-20

## 2023-01-17 RX ORDER — MAGNESIUM SULFATE 500 MG/ML
1 VIAL (ML) INJECTION ONCE
Refills: 0 | Status: COMPLETED | OUTPATIENT
Start: 2023-01-17 | End: 2023-01-17

## 2023-01-17 RX ADMIN — FLUPHENAZINE HYDROCHLORIDE 125 MILLIGRAM(S): 1 TABLET, FILM COATED ORAL at 14:30

## 2023-01-17 RX ADMIN — Medication 2 MILLIGRAM(S): at 21:52

## 2023-01-17 RX ADMIN — Medication 1 TABLET(S): at 12:32

## 2023-01-17 RX ADMIN — GABAPENTIN 100 MILLIGRAM(S): 400 CAPSULE ORAL at 21:52

## 2023-01-17 RX ADMIN — NYSTATIN CREAM 1 APPLICATION(S): 100000 CREAM TOPICAL at 17:26

## 2023-01-17 RX ADMIN — Medication 50 MICROGRAM(S): at 05:33

## 2023-01-17 RX ADMIN — Medication 50 MILLIGRAM(S): at 05:33

## 2023-01-17 RX ADMIN — CEFTRIAXONE 100 MILLIGRAM(S): 500 INJECTION, POWDER, FOR SOLUTION INTRAMUSCULAR; INTRAVENOUS at 12:29

## 2023-01-17 RX ADMIN — CHLORHEXIDINE GLUCONATE 1 APPLICATION(S): 213 SOLUTION TOPICAL at 09:03

## 2023-01-17 RX ADMIN — Medication 1 MILLIGRAM(S): at 12:32

## 2023-01-17 RX ADMIN — Medication 100 GRAM(S): at 09:02

## 2023-01-17 RX ADMIN — RIVAROXABAN 20 MILLIGRAM(S): KIT at 12:32

## 2023-01-17 RX ADMIN — ATORVASTATIN CALCIUM 40 MILLIGRAM(S): 80 TABLET, FILM COATED ORAL at 21:52

## 2023-01-17 RX ADMIN — Medication 15 MILLIGRAM(S): at 21:52

## 2023-01-17 NOTE — PROGRESS NOTE ADULT - PROBLEM SELECTOR PLAN 9
- resumed back on Xarelto 20mg   - will resume home metoprolol succinate 50mg qd this AM for Afib, with hold parameters     Dispo: pending PT recs, patient appears debilitated will likely need rehab - resumed back on Xarelto 20mg   - will resume home metoprolol succinate 50mg qd this AM for Afib, with hold parameters     Dispo: pending PT eval noted, recommend rehab   Pt medically ready for discharge, awaiting Rehab placement   plan of care d/w pt and ACP - resumed back on Xarelto 20mg   - will resume home metoprolol succinate 50mg qd this AM for Afib, with hold parameters   Hypomagnesemia- supplemented , will monitor BMP     Dispo: pending PT eval noted, recommend rehab   Pt medically ready for discharge, awaiting Rehab placement   plan of care d/w pt and ACP

## 2023-01-17 NOTE — PROGRESS NOTE ADULT - PROBLEM SELECTOR PLAN 7
FS QID  sliding scale  NCS diet  complicated by neuropathy, starting gabapentin 100mg QHS FS QID  sliding scale  NCS diet  complicated by neuropathy, starting gabapentin 100mg QHS, can increase as needed

## 2023-01-17 NOTE — PROGRESS NOTE ADULT - SUBJECTIVE AND OBJECTIVE BOX
Patient is a 62y old  Female who presents with a chief complaint of septic shock (14 Jan 2023 13:17)      SUBJECTIVE / OVERNIGHT EVENTS:    MEDICATIONS  (STANDING):  atorvastatin 40 milliGRAM(s) Oral at bedtime  benztropine 2 milliGRAM(s) Oral at bedtime  busPIRone 15 milliGRAM(s) Oral <User Schedule>  cefTRIAXone   IVPB 1000 milliGRAM(s) IV Intermittent every 24 hours  chlorhexidine 2% Cloths 1 Application(s) Topical <User Schedule>  dextrose 5%. 1000 milliLiter(s) (50 mL/Hr) IV Continuous <Continuous>  dextrose 5%. 1000 milliLiter(s) (100 mL/Hr) IV Continuous <Continuous>  dextrose 50% Injectable 25 Gram(s) IV Push once  dextrose 50% Injectable 12.5 Gram(s) IV Push once  dextrose 50% Injectable 25 Gram(s) IV Push once  folic acid 1 milliGRAM(s) Oral daily  gabapentin 100 milliGRAM(s) Oral at bedtime  glucagon  Injectable 1 milliGRAM(s) IntraMuscular once  insulin lispro (ADMELOG) corrective regimen sliding scale   SubCutaneous at bedtime  insulin lispro (ADMELOG) corrective regimen sliding scale   SubCutaneous three times a day before meals  levothyroxine 50 MICROGram(s) Oral daily  metoprolol succinate ER 50 milliGRAM(s) Oral daily  multivitamin 1 Tablet(s) Oral daily  rivaroxaban 20 milliGRAM(s) Oral daily    MEDICATIONS  (PRN):  acetaminophen     Tablet .. 650 milliGRAM(s) Oral every 6 hours PRN Temp greater or equal to 38C (100.4F), Mild Pain (1 - 3)  aluminum hydroxide/magnesium hydroxide/simethicone Suspension 30 milliLiter(s) Oral every 4 hours PRN Dyspepsia  clonazePAM  Tablet 0.5 milliGRAM(s) Oral daily PRN anxiety/restless  dextrose Oral Gel 15 Gram(s) Oral once PRN Blood Glucose LESS THAN 70 milliGRAM(s)/deciliter  melatonin 3 milliGRAM(s) Oral at bedtime PRN Insomnia  ondansetron Injectable 4 milliGRAM(s) IV Push every 8 hours PRN Nausea and/or Vomiting      Vital Signs Last 24 Hrs  T(C): 36.8 (17 Jan 2023 05:05), Max: 36.8 (17 Jan 2023 05:05)  T(F): 98.2 (17 Jan 2023 05:05), Max: 98.2 (17 Jan 2023 05:05)  HR: 66 (17 Jan 2023 05:05) (66 - 73)  BP: 141/76 (17 Jan 2023 05:05) (138/80 - 141/76)  BP(mean): --  RR: 18 (17 Jan 2023 05:05) (18 - 18)  SpO2: 100% (17 Jan 2023 05:05) (100% - 100%)    Parameters below as of 17 Jan 2023 05:05  Patient On (Oxygen Delivery Method): room air      CAPILLARY BLOOD GLUCOSE      POCT Blood Glucose.: 92 mg/dL (17 Jan 2023 08:24)  POCT Blood Glucose.: 90 mg/dL (16 Jan 2023 22:20)  POCT Blood Glucose.: 89 mg/dL (16 Jan 2023 17:22)  POCT Blood Glucose.: 117 mg/dL (16 Jan 2023 12:12)    I&O's Summary      PHYSICAL EXAM:  GENERAL: NAD, well-developed  HEAD:  Atraumatic, Normocephalic  EYES: EOMI, PERRLA, conjunctiva and sclera clear  NECK: Supple, No JVD  CHEST/LUNG: Clear to auscultation bilaterally; No wheeze  HEART: Regular rate and rhythm; No murmurs, rubs, or gallops  ABDOMEN: Soft, Nontender, Nondistended; Bowel sounds present  EXTREMITIES:  2+ Peripheral Pulses, No clubbing, cyanosis, or edema  PSYCH: AAOx3  NEUROLOGY: non-focal  SKIN: No rashes or lesions    LABS:                        10.2   10.18 )-----------( 150      ( 16 Jan 2023 05:32 )             31.3     01-17    135  |  102  |  14  ----------------------------<  79  3.9   |  19<L>  |  0.71    Ca    9.3      17 Jan 2023 06:10  Phos  3.1     01-17  Mg     1.50     01-17                RADIOLOGY & ADDITIONAL TESTS:    Imaging Personally Reviewed:    Consultant(s) Notes Reviewed:      Care Discussed with Consultants/Other Providers:   Patient is a 62y old  Female who presents with a chief complaint of septic shock (14 Jan 2023 13:17)      SUBJECTIVE / OVERNIGHT EVENTS: patient seen and examined by bedside, no acute distress noted, denies headache, dizziness, SOB, CP, Palpitations , N/V/D, abdominal pain      MEDICATIONS  (STANDING):  atorvastatin 40 milliGRAM(s) Oral at bedtime  benztropine 2 milliGRAM(s) Oral at bedtime  busPIRone 15 milliGRAM(s) Oral <User Schedule>  cefTRIAXone   IVPB 1000 milliGRAM(s) IV Intermittent every 24 hours  chlorhexidine 2% Cloths 1 Application(s) Topical <User Schedule>  dextrose 5%. 1000 milliLiter(s) (50 mL/Hr) IV Continuous <Continuous>  dextrose 5%. 1000 milliLiter(s) (100 mL/Hr) IV Continuous <Continuous>  dextrose 50% Injectable 25 Gram(s) IV Push once  dextrose 50% Injectable 12.5 Gram(s) IV Push once  dextrose 50% Injectable 25 Gram(s) IV Push once  folic acid 1 milliGRAM(s) Oral daily  gabapentin 100 milliGRAM(s) Oral at bedtime  glucagon  Injectable 1 milliGRAM(s) IntraMuscular once  insulin lispro (ADMELOG) corrective regimen sliding scale   SubCutaneous at bedtime  insulin lispro (ADMELOG) corrective regimen sliding scale   SubCutaneous three times a day before meals  levothyroxine 50 MICROGram(s) Oral daily  metoprolol succinate ER 50 milliGRAM(s) Oral daily  multivitamin 1 Tablet(s) Oral daily  rivaroxaban 20 milliGRAM(s) Oral daily    MEDICATIONS  (PRN):  acetaminophen     Tablet .. 650 milliGRAM(s) Oral every 6 hours PRN Temp greater or equal to 38C (100.4F), Mild Pain (1 - 3)  aluminum hydroxide/magnesium hydroxide/simethicone Suspension 30 milliLiter(s) Oral every 4 hours PRN Dyspepsia  clonazePAM  Tablet 0.5 milliGRAM(s) Oral daily PRN anxiety/restless  dextrose Oral Gel 15 Gram(s) Oral once PRN Blood Glucose LESS THAN 70 milliGRAM(s)/deciliter  melatonin 3 milliGRAM(s) Oral at bedtime PRN Insomnia  ondansetron Injectable 4 milliGRAM(s) IV Push every 8 hours PRN Nausea and/or Vomiting      Vital Signs Last 24 Hrs  T(C): 36.8 (17 Jan 2023 05:05), Max: 36.8 (17 Jan 2023 05:05)  T(F): 98.2 (17 Jan 2023 05:05), Max: 98.2 (17 Jan 2023 05:05)  HR: 66 (17 Jan 2023 05:05) (66 - 73)  BP: 141/76 (17 Jan 2023 05:05) (138/80 - 141/76)  BP(mean): --  RR: 18 (17 Jan 2023 05:05) (18 - 18)  SpO2: 100% (17 Jan 2023 05:05) (100% - 100%)    Parameters below as of 17 Jan 2023 05:05  Patient On (Oxygen Delivery Method): room air      CAPILLARY BLOOD GLUCOSE      POCT Blood Glucose.: 92 mg/dL (17 Jan 2023 08:24)  POCT Blood Glucose.: 90 mg/dL (16 Jan 2023 22:20)  POCT Blood Glucose.: 89 mg/dL (16 Jan 2023 17:22)  POCT Blood Glucose.: 117 mg/dL (16 Jan 2023 12:12)    I&O's Summary      PHYSICAL EXAM:  GENERAL: NAD, well-developed  HEAD:  Atraumatic, Normocephalic  EYES: EOMI, PERRLA, conjunctiva and sclera clear  CHEST/LUNG: Clear to auscultation bilaterally; No wheeze  HEART: Regular rate and rhythm; No murmurs, rubs, or gallops  ABDOMEN: Soft, Nontender, Nondistended; Bowel sounds present  EXTREMITIES:  2+ Peripheral Pulses, No clubbing, cyanosis, or edema  PSYCH: AAOx3, calm   NEUROLOGY: non-focal  SKIN: No rashes or lesions    LABS:                        10.2   10.18 )-----------( 150      ( 16 Jan 2023 05:32 )             31.3     01-17    135  |  102  |  14  ----------------------------<  79  3.9   |  19<L>  |  0.71    Ca    9.3      17 Jan 2023 06:10  Phos  3.1     01-17  Mg     1.50     01-17                RADIOLOGY & ADDITIONAL TESTS:    Imaging Personally Reviewed:    Consultant(s) Notes Reviewed:      Care Discussed with Consultants/Other Providers:

## 2023-01-17 NOTE — PROGRESS NOTE ADULT - PROBLEM SELECTOR PLAN 5
meds verified with outpt pharmacy. adjustments made  pt currently calm, cooperative  no evidence of psychosis at this time, denies any hallucinations this AM meds verified with outpt pharmacy. adjustments made  pt currently calm, cooperative  no evidence of psychosis at this time, denies any hallucinations   pt gets weekly Prolixin 125 mg IM, case d/w psych by ACP, given one dose today

## 2023-01-17 NOTE — PROGRESS NOTE ADULT - PROBLEM SELECTOR PLAN 4
likely due to ATN secondary to sepsis  improving   cont to monitor likely due to ATN secondary to sepsis  improved    cont to monitor  Avoid nephrotoxics

## 2023-01-17 NOTE — CHART NOTE - NSCHARTNOTEFT_GEN_A_CORE
Psychiatry consult cancelled by KATHIA real at this time. Called for medication management as home medication not available at [facility], however home medication is available at [facility] pharmacy and being sent over. NO further questions from primary team at this time. Aware to call back if consult is needed.

## 2023-01-18 LAB
GLUCOSE BLDC GLUCOMTR-MCNC: 105 MG/DL — HIGH (ref 70–99)
GLUCOSE BLDC GLUCOMTR-MCNC: 136 MG/DL — HIGH (ref 70–99)
GLUCOSE BLDC GLUCOMTR-MCNC: 137 MG/DL — HIGH (ref 70–99)
GLUCOSE BLDC GLUCOMTR-MCNC: 98 MG/DL — SIGNIFICANT CHANGE UP (ref 70–99)
SARS-COV-2 RNA SPEC QL NAA+PROBE: SIGNIFICANT CHANGE UP

## 2023-01-18 PROCEDURE — 99232 SBSQ HOSP IP/OBS MODERATE 35: CPT

## 2023-01-18 RX ADMIN — Medication 2 MILLIGRAM(S): at 21:30

## 2023-01-18 RX ADMIN — GABAPENTIN 100 MILLIGRAM(S): 400 CAPSULE ORAL at 21:30

## 2023-01-18 RX ADMIN — Medication 15 MILLIGRAM(S): at 21:15

## 2023-01-18 RX ADMIN — Medication 50 MILLIGRAM(S): at 05:36

## 2023-01-18 RX ADMIN — NYSTATIN CREAM 1 APPLICATION(S): 100000 CREAM TOPICAL at 17:43

## 2023-01-18 RX ADMIN — NYSTATIN CREAM 1 APPLICATION(S): 100000 CREAM TOPICAL at 05:37

## 2023-01-18 RX ADMIN — CHLORHEXIDINE GLUCONATE 1 APPLICATION(S): 213 SOLUTION TOPICAL at 12:24

## 2023-01-18 RX ADMIN — Medication 1 TABLET(S): at 12:24

## 2023-01-18 RX ADMIN — Medication 1 MILLIGRAM(S): at 12:24

## 2023-01-18 RX ADMIN — Medication 50 MICROGRAM(S): at 05:37

## 2023-01-18 RX ADMIN — ATORVASTATIN CALCIUM 40 MILLIGRAM(S): 80 TABLET, FILM COATED ORAL at 21:15

## 2023-01-18 RX ADMIN — CEFTRIAXONE 100 MILLIGRAM(S): 500 INJECTION, POWDER, FOR SOLUTION INTRAMUSCULAR; INTRAVENOUS at 12:24

## 2023-01-18 RX ADMIN — RIVAROXABAN 20 MILLIGRAM(S): KIT at 12:24

## 2023-01-18 NOTE — PROGRESS NOTE ADULT - PROBLEM SELECTOR PLAN 5
meds verified with outpt pharmacy. adjustments made  pt currently calm, cooperative  no evidence of psychosis at this time, denies any hallucinations   pt gets weekly Prolixin 125 mg IM, case d/w psych by ACP, given one dose today meds verified with outpt pharmacy. adjustments made  pt currently calm, cooperative  no evidence of psychosis at this time, denies any hallucinations   pt gets weekly Prolixin 125 mg IM, case d/w psych by ACP, given one dose 1/17/23

## 2023-01-18 NOTE — CHART NOTE - NSCHARTNOTESELECT_GEN_ALL_CORE
Follow Up/Nutrition Services
MAR Accept/Transfer Note
POCUS/Event Note
MICU transfer/Event Note
psychiatry/Event Note

## 2023-01-18 NOTE — PROGRESS NOTE ADULT - PROBLEM SELECTOR PLAN 7
FS QID  sliding scale  NCS diet  complicated by neuropathy, starting gabapentin 100mg QHS, can increase as needed

## 2023-01-18 NOTE — PROGRESS NOTE ADULT - PROBLEM SELECTOR PLAN 2
cont ctx for now  Cx with Ecoli - sensitive to ceftriaxone  - currenrtly on day 6 out of 7 cont ctx for now  Cx with Ecoli - sensitive to ceftriaxone  -completed  7 day course

## 2023-01-18 NOTE — PROGRESS NOTE ADULT - PROBLEM SELECTOR PLAN 1
- s/p ICU stay on pressors due to E coli UTI  - now hemodynamically stable   - currently on CTX  - Bcx NGTD, UCX Ecoli sensitive to CTX, currently on day day 6 of 7 - s/p ICU stay on pressors due to E coli UTI  - now hemodynamically stable   - currently on CTX  - Bcx NGTD, UCX Ecoli sensitive to CTX, completed  7 day course

## 2023-01-18 NOTE — PROGRESS NOTE ADULT - SUBJECTIVE AND OBJECTIVE BOX
Patient is a 62y old  Female who presents with a chief complaint of septic shock (14 Jan 2023 13:17)      SUBJECTIVE / OVERNIGHT EVENTS:    MEDICATIONS  (STANDING):  atorvastatin 40 milliGRAM(s) Oral at bedtime  benztropine 2 milliGRAM(s) Oral at bedtime  busPIRone 15 milliGRAM(s) Oral <User Schedule>  cefTRIAXone   IVPB 1000 milliGRAM(s) IV Intermittent every 24 hours  chlorhexidine 2% Cloths 1 Application(s) Topical <User Schedule>  dextrose 5%. 1000 milliLiter(s) (50 mL/Hr) IV Continuous <Continuous>  dextrose 5%. 1000 milliLiter(s) (100 mL/Hr) IV Continuous <Continuous>  dextrose 50% Injectable 25 Gram(s) IV Push once  dextrose 50% Injectable 12.5 Gram(s) IV Push once  dextrose 50% Injectable 25 Gram(s) IV Push once  folic acid 1 milliGRAM(s) Oral daily  gabapentin 100 milliGRAM(s) Oral at bedtime  glucagon  Injectable 1 milliGRAM(s) IntraMuscular once  insulin lispro (ADMELOG) corrective regimen sliding scale   SubCutaneous at bedtime  insulin lispro (ADMELOG) corrective regimen sliding scale   SubCutaneous three times a day before meals  levothyroxine 50 MICROGram(s) Oral daily  metoprolol succinate ER 50 milliGRAM(s) Oral daily  multivitamin 1 Tablet(s) Oral daily  nystatin Ointment 1 Application(s) Topical two times a day  rivaroxaban 20 milliGRAM(s) Oral daily    MEDICATIONS  (PRN):  acetaminophen     Tablet .. 650 milliGRAM(s) Oral every 6 hours PRN Temp greater or equal to 38C (100.4F), Mild Pain (1 - 3)  aluminum hydroxide/magnesium hydroxide/simethicone Suspension 30 milliLiter(s) Oral every 4 hours PRN Dyspepsia  clonazePAM  Tablet 0.5 milliGRAM(s) Oral daily PRN anxiety/restless  dextrose Oral Gel 15 Gram(s) Oral once PRN Blood Glucose LESS THAN 70 milliGRAM(s)/deciliter  melatonin 3 milliGRAM(s) Oral at bedtime PRN Insomnia  ondansetron Injectable 4 milliGRAM(s) IV Push every 8 hours PRN Nausea and/or Vomiting      Vital Signs Last 24 Hrs  T(C): 36.8 (18 Jan 2023 05:13), Max: 37.1 (17 Jan 2023 21:50)  T(F): 98.2 (18 Jan 2023 05:13), Max: 98.7 (17 Jan 2023 21:50)  HR: 88 (18 Jan 2023 05:13) (70 - 93)  BP: 139/71 (18 Jan 2023 05:13) (115/65 - 152/62)  BP(mean): --  RR: 18 (18 Jan 2023 05:13) (18 - 18)  SpO2: 100% (18 Jan 2023 05:13) (98% - 100%)    Parameters below as of 18 Jan 2023 05:13  Patient On (Oxygen Delivery Method): room air      CAPILLARY BLOOD GLUCOSE      POCT Blood Glucose.: 137 mg/dL (18 Jan 2023 08:21)  POCT Blood Glucose.: 128 mg/dL (17 Jan 2023 22:06)  POCT Blood Glucose.: 138 mg/dL (17 Jan 2023 17:05)  POCT Blood Glucose.: 88 mg/dL (17 Jan 2023 12:14)    I&O's Summary      PHYSICAL EXAM:  GENERAL: NAD, well-developed  HEAD:  Atraumatic, Normocephalic  EYES: EOMI, PERRLA, conjunctiva and sclera clear  NECK: Supple, No JVD  CHEST/LUNG: Clear to auscultation bilaterally; No wheeze  HEART: Regular rate and rhythm; No murmurs, rubs, or gallops  ABDOMEN: Soft, Nontender, Nondistended; Bowel sounds present  EXTREMITIES:  2+ Peripheral Pulses, No clubbing, cyanosis, or edema  PSYCH: AAOx3  NEUROLOGY: non-focal  SKIN: No rashes or lesions    LABS:    01-17    135  |  102  |  14  ----------------------------<  79  3.9   |  19<L>  |  0.71    Ca    9.3      17 Jan 2023 06:10  Phos  3.1     01-17  Mg     1.50     01-17                RADIOLOGY & ADDITIONAL TESTS:    Imaging Personally Reviewed:    Consultant(s) Notes Reviewed:      Care Discussed with Consultants/Other Providers:   Patient is a 62y old  Female who presents with a chief complaint of septic shock (14 Jan 2023 13:17)      SUBJECTIVE / OVERNIGHT EVENTS: patient seen and examined by bedside, no acute distress noted  no acute events over night      MEDICATIONS  (STANDING):  atorvastatin 40 milliGRAM(s) Oral at bedtime  benztropine 2 milliGRAM(s) Oral at bedtime  busPIRone 15 milliGRAM(s) Oral <User Schedule>  cefTRIAXone   IVPB 1000 milliGRAM(s) IV Intermittent every 24 hours  chlorhexidine 2% Cloths 1 Application(s) Topical <User Schedule>  dextrose 5%. 1000 milliLiter(s) (50 mL/Hr) IV Continuous <Continuous>  dextrose 5%. 1000 milliLiter(s) (100 mL/Hr) IV Continuous <Continuous>  dextrose 50% Injectable 25 Gram(s) IV Push once  dextrose 50% Injectable 12.5 Gram(s) IV Push once  dextrose 50% Injectable 25 Gram(s) IV Push once  folic acid 1 milliGRAM(s) Oral daily  gabapentin 100 milliGRAM(s) Oral at bedtime  glucagon  Injectable 1 milliGRAM(s) IntraMuscular once  insulin lispro (ADMELOG) corrective regimen sliding scale   SubCutaneous at bedtime  insulin lispro (ADMELOG) corrective regimen sliding scale   SubCutaneous three times a day before meals  levothyroxine 50 MICROGram(s) Oral daily  metoprolol succinate ER 50 milliGRAM(s) Oral daily  multivitamin 1 Tablet(s) Oral daily  nystatin Ointment 1 Application(s) Topical two times a day  rivaroxaban 20 milliGRAM(s) Oral daily    MEDICATIONS  (PRN):  acetaminophen     Tablet .. 650 milliGRAM(s) Oral every 6 hours PRN Temp greater or equal to 38C (100.4F), Mild Pain (1 - 3)  aluminum hydroxide/magnesium hydroxide/simethicone Suspension 30 milliLiter(s) Oral every 4 hours PRN Dyspepsia  clonazePAM  Tablet 0.5 milliGRAM(s) Oral daily PRN anxiety/restless  dextrose Oral Gel 15 Gram(s) Oral once PRN Blood Glucose LESS THAN 70 milliGRAM(s)/deciliter  melatonin 3 milliGRAM(s) Oral at bedtime PRN Insomnia  ondansetron Injectable 4 milliGRAM(s) IV Push every 8 hours PRN Nausea and/or Vomiting      Vital Signs Last 24 Hrs  T(C): 36.8 (18 Jan 2023 05:13), Max: 37.1 (17 Jan 2023 21:50)  T(F): 98.2 (18 Jan 2023 05:13), Max: 98.7 (17 Jan 2023 21:50)  HR: 88 (18 Jan 2023 05:13) (70 - 93)  BP: 139/71 (18 Jan 2023 05:13) (115/65 - 152/62)  BP(mean): --  RR: 18 (18 Jan 2023 05:13) (18 - 18)  SpO2: 100% (18 Jan 2023 05:13) (98% - 100%)    Parameters below as of 18 Jan 2023 05:13  Patient On (Oxygen Delivery Method): room air      CAPILLARY BLOOD GLUCOSE      POCT Blood Glucose.: 137 mg/dL (18 Jan 2023 08:21)  POCT Blood Glucose.: 128 mg/dL (17 Jan 2023 22:06)  POCT Blood Glucose.: 138 mg/dL (17 Jan 2023 17:05)  POCT Blood Glucose.: 88 mg/dL (17 Jan 2023 12:14)    I&O's Summary    PHYSICAL EXAM:  GENERAL: NAD, well-developed  HEAD:  Atraumatic, Normocephalic  EYES: EOMI, PERRLA, conjunctiva and sclera clear  CHEST/LUNG: Clear to auscultation bilaterally; No wheeze  HEART: Regular rate and rhythm; No murmurs, rubs, or gallops  ABDOMEN: Soft, Nontender, Nondistended; Bowel sounds present  EXTREMITIES:  2+ Peripheral Pulses, No clubbing, cyanosis, or edema  PSYCH: AAOx3, calm   NEUROLOGY: non-focal  SKIN: No rashes or lesions    LABS:    01-17    135  |  102  |  14  ----------------------------<  79  3.9   |  19<L>  |  0.71    Ca    9.3      17 Jan 2023 06:10  Phos  3.1     01-17  Mg     1.50     01-17                RADIOLOGY & ADDITIONAL TESTS:    Imaging Personally Reviewed:    Consultant(s) Notes Reviewed:      Care Discussed with Consultants/Other Providers:

## 2023-01-18 NOTE — PROGRESS NOTE ADULT - ASSESSMENT
63 y/o F with DM2, hypothyroidism, schizophrenia, prior psychiatric hospitalizations on [facility], ETOH abuse, Afib on Xarelto, admitted to MICU for hypovolemic and septic shock 2/2 UTI requiring pressors. Now downgrade to floors, BPs stable.

## 2023-01-18 NOTE — PROGRESS NOTE ADULT - PROBLEM SELECTOR PLAN 9
- resumed back on Xarelto 20mg   - will resume home metoprolol succinate 50mg qd this AM for Afib, with hold parameters   Hypomagnesemia- supplemented , will monitor BMP     Dispo: pending PT eval noted, recommend rehab   Pt medically ready for discharge, awaiting Rehab placement   plan of care d/w pt and ACP

## 2023-01-19 LAB
GLUCOSE BLDC GLUCOMTR-MCNC: 108 MG/DL — HIGH (ref 70–99)
GLUCOSE BLDC GLUCOMTR-MCNC: 121 MG/DL — HIGH (ref 70–99)
GLUCOSE BLDC GLUCOMTR-MCNC: 87 MG/DL — SIGNIFICANT CHANGE UP (ref 70–99)
GLUCOSE BLDC GLUCOMTR-MCNC: 95 MG/DL — SIGNIFICANT CHANGE UP (ref 70–99)

## 2023-01-19 PROCEDURE — 99232 SBSQ HOSP IP/OBS MODERATE 35: CPT

## 2023-01-19 RX ORDER — CLONAZEPAM 1 MG
0.5 TABLET ORAL DAILY
Refills: 0 | Status: DISCONTINUED | OUTPATIENT
Start: 2023-01-19 | End: 2023-01-20

## 2023-01-19 RX ADMIN — ATORVASTATIN CALCIUM 40 MILLIGRAM(S): 80 TABLET, FILM COATED ORAL at 21:42

## 2023-01-19 RX ADMIN — Medication 1 TABLET(S): at 13:34

## 2023-01-19 RX ADMIN — Medication 1 MILLIGRAM(S): at 12:33

## 2023-01-19 RX ADMIN — Medication 50 MICROGRAM(S): at 05:37

## 2023-01-19 RX ADMIN — NYSTATIN CREAM 1 APPLICATION(S): 100000 CREAM TOPICAL at 05:37

## 2023-01-19 RX ADMIN — Medication 2 MILLIGRAM(S): at 21:41

## 2023-01-19 RX ADMIN — GABAPENTIN 100 MILLIGRAM(S): 400 CAPSULE ORAL at 21:42

## 2023-01-19 RX ADMIN — RIVAROXABAN 20 MILLIGRAM(S): KIT at 12:34

## 2023-01-19 RX ADMIN — NYSTATIN CREAM 1 APPLICATION(S): 100000 CREAM TOPICAL at 17:53

## 2023-01-19 RX ADMIN — Medication 50 MILLIGRAM(S): at 05:37

## 2023-01-19 RX ADMIN — Medication 15 MILLIGRAM(S): at 21:42

## 2023-01-19 RX ADMIN — CHLORHEXIDINE GLUCONATE 1 APPLICATION(S): 213 SOLUTION TOPICAL at 12:35

## 2023-01-19 NOTE — PROGRESS NOTE ADULT - PROBLEM SELECTOR PLAN 5
meds verified with outpt pharmacy. adjustments made  pt currently calm, cooperative  no evidence of psychosis at this time, denies any hallucinations   pt gets weekly Prolixin 125 mg IM, case d/w psych by ACP, given one dose 1/17/23

## 2023-01-19 NOTE — PROGRESS NOTE ADULT - PROBLEM SELECTOR PLAN 1
- s/p ICU stay on pressors due to E coli UTI  - now hemodynamically stable   - currently on CTX  - Bcx NGTD, UCX Ecoli sensitive to CTX, completed  7 day course

## 2023-01-19 NOTE — PROGRESS NOTE ADULT - SUBJECTIVE AND OBJECTIVE BOX
Patient is a 62y old  Female who presents with a chief complaint of septic shock (14 Jan 2023 13:17)      SUBJECTIVE / OVERNIGHT EVENTS:    MEDICATIONS  (STANDING):  atorvastatin 40 milliGRAM(s) Oral at bedtime  benztropine 2 milliGRAM(s) Oral at bedtime  busPIRone 15 milliGRAM(s) Oral <User Schedule>  chlorhexidine 2% Cloths 1 Application(s) Topical <User Schedule>  dextrose 5%. 1000 milliLiter(s) (100 mL/Hr) IV Continuous <Continuous>  dextrose 5%. 1000 milliLiter(s) (50 mL/Hr) IV Continuous <Continuous>  dextrose 50% Injectable 25 Gram(s) IV Push once  dextrose 50% Injectable 12.5 Gram(s) IV Push once  dextrose 50% Injectable 25 Gram(s) IV Push once  folic acid 1 milliGRAM(s) Oral daily  gabapentin 100 milliGRAM(s) Oral at bedtime  glucagon  Injectable 1 milliGRAM(s) IntraMuscular once  insulin lispro (ADMELOG) corrective regimen sliding scale   SubCutaneous at bedtime  insulin lispro (ADMELOG) corrective regimen sliding scale   SubCutaneous three times a day before meals  levothyroxine 50 MICROGram(s) Oral daily  metoprolol succinate ER 50 milliGRAM(s) Oral daily  multivitamin 1 Tablet(s) Oral daily  nystatin Ointment 1 Application(s) Topical two times a day  rivaroxaban 20 milliGRAM(s) Oral daily    MEDICATIONS  (PRN):  acetaminophen     Tablet .. 650 milliGRAM(s) Oral every 6 hours PRN Temp greater or equal to 38C (100.4F), Mild Pain (1 - 3)  aluminum hydroxide/magnesium hydroxide/simethicone Suspension 30 milliLiter(s) Oral every 4 hours PRN Dyspepsia  clonazePAM  Tablet 0.5 milliGRAM(s) Oral daily PRN anxiety/restless  dextrose Oral Gel 15 Gram(s) Oral once PRN Blood Glucose LESS THAN 70 milliGRAM(s)/deciliter  melatonin 3 milliGRAM(s) Oral at bedtime PRN Insomnia  ondansetron Injectable 4 milliGRAM(s) IV Push every 8 hours PRN Nausea and/or Vomiting      Vital Signs Last 24 Hrs  T(C): 36.5 (19 Jan 2023 05:36), Max: 37.2 (18 Jan 2023 12:29)  T(F): 97.7 (19 Jan 2023 05:36), Max: 98.9 (18 Jan 2023 12:29)  HR: 70 (19 Jan 2023 05:36) (67 - 72)  BP: 139/69 (19 Jan 2023 05:36) (104/78 - 148/70)  BP(mean): --  RR: 19 (19 Jan 2023 05:36) (18 - 20)  SpO2: 99% (19 Jan 2023 05:36) (94% - 100%)    Parameters below as of 19 Jan 2023 05:36  Patient On (Oxygen Delivery Method): room air      CAPILLARY BLOOD GLUCOSE      POCT Blood Glucose.: 108 mg/dL (19 Jan 2023 08:37)  POCT Blood Glucose.: 136 mg/dL (18 Jan 2023 22:15)  POCT Blood Glucose.: 105 mg/dL (18 Jan 2023 17:08)  POCT Blood Glucose.: 98 mg/dL (18 Jan 2023 12:04)    I&O's Summary      PHYSICAL EXAM:  GENERAL: NAD, well-developed  HEAD:  Atraumatic, Normocephalic  EYES: EOMI, PERRLA, conjunctiva and sclera clear  NECK: Supple, No JVD  CHEST/LUNG: Clear to auscultation bilaterally; No wheeze  HEART: Regular rate and rhythm; No murmurs, rubs, or gallops  ABDOMEN: Soft, Nontender, Nondistended; Bowel sounds present  EXTREMITIES:  2+ Peripheral Pulses, No clubbing, cyanosis, or edema  PSYCH: AAOx3  NEUROLOGY: non-focal  SKIN: No rashes or lesions    LABS:                    RADIOLOGY & ADDITIONAL TESTS:    Imaging Personally Reviewed:    Consultant(s) Notes Reviewed:      Care Discussed with Consultants/Other Providers:   Patient is a 62y old  Female who presents with a chief complaint of septic shock (14 Jan 2023 13:17)      SUBJECTIVE / OVERNIGHT EVENTS: patient seen and examined by bedside, pt with no acute complain, no acute distress noted      MEDICATIONS  (STANDING):  atorvastatin 40 milliGRAM(s) Oral at bedtime  benztropine 2 milliGRAM(s) Oral at bedtime  busPIRone 15 milliGRAM(s) Oral <User Schedule>  chlorhexidine 2% Cloths 1 Application(s) Topical <User Schedule>  dextrose 5%. 1000 milliLiter(s) (100 mL/Hr) IV Continuous <Continuous>  dextrose 5%. 1000 milliLiter(s) (50 mL/Hr) IV Continuous <Continuous>  dextrose 50% Injectable 25 Gram(s) IV Push once  dextrose 50% Injectable 12.5 Gram(s) IV Push once  dextrose 50% Injectable 25 Gram(s) IV Push once  folic acid 1 milliGRAM(s) Oral daily  gabapentin 100 milliGRAM(s) Oral at bedtime  glucagon  Injectable 1 milliGRAM(s) IntraMuscular once  insulin lispro (ADMELOG) corrective regimen sliding scale   SubCutaneous at bedtime  insulin lispro (ADMELOG) corrective regimen sliding scale   SubCutaneous three times a day before meals  levothyroxine 50 MICROGram(s) Oral daily  metoprolol succinate ER 50 milliGRAM(s) Oral daily  multivitamin 1 Tablet(s) Oral daily  nystatin Ointment 1 Application(s) Topical two times a day  rivaroxaban 20 milliGRAM(s) Oral daily    MEDICATIONS  (PRN):  acetaminophen     Tablet .. 650 milliGRAM(s) Oral every 6 hours PRN Temp greater or equal to 38C (100.4F), Mild Pain (1 - 3)  aluminum hydroxide/magnesium hydroxide/simethicone Suspension 30 milliLiter(s) Oral every 4 hours PRN Dyspepsia  clonazePAM  Tablet 0.5 milliGRAM(s) Oral daily PRN anxiety/restless  dextrose Oral Gel 15 Gram(s) Oral once PRN Blood Glucose LESS THAN 70 milliGRAM(s)/deciliter  melatonin 3 milliGRAM(s) Oral at bedtime PRN Insomnia  ondansetron Injectable 4 milliGRAM(s) IV Push every 8 hours PRN Nausea and/or Vomiting      Vital Signs Last 24 Hrs  T(C): 36.5 (19 Jan 2023 05:36), Max: 37.2 (18 Jan 2023 12:29)  T(F): 97.7 (19 Jan 2023 05:36), Max: 98.9 (18 Jan 2023 12:29)  HR: 70 (19 Jan 2023 05:36) (67 - 72)  BP: 139/69 (19 Jan 2023 05:36) (104/78 - 148/70)  BP(mean): --  RR: 19 (19 Jan 2023 05:36) (18 - 20)  SpO2: 99% (19 Jan 2023 05:36) (94% - 100%)    Parameters below as of 19 Jan 2023 05:36  Patient On (Oxygen Delivery Method): room air      CAPILLARY BLOOD GLUCOSE      POCT Blood Glucose.: 108 mg/dL (19 Jan 2023 08:37)  POCT Blood Glucose.: 136 mg/dL (18 Jan 2023 22:15)  POCT Blood Glucose.: 105 mg/dL (18 Jan 2023 17:08)  POCT Blood Glucose.: 98 mg/dL (18 Jan 2023 12:04)        PHYSICAL EXAM:  GENERAL: NAD, well-developed  HEAD:  Atraumatic, Normocephalic  EYES: EOMI, PERRLA, conjunctiva and sclera clear  CHEST/LUNG: Clear to auscultation bilaterally; No wheeze  HEART: Regular rate and rhythm; No murmurs, rubs, or gallops  ABDOMEN: Soft, Nontender, Nondistended; Bowel sounds present  EXTREMITIES:  2+ Peripheral Pulses, No clubbing, cyanosis, or edema  PSYCH: AAOx3, calm   NEUROLOGY: non-focal  SKIN: No rashes or lesions        LABS:          no new la bs           RADIOLOGY & ADDITIONAL TESTS:    Imaging Personally Reviewed:    Consultant(s) Notes Reviewed:      Care Discussed with Consultants/Other Providers:

## 2023-01-20 ENCOUNTER — TRANSCRIPTION ENCOUNTER (OUTPATIENT)
Age: 63
End: 2023-01-20

## 2023-01-20 VITALS
DIASTOLIC BLOOD PRESSURE: 78 MMHG | OXYGEN SATURATION: 98 % | HEART RATE: 70 BPM | SYSTOLIC BLOOD PRESSURE: 135 MMHG | TEMPERATURE: 98 F | RESPIRATION RATE: 18 BRPM

## 2023-01-20 LAB
GLUCOSE BLDC GLUCOMTR-MCNC: 119 MG/DL — HIGH (ref 70–99)
GLUCOSE BLDC GLUCOMTR-MCNC: 203 MG/DL — HIGH (ref 70–99)

## 2023-01-20 PROCEDURE — 99239 HOSP IP/OBS DSCHRG MGMT >30: CPT

## 2023-01-20 RX ORDER — RIVAROXABAN 15 MG-20MG
1 KIT ORAL
Qty: 0 | Refills: 0 | DISCHARGE
Start: 2023-01-20

## 2023-01-20 RX ORDER — CLONAZEPAM 1 MG
1 TABLET ORAL
Qty: 0 | Refills: 0 | DISCHARGE
Start: 2023-01-20

## 2023-01-20 RX ORDER — DILTIAZEM HCL 120 MG
0 CAPSULE, EXT RELEASE 24 HR ORAL
Qty: 0 | Refills: 0 | DISCHARGE

## 2023-01-20 RX ORDER — LANOLIN ALCOHOL/MO/W.PET/CERES
1 CREAM (GRAM) TOPICAL
Qty: 0 | Refills: 0 | DISCHARGE
Start: 2023-01-20

## 2023-01-20 RX ORDER — NYSTATIN CREAM 100000 [USP'U]/G
1 CREAM TOPICAL
Qty: 0 | Refills: 0 | DISCHARGE
Start: 2023-01-20

## 2023-01-20 RX ORDER — DICLOFENAC SODIUM 75 MG/1
0 TABLET, DELAYED RELEASE ORAL
Qty: 0 | Refills: 0 | DISCHARGE

## 2023-01-20 RX ORDER — RIVAROXABAN 15 MG-20MG
0 KIT ORAL
Qty: 0 | Refills: 1 | DISCHARGE

## 2023-01-20 RX ORDER — ATORVASTATIN CALCIUM 80 MG/1
1 TABLET, FILM COATED ORAL
Qty: 0 | Refills: 0 | DISCHARGE
Start: 2023-01-20

## 2023-01-20 RX ORDER — GABAPENTIN 400 MG/1
1 CAPSULE ORAL
Qty: 0 | Refills: 0 | DISCHARGE
Start: 2023-01-20

## 2023-01-20 RX ORDER — FLUPHENAZINE HYDROCHLORIDE 1 MG/1
0 TABLET, FILM COATED ORAL
Qty: 0 | Refills: 0 | DISCHARGE

## 2023-01-20 RX ORDER — CLONAZEPAM 1 MG
0 TABLET ORAL
Qty: 0 | Refills: 0 | DISCHARGE

## 2023-01-20 RX ORDER — FOLIC ACID 0.8 MG
1 TABLET ORAL
Qty: 0 | Refills: 0 | DISCHARGE
Start: 2023-01-20

## 2023-01-20 RX ORDER — SIMVASTATIN 20 MG/1
0 TABLET, FILM COATED ORAL
Qty: 0 | Refills: 0 | DISCHARGE

## 2023-01-20 RX ORDER — LOSARTAN/HYDROCHLOROTHIAZIDE 100MG-25MG
0 TABLET ORAL
Qty: 0 | Refills: 1 | DISCHARGE

## 2023-01-20 RX ADMIN — Medication 50 MICROGRAM(S): at 05:24

## 2023-01-20 RX ADMIN — Medication 2: at 12:00

## 2023-01-20 RX ADMIN — NYSTATIN CREAM 1 APPLICATION(S): 100000 CREAM TOPICAL at 05:25

## 2023-01-20 RX ADMIN — Medication 1 MILLIGRAM(S): at 11:45

## 2023-01-20 RX ADMIN — Medication 50 MILLIGRAM(S): at 05:24

## 2023-01-20 RX ADMIN — Medication 1 TABLET(S): at 11:45

## 2023-01-20 NOTE — PROGRESS NOTE ADULT - SUBJECTIVE AND OBJECTIVE BOX
Patient is a 62y old  Female who presents with a chief complaint of septic shock (14 Jan 2023 13:17)      SUBJECTIVE / OVERNIGHT EVENTS:    MEDICATIONS  (STANDING):  atorvastatin 40 milliGRAM(s) Oral at bedtime  benztropine 2 milliGRAM(s) Oral at bedtime  busPIRone 15 milliGRAM(s) Oral <User Schedule>  chlorhexidine 2% Cloths 1 Application(s) Topical <User Schedule>  dextrose 5%. 1000 milliLiter(s) (50 mL/Hr) IV Continuous <Continuous>  dextrose 5%. 1000 milliLiter(s) (100 mL/Hr) IV Continuous <Continuous>  dextrose 50% Injectable 25 Gram(s) IV Push once  dextrose 50% Injectable 12.5 Gram(s) IV Push once  dextrose 50% Injectable 25 Gram(s) IV Push once  folic acid 1 milliGRAM(s) Oral daily  gabapentin 100 milliGRAM(s) Oral at bedtime  glucagon  Injectable 1 milliGRAM(s) IntraMuscular once  insulin lispro (ADMELOG) corrective regimen sliding scale   SubCutaneous three times a day before meals  insulin lispro (ADMELOG) corrective regimen sliding scale   SubCutaneous at bedtime  levothyroxine 50 MICROGram(s) Oral daily  metoprolol succinate ER 50 milliGRAM(s) Oral daily  multivitamin 1 Tablet(s) Oral daily  nystatin Ointment 1 Application(s) Topical two times a day  rivaroxaban 20 milliGRAM(s) Oral daily    MEDICATIONS  (PRN):  acetaminophen     Tablet .. 650 milliGRAM(s) Oral every 6 hours PRN Temp greater or equal to 38C (100.4F), Mild Pain (1 - 3)  aluminum hydroxide/magnesium hydroxide/simethicone Suspension 30 milliLiter(s) Oral every 4 hours PRN Dyspepsia  clonazePAM  Tablet 0.5 milliGRAM(s) Oral daily PRN anxiety/restless  dextrose Oral Gel 15 Gram(s) Oral once PRN Blood Glucose LESS THAN 70 milliGRAM(s)/deciliter  melatonin 3 milliGRAM(s) Oral at bedtime PRN Insomnia  ondansetron Injectable 4 milliGRAM(s) IV Push every 8 hours PRN Nausea and/or Vomiting      Vital Signs Last 24 Hrs  T(C): 36.7 (20 Jan 2023 08:04), Max: 37.2 (20 Jan 2023 05:23)  T(F): 98 (20 Jan 2023 08:04), Max: 98.9 (20 Jan 2023 05:23)  HR: 70 (20 Jan 2023 08:04) (66 - 82)  BP: 135/78 (20 Jan 2023 08:04) (128/77 - 145/69)  BP(mean): --  RR: 18 (20 Jan 2023 08:04) (18 - 18)  SpO2: 98% (20 Jan 2023 08:04) (98% - 100%)    Parameters below as of 20 Jan 2023 08:04  Patient On (Oxygen Delivery Method): room air      CAPILLARY BLOOD GLUCOSE      POCT Blood Glucose.: 119 mg/dL (20 Jan 2023 08:30)  POCT Blood Glucose.: 95 mg/dL (19 Jan 2023 22:05)  POCT Blood Glucose.: 87 mg/dL (19 Jan 2023 17:23)  POCT Blood Glucose.: 121 mg/dL (19 Jan 2023 12:12)    I&O's Summary      PHYSICAL EXAM:  GENERAL: NAD, well-developed  HEAD:  Atraumatic, Normocephalic  EYES: EOMI, PERRLA, conjunctiva and sclera clear  NECK: Supple, No JVD  CHEST/LUNG: Clear to auscultation bilaterally; No wheeze  HEART: Regular rate and rhythm; No murmurs, rubs, or gallops  ABDOMEN: Soft, Nontender, Nondistended; Bowel sounds present  EXTREMITIES:  2+ Peripheral Pulses, No clubbing, cyanosis, or edema  PSYCH: AAOx3  NEUROLOGY: non-focal  SKIN: No rashes or lesions    LABS:                    RADIOLOGY & ADDITIONAL TESTS:    Imaging Personally Reviewed:    Consultant(s) Notes Reviewed:      Care Discussed with Consultants/Other Providers:   Patient is a 62y old  Female who presents with a chief complaint of septic shock (14 Jan 2023 13:17)      SUBJECTIVE / OVERNIGHT EVENTS: patient seen and examined by bedside, pt alert and awake, no acute distress noted  , denies headache, dizziness, SOB, CP, Palpitations , N/V/D, abdominal pain  Pt's friend Dr Wolf at bedside     MEDICATIONS  (STANDING):  atorvastatin 40 milliGRAM(s) Oral at bedtime  benztropine 2 milliGRAM(s) Oral at bedtime  busPIRone 15 milliGRAM(s) Oral <User Schedule>  chlorhexidine 2% Cloths 1 Application(s) Topical <User Schedule>  dextrose 5%. 1000 milliLiter(s) (50 mL/Hr) IV Continuous <Continuous>  dextrose 5%. 1000 milliLiter(s) (100 mL/Hr) IV Continuous <Continuous>  dextrose 50% Injectable 25 Gram(s) IV Push once  dextrose 50% Injectable 12.5 Gram(s) IV Push once  dextrose 50% Injectable 25 Gram(s) IV Push once  folic acid 1 milliGRAM(s) Oral daily  gabapentin 100 milliGRAM(s) Oral at bedtime  glucagon  Injectable 1 milliGRAM(s) IntraMuscular once  insulin lispro (ADMELOG) corrective regimen sliding scale   SubCutaneous three times a day before meals  insulin lispro (ADMELOG) corrective regimen sliding scale   SubCutaneous at bedtime  levothyroxine 50 MICROGram(s) Oral daily  metoprolol succinate ER 50 milliGRAM(s) Oral daily  multivitamin 1 Tablet(s) Oral daily  nystatin Ointment 1 Application(s) Topical two times a day  rivaroxaban 20 milliGRAM(s) Oral daily    MEDICATIONS  (PRN):  acetaminophen     Tablet .. 650 milliGRAM(s) Oral every 6 hours PRN Temp greater or equal to 38C (100.4F), Mild Pain (1 - 3)  aluminum hydroxide/magnesium hydroxide/simethicone Suspension 30 milliLiter(s) Oral every 4 hours PRN Dyspepsia  clonazePAM  Tablet 0.5 milliGRAM(s) Oral daily PRN anxiety/restless  dextrose Oral Gel 15 Gram(s) Oral once PRN Blood Glucose LESS THAN 70 milliGRAM(s)/deciliter  melatonin 3 milliGRAM(s) Oral at bedtime PRN Insomnia  ondansetron Injectable 4 milliGRAM(s) IV Push every 8 hours PRN Nausea and/or Vomiting      Vital Signs Last 24 Hrs  T(C): 36.7 (20 Jan 2023 08:04), Max: 37.2 (20 Jan 2023 05:23)  T(F): 98 (20 Jan 2023 08:04), Max: 98.9 (20 Jan 2023 05:23)  HR: 70 (20 Jan 2023 08:04) (66 - 82)  BP: 135/78 (20 Jan 2023 08:04) (128/77 - 145/69)  BP(mean): --  RR: 18 (20 Jan 2023 08:04) (18 - 18)  SpO2: 98% (20 Jan 2023 08:04) (98% - 100%)    Parameters below as of 20 Jan 2023 08:04  Patient On (Oxygen Delivery Method): room air      CAPILLARY BLOOD GLUCOSE      POCT Blood Glucose.: 119 mg/dL (20 Jan 2023 08:30)  POCT Blood Glucose.: 95 mg/dL (19 Jan 2023 22:05)  POCT Blood Glucose.: 87 mg/dL (19 Jan 2023 17:23)  POCT Blood Glucose.: 121 mg/dL (19 Jan 2023 12:12)    I&O's Summary    PHYSICAL EXAM:  GENERAL: NAD, well-developed  HEAD:  Atraumatic, Normocephalic  EYES: EOMI, PERRLA, conjunctiva and sclera clear  CHEST/LUNG: Clear to auscultation bilaterally; No wheeze  HEART: Regular rate and rhythm; No murmurs, rubs, or gallops  ABDOMEN: Soft, Nontender, Nondistended; Bowel sounds present  EXTREMITIES:  2+ Peripheral Pulses, No clubbing, cyanosis, or edema  PSYCH: AAOx3, calm   NEUROLOGY: non-focal  SKIN: No rashes or lesions      LABS:        no new labs             RADIOLOGY & ADDITIONAL TESTS:    Imaging Personally Reviewed:    Consultant(s) Notes Reviewed:      Care Discussed with Consultants/Other Providers:

## 2023-01-20 NOTE — DISCHARGE NOTE PROVIDER - HOSPITAL COURSE
dc 1/20 [facility]   61 y/o F with DM2, hypothyroidism, schizophrenia, prior psychiatric hospitalizations on [facility], ETOH abuse, Afib on Xarelto, admitted to MICU for hypovolemic and septic shock 2/2 UTI requiring pressors. Now downgrade to floors, BPs stable.      : Septic shock.   - s/p ICU stay on pressors due to E coli UTI  - now hemodynamically stable   - s/p  CTX  - Bcx NGTD, UCX Ecoli sensitive to CTX, completed  7 day course.    : E-coli UTI.    s/p ctx for 7 days   Cx with Ecoli - sensitive to ceftriaxone  -completed  7 day course.      Toxic metabolic encephalopathy.    - pt found on floor unresponsive, hypothermic, hypotensive, now resolved   - patient appears AAOx3 with slowed speech   - will cont to monitor.    : KODY (acute kidney injury).   : likely due to ATN secondary to sepsis  improved    cont to monitor  Avoid nephrotoxics.    : Schizophrenia.   pt currently calm, cooperative  no evidence of psychosis at this time, denies any hallucinations   pt gets weekly Prolixin 125 mg IM, case d/w psych by ACP, given one dose 1/17/23.    : Hypothyroidism.   : TSH wnl  cont with current home dose of synthroid.    : Diabetes mellitus of other type without complication.   : FS QID  sliding scale  NCS diet  complicated by neuropathy, starting gabapentin 100mg QHS, can increase as needed.      NPH (normal pressure hydrocephalus).    pt to f/u with neuro as outpt for large volume LP  need to resolve current state with infection prior to addressing this, d/w Dr Wolf t bedside today ,  he is aware and in agreement.    : Chronic atrial fibrillation.    - resumed back on Xarelto 20mg   - c/w home metoprolol succinate 50mg qd this AM for Afib, with hold parameters   Patient hemodynamically stable for discharge to rehab   Time spent in discharge process is 35 min  plan of care d/w pt and Dr Wolf at bedside   case d/w ACP and SW         dc 1/20 [facility]

## 2023-01-20 NOTE — PROGRESS NOTE ADULT - PROBLEM SELECTOR PROBLEM 5
Schizophrenia

## 2023-01-20 NOTE — DISCHARGE NOTE PROVIDER - NSDCCPCAREPLAN_GEN_ALL_CORE_FT
PRINCIPAL DISCHARGE DIAGNOSIS  Diagnosis: Septic shock  Assessment and Plan of Treatment: You were admitted to the hospital for sepsis from a urinary tract infection. You were treated with anitbiotics and monitoring in the intensive care unit for sepsis.      SECONDARY DISCHARGE DIAGNOSES  Diagnosis: KODY (acute kidney injury)  Assessment and Plan of Treatment: Your kidney functions were found to be abnormal. Continue blood pressure, cholesterol and diabetic medications. Goal of hemoglobin A1C (HgbA1C) < 7%.  Avoid nephrotoxic drugs such as nonsteroidal anti-inflammatory agents (NSAIDs).   Please follow up with your PCP to monitor your kidney function, continue with low protein and potassium diet.      Diagnosis: Schizophrenia  Assessment and Plan of Treatment: Continue with medications as prescribed and follow up with Psychiatry as outpatient.    Diagnosis: Hypernatremia  Assessment and Plan of Treatment: Your sodium levels were found to be elevated. Follow up with PCP as outpatient for further monitoring of sodium levels as outpatient.    Diagnosis: Hypothyroidism  Assessment and Plan of Treatment: Continue with medications and follow up with PCP.    Diagnosis: Diabetes mellitus of other type without complication  Assessment and Plan of Treatment: Monitor finger sticks pre-meal and bedtime, low salt, fat and carbohydrate diet, minimize glucose intake.  Exercise daily for at least 30 minutes and weight loss.  Follow up with primary care physician and endocrinologist for routine Hemoglobin A1C checks and management.  Follow up with your ophthalmologist for routine yearly vision exams.       PRINCIPAL DISCHARGE DIAGNOSIS  Diagnosis: Septic shock  Assessment and Plan of Treatment: You were admitted to the hospital for sepsis from a urinary tract infection. You were treated with anitbiotics and monitoring in the intensive care unit for sepsis.      SECONDARY DISCHARGE DIAGNOSES  Diagnosis: KODY (acute kidney injury)  Assessment and Plan of Treatment: Your kidney functions were found to be abnormal. Continue blood pressure, cholesterol and diabetic medications. Goal of hemoglobin A1C (HgbA1C) < 7%.  Avoid nephrotoxic drugs such as nonsteroidal anti-inflammatory agents (NSAIDs).   Please follow up with your PCP to monitor your kidney function, continue with low protein and potassium diet.      Diagnosis: Schizophrenia  Assessment and Plan of Treatment: Continue with medications as prescribed and follow up with Psychiatry as outpatient.    Diagnosis: Hypernatremia  Assessment and Plan of Treatment: Your sodium levels were found to be elevated. Follow up with PCP as outpatient for further monitoring of sodium levels as outpatient.    Diagnosis: Hypothyroidism  Assessment and Plan of Treatment: Continue with medications and follow up with PCP.    Diagnosis: NPH (normal pressure hydrocephalus)  Assessment and Plan of Treatment: You will need to follow up with Dr. Westfall for a lumbar puncture.    Diagnosis: Diabetes mellitus of other type without complication  Assessment and Plan of Treatment: Monitor finger sticks pre-meal and bedtime, low salt, fat and carbohydrate diet, minimize glucose intake.  Exercise daily for at least 30 minutes and weight loss.  Follow up with primary care physician and endocrinologist for routine Hemoglobin A1C checks and management.  Follow up with your ophthalmologist for routine yearly vision exams.

## 2023-01-20 NOTE — PROGRESS NOTE ADULT - PROBLEM SELECTOR PROBLEM 8
NPH (normal pressure hydrocephalus)

## 2023-01-20 NOTE — PROGRESS NOTE ADULT - PROBLEM SELECTOR PROBLEM 4
KODY (acute kidney injury)

## 2023-01-20 NOTE — DISCHARGE NOTE PROVIDER - CARE PROVIDER_API CALL
Cesar Westfall)  Neurology  [address]  [place] [address]  Phone: [phone]  Fax: [phone]  Follow Up Time:

## 2023-01-20 NOTE — DISCHARGE NOTE NURSING/CASE MANAGEMENT/SOCIAL WORK - NSDCPEXARELTOCOMP_GEN_ALL_CORE
Patient called and stated that she is having chest congestion. She would like to know what can she take over the counter for that. Also, she is still feeling under the weather. She would like to know if she can take a couple of more days off. Rivaroxaban/Xarelto is used to treat and prevent blood clots.  If you are not able to swallow the tablets whole, you may crush the tablets and mix them with a small amount of applesauce and promptly take within four hours. Eat some food right after you swallow the mixture. Take 2.5mg or 10mg tablets of Rivaroxaban/Xarelto with or without food. Take 15mg or 20mg tablets of Rivaroxaban/Xarelto with food. Never skip a dose of Rivaroxaban/Xarelto.  If you take Rivaroxaban/Xarelto once a day and you forget to take your dose, take a dose as soon as you remember on the same day. If you take Rivaroxaban/Xarelto 2.5 mg twice a day and you forget to take your dose, skip the missed dose and take your next dose at your usual time. DO NOT take an extra pill to ‘catch up’. If you take Rivaroxaban/Xarelto 15 mg twice a day and you forget to take your dose, take a dose as soon as you remember on the same day. You may take 2 doses at the same time to make up for missed dose ONLY if you take a total of 30mg per day. Notify your doctor that you missed a dose. Take Rivaroxaban/Xarelto at the same time each morning and evening. Rivaroxaban/Xarelto may be taken with other medication or food.

## 2023-01-20 NOTE — DISCHARGE NOTE PROVIDER - NSDCFUADDINST_GEN_ALL_CORE_FT
You will need to follow up with neurology as outpatient for a lumbar puncture.  You will need to follow up with neurology as outpatient for a lumbar puncture.     Your diltiazem was held follow up with PCP/ cardiology when to resume.

## 2023-01-20 NOTE — PROGRESS NOTE ADULT - PROVIDER SPECIALTY LIST ADULT
MICU
Hospitalist
Internal Medicine
Hospitalist

## 2023-01-20 NOTE — PROGRESS NOTE ADULT - PROBLEM SELECTOR PLAN 6
TSH wnl  cont with current home dose of synthroid

## 2023-01-20 NOTE — PROGRESS NOTE ADULT - PROBLEM SELECTOR PLAN 1
- s/p ICU stay on pressors due to E coli UTI  - now hemodynamically stable   - currently on CTX  - Bcx NGTD, UCX Ecoli sensitive to CTX, completed  7 day course - s/p ICU stay on pressors due to E coli UTI  - now hemodynamically stable   - s/p  CTX  - Bcx NGTD, UCX Ecoli sensitive to CTX, completed  7 day course

## 2023-01-20 NOTE — DISCHARGE NOTE PROVIDER - NSDCMRMEDTOKEN_GEN_ALL_CORE_FT
atorvastatin 40 mg oral tablet: 1 tab(s) orally once a day (at bedtime)  BENZTROPINE MES 2 MG TABLET:   BUSPIRONE HCL 15 MG TABLET: take 1 tablet by mouth at bedtime  clonazePAM 0.5 mg oral tablet: 1 tab(s) orally once a day, As needed, anxiety/restless  DICLOFENAC SOD  MG TAB:   DILTIAZEM 24H ER(CD) 300 MG CP:   FLUPHENAZINE  MG/5 ML:   folic acid 1 mg oral tablet: 1 tab(s) orally once a day  gabapentin 100 mg oral capsule: 1 cap(s) orally once a day (at bedtime)  LEVOTHYROXINE 50 MCG TABLET: take 1 tablet by mouth once daily  LOSARTAN-HCTZ 100-25 MG TAB: take 1 tablet by mouth once daily  melatonin 3 mg oral tablet: 1 tab(s) orally once a day (at bedtime), As needed, Insomnia  METFORMIN  MG TABLET: take 1 tablet by mouth once daily  METOPROLOL SUCC ER 50 MG TAB:   Multiple Vitamins oral tablet: 1 tab(s) orally once a day  XARELTO 15 MG TABLET: take 1 tablet by mouth twice a day   atorvastatin 40 mg oral tablet: 1 tab(s) orally once a day (at bedtime)  BENZTROPINE MES 2 MG TABLET:   BUSPIRONE HCL 15 MG TABLET: take 1 tablet by mouth at bedtime  clonazePAM 0.5 mg oral tablet: 1 tab(s) orally once a day, As needed, anxiety/restless  DILTIAZEM 24H ER(CD) 300 MG CP:   FLUPHENAZINE  MG/5 ML:   folic acid 1 mg oral tablet: 1 tab(s) orally once a day  gabapentin 100 mg oral capsule: 1 cap(s) orally once a day (at bedtime)  LEVOTHYROXINE 50 MCG TABLET: take 1 tablet by mouth once daily  melatonin 3 mg oral tablet: 1 tab(s) orally once a day (at bedtime), As needed, Insomnia  METFORMIN  MG TABLET: take 1 tablet by mouth once daily  METOPROLOL SUCC ER 50 MG TAB:   Multiple Vitamins oral tablet: 1 tab(s) orally once a day  nystatin 100,000 units/g topical ointment: 1 application topically 2 times a day  rivaroxaban 20 mg oral tablet: 1 tab(s) orally once a day   atorvastatin 40 mg oral tablet: 1 tab(s) orally once a day (at bedtime)  BENZTROPINE MES 2 MG TABLET:   BUSPIRONE HCL 15 MG TABLET: take 1 tablet by mouth at bedtime  clonazePAM 0.5 mg oral tablet: 1 tab(s) orally once a day, As needed, anxiety/restless  FLUPHENAZINE  MG/5 ML: 125 milligram(s) injectable every 7 days, next dose 1/24  folic acid 1 mg oral tablet: 1 tab(s) orally once a day  gabapentin 100 mg oral capsule: 1 cap(s) orally once a day (at bedtime)  LEVOTHYROXINE 50 MCG TABLET: take 1 tablet by mouth once daily  melatonin 3 mg oral tablet: 1 tab(s) orally once a day (at bedtime), As needed, Insomnia  METFORMIN  MG TABLET: take 1 tablet by mouth once daily  METOPROLOL SUCC ER 50 MG TAB:   Multiple Vitamins oral tablet: 1 tab(s) orally once a day  nystatin 100,000 units/g topical ointment: 1 application topically 2 times a day  rivaroxaban 20 mg oral tablet: 1 tab(s) orally once a day

## 2023-01-20 NOTE — DISCHARGE NOTE NURSING/CASE MANAGEMENT/SOCIAL WORK - NSDCFUADDAPPT_GEN_ALL_CORE_FT
Follow up with your primary care physician for further monitoring in 1-2 weeks. Please call to arrange appointment.  Follow up with neurology, Dr Cesar Westfall within 1-2 weeks of discharge. Please call for appointment for a lumbar puncture.

## 2023-01-20 NOTE — DISCHARGE NOTE NURSING/CASE MANAGEMENT/SOCIAL WORK - NSDCPEFALRISK_GEN_ALL_CORE
For information on Fall & Injury Prevention, visit: [email or web address] OR  [email or web address] OR  https://www.cdc.gov/steadi/patient.html

## 2023-01-20 NOTE — PROGRESS NOTE ADULT - PROBLEM SELECTOR PLAN 2
cont ctx for now  Cx with Ecoli - sensitive to ceftriaxone  -completed  7 day course on  ctx for 7 days   Cx with Ecoli - sensitive to ceftriaxone  -completed  7 day course

## 2023-01-20 NOTE — PROGRESS NOTE ADULT - PROBLEM SELECTOR PLAN 9
- resumed back on Xarelto 20mg   - will resume home metoprolol succinate 50mg qd this AM for Afib, with hold parameters   Hypomagnesemia- supplemented , will monitor BMP     Dispo: pending PT eval noted, recommend rehab   Pt medically ready for discharge, awaiting Rehab placement   plan of care d/w pt and ACP - resumed back on Xarelto 20mg   - will resume home metoprolol succinate 50mg qd this AM for Afib, with hold parameters   Hypomagnesemia- supplemented , will monitor BMP     Dispo: pending PT eval noted, recommend rehab   Pt medically ready for discharge, awaiting Rehab placement   plan of care d/w pt and friend Dr Wolf at bedside, all medications and follow up discussed   case d/w  ACP and SW   Patient hemodynamically stable for discharge  Time spent in discharge process is 35 min

## 2023-01-20 NOTE — PROGRESS NOTE ADULT - PROBLEM SELECTOR PROBLEM 3
Toxic metabolic encephalopathy

## 2023-01-20 NOTE — DISCHARGE NOTE PROVIDER - NSDCFUADDAPPT_GEN_ALL_CORE_FT
Follow up with your primary care physician for further monitoring in 1-2 weeks. Please call to arrange appointment.   Follow up with your primary care physician for further monitoring in 1-2 weeks. Please call to arrange appointment.  Follow up with neurology, Dr Cesar Westfall within 1-2 weeks of discharge. Please call for appointment for a lumbar puncture.

## 2023-01-20 NOTE — DISCHARGE NOTE NURSING/CASE MANAGEMENT/SOCIAL WORK - PATIENT PORTAL LINK FT
You can access the FollowMyHealth Patient Portal offered by [facility] by registering at the following website: [email or web address]. By joining [facility]’s FollowMyHealth portal, you will also be able to view your health information using other applications (apps) compatible with our system.

## 2023-01-20 NOTE — DISCHARGE NOTE NURSING/CASE MANAGEMENT/SOCIAL WORK - NSDCVIVACCINE_GEN_ALL_CORE_FT
influenza, injectable, quadrivalent, preservative free; 05-Nov-2018 11:00; Chanel Alva (RN); [employer]; [id number] (Exp. Date: 30-Jun-2019); IntraMuscular; Deltoid Left.; 0.5 milliLiter(s); VIS (VIS Published: 07-Aug-2015, VIS Presented: 05-Nov-2018);   Tdap; 12-Apr-2019 19:46; Kathrin Barfield (RN); [employer]; [id number] (Exp. Date: 12-Mar-2020); IntraMuscular; Deltoid Left.; 0.5 milliLiter(s); VIS (VIS Published: 09-May-2013, VIS Presented: 12-Apr-2019);   
None

## 2023-01-20 NOTE — PROGRESS NOTE ADULT - PROBLEM SELECTOR PLAN 8
pt to f/u with neuro as outpt for large volume LP  need to resolve current state with infection prior to addressing this, d/w Dr Wolf, he is aware and in agreement

## 2023-02-02 ENCOUNTER — NON-APPOINTMENT (OUTPATIENT)
Age: 63
End: 2023-02-02

## 2023-03-28 PROBLEM — E03.9 HYPOTHYROIDISM, UNSPECIFIED: Chronic | Status: ACTIVE | Noted: 2023-01-11

## 2023-03-28 PROBLEM — I48.91 UNSPECIFIED ATRIAL FIBRILLATION: Chronic | Status: ACTIVE | Noted: 2023-01-11

## 2023-03-28 PROBLEM — F20.9 SCHIZOPHRENIA, UNSPECIFIED: Chronic | Status: ACTIVE | Noted: 2023-01-11

## 2023-04-27 ENCOUNTER — APPOINTMENT (OUTPATIENT)
Dept: CARDIOLOGY | Facility: CLINIC | Age: 63
End: 2023-04-27

## 2023-04-27 NOTE — ED ADULT NURSE NOTE - NSIMPLEMENTINTERV_GEN_ALL_ED
Implemented All Fall with Harm Risk Interventions:  [place] to call system. Call bell, personal items and telephone within reach. Instruct patient to call for assistance. Room bathroom lighting operational. Non-slip footwear when patient is off stretcher. Physically safe environment: no spills, clutter or unnecessary equipment. Stretcher in lowest position, wheels locked, appropriate side rails in place. Provide visual cue, wrist band, yellow gown, etc. Monitor gait and stability. Monitor for mental status changes and reorient to person, place, and time. Review medications for side effects contributing to fall risk. Reinforce activity limits and safety measures with patient and family. Provide visual clues: red socks. Rifampin Pregnancy And Lactation Text: This medication is Pregnancy Category C and it isn't know if it is safe during pregnancy. It is also excreted in breast milk and should not be used if you are breast feeding.

## 2023-04-30 ENCOUNTER — TRANSCRIPTION ENCOUNTER (OUTPATIENT)
Age: 63
End: 2023-04-30

## 2023-05-17 ENCOUNTER — APPOINTMENT (OUTPATIENT)
Dept: CARDIOLOGY | Facility: CLINIC | Age: 63
End: 2023-05-17
Payer: MEDICARE

## 2023-05-17 ENCOUNTER — NON-APPOINTMENT (OUTPATIENT)
Age: 63
End: 2023-05-17

## 2023-05-17 ENCOUNTER — LABORATORY RESULT (OUTPATIENT)
Age: 63
End: 2023-05-17

## 2023-05-17 VITALS
HEART RATE: 80 BPM | DIASTOLIC BLOOD PRESSURE: 80 MMHG | WEIGHT: 200 LBS | OXYGEN SATURATION: 99 % | BODY MASS INDEX: 33.28 KG/M2 | TEMPERATURE: 97.6 F | SYSTOLIC BLOOD PRESSURE: 122 MMHG

## 2023-05-17 DIAGNOSIS — F41.9 ANXIETY DISORDER, UNSPECIFIED: ICD-10-CM

## 2023-05-17 DIAGNOSIS — Z91.81 HISTORY OF FALLING: ICD-10-CM

## 2023-05-17 DIAGNOSIS — Z79.01 LONG TERM (CURRENT) USE OF ANTICOAGULANTS: ICD-10-CM

## 2023-05-17 DIAGNOSIS — E78.5 HYPERLIPIDEMIA, UNSPECIFIED: ICD-10-CM

## 2023-05-17 DIAGNOSIS — R01.1 CARDIAC MURMUR, UNSPECIFIED: ICD-10-CM

## 2023-05-17 PROCEDURE — 99214 OFFICE O/P EST MOD 30 MIN: CPT | Mod: 25

## 2023-05-17 PROCEDURE — 93000 ELECTROCARDIOGRAM COMPLETE: CPT

## 2023-05-17 RX ORDER — DICLOFENAC SODIUM 100 MG/1
100 TABLET, FILM COATED, EXTENDED RELEASE ORAL
Qty: 5 | Refills: 0 | Status: DISCONTINUED | COMMUNITY
Start: 2022-09-08 | End: 2023-05-17

## 2023-05-17 RX ORDER — MELOXICAM 7.5 MG/1
7.5 TABLET ORAL
Qty: 7 | Refills: 1 | Status: ACTIVE | COMMUNITY
Start: 2023-05-17

## 2023-05-18 LAB
ALBUMIN SERPL ELPH-MCNC: 4.7 G/DL
ALP BLD-CCNC: 111 U/L
ALT SERPL-CCNC: 16 U/L
ANION GAP SERPL CALC-SCNC: 16 MMOL/L
AST SERPL-CCNC: 15 U/L
BASOPHILS # BLD AUTO: 0.07 K/UL
BASOPHILS NFR BLD AUTO: 0.7 %
BILIRUB DIRECT SERPL-MCNC: 0.1 MG/DL
BILIRUB INDIRECT SERPL-MCNC: 0.2 MG/DL
BILIRUB SERPL-MCNC: 0.2 MG/DL
BUN SERPL-MCNC: 20 MG/DL
CALCIUM SERPL-MCNC: 10.5 MG/DL
CHLORIDE SERPL-SCNC: 97 MMOL/L
CHOLEST SERPL-MCNC: 162 MG/DL
CK SERPL-CCNC: 26 U/L
CO2 SERPL-SCNC: 22 MMOL/L
CREAT SERPL-MCNC: 0.66 MG/DL
EGFR: 98 ML/MIN/1.73M2
EOSINOPHIL # BLD AUTO: 0.29 K/UL
EOSINOPHIL NFR BLD AUTO: 2.7 %
ESTIMATED AVERAGE GLUCOSE: 134 MG/DL
GLUCOSE SERPL-MCNC: 90 MG/DL
HBA1C MFR BLD HPLC: 6.3 %
HCT VFR BLD CALC: 38.2 %
HDLC SERPL-MCNC: 37 MG/DL
HGB BLD-MCNC: 12.2 G/DL
IMM GRANULOCYTES NFR BLD AUTO: 0.4 %
LDLC SERPL CALC-MCNC: 84 MG/DL
LDLC SERPL DIRECT ASSAY-MCNC: 93 MG/DL
LYMPHOCYTES # BLD AUTO: 1.8 K/UL
LYMPHOCYTES NFR BLD AUTO: 17 %
MAGNESIUM SERPL-MCNC: 1.9 MG/DL
MAN DIFF?: NORMAL
MCHC RBC-ENTMCNC: 29.3 PG
MCHC RBC-ENTMCNC: 31.9 GM/DL
MCV RBC AUTO: 91.6 FL
MONOCYTES # BLD AUTO: 0.87 K/UL
MONOCYTES NFR BLD AUTO: 8.2 %
NEUTROPHILS # BLD AUTO: 7.49 K/UL
NEUTROPHILS NFR BLD AUTO: 71 %
NONHDLC SERPL-MCNC: 125 MG/DL
OSMOLALITY SERPL: 287 MOSMOL/KG
PHOSPHATE SERPL-MCNC: 4.5 MG/DL
PLATELET # BLD AUTO: 314 K/UL
POTASSIUM SERPL-SCNC: 4.5 MMOL/L
PROT SERPL-MCNC: 7.7 G/DL
RBC # BLD: 4.17 M/UL
RBC # FLD: 12.8 %
SODIUM SERPL-SCNC: 135 MMOL/L
T3RU NFR SERPL: 1 TBI
T4 FREE SERPL-MCNC: 1.2 NG/DL
T4 SERPL-MCNC: 7.2 UG/DL
TRIGL SERPL-MCNC: 206 MG/DL
TSH SERPL-ACNC: 1.3 UIU/ML
URATE SERPL-MCNC: 5.4 MG/DL
WBC # FLD AUTO: 10.56 K/UL

## 2023-05-19 PROBLEM — Z79.01 ANTICOAGULATED: Status: ACTIVE | Noted: 2023-05-17

## 2023-05-19 NOTE — HISTORY OF PRESENT ILLNESS
[FreeTextEntry1] : 61 yo Female presenting for follow up due to unwitnessed and unprovoked fall 4 weeks ago. Pt states she was leaving her car and fell on her right hip. Denies loss of consciousness or hitting her head on the floor. Her only complaint related to fall today is pain below the right knee with paresthesias in her distal toes. She denies any other pain, numbness, or limitation to ROM.\par \par This patient presents today for general medical exam and to follow up for any medical issues. They deny any new health problems or new family medical history. The patient denies heat/cold intolerance, weight gain or loss, changes in their hair pattern, alteration in sleep habits, or change in their mood patterns. They also deny joint pain, rash, change in vision, or alteration in their bowel patterns. The patient presents for evaluation of high blood pressure. Patient is currently tolerating the current antihypertensive regime and they deny headaches, stiff neck, visual changes, pedal Edema or PND. They also are here for follow-up of elevated cholesterol. Patient is currently tolerating medication and and does not complain of new muscle pain, joint pain, back pain,urinary changes ,nausea, vomiting, abdominal pain or diarrhea. The patient is trying to follow a low cholesterol diet. Pt stopped her simvastatin the last few days due to her leg pain.  The patient also presents for continued care of diabetes mellitus. Patient is following the diabetic regimen. Patient is tolerating hypoglycemic agents and following the diet. Patient denies any visual changes, blood in the urine, abdominal pain, nausea, vomiting, myalgias, arthralgias, paresthesia’s and syncope. The patient denies any chest discomfort, shortness of breath or new neurologic signs or symptoms. Patient denies any polyuria, worsening nocturia, or polydipsia.  Patient also presents today for follow-up of obesity. The patient states they have not lost significant weight since the last visit. Patient is currently obese and remains sedentary. The patient has not been compliant with medical follow-up instructions for weight-loss and exercise since the last visit.

## 2023-05-22 RX ORDER — BUSPIRONE HYDROCHLORIDE 15 MG/1
15 TABLET ORAL AT BEDTIME
Refills: 0 | Status: ACTIVE | COMMUNITY
Start: 2023-05-22

## 2023-05-22 RX ORDER — BENZTROPINE MESYLATE 2 MG/1
2 TABLET ORAL AT BEDTIME
Refills: 0 | Status: ACTIVE | COMMUNITY
Start: 2023-05-22

## 2023-05-22 RX ORDER — GABAPENTIN 100 MG/1
100 CAPSULE ORAL DAILY
Refills: 0 | Status: ACTIVE | COMMUNITY
Start: 2023-05-22

## 2023-05-22 RX ORDER — SIMVASTATIN 20 MG/1
20 TABLET, FILM COATED ORAL
Qty: 90 | Refills: 1 | Status: DISCONTINUED | COMMUNITY
Start: 2021-04-09 | End: 2023-05-22

## 2023-05-22 RX ORDER — DILTIAZEM HYDROCHLORIDE 300 MG/1
300 CAPSULE, EXTENDED RELEASE ORAL
Qty: 90 | Refills: 1 | Status: DISCONTINUED | COMMUNITY
Start: 2019-07-05 | End: 2023-05-22

## 2023-05-22 RX ORDER — ATORVASTATIN CALCIUM 40 MG/1
40 TABLET, FILM COATED ORAL
Qty: 1 | Refills: 1 | Status: ACTIVE | COMMUNITY
Start: 2023-05-22

## 2023-05-25 RX ORDER — FOLIC ACID 1 MG/1
1 TABLET ORAL
Qty: 90 | Refills: 0 | Status: ACTIVE | COMMUNITY
Start: 2023-05-22

## 2023-06-16 ENCOUNTER — APPOINTMENT (OUTPATIENT)
Dept: CARDIOLOGY | Facility: CLINIC | Age: 63
End: 2023-06-16
Payer: MEDICARE

## 2023-06-16 PROCEDURE — 93224 XTRNL ECG REC UP TO 48 HRS: CPT

## 2023-06-20 NOTE — PATIENT PROFILE ADULT. - NS PRO OT REFERRAL QUES 1 YN
You can access the FollowMyHealth Patient Portal offered by [facility] by registering at the following website: [email or web address]. By joining [facility] [employer] [facility]’s FollowMyHealth portal, you will also be able to view your health information using other applications (apps) compatible with our system.
no

## 2023-07-27 ENCOUNTER — APPOINTMENT (OUTPATIENT)
Dept: CARDIOLOGY | Facility: CLINIC | Age: 63
End: 2023-07-27
Payer: MEDICARE

## 2023-07-27 VITALS
BODY MASS INDEX: 33.32 KG/M2 | SYSTOLIC BLOOD PRESSURE: 140 MMHG | OXYGEN SATURATION: 99 % | DIASTOLIC BLOOD PRESSURE: 70 MMHG | HEART RATE: 95 BPM | HEIGHT: 65 IN | WEIGHT: 200 LBS | TEMPERATURE: 98.7 F

## 2023-07-27 DIAGNOSIS — R79.89 OTHER SPECIFIED ABNORMAL FINDINGS OF BLOOD CHEMISTRY: ICD-10-CM

## 2023-07-27 DIAGNOSIS — I47.1 SUPRAVENTRICULAR TACHYCARDIA: ICD-10-CM

## 2023-07-27 DIAGNOSIS — M79.606 PAIN IN LEG, UNSPECIFIED: ICD-10-CM

## 2023-07-27 PROCEDURE — 99214 OFFICE O/P EST MOD 30 MIN: CPT

## 2023-07-27 RX ORDER — RIVAROXABAN 15 MG/1
15 TABLET, FILM COATED ORAL
Qty: 42 | Refills: 0 | Status: DISCONTINUED | COMMUNITY
Start: 2022-09-09 | End: 2023-07-27

## 2023-07-28 ENCOUNTER — NON-APPOINTMENT (OUTPATIENT)
Age: 63
End: 2023-07-28

## 2023-07-28 LAB
ALBUMIN SERPL ELPH-MCNC: 4.9 G/DL
ALP BLD-CCNC: 85 U/L
ALT SERPL-CCNC: 16 U/L
ANION GAP SERPL CALC-SCNC: 16 MMOL/L
AST SERPL-CCNC: 20 U/L
BILIRUB DIRECT SERPL-MCNC: 0.1 MG/DL
BILIRUB INDIRECT SERPL-MCNC: 0.2 MG/DL
BILIRUB SERPL-MCNC: 0.3 MG/DL
BUN SERPL-MCNC: 10 MG/DL
CALCIUM SERPL-MCNC: 9.7 MG/DL
CHLORIDE SERPL-SCNC: 93 MMOL/L
CHOLEST SERPL-MCNC: 207 MG/DL
CK SERPL-CCNC: 54 U/L
CO2 SERPL-SCNC: 18 MMOL/L
CREAT SERPL-MCNC: 0.49 MG/DL
DEPRECATED D DIMER PPP IA-ACNC: 311 NG/ML DDU
EGFR: 105 ML/MIN/1.73M2
ESTIMATED AVERAGE GLUCOSE: 117 MG/DL
GLUCOSE SERPL-MCNC: 100 MG/DL
HBA1C MFR BLD HPLC: 5.7 %
HDLC SERPL-MCNC: 42 MG/DL
LDLC SERPL CALC-MCNC: 133 MG/DL
LDLC SERPL DIRECT ASSAY-MCNC: 141 MG/DL
MAGNESIUM SERPL-MCNC: 2 MG/DL
NONHDLC SERPL-MCNC: 166 MG/DL
PHOSPHATE SERPL-MCNC: 2.7 MG/DL
POTASSIUM SERPL-SCNC: 3.9 MMOL/L
PROT SERPL-MCNC: 7.7 G/DL
SODIUM SERPL-SCNC: 128 MMOL/L
T4 FREE SERPL-MCNC: 1.7 NG/DL
TRIGL SERPL-MCNC: 184 MG/DL
TSH SERPL-ACNC: 1.38 UIU/ML

## 2023-07-28 NOTE — HISTORY OF PRESENT ILLNESS
[FreeTextEntry1] : SINDHU is a 62 year old F w/MHx of Elevated D Dimer, HLD, HTN, DM, Hypothyroid who presents for follow up. Did have fall prior to last visit. Holter was performed 5/17/2023 Does have some c/o R knee pain, taking APAP w/ good effect. TTE was Rx however not performed.\par The patient is here for follow-up of elevated cholesterol. Currently tolerating prescribed medications. Denies muscle pain, joint pain, back pain, urinary changes, nausea, vomiting, abdominal pain or diarrhea. The patient is trying to follow a low cholesterol diet.\par The patient is here for evaluation of high blood pressure. Currently tolerating antihypertensive medications. Denies headaches, stiff neck, visual changes, or PND. The patient has been trying to stay on a low-sodium diet.

## 2023-07-31 ENCOUNTER — NON-APPOINTMENT (OUTPATIENT)
Age: 63
End: 2023-07-31

## 2023-08-03 ENCOUNTER — NON-APPOINTMENT (OUTPATIENT)
Age: 63
End: 2023-08-03

## 2023-08-25 ENCOUNTER — INPATIENT (INPATIENT)
Facility: HOSPITAL | Age: 63
LOS: 11 days | Discharge: PSYCHIATRIC OR SUBSTANCE USE FACILITY | End: 2023-09-06
Attending: STUDENT IN AN ORGANIZED HEALTH CARE EDUCATION/TRAINING PROGRAM | Admitting: STUDENT IN AN ORGANIZED HEALTH CARE EDUCATION/TRAINING PROGRAM
Payer: MEDICARE

## 2023-08-25 VITALS
RESPIRATION RATE: 15 BRPM | OXYGEN SATURATION: 99 % | SYSTOLIC BLOOD PRESSURE: 172 MMHG | TEMPERATURE: 98 F | DIASTOLIC BLOOD PRESSURE: 83 MMHG | HEART RATE: 97 BPM

## 2023-08-25 DIAGNOSIS — F20.9 SCHIZOPHRENIA, UNSPECIFIED: ICD-10-CM

## 2023-08-25 LAB
ANION GAP SERPL CALC-SCNC: 16 MMOL/L — HIGH (ref 7–14)
APAP SERPL-MCNC: <10 UG/ML — LOW (ref 15–25)
BASOPHILS # BLD AUTO: 0.05 K/UL — SIGNIFICANT CHANGE UP (ref 0–0.2)
BASOPHILS NFR BLD AUTO: 0.9 % — SIGNIFICANT CHANGE UP (ref 0–2)
BUN SERPL-MCNC: 10 MG/DL — SIGNIFICANT CHANGE UP (ref 7–23)
CALCIUM SERPL-MCNC: 9.7 MG/DL — SIGNIFICANT CHANGE UP (ref 8.4–10.5)
CHLORIDE SERPL-SCNC: 99 MMOL/L — SIGNIFICANT CHANGE UP (ref 98–107)
CO2 SERPL-SCNC: 23 MMOL/L — SIGNIFICANT CHANGE UP (ref 22–31)
CREAT SERPL-MCNC: 0.62 MG/DL — SIGNIFICANT CHANGE UP (ref 0.5–1.3)
EGFR: 99 ML/MIN/1.73M2 — SIGNIFICANT CHANGE UP
EOSINOPHIL # BLD AUTO: 0.14 K/UL — SIGNIFICANT CHANGE UP (ref 0–0.5)
EOSINOPHIL NFR BLD AUTO: 2.6 % — SIGNIFICANT CHANGE UP (ref 0–6)
ETHANOL SERPL-MCNC: <10 MG/DL — SIGNIFICANT CHANGE UP
GLUCOSE SERPL-MCNC: 100 MG/DL — HIGH (ref 70–99)
HCT VFR BLD CALC: 32.3 % — LOW (ref 34.5–45)
HGB BLD-MCNC: 10.8 G/DL — LOW (ref 11.5–15.5)
IANC: 3.1 K/UL — SIGNIFICANT CHANGE UP (ref 1.8–7.4)
IMM GRANULOCYTES NFR BLD AUTO: 0.4 % — SIGNIFICANT CHANGE UP (ref 0–0.9)
LYMPHOCYTES # BLD AUTO: 1.44 K/UL — SIGNIFICANT CHANGE UP (ref 1–3.3)
LYMPHOCYTES # BLD AUTO: 26.8 % — SIGNIFICANT CHANGE UP (ref 13–44)
MCHC RBC-ENTMCNC: 29.1 PG — SIGNIFICANT CHANGE UP (ref 27–34)
MCHC RBC-ENTMCNC: 33.4 GM/DL — SIGNIFICANT CHANGE UP (ref 32–36)
MCV RBC AUTO: 87.1 FL — SIGNIFICANT CHANGE UP (ref 80–100)
MONOCYTES # BLD AUTO: 0.62 K/UL — SIGNIFICANT CHANGE UP (ref 0–0.9)
MONOCYTES NFR BLD AUTO: 11.5 % — SIGNIFICANT CHANGE UP (ref 2–14)
NEUTROPHILS # BLD AUTO: 3.1 K/UL — SIGNIFICANT CHANGE UP (ref 1.8–7.4)
NEUTROPHILS NFR BLD AUTO: 57.8 % — SIGNIFICANT CHANGE UP (ref 43–77)
NRBC # BLD: 0 /100 WBCS — SIGNIFICANT CHANGE UP (ref 0–0)
NRBC # FLD: 0 K/UL — SIGNIFICANT CHANGE UP (ref 0–0)
PLATELET # BLD AUTO: 264 K/UL — SIGNIFICANT CHANGE UP (ref 150–400)
POTASSIUM SERPL-MCNC: 3 MMOL/L — LOW (ref 3.5–5.3)
POTASSIUM SERPL-SCNC: 3 MMOL/L — LOW (ref 3.5–5.3)
RBC # BLD: 3.71 M/UL — LOW (ref 3.8–5.2)
RBC # FLD: 12 % — SIGNIFICANT CHANGE UP (ref 10.3–14.5)
SALICYLATES SERPL-MCNC: <0.3 MG/DL — LOW (ref 15–30)
SODIUM SERPL-SCNC: 138 MMOL/L — SIGNIFICANT CHANGE UP (ref 135–145)
TSH SERPL-MCNC: 1.07 UIU/ML — SIGNIFICANT CHANGE UP (ref 0.27–4.2)
WBC # BLD: 5.37 K/UL — SIGNIFICANT CHANGE UP (ref 3.8–10.5)
WBC # FLD AUTO: 5.37 K/UL — SIGNIFICANT CHANGE UP (ref 3.8–10.5)

## 2023-08-25 PROCEDURE — 99285 EMERGENCY DEPT VISIT HI MDM: CPT

## 2023-08-25 RX ORDER — SODIUM CHLORIDE 9 MG/ML
1000 INJECTION INTRAMUSCULAR; INTRAVENOUS; SUBCUTANEOUS ONCE
Refills: 0 | Status: COMPLETED | OUTPATIENT
Start: 2023-08-25 | End: 2023-08-25

## 2023-08-25 RX ORDER — POTASSIUM CHLORIDE 20 MEQ
40 PACKET (EA) ORAL ONCE
Refills: 0 | Status: COMPLETED | OUTPATIENT
Start: 2023-08-25 | End: 2023-08-25

## 2023-08-25 RX ORDER — ACETAMINOPHEN 500 MG
650 TABLET ORAL ONCE
Refills: 0 | Status: COMPLETED | OUTPATIENT
Start: 2023-08-25 | End: 2023-08-25

## 2023-08-25 RX ADMIN — Medication 650 MILLIGRAM(S): at 19:50

## 2023-08-25 RX ADMIN — Medication 40 MILLIEQUIVALENT(S): at 20:41

## 2023-08-25 RX ADMIN — SODIUM CHLORIDE 1000 MILLILITER(S): 9 INJECTION INTRAMUSCULAR; INTRAVENOUS; SUBCUTANEOUS at 18:25

## 2023-08-25 RX ADMIN — Medication 650 MILLIGRAM(S): at 19:11

## 2023-08-25 NOTE — ED BEHAVIORAL HEALTH ASSESSMENT NOTE - HPI (INCLUDE ILLNESS QUALITY, SEVERITY, DURATION, TIMING, CONTEXT, MODIFYING FACTORS, ASSOCIATED SIGNS AND SYMPTOMS)
63 y/o   female, single, noncaregiver, domiciled alone, on SSD, non caregiver, with psychiatric history of Schizophrenia and Alcohol Abuse, 3 psychiatric hospitalizations last at [facility] from 8/2018-11/2018, no history of SI, SA, or HI. No history of aggression or legal issues, current daily alcohol use, no known history of detox/rehab/withdrawal symptoms/DTs or seizuresPMH of DM2, hypothyroidism, Afib on Xarelto, hx of hypovolemic and septic shock secondary to UTI requiring pressors 01/20/23; NPH (normal pressure hydrocephalus). 63 y/o   female,  x 10 years, no children, no family, noncaregiver, retired  at [employer], domiciled alone in a private house (currently facing foreclosure from the bank due to unpaid debt), HHA services Mon-Friday for 3 hours /day (Pt did not let HHA in 2 weeks ago), PUMA mohan 2022 for 3 months, on SSD, with psychiatric history of Schizophrenia and Alcohol Abuse, hx of Cannabis Abuse, Personality Disorder, 3 psychiatric hospitalizations last at [facility] from 8/2018-11/2018, attended [facility] starting on 11/26/2018 - 04/28/2023 (discharged for failure to attend appointments), no history of SI, SA, or HI. No history of aggression or legal issues, drinking 3-6 beers nightly and using marijuana on the weekends, no known history of detox/rehab/withdrawal symptoms/DTs or seizures, PMH of DM2, hypothyroidism, Afib on Xarelto, hx of hypovolemic and septic shock secondary to UTI requiring pressors 01/20/23; NPH (normal pressure hydrocephalus; onset of difficulty walking 12/22; [facility] discharge recommendation to f/u with neuro as outpt for large volume LP on 01/20/23), who was BIB EMS from home after her close friend/AA sponsor/HCP Luciano Esparzajuliettesimran sent her in for poor self care.     COLLATERAL FROM FRIEND/AA SPONSOR Luciano Herrera at [phone]: he has known Patient for > 9 years; he last saw Patient well 4 days ago. At baseline, Patient is "normal" does her own shopping, takes care of herself, carries a fluent conversation and functioning swell. Patient usually calls him everyday at 6pm to let him know how she is doing. At times, Mr Wolf goes over to run an errand for her, grocery shopping etc. In the last few days, he has found Patient on the floor after falling and no being able to get back up. Luciano does not think Patient "needs to be locked into psychiatry" at this time and sent her in to get "services" such as more help at home. Luciano thinks Patient needs to go to a sub acute rehab facility to get her strength back, be medically tuned up and also psychiatrically treated. No aggression, no violence, no suicidality.    ISTOP Reference #: [id number] no record of patient  CVM reviewed 61 y/o   female,  x 10 years, no children, no family, noncaregiver, retired  at [employer], domiciled alone in a private house (currently facing foreclosure from the bank due to unpaid debt), HHA services Mon-Friday for 3 hours /day (Pt did not let HHA back in so no services for last 2 weeks), PUMA stint 2022 for 3 months, on SSD, with psychiatric history of Schizophrenia and Alcohol Abuse, hx of Cannabis Abuse, Personality Disorder, 3 psychiatric hospitalizations last at [facility] from 8/2018-11/2018, attended [facility] starting on 11/26/2018 - 04/28/2023 (discharged for failure to attend appointments), no history of SI, SA, or HI. No history of aggression or legal issues, drinking 3-6 beers nightly and using marijuana on the weekends, no known history of detox/rehab/withdrawal symptoms/DTs or seizures, PMH of DM2, hypothyroidism, Afib on Xarelto, hx of hypovolemic and septic shock secondary to UTI requiring pressors 01/20/23; NPH (normal pressure hydrocephalus; onset of difficulty walking 12/22; [facility] discharge recommendation to f/u with neuro as outpt for large volume LP on 01/20/23), who was BIB EMS from home after her close friend/AA sponsor/HCP Luciano Herrera sent her in for poor self care, found laying on the floor. Serum tox negative.     EXAM: Patient sitting on side of stretcher, somewhat unkempt but otherwise adequate hygiene, she is frequently laughing out loud (unclear over what), with elevated, expansive out-of-context happy mood, Limited historian and has difficultly maintaining attention. Keeps saying "ask my boyfriend" (ie Joseid), Confirms that she is not taking any medications by mouth or as an injection. Not able to decipher if that includes medical medications. Says "no" when asked if she has been seeing doctors for follow up. Grossly tangential, internally preoccupied. Denies suicidality    COLLATERAL FROM FRIEND/AA SPONSOR Luciano Herrera at [phone]: he has known Patient for > 9 years; he last saw Patient well 4 days ago. At baseline, Patient is "normal" does her own shopping, takes care of herself, carries a fluent conversation and functioning swell. Patient usually calls him everyday at 6pm to let him know how she is doing. At times, Mr Wolf goes over to run an errand for her, grocery shopping etc. In the last few days, he has found Patient on the floor after falling and no being able to get back up. Luciano ambivalent into what exactly Patient needs - says "she needs help. whatever you guys think."  On one hand, he does not think Patient "needs to be locked into psychiatry" at this time if she does not want to, and he sent her in to get "services" such as more help at home. Luciano thinks Patient needs to go to a sub acute rehab facility to get her strength back, be medically tuned up and also psychiatrically treated. No aggression, no violence, no suicidality.     ISTOP Reference #: [id number] no record of patient  CVM reviewed

## 2023-08-25 NOTE — ED BEHAVIORAL HEALTH ASSESSMENT NOTE - VIOLENCE RISK FACTORS:
Substance abuse/Noncompliance with treatment Substance abuse/Affective dysregulation/Noncompliance with treatment

## 2023-08-25 NOTE — ED ADULT NURSE NOTE - ALCOHOL PRE SCREEN (AUDIT - C)
Prep Survey      Responses   [facility] patient discharged from? [facility]   Is LACE score < 7 ? Yes   Emergency Room discharge w/ pulse ox? No   Eligibility Not Eligible   What are the reasons patient is not eligible? Other   Does the patient have one of the following disease processes/diagnoses(primary or secondary)? Other   Prep survey completed? Yes          Johanna Monk RN        
Statement Selected

## 2023-08-25 NOTE — ED BEHAVIORAL HEALTH ASSESSMENT NOTE - RISK ASSESSMENT
Chronic risk factors: single, no family involvement, long psych hx including psychosis, substance abuse, hx of noncompliance. Protective factors: age < 65 yrs, female gender, denies recent substance use, stable domicile albeit soon to face bank foreclosure, no suicide attempts;  no legal issues. Acute risk factors identified: psychosis, noncompliance with both psych and medical medications, no current access to psych services, lives alone, no more HHA services, increased falls

## 2023-08-25 NOTE — ED BEHAVIORAL HEALTH ASSESSMENT NOTE - PSYCHIATRIC ISSUES AND PLAN (INCLUDE STANDING AND PRN MEDICATION)
start Zydis 5mg PO bid for psychosis, affective lability; HIGHLY RECOMMEND medical admission - get CT head, Neurology consultation in light of previously diagnosed NPH with no subsequent follow up for large volume tap, with worsened balance/gait now using wheelchair and found laying on the floor not able to get up with acute AMS in last 4 days.

## 2023-08-25 NOTE — ED PROVIDER NOTE - ATTENDING CONTRIBUTION TO CARE
62-year-old female, past medical history of diabetes, hypothyroidism, schizophrenia (on Prolixin 125 mg IM weekly), prior history of EtOH abuse, and A-fib (on Xarelto), presents to ED from home for psych evaluation.  Patient is A&O x3.  Patient is poor historian.  Patient states that she has not been compliant with her schizophrenia medication for approximately 2.5 months.  Denies any acute complaints here.  Denies SI/HI/AH/VH.    Collateral obtained from Luciano Herrera (Friend/Caretaker/Emergency Contact): States that patient has been noncompliant with Prolixin for couple months now. States that patient lives alone, and has been unable to care for herself. She is eating less, behaving strangely, and having visual hallucinations. He states that patient always gets like this when she is noncompliant with meds.    Patient's vital signs stable.  Physical exam unremarkable.  Patient is very tangential of thought, has flat affect, and bizarre behaviors.  However is following commands and cooperative.  No abdominal tenderness.  Heart is regular rate and rhythm.  Lungs clear to auscultation bilaterally.  Neuro exam is nonfocal.    Plan to perform psych medical clearance.  We will then consult psych for evaluation.  Patient likely would benefit from inpatient psychiatric admission given unable to take care of self due to decompensated schizophrenia.

## 2023-08-25 NOTE — ED PROVIDER NOTE - PATIENT'S PREFERRED PRONOUN
Airway       Patient location during procedure: OR       Procedure Start/Stop Times: 4/28/2022 9:38 AM  Staff -        CRNA: Patricia Farris APRN CRNA       Performed By: CRNA  Consent for Airway        Urgency: elective  Indications and Patient Condition       Indications for airway management: savanna-procedural       Induction type:intravenous       Mask difficulty assessment: 1 - vent by mask    Final Airway Details       Final airway type: endotracheal airway       Successful airway: ETT - single and Oral  Endotracheal Airway Details        ETT size (mm): 7.0       Cuffed: yes       Successful intubation technique: direct laryngoscopy       DL Blade Type: Long 2       Grade View of Cords: 3       Position: Center       Measured from: gums/teeth       Secured at (cm): 22       Bite block used: None    Post intubation assessment        Placement verified by: capnometry, equal breath sounds and chest rise        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      
Her/She

## 2023-08-25 NOTE — ED BEHAVIORAL HEALTH ASSESSMENT NOTE - SUMMARY
63 y/o F w/ hx of Schizophrenia, Alcohol Abuse, hx of Cannabis Abuse, Personality Disorder, 3 psychiatric hospitalizations last at [facility] from 8/2018-11/2018, attended [facility] starting on 11/26/2018 - 04/28/2023 (discharged for failure to attend appointments), noncompliant with outpatient medical and psychiatric services at this time including not taking any psychiatric medications, suspected noncompliant also with medical medications such as Xarelto for A-fib, onset of gait changed 12/22 with subsequnt dx at [facility] in 01/23 of NPH with recommendation to f/u with neuro as outpt for large volume LP which Patient never did. Patient was at her baseline 4 days ago and was found on the floor of her home after a fall today, poor PO intake, and presenting now with poor attention, highly labile elated mood/affect, laughing loudly and frequently out of context, disoriented to situation/time, poor historian. HIGHLY RECOMMEND medical admission - get CT head, Neurology consultation in light of previously diagnosed NPH with no subsequent follow up for large volume tap, with worsened balance/gait now using wheelchair and found laying on the floor not able to get up with acute AMS in last 4 days.

## 2023-08-25 NOTE — ED BEHAVIORAL HEALTH ASSESSMENT NOTE - OTHER
deferred close friend, AA sponsor, Health Care Proxy "great!" somewhat dishevelled unable to tolerate an MMSE at this time cannot rule out perceptual distortions limited tenuous expansive, highly elevated, laughing frequently out of context psychosis

## 2023-08-25 NOTE — ED ADULT TRIAGE NOTE - CHIEF COMPLAINT QUOTE
Pt arrives via EMS from home, friend/caretaker called 911 d/t patient not eating, sleeping, or taking care of herself lately. Hasn't had monthly schizophrenia injection in 2.5 months. PMHx: DM, Afib, schizophrenia, hypothyroid. Pt arrives via EMS from home, friend/caretaker called 911 d/t patient not eating, sleeping, or taking care of herself lately. Hasn't had monthly schizophrenia injection in 2.5 months. Uses walker at baseline. PMHx: DM, Afib, schizophrenia, hypothyroid.

## 2023-08-25 NOTE — ED BEHAVIORAL HEALTH ASSESSMENT NOTE - CURRENT MEDICATION
noncompliant with Prolixin Decanoate 12.5mg IM MURILLO; atorvastatin 40 mg PO qhs, Cogentin 2mg PO qd, buspirone 15mg PO qhs; clonazepam 0.5mg po qd prn, gabapentin 100 mg oral PO qhs, Synthroid 50mcg po qd, melatonin 3mg po qhs prn; metformin 850mg PO qd; metoprolol ER 50mg PO qd, rivaroxaban 20 mg oral tablet: 1 tab(s) orally once a day

## 2023-08-25 NOTE — ED BEHAVIORAL HEALTH ASSESSMENT NOTE - PAST PSYCHOTROPIC MEDICATION
Geodon, Zoloft, Zyprexa Zydis, Klonopin Geodon, Zoloft, Zyprexa Zydis, Klonopin; SeroquelTierar (cost.), Prolixin PO and MURILLO, BuSpar 15mg hs, and Cogentin 1mg bid, haldol, ativan

## 2023-08-25 NOTE — ED ADULT NURSE NOTE - OBJECTIVE STATEMENT
Patient presents to ED for inability to perform ADLs as per triage note. She is AA&Ox2, in no acute distress, calm, cooperative. Patient states she feels fine, but reports chronic back pain, difficulty walking, and is here because she "has schizophrenia." At baseline, walks with walker. Denies CP, dizziness, SOB, dyspnea, N/V, fevers. Respirations even, unlabored on room air. History of DM, A-fib, schizophrenia, hypothyroid. 20G IV placed right AC, labs drawn and sent.

## 2023-08-25 NOTE — ED BEHAVIORAL HEALTH ASSESSMENT NOTE - DIFFERENTIAL
rule out delirium due to medical condition....concerning is increased falls, worsened gait, now wheelchair bound and never followed up with outpatient neurology for recommended large volume LP

## 2023-08-25 NOTE — ED ADULT NURSE NOTE - CHIEF COMPLAINT QUOTE
Pt arrives via EMS from home, friend/caretaker called 911 d/t patient not eating, sleeping, or taking care of herself lately. Hasn't had monthly schizophrenia injection in 2.5 months. Uses walker at baseline. PMHx: DM, Afib, schizophrenia, hypothyroid.

## 2023-08-25 NOTE — ED BEHAVIORAL HEALTH ASSESSMENT NOTE - DESCRIPTION
Tylenol 650mg PO x 1 dose and potassium chloride 40mEq x 1 dose see HPI DM2, hypothyroidism, Afib on Xarelto, hx of hypovolemic and septic shock secondary to UTI requiring pressors 01/20/23; NPH (normal pressure hydrocephalus; onset of difficulty walking 12/22; [facility] discharge recommendation to f/u with neuro as outpt for large volume LP on 01/20/23)

## 2023-08-25 NOTE — ED PROVIDER NOTE - PROGRESS NOTE DETAILS
MD Lopez: Pt evaluated by psych. They believe pt is acute psychotic and will need inpatient treatment. However, they are concerned about pt's gait imbalance, and recent h/o ?NPH based on CTH on last admission. Pt was supposed to follow up w/ neuro outpt for LP to drain fluid, however did not. Given this psych recommending CTH, medical admission, and PT/OT. Psych will follow while patient inpatient. Autumn DE LA TORRE PGY3: CT c.f NPH. spoke to hospitalist who will admit pt.

## 2023-08-25 NOTE — ED BEHAVIORAL HEALTH ASSESSMENT NOTE - INDICATION
psychosis, falls psychosis, falls; NO CAPACITY to make her medical decisions and she cannot leave AMA

## 2023-08-25 NOTE — ED PROVIDER NOTE - OBJECTIVE STATEMENT
62-year-old female history of schizophrenia unable to discern as to why she is here patient laughs and states she is waiting for her boyfriend.  Is denying any pain.  Patient is not a good historian.  Per ED triage note patient was brought in by a caretaker who stated that she was not eating sleeping or taking care of herself.  She also has reportedly not had her schizophrenia medication in 2-1/2 months.

## 2023-08-25 NOTE — ED BEHAVIORAL HEALTH ASSESSMENT NOTE - AXIS III
DM2, hypothyroidism, Afib on Xarelto, hx of hypovolemic and septic shock secondary to UTI requiring pressors 01/20/23; NPH (normal pressure hydrocephalus; onset of difficulty walking 12/22; [facility] discharge recommendation to f/u with neuro as outpt for large volume LP on 01/20/23)

## 2023-08-25 NOTE — ED PROVIDER NOTE - NS ED MD TWO NIGHTS YN
Chief complaint:   Chief Complaint   Patient presents with   • Neurologic Problem     seizures        Vitals:  Visit Vitals  /61 (BP Location: RUE - Right upper extremity, Patient Position: Sitting, Cuff Size: Regular)   Pulse 70   Wt 53.5 kg   BMI 19.49 kg/m²       HISTORY OF PRESENT ILLNESS     HPI:  This is a 23-year-old female who presents for follow-up today regarding complex partial seizures.  She was last seen by me in follow-up in June time frame.  She remains on Keppra XR 2000 mg daily.  She has not had any seizures since her last visit with me.  Her mother states that the patient occasionally has anxiety and wonders whether this is Keppra related.  She has been on the Keppra for a long time now and also does have a tendency for anxiety at baseline.  Patient denies any other health-related concerns today.    Other significant problems:  Patient Active Problem List    Diagnosis Date Noted   • Vegan diet 12/06/2018     Priority: Low   • Epilepsy without status epilepticus, not intractable (CMS/[facility]) 05/26/2016     Priority: Low   • Autism 05/26/2016     Priority: Low       PAST MEDICAL, FAMILY AND SOCIAL HISTORY     Medications:  Current Outpatient Medications   Medication   • KEPPRA  MG 24 hr tablet   • Multiple Minerals-Vitamins (CALCIUM CITRATE PLUS/MAGNESIUM) Tab   • Ibuprofen (IBU PO)   • Multiple Vitamins-Minerals (MULTIVITAMIN PO)     No current facility-administered medications for this visit.        Allergies:  ALLERGIES:  No Known Allergies    Past Medical  History/Surgeries:  Past Medical History:   Diagnosis Date   • Autism    • Epilepsy (CMS/[facility]) age 14 onset    Dr. Camila Elmore, neurology       Past Surgical History:   Procedure Laterality Date   • [place] tooth extraction  12/2015       Family History:  Family History   Problem Relation Age of Onset   • NEGATIVE FAMILY HX OF Other         colon, ovarian cancer   • Cancer, Breast Other         great-grand parents       Social  History:  Social History     Tobacco Use   • Smoking status: Never Smoker   • Smokeless tobacco: Never Used   Substance Use Topics   • Alcohol use: No     Alcohol/week: 0.0 standard drinks       REVIEW OF SYSTEMS     Review of Systems  Per HPI. New ROS Symptoms: Constitutional-negative. EYES-negative. ENT-negative. CV-negative LUNGS-Negative. ABD-Negative. -Negative. MSK-negative. PSYCH-Negative. SKIN-negative. HEME/ONC-Negative. Allerg/IMMUN-negative.   PHYSICAL EXAM     Physical Exam  GENERAL: NAD.     Vital signs are recorded in the nursing note.    HEENT: Negative. MMM. Non icteric sclera.   CARDIOVASCULAR:  Heart rate was regular without abnormal sounds.   LUNGS: CTA b/l.   Psyche: Euthymic.      MENTAL STATUS: Speech is fluent. Comprehension baseline.   CRANIAL NERVES:  EOMI, PERRLA, face symmetric, voice clear.   MOTOR EXAMINATION: 5/5 strength x 4 with normal bulk and tone.   Cerebellar: No dysmetria.   Gait-Normal.  ASSESSMENT/PLAN     1. Partial symptomatic epilepsy    Plan:  This patient was seen and examined in follow-up today regarding complex partial seizures.  I have updated her prescription for the Keppra XR.  Of note it does need to be brand name.  She will continue 2000 mg daily.  I have recommended that she follow up with me in 6 months for routine follow-up.  If she has any new questions, concerns or new developments, she can call the office.   Yes

## 2023-08-25 NOTE — ED BEHAVIORAL HEALTH ASSESSMENT NOTE - OTHER PAST PSYCHIATRIC HISTORY (INCLUDE DETAILS REGARDING ONSET, COURSE OF ILLNESS, INPATIENT/OUTPATIENT TREATMENT)
3 [facility] inpatient admissions 1995, 2018, 2019; [facility] stints in 1995, 1997 x 2, 1999  - remote 3 [facility] inpatient admissions , , 2019; [facility] stints in , 1997 x 2,   - remote  1/15/2015 [facility] note: "After her hospitalization in  the pt accepted her need for antipsychotic medication and had remained compliant until her discharge. From following her last hospitalization in  the pt lived a very isolated lifestyle often sleeping late into the day and then calling her  at work two or three times and waited until he arrived home and prepared dinner. He was satisfied with her living like as described above since it kept her safe and out of the hospital. She consistently presented in individual therapy with blunted affect and expressing that everything was okay. On the weekends she and her  would shop and go out to eat together. Any attempt to explore any need to participate more in life like being with others in some way was resisted. Her life changed drastically when her  suddenly  of a heart attack in 2013. She had very little family or friends to turn to. She was seen weekly often spending much of the session crying and expressing feelings of being overwhelmed with loneliness and despair. She has two couples in [place]; cousins of her  who became more available to her and helped her to adapt to paying bills etc. She had to become the caretaker for her dog, who became a  for her. Neighbors on her block reached out to her and were of some assistance. However after some months she became aware that the neighbors had their own lives and were not going to be that available."

## 2023-08-26 DIAGNOSIS — R26.81 UNSTEADINESS ON FEET: ICD-10-CM

## 2023-08-26 DIAGNOSIS — I63.9 CEREBRAL INFARCTION, UNSPECIFIED: ICD-10-CM

## 2023-08-26 DIAGNOSIS — E87.6 HYPOKALEMIA: ICD-10-CM

## 2023-08-26 DIAGNOSIS — G91.2 (IDIOPATHIC) NORMAL PRESSURE HYDROCEPHALUS: ICD-10-CM

## 2023-08-26 DIAGNOSIS — F20.9 SCHIZOPHRENIA, UNSPECIFIED: ICD-10-CM

## 2023-08-26 DIAGNOSIS — E03.9 HYPOTHYROIDISM, UNSPECIFIED: ICD-10-CM

## 2023-08-26 DIAGNOSIS — I48.91 UNSPECIFIED ATRIAL FIBRILLATION: ICD-10-CM

## 2023-08-26 DIAGNOSIS — E11.9 TYPE 2 DIABETES MELLITUS WITHOUT COMPLICATIONS: ICD-10-CM

## 2023-08-26 LAB
A1C WITH ESTIMATED AVERAGE GLUCOSE RESULT: 5.7 % — HIGH (ref 4–5.6)
ALBUMIN SERPL ELPH-MCNC: 4.3 G/DL — SIGNIFICANT CHANGE UP (ref 3.3–5)
ALP SERPL-CCNC: 66 U/L — SIGNIFICANT CHANGE UP (ref 40–120)
ALT FLD-CCNC: 18 U/L — SIGNIFICANT CHANGE UP (ref 4–33)
AMPHET UR-MCNC: NEGATIVE — SIGNIFICANT CHANGE UP
ANION GAP SERPL CALC-SCNC: 16 MMOL/L — HIGH (ref 7–14)
APPEARANCE UR: ABNORMAL
APTT BLD: 35.4 SEC — SIGNIFICANT CHANGE UP (ref 24.5–35.6)
AST SERPL-CCNC: 20 U/L — SIGNIFICANT CHANGE UP (ref 4–32)
BACTERIA # UR AUTO: ABNORMAL /HPF
BARBITURATES UR SCN-MCNC: NEGATIVE — SIGNIFICANT CHANGE UP
BASOPHILS # BLD AUTO: 0.04 K/UL — SIGNIFICANT CHANGE UP (ref 0–0.2)
BASOPHILS NFR BLD AUTO: 0.7 % — SIGNIFICANT CHANGE UP (ref 0–2)
BENZODIAZ UR-MCNC: NEGATIVE — SIGNIFICANT CHANGE UP
BILIRUB SERPL-MCNC: 0.3 MG/DL — SIGNIFICANT CHANGE UP (ref 0.2–1.2)
BILIRUB UR-MCNC: NEGATIVE — SIGNIFICANT CHANGE UP
BUN SERPL-MCNC: 8 MG/DL — SIGNIFICANT CHANGE UP (ref 7–23)
CALCIUM SERPL-MCNC: 9.4 MG/DL — SIGNIFICANT CHANGE UP (ref 8.4–10.5)
CAST: 2 /LPF — SIGNIFICANT CHANGE UP (ref 0–4)
CHLORIDE SERPL-SCNC: 103 MMOL/L — SIGNIFICANT CHANGE UP (ref 98–107)
CHOLEST SERPL-MCNC: 155 MG/DL — SIGNIFICANT CHANGE UP
CO2 SERPL-SCNC: 22 MMOL/L — SIGNIFICANT CHANGE UP (ref 22–31)
COCAINE METAB.OTHER UR-MCNC: NEGATIVE — SIGNIFICANT CHANGE UP
COLOR SPEC: YELLOW — SIGNIFICANT CHANGE UP
CREAT SERPL-MCNC: 0.59 MG/DL — SIGNIFICANT CHANGE UP (ref 0.5–1.3)
CREATININE URINE RESULT, DAU: 128 MG/DL — SIGNIFICANT CHANGE UP
DIFF PNL FLD: ABNORMAL
EGFR: 101 ML/MIN/1.73M2 — SIGNIFICANT CHANGE UP
EOSINOPHIL # BLD AUTO: 0.14 K/UL — SIGNIFICANT CHANGE UP (ref 0–0.5)
EOSINOPHIL NFR BLD AUTO: 2.6 % — SIGNIFICANT CHANGE UP (ref 0–6)
ESTIMATED AVERAGE GLUCOSE: 117 — SIGNIFICANT CHANGE UP
GLUCOSE BLDC GLUCOMTR-MCNC: 110 MG/DL — HIGH (ref 70–99)
GLUCOSE BLDC GLUCOMTR-MCNC: 123 MG/DL — HIGH (ref 70–99)
GLUCOSE BLDC GLUCOMTR-MCNC: 141 MG/DL — HIGH (ref 70–99)
GLUCOSE SERPL-MCNC: 92 MG/DL — SIGNIFICANT CHANGE UP (ref 70–99)
GLUCOSE UR QL: NEGATIVE MG/DL — SIGNIFICANT CHANGE UP
HCT VFR BLD CALC: 33.6 % — LOW (ref 34.5–45)
HDLC SERPL-MCNC: 35 MG/DL — LOW
HGB BLD-MCNC: 11.2 G/DL — LOW (ref 11.5–15.5)
IANC: 3.25 K/UL — SIGNIFICANT CHANGE UP (ref 1.8–7.4)
IMM GRANULOCYTES NFR BLD AUTO: 0.6 % — SIGNIFICANT CHANGE UP (ref 0–0.9)
INR BLD: 1.02 RATIO — SIGNIFICANT CHANGE UP (ref 0.85–1.18)
KETONES UR-MCNC: 40 MG/DL
LEUKOCYTE ESTERASE UR-ACNC: ABNORMAL
LIPID PNL WITH DIRECT LDL SERPL: 99 MG/DL — SIGNIFICANT CHANGE UP
LYMPHOCYTES # BLD AUTO: 1.47 K/UL — SIGNIFICANT CHANGE UP (ref 1–3.3)
LYMPHOCYTES # BLD AUTO: 27.3 % — SIGNIFICANT CHANGE UP (ref 13–44)
MAGNESIUM SERPL-MCNC: 1.8 MG/DL — SIGNIFICANT CHANGE UP (ref 1.6–2.6)
MCHC RBC-ENTMCNC: 28.6 PG — SIGNIFICANT CHANGE UP (ref 27–34)
MCHC RBC-ENTMCNC: 33.3 GM/DL — SIGNIFICANT CHANGE UP (ref 32–36)
MCV RBC AUTO: 85.7 FL — SIGNIFICANT CHANGE UP (ref 80–100)
METHADONE UR-MCNC: NEGATIVE — SIGNIFICANT CHANGE UP
MONOCYTES # BLD AUTO: 0.46 K/UL — SIGNIFICANT CHANGE UP (ref 0–0.9)
MONOCYTES NFR BLD AUTO: 8.5 % — SIGNIFICANT CHANGE UP (ref 2–14)
NEUTROPHILS # BLD AUTO: 3.25 K/UL — SIGNIFICANT CHANGE UP (ref 1.8–7.4)
NEUTROPHILS NFR BLD AUTO: 60.3 % — SIGNIFICANT CHANGE UP (ref 43–77)
NITRITE UR-MCNC: POSITIVE
NON HDL CHOLESTEROL: 120 MG/DL — SIGNIFICANT CHANGE UP
NRBC # BLD: 0 /100 WBCS — SIGNIFICANT CHANGE UP (ref 0–0)
NRBC # FLD: 0 K/UL — SIGNIFICANT CHANGE UP (ref 0–0)
OPIATES UR-MCNC: NEGATIVE — SIGNIFICANT CHANGE UP
OXYCODONE UR-MCNC: NEGATIVE — SIGNIFICANT CHANGE UP
PCP SPEC-MCNC: SIGNIFICANT CHANGE UP
PCP UR-MCNC: NEGATIVE — SIGNIFICANT CHANGE UP
PH UR: 7 — SIGNIFICANT CHANGE UP (ref 5–8)
PHOSPHATE SERPL-MCNC: 3 MG/DL — SIGNIFICANT CHANGE UP (ref 2.5–4.5)
PLATELET # BLD AUTO: 274 K/UL — SIGNIFICANT CHANGE UP (ref 150–400)
POTASSIUM SERPL-MCNC: 3.4 MMOL/L — LOW (ref 3.5–5.3)
POTASSIUM SERPL-SCNC: 3.4 MMOL/L — LOW (ref 3.5–5.3)
PROT SERPL-MCNC: 7.1 G/DL — SIGNIFICANT CHANGE UP (ref 6–8.3)
PROT UR-MCNC: 30 MG/DL
PROTHROM AB SERPL-ACNC: 11.5 SEC — SIGNIFICANT CHANGE UP (ref 9.5–13)
RBC # BLD: 3.92 M/UL — SIGNIFICANT CHANGE UP (ref 3.8–5.2)
RBC # FLD: 11.9 % — SIGNIFICANT CHANGE UP (ref 10.3–14.5)
RBC CASTS # UR COMP ASSIST: 1 /HPF — SIGNIFICANT CHANGE UP (ref 0–4)
SODIUM SERPL-SCNC: 141 MMOL/L — SIGNIFICANT CHANGE UP (ref 135–145)
SP GR SPEC: 1.02 — SIGNIFICANT CHANGE UP (ref 1–1.03)
SQUAMOUS # UR AUTO: 2 /HPF — SIGNIFICANT CHANGE UP (ref 0–5)
THC UR QL: NEGATIVE — SIGNIFICANT CHANGE UP
TRIGL SERPL-MCNC: 105 MG/DL — SIGNIFICANT CHANGE UP
UROBILINOGEN FLD QL: 1 MG/DL — SIGNIFICANT CHANGE UP (ref 0.2–1)
WBC # BLD: 5.39 K/UL — SIGNIFICANT CHANGE UP (ref 3.8–10.5)
WBC # FLD AUTO: 5.39 K/UL — SIGNIFICANT CHANGE UP (ref 3.8–10.5)
WBC UR QL: 392 /HPF — HIGH (ref 0–5)

## 2023-08-26 PROCEDURE — 99232 SBSQ HOSP IP/OBS MODERATE 35: CPT

## 2023-08-26 PROCEDURE — 99223 1ST HOSP IP/OBS HIGH 75: CPT

## 2023-08-26 PROCEDURE — 70450 CT HEAD/BRAIN W/O DYE: CPT | Mod: 26,MA

## 2023-08-26 RX ORDER — DEXTROSE 50 % IN WATER 50 %
12.5 SYRINGE (ML) INTRAVENOUS ONCE
Refills: 0 | Status: DISCONTINUED | OUTPATIENT
Start: 2023-08-26 | End: 2023-09-06

## 2023-08-26 RX ORDER — ACETAMINOPHEN 500 MG
650 TABLET ORAL EVERY 6 HOURS
Refills: 0 | Status: DISCONTINUED | OUTPATIENT
Start: 2023-08-26 | End: 2023-09-06

## 2023-08-26 RX ORDER — INSULIN LISPRO 100/ML
VIAL (ML) SUBCUTANEOUS
Refills: 0 | Status: DISCONTINUED | OUTPATIENT
Start: 2023-08-26 | End: 2023-09-06

## 2023-08-26 RX ORDER — POTASSIUM CHLORIDE 20 MEQ
40 PACKET (EA) ORAL ONCE
Refills: 0 | Status: COMPLETED | OUTPATIENT
Start: 2023-08-26 | End: 2023-08-26

## 2023-08-26 RX ORDER — LANOLIN ALCOHOL/MO/W.PET/CERES
3 CREAM (GRAM) TOPICAL AT BEDTIME
Refills: 0 | Status: DISCONTINUED | OUTPATIENT
Start: 2023-08-26 | End: 2023-09-06

## 2023-08-26 RX ORDER — ENOXAPARIN SODIUM 100 MG/ML
40 INJECTION SUBCUTANEOUS EVERY 24 HOURS
Refills: 0 | Status: DISCONTINUED | OUTPATIENT
Start: 2023-08-26 | End: 2023-08-26

## 2023-08-26 RX ORDER — INSULIN LISPRO 100/ML
VIAL (ML) SUBCUTANEOUS AT BEDTIME
Refills: 0 | Status: DISCONTINUED | OUTPATIENT
Start: 2023-08-26 | End: 2023-09-06

## 2023-08-26 RX ORDER — RIVAROXABAN 15 MG-20MG
20 KIT ORAL
Refills: 0 | Status: DISCONTINUED | OUTPATIENT
Start: 2023-08-26 | End: 2023-09-06

## 2023-08-26 RX ORDER — ATORVASTATIN CALCIUM 80 MG/1
80 TABLET, FILM COATED ORAL AT BEDTIME
Refills: 0 | Status: DISCONTINUED | OUTPATIENT
Start: 2023-08-26 | End: 2023-09-06

## 2023-08-26 RX ORDER — LEVOTHYROXINE SODIUM 125 MCG
50 TABLET ORAL DAILY
Refills: 0 | Status: DISCONTINUED | OUTPATIENT
Start: 2023-08-26 | End: 2023-09-06

## 2023-08-26 RX ORDER — DEXTROSE 50 % IN WATER 50 %
25 SYRINGE (ML) INTRAVENOUS ONCE
Refills: 0 | Status: DISCONTINUED | OUTPATIENT
Start: 2023-08-26 | End: 2023-09-06

## 2023-08-26 RX ORDER — METOPROLOL TARTRATE 50 MG
50 TABLET ORAL DAILY
Refills: 0 | Status: DISCONTINUED | OUTPATIENT
Start: 2023-08-26 | End: 2023-09-06

## 2023-08-26 RX ORDER — GLUCAGON INJECTION, SOLUTION 0.5 MG/.1ML
1 INJECTION, SOLUTION SUBCUTANEOUS ONCE
Refills: 0 | Status: DISCONTINUED | OUTPATIENT
Start: 2023-08-26 | End: 2023-09-06

## 2023-08-26 RX ORDER — OLANZAPINE 15 MG/1
5 TABLET, FILM COATED ORAL
Refills: 0 | Status: DISCONTINUED | OUTPATIENT
Start: 2023-08-26 | End: 2023-08-30

## 2023-08-26 RX ORDER — HALOPERIDOL DECANOATE 100 MG/ML
5 INJECTION INTRAMUSCULAR ONCE
Refills: 0 | Status: COMPLETED | OUTPATIENT
Start: 2023-08-26 | End: 2023-08-26

## 2023-08-26 RX ORDER — DEXTROSE 50 % IN WATER 50 %
15 SYRINGE (ML) INTRAVENOUS ONCE
Refills: 0 | Status: DISCONTINUED | OUTPATIENT
Start: 2023-08-26 | End: 2023-09-06

## 2023-08-26 RX ORDER — SODIUM CHLORIDE 9 MG/ML
1000 INJECTION, SOLUTION INTRAVENOUS
Refills: 0 | Status: DISCONTINUED | OUTPATIENT
Start: 2023-08-26 | End: 2023-09-06

## 2023-08-26 RX ORDER — FOLIC ACID 0.8 MG
1 TABLET ORAL DAILY
Refills: 0 | Status: DISCONTINUED | OUTPATIENT
Start: 2023-08-26 | End: 2023-09-06

## 2023-08-26 RX ADMIN — Medication 1 MILLIGRAM(S): at 02:48

## 2023-08-26 RX ADMIN — Medication 40 MILLIEQUIVALENT(S): at 15:24

## 2023-08-26 RX ADMIN — Medication 50 MILLIGRAM(S): at 18:08

## 2023-08-26 RX ADMIN — Medication 3 MILLIGRAM(S): at 22:32

## 2023-08-26 RX ADMIN — Medication 1 TABLET(S): at 18:09

## 2023-08-26 RX ADMIN — Medication 1 MILLIGRAM(S): at 04:16

## 2023-08-26 RX ADMIN — Medication 50 MICROGRAM(S): at 18:08

## 2023-08-26 RX ADMIN — OLANZAPINE 5 MILLIGRAM(S): 15 TABLET, FILM COATED ORAL at 18:08

## 2023-08-26 RX ADMIN — ATORVASTATIN CALCIUM 80 MILLIGRAM(S): 80 TABLET, FILM COATED ORAL at 22:32

## 2023-08-26 RX ADMIN — Medication 1 MILLIGRAM(S): at 18:09

## 2023-08-26 NOTE — ED ADULT NURSE REASSESSMENT NOTE - NS ED NURSE REASSESS COMMENT FT1
break coverage RN: pt A&Ox1 sitting on end of stretcher fully dressed. RN redirected pt to reposition on stretcher for safety, pt lay down for a few moment then sat back up on end of stretcher. states she is waiting for her boyfriend to pick her up.
pt slid to floor from seated position at end of stretcher, witnessed by MD Arredondo and BREE Guerra. pt did not hit head. pt assisted back to bed and agreed to previously ordered CT. pt brought to CT.

## 2023-08-26 NOTE — PHYSICAL THERAPY INITIAL EVALUATION ADULT - ADDITIONAL COMMENTS
Unable to obtain accurate social history secondary to pt. presenting with confusion during subjective history and presenting with difficulty following commands, limited social history obtained through past medical documents, please refer to social work for more attainable social history. As per H&P patient lives alone and was independent, friend found her on the floor unable to get up.     Patient left semi-reclined in bed, NAD, no complaints, bed alarm on, call caceres within reach, RN Archana/Lul made aware.

## 2023-08-26 NOTE — H&P ADULT - PROBLEM SELECTOR PLAN 4
noted to have chronic stroke    - per neuro, cont xarelto  - increase statin to 80mg qd (friend confirmed pt had been compliant with her oral meds)  -LDL 99, A1C:5.7, TSH wnl

## 2023-08-26 NOTE — PHYSICAL THERAPY INITIAL EVALUATION ADULT - WEIGHT-BEARING RESTRICTIONS: STAND/SIT, REHAB EVAL
Patient was tested for covid was out of work just received negative results needs work excuse to cover days 10/12 thru 10/14 returning back to work on 10/15   full weight-bearing

## 2023-08-26 NOTE — PHYSICAL THERAPY INITIAL EVALUATION ADULT - NSPTDISCHREC_GEN_A_CORE
anticipated discharge to rehab facility to address current functional limitation to optimize safety to allow pt. to reach their optimal level of function and improve safety within the home to reduce risk of future falls./Sub-acute Rehab

## 2023-08-26 NOTE — ED ADULT NURSE REASSESSMENT NOTE - NSFALLRISKINTERV_ED_ALL_ED
Assistance OOB with selected safe patient handling equipment if applicable/Communicate fall risk and risk factors to all staff, patient, and family/Encourage patient to sit up slowly, dangle for a short time, stand at bedside before walking/Provide visual cue: yellow wristband, yellow gown, etc/Reinforce activity limits and safety measures with patient and family/Review medications for side effects contributing to fall risk/Toileting schedule using arm’s reach rule for commode and bathroom/Use of alarms - bed, stretcher, chair and/or video monitoring/Call bell, personal items and telephone in reach/Instruct patient to call for assistance before getting out of bed/chair/stretcher/Non-slip footwear applied when patient is off stretcher/Bryan to call system/Physically safe environment - no spills, clutter or unnecessary equipment/Purposeful Proactive Rounding/Room/bathroom lighting operational, light cord in reach

## 2023-08-26 NOTE — H&P ADULT - NSHPLABSRESULTS_GEN_ALL_CORE
LABS:                        11.2   5.39  )-----------( 274      ( 26 Aug 2023 13:54 )             33.6     08-26    141  |  103  |  8   ----------------------------<  92  3.4<L>   |  22  |  0.59    Ca    9.4      26 Aug 2023 13:54  Phos  3.0     08-26  Mg     1.80     08-26    TPro  7.1  /  Alb  4.3  /  TBili  0.3  /  DBili  x   /  AST  20  /  ALT  18  /  AlkPhos  66  08-26    PT/INR - ( 26 Aug 2023 13:54 )   PT: 11.5 sec;   INR: 1.02 ratio         PTT - ( 26 Aug 2023 13:54 )  PTT:35.4 sec  Urinalysis Basic - ( 26 Aug 2023 13:54 )    Color: x / Appearance: x / SG: x / pH: x  Gluc: 92 mg/dL / Ketone: x  / Bili: x / Urobili: x   Blood: x / Protein: x / Nitrite: x   Leuk Esterase: x / RBC: x / WBC x   Sq Epi: x / Non Sq Epi: x / Bacteria: x      CAPILLARY BLOOD GLUCOSE      POCT Blood Glucose.: 110 mg/dL (26 Aug 2023 11:31)      RADIOLOGY & ADDITIONAL TESTS: Reviewed.    CT Head:    No acute intracranial pathology.    General is atrophy and chronic appearing white matter changes.    Bilaterally enlarged ventricles out of proportion to sulci, raising the   possibility of hydrocephalus..

## 2023-08-26 NOTE — H&P ADULT - ASSESSMENT
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

## 2023-08-26 NOTE — PHYSICAL THERAPY INITIAL EVALUATION ADULT - PLANNED THERAPY INTERVENTIONS, PT EVAL
balance training/gait training/ROM/strengthening/transfer training
Abdomen soft, non-tender, no guarding.

## 2023-08-26 NOTE — H&P ADULT - NSHPSOCIALHISTORY_GEN_ALL_CORE
Lives alone at home, mostly independent. previous hx of alcohol use but quit long time ago. current;y denies alcohol or cig smoking or use of ilicit drugs.

## 2023-08-26 NOTE — CONSULT NOTE ADULT - ASSESSMENT
SINDHU MUÑOZ is a 62y F w/ PMHx significant for schizophrenia, hx of etOh abuse, cannibus abuse, personality disorder,  DM2, hypothyroidism, Afib on Xarelto, hx of hypovolemic and septic shock secondary to UTI requiring pressors 23; NPH (normal pressure hydrocephalus; onset of difficulty walking ; [facility] discharge recommendation to f/u with neuro as outpt for large volume LP on 23), who was BIB EMS from home after her close friend/AA sponsor/HCP Luciano Herrera sent her in for poor self care, found laying on the floor. Serum tox negative.     Neurology consulted for "psychosis and hx of NPH". Patient never followed up for her NPH outpatient since . Patient on my examination is awake, alert, and oriented x2 (to self and place, not to date -thinks it is 2023). Patient also requested to speak to her  (who is ), then when asked if she meant her male friend Luciano, patient corrected herself. Able to follow simple and 2-step commands. Patient recently urinated on herself in the bed. When questioned if she has had episodes of urinary incontinence before and how frequently, she responds "5 minutes ago". She reports that 2 days ago she fell. She affirmed that she has had gait and balance issues, describing her gait as wobbly. Patient ambulated with me with a walker, and states that now she has had to use a walker "some of the time". Gait is short, shuffled and guarded but not wide based.     Impression: gait and balance issues, urinary incontinence with CTH finding of ventriculomegaly 2/2 to possible NPH and contributing factors including metabolic encephalopathy and primary psychiatric dx (schizophrenia)    Recommendations:          Case to be seen by Dr. Mchugh, attending.   Recommendations finalized pending attending signature/attestation.     SINDHU MUÑOZ is a 62y F w/ PMHx significant for schizophrenia, hx of etOh abuse, cannibus abuse, personality disorder,  DM2, hypothyroidism, Afib on Xarelto, hx of hypovolemic and septic shock secondary to UTI requiring pressors 23; NPH (normal pressure hydrocephalus; onset of difficulty walking ; [facility] discharge recommendation to f/u with neuro as outpt for large volume LP on 23), who was BIB EMS from home after her close friend/AA sponsor/HCP Luciano Herrera sent her in for poor self care, found laying on the floor. Serum tox negative.     Neurology consulted for "psychosis and hx of NPH". Patient never followed up for her NPH outpatient since . Patient on my examination is awake, alert, and oriented x2 (to self and place, not to date -thinks it is 2023). Patient also requested to speak to her  (who is ), then when asked if she meant her male friend Luciano, patient corrected herself. Able to follow simple and 2-step commands. Patient recently urinated on herself in the bed. When questioned if she has had episodes of urinary incontinence before and how frequently, she responds "5 minutes ago". She reports that 2 days ago she fell. She affirmed that she has had gait and balance issues, describing her gait as wobbly. Patient ambulated with me with a walker, and states that now she has had to use a walker "some of the time". Gait is short, shuffled and guarded but not wide based.     Impression: gait and balance issues, behavioral issues/confusion, urinary incontinence with CTH finding of ventriculomegaly 2/2 to possible NPH and contributing factors including metabolic encephalopathy and primary psychiatric dx (schizophrenia)    Recommendations:  Patient prescribed Xarelto/AC for her Atrial fibrillation, would not recommend LP at this time given risks outweigh benefits  Psychiatric care and antipsychotic therapy as needed for behavioral issues/confusion and underlying psychiatric disorder of schizophrenia      Case to be seen by Dr. Mchugh, attending.   Recommendations finalized pending attending signature/attestation.     SINDHU MUÑOZ is a 62y F w/ PMHx significant for schizophrenia, hx of etOh abuse, cannibus abuse, personality disorder,  DM2, hypothyroidism, Afib on Xarelto, hx of hypovolemic and septic shock secondary to UTI requiring pressors 23; NPH (normal pressure hydrocephalus; onset of difficulty walking ; [facility] discharge recommendation to f/u with neuro as outpt for large volume LP on 23), who was BIB EMS from home after her close friend/AA sponsor/HCP Luciano Herrera sent her in for poor self care, found laying on the floor. Serum tox negative.     Neurology consulted for "psychosis and hx of NPH". Patient never followed up for her NPH outpatient since . Patient on my examination is awake, alert, and oriented x2 (to self and place, not to date -thinks it is 2023). Patient also requested to speak to her  (who is ), then when asked if she meant her male friend Luciano, patient corrected herself. Able to follow simple and 2-step commands. Patient recently urinated on herself in the bed. When questioned if she has had episodes of urinary incontinence before and how frequently, she responds "5 minutes ago". She reports that 2 days ago she fell. She affirmed that she has had gait and balance issues, describing her gait as wobbly. Patient ambulated with me with a walker, and states that now she has had to use a walker "some of the time". Gait is short, shuffled and guarded but not wide based.     Impression: gait and balance issues, behavioral issues/confusion, urinary incontinence with CTH finding of ventriculomegaly 2/2 to possible NPH and contributing factors including metabolic encephalopathy and primary psychiatric dx (schizophrenia)    Recommendations:  Patient prescribed Xarelto/AC for her Atrial fibrillation, would not recommend LP at this time given risks outweigh benefits  Psychiatric care and antipsychotic therapy as needed for behavioral issues/confusion and underlying psychiatric disorder of schizophrenia  TFTs  PT/OT/S&S  SW/SBIRT    Case to be seen by Dr. Mchugh, attending.   Recommendations finalized pending attending signature/attestation.     SINDHU MUÑOZ is a 62y F w/ PMHx significant for schizophrenia, hx of etOh abuse, cannibus abuse, personality disorder,  DM2, hypothyroidism, Afib on Xarelto, hx of hypovolemic and septic shock secondary to UTI requiring pressors 23; NPH (normal pressure hydrocephalus; onset of difficulty walking ; [facility] discharge recommendation to f/u with neuro as outpt for large volume LP on 23), who was BIB EMS from home after her close friend/AA sponsor/HCP Luciano Herrera sent her in for poor self care, found laying on the floor. Serum tox negative.     Neurology consulted for "psychosis and hx of NPH". Patient never followed up for her NPH outpatient since . Patient on my examination is awake, alert, and oriented x2 (to self and place, not to date -thinks it is 2023). Patient also requested to speak to her  (who is ), then when asked if she meant her male friend Luciano, patient corrected herself. Able to follow simple and 2-step commands. Patient recently urinated on herself in the bed. When questioned if she has had episodes of urinary incontinence before and how frequently, she responds "5 minutes ago". She reports that 2 days ago she fell. She affirmed that she has had gait and balance issues, describing her gait as wobbly. Patient ambulated with me with a walker, and states that now she has had to use a walker "some of the time". Gait is short, shuffled and guarded but not wide based.     Impression: gait and balance issues, behavioral issues/confusion, urinary incontinence with CTH finding of ventriculomegaly 2/2 to possible NPH and contributing factors including metabolic encephalopathy and primary psychiatric dx (schizophrenia), Incidental chronic R. basal ganglia infarct on CTH    Recommendations:  Patient prescribed Xarelto/AC for her Atrial fibrillation, would not recommend LP at this time given risks outweigh benefits  Psychiatric care and antipsychotic therapy as needed for behavioral issues/confusion and underlying psychiatric disorder of schizophrenia  TFTs given hx of hypothyroidism  Given chronic r. basal ganglia infarct found on CTH, would recommend HbA1C and Lipid Panel, Aspirin 81MG PO daily, Atorvastatin 80MG QHS, titrate to LDL<70  PT/OT/S&S  SW/SBIRT    Case to be seen by Dr. Mchugh, attending.   Recommendations finalized pending attending signature/attestation.     SINDHU MUÑOZ is a 62y F w/ PMHx significant for schizophrenia, hx of etOh abuse, cannibus abuse, personality disorder,  DM2, hypothyroidism, Afib on Xarelto, hx of hypovolemic and septic shock secondary to UTI requiring pressors 23; NPH (normal pressure hydrocephalus; onset of difficulty walking ; [facility] discharge recommendation to f/u with neuro as outpt for large volume LP on 23), who was BIB EMS from home after her close friend/AA sponsor/HCP Luciano Herrera sent her in for poor self care, found laying on the floor. Serum tox negative.     Neurology consulted for "psychosis and hx of NPH". Patient never followed up for her NPH outpatient since . Patient on my examination is awake, alert, and oriented x2 (to self and place, not to date -thinks it is 2023). Patient also requested to speak to her  (who is ), then when asked if she meant her male friend Luciano, patient corrected herself. Able to follow simple and 2-step commands. Patient recently urinated on herself in the bed. When questioned if she has had episodes of urinary incontinence before and how frequently, she responds "5 minutes ago". She reports that 2 days ago she fell. She affirmed that she has had gait and balance issues, describing her gait as wobbly. Patient ambulated with me with a walker, and states that now she has had to use a walker "some of the time". Gait is short, shuffled and guarded but not wide based.     Impression: gait and balance issues, behavioral issues/confusion, urinary incontinence with CTH finding of ventriculomegaly 2/2 to possible NPH and contributing factors including metabolic encephalopathy and primary psychiatric dx (schizophrenia), Incidental chronic R. basal ganglia infarct on CTH    Recommendations:  Patient prescribed Xarelto/AC for her Atrial fibrillation, would not recommend LP at this time given risks outweigh benefits  Psychiatric care and antipsychotic therapy as needed for behavioral issues/confusion and underlying psychiatric disorder of schizophrenia  TFTs given hx of hypothyroidism  Given chronic r. basal ganglia infarct found on CTH, would recommend HbA1C and Lipid Panel, Atorvastatin 80MG QHS, titrate to LDL<70 and BP control   PT/OT/S&S  SW/SBIRT    Case to be seen by Dr. Mchugh, attending.   Recommendations finalized pending attending signature/attestation.     SINDHU MUÑOZ is a 62y F w/ PMHx significant for schizophrenia, hx of etOh abuse, cannibus abuse, personality disorder,  DM2, hypothyroidism, Afib on Xarelto, hx of hypovolemic and septic shock secondary to UTI requiring pressors 23; NPH (normal pressure hydrocephalus; onset of difficulty walking ; [facility] discharge recommendation to f/u with neuro as outpt for large volume LP on 23), who was BIB EMS from home after her close friend/AA sponsor/HCP Luciano Herrera sent her in for poor self care, found laying on the floor. Serum tox negative.     Neurology consulted for "psychosis and hx of NPH". Patient never followed up for her NPH outpatient since . Patient on my examination is awake, alert, and oriented x2 (to self and place, not to date -thinks it is 2023). Patient also requested to speak to her  (who is ), then when asked if she meant her male friend Luciano, patient corrected herself. Able to follow simple and 2-step commands. Patient recently urinated on herself in the bed. When questioned if she has had episodes of urinary incontinence before and how frequently, she responds "5 minutes ago". She reports that 2 days ago she fell. She affirmed that she has had gait and balance issues, describing her gait as wobbly. Patient ambulated with me with a walker, and states that now she has had to use a walker "some of the time". Gait is short, shuffled and guarded but not wide based.     Impression: gait and balance issues, behavioral issues/confusion, urinary incontinence with CTH finding of ventriculomegaly 2/2 to possible NPH and contributing factors including metabolic encephalopathy and primary psychiatric dx (schizophrenia), Incidental chronic R. basal ganglia infarct on CTH    Recommendations:  Reviewed CT Head scans from [id number]. Patient's CT head from 2018 and  compared to  show no change in ventricle size. Therefore, would not recommend any further neurological inpatient workup at this time.   Patient prescribed Xarelto/AC for her Atrial fibrillation, would not recommend LP at this time given risks outweigh benefits - please clarify if patient is on AC or would need to hold for medical reasons. We would recommend from stroke prevention standpoint to be on AC and if not, at least ASA 81 mg daily  Psychiatric care and antipsychotic therapy as needed for behavioral issues/confusion and underlying psychiatric disorder of schizophrenia  TFTs given hx of hypothyroidism  Given chronic r. basal ganglia infarct found on CTH, would recommend HbA1C and Lipid Panel, Atorvastatin 80MG QHS, titrate to LDL<70 and BP control   PT/OT/S&S  SW/SBIRT  F/u outpatient  [facility]  Case to be seen by Dr. Mchugh, attending.   Recommendations finalized pending attending signature/attestation.     SINDHU MUÑOZ is a 62y F w/ PMHx significant for schizophrenia, hx of etOh abuse, cannibus abuse, personality disorder,  DM2, hypothyroidism, Afib on Xarelto, hx of hypovolemic and septic shock secondary to UTI requiring pressors 23; NPH (normal pressure hydrocephalus; onset of difficulty walking ; [facility] discharge recommendation to f/u with neuro as outpt for large volume LP on 23), who was BIB EMS from home after her close friend/AA sponsor/HCP Luciano Herrera sent her in for poor self care, found laying on the floor. Serum tox negative.     Neurology consulted for "psychosis and hx of NPH". Patient never followed up for her NPH outpatient since . Patient on my examination is awake, alert, and oriented x2 (to self and place, not to date -thinks it is 2023). Patient also requested to speak to her  (who is ), then when asked if she meant her male friend Luciano, patient corrected herself. Able to follow simple and 2-step commands. Patient recently urinated on herself in the bed. When questioned if she has had episodes of urinary incontinence before and how frequently, she responds "5 minutes ago". She reports that 2 days ago she fell. She affirmed that she has had gait and balance issues, describing her gait as wobbly. Patient ambulated with me with a walker, and states that now she has had to use a walker "some of the time". Gait is short, shuffled and guarded but not wide based or magnetic gait.     Impression: gait and balance issues, behavioral issues/confusion, urinary incontinence with CTH finding of ventriculomegaly 2/2 to possible NPH and contributing factors including metabolic encephalopathy and primary psychiatric dx (schizophrenia), Incidental chronic R. basal ganglia infarct on CTH    Recommendations:  Reviewed CT Head scans from [id number]. Patient's CT head from 2018 and  compared to  show no change in ventricle size. Patient gait also not characteristic of NPH. Therefore, would not recommend any further neurological inpatient workup at this time.   Patient prescribed Xarelto/AC for her Atrial fibrillation, would not recommend LP at this time given risks outweigh benefits - please clarify if patient is on AC or would need to hold for medical reasons. We would recommend from stroke prevention standpoint to be on AC and if not, at least ASA 81 mg daily  Given chronic r. basal ganglia infarct found on CTH, would recommend HbA1C and Lipid Panel, Atorvastatin 80MG QHS, titrate to LDL<70 and BP control   Psychiatric care and antipsychotic therapy as needed for behavioral issues/confusion and underlying psychiatric disorder of schizophrenia  TFTs given hx of hypothyroidism  PT/OT/S&S  SW/SBIRT  F/u outpatient w/ [address] [place] [address]. Patient can follow up with general neurology at [address] [place] [address] 1-2 weeks after discharge. Please instruct the patient to call [phone] to schedule this appointment.    Case seen and discussed w/ Dr. Mchugh, attending.   Recommendations finalized pending attending signature/attestation.

## 2023-08-26 NOTE — CONSULT NOTE ADULT - ATTENDING COMMENTS
Ms. Acosta is a 63 yo woman with gait difficulty.  In my opinion, she does not have normal pressure hydrocephalus:  - She does not have the classic magnetic gait  - On imaging, the size of the ventricles are minimally, if at all, enlarged out of proportion to the widening of the sulci  - There is no significant increase in the size of the ventricles on the current study compared to the studies from 1/2023, 4/2019, and 8/2018.  She can seek another opinion from a Movement disorders specialist and/or Neurosurgeon Dr. Duarte Carson who also does NPH workup outpatient.  Neurology signing off. Please reconsult PRN or call [facility] [phone] with any questions.  Thank you Ms. Acosta is a 63 yo woman with gait difficulty.  In my opinion, she does not have normal pressure hydrocephalus:  - She does not have the classic magnetic gait  - On imaging, the size of the ventricles are minimally, if at all, enlarged out of proportion to the widening of the sulci  - There is no significant increase in the size of the ventricles on the current study compared to the studies from 1/2023, 4/2019, and 8/2018.  She can seek another opinion from a Movement disorders specialist and/or Neurosurgeon Dr. Duarte Carson who also does NPH workup outpatient.  If she has confimred A.Fib she should be on A/C and if this is contraindicated, then she should be on aspirin.   Neurology signing off. Please reconsult PRN or call [facility] [phone] with any questions.  Thank you

## 2023-08-26 NOTE — H&P ADULT - PROBLEM SELECTOR PLAN 1
CT Head wo no acute changes. Ua pending.   - evaluated by psychiatry, appreciate recs  - per psych, not capacity to leave [facility]   - cont with zyprexa 5mg po bid  - hold other psychiatric medications

## 2023-08-26 NOTE — PHYSICAL THERAPY INITIAL EVALUATION ADULT - PERTINENT HX OF CURRENT PROBLEM, REHAB EVAL
Patient is a 62 year old female with past medical history of schizophrenic disorder, brought in for behavioral change with being non-compliant with psychiatric meds. Friend reported he found friend on the floor and was unable to get up. CT head (-) for acute intracranial pathology.

## 2023-08-26 NOTE — PHYSICAL THERAPY INITIAL EVALUATION ADULT - GENERAL OBSERVATIONS, REHAB EVAL
Patient found semi-reclined in bed, NAD A&Ox2, no complaints of pain and or discomfort, spO2 99%, HR 92, patient agreeable to participate in PT evaluation

## 2023-08-26 NOTE — CONSULT NOTE ADULT - SUBJECTIVE AND OBJECTIVE BOX
Neurology - Consult Note    -  Spectra: [id number] ([facility]), [address] ([facility])  -    HPI: Patient SINDHU MUÑOZ is a 62y (1960) woman with a PMHx significant for schizophrenia, hx of etOh abuse, cannibus abuse, personality disorder,  DM2, hypothyroidism, Afib on Xarelto, hx of hypovolemic and septic shock secondary to UTI requiring pressors 23; NPH (normal pressure hydrocephalus; onset of difficulty walking ; [facility] discharge recommendation to f/u with neuro as outpt for large volume LP on 23), who was BIB EMS from home after her close friend/AA sponsor/HCP Luciano Herrera sent her in for poor self care, found laying on the floor. Serum tox negative.     Neurology consulted for "psychosis and hx of NPH". Patient never followed up for her NPH outpatient since . Patient on my examination is awake, alert, and oriented x2 (to self and place, not to date -thinks it is 2023). Patient also requested to speak to her  (who is ), then when asked if she meant her male friend Luciano, patient corrected herself. Able to follow simple and 2-step commands. Patient recently urinated on herself in the bed. When questioned if she has had episodes of urinary incontinence before and how frequently, she responds "5 minutes ago". She reports that 2 days ago she fell. She affirmed that she has had gait and balance issues, describing her gait as wobbly. Patient ambulated with me with a walker, and states that now she has had to use a walker "some of the time". Gait is short, shuffled and guarded but not wide based.       Review of Systems:    NEUROLOGICAL: +As stated in HPI above  All other review of systems is negative unless indicated above.    Allergies:  No Known Allergies      PMHx/PSHx/Family Hx: As above, otherwise see below   Diabetes mellitus of other type without complication    Psychosis    Alcohol abuse    Hypothyroidism    Schizophrenia    Atrial fibrillation        Social Hx:  , lives by self    Medications:  MEDICATIONS  (STANDING):  dextrose 5%. 1000 milliLiter(s) (50 mL/Hr) IV Continuous <Continuous>  dextrose 5%. 1000 milliLiter(s) (100 mL/Hr) IV Continuous <Continuous>  dextrose 50% Injectable 25 Gram(s) IV Push once  dextrose 50% Injectable 12.5 Gram(s) IV Push once  dextrose 50% Injectable 25 Gram(s) IV Push once  glucagon  Injectable 1 milliGRAM(s) IntraMuscular once  insulin lispro (ADMELOG) corrective regimen sliding scale   SubCutaneous at bedtime  insulin lispro (ADMELOG) corrective regimen sliding scale   SubCutaneous three times a day before meals  OLANZapine 5 milliGRAM(s) Oral two times a day    MEDICATIONS  (PRN):  acetaminophen     Tablet .. 650 milliGRAM(s) Oral every 6 hours PRN Temp greater or equal to 38C (100.4F), Mild Pain (1 - 3), Moderate Pain (4 - 6)  dextrose Oral Gel 15 Gram(s) Oral once PRN Blood Glucose LESS THAN 70 milliGRAM(s)/deciliter      Vitals:  T(C): 36.4 (23 @ 10:00), Max: 36.9 (23 @ 16:13)  HR: 83 (23 @ 10:00) (79 - 97)  BP: 150/91 (23 @ 10:00) (118/54 - 172/83)  RR: 16 (23 @ 10:00) (15 - 17)  SpO2: 100% (23 @ 10:00) (96% - 100%)    Physical Examination:   Neurologic Exam:  Mental status - Awake, Alert, Oriented to person, place, not to time (thinks it is 2023). Speech fluent, repetition and naming intact. Follows simple and complex commands. Attention/concentration and fund of knowledge intact. Patient initially requested to speak to  (who is ) but when prompted with name of her male friend, she corrected herself.    Cranial nerves - PERRLA, VFF, EOMI, face sensation (V1-V3) intact b/l, facial strength intact without asymmetry b/l, hearing intact b/l, palate with symmetric elevation, trapezius 5/5 strength b/l, tongue midline on protrusion with full lateral movement    Motor - Normal bulk and tone throughout. No pronator drift.    Strength testing            Deltoid      Biceps      Triceps     Wrist Extension    Wrist Flexion              R            4+*                 4+*       4+*                5                          5                        5                   L             4+*                 4+*          4+*                 5                         5                        5                   * limited by poor effort            Hip Flexion    Hip Extension    Knee Flexion    Knee Extension    Dorsiflexion    Plantar Flexion  R              4+*                           5                       5                           5                            5                          5  L              4+*                        5                        5                           5                            5                          5    Sensation - Light touch intact throughout    DTR's -             Biceps      Triceps     Brachioradialis      Patellar    Ankle    Toes/plantar response  R             2+             2+                  2+                       2+            2+                 Down  L              2+             2+                 2+                        2+           2+                 Down    Coordination - Finger to Nose intact b/l. No tremors appreciated    Gait and station - Walked with a walker. Gait is slow, short and shuffling, but not wide based.    Labs:                        10.8   5.37  )-----------( 264      ( 25 Aug 2023 18:00 )             32.3     08-25    138  |  99  |  10  ----------------------------<  100<H>  3.0<L>   |  23  |  0.62    Ca    9.7      25 Aug 2023 18:00  Mg     1.70     08-25      CAPILLARY BLOOD GLUCOSE      POCT Blood Glucose.: 109 mg/dL (25 Aug 2023 16:21)          CSF:                  Radiology:  CT Head No Cont:  (26 Aug 2023 04:56)  COMPARISON STUDY: CT head 2023    FINDINGS:    No acute intracranial hemorrhage, mass effect, vasogenic edema, or   evidence of acute territorial infarct.    There is generalized volume loss. Ventricles appear enlarged and out of   proportion to sulci, similar to prior. Patchy white matter   hypoattenuation is nonspecific in etiology but likely related to chronic   microvascular changes. Old right basal ganglia lacunar infarct.    The visualized paranasal sinuses are clear. The mastoid air cells and   middle ear cavities are clear.    The globes are unremarkable.    The soft tissues of the scalp are unremarkable. The calvarium is intact.    IMPRESSION:    No acute intracranial pathology.    General is atrophy and chronic appearing white matter changes.    Bilaterally enlarged ventricles out of proportion to sulci, raising the   possibility of hydrocephalus..

## 2023-08-26 NOTE — PHYSICAL THERAPY INITIAL EVALUATION ADULT - MANUAL MUSCLE TESTING RESULTS, REHAB EVAL
patients bilateral upper and lower extremity strength grossly 3+/5 upon MMT and functional assessments

## 2023-08-26 NOTE — PROVIDER CONTACT NOTE (OTHER) - BACKGROUND
61 y/o   female, no children, no family, noncaregiver, domiciled alone in a private house. with a PmHx of Schizophrenia and Alcohol Abuse, hx of Cannabis Abuse, Personality Disorder

## 2023-08-26 NOTE — H&P ADULT - NSHPPHYSICALEXAM_GEN_ALL_CORE
VITAL SIGNS:  T(C): 36.4 (08-26-23 @ 10:00), Max: 36.9 (08-25-23 @ 16:13)  T(F): 97.5 (08-26-23 @ 10:00), Max: 98.4 (08-25-23 @ 16:13)  HR: 83 (08-26-23 @ 10:00) (79 - 97)  BP: 150/91 (08-26-23 @ 10:00) (118/54 - 172/83)  BP(mean): --  RR: 16 (08-26-23 @ 10:00) (15 - 17)  SpO2: 100% (08-26-23 @ 10:00) (96% - 100%)  Wt(kg): --    PHYSICAL EXAM:  Constitutional: WDWN resting comfortably in bed; NAD  Head: NC/AT  Eyes: PERRL  ENT: no nasal discharge; MMM  Neck: supple; no JVD  Respiratory: CTA B/L; no W/R/R  Cardiac: +S1/S2; RRR; no M/R/G  Gastrointestinal: soft, NT/ND; no rebound or guarding; +BSx4  Extremities: WWP, no clubbing or cyanosis; no peripheral edema  Musculoskeletal: NROM x4; no joint swelling, tenderness or erythema  Vascular: 2+ radial, DP/PT pulses B/L  Neurologic: AAOx3; CNII-XII grossly intact; no focal deficits  Psychiatric: labile mood, laughs inappropriately

## 2023-08-26 NOTE — H&P ADULT - PROBLEM SELECTOR PLAN 2
reported fall due to gait instability. denies head trauma. previous hx of NPH, repeat CT Head showed stable enlarged ventricles. chronic stroke.     - evaluated by neuro, no current indication for LP. recommends outpatient neuro fu   - fu PT eval and recs  - per friend, requests to have PUMA eval

## 2023-08-26 NOTE — H&P ADULT - HISTORY OF PRESENT ILLNESS
63 yo female with pmhx of DM2, hypothyroidism, Afib on Xarelto, Urosepsis, NPH (12/22), Schizophrenia (pn Prolixin 125mg inj wkly) is brought in for behavioral change with being non-compliant with psychiatric meds. pt is ANO3, but poor historian, unable to state why she is in the hospital. collateral obtained from a friend Mr. Wolf who states pt hasn't been herself for the past 4 days, at baseline, pt is independent and able to take care of herself. takes her own medications. lives alone. but recently unable to do so and yesterday noted that pt on the floor and unable to get herself up. per friend, pt usually has these symptoms when she is not taking her psych medications (states the medication is injection possibly fluphenazine). Denies any combative or aggressive behavior. no concern for self harm. patient denies hallucinations, no SI.  Mr. Wolf also suggests that pt will benefit from PUMA as she needs more help.     Evaluated by psychiatry in the ed, recommends medical admission and neuro eval. evaluated by neuro.

## 2023-08-27 DIAGNOSIS — N39.0 URINARY TRACT INFECTION, SITE NOT SPECIFIED: ICD-10-CM

## 2023-08-27 LAB
ANION GAP SERPL CALC-SCNC: 9 MMOL/L — SIGNIFICANT CHANGE UP (ref 7–14)
BASOPHILS # BLD AUTO: 0.04 K/UL — SIGNIFICANT CHANGE UP (ref 0–0.2)
BASOPHILS NFR BLD AUTO: 0.6 % — SIGNIFICANT CHANGE UP (ref 0–2)
BUN SERPL-MCNC: 10 MG/DL — SIGNIFICANT CHANGE UP (ref 7–23)
CALCIUM SERPL-MCNC: 9.5 MG/DL — SIGNIFICANT CHANGE UP (ref 8.4–10.5)
CHLORIDE SERPL-SCNC: 107 MMOL/L — SIGNIFICANT CHANGE UP (ref 98–107)
CO2 SERPL-SCNC: 23 MMOL/L — SIGNIFICANT CHANGE UP (ref 22–31)
CREAT SERPL-MCNC: 0.58 MG/DL — SIGNIFICANT CHANGE UP (ref 0.5–1.3)
EGFR: 101 ML/MIN/1.73M2 — SIGNIFICANT CHANGE UP
EOSINOPHIL # BLD AUTO: 0.25 K/UL — SIGNIFICANT CHANGE UP (ref 0–0.5)
EOSINOPHIL NFR BLD AUTO: 3.5 % — SIGNIFICANT CHANGE UP (ref 0–6)
GLUCOSE BLDC GLUCOMTR-MCNC: 120 MG/DL — HIGH (ref 70–99)
GLUCOSE BLDC GLUCOMTR-MCNC: 126 MG/DL — HIGH (ref 70–99)
GLUCOSE BLDC GLUCOMTR-MCNC: 134 MG/DL — HIGH (ref 70–99)
GLUCOSE BLDC GLUCOMTR-MCNC: 137 MG/DL — HIGH (ref 70–99)
GLUCOSE SERPL-MCNC: 105 MG/DL — HIGH (ref 70–99)
HCT VFR BLD CALC: 33.4 % — LOW (ref 34.5–45)
HCV AB S/CO SERPL IA: 0.08 S/CO — SIGNIFICANT CHANGE UP (ref 0–0.99)
HCV AB SERPL-IMP: SIGNIFICANT CHANGE UP
HGB BLD-MCNC: 11.1 G/DL — LOW (ref 11.5–15.5)
IANC: 4.32 K/UL — SIGNIFICANT CHANGE UP (ref 1.8–7.4)
IMM GRANULOCYTES NFR BLD AUTO: 0.1 % — SIGNIFICANT CHANGE UP (ref 0–0.9)
LYMPHOCYTES # BLD AUTO: 1.75 K/UL — SIGNIFICANT CHANGE UP (ref 1–3.3)
LYMPHOCYTES # BLD AUTO: 24.8 % — SIGNIFICANT CHANGE UP (ref 13–44)
MAGNESIUM SERPL-MCNC: 1.8 MG/DL — SIGNIFICANT CHANGE UP (ref 1.6–2.6)
MCHC RBC-ENTMCNC: 29.1 PG — SIGNIFICANT CHANGE UP (ref 27–34)
MCHC RBC-ENTMCNC: 33.2 GM/DL — SIGNIFICANT CHANGE UP (ref 32–36)
MCV RBC AUTO: 87.7 FL — SIGNIFICANT CHANGE UP (ref 80–100)
MONOCYTES # BLD AUTO: 0.69 K/UL — SIGNIFICANT CHANGE UP (ref 0–0.9)
MONOCYTES NFR BLD AUTO: 9.8 % — SIGNIFICANT CHANGE UP (ref 2–14)
NEUTROPHILS # BLD AUTO: 4.32 K/UL — SIGNIFICANT CHANGE UP (ref 1.8–7.4)
NEUTROPHILS NFR BLD AUTO: 61.2 % — SIGNIFICANT CHANGE UP (ref 43–77)
NRBC # BLD: 0 /100 WBCS — SIGNIFICANT CHANGE UP (ref 0–0)
NRBC # FLD: 0 K/UL — SIGNIFICANT CHANGE UP (ref 0–0)
PHOSPHATE SERPL-MCNC: 2.5 MG/DL — SIGNIFICANT CHANGE UP (ref 2.5–4.5)
PLATELET # BLD AUTO: 294 K/UL — SIGNIFICANT CHANGE UP (ref 150–400)
POTASSIUM SERPL-MCNC: 3.8 MMOL/L — SIGNIFICANT CHANGE UP (ref 3.5–5.3)
POTASSIUM SERPL-SCNC: 3.8 MMOL/L — SIGNIFICANT CHANGE UP (ref 3.5–5.3)
RBC # BLD: 3.81 M/UL — SIGNIFICANT CHANGE UP (ref 3.8–5.2)
RBC # FLD: 12.2 % — SIGNIFICANT CHANGE UP (ref 10.3–14.5)
SODIUM SERPL-SCNC: 139 MMOL/L — SIGNIFICANT CHANGE UP (ref 135–145)
WBC # BLD: 7.06 K/UL — SIGNIFICANT CHANGE UP (ref 3.8–10.5)
WBC # FLD AUTO: 7.06 K/UL — SIGNIFICANT CHANGE UP (ref 3.8–10.5)

## 2023-08-27 PROCEDURE — 99233 SBSQ HOSP IP/OBS HIGH 50: CPT

## 2023-08-27 RX ORDER — CEFTRIAXONE 500 MG/1
1000 INJECTION, POWDER, FOR SOLUTION INTRAMUSCULAR; INTRAVENOUS ONCE
Refills: 0 | Status: COMPLETED | OUTPATIENT
Start: 2023-08-27 | End: 2023-08-27

## 2023-08-27 RX ORDER — CEFTRIAXONE 500 MG/1
INJECTION, POWDER, FOR SOLUTION INTRAMUSCULAR; INTRAVENOUS
Refills: 0 | Status: COMPLETED | OUTPATIENT
Start: 2023-08-27 | End: 2023-08-31

## 2023-08-27 RX ORDER — CEFTRIAXONE 500 MG/1
1000 INJECTION, POWDER, FOR SOLUTION INTRAMUSCULAR; INTRAVENOUS EVERY 24 HOURS
Refills: 0 | Status: COMPLETED | OUTPATIENT
Start: 2023-08-28 | End: 2023-08-30

## 2023-08-27 RX ORDER — NYSTATIN CREAM 100000 [USP'U]/G
1 CREAM TOPICAL
Refills: 0 | Status: DISCONTINUED | OUTPATIENT
Start: 2023-08-27 | End: 2023-09-06

## 2023-08-27 RX ADMIN — OLANZAPINE 5 MILLIGRAM(S): 15 TABLET, FILM COATED ORAL at 17:25

## 2023-08-27 RX ADMIN — Medication 1 MILLIGRAM(S): at 12:09

## 2023-08-27 RX ADMIN — NYSTATIN CREAM 1 APPLICATION(S): 100000 CREAM TOPICAL at 18:28

## 2023-08-27 RX ADMIN — ATORVASTATIN CALCIUM 80 MILLIGRAM(S): 80 TABLET, FILM COATED ORAL at 21:20

## 2023-08-27 RX ADMIN — Medication 1 TABLET(S): at 12:09

## 2023-08-27 RX ADMIN — CEFTRIAXONE 100 MILLIGRAM(S): 500 INJECTION, POWDER, FOR SOLUTION INTRAMUSCULAR; INTRAVENOUS at 12:15

## 2023-08-27 RX ADMIN — Medication 50 MILLIGRAM(S): at 05:24

## 2023-08-27 RX ADMIN — Medication 3 MILLIGRAM(S): at 21:21

## 2023-08-27 RX ADMIN — Medication 50 MICROGRAM(S): at 05:24

## 2023-08-27 RX ADMIN — OLANZAPINE 5 MILLIGRAM(S): 15 TABLET, FILM COATED ORAL at 05:24

## 2023-08-27 RX ADMIN — RIVAROXABAN 20 MILLIGRAM(S): KIT at 17:25

## 2023-08-27 NOTE — PATIENT PROFILE ADULT - MST SCORE
Do you currently have any of the following:    Fever: No  Headache:  No  Cough: No  Shortness of breath: No  Exposed to anyone with these symptoms: No         Ramon Moctezuma presents to the [facility] on 11/30/2022. Trisha Solis is complaining of pain in her low back. The pain is constant. The pain is described as aching, shooting, sharp, and miserable. Pain is rated on her best day at a 7, on her worst day at a 10, and on average at a 7 on the VAS scale. Any procedures since your last visit: No    Pacemaker or defibrillator: No     She is not on NSAIDS and is not on anticoagulation medications. Medication Contract and Consent for Opioid Use Documents Filed       Patient Documents       Type of Document Status Date Received Received By Description    Medication Contract Received 10/17/2019 12:24 PM Ramana Adkins 10/8/19 Opioid/Narcotic Med Contract for NeuroSurgery    Medication Contract Signed 2/13/2020 11:58 AM [facility]OL[facility] medication contract                    BP (!) 93/57   Pulse 61   Temp 97 °F (36.1 °C) (Infrared)   Resp 16   Ht 5' 3\" (1.6 m)   Wt 210 lb (95.3 kg)   LMP 05/19/2014   SpO2 97%   BMI 37.20 kg/m²      Patient's last menstrual period was 05/19/2014.
0
0

## 2023-08-27 NOTE — PATIENT PROFILE ADULT - NSPROGENPREVTRANSF_GEN_A_NUR
[phone] to schedule ultrasound    Florastor or Floragen.  
no history of blood product transfusion
no

## 2023-08-27 NOTE — PATIENT PROFILE ADULT - FALL HARM RISK - HARM RISK INTERVENTIONS
Assistance with ambulation/Assistance OOB with selected safe patient handling equipment/Communicate Risk of Fall with Harm to all staff/Discuss with provider need for PT consult/Monitor for mental status changes/Monitor gait and stability/Move patient closer to nurses' station/Orthostatic vital signs/Provide patient with walking aids - walker, cane, crutches/Reinforce activity limits and safety measures with patient and family/Reorient to person, place and time as needed/Review medications for side effects contributing to fall risk/Sit up slowly, dangle for a short time, stand at bedside before walking/Tailored Fall Risk Interventions/Toileting schedule using arm’s reach rule for commode and bathroom/Use of alarms - bed, chair and/or voice tab/Visual Cue: Yellow wristband and red socks/Bed in lowest position, wheels locked, appropriate side rails in place/Call bell, personal items and telephone in reach/Instruct patient to call for assistance before getting out of bed or chair/Non-slip footwear when patient is out of bed/[place] to call system/Physically safe environment - no spills, clutter or unnecessary equipment/Purposeful Proactive Rounding/Room/bathroom lighting operational, light cord in reach
Communicate Risk of Fall with Harm to all staff/Reinforce activity limits and safety measures with patient and family/Tailored Fall Risk Interventions/Visual Cue: Yellow wristband and red socks/Bed in lowest position, wheels locked, appropriate side rails in place/Call bell, personal items and telephone in reach/Instruct patient to call for assistance before getting out of bed or chair/Non-slip footwear when patient is out of bed/[place] to call system/Physically safe environment - no spills, clutter or unnecessary equipment/Purposeful Proactive Rounding/Room/bathroom lighting operational, light cord in reach

## 2023-08-27 NOTE — PATIENT PROFILE ADULT - STATED REASON FOR ADMISSION
patient is confused, states she wants to go home
Pt thinks her boyfriend called EMT due o her fainting

## 2023-08-28 LAB
GLUCOSE BLDC GLUCOMTR-MCNC: 108 MG/DL — HIGH (ref 70–99)
GLUCOSE BLDC GLUCOMTR-MCNC: 120 MG/DL — HIGH (ref 70–99)
GLUCOSE BLDC GLUCOMTR-MCNC: 188 MG/DL — HIGH (ref 70–99)
GLUCOSE BLDC GLUCOMTR-MCNC: 99 MG/DL — SIGNIFICANT CHANGE UP (ref 70–99)

## 2023-08-28 PROCEDURE — 99232 SBSQ HOSP IP/OBS MODERATE 35: CPT

## 2023-08-28 RX ORDER — OLANZAPINE 15 MG/1
5 TABLET, FILM COATED ORAL ONCE
Refills: 0 | Status: COMPLETED | OUTPATIENT
Start: 2023-08-28 | End: 2023-08-28

## 2023-08-28 RX ADMIN — Medication 50 MILLIGRAM(S): at 06:20

## 2023-08-28 RX ADMIN — Medication 650 MILLIGRAM(S): at 09:30

## 2023-08-28 RX ADMIN — Medication 650 MILLIGRAM(S): at 08:30

## 2023-08-28 RX ADMIN — Medication 50 MICROGRAM(S): at 06:20

## 2023-08-28 RX ADMIN — ATORVASTATIN CALCIUM 80 MILLIGRAM(S): 80 TABLET, FILM COATED ORAL at 22:49

## 2023-08-28 RX ADMIN — OLANZAPINE 5 MILLIGRAM(S): 15 TABLET, FILM COATED ORAL at 06:19

## 2023-08-28 RX ADMIN — CEFTRIAXONE 100 MILLIGRAM(S): 500 INJECTION, POWDER, FOR SOLUTION INTRAMUSCULAR; INTRAVENOUS at 09:29

## 2023-08-28 RX ADMIN — Medication 1: at 17:17

## 2023-08-28 RX ADMIN — NYSTATIN CREAM 1 APPLICATION(S): 100000 CREAM TOPICAL at 17:18

## 2023-08-28 RX ADMIN — NYSTATIN CREAM 1 APPLICATION(S): 100000 CREAM TOPICAL at 06:20

## 2023-08-28 RX ADMIN — RIVAROXABAN 20 MILLIGRAM(S): KIT at 22:49

## 2023-08-29 LAB
-  AMIKACIN: SIGNIFICANT CHANGE UP
-  AMOXICILLIN/CLAVULANIC ACID: SIGNIFICANT CHANGE UP
-  AMPICILLIN/SULBACTAM: SIGNIFICANT CHANGE UP
-  AMPICILLIN: SIGNIFICANT CHANGE UP
-  AZTREONAM: SIGNIFICANT CHANGE UP
-  CEFAZOLIN: SIGNIFICANT CHANGE UP
-  CEFEPIME: SIGNIFICANT CHANGE UP
-  CEFOXITIN: SIGNIFICANT CHANGE UP
-  CEFTRIAXONE: SIGNIFICANT CHANGE UP
-  CEFUROXIME: SIGNIFICANT CHANGE UP
-  CIPROFLOXACIN: SIGNIFICANT CHANGE UP
-  ERTAPENEM: SIGNIFICANT CHANGE UP
-  GENTAMICIN: SIGNIFICANT CHANGE UP
-  IMIPENEM: SIGNIFICANT CHANGE UP
-  LEVOFLOXACIN: SIGNIFICANT CHANGE UP
-  MEROPENEM: SIGNIFICANT CHANGE UP
-  NITROFURANTOIN: SIGNIFICANT CHANGE UP
-  PIPERACILLIN/TAZOBACTAM: SIGNIFICANT CHANGE UP
-  TOBRAMYCIN: SIGNIFICANT CHANGE UP
-  TRIMETHOPRIM/SULFAMETHOXAZOLE: SIGNIFICANT CHANGE UP
ANION GAP SERPL CALC-SCNC: 15 MMOL/L — HIGH (ref 7–14)
BASOPHILS # BLD AUTO: 0.05 K/UL — SIGNIFICANT CHANGE UP (ref 0–0.2)
BASOPHILS NFR BLD AUTO: 0.5 % — SIGNIFICANT CHANGE UP (ref 0–2)
BUN SERPL-MCNC: 12 MG/DL — SIGNIFICANT CHANGE UP (ref 7–23)
CALCIUM SERPL-MCNC: 9.8 MG/DL — SIGNIFICANT CHANGE UP (ref 8.4–10.5)
CHLORIDE SERPL-SCNC: 103 MMOL/L — SIGNIFICANT CHANGE UP (ref 98–107)
CO2 SERPL-SCNC: 20 MMOL/L — LOW (ref 22–31)
CREAT SERPL-MCNC: 0.63 MG/DL — SIGNIFICANT CHANGE UP (ref 0.5–1.3)
EGFR: 99 ML/MIN/1.73M2 — SIGNIFICANT CHANGE UP
EOSINOPHIL # BLD AUTO: 0.18 K/UL — SIGNIFICANT CHANGE UP (ref 0–0.5)
EOSINOPHIL NFR BLD AUTO: 1.7 % — SIGNIFICANT CHANGE UP (ref 0–6)
GLUCOSE BLDC GLUCOMTR-MCNC: 106 MG/DL — HIGH (ref 70–99)
GLUCOSE BLDC GLUCOMTR-MCNC: 108 MG/DL — HIGH (ref 70–99)
GLUCOSE BLDC GLUCOMTR-MCNC: 111 MG/DL — HIGH (ref 70–99)
GLUCOSE BLDC GLUCOMTR-MCNC: 131 MG/DL — HIGH (ref 70–99)
GLUCOSE SERPL-MCNC: 115 MG/DL — HIGH (ref 70–99)
HCT VFR BLD CALC: 37.8 % — SIGNIFICANT CHANGE UP (ref 34.5–45)
HGB BLD-MCNC: 12.3 G/DL — SIGNIFICANT CHANGE UP (ref 11.5–15.5)
IANC: 6.86 K/UL — SIGNIFICANT CHANGE UP (ref 1.8–7.4)
IMM GRANULOCYTES NFR BLD AUTO: 0.7 % — SIGNIFICANT CHANGE UP (ref 0–0.9)
LYMPHOCYTES # BLD AUTO: 2.58 K/UL — SIGNIFICANT CHANGE UP (ref 1–3.3)
LYMPHOCYTES # BLD AUTO: 24 % — SIGNIFICANT CHANGE UP (ref 13–44)
MAGNESIUM SERPL-MCNC: 2 MG/DL — SIGNIFICANT CHANGE UP (ref 1.6–2.6)
MCHC RBC-ENTMCNC: 28.7 PG — SIGNIFICANT CHANGE UP (ref 27–34)
MCHC RBC-ENTMCNC: 32.5 GM/DL — SIGNIFICANT CHANGE UP (ref 32–36)
MCV RBC AUTO: 88.3 FL — SIGNIFICANT CHANGE UP (ref 80–100)
METHOD TYPE: SIGNIFICANT CHANGE UP
MONOCYTES # BLD AUTO: 1.01 K/UL — HIGH (ref 0–0.9)
MONOCYTES NFR BLD AUTO: 9.4 % — SIGNIFICANT CHANGE UP (ref 2–14)
NEUTROPHILS # BLD AUTO: 6.86 K/UL — SIGNIFICANT CHANGE UP (ref 1.8–7.4)
NEUTROPHILS NFR BLD AUTO: 63.7 % — SIGNIFICANT CHANGE UP (ref 43–77)
NRBC # BLD: 0 /100 WBCS — SIGNIFICANT CHANGE UP (ref 0–0)
NRBC # FLD: 0 K/UL — SIGNIFICANT CHANGE UP (ref 0–0)
PHOSPHATE SERPL-MCNC: 4 MG/DL — SIGNIFICANT CHANGE UP (ref 2.5–4.5)
PLATELET # BLD AUTO: 396 K/UL — SIGNIFICANT CHANGE UP (ref 150–400)
POTASSIUM SERPL-MCNC: 4.7 MMOL/L — SIGNIFICANT CHANGE UP (ref 3.5–5.3)
POTASSIUM SERPL-SCNC: 4.7 MMOL/L — SIGNIFICANT CHANGE UP (ref 3.5–5.3)
RBC # BLD: 4.28 M/UL — SIGNIFICANT CHANGE UP (ref 3.8–5.2)
RBC # FLD: 12.6 % — SIGNIFICANT CHANGE UP (ref 10.3–14.5)
SODIUM SERPL-SCNC: 138 MMOL/L — SIGNIFICANT CHANGE UP (ref 135–145)
WBC # BLD: 10.75 K/UL — HIGH (ref 3.8–10.5)
WBC # FLD AUTO: 10.75 K/UL — HIGH (ref 3.8–10.5)

## 2023-08-29 PROCEDURE — 99232 SBSQ HOSP IP/OBS MODERATE 35: CPT

## 2023-08-29 RX ADMIN — ATORVASTATIN CALCIUM 80 MILLIGRAM(S): 80 TABLET, FILM COATED ORAL at 21:12

## 2023-08-29 RX ADMIN — OLANZAPINE 5 MILLIGRAM(S): 15 TABLET, FILM COATED ORAL at 05:39

## 2023-08-29 RX ADMIN — NYSTATIN CREAM 1 APPLICATION(S): 100000 CREAM TOPICAL at 05:39

## 2023-08-29 RX ADMIN — OLANZAPINE 5 MILLIGRAM(S): 15 TABLET, FILM COATED ORAL at 00:20

## 2023-08-29 RX ADMIN — Medication 50 MILLIGRAM(S): at 05:31

## 2023-08-29 RX ADMIN — Medication 1 TABLET(S): at 11:20

## 2023-08-29 RX ADMIN — CEFTRIAXONE 100 MILLIGRAM(S): 500 INJECTION, POWDER, FOR SOLUTION INTRAMUSCULAR; INTRAVENOUS at 17:00

## 2023-08-29 RX ADMIN — Medication 50 MICROGRAM(S): at 05:32

## 2023-08-29 RX ADMIN — Medication 1 MILLIGRAM(S): at 11:19

## 2023-08-29 RX ADMIN — RIVAROXABAN 20 MILLIGRAM(S): KIT at 17:06

## 2023-08-29 RX ADMIN — Medication 3 MILLIGRAM(S): at 21:12

## 2023-08-29 RX ADMIN — OLANZAPINE 5 MILLIGRAM(S): 15 TABLET, FILM COATED ORAL at 17:24

## 2023-08-30 DIAGNOSIS — N39.0 URINARY TRACT INFECTION, SITE NOT SPECIFIED: ICD-10-CM

## 2023-08-30 LAB
ANION GAP SERPL CALC-SCNC: 12 MMOL/L — SIGNIFICANT CHANGE UP (ref 7–14)
BASOPHILS # BLD AUTO: 0.06 K/UL — SIGNIFICANT CHANGE UP (ref 0–0.2)
BASOPHILS NFR BLD AUTO: 0.8 % — SIGNIFICANT CHANGE UP (ref 0–2)
BUN SERPL-MCNC: 12 MG/DL — SIGNIFICANT CHANGE UP (ref 7–23)
CALCIUM SERPL-MCNC: 9.5 MG/DL — SIGNIFICANT CHANGE UP (ref 8.4–10.5)
CHLORIDE SERPL-SCNC: 106 MMOL/L — SIGNIFICANT CHANGE UP (ref 98–107)
CO2 SERPL-SCNC: 18 MMOL/L — LOW (ref 22–31)
CREAT SERPL-MCNC: 0.66 MG/DL — SIGNIFICANT CHANGE UP (ref 0.5–1.3)
EGFR: 98 ML/MIN/1.73M2 — SIGNIFICANT CHANGE UP
EOSINOPHIL # BLD AUTO: 0.28 K/UL — SIGNIFICANT CHANGE UP (ref 0–0.5)
EOSINOPHIL NFR BLD AUTO: 3.9 % — SIGNIFICANT CHANGE UP (ref 0–6)
GLUCOSE BLDC GLUCOMTR-MCNC: 100 MG/DL — HIGH (ref 70–99)
GLUCOSE BLDC GLUCOMTR-MCNC: 110 MG/DL — HIGH (ref 70–99)
GLUCOSE BLDC GLUCOMTR-MCNC: 135 MG/DL — HIGH (ref 70–99)
GLUCOSE BLDC GLUCOMTR-MCNC: 93 MG/DL — SIGNIFICANT CHANGE UP (ref 70–99)
GLUCOSE SERPL-MCNC: 135 MG/DL — HIGH (ref 70–99)
HCT VFR BLD CALC: 32.9 % — LOW (ref 34.5–45)
HGB BLD-MCNC: 10.9 G/DL — LOW (ref 11.5–15.5)
IANC: 4.3 K/UL — SIGNIFICANT CHANGE UP (ref 1.8–7.4)
IMM GRANULOCYTES NFR BLD AUTO: 0.3 % — SIGNIFICANT CHANGE UP (ref 0–0.9)
LYMPHOCYTES # BLD AUTO: 1.77 K/UL — SIGNIFICANT CHANGE UP (ref 1–3.3)
LYMPHOCYTES # BLD AUTO: 24.7 % — SIGNIFICANT CHANGE UP (ref 13–44)
MAGNESIUM SERPL-MCNC: 1.9 MG/DL — SIGNIFICANT CHANGE UP (ref 1.6–2.6)
MCHC RBC-ENTMCNC: 29 PG — SIGNIFICANT CHANGE UP (ref 27–34)
MCHC RBC-ENTMCNC: 33.1 GM/DL — SIGNIFICANT CHANGE UP (ref 32–36)
MCV RBC AUTO: 87.5 FL — SIGNIFICANT CHANGE UP (ref 80–100)
MONOCYTES # BLD AUTO: 0.75 K/UL — SIGNIFICANT CHANGE UP (ref 0–0.9)
MONOCYTES NFR BLD AUTO: 10.4 % — SIGNIFICANT CHANGE UP (ref 2–14)
NEUTROPHILS # BLD AUTO: 4.3 K/UL — SIGNIFICANT CHANGE UP (ref 1.8–7.4)
NEUTROPHILS NFR BLD AUTO: 59.9 % — SIGNIFICANT CHANGE UP (ref 43–77)
NRBC # BLD: 0 /100 WBCS — SIGNIFICANT CHANGE UP (ref 0–0)
NRBC # FLD: 0 K/UL — SIGNIFICANT CHANGE UP (ref 0–0)
PHOSPHATE SERPL-MCNC: 3.3 MG/DL — SIGNIFICANT CHANGE UP (ref 2.5–4.5)
PLATELET # BLD AUTO: 271 K/UL — SIGNIFICANT CHANGE UP (ref 150–400)
POTASSIUM SERPL-MCNC: 4.1 MMOL/L — SIGNIFICANT CHANGE UP (ref 3.5–5.3)
POTASSIUM SERPL-SCNC: 4.1 MMOL/L — SIGNIFICANT CHANGE UP (ref 3.5–5.3)
RBC # BLD: 3.76 M/UL — LOW (ref 3.8–5.2)
RBC # FLD: 12.4 % — SIGNIFICANT CHANGE UP (ref 10.3–14.5)
SODIUM SERPL-SCNC: 136 MMOL/L — SIGNIFICANT CHANGE UP (ref 135–145)
WBC # BLD: 7.18 K/UL — SIGNIFICANT CHANGE UP (ref 3.8–10.5)
WBC # FLD AUTO: 7.18 K/UL — SIGNIFICANT CHANGE UP (ref 3.8–10.5)

## 2023-08-30 PROCEDURE — 90792 PSYCH DIAG EVAL W/MED SRVCS: CPT

## 2023-08-30 PROCEDURE — 99233 SBSQ HOSP IP/OBS HIGH 50: CPT

## 2023-08-30 RX ORDER — OLANZAPINE 15 MG/1
7.5 TABLET, FILM COATED ORAL AT BEDTIME
Refills: 0 | Status: DISCONTINUED | OUTPATIENT
Start: 2023-08-30 | End: 2023-09-01

## 2023-08-30 RX ORDER — OLANZAPINE 15 MG/1
5 TABLET, FILM COATED ORAL DAILY
Refills: 0 | Status: DISCONTINUED | OUTPATIENT
Start: 2023-08-30 | End: 2023-09-01

## 2023-08-30 RX ADMIN — ATORVASTATIN CALCIUM 80 MILLIGRAM(S): 80 TABLET, FILM COATED ORAL at 21:15

## 2023-08-30 RX ADMIN — Medication 1 MILLIGRAM(S): at 11:24

## 2023-08-30 RX ADMIN — Medication 50 MILLIGRAM(S): at 05:17

## 2023-08-30 RX ADMIN — RIVAROXABAN 20 MILLIGRAM(S): KIT at 17:09

## 2023-08-30 RX ADMIN — Medication 3 MILLIGRAM(S): at 21:15

## 2023-08-30 RX ADMIN — CEFTRIAXONE 100 MILLIGRAM(S): 500 INJECTION, POWDER, FOR SOLUTION INTRAMUSCULAR; INTRAVENOUS at 16:09

## 2023-08-30 RX ADMIN — NYSTATIN CREAM 1 APPLICATION(S): 100000 CREAM TOPICAL at 17:12

## 2023-08-30 RX ADMIN — Medication 650 MILLIGRAM(S): at 18:15

## 2023-08-30 RX ADMIN — OLANZAPINE 5 MILLIGRAM(S): 15 TABLET, FILM COATED ORAL at 05:17

## 2023-08-30 RX ADMIN — Medication 50 MICROGRAM(S): at 05:17

## 2023-08-30 RX ADMIN — Medication 650 MILLIGRAM(S): at 17:15

## 2023-08-30 RX ADMIN — Medication 1 TABLET(S): at 11:23

## 2023-08-30 RX ADMIN — NYSTATIN CREAM 1 APPLICATION(S): 100000 CREAM TOPICAL at 05:17

## 2023-08-30 RX ADMIN — OLANZAPINE 7.5 MILLIGRAM(S): 15 TABLET, FILM COATED ORAL at 21:15

## 2023-08-30 NOTE — BH CONSULTATION LIAISON PROGRESS NOTE - NSBHASSESSMENTFT_PSY_ALL_CORE
63 y/o   female,  x 10 years, no children, no family, noncaregiver, retired  at [employer], domiciled alone in a private house (currently facing foreclosure from the bank due to unpaid debt), HHA services Mon-Friday for 3 hours /day (Pt did not let HHA back in so no services for last 2 weeks), PUMA mohan 2022 for 3 months, on SSD, with psychiatric history of Schizophrenia and Alcohol Abuse, hx of Cannabis Abuse, Personality Disorder, 3 psychiatric hospitalizations last at [facility] from 8/2018-11/2018, attended [facility] starting on 11/26/2018 - 04/28/2023 (discharged for failure to attend appointments), no history of SI, SA, or HI. No history of aggression or legal issues, drinking 3-6 beers nightly and using marijuana on the weekends, no known history of detox/rehab/withdrawal symptoms/DTs or seizures, PMH of DM2, hypothyroidism, Afib on Xarelto, hx of hypovolemic and septic shock secondary to UTI requiring pressors 01/20/23; NPH (normal pressure hydrocephalus; onset of difficulty walking 12/22; [facility] discharge recommendation to f/u with neuro as outpt for large volume LP on 01/20/23), who was BIB EMS from home after her close friend/AA sponsor/HCP Luciano Herrera sent her in for poor self care, found laying on the floor. Serum tox negative.     Patient on evaluation is highly disorganized, illogical, confused, with poor insight/judgement. Also has an active UTI, currently on antibiotics.   DDX: Delirium secondary to general medical condition; chronic schizophrenia     Plan:  Increase Zydis 5 mg PO bid to 5 mg PO QAM and 7.5 mg PO QHS for psychosis. Please monitor QTc  Neurology recs appreciated  Can use Zyprexa 1.25 mg IM Q6 PRN for agitation.  Delirium precautions, avoid excessive use of BZD/ anticholinergics (delirogenics)   Provide frequent orientation  No capacity to leave AMA, not psychiatrically cleared   Observation status deferred to primary team  CL will follow

## 2023-08-31 LAB
-  AMPICILLIN: SIGNIFICANT CHANGE UP
-  CIPROFLOXACIN: SIGNIFICANT CHANGE UP
-  LEVOFLOXACIN: SIGNIFICANT CHANGE UP
-  NITROFURANTOIN: SIGNIFICANT CHANGE UP
-  TETRACYCLINE: SIGNIFICANT CHANGE UP
-  VANCOMYCIN: SIGNIFICANT CHANGE UP
CULTURE RESULTS: SIGNIFICANT CHANGE UP
GLUCOSE BLDC GLUCOMTR-MCNC: 105 MG/DL — HIGH (ref 70–99)
GLUCOSE BLDC GLUCOMTR-MCNC: 126 MG/DL — HIGH (ref 70–99)
GLUCOSE BLDC GLUCOMTR-MCNC: 179 MG/DL — HIGH (ref 70–99)
GLUCOSE BLDC GLUCOMTR-MCNC: 280 MG/DL — HIGH (ref 70–99)
GLUCOSE BLDC GLUCOMTR-MCNC: 86 MG/DL — SIGNIFICANT CHANGE UP (ref 70–99)
METHOD TYPE: SIGNIFICANT CHANGE UP
ORGANISM # SPEC MICROSCOPIC CNT: SIGNIFICANT CHANGE UP
SPECIMEN SOURCE: SIGNIFICANT CHANGE UP

## 2023-08-31 PROCEDURE — 99232 SBSQ HOSP IP/OBS MODERATE 35: CPT

## 2023-08-31 RX ADMIN — ATORVASTATIN CALCIUM 80 MILLIGRAM(S): 80 TABLET, FILM COATED ORAL at 21:21

## 2023-08-31 RX ADMIN — OLANZAPINE 7.5 MILLIGRAM(S): 15 TABLET, FILM COATED ORAL at 21:21

## 2023-08-31 RX ADMIN — OLANZAPINE 5 MILLIGRAM(S): 15 TABLET, FILM COATED ORAL at 12:10

## 2023-08-31 RX ADMIN — Medication 3 MILLIGRAM(S): at 21:21

## 2023-08-31 RX ADMIN — Medication 3: at 17:52

## 2023-08-31 RX ADMIN — Medication 1 TABLET(S): at 12:10

## 2023-08-31 RX ADMIN — NYSTATIN CREAM 1 APPLICATION(S): 100000 CREAM TOPICAL at 04:58

## 2023-08-31 RX ADMIN — Medication 50 MILLIGRAM(S): at 04:58

## 2023-08-31 RX ADMIN — RIVAROXABAN 20 MILLIGRAM(S): KIT at 17:54

## 2023-08-31 RX ADMIN — NYSTATIN CREAM 1 APPLICATION(S): 100000 CREAM TOPICAL at 17:54

## 2023-08-31 RX ADMIN — Medication 50 MICROGRAM(S): at 04:58

## 2023-08-31 RX ADMIN — Medication 1 MILLIGRAM(S): at 12:10

## 2023-08-31 NOTE — DISCHARGE NOTE PROVIDER - HOSPITAL COURSE
Patient is a 61yo F with PMH of T2DM, hypothyroidism, Afib on Xarelto, NPH, and schizophrenia on weekly Prolixin injections who presented with behavioral changes and medication noncompliance. She was admitted with acute encephalopathy possibly due to acute exacerbation of known schizophrenia. She was treated with ceftriaxone for UTI with UCx growing >100K CFUs of Ecoli, with lesser growth of Efaecalis. She was seen by PT and recommended for PUMA. She was seen by psychiatry for further optimization. Patient is a 63yo F with PMH of T2DM, hypothyroidism, Afib on Xarelto, NPH, and schizophrenia on weekly Prolixin injections who presented with behavioral changes and medication noncompliance. She was admitted with acute encephalopathy possibly due to acute exacerbation of known schizophrenia. She was treated with ceftriaxone for UTI with UCx growing >100K CFUs of Ecoli, with lesser growth of Efaecalis. She was seen by PT and recommended for PUMA. PT to reassess as patient is now independently ambulatory around the unit. She was seen by psychiatry for further optimization, started on zydis, needs further BH optimization. Likely inpatient psychiatric hospitalization Patient is a 61yo F with PMH of T2DM, hypothyroidism, Afib on Xarelto, NPH, and schizophrenia on weekly Prolixin injections who presented with behavioral changes and medication noncompliance. She was admitted with acute encephalopathy possibly due to acute exacerbation of known schizophrenia. She was treated with ceftriaxone for UTI with UCx growing >100K CFUs of Ecoli, with lesser growth of Efaecalis. She was seen by psychiatry for further optimization, started on zydis, needs further BH optimization with inpatient psychiatric hospitalization Patient is a 63yo F with PMH of T2DM, hypothyroidism, Afib on Xarelto, NPH, and schizophrenia on weekly Prolixin injections who presented with behavioral changes and medication noncompliance. She was admitted with acute encephalopathy possibly due to acute exacerbation of known schizophrenia. She was treated with ceftriaxone for UTI with UCx growing >100K CFUs of Ecoli, with lesser growth of Efaecalis. She was seen by psychiatry for further optimization, started on zydis, needs further BH optimization with inpatient psychiatric hospitalization and being discharged to [facility] for inpatient psychiatric care.

## 2023-08-31 NOTE — DISCHARGE NOTE PROVIDER - NSDCMRMEDTOKEN_GEN_ALL_CORE_FT
atorvastatin 40 mg oral tablet: 1 tab(s) orally once a day (at bedtime)  BENZTROPINE MES 2 MG TABLET:   BUSPIRONE HCL 15 MG TABLET: take 1 tablet by mouth at bedtime  clonazePAM 0.5 mg oral tablet: 1 tab(s) orally once a day, As needed, anxiety/restless  FLUPHENAZINE  MG/5 ML: 125 milligram(s) injectable every 7 days, next dose 1/24  folic acid 1 mg oral tablet: 1 tab(s) orally once a day  gabapentin 100 mg oral capsule: 1 cap(s) orally once a day (at bedtime)  LEVOTHYROXINE 50 MCG TABLET: take 1 tablet by mouth once daily  melatonin 3 mg oral tablet: 1 tab(s) orally once a day (at bedtime), As needed, Insomnia  METFORMIN  MG TABLET: take 1 tablet by mouth once daily  METOPROLOL SUCC ER 50 MG TAB:   Multiple Vitamins oral tablet: 1 tab(s) orally once a day  nystatin 100,000 units/g topical ointment: 1 application topically 2 times a day  rivaroxaban 20 mg oral tablet: 1 tab(s) orally once a day   atorvastatin 80 mg oral tablet: 1 tab(s) orally once a day (at bedtime)  benztropine 0.5 mg oral tablet: 1 tab(s) orally 2 times a day  folic acid 1 mg oral tablet: 1 tab(s) orally once a day  levothyroxine 50 mcg (0.05 mg) oral tablet: 1 tab(s) orally once a day  METFORMIN  MG TABLET: take 1 tablet by mouth once daily  metoprolol succinate 50 mg oral tablet, extended release: 1 tab(s) orally once a day  Multiple Vitamins oral tablet: 1 tab(s) orally once a day  nystatin 100,000 units/g topical powder: 1 Apply topically to affected area 2 times a day  OLANZapine 10 mg intramuscular injection: 1.25 milligram(s) intramuscular every 6 hours As needed Agitation  OLANZapine 5 mg oral tablet: 1 orally once a day morning  OLANZapine 7.5 mg oral tablet: 1 orally once a day (at bedtime)  rivaroxaban 20 mg oral tablet: 1 tab(s) orally once a day (before a meal)   atorvastatin 80 mg oral tablet: 1 tab(s) orally once a day (at bedtime)  benztropine 0.5 mg oral tablet: 1 tab(s) orally 2 times a day  fluPHENAZine 2.5 mg oral tablet: 3 tab(s) orally  folic acid 1 mg oral tablet: 1 tab(s) orally once a day  levothyroxine 50 mcg (0.05 mg) oral tablet: 1 tab(s) orally once a day  METFORMIN  MG TABLET: take 1 tablet by mouth once daily  metoprolol succinate 50 mg oral tablet, extended release: 1 tab(s) orally once a day  Multiple Vitamins oral tablet: 1 tab(s) orally once a day  nystatin 100,000 units/g topical powder: 1 Apply topically to affected area 2 times a day  OLANZapine 10 mg intramuscular injection: 1.25 milligram(s) intramuscular every 6 hours As needed Agitation  rivaroxaban 20 mg oral tablet: 1 tab(s) orally once a day (before a meal)   atorvastatin 80 mg oral tablet: 1 tab(s) orally once a day (at bedtime)  benztropine 0.5 mg oral tablet: 1 tab(s) orally 2 times a day  fluPHENAZine 2.5 mg oral tablet: 3 tab(s) orally once a day (at bedtime) Take 7.5mg daily at bedtime.  fluPHENAZine 2.5 mg oral tablet: 1 tab(s) orally once a day Once daily every morning.  folic acid 1 mg oral tablet: 1 tab(s) orally once a day  levothyroxine 50 mcg (0.05 mg) oral tablet: 1 tab(s) orally once a day  METFORMIN  MG TABLET: take 1 tablet by mouth once daily  metoprolol succinate 50 mg oral tablet, extended release: 1 tab(s) orally once a day  Multiple Vitamins oral tablet: 1 tab(s) orally once a day  nystatin 100,000 units/g topical powder: 1 Apply topically to affected area 2 times a day  OLANZapine 10 mg intramuscular injection: 1.25 milligram(s) intramuscular every 6 hours As needed Agitation  rivaroxaban 20 mg oral tablet: 1 tab(s) orally once a day (before a meal)

## 2023-08-31 NOTE — DISCHARGE NOTE PROVIDER - NSDCCPCAREPLAN_GEN_ALL_CORE_FT
PRINCIPAL DISCHARGE DIAGNOSIS  Diagnosis: Schizophrenia  Assessment and Plan of Treatment:       SECONDARY DISCHARGE DIAGNOSES  Diagnosis: Hydrocephalus  Assessment and Plan of Treatment: F/u outpatient w/ [address] [place] [address]. Patient can follow up with general neurology at [address] [place] [address] 1-2 weeks after discharge. Please instruct the patient to call [phone] to schedule this appointment.  Or can seek another opinion from a Movement disorders specialist and/or Neurosurgeon Dr. Duarte Carson who also does NPH workup outpatient       PRINCIPAL DISCHARGE DIAGNOSIS  Diagnosis: Schizophrenia  Assessment and Plan of Treatment: Please follow up with your Psychiatrist for further management. medication adjustment at [facility]      SECONDARY DISCHARGE DIAGNOSES  Diagnosis: Hydrocephalus  Assessment and Plan of Treatment: F/u outpatient w/ [address] [place] [address]. Patient can follow up with general neurology at [address] [place] [address] 1-2 weeks after discharge. Please instruct the patient to call [phone] to schedule this appointment.  Or can seek another opinion from a Movement disorders specialist and/or Neurosurgeon Dr. Duarte Carson who also does NPH workup outpatient      Diagnosis: UTI (urinary tract infection)  Assessment and Plan of Treatment: Your Urine culture grew e.coli/ e.faecalis, received IV ceftraixone, repeat UA negative.     PRINCIPAL DISCHARGE DIAGNOSIS  Diagnosis: Schizophrenia  Assessment and Plan of Treatment: You were treated for acute psychosis. You are now being transferred to [facility] for continued inpatient psychiatric are. Please follow up with your Psychiatrist for further management. medication adjustment at [facility]      SECONDARY DISCHARGE DIAGNOSES  Diagnosis: Hydrocephalus  Assessment and Plan of Treatment: F/u outpatient w/ [address] [place] [address]. Patient can follow up with general neurology at [address] [place] [address] 1-2 weeks after discharge. Please call [phone] to schedule this appointment.  Or can seek another opinion from a Movement disorders specialist and/or Neurosurgeon Dr. Duarte Carson who also does NPH workup outpatient      Diagnosis: UTI (urinary tract infection)  Assessment and Plan of Treatment: Your Urine culture grew e.coli/ e.faecalis, received IV ceftraixone, repeat UA negative.    Diagnosis: DM (diabetes mellitus)  Assessment and Plan of Treatment: Resume metformin. Checkn fingerstick glucose twice daily.    Diagnosis: Afib  Assessment and Plan of Treatment: Continue Xarelto and Metoprolol.    Diagnosis: Hypothyroid  Assessment and Plan of Treatment: Continue Synthroid.

## 2023-08-31 NOTE — DISCHARGE NOTE PROVIDER - NSFOLLOWUPCLINICS_GEN_ALL_ED_FT
[facility]  Neurology Auto[facility] Encephalitis  [address] [place] [address]  Phone: [phone]  Fax: [phone]

## 2023-08-31 NOTE — DISCHARGE NOTE PROVIDER - CARE PROVIDER_API CALL
Josh Carson (MD)  Neurosurgery  [address], [address]  [place] [address]  Phone: [phone]  Fax: [phone]  Follow Up Time:

## 2023-08-31 NOTE — DISCHARGE NOTE PROVIDER - ATTENDING DISCHARGE PHYSICAL EXAMINATION:
Physical Exam:  GENERAL: no apparent distress  CHEST/LUNG: on RA; Clear to auscultation bilaterally; No wheezing; No crackles  HEART: Regular rate and rhythm; S1/S2 wnl; no obvious murmurs  ABDOMEN: Soft, Nontender, Nondistended; Bowel sounds present  EXTREMITIES:  2+ Peripheral Pulses, No edema  PSYCH: normal affect, calm demeanor

## 2023-09-01 LAB
GLUCOSE BLDC GLUCOMTR-MCNC: 103 MG/DL — HIGH (ref 70–99)
GLUCOSE BLDC GLUCOMTR-MCNC: 119 MG/DL — HIGH (ref 70–99)
GLUCOSE BLDC GLUCOMTR-MCNC: 150 MG/DL — HIGH (ref 70–99)
GLUCOSE BLDC GLUCOMTR-MCNC: 94 MG/DL — SIGNIFICANT CHANGE UP (ref 70–99)

## 2023-09-01 PROCEDURE — 99232 SBSQ HOSP IP/OBS MODERATE 35: CPT

## 2023-09-01 PROCEDURE — 99233 SBSQ HOSP IP/OBS HIGH 50: CPT

## 2023-09-01 RX ORDER — FLUPHENAZINE HYDROCHLORIDE 1 MG/1
2.5 TABLET, FILM COATED ORAL
Refills: 0 | Status: DISCONTINUED | OUTPATIENT
Start: 2023-09-01 | End: 2023-09-06

## 2023-09-01 RX ORDER — FLUPHENAZINE HYDROCHLORIDE 1 MG/1
5 TABLET, FILM COATED ORAL
Refills: 0 | Status: DISCONTINUED | OUTPATIENT
Start: 2023-09-01 | End: 2023-09-05

## 2023-09-01 RX ORDER — BENZTROPINE MESYLATE 1 MG
0.5 TABLET ORAL
Refills: 0 | Status: DISCONTINUED | OUTPATIENT
Start: 2023-09-01 | End: 2023-09-06

## 2023-09-01 RX ORDER — OLANZAPINE 15 MG/1
1.25 TABLET, FILM COATED ORAL EVERY 6 HOURS
Refills: 0 | Status: DISCONTINUED | OUTPATIENT
Start: 2023-09-01 | End: 2023-09-05

## 2023-09-01 RX ADMIN — NYSTATIN CREAM 1 APPLICATION(S): 100000 CREAM TOPICAL at 16:56

## 2023-09-01 RX ADMIN — Medication 50 MICROGRAM(S): at 05:14

## 2023-09-01 RX ADMIN — Medication 3 MILLIGRAM(S): at 21:50

## 2023-09-01 RX ADMIN — RIVAROXABAN 20 MILLIGRAM(S): KIT at 16:56

## 2023-09-01 RX ADMIN — Medication 0.5 MILLIGRAM(S): at 18:47

## 2023-09-01 RX ADMIN — Medication 50 MILLIGRAM(S): at 05:14

## 2023-09-01 RX ADMIN — NYSTATIN CREAM 1 APPLICATION(S): 100000 CREAM TOPICAL at 05:14

## 2023-09-01 RX ADMIN — Medication 1 TABLET(S): at 12:42

## 2023-09-01 RX ADMIN — OLANZAPINE 5 MILLIGRAM(S): 15 TABLET, FILM COATED ORAL at 12:41

## 2023-09-01 RX ADMIN — ATORVASTATIN CALCIUM 80 MILLIGRAM(S): 80 TABLET, FILM COATED ORAL at 21:50

## 2023-09-01 RX ADMIN — FLUPHENAZINE HYDROCHLORIDE 5 MILLIGRAM(S): 1 TABLET, FILM COATED ORAL at 22:04

## 2023-09-01 RX ADMIN — Medication 1 MILLIGRAM(S): at 12:42

## 2023-09-01 RX ADMIN — FLUPHENAZINE HYDROCHLORIDE 2.5 MILLIGRAM(S): 1 TABLET, FILM COATED ORAL at 18:47

## 2023-09-01 NOTE — BH CONSULTATION LIAISON PROGRESS NOTE - NSBHATTESTCOMMENTATTENDFT_PSY_A_CORE
Orthopedic (Pediatric)
agree with above, patient still with pseudocyesis, hypersexual delusions, inability to care for self, concern for the same from HCP Hamid, would change patient's meds as above as patient may have done better on prolixin in the past, per Luciano has been n/c with prolixin dec, plan for psych admission once UTI is optimally treated. 
agree with above - patient still disorganized, psychotic, perseverative on , eating better, plan as above

## 2023-09-01 NOTE — BH CONSULTATION LIAISON PROGRESS NOTE - NSBHASSESSMENTFT_PSY_ALL_CORE
63 y/o   female,  x 10 years, no children, no family, noncaregiver, retired  at [employer], domiciled alone in a private house (currently facing foreclosure from the bank due to unpaid debt), HHA services Mon-Friday for 3 hours /day (Pt did not let HHA back in so no services for last 2 weeks), PUMA mohan 2022 for 3 months, on SSD, with psychiatric history of Schizophrenia and Alcohol Abuse, hx of Cannabis Abuse, Personality Disorder, 3 psychiatric hospitalizations last at [facility] from 8/2018-11/2018, attended [facility] starting on 11/26/2018 - 04/28/2023 (discharged for failure to attend appointments), no history of SI, SA, or HI. No history of aggression or legal issues, drinking 3-6 beers nightly and using marijuana on the weekends, no known history of detox/rehab/withdrawal symptoms/DTs or seizures, PMH of DM2, hypothyroidism, Afib on Xarelto, hx of hypovolemic and septic shock secondary to UTI requiring pressors 01/20/23; NPH (normal pressure hydrocephalus; onset of difficulty walking 12/22; [facility] discharge recommendation to f/u with neuro as outpt for large volume LP on 01/20/23), who was BIB EMS from home after her close friend/AA sponsor/HCP Luciano Herrera sent her in for poor self care, found laying on the floor. Serum tox negative.     Patient on evaluation is highly disorganized, illogical, confused, with poor insight/judgement. Also has an active UTI, currently on antibiotics.  Per her close friend Luciano Herrera at baseline patient is very independent and can take care of herself when she is taking her medications.     DDX: Delirium secondary to general medical condition; chronic schizophrenia     Plan:  c/w Zydis 5 mg PO QAM and 7.5 mg PO QHS for psychosis. Please monitor QTc  Neurology recs appreciated  Can use Zyprexa 1.25 mg IM Q6 PRN for agitation.  Delirium precautions, avoid excessive use of BZD/ anticholinergics (delirogenics)   Provide frequent orientation  No capacity to leave AMA, not psychiatrically cleared   Observation status deferred to primary team  CL will follow    63 y/o   female,  x 10 years, no children, no family, noncaregiver, retired  at [employer], domiciled alone in a private house (currently facing foreclosure from the bank due to unpaid debt), HHA services Mon-Friday for 3 hours /day (Pt did not let HHA back in so no services for last 2 weeks), PUMA mohan 2022 for 3 months, on SSD, with psychiatric history of Schizophrenia and Alcohol Abuse, hx of Cannabis Abuse, Personality Disorder, 3 psychiatric hospitalizations last at [facility] from 8/2018-11/2018, attended [facility] starting on 11/26/2018 - 04/28/2023 (discharged for failure to attend appointments), no history of SI, SA, or HI. No history of aggression or legal issues, drinking 3-6 beers nightly and using marijuana on the weekends, no known history of detox/rehab/withdrawal symptoms/DTs or seizures, PMH of DM2, hypothyroidism, Afib on Xarelto, hx of hypovolemic and septic shock secondary to UTI requiring pressors 01/20/23; NPH (normal pressure hydrocephalus; onset of difficulty walking 12/22; [facility] discharge recommendation to f/u with neuro as outpt for large volume LP on 01/20/23), who was BIB EMS from home after her close friend/AA sponsor/HCP Luciano Herrera sent her in for poor self care, found laying on the floor. Serum tox negative.     Patient on evaluation is highly disorganized, illogical, confused, with poor insight/judgement. Also has an active UTI, currently on antibiotics.  Per her close friend Luciano Herrera at baseline patient is very independent and can take care of herself when she is taking her medications.  Patient currently is NOT at usual baseline, is quite psychotic, unable to care for self, likely to require inpatient psych hospitalization if condition does not improve within days of resolution of the patient's UTI.     DDX: Delirium secondary to general medical condition; chronic schizophrenia     Plan:  STOP Zyprexa  Begin Prolixin 2.5mg qAM + 5mg qHS  Begin Cogentin 0.5mg BID  Neurology recs appreciated  Can use Zyprexa 1.25 mg IM Q6 PRN for agitation.  Delirium precautions, avoid excessive use of BZD/ anticholinergics (delirogenics)   Provide frequent orientation  Cannot leave AMA  Will need psych admission if continued psychosis after completion of UTI treatment, d/w Hamid

## 2023-09-02 LAB
APPEARANCE UR: CLEAR — SIGNIFICANT CHANGE UP
BACTERIA # UR AUTO: ABNORMAL /HPF
BILIRUB UR-MCNC: NEGATIVE — SIGNIFICANT CHANGE UP
CAST: 1 /LPF — SIGNIFICANT CHANGE UP (ref 0–4)
COLOR SPEC: YELLOW — SIGNIFICANT CHANGE UP
DIFF PNL FLD: NEGATIVE — SIGNIFICANT CHANGE UP
GLUCOSE BLDC GLUCOMTR-MCNC: 102 MG/DL — HIGH (ref 70–99)
GLUCOSE BLDC GLUCOMTR-MCNC: 104 MG/DL — HIGH (ref 70–99)
GLUCOSE BLDC GLUCOMTR-MCNC: 112 MG/DL — HIGH (ref 70–99)
GLUCOSE BLDC GLUCOMTR-MCNC: 95 MG/DL — SIGNIFICANT CHANGE UP (ref 70–99)
GLUCOSE UR QL: NEGATIVE MG/DL — SIGNIFICANT CHANGE UP
KETONES UR-MCNC: NEGATIVE MG/DL — SIGNIFICANT CHANGE UP
LEUKOCYTE ESTERASE UR-ACNC: ABNORMAL
NITRITE UR-MCNC: NEGATIVE — SIGNIFICANT CHANGE UP
PH UR: 6 — SIGNIFICANT CHANGE UP (ref 5–8)
PROT UR-MCNC: NEGATIVE MG/DL — SIGNIFICANT CHANGE UP
RBC CASTS # UR COMP ASSIST: 1 /HPF — SIGNIFICANT CHANGE UP (ref 0–4)
SP GR SPEC: 1.01 — SIGNIFICANT CHANGE UP (ref 1–1.03)
SQUAMOUS # UR AUTO: 0 /HPF — SIGNIFICANT CHANGE UP (ref 0–5)
UROBILINOGEN FLD QL: 0.2 MG/DL — SIGNIFICANT CHANGE UP (ref 0.2–1)
WBC UR QL: 4 /HPF — SIGNIFICANT CHANGE UP (ref 0–5)

## 2023-09-02 PROCEDURE — 99233 SBSQ HOSP IP/OBS HIGH 50: CPT

## 2023-09-02 RX ADMIN — FLUPHENAZINE HYDROCHLORIDE 5 MILLIGRAM(S): 1 TABLET, FILM COATED ORAL at 21:58

## 2023-09-02 RX ADMIN — Medication 650 MILLIGRAM(S): at 06:45

## 2023-09-02 RX ADMIN — NYSTATIN CREAM 1 APPLICATION(S): 100000 CREAM TOPICAL at 07:58

## 2023-09-02 RX ADMIN — ATORVASTATIN CALCIUM 80 MILLIGRAM(S): 80 TABLET, FILM COATED ORAL at 21:58

## 2023-09-02 RX ADMIN — NYSTATIN CREAM 1 APPLICATION(S): 100000 CREAM TOPICAL at 16:54

## 2023-09-02 RX ADMIN — Medication 1 TABLET(S): at 11:36

## 2023-09-02 RX ADMIN — Medication 650 MILLIGRAM(S): at 05:50

## 2023-09-02 RX ADMIN — FLUPHENAZINE HYDROCHLORIDE 2.5 MILLIGRAM(S): 1 TABLET, FILM COATED ORAL at 07:57

## 2023-09-02 RX ADMIN — Medication 50 MILLIGRAM(S): at 05:50

## 2023-09-02 RX ADMIN — Medication 0.5 MILLIGRAM(S): at 06:47

## 2023-09-02 RX ADMIN — Medication 3 MILLIGRAM(S): at 21:59

## 2023-09-02 RX ADMIN — Medication 0.5 MILLIGRAM(S): at 16:57

## 2023-09-02 RX ADMIN — RIVAROXABAN 20 MILLIGRAM(S): KIT at 16:57

## 2023-09-02 RX ADMIN — Medication 1 MILLIGRAM(S): at 11:36

## 2023-09-02 RX ADMIN — Medication 50 MICROGRAM(S): at 05:50

## 2023-09-03 LAB
ANION GAP SERPL CALC-SCNC: 13 MMOL/L — SIGNIFICANT CHANGE UP (ref 7–14)
BUN SERPL-MCNC: 14 MG/DL — SIGNIFICANT CHANGE UP (ref 7–23)
CALCIUM SERPL-MCNC: 9.5 MG/DL — SIGNIFICANT CHANGE UP (ref 8.4–10.5)
CHLORIDE SERPL-SCNC: 105 MMOL/L — SIGNIFICANT CHANGE UP (ref 98–107)
CO2 SERPL-SCNC: 23 MMOL/L — SIGNIFICANT CHANGE UP (ref 22–31)
CREAT SERPL-MCNC: 0.54 MG/DL — SIGNIFICANT CHANGE UP (ref 0.5–1.3)
EGFR: 103 ML/MIN/1.73M2 — SIGNIFICANT CHANGE UP
GLUCOSE BLDC GLUCOMTR-MCNC: 110 MG/DL — HIGH (ref 70–99)
GLUCOSE BLDC GLUCOMTR-MCNC: 116 MG/DL — HIGH (ref 70–99)
GLUCOSE BLDC GLUCOMTR-MCNC: 119 MG/DL — HIGH (ref 70–99)
GLUCOSE BLDC GLUCOMTR-MCNC: 91 MG/DL — SIGNIFICANT CHANGE UP (ref 70–99)
GLUCOSE SERPL-MCNC: 102 MG/DL — HIGH (ref 70–99)
HCT VFR BLD CALC: 33.9 % — LOW (ref 34.5–45)
HGB BLD-MCNC: 11.5 G/DL — SIGNIFICANT CHANGE UP (ref 11.5–15.5)
MCHC RBC-ENTMCNC: 29.6 PG — SIGNIFICANT CHANGE UP (ref 27–34)
MCHC RBC-ENTMCNC: 33.9 GM/DL — SIGNIFICANT CHANGE UP (ref 32–36)
MCV RBC AUTO: 87.4 FL — SIGNIFICANT CHANGE UP (ref 80–100)
NRBC # BLD: 0 /100 WBCS — SIGNIFICANT CHANGE UP (ref 0–0)
NRBC # FLD: 0 K/UL — SIGNIFICANT CHANGE UP (ref 0–0)
PLATELET # BLD AUTO: 313 K/UL — SIGNIFICANT CHANGE UP (ref 150–400)
POTASSIUM SERPL-MCNC: 3.8 MMOL/L — SIGNIFICANT CHANGE UP (ref 3.5–5.3)
POTASSIUM SERPL-SCNC: 3.8 MMOL/L — SIGNIFICANT CHANGE UP (ref 3.5–5.3)
RBC # BLD: 3.88 M/UL — SIGNIFICANT CHANGE UP (ref 3.8–5.2)
RBC # FLD: 12.2 % — SIGNIFICANT CHANGE UP (ref 10.3–14.5)
SODIUM SERPL-SCNC: 141 MMOL/L — SIGNIFICANT CHANGE UP (ref 135–145)
WBC # BLD: 7.49 K/UL — SIGNIFICANT CHANGE UP (ref 3.8–10.5)
WBC # FLD AUTO: 7.49 K/UL — SIGNIFICANT CHANGE UP (ref 3.8–10.5)

## 2023-09-03 PROCEDURE — 99233 SBSQ HOSP IP/OBS HIGH 50: CPT

## 2023-09-03 RX ORDER — LANOLIN ALCOHOL/MO/W.PET/CERES
6 CREAM (GRAM) TOPICAL ONCE
Refills: 0 | Status: COMPLETED | OUTPATIENT
Start: 2023-09-03 | End: 2023-09-04

## 2023-09-03 RX ADMIN — Medication 1 MILLIGRAM(S): at 11:43

## 2023-09-03 RX ADMIN — Medication 0.5 MILLIGRAM(S): at 05:15

## 2023-09-03 RX ADMIN — RIVAROXABAN 20 MILLIGRAM(S): KIT at 17:13

## 2023-09-03 RX ADMIN — Medication 50 MILLIGRAM(S): at 05:15

## 2023-09-03 RX ADMIN — Medication 0.5 MILLIGRAM(S): at 17:13

## 2023-09-03 RX ADMIN — NYSTATIN CREAM 1 APPLICATION(S): 100000 CREAM TOPICAL at 17:14

## 2023-09-03 RX ADMIN — Medication 3 MILLIGRAM(S): at 21:16

## 2023-09-03 RX ADMIN — ATORVASTATIN CALCIUM 80 MILLIGRAM(S): 80 TABLET, FILM COATED ORAL at 21:16

## 2023-09-03 RX ADMIN — NYSTATIN CREAM 1 APPLICATION(S): 100000 CREAM TOPICAL at 06:36

## 2023-09-03 RX ADMIN — FLUPHENAZINE HYDROCHLORIDE 5 MILLIGRAM(S): 1 TABLET, FILM COATED ORAL at 21:16

## 2023-09-03 RX ADMIN — FLUPHENAZINE HYDROCHLORIDE 2.5 MILLIGRAM(S): 1 TABLET, FILM COATED ORAL at 10:03

## 2023-09-03 RX ADMIN — Medication 1 TABLET(S): at 11:43

## 2023-09-03 RX ADMIN — Medication 50 MICROGRAM(S): at 05:15

## 2023-09-04 LAB
GLUCOSE BLDC GLUCOMTR-MCNC: 109 MG/DL — HIGH (ref 70–99)
GLUCOSE BLDC GLUCOMTR-MCNC: 112 MG/DL — HIGH (ref 70–99)
GLUCOSE BLDC GLUCOMTR-MCNC: 114 MG/DL — HIGH (ref 70–99)
GLUCOSE BLDC GLUCOMTR-MCNC: 95 MG/DL — SIGNIFICANT CHANGE UP (ref 70–99)

## 2023-09-04 PROCEDURE — 99233 SBSQ HOSP IP/OBS HIGH 50: CPT

## 2023-09-04 RX ORDER — ATORVASTATIN CALCIUM 80 MG/1
1 TABLET, FILM COATED ORAL
Qty: 0 | Refills: 0 | DISCHARGE
Start: 2023-09-04

## 2023-09-04 RX ORDER — METOPROLOL TARTRATE 50 MG
1 TABLET ORAL
Qty: 0 | Refills: 0 | DISCHARGE
Start: 2023-09-04

## 2023-09-04 RX ORDER — BENZTROPINE MESYLATE 1 MG
1 TABLET ORAL
Qty: 0 | Refills: 0 | DISCHARGE
Start: 2023-09-04

## 2023-09-04 RX ORDER — OLANZAPINE 15 MG/1
1.25 TABLET, FILM COATED ORAL
Qty: 0 | Refills: 0 | DISCHARGE
Start: 2023-09-04

## 2023-09-04 RX ORDER — METOPROLOL TARTRATE 50 MG
0 TABLET ORAL
Qty: 0 | Refills: 0 | DISCHARGE

## 2023-09-04 RX ORDER — RIVAROXABAN 15 MG-20MG
1 KIT ORAL
Qty: 0 | Refills: 0 | DISCHARGE
Start: 2023-09-04

## 2023-09-04 RX ORDER — LEVOTHYROXINE SODIUM 125 MCG
0 TABLET ORAL
Qty: 0 | Refills: 0 | DISCHARGE

## 2023-09-04 RX ORDER — LEVOTHYROXINE SODIUM 125 MCG
1 TABLET ORAL
Qty: 0 | Refills: 0 | DISCHARGE
Start: 2023-09-04

## 2023-09-04 RX ORDER — FLUPHENAZINE HYDROCHLORIDE 1 MG/1
125 TABLET, FILM COATED ORAL
Qty: 0 | Refills: 0 | DISCHARGE

## 2023-09-04 RX ORDER — NYSTATIN CREAM 100000 [USP'U]/G
1 CREAM TOPICAL
Qty: 0 | Refills: 0 | DISCHARGE
Start: 2023-09-04

## 2023-09-04 RX ORDER — FOLIC ACID 0.8 MG
1 TABLET ORAL
Qty: 0 | Refills: 0 | DISCHARGE
Start: 2023-09-04

## 2023-09-04 RX ORDER — BENZTROPINE MESYLATE 1 MG
0 TABLET ORAL
Qty: 0 | Refills: 0 | DISCHARGE

## 2023-09-04 RX ADMIN — FLUPHENAZINE HYDROCHLORIDE 5 MILLIGRAM(S): 1 TABLET, FILM COATED ORAL at 21:07

## 2023-09-04 RX ADMIN — FLUPHENAZINE HYDROCHLORIDE 2.5 MILLIGRAM(S): 1 TABLET, FILM COATED ORAL at 11:43

## 2023-09-04 RX ADMIN — Medication 0.5 MILLIGRAM(S): at 05:56

## 2023-09-04 RX ADMIN — Medication 50 MICROGRAM(S): at 05:57

## 2023-09-04 RX ADMIN — Medication 6 MILLIGRAM(S): at 00:15

## 2023-09-04 RX ADMIN — Medication 50 MILLIGRAM(S): at 05:57

## 2023-09-04 RX ADMIN — Medication 1 TABLET(S): at 11:43

## 2023-09-04 RX ADMIN — Medication 3 MILLIGRAM(S): at 21:07

## 2023-09-04 RX ADMIN — NYSTATIN CREAM 1 APPLICATION(S): 100000 CREAM TOPICAL at 16:57

## 2023-09-04 RX ADMIN — NYSTATIN CREAM 1 APPLICATION(S): 100000 CREAM TOPICAL at 05:57

## 2023-09-04 RX ADMIN — Medication 1 MILLIGRAM(S): at 11:43

## 2023-09-04 RX ADMIN — RIVAROXABAN 20 MILLIGRAM(S): KIT at 16:57

## 2023-09-04 RX ADMIN — ATORVASTATIN CALCIUM 80 MILLIGRAM(S): 80 TABLET, FILM COATED ORAL at 21:07

## 2023-09-04 RX ADMIN — Medication 0.5 MILLIGRAM(S): at 16:58

## 2023-09-05 LAB
B PERT DNA SPEC QL NAA+PROBE: SIGNIFICANT CHANGE UP
B PERT+PARAPERT DNA PNL SPEC NAA+PROBE: SIGNIFICANT CHANGE UP
BORDETELLA PARAPERTUSSIS (RAPRVP): SIGNIFICANT CHANGE UP
C PNEUM DNA SPEC QL NAA+PROBE: SIGNIFICANT CHANGE UP
FLUAV SUBTYP SPEC NAA+PROBE: SIGNIFICANT CHANGE UP
FLUBV RNA SPEC QL NAA+PROBE: SIGNIFICANT CHANGE UP
GLUCOSE BLDC GLUCOMTR-MCNC: 100 MG/DL — HIGH (ref 70–99)
GLUCOSE BLDC GLUCOMTR-MCNC: 116 MG/DL — HIGH (ref 70–99)
GLUCOSE BLDC GLUCOMTR-MCNC: 121 MG/DL — HIGH (ref 70–99)
GLUCOSE BLDC GLUCOMTR-MCNC: 157 MG/DL — HIGH (ref 70–99)
HADV DNA SPEC QL NAA+PROBE: SIGNIFICANT CHANGE UP
HCOV 229E RNA SPEC QL NAA+PROBE: SIGNIFICANT CHANGE UP
HCOV HKU1 RNA SPEC QL NAA+PROBE: SIGNIFICANT CHANGE UP
HCOV NL63 RNA SPEC QL NAA+PROBE: SIGNIFICANT CHANGE UP
HCOV OC43 RNA SPEC QL NAA+PROBE: SIGNIFICANT CHANGE UP
HMPV RNA SPEC QL NAA+PROBE: SIGNIFICANT CHANGE UP
HPIV1 RNA SPEC QL NAA+PROBE: SIGNIFICANT CHANGE UP
HPIV2 RNA SPEC QL NAA+PROBE: SIGNIFICANT CHANGE UP
HPIV3 RNA SPEC QL NAA+PROBE: SIGNIFICANT CHANGE UP
HPIV4 RNA SPEC QL NAA+PROBE: SIGNIFICANT CHANGE UP
M PNEUMO DNA SPEC QL NAA+PROBE: SIGNIFICANT CHANGE UP
RAPID RVP RESULT: SIGNIFICANT CHANGE UP
RSV RNA SPEC QL NAA+PROBE: SIGNIFICANT CHANGE UP
RV+EV RNA SPEC QL NAA+PROBE: SIGNIFICANT CHANGE UP
SARS-COV-2 RNA SPEC QL NAA+PROBE: SIGNIFICANT CHANGE UP

## 2023-09-05 PROCEDURE — 99232 SBSQ HOSP IP/OBS MODERATE 35: CPT

## 2023-09-05 RX ORDER — FLUPHENAZINE HYDROCHLORIDE 1 MG/1
7.5 TABLET, FILM COATED ORAL
Refills: 0 | Status: DISCONTINUED | OUTPATIENT
Start: 2023-09-05 | End: 2023-09-06

## 2023-09-05 RX ADMIN — Medication 50 MICROGRAM(S): at 05:06

## 2023-09-05 RX ADMIN — Medication 1 TABLET(S): at 12:24

## 2023-09-05 RX ADMIN — Medication 650 MILLIGRAM(S): at 23:47

## 2023-09-05 RX ADMIN — Medication 3 MILLIGRAM(S): at 21:03

## 2023-09-05 RX ADMIN — RIVAROXABAN 20 MILLIGRAM(S): KIT at 17:33

## 2023-09-05 RX ADMIN — NYSTATIN CREAM 1 APPLICATION(S): 100000 CREAM TOPICAL at 17:34

## 2023-09-05 RX ADMIN — Medication 50 MILLIGRAM(S): at 05:06

## 2023-09-05 RX ADMIN — Medication 0.5 MILLIGRAM(S): at 17:34

## 2023-09-05 RX ADMIN — FLUPHENAZINE HYDROCHLORIDE 2.5 MILLIGRAM(S): 1 TABLET, FILM COATED ORAL at 08:39

## 2023-09-05 RX ADMIN — ATORVASTATIN CALCIUM 80 MILLIGRAM(S): 80 TABLET, FILM COATED ORAL at 22:25

## 2023-09-05 RX ADMIN — Medication 0.5 MILLIGRAM(S): at 05:07

## 2023-09-05 RX ADMIN — NYSTATIN CREAM 1 APPLICATION(S): 100000 CREAM TOPICAL at 05:07

## 2023-09-05 RX ADMIN — Medication 650 MILLIGRAM(S): at 23:04

## 2023-09-05 RX ADMIN — Medication 1 MILLIGRAM(S): at 12:25

## 2023-09-05 RX ADMIN — FLUPHENAZINE HYDROCHLORIDE 7.5 MILLIGRAM(S): 1 TABLET, FILM COATED ORAL at 21:03

## 2023-09-05 NOTE — PROGRESS NOTE ADULT - PROBLEM SELECTOR PLAN 8
Chronic AFib.   cont with toprol and xarelto

## 2023-09-05 NOTE — PROGRESS NOTE ADULT - SUBJECTIVE AND OBJECTIVE BOX
[facility]  John BroussardDO  Available via MS Teams  In house pager [phone]    SUBJECTIVE / OVERNIGHT EVENTS:  No acute events overnight.  Denies acute complaints.  Seen walking in her room independently, walking through the hallway later on.    Allowed me to examine her again today.       ADDITIONAL REVIEW OF SYSTEMS:    MEDICATIONS  (STANDING):  atorvastatin 80 milliGRAM(s) Oral at bedtime  dextrose 5%. 1000 milliLiter(s) (100 mL/Hr) IV Continuous <Continuous>  dextrose 5%. 1000 milliLiter(s) (50 mL/Hr) IV Continuous <Continuous>  dextrose 50% Injectable 25 Gram(s) IV Push once  dextrose 50% Injectable 12.5 Gram(s) IV Push once  dextrose 50% Injectable 25 Gram(s) IV Push once  folic acid 1 milliGRAM(s) Oral daily  glucagon  Injectable 1 milliGRAM(s) IntraMuscular once  insulin lispro (ADMELOG) corrective regimen sliding scale   SubCutaneous three times a day before meals  insulin lispro (ADMELOG) corrective regimen sliding scale   SubCutaneous at bedtime  levothyroxine 50 MICROGram(s) Oral daily  melatonin 3 milliGRAM(s) Oral at bedtime  metoprolol succinate ER 50 milliGRAM(s) Oral daily  multivitamin 1 Tablet(s) Oral daily  nystatin Powder 1 Application(s) Topical two times a day  OLANZapine 7.5 milliGRAM(s) Oral at bedtime  OLANZapine 5 milliGRAM(s) Oral daily  rivaroxaban 20 milliGRAM(s) Oral with dinner    MEDICATIONS  (PRN):  acetaminophen     Tablet .. 650 milliGRAM(s) Oral every 6 hours PRN Temp greater or equal to 38C (100.4F), Mild Pain (1 - 3), Moderate Pain (4 - 6)  dextrose Oral Gel 15 Gram(s) Oral once PRN Blood Glucose LESS THAN 70 milliGRAM(s)/deciliter      I&O's Summary      PHYSICAL EXAM:  Vital Signs Last 24 Hrs  T(C): 36.6 (31 Aug 2023 04:45), Max: 37.1 (30 Aug 2023 21:17)  T(F): 97.8 (31 Aug 2023 04:45), Max: 98.7 (30 Aug 2023 21:17)  HR: 98 (31 Aug 2023 04:45) (83 - 98)  BP: 125/111 (31 Aug 2023 04:45) (125/111 - 163/84)  BP(mean): 114 (31 Aug 2023 04:45) (114 - 114)  RR: 18 (31 Aug 2023 04:45) (18 - 18)  SpO2: 100% (31 Aug 2023 04:45) (99% - 100%)    Parameters below as of 31 Aug 2023 04:45  Patient On (Oxygen Delivery Method): room air        CONSTITUTIONAL: NAD, well-groomed  EYES: PERRLA; conjunctiva and sclera clear  ENMT: Moist oral mucosa, no pharyngeal injection or exudates; normal dentition  NECK: Supple, no palpable masses; no thyromegaly  RESPIRATORY: Normal respiratory effort; lungs are clear to auscultation bilaterally  CARDIOVASCULAR: Regular rate and rhythm, normal S1 and S2, no murmur/rub/gallop; No lower extremity edema; Peripheral pulses are 2+ bilaterally  ABDOMEN: Nontender to palpation, normoactive bowel sounds, no rebound/guarding; No hepatosplenomegaly  MUSCULOSKELETAL:  Normal gait; no clubbing or cyanosis of digits; no joint swelling or tenderness to palpation  PSYCH: A+O to self and place but not to time; affect appropriate; behavior disorganized  NEUROLOGY: CN 2-12 are intact and symmetric; no gross sensory deficits   SKIN: No rashes; no palpable lesions    LABS:                        10.9   7.18  )-----------( 271      ( 30 Aug 2023 06:00 )             32.9     08-30    136  |  106  |  12  ----------------------------<  135<H>  4.1   |  18<L>  |  0.66    Ca    9.5      30 Aug 2023 06:00  Phos  3.3     08-30  Mg     1.90     08-30            Urinalysis Basic - ( 30 Aug 2023 06:00 )    Color: x / Appearance: x / SG: x / pH: x  Gluc: 135 mg/dL / Ketone: x  / Bili: x / Urobili: x   Blood: x / Protein: x / Nitrite: x   Leuk Esterase: x / RBC: x / WBC x   Sq Epi: x / Non Sq Epi: x / Bacteria: x  
[facility]  John BroussardDO  Available via MS Teams  In house pager [phone]    SUBJECTIVE / OVERNIGHT EVENTS:  No acute events overnight.  Denies acute complaints.  Allowed me to examine her today.  Says she is worried about her . Does not clarify why.    ADDITIONAL REVIEW OF SYSTEMS:    MEDICATIONS  (STANDING):  atorvastatin 80 milliGRAM(s) Oral at bedtime  cefTRIAXone   IVPB      cefTRIAXone   IVPB 1000 milliGRAM(s) IV Intermittent every 24 hours  dextrose 5%. 1000 milliLiter(s) (100 mL/Hr) IV Continuous <Continuous>  dextrose 5%. 1000 milliLiter(s) (50 mL/Hr) IV Continuous <Continuous>  dextrose 50% Injectable 25 Gram(s) IV Push once  dextrose 50% Injectable 12.5 Gram(s) IV Push once  dextrose 50% Injectable 25 Gram(s) IV Push once  folic acid 1 milliGRAM(s) Oral daily  glucagon  Injectable 1 milliGRAM(s) IntraMuscular once  insulin lispro (ADMELOG) corrective regimen sliding scale   SubCutaneous three times a day before meals  insulin lispro (ADMELOG) corrective regimen sliding scale   SubCutaneous at bedtime  levothyroxine 50 MICROGram(s) Oral daily  melatonin 3 milliGRAM(s) Oral at bedtime  metoprolol succinate ER 50 milliGRAM(s) Oral daily  multivitamin 1 Tablet(s) Oral daily  nystatin Powder 1 Application(s) Topical two times a day  OLANZapine 5 milliGRAM(s) Oral daily  OLANZapine 7.5 milliGRAM(s) Oral daily  rivaroxaban 20 milliGRAM(s) Oral with dinner    MEDICATIONS  (PRN):  acetaminophen     Tablet .. 650 milliGRAM(s) Oral every 6 hours PRN Temp greater or equal to 38C (100.4F), Mild Pain (1 - 3), Moderate Pain (4 - 6)  dextrose Oral Gel 15 Gram(s) Oral once PRN Blood Glucose LESS THAN 70 milliGRAM(s)/deciliter      I&O's Summary      PHYSICAL EXAM:  Vital Signs Last 24 Hrs  T(C): 36.9 (30 Aug 2023 10:55), Max: 37.4 (29 Aug 2023 21:26)  T(F): 98.5 (30 Aug 2023 10:55), Max: 99.3 (29 Aug 2023 21:26)  HR: 86 (30 Aug 2023 10:55) (75 - 86)  BP: 122/58 (30 Aug 2023 10:55) (122/58 - 132/67)  BP(mean): 83 (30 Aug 2023 05:20) (83 - 83)  RR: 18 (30 Aug 2023 10:55) (18 - 18)  SpO2: 99% (30 Aug 2023 10:55) (99% - 100%)    Parameters below as of 30 Aug 2023 10:55  Patient On (Oxygen Delivery Method): room air      CONSTITUTIONAL: NAD, well-groomed  EYES: PERRLA; conjunctiva and sclera clear  ENMT: Moist oral mucosa, no pharyngeal injection or exudates; normal dentition  NECK: Supple, no palpable masses; no thyromegaly  RESPIRATORY: Normal respiratory effort; lungs are clear to auscultation bilaterally  CARDIOVASCULAR: Regular rate and rhythm, normal S1 and S2, no murmur/rub/gallop; No lower extremity edema; Peripheral pulses are 2+ bilaterally  ABDOMEN: Nontender to palpation, normoactive bowel sounds, no rebound/guarding; No hepatosplenomegaly  MUSCULOSKELETAL:  Normal gait; no clubbing or cyanosis of digits; no joint swelling or tenderness to palpation  PSYCH: A+O to self but not to place or time of; affect appropriate; behavior disorganized  NEUROLOGY: CN 2-12 are intact and symmetric; no gross sensory deficits   SKIN: No rashes; no palpable lesions    LABS:                        10.9   7.18  )-----------( 271      ( 30 Aug 2023 06:00 )             32.9     08-30    136  |  106  |  12  ----------------------------<  135<H>  4.1   |  18<L>  |  0.66    Ca    9.5      30 Aug 2023 06:00  Phos  3.3     08-30  Mg     1.90     08-30            Urinalysis Basic - ( 30 Aug 2023 06:00 )    Color: x / Appearance: x / SG: x / pH: x  Gluc: 135 mg/dL / Ketone: x  / Bili: x / Urobili: x   Blood: x / Protein: x / Nitrite: x   Leuk Esterase: x / RBC: x / WBC x   Sq Epi: x / Non Sq Epi: x / Bacteria: x          
[facility]  John BroussardDO  Available via MS Teams  In house pager [phone]    SUBJECTIVE / OVERNIGHT EVENTS:  No acute events overnight.  Walking around the unit, asking when lunch is.  Feels well. Denies acute complaints.    ADDITIONAL REVIEW OF SYSTEMS:    MEDICATIONS  (STANDING):  atorvastatin 80 milliGRAM(s) Oral at bedtime  dextrose 5%. 1000 milliLiter(s) (100 mL/Hr) IV Continuous <Continuous>  dextrose 5%. 1000 milliLiter(s) (50 mL/Hr) IV Continuous <Continuous>  dextrose 50% Injectable 25 Gram(s) IV Push once  dextrose 50% Injectable 12.5 Gram(s) IV Push once  dextrose 50% Injectable 25 Gram(s) IV Push once  folic acid 1 milliGRAM(s) Oral daily  glucagon  Injectable 1 milliGRAM(s) IntraMuscular once  insulin lispro (ADMELOG) corrective regimen sliding scale   SubCutaneous three times a day before meals  insulin lispro (ADMELOG) corrective regimen sliding scale   SubCutaneous at bedtime  levothyroxine 50 MICROGram(s) Oral daily  melatonin 3 milliGRAM(s) Oral at bedtime  metoprolol succinate ER 50 milliGRAM(s) Oral daily  multivitamin 1 Tablet(s) Oral daily  nystatin Powder 1 Application(s) Topical two times a day  OLANZapine 5 milliGRAM(s) Oral daily  OLANZapine 7.5 milliGRAM(s) Oral at bedtime  rivaroxaban 20 milliGRAM(s) Oral with dinner    MEDICATIONS  (PRN):  acetaminophen     Tablet .. 650 milliGRAM(s) Oral every 6 hours PRN Temp greater or equal to 38C (100.4F), Mild Pain (1 - 3), Moderate Pain (4 - 6)  dextrose Oral Gel 15 Gram(s) Oral once PRN Blood Glucose LESS THAN 70 milliGRAM(s)/deciliter      I&O's Summary      PHYSICAL EXAM:  Vital Signs Last 24 Hrs  T(C): 36.8 (01 Sep 2023 11:10), Max: 36.9 (31 Aug 2023 21:47)  T(F): 98.2 (01 Sep 2023 11:10), Max: 98.5 (01 Sep 2023 05:26)  HR: 92 (01 Sep 2023 11:10) (89 - 102)  BP: 130/88 (01 Sep 2023 11:10) (112/95 - 137/71)  BP(mean): --  RR: 18 (01 Sep 2023 11:10) (17 - 18)  SpO2: 99% (01 Sep 2023 11:10) (99% - 100%)    Parameters below as of 01 Sep 2023 11:10  Patient On (Oxygen Delivery Method): room air      CONSTITUTIONAL: NAD,  well-groomed  EYES: PERRLA; conjunctiva and sclera clear  ENMT: Moist oral mucosa, no pharyngeal injection or exudates; normal dentition  NECK: Supple, no palpable masses; no thyromegaly  RESPIRATORY: Normal respiratory effort; lungs are clear to auscultation bilaterally  CARDIOVASCULAR: Regular rate and rhythm, normal S1 and S2, no murmur/rub/gallop; No lower extremity edema; Peripheral pulses are 2+ bilaterally  ABDOMEN: Nontender to palpation, normoactive bowel sounds, no rebound/guarding; No hepatosplenomegaly  MUSCULOSKELETAL:  no clubbing or cyanosis of digits; no joint swelling or tenderness to palpation  PSYCH: A+O to person, place, but not to time; affect appropriate  NEUROLOGY: CN 2-12 are intact and symmetric; no gross sensory deficits   SKIN: No rashes; no palpable lesions    LABS:              
[facility]  John BroussardDO  Available via MS Teams  In house pager [phone]    SUBJECTIVE / OVERNIGHT EVENTS:  No acute events overnight.  Would not engage in conversation with me today.  Told me to leave.  I asked for her name and she said, "none of your business."  Then she walked into the bathroom and closed the door, telling me to "go away."    ADDITIONAL REVIEW OF SYSTEMS:    MEDICATIONS  (STANDING):  atorvastatin 80 milliGRAM(s) Oral at bedtime  cefTRIAXone   IVPB      cefTRIAXone   IVPB 1000 milliGRAM(s) IV Intermittent every 24 hours  dextrose 5%. 1000 milliLiter(s) (100 mL/Hr) IV Continuous <Continuous>  dextrose 5%. 1000 milliLiter(s) (50 mL/Hr) IV Continuous <Continuous>  dextrose 50% Injectable 25 Gram(s) IV Push once  dextrose 50% Injectable 12.5 Gram(s) IV Push once  dextrose 50% Injectable 25 Gram(s) IV Push once  folic acid 1 milliGRAM(s) Oral daily  glucagon  Injectable 1 milliGRAM(s) IntraMuscular once  insulin lispro (ADMELOG) corrective regimen sliding scale   SubCutaneous three times a day before meals  insulin lispro (ADMELOG) corrective regimen sliding scale   SubCutaneous at bedtime  levothyroxine 50 MICROGram(s) Oral daily  melatonin 3 milliGRAM(s) Oral at bedtime  metoprolol succinate ER 50 milliGRAM(s) Oral daily  multivitamin 1 Tablet(s) Oral daily  nystatin Powder 1 Application(s) Topical two times a day  OLANZapine 5 milliGRAM(s) Oral two times a day  rivaroxaban 20 milliGRAM(s) Oral with dinner    MEDICATIONS  (PRN):  acetaminophen     Tablet .. 650 milliGRAM(s) Oral every 6 hours PRN Temp greater or equal to 38C (100.4F), Mild Pain (1 - 3), Moderate Pain (4 - 6)  dextrose Oral Gel 15 Gram(s) Oral once PRN Blood Glucose LESS THAN 70 milliGRAM(s)/deciliter      I&O's Summary      PHYSICAL EXAM:  Vital Signs Last 24 Hrs  T(C): 36.6 (28 Aug 2023 11:42), Max: 36.7 (27 Aug 2023 22:25)  T(F): 97.8 (28 Aug 2023 11:42), Max: 98 (27 Aug 2023 22:25)  HR: 76 (28 Aug 2023 11:42) (76 - 86)  BP: 161/84 (28 Aug 2023 11:42) (120/65 - 161/84)  BP(mean): --  RR: 18 (28 Aug 2023 11:42) (18 - 18)  SpO2: 100% (28 Aug 2023 11:42) (100% - 100%)    Parameters below as of 28 Aug 2023 11:42  Patient On (Oxygen Delivery Method): room air      Refused physical examination.   Walking with normal gait, speaking in full sentences, in no obvious distress.  Without respiratory distress or accessory muscle use.  Does not answer orientation questions appropriately - would not offer her name.    LABS:                        11.1   7.06  )-----------( 294      ( 27 Aug 2023 10:44 )             33.4     08-27    139  |  107  |  10  ----------------------------<  105<H>  3.8   |  23  |  0.58    Ca    9.5      27 Aug 2023 10:44  Phos  2.5     08-27  Mg     1.80     08-27    TPro  7.1  /  Alb  4.3  /  TBili  0.3  /  DBili  x   /  AST  20  /  ALT  18  /  AlkPhos  66  08-26    PT/INR - ( 26 Aug 2023 13:54 )   PT: 11.5 sec;   INR: 1.02 ratio         PTT - ( 26 Aug 2023 13:54 )  PTT:35.4 sec      Urinalysis Basic - ( 27 Aug 2023 10:44 )    Color: x / Appearance: x / SG: x / pH: x  Gluc: 105 mg/dL / Ketone: x  / Bili: x / Urobili: x   Blood: x / Protein: x / Nitrite: x   Leuk Esterase: x / RBC: x / WBC x   Sq Epi: x / Non Sq Epi: x / Bacteria: x      
[facility]  John Broussard   Available via MS Teams  In house pager [phone]    SUBJECTIVE / OVERNIGHT EVENTS:  No acute events overnight.  Says she is upset because there was no hot water in the shower.  Denies acute complaints, including urinary complaints.  Wants to speak to her , asked me to dial the number written on the board.    ADDITIONAL REVIEW OF SYSTEMS:    MEDICATIONS  (STANDING):  atorvastatin 80 milliGRAM(s) Oral at bedtime  benztropine 0.5 milliGRAM(s) Oral two times a day  dextrose 5%. 1000 milliLiter(s) (50 mL/Hr) IV Continuous <Continuous>  dextrose 5%. 1000 milliLiter(s) (100 mL/Hr) IV Continuous <Continuous>  dextrose 50% Injectable 25 Gram(s) IV Push once  dextrose 50% Injectable 12.5 Gram(s) IV Push once  dextrose 50% Injectable 25 Gram(s) IV Push once  fluPHENAZine 5 milliGRAM(s) Oral <User Schedule>  fluPHENAZine 2.5 milliGRAM(s) Oral <User Schedule>  folic acid 1 milliGRAM(s) Oral daily  glucagon  Injectable 1 milliGRAM(s) IntraMuscular once  insulin lispro (ADMELOG) corrective regimen sliding scale   SubCutaneous three times a day before meals  insulin lispro (ADMELOG) corrective regimen sliding scale   SubCutaneous at bedtime  levothyroxine 50 MICROGram(s) Oral daily  melatonin 3 milliGRAM(s) Oral at bedtime  metoprolol succinate ER 50 milliGRAM(s) Oral daily  multivitamin 1 Tablet(s) Oral daily  nystatin Powder 1 Application(s) Topical two times a day  rivaroxaban 20 milliGRAM(s) Oral with dinner    MEDICATIONS  (PRN):  acetaminophen     Tablet .. 650 milliGRAM(s) Oral every 6 hours PRN Temp greater or equal to 38C (100.4F), Mild Pain (1 - 3), Moderate Pain (4 - 6)  dextrose Oral Gel 15 Gram(s) Oral once PRN Blood Glucose LESS THAN 70 milliGRAM(s)/deciliter  OLANZapine Injectable 1.25 milliGRAM(s) IntraMuscular every 6 hours PRN Agitation      I&O's Summary      PHYSICAL EXAM:  Vital Signs Last 24 Hrs  T(C): 36.8 (03 Sep 2023 11:10), Max: 36.8 (03 Sep 2023 11:10)  T(F): 98.2 (03 Sep 2023 11:10), Max: 98.2 (03 Sep 2023 11:10)  HR: 83 (03 Sep 2023 05:32) (83 - 85)  BP: 154/89 (03 Sep 2023 05:32) (115/88 - 154/89)  BP(mean): --  RR: 18 (03 Sep 2023 11:10) (18 - 18)  SpO2: 100% (03 Sep 2023 11:10) (100% - 100%)    Parameters below as of 03 Sep 2023 11:10  Patient On (Oxygen Delivery Method): room air      CONSTITUTIONAL: NAD,  well-groomed  EYES: PERRLA; conjunctiva and sclera clear  ENMT: Moist oral mucosa, no pharyngeal injection or exudates; normal dentition  NECK: Supple, no palpable masses; no thyromegaly  RESPIRATORY: Normal respiratory effort; lungs are clear to auscultation bilaterally  CARDIOVASCULAR: Regular rate and rhythm, normal S1 and S2, no murmur/rub/gallop; No lower extremity edema; Peripheral pulses are 2+ bilaterally  ABDOMEN: Nontender to palpation, normoactive bowel sounds, no rebound/guarding; No hepatosplenomegaly  MUSCULOSKELETAL:  Normal gait; no clubbing or cyanosis of digits; no joint swelling or tenderness to palpation  PSYCH: A+O to person, place, and time; affect appropriate  NEUROLOGY: CN 2-12 are intact and symmetric; no gross sensory deficits   SKIN: No rashes; no palpable lesions    LABS:                Urinalysis Basic - ( 02 Sep 2023 19:00 )    Color: Yellow / Appearance: Clear / S.010 / pH: x  Gluc: x / Ketone: Negative mg/dL  / Bili: Negative / Urobili: 0.2 mg/dL   Blood: x / Protein: Negative mg/dL / Nitrite: Negative   Leuk Esterase: Trace / RBC: 1 /HPF / WBC 4 /HPF   Sq Epi: x / Non Sq Epi: 0 /HPF / Bacteria: Occasional /HPF        
[facility]  John BroussardDO  Available via MS Teams  In house pager [phone]    SUBJECTIVE / OVERNIGHT EVENTS:  No acute events overnight.  Asked me to dial her boyfriend's number into the phone.  Denies acute complaints.    ADDITIONAL REVIEW OF SYSTEMS:    MEDICATIONS  (STANDING):  atorvastatin 80 milliGRAM(s) Oral at bedtime  benztropine 0.5 milliGRAM(s) Oral two times a day  dextrose 5%. 1000 milliLiter(s) (50 mL/Hr) IV Continuous <Continuous>  dextrose 5%. 1000 milliLiter(s) (100 mL/Hr) IV Continuous <Continuous>  dextrose 50% Injectable 25 Gram(s) IV Push once  dextrose 50% Injectable 12.5 Gram(s) IV Push once  dextrose 50% Injectable 25 Gram(s) IV Push once  fluPHENAZine 5 milliGRAM(s) Oral <User Schedule>  fluPHENAZine 2.5 milliGRAM(s) Oral <User Schedule>  folic acid 1 milliGRAM(s) Oral daily  glucagon  Injectable 1 milliGRAM(s) IntraMuscular once  insulin lispro (ADMELOG) corrective regimen sliding scale   SubCutaneous at bedtime  insulin lispro (ADMELOG) corrective regimen sliding scale   SubCutaneous three times a day before meals  levothyroxine 50 MICROGram(s) Oral daily  melatonin 3 milliGRAM(s) Oral at bedtime  metoprolol succinate ER 50 milliGRAM(s) Oral daily  multivitamin 1 Tablet(s) Oral daily  nystatin Powder 1 Application(s) Topical two times a day  rivaroxaban 20 milliGRAM(s) Oral with dinner    MEDICATIONS  (PRN):  acetaminophen     Tablet .. 650 milliGRAM(s) Oral every 6 hours PRN Temp greater or equal to 38C (100.4F), Mild Pain (1 - 3), Moderate Pain (4 - 6)  dextrose Oral Gel 15 Gram(s) Oral once PRN Blood Glucose LESS THAN 70 milliGRAM(s)/deciliter  OLANZapine Injectable 1.25 milliGRAM(s) IntraMuscular every 6 hours PRN Agitation      I&O's Summary      PHYSICAL EXAM:  Vital Signs Last 24 Hrs  T(C): 36.6 (04 Sep 2023 10:59), Max: 36.8 (03 Sep 2023 17:16)  T(F): 97.8 (04 Sep 2023 10:59), Max: 98.3 (03 Sep 2023 17:16)  HR: 83 (04 Sep 2023 10:59) (69 - 83)  BP: 146/93 (04 Sep 2023 10:59) (126/52 - 147/62)  BP(mean): --  RR: 18 (04 Sep 2023 10:59) (17 - 19)  SpO2: 99% (04 Sep 2023 10:59) (99% - 100%)    Parameters below as of 04 Sep 2023 10:59  Patient On (Oxygen Delivery Method): room air      CONSTITUTIONAL: NAD, well-groomed  EYES: PERRLA; conjunctiva and sclera clear  ENMT: Moist oral mucosa, no pharyngeal injection or exudates; normal dentition  NECK: Supple, no palpable masses; no thyromegaly  RESPIRATORY: Normal respiratory effort; lungs are clear to auscultation bilaterally  CARDIOVASCULAR: Regular rate and rhythm, normal S1 and S2, no murmur/rub/gallop; No lower extremity edema; Peripheral pulses are 2+ bilaterally  ABDOMEN: Nontender to palpation, normoactive bowel sounds, no rebound/guarding; No hepatosplenomegaly  MUSCULOSKELETAL:  Normal gait; no clubbing or cyanosis of digits; no joint swelling or tenderness to palpation  PSYCH: A+O to person, place, and time; affect flat  NEUROLOGY: CN 2-12 are intact and symmetric; no gross sensory deficits   SKIN: No rashes; no palpable lesions    LABS:                        11.5   7.49  )-----------( 313      ( 03 Sep 2023 13:30 )             33.9     09-03    141  |  105  |  14  ----------------------------<  102<H>  3.8   |  23  |  0.54    Ca    9.5      03 Sep 2023 13:30            Urinalysis Basic - ( 03 Sep 2023 13:30 )    Color: x / Appearance: x / SG: x / pH: x  Gluc: 102 mg/dL / Ketone: x  / Bili: x / Urobili: x   Blood: x / Protein: x / Nitrite: x   Leuk Esterase: x / RBC: x / WBC x   Sq Epi: x / Non Sq Epi: x / Bacteria: x    
[facility]  John Broussard   Available via MS Teams  In house pager [phone]    SUBJECTIVE / OVERNIGHT EVENTS:  No acute events overnight.  Denies acute complaints.  Ambulatory around the unit independently.    ADDITIONAL REVIEW OF SYSTEMS:    MEDICATIONS  (STANDING):  atorvastatin 80 milliGRAM(s) Oral at bedtime  benztropine 0.5 milliGRAM(s) Oral two times a day  dextrose 5%. 1000 milliLiter(s) (50 mL/Hr) IV Continuous <Continuous>  dextrose 5%. 1000 milliLiter(s) (100 mL/Hr) IV Continuous <Continuous>  dextrose 50% Injectable 25 Gram(s) IV Push once  dextrose 50% Injectable 12.5 Gram(s) IV Push once  dextrose 50% Injectable 25 Gram(s) IV Push once  fluPHENAZine 2.5 milliGRAM(s) Oral <User Schedule>  fluPHENAZine 5 milliGRAM(s) Oral <User Schedule>  folic acid 1 milliGRAM(s) Oral daily  glucagon  Injectable 1 milliGRAM(s) IntraMuscular once  insulin lispro (ADMELOG) corrective regimen sliding scale   SubCutaneous at bedtime  insulin lispro (ADMELOG) corrective regimen sliding scale   SubCutaneous three times a day before meals  levothyroxine 50 MICROGram(s) Oral daily  melatonin 3 milliGRAM(s) Oral at bedtime  metoprolol succinate ER 50 milliGRAM(s) Oral daily  multivitamin 1 Tablet(s) Oral daily  nystatin Powder 1 Application(s) Topical two times a day  rivaroxaban 20 milliGRAM(s) Oral with dinner    MEDICATIONS  (PRN):  acetaminophen     Tablet .. 650 milliGRAM(s) Oral every 6 hours PRN Temp greater or equal to 38C (100.4F), Mild Pain (1 - 3), Moderate Pain (4 - 6)  dextrose Oral Gel 15 Gram(s) Oral once PRN Blood Glucose LESS THAN 70 milliGRAM(s)/deciliter  OLANZapine Injectable 1.25 milliGRAM(s) IntraMuscular every 6 hours PRN Agitation      I&O's Summary      PHYSICAL EXAM:  Vital Signs Last 24 Hrs  T(C): 36.7 (02 Sep 2023 11:05), Max: 37.2 (02 Sep 2023 05:45)  T(F): 98 (02 Sep 2023 11:05), Max: 98.9 (02 Sep 2023 05:45)  HR: 73 (02 Sep 2023 11:05) (73 - 93)  BP: 131/60 (02 Sep 2023 11:05) (131/60 - 155/89)  BP(mean): --  RR: 18 (02 Sep 2023 11:05) (18 - 18)  SpO2: 100% (02 Sep 2023 11:05) (99% - 100%)    Parameters below as of 02 Sep 2023 11:05  Patient On (Oxygen Delivery Method): room air      CONSTITUTIONAL: NAD,, well-groomed  EYES: PERRLA; conjunctiva and sclera clear  ENMT: Moist oral mucosa, no pharyngeal injection or exudates; normal dentition  NECK: Supple, no palpable masses; no thyromegaly  RESPIRATORY: Normal respiratory effort; lungs are clear to auscultation bilaterally  CARDIOVASCULAR: Regular rate and rhythm, normal S1 and S2, no murmur/rub/gallop; No lower extremity edema; Peripheral pulses are 2+ bilaterally  ABDOMEN: Nontender to palpation, normoactive bowel sounds, no rebound/guarding; No hepatosplenomegaly  MUSCULOSKELETAL:  no clubbing or cyanosis of digits; no joint swelling or tenderness to palpation  PSYCH: A+O to person, place, but not to time; affect flat  NEUROLOGY: CN 2-12 are intact and symmetric; no gross sensory deficits   SKIN: No rashes; no palpable lesions    LABS:                  
[facility] Division of Hospital Medicine  Vahe Cuevas MD  In House Pager [phone]    Patient is a 62y old  Female who presents with a chief complaint of Schizophrenia     (05 Sep 2023 11:10)    SUBJECTIVE / OVERNIGHT EVENTS: no acute events. patient feels well. no acute complaints.     ROS: Denied Fever, Chill, CP, SOB, Abd pain, N/V/D, LE swelling or pain.     MEDICATIONS  (STANDING):  atorvastatin 80 milliGRAM(s) Oral at bedtime  benztropine 0.5 milliGRAM(s) Oral two times a day  dextrose 5%. 1000 milliLiter(s) (50 mL/Hr) IV Continuous <Continuous>  dextrose 5%. 1000 milliLiter(s) (100 mL/Hr) IV Continuous <Continuous>  dextrose 50% Injectable 25 Gram(s) IV Push once  dextrose 50% Injectable 12.5 Gram(s) IV Push once  dextrose 50% Injectable 25 Gram(s) IV Push once  fluPHENAZine 2.5 milliGRAM(s) Oral <User Schedule>  fluPHENAZine 5 milliGRAM(s) Oral <User Schedule>  folic acid 1 milliGRAM(s) Oral daily  glucagon  Injectable 1 milliGRAM(s) IntraMuscular once  insulin lispro (ADMELOG) corrective regimen sliding scale   SubCutaneous at bedtime  insulin lispro (ADMELOG) corrective regimen sliding scale   SubCutaneous three times a day before meals  levothyroxine 50 MICROGram(s) Oral daily  melatonin 3 milliGRAM(s) Oral at bedtime  metoprolol succinate ER 50 milliGRAM(s) Oral daily  multivitamin 1 Tablet(s) Oral daily  nystatin Powder 1 Application(s) Topical two times a day  rivaroxaban 20 milliGRAM(s) Oral with dinner    MEDICATIONS  (PRN):  acetaminophen     Tablet .. 650 milliGRAM(s) Oral every 6 hours PRN Temp greater or equal to 38C (100.4F), Mild Pain (1 - 3), Moderate Pain (4 - 6)  dextrose Oral Gel 15 Gram(s) Oral once PRN Blood Glucose LESS THAN 70 milliGRAM(s)/deciliter  OLANZapine Injectable 1.25 milliGRAM(s) IntraMuscular every 6 hours PRN Agitation    CAPILLARY BLOOD GLUCOSE      POCT Blood Glucose.: 121 mg/dL (05 Sep 2023 16:19)  POCT Blood Glucose.: 100 mg/dL (05 Sep 2023 11:26)  POCT Blood Glucose.: 116 mg/dL (05 Sep 2023 07:38)  POCT Blood Glucose.: 114 mg/dL (04 Sep 2023 21:48)    I&O's Summary      Vital Signs Last 24 Hrs  T(C): 36.6 (05 Sep 2023 11:48), Max: 36.9 (04 Sep 2023 21:05)  T(F): 97.8 (05 Sep 2023 11:48), Max: 98.4 (04 Sep 2023 21:05)  HR: 80 (05 Sep 2023 11:48) (80 - 92)  BP: 148/68 (05 Sep 2023 11:48) (135/77 - 148/68)  BP(mean): --  RR: 17 (05 Sep 2023 05:05) (17 - 17)  SpO2: 100% (05 Sep 2023 11:48) (100% - 100%)    Parameters below as of 05 Sep 2023 11:48  Patient On (Oxygen Delivery Method): room air        LABS:                    RADIOLOGY & ADDITIONAL TESTS:  Results Reviewed: Y  Imaging Personally Reviewed: Y  Electrocardiogram Personally Reviewed: Y    COORDINATION OF CARE:  Care Discussed with Consultants/Other Providers [Y/N]: Y  Prior or Outpatient Records Reviewed [Y/N]: Y  
[facility] Division of Hospital Medicine  John BroussardDO  Available via MS Teams  In house pager [phone]    SUBJECTIVE / OVERNIGHT EVENTS:  No acute events overnight.  More engaged in conversation today but states tangential thoughts frequently.  Says she is waiting for her . Also says she is pregnant.   Does not allow me to examine her.    ADDITIONAL REVIEW OF SYSTEMS:    MEDICATIONS  (STANDING):  atorvastatin 80 milliGRAM(s) Oral at bedtime  cefTRIAXone   IVPB      cefTRIAXone   IVPB 1000 milliGRAM(s) IV Intermittent every 24 hours  dextrose 5%. 1000 milliLiter(s) (100 mL/Hr) IV Continuous <Continuous>  dextrose 5%. 1000 milliLiter(s) (50 mL/Hr) IV Continuous <Continuous>  dextrose 50% Injectable 25 Gram(s) IV Push once  dextrose 50% Injectable 12.5 Gram(s) IV Push once  dextrose 50% Injectable 25 Gram(s) IV Push once  folic acid 1 milliGRAM(s) Oral daily  glucagon  Injectable 1 milliGRAM(s) IntraMuscular once  insulin lispro (ADMELOG) corrective regimen sliding scale   SubCutaneous three times a day before meals  insulin lispro (ADMELOG) corrective regimen sliding scale   SubCutaneous at bedtime  levothyroxine 50 MICROGram(s) Oral daily  melatonin 3 milliGRAM(s) Oral at bedtime  metoprolol succinate ER 50 milliGRAM(s) Oral daily  multivitamin 1 Tablet(s) Oral daily  nystatin Powder 1 Application(s) Topical two times a day  OLANZapine 5 milliGRAM(s) Oral two times a day  rivaroxaban 20 milliGRAM(s) Oral with dinner    MEDICATIONS  (PRN):  acetaminophen     Tablet .. 650 milliGRAM(s) Oral every 6 hours PRN Temp greater or equal to 38C (100.4F), Mild Pain (1 - 3), Moderate Pain (4 - 6)  dextrose Oral Gel 15 Gram(s) Oral once PRN Blood Glucose LESS THAN 70 milliGRAM(s)/deciliter      I&O's Summary      PHYSICAL EXAM:  Vital Signs Last 24 Hrs  T(C): 36.4 (29 Aug 2023 11:09), Max: 36.7 (29 Aug 2023 04:45)  T(F): 97.5 (29 Aug 2023 11:09), Max: 98 (29 Aug 2023 04:45)  HR: 84 (29 Aug 2023 11:09) (83 - 106)  BP: 174/80 (29 Aug 2023 11:09) (149/74 - 178/82)  BP(mean): 107 (29 Aug 2023 04:45) (107 - 107)  RR: 18 (29 Aug 2023 11:09) (18 - 18)  SpO2: 97% (29 Aug 2023 11:09) (97% - 100%)    Parameters below as of 29 Aug 2023 11:09  Patient On (Oxygen Delivery Method): room air      Refuses physical examination.  Lying comfortably in bed. Speaking in full sentences.   In no acute distress or respiratory distress.  AAOx2. Affect somewhat flat.   States she is pregnant.    LABS:                        12.3   10.75 )-----------( 396      ( 29 Aug 2023 03:15 )             37.8     08-29    138  |  103  |  12  ----------------------------<  115<H>  4.7   |  20<L>  |  0.63    Ca    9.8      29 Aug 2023 03:15  Phos  4.0     08-29  Mg     2.00     08-29            Urinalysis Basic - ( 29 Aug 2023 03:15 )    Color: x / Appearance: x / SG: x / pH: x  Gluc: 115 mg/dL / Ketone: x  / Bili: x / Urobili: x   Blood: x / Protein: x / Nitrite: x   Leuk Esterase: x / RBC: x / WBC x   Sq Epi: x / Non Sq Epi: x / Bacteria: x        Culture - Urine (collected 27 Aug 2023 12:00)  Source: Clean Catch Clean Catch (Midstream)  Preliminary Report (28 Aug 2023 18:27):    >100,000 CFU/ml Escherichia coli    
[facility] Division of Hospital Medicine  Javier Beasley) MD Bogdan  Pager [phone]    SUBJECTIVE:  Chief complaint: where's my gold watch    Pt seen and evaluated at bedside this AM. No o/n events. Asking for her gold watch - states it's missing. Also states she's pregnant. Otherwise, no new complaints.      ROS: All systems negative except as noted.      Vital Signs Last 24 Hrs  T(C): 36.8 (27 Aug 2023 11:20), Max: 36.8 (27 Aug 2023 11:20)  T(F): 98.2 (27 Aug 2023 11:20), Max: 98.2 (27 Aug 2023 11:20)  HR: 89 (27 Aug 2023 11:20) (75 - 89)  BP: 148/92 (27 Aug 2023 11:20) (119/87 - 148/92)  BP(mean): --  RR: 18 (27 Aug 2023 11:20) (17 - 18)  SpO2: 99% (27 Aug 2023 11:20) (98% - 100%)    Parameters below as of 27 Aug 2023 11:20  Patient On (Oxygen Delivery Method): room air          PHYSICAL EXAM:  Gen- In bed, disheveled.  Chest- CTAB, normal effort. no r/r/w.  CVS- RRR, S1S2, no g/r/m.  GI- Soft abd, NT, ND, +BSx4  Ext- No C/C.   Neuro- Ambulatory w/o assistance. No gross gait instability.            MEDICATION:  MEDICATIONS  (STANDING):  atorvastatin 80 milliGRAM(s) Oral at bedtime  cefTRIAXone   IVPB      dextrose 5%. 1000 milliLiter(s) (100 mL/Hr) IV Continuous <Continuous>  dextrose 5%. 1000 milliLiter(s) (50 mL/Hr) IV Continuous <Continuous>  dextrose 50% Injectable 25 Gram(s) IV Push once  dextrose 50% Injectable 12.5 Gram(s) IV Push once  dextrose 50% Injectable 25 Gram(s) IV Push once  folic acid 1 milliGRAM(s) Oral daily  glucagon  Injectable 1 milliGRAM(s) IntraMuscular once  insulin lispro (ADMELOG) corrective regimen sliding scale   SubCutaneous three times a day before meals  insulin lispro (ADMELOG) corrective regimen sliding scale   SubCutaneous at bedtime  levothyroxine 50 MICROGram(s) Oral daily  melatonin 3 milliGRAM(s) Oral at bedtime  metoprolol succinate ER 50 milliGRAM(s) Oral daily  multivitamin 1 Tablet(s) Oral daily  OLANZapine 5 milliGRAM(s) Oral two times a day  rivaroxaban 20 milliGRAM(s) Oral with dinner    MEDICATIONS  (PRN):  acetaminophen     Tablet .. 650 milliGRAM(s) Oral every 6 hours PRN Temp greater or equal to 38C (100.4F), Mild Pain (1 - 3), Moderate Pain (4 - 6)  dextrose Oral Gel 15 Gram(s) Oral once PRN Blood Glucose LESS THAN 70 milliGRAM(s)/deciliter            LABORATORY:                          11.1   7.06  )-----------( 294      ( 27 Aug 2023 10:44 )             33.4     08-27    139  |  107  |  10  ----------------------------<  105<H>  3.8   |  23  |  0.58    Ca    9.5      27 Aug 2023 10:44  Phos  2.5     08-27  Mg     1.80     08-27    TPro  7.1  /  Alb  4.3  /  TBili  0.3  /  DBili  x   /  AST  20  /  ALT  18  /  AlkPhos  66  08-26    PT/INR - ( 26 Aug 2023 13:54 )   PT: 11.5 sec;   INR: 1.02 ratio         PTT - ( 26 Aug 2023 13:54 )  PTT:35.4 sec  Urinalysis Basic - ( 27 Aug 2023 10:44 )    Color: x / Appearance: x / SG: x / pH: x  Gluc: 105 mg/dL / Ketone: x  / Bili: x / Urobili: x   Blood: x / Protein: x / Nitrite: x   Leuk Esterase: x / RBC: x / WBC x   Sq Epi: x / Non Sq Epi: x / Bacteria: x

## 2023-09-05 NOTE — PROGRESS NOTE ADULT - PROBLEM SELECTOR PLAN 6
TSH wnl  -cont with home synthroid

## 2023-09-05 NOTE — PROGRESS NOTE ADULT - TIME BILLING
Time-based billing (NON-critical care).     50 minutes spent on total encounter; more than 50% of the visit was spent counseling and / or coordinating care by the attending physician.  The necessity of the time spent during the encounter on this date of service was due to:     review of laboratory data, radiology results, consultants' recommendations, documentation in [place], discussion with patient/ACP, psychiatry team, and interdisciplinary staff (such as , social workers, etc). Interventions were performed as documented above.
Time-based billing (NON-critical care).     35 minutes spent on total encounter; more than 50% of the visit was spent counseling and / or coordinating care by the attending physician.  The necessity of the time spent during the encounter on this date of service was due to:     review of laboratory data, radiology results, consultants' recommendations, documentation in [place], discussion with patient/ACP and interdisciplinary staff (such as , social workers, etc). Interventions were performed as documented above.
reviewing laboratory data, consultants' recommendations, documentation in [place], performing medically appropriate examinations/evaluations, discussion with patient/family/RN/ACP/Residents and interdisciplinary staff (such as , social workers, etc), counseling patient/family/care giver, ordering medically appropriate medication, tests, or procedures. Interventions were performed as documented above.
Time-based billing (NON-critical care).     35 minutes spent on total encounter; more than 50% of the visit was spent counseling and / or coordinating care by the attending physician.  The necessity of the time spent during the encounter on this date of service was due to:     review of laboratory data, radiology results, consultants' recommendations, documentation in [place], discussion with patient/ACP and interdisciplinary staff (such as , social workers, etc). Interventions were performed as documented above.
Time-based billing (NON-critical care).     35 minutes spent on total encounter; more than 50% of the visit was spent counseling and / or coordinating care by the attending physician.  The necessity of the time spent during the encounter on this date of service was due to:     review of laboratory data, radiology results, consultants' recommendations, documentation in [place], discussion with patient/ACP and interdisciplinary staff (such as , social workers, etc). Interventions were performed as documented above.

## 2023-09-05 NOTE — PROGRESS NOTE ADULT - PROBLEM SELECTOR PLAN 7
a1c 5.7  hold metformin  -FS acceptable.  - cont with POC glucose checks and MARCELINA TID AC

## 2023-09-05 NOTE — PROGRESS NOTE ADULT - PROBLEM SELECTOR PROBLEM 5
CVA (cerebrovascular accident)

## 2023-09-05 NOTE — PROGRESS NOTE ADULT - REASON FOR ADMISSION
Weakness and behavioral change

## 2023-09-05 NOTE — PROGRESS NOTE ADULT - ASSESSMENT
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 patient is ambulating with walker. likely due to metabolic etiology from UTI. now improved with abx.     Physical Exam:  GENERAL: no apparent distress  CHEST/LUNG: on RA; Clear to auscultation bilaterally; No wheezing; No crackles  HEART: Regular rate and rhythm; S1/S2 wnl; no obvious murmurs  ABDOMEN: Soft, Nontender, Nondistended; Bowel sounds present  EXTREMITIES:  2+ Peripheral Pulses, No edema
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 

## 2023-09-05 NOTE — PROGRESS NOTE ADULT - PROBLEM SELECTOR PLAN 4
- Further follow up as OP  - Risk of holding AC outweigh benefit of LP - no compelling reason to pursue. Appreciate neuro recs.  - per neurology, low likelihood she has NPH - does not have the classic magnetic gait, ventricles on imaging are mildly enlarged, and there is no significant increase in size from prior studies
HECTOR.   -Further follow up as OP  -Risk of holding AC outweigh benefit of LP - no compelling reason to pursue. Appreciate neuro recs.
- Further follow up as OP  - Risk of holding AC outweigh benefit of LP - no compelling reason to pursue. Appreciate neuro recs.  - per neurology, low likelihood she has NPH - does not have the classic magnetic gait, ventricles on imaging are mildly enlarged, and there is no significant increase in size from prior studies

## 2023-09-05 NOTE — PROGRESS NOTE ADULT - PROBLEM SELECTOR PROBLEM 3
Gait instability

## 2023-09-05 NOTE — DIETITIAN INITIAL EVALUATION ADULT - OTHER INFO
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     At time of visit, Pt awake, alert. Per Pt, her appetite good; no chewing/swallowing difficulty; no nausea, vomiting or diarrhea @ this time.   Per Pt, her height: ~5'4" but could not confirm her actual body weight. Of note, Pt's weights: 76.9 kg (8/26/23), 90.7 kg (HIE - 5/17/23) --> weight loss of 13.8 kg in 3 months -> ?question accuracy? Rec to obtain Pt['s accurate body weights, weekly. Food preferences discussed. Rec to add PO supplement: Glucerna shake - 1x daily to diet rx. No food related concern voiced at present. RD remains available.

## 2023-09-05 NOTE — PROGRESS NOTE ADULT - PROBLEM SELECTOR PROBLEM 4
Normal pressure hydrocephalus

## 2023-09-05 NOTE — DIETITIAN INITIAL EVALUATION ADULT - NS FNS DIET ORDER
DASH/TLC: Sodium & Cholesterol Restricted; Consistent Carbohydrate {No Snacks} (CSTCHO) (08-26-23 @ 15:01) [Active]

## 2023-09-05 NOTE — PROGRESS NOTE ADULT - PROBLEM SELECTOR PLAN 9
Resolved.   monitor and replete prn    DVT ppx- On Xarelto    Dispo- PT rec [facility]. F/u in AM for placement. Needs further BH optimization. F/u urine cx.
Resolved.   monitor and replete prn    DVT ppx- On Xarelto    Dispo- PT rec [facility].   Needs further BH optimization. F/u urine cx.    Discussed with medicine ACP.
Resolved.   monitor and replete prn    DVT ppx- On Xarelto    Dispo- PT rec [facility]. PT to reassess as patient is now independently ambulatory around the unit.  Needs further BH optimization. referral started for inpatient psych.     Discussed with medicine ACP.
Resolved.   monitor and replete prn    DVT ppx- On Xarelto    Dispo- PT rec [facility].   Needs further BH optimization.    Discussed with medicine ACP.
Resolved.   monitor and replete prn    DVT ppx- On Xarelto    Dispo- PT rec [facility]. PT to reassess as patient is now independently ambulatory around the unit.  Needs further BH optimization. Likely inpatient psychiatric hospitalization.    Discussed with medicine ACP.
Resolved.   monitor and replete prn    DVT ppx- On Xarelto    Dispo- PT rec [facility]MA. PT to reassess as patient is now independently ambulatory around the unit.  Needs further BH optimization. Likely inpatient psychiatric hospitalization.    Discussed with medicine ACP.
Resolved.   monitor and replete prn    DVT ppx- On Xarelto    Dispo- PT rec [facility]MA.   Needs further BH optimization.    Discussed with medicine ACP.
Resolved.   monitor and replete prn    DVT ppx- On Xarelto    Dispo- PT rec [facility]MA. PT to reassess as patient is now independently ambulatory around the unit.  Needs further BH optimization. Likely inpatient psychiatric hospitalization.    Discussed with medicine ACP, bedside RN.
Resolved.   monitor and replete prn    DVT ppx- On Xarelto    Dispo- PT rec PUMA.   Needs further BH optimization. F/u urine cx.    Discussed with medicine ACP.
Resolved.   monitor and replete prn    DVT ppx- On Xarelto    Dispo- PT rec PUMA.   Needs further BH optimization. Potentially inpatient psychiatric hospitalization.    Discussed with medicine ACP.

## 2023-09-05 NOTE — DIETITIAN INITIAL EVALUATION ADULT - PERTINENT LABORATORY DATA
09-03    141  |  105  |  14  ----------------------------<  102<H>  3.8   |  23  |  0.54    Ca    9.5      03 Sep 2023 13:30    POCT Blood Glucose.: 116 mg/dL (09-05-23 @ 07:38)  A1C with Estimated Average Glucose Result: 5.7 % (08-26-23 @ 13:54)  A1C with Estimated Average Glucose Result: 6.1 % (01-14-23 @ 08:40)

## 2023-09-05 NOTE — PROGRESS NOTE ADULT - PROBLEM SELECTOR PLAN 3
reported fall due to gait instability. denies head trauma. previous hx of NPH, repeat CT Head showed stable enlarged ventricles. chronic stroke.     - evaluated by neuro, no current indication for LP. not convinced for NPH  - On my evaluation - able get up and out of bed and walk to bathroom.  - PT rec PUMA.
reported fall due to gait instability. denies head trauma. previous hx of NPH, repeat CT Head showed stable enlarged ventricles. chronic stroke.     - evaluated by neuro, no current indication for LP. not convinced for NPH  - On my evaluation - able get up and out of bed and walk to bathroom.  - PT rec PUMA, but patient ambulates independently with walker. PT to follow up and re-evaluate.   - can go to [facility] with PT there.

## 2023-09-05 NOTE — PROGRESS NOTE ADULT - PROBLEM SELECTOR PLAN 5
noted to have chronic stroke    - per neuro, cont xarelto  - cont statin 80mg qd (friend confirmed pt had been compliant with her oral meds)  -  LDL 99, A1C:5.7, TSH wnl
noted to have chronic stroke    - per neuro, cont xarelto  - cont statin 80mg qd (friend confirmed pt had been compliant with her oral meds)  -LDL 99, A1C:5.7, TSH wnl
noted to have chronic stroke    - per neuro, cont xarelto  - cont statin 80mg qd (friend confirmed pt had been compliant with her oral meds)  -  LDL 99, A1C:5.7, TSH wnl

## 2023-09-05 NOTE — PROGRESS NOTE ADULT - PROBLEM SELECTOR PROBLEM 9
Hypokalemia

## 2023-09-05 NOTE — PROGRESS NOTE ADULT - PROBLEM SELECTOR PROBLEM 1
Acute exacerbation of chronic schizophrenia

## 2023-09-05 NOTE — DIETITIAN INITIAL EVALUATION ADULT - ADD RECOMMEND
1. Encourage & assist Pt with meals; Monitor PO diet tolerance;   2. Honor food preferences;   3. Continue Multivitamin, Folic Acid  as ordered;   4. Monitor labs, hydration status;

## 2023-09-05 NOTE — PROGRESS NOTE ADULT - PROBLEM SELECTOR PLAN 2
Ecoli UTI  UA positive. Poor historian - so unclear if she has LUTS. Still confused/altered - as such will treat.  - Urine culture obtained - >100K CFUs Ecoli sensitive to CTX, 10-49K CFUs Efaecalis  - s/p CTX 1g IV daily x5d    Given Efaecalis isn't necessarily treated to resolution with CTX, UA rechecked to ensure resolution - seems treated  CBC without leukocytosis, patient without fever.   Will consider UTI treated at this time.  ctm clinically.
Ecoli UTI  UA positive. Poor historian - so unclear if she has LUTS. Still confused/altered - as such will treat.  - Urine culture obtained - >100K CFUs Ecoli sensitive to CTX, 10-49K CFUs Efaecalis  - s/p CTX 1g IV daily x5d    Given Efaecalis isn't necessarily treated to resolution with CTX, will recheck UA to ensure resolution of acute UTI.  Has remained afebrile. No urinary complaints.  Repeat CBC, BMP.
Ecoli UTI  UA positive. Poor historian - so unclear if she has LUTS. Still confused/altered - as such will treat.  - Urine culture obtained - >100K CFUs Ecoli sensitive to CTX, 10-49K CFUs Efaecalis  - c/w CTX 1g IV daily x5d - to be re-evaluated.
Ecoli UTI  UA positive. Poor historian - so unclear if she has LUTS. Still confused/altered - as such will treat.  - Urine culture obtained - >100K CFUs Ecoli sensitive to CTX, 10-49K CFUs Efaecalis  - s/p CTX 1g IV daily x5d
Ecoli UTI  UA positive. Poor historian - so unclear if she has LUTS. Still confused/altered - as such will treat.  - Urine culture obtained - >100K CFUs Ecoli sensitive to CTX, 10-49K CFUs Efaecalis  - s/p CTX 1g IV daily x5d    Given Efaecalis isn't necessarily treated to resolution with CTX, will recheck UA to ensure resolution of acute UTI.
UA positive. Poor historian - so unclear if she has LUTS. Still confused/altered - as such will treat.  - Urine culture obtained - f/u results. Thus far >100K CFUs Ecoli  - c/w CTX 1g IV daily x5d - to be re-evaluated.
UA positive. POor historian - so unclear if she has LUTS. Still confused/altered - as such will treat.  -Urine culture obtained - f/u results.  -Then start CTX 1g qday x3d - can extend duration pending course - to be re-evaluated.
UA positive. Poor historian - so unclear if she has LUTS. Still confused/altered - as such will treat.  - Urine culture obtained - f/u results.  - c/w CTX 1g IV daily x3d - can extend duration pending course - to be re-evaluated.
Ecoli UTI  UA positive. Poor historian - so unclear if she has LUTS. Still confused/altered - as such will treat.  - Urine culture obtained - >100K CFUs Ecoli sensitive to CTX, 10-49K CFUs Efaecalis  - s/p CTX 1g IV daily x5d    Given Efaecalis isn't necessarily treated to resolution with CTX, UA rechecked to ensure resolution - seems treated  CBC without leukocytosis, patient without fever.   Will consider UTI treated at this time.
Ecoli UTI  UA positive. Poor historian - so unclear if she has LUTS. Still confused/altered - as such will treat.  - Urine culture obtained - >100K CFUs Ecoli sensitive to CTX, 10-49K CFUs Efaecalis  - s/p CTX 1g IV daily x5d    Given Efaecalis isn't necessarily treated to resolution with CTX, will recheck UA to ensure resolution of acute UTI.

## 2023-09-05 NOTE — PROGRESS NOTE ADULT - PROBLEM SELECTOR PLAN 1
CT Head wo no acute changes.   - evaluated by psychiatry, appreciate recs  - per psych, not capacity to leave ama   - cont with zyprexa 5mg po bid  - hold other psychiatric medications  - Concern acute UTI also contributing to acute deterioration, but no significant improvement after treatment of UTI
CT Head wo no acute changes.   - evaluated by psychiatry, appreciate recs  - per psych, not capacity to leave ama   - cont with zyprexa 5mg po bid  - hold other psychiatric medications  - Concern acute UTI also contributing to acute deterioration, but no significant improvement after treatment of UTI
School
CT Head wo no acute changes.   - evaluated by psychiatry, appreciate recs  - per psych, not capacity to leave ama   - cont with zyprexa 5mg po bid  - hold other psychiatric medications  - Concern acute UTI also contributing to acute deterioration. There may be ocmponent of encephalopathy due to infection.
CT Head wo no acute changes.   - evaluated by psychiatry, appreciate recs  - per psych, not capacity to leave ama   - cont with zyprexa 5mg po bid  - hold other psychiatric medications  - Concern acute UTI also contributing to acute deterioration, but no significant improvement after treatment of UTI  - pending admission to inpatient psych. f/u SW.
CT Head wo no acute changes.   - evaluated by psychiatry, appreciate recs  - per psych, not capacity to leave ama   - cont with zyprexa 5mg po bid  - hold other psychiatric medications  - Concern acute UTI also contributing to acute deterioration. There may be component of encephalopathy due to infection.
CT Head wo no acute changes.   - evaluated by psychiatry, appreciate recs  - per psych, not capacity to leave ama   - cont with zyprexa 5mg po bid  - hold other psychiatric medications  - Concern acute UTI also contributing to acute deterioration, but no significant improvement after treatment of UTI
CT Head wo no acute changes.   - evaluated by psychiatry, appreciate recs  - per psych, not capacity to leave ama   - cont with zyprexa 5mg po bid  - hold other psychiatric medications  - Concern acute UTI also contributing to acute deterioration, but no significant improvement after treatment of UTI
CT Head wo no acute changes.   - evaluated by psychiatry, appreciate recs  - per psych, not capacity to leave ama   - cont with zyprexa 5mg po bid  - hold other psychiatric medications  - Concern acute UTI also contributing to acute deterioration. There may be component of encephalopathy due to infection.
CT Head wo no acute changes.   - evaluated by psychiatry, appreciate recs  - per psych, not capacity to leave ama   - cont with zyprexa 5mg po bid  - hold other psychiatric medications  - Concern acute UTI also contributing to acute deterioration, but no significant improvement after treatment of UTI
CT Head wo no acute changes.   - evaluated by psychiatry, appreciate recs  - per psych, not capacity to leave ama   - cont with zyprexa 5mg po bid  - hold other psychiatric medications  - Concern acute UTI also contributing to acute deterioration, but no significant improvement after treatment of UTI

## 2023-09-05 NOTE — BH CONSULTATION LIAISON PROGRESS NOTE - NSBHASSESSMENTFT_PSY_ALL_CORE
63 y/o   female,  x 10 years, no children, no family, noncaregiver, retired  at [employer], domiciled alone in a private house (currently facing foreclosure from the bank due to unpaid debt), HHA services Mon-Friday for 3 hours /day (Pt did not let HHA back in so no services for last 2 weeks), PUMA mohan 2022 for 3 months, on SSD, with psychiatric history of Schizophrenia and Alcohol Abuse, hx of Cannabis Abuse, Personality Disorder, 3 psychiatric hospitalizations last at [facility] from 8/2018-11/2018, attended [facility] starting on 11/26/2018 - 04/28/2023 (discharged for failure to attend appointments), no history of SI, SA, or HI. No history of aggression or legal issues, drinking 3-6 beers nightly and using marijuana on the weekends, no known history of detox/rehab/withdrawal symptoms/DTs or seizures, PMH of DM2, hypothyroidism, Afib on Xarelto, hx of hypovolemic and septic shock secondary to UTI requiring pressors 01/20/23; NPH (normal pressure hydrocephalus; onset of difficulty walking 12/22; [facility] discharge recommendation to f/u with neuro as outpt for large volume LP on 01/20/23), who was BIB EMS from home after her close friend/AA sponsor/HCP Luciano Herrera sent her in for poor self care, found laying on the floor. Serum tox negative.     Patient on evaluation is highly disorganized, illogical, confused, with poor insight/judgement. Also has an active UTI, currently on antibiotics.  Per her close friend Luciano Herrera at baseline patient is very independent and can take care of herself when she is taking her medications.  Patient currently is NOT at usual baseline, is quite psychotic, unable to care for self, likely to require inpatient psych hospitalization if condition does not improve within days of resolution of the patient's UTI.     9/5 - UTI cleared patient primary team, continues to exhibit psychosis, not at baseline per Luciano c/w plan for adm to [facility], no signs of EPS    DDX: schizophrenia, r/o delitium     Plan:  STOP Zyprexa  Increase Prolixin to 2.5mg qAM + 7.5mg qHS  Continue Cogentin 0.5mg BID  Neurology recs appreciated  Can use Zyprexa 1.25 mg IM Q6 PRN for agitation.  Delirium precautions, avoid excessive use of BZD/ anticholinergics (delirogenics)   Provide frequent orientation  Cannot leave AMA  [facility] admission once medically cleared

## 2023-09-06 ENCOUNTER — TRANSCRIPTION ENCOUNTER (OUTPATIENT)
Age: 63
End: 2023-09-06

## 2023-09-06 VITALS
WEIGHT: 179.02 LBS | TEMPERATURE: 97 F | DIASTOLIC BLOOD PRESSURE: 85 MMHG | RESPIRATION RATE: 18 BRPM | OXYGEN SATURATION: 100 % | SYSTOLIC BLOOD PRESSURE: 137 MMHG | HEART RATE: 84 BPM

## 2023-09-06 LAB — GLUCOSE BLDC GLUCOMTR-MCNC: 108 MG/DL — HIGH (ref 70–99)

## 2023-09-06 PROCEDURE — 99232 SBSQ HOSP IP/OBS MODERATE 35: CPT

## 2023-09-06 PROCEDURE — 99239 HOSP IP/OBS DSCHRG MGMT >30: CPT

## 2023-09-06 RX ORDER — FLUPHENAZINE HYDROCHLORIDE 1 MG/1
1 TABLET, FILM COATED ORAL
Qty: 0 | Refills: 0 | DISCHARGE
Start: 2023-09-06

## 2023-09-06 RX ORDER — FLUPHENAZINE HYDROCHLORIDE 1 MG/1
3 TABLET, FILM COATED ORAL
Qty: 0 | Refills: 0 | DISCHARGE
Start: 2023-09-06

## 2023-09-06 RX ORDER — OLANZAPINE 15 MG/1
1 TABLET, FILM COATED ORAL
Qty: 0 | Refills: 0 | DISCHARGE

## 2023-09-06 RX ADMIN — Medication 1 TABLET(S): at 08:51

## 2023-09-06 RX ADMIN — Medication 1 MILLIGRAM(S): at 08:51

## 2023-09-06 RX ADMIN — NYSTATIN CREAM 1 APPLICATION(S): 100000 CREAM TOPICAL at 05:21

## 2023-09-06 RX ADMIN — Medication 50 MICROGRAM(S): at 05:02

## 2023-09-06 RX ADMIN — FLUPHENAZINE HYDROCHLORIDE 2.5 MILLIGRAM(S): 1 TABLET, FILM COATED ORAL at 08:51

## 2023-09-06 RX ADMIN — Medication 0.5 MILLIGRAM(S): at 05:02

## 2023-09-06 RX ADMIN — Medication 50 MILLIGRAM(S): at 05:02

## 2023-09-06 NOTE — BH CONSULTATION LIAISON PROGRESS NOTE - NSBHATTESTBILLING_PSY_A_CORE
10759-Fbijnhxsoo OBS or IP - moderate complexity OR 35-49 mins
38723-Qhqmpylkxy OBS or IP - moderate complexity OR 35-49 mins
84710-Pomicgpyvc OBS or IP - moderate complexity OR 35-49 mins
58771-Nldidrnzzh OBS or IP - moderate complexity OR 35-49 mins

## 2023-09-06 NOTE — BH CONSULTATION LIAISON PROGRESS NOTE - NSICDXBHSECONDARYDX_PSY_ALL_CORE
Acute exacerbation of chronic schizophrenia   F20.9  Gait instability   R26.81  Normal pressure hydrocephalus   G91.2  Hypothyroid   E03.9  CVA (cerebrovascular accident)   I63.9  DM (diabetes mellitus)   E11.9  Afib   I48.91  Hypokalemia   E87.6  Acute UTI   N39.0  

## 2023-09-06 NOTE — BH CONSULTATION LIAISON PROGRESS NOTE - CURRENT MEDICATION
MEDICATIONS  (STANDING):  atorvastatin 80 milliGRAM(s) Oral at bedtime  benztropine 0.5 milliGRAM(s) Oral two times a day  dextrose 5%. 1000 milliLiter(s) (50 mL/Hr) IV Continuous <Continuous>  dextrose 5%. 1000 milliLiter(s) (100 mL/Hr) IV Continuous <Continuous>  dextrose 50% Injectable 25 Gram(s) IV Push once  dextrose 50% Injectable 12.5 Gram(s) IV Push once  dextrose 50% Injectable 25 Gram(s) IV Push once  fluPHENAZine 2.5 milliGRAM(s) Oral <User Schedule>  fluPHENAZine 5 milliGRAM(s) Oral <User Schedule>  folic acid 1 milliGRAM(s) Oral daily  glucagon  Injectable 1 milliGRAM(s) IntraMuscular once  insulin lispro (ADMELOG) corrective regimen sliding scale   SubCutaneous at bedtime  insulin lispro (ADMELOG) corrective regimen sliding scale   SubCutaneous three times a day before meals  levothyroxine 50 MICROGram(s) Oral daily  melatonin 3 milliGRAM(s) Oral at bedtime  metoprolol succinate ER 50 milliGRAM(s) Oral daily  multivitamin 1 Tablet(s) Oral daily  nystatin Powder 1 Application(s) Topical two times a day  rivaroxaban 20 milliGRAM(s) Oral with dinner    MEDICATIONS  (PRN):  acetaminophen     Tablet .. 650 milliGRAM(s) Oral every 6 hours PRN Temp greater or equal to 38C (100.4F), Mild Pain (1 - 3), Moderate Pain (4 - 6)  dextrose Oral Gel 15 Gram(s) Oral once PRN Blood Glucose LESS THAN 70 milliGRAM(s)/deciliter  OLANZapine Injectable 1.25 milliGRAM(s) IntraMuscular every 6 hours PRN Agitation  
MEDICATIONS  (STANDING):  atorvastatin 80 milliGRAM(s) Oral at bedtime  dextrose 5%. 1000 milliLiter(s) (50 mL/Hr) IV Continuous <Continuous>  dextrose 5%. 1000 milliLiter(s) (100 mL/Hr) IV Continuous <Continuous>  dextrose 50% Injectable 25 Gram(s) IV Push once  dextrose 50% Injectable 12.5 Gram(s) IV Push once  dextrose 50% Injectable 25 Gram(s) IV Push once  folic acid 1 milliGRAM(s) Oral daily  glucagon  Injectable 1 milliGRAM(s) IntraMuscular once  insulin lispro (ADMELOG) corrective regimen sliding scale   SubCutaneous at bedtime  insulin lispro (ADMELOG) corrective regimen sliding scale   SubCutaneous three times a day before meals  levothyroxine 50 MICROGram(s) Oral daily  melatonin 3 milliGRAM(s) Oral at bedtime  metoprolol succinate ER 50 milliGRAM(s) Oral daily  multivitamin 1 Tablet(s) Oral daily  nystatin Powder 1 Application(s) Topical two times a day  OLANZapine 7.5 milliGRAM(s) Oral at bedtime  OLANZapine 5 milliGRAM(s) Oral daily  rivaroxaban 20 milliGRAM(s) Oral with dinner    MEDICATIONS  (PRN):  acetaminophen     Tablet .. 650 milliGRAM(s) Oral every 6 hours PRN Temp greater or equal to 38C (100.4F), Mild Pain (1 - 3), Moderate Pain (4 - 6)  dextrose Oral Gel 15 Gram(s) Oral once PRN Blood Glucose LESS THAN 70 milliGRAM(s)/deciliter  
MEDICATIONS  (STANDING):  atorvastatin 80 milliGRAM(s) Oral at bedtime  benztropine 0.5 milliGRAM(s) Oral two times a day  dextrose 5%. 1000 milliLiter(s) (50 mL/Hr) IV Continuous <Continuous>  dextrose 5%. 1000 milliLiter(s) (100 mL/Hr) IV Continuous <Continuous>  dextrose 50% Injectable 25 Gram(s) IV Push once  dextrose 50% Injectable 12.5 Gram(s) IV Push once  dextrose 50% Injectable 25 Gram(s) IV Push once  fluPHENAZine 2.5 milliGRAM(s) Oral <User Schedule>  fluPHENAZine 7.5 milliGRAM(s) Oral <User Schedule>  folic acid 1 milliGRAM(s) Oral daily  glucagon  Injectable 1 milliGRAM(s) IntraMuscular once  insulin lispro (ADMELOG) corrective regimen sliding scale   SubCutaneous three times a day before meals  insulin lispro (ADMELOG) corrective regimen sliding scale   SubCutaneous at bedtime  levothyroxine 50 MICROGram(s) Oral daily  melatonin 3 milliGRAM(s) Oral at bedtime  metoprolol succinate ER 50 milliGRAM(s) Oral daily  multivitamin 1 Tablet(s) Oral daily  nystatin Powder 1 Application(s) Topical two times a day  rivaroxaban 20 milliGRAM(s) Oral with dinner    MEDICATIONS  (PRN):  acetaminophen     Tablet .. 650 milliGRAM(s) Oral every 6 hours PRN Temp greater or equal to 38C (100.4F), Mild Pain (1 - 3), Moderate Pain (4 - 6)  dextrose Oral Gel 15 Gram(s) Oral once PRN Blood Glucose LESS THAN 70 milliGRAM(s)/deciliter  
MEDICATIONS  (STANDING):  atorvastatin 80 milliGRAM(s) Oral at bedtime  cefTRIAXone   IVPB      cefTRIAXone   IVPB 1000 milliGRAM(s) IV Intermittent every 24 hours  dextrose 5%. 1000 milliLiter(s) (100 mL/Hr) IV Continuous <Continuous>  dextrose 5%. 1000 milliLiter(s) (50 mL/Hr) IV Continuous <Continuous>  dextrose 50% Injectable 25 Gram(s) IV Push once  dextrose 50% Injectable 12.5 Gram(s) IV Push once  dextrose 50% Injectable 25 Gram(s) IV Push once  folic acid 1 milliGRAM(s) Oral daily  glucagon  Injectable 1 milliGRAM(s) IntraMuscular once  insulin lispro (ADMELOG) corrective regimen sliding scale   SubCutaneous three times a day before meals  insulin lispro (ADMELOG) corrective regimen sliding scale   SubCutaneous at bedtime  levothyroxine 50 MICROGram(s) Oral daily  melatonin 3 milliGRAM(s) Oral at bedtime  metoprolol succinate ER 50 milliGRAM(s) Oral daily  multivitamin 1 Tablet(s) Oral daily  nystatin Powder 1 Application(s) Topical two times a day  OLANZapine 5 milliGRAM(s) Oral two times a day  rivaroxaban 20 milliGRAM(s) Oral with dinner    MEDICATIONS  (PRN):  acetaminophen     Tablet .. 650 milliGRAM(s) Oral every 6 hours PRN Temp greater or equal to 38C (100.4F), Mild Pain (1 - 3), Moderate Pain (4 - 6)  dextrose Oral Gel 15 Gram(s) Oral once PRN Blood Glucose LESS THAN 70 milliGRAM(s)/deciliter

## 2023-09-06 NOTE — DISCHARGE NOTE NURSING/CASE MANAGEMENT/SOCIAL WORK - PATIENT PORTAL LINK FT
You can access the FollowMyHealth Patient Portal offered by [facility] by registering at the following website: [email or web address]. By joining [facility]’s FollowMyHealth portal, you will also be able to view your health information using other applications (apps) compatible with our system.

## 2023-09-06 NOTE — BH CONSULTATION LIAISON PROGRESS NOTE - NSBHFUPINTERVALHXFT_PSY_A_CORE
61 y/o   female,  x 10 years, no children, no family, noncaregiver, retired  at [employer], domiciled alone in a private house (currently facing foreclosure from the bank due to unpaid debt), HHA services Mon-Friday for 3 hours /day (Pt did not let HHA back in so no services for last 2 weeks), PUMA mohan  for 3 months, on SSD, with psychiatric history of Schizophrenia and Alcohol Abuse, hx of Cannabis Abuse, Personality Disorder, 3 psychiatric hospitalizations last at [facility] from 2018-2018, attended [facility] starting on 2018 - 2023 (discharged for failure to attend appointments), no history of SI, SA, or HI. No history of aggression or legal issues, drinking 3-6 beers nightly and using marijuana on the weekends, no known history of detox/rehab/withdrawal symptoms/DTs or seizures, PMH of DM2, hypothyroidism, Afib on Xarelto, hx of hypovolemic and septic shock secondary to UTI requiring pressors 23; NPH (normal pressure hydrocephalus; onset of difficulty walking ; [facility] discharge recommendation to f/u with neuro as outpt for large volume LP on 23), who was BIB EMS from home after her close friend/AA sponsor/HCP Luciano Herrera sent her in for poor self care, found laying on the floor. Serum tox negative.   : Patient seen and evaluated at the bedside. She was A & O 2, disorganized, bizarre. Reported that she is waiting for her  to come and see her, then mumbled something incoherently about , asking if her family is okay?. Patient had a disorganized, illogical thought process, loose associations, laughing inappropriately, perseverating several times that she needs to comb her hair as she is going to a concert. When asked if she is feeling depressed, she became guarded, saying "I am not crying". Denied SI/HI/AH/VH/paranoia.     
patient believes she gave birth a few days ago and now has 3 kids, says "its a miracle" unable to reconcile her post menopausal state and developing pregancy and then giving birth all within the span of 4d in hospital setting, seen arguing with friend Luciano  
63 y/o   female,  x 10 years, no children, no family, noncaregiver, retired  at [employer], domiciled alone in a private house (currently facing foreclosure from the bank due to unpaid debt), HHA services Mon-Friday for 3 hours /day (Pt did not let HHA back in so no services for last 2 weeks), PUMA mohan  for 3 months, on SSD, with psychiatric history of Schizophrenia and Alcohol Abuse, hx of Cannabis Abuse, Personality Disorder, 3 psychiatric hospitalizations last at [facility] from 2018-2018, attended [facility] starting on 2018 - 2023 (discharged for failure to attend appointments), no history of SI, SA, or HI. No history of aggression or legal issues, drinking 3-6 beers nightly and using marijuana on the weekends, no known history of detox/rehab/withdrawal symptoms/DTs or seizures, PMH of DM2, hypothyroidism, Afib on Xarelto, hx of hypovolemic and septic shock secondary to UTI requiring pressors 23; NPH (normal pressure hydrocephalus; onset of difficulty walking ; [facility] discharge recommendation to f/u with neuro as outpt for large volume LP on 23), who was BIB EMS from home after her close friend/AA sponsor/HCP Luciano Herrera sent her in for poor self care, found laying on the floor. Serum tox negative.   : Patient seen and evaluated at the bedside. She was A & O 2, disorganized, bizarre. Reported that she is waiting for her  to come and see her, then mumbled something incoherently about , asking if her family is okay?. Patient had a disorganized, illogical thought process, loose associations, laughing inappropriately, perseverating several times that she needs to comb her hair as she is going to a concert. When asked if she is feeling depressed, she became guarded, saying "I am not crying". Denied SI/HI/AH/VH/paranoia.     
patient states she wishes to leave to take care of her (delusional) children, no insight into her prior lack of self care, per collateral this is not usual baseline, patient states she does not want psych meds but is taking them

## 2023-09-06 NOTE — BH CONSULTATION LIAISON PROGRESS NOTE - NSBHCHARTREVIEWVS_PSY_A_CORE FT
Vital Signs Last 24 Hrs  T(C): 36.6 (05 Sep 2023 11:48), Max: 36.9 (04 Sep 2023 21:05)  T(F): 97.8 (05 Sep 2023 11:48), Max: 98.4 (04 Sep 2023 21:05)  HR: 80 (05 Sep 2023 11:48) (80 - 92)  BP: 148/68 (05 Sep 2023 11:48) (135/77 - 148/68)  BP(mean): --  RR: 17 (05 Sep 2023 05:05) (17 - 17)  SpO2: 100% (05 Sep 2023 11:48) (100% - 100%)    Parameters below as of 05 Sep 2023 11:48  Patient On (Oxygen Delivery Method): room air    
Vital Signs Last 24 Hrs  T(C): 36.2 (06 Sep 2023 04:41), Max: 36.8 (05 Sep 2023 21:15)  T(F): 97.2 (06 Sep 2023 04:41), Max: 98.3 (05 Sep 2023 21:15)  HR: 84 (06 Sep 2023 04:41) (84 - 84)  BP: 137/85 (06 Sep 2023 04:41) (125/71 - 137/85)  BP(mean): --  RR: 18 (06 Sep 2023 04:41) (17 - 18)  SpO2: 100% (06 Sep 2023 04:41) (100% - 100%)    Parameters below as of 06 Sep 2023 04:41  Patient On (Oxygen Delivery Method): room air    
Vital Signs Last 24 Hrs  T(C): 36.9 (30 Aug 2023 10:55), Max: 37.4 (29 Aug 2023 21:26)  T(F): 98.5 (30 Aug 2023 10:55), Max: 99.3 (29 Aug 2023 21:26)  HR: 86 (30 Aug 2023 10:55) (75 - 86)  BP: 122/58 (30 Aug 2023 10:55) (122/58 - 132/67)  BP(mean): 83 (30 Aug 2023 05:20) (83 - 83)  RR: 18 (30 Aug 2023 10:55) (18 - 18)  SpO2: 99% (30 Aug 2023 10:55) (99% - 100%)    Parameters below as of 30 Aug 2023 10:55  Patient On (Oxygen Delivery Method): room air    
Vital Signs Last 24 Hrs  T(C): 36.8 (01 Sep 2023 11:10), Max: 36.9 (31 Aug 2023 21:47)  T(F): 98.2 (01 Sep 2023 11:10), Max: 98.5 (01 Sep 2023 05:26)  HR: 92 (01 Sep 2023 11:10) (89 - 102)  BP: 130/88 (01 Sep 2023 11:10) (112/95 - 137/71)  BP(mean): --  RR: 18 (01 Sep 2023 11:10) (17 - 18)  SpO2: 99% (01 Sep 2023 11:10) (99% - 100%)    Parameters below as of 01 Sep 2023 11:10  Patient On (Oxygen Delivery Method): room air

## 2023-09-06 NOTE — DISCHARGE NOTE NURSING/CASE MANAGEMENT/SOCIAL WORK - NSDCPEFALRISK_GEN_ALL_CORE
For information on Fall & Injury Prevention, visit: [email or web address] OR  [email or web address] OR  https://www.cdc.gov/steadi/patient.html

## 2023-09-06 NOTE — BH CONSULTATION LIAISON PROGRESS NOTE - NSBHPSYCHOLCOGABN_PSY_A_CORE
disoriented to time/disoriented to situation

## 2023-09-06 NOTE — BH CONSULTATION LIAISON PROGRESS NOTE - NSBHMSETHTPROC_PSY_A_CORE
Disorganized/Tangential/Illogical/Impaired reasoning

## 2023-09-06 NOTE — DISCHARGE NOTE NURSING/CASE MANAGEMENT/SOCIAL WORK - NSDCVIVACCINE_GEN_ALL_CORE_FT
influenza, injectable, quadrivalent, preservative free; 05-Nov-2018 11:00; Chanel Alva (RN); [employer]; [id number] (Exp. Date: 30-Jun-2019); IntraMuscular; Deltoid Left.; 0.5 milliLiter(s); VIS (VIS Published: 07-Aug-2015, VIS Presented: 05-Nov-2018);   Tdap; 12-Apr-2019 19:46; Kathrin Barfield (RN); [employer]; [id number] (Exp. Date: 12-Mar-2020); IntraMuscular; Deltoid Left.; 0.5 milliLiter(s); VIS (VIS Published: 09-May-2013, VIS Presented: 12-Apr-2019);
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
## 2023-09-06 NOTE — BH CONSULTATION LIAISON PROGRESS NOTE - NSBHCONSULTPRIMARYDISCUSSYES_PSY_A_CORE FT
[facility] admission 
will need psych admission once able
[facility] Psych admit

## 2023-09-06 NOTE — BH CONSULTATION LIAISON PROGRESS NOTE - NSBHASSESSMENTFT_PSY_ALL_CORE
63 y/o   female,  x 10 years, no children, no family, noncaregiver, retired  at [employer], domiciled alone in a private house (currently facing foreclosure from the bank due to unpaid debt), HHA services Mon-Friday for 3 hours /day (Pt did not let HHA back in so no services for last 2 weeks), PUMA marques 2022 for 3 months, on SSD, with psychiatric history of Schizophrenia and Alcohol Abuse, hx of Cannabis Abuse, Personality Disorder, 3 psychiatric hospitalizations last at [facility] from 8/2018-11/2018, attended [facility] starting on 11/26/2018 - 04/28/2023 (discharged for failure to attend appointments), no history of SI, SA, or HI. No history of aggression or legal issues, drinking 3-6 beers nightly and using marijuana on the weekends, no known history of detox/rehab/withdrawal symptoms/DTs or seizures, PMH of DM2, hypothyroidism, Afib on Xarelto, hx of hypovolemic and septic shock secondary to UTI requiring pressors 01/20/23; NPH (normal pressure hydrocephalus; onset of difficulty walking 12/22; [facility] discharge recommendation to f/u with neuro as outpt for large volume LP on 01/20/23), who was BIB EMS from home after her close friend/AA sponsor/HCP Luciano Herrera sent her in for poor self care, found laying on the floor. Serum tox negative.     Patient on evaluation is highly disorganized, illogical, confused, with poor insight/judgement. Also has an active UTI, currently on antibiotics.  Per her close friend Luciano Herrera at baseline patient is very independent and can take care of herself when she is taking her medications.  Patient currently is NOT at usual baseline, is quite psychotic, unable to care for self, likely to require inpatient psych hospitalization if condition does not improve within days of resolution of the patient's UTI.     9/5 - UTI cleared patient primary team, continues to exhibit psychosis, not at baseline per Luciano c/w plan for adm to [facility], no signs of EPS  9/6 - still psychotic, no insight, concern for lack of self care, not at baseline     DDX: schizophrenia, r/o delitium     Plan:  STOP Zyprexa  Increase Prolixin to 2.5mg qAM + 7.5mg qHS  Continue Cogentin 0.5mg BID  Neurology recs appreciated  Can use Zyprexa 1.25 mg IM Q6 PRN for agitation.  Delirium precautions, avoid excessive use of BZD/ anticholinergics (delirogenics)   Provide frequent orientation  Cannot leave AMA  [facility] admission once medically cleared, 2PC signed, [facility] transfer

## 2023-09-06 NOTE — CHART NOTE - NSCHARTNOTEFT_GEN_A_CORE
patient seen and examined today. no new complaints. stable for d/c to inpatient psych.   refer to d/c summary    CAPILLARY BLOOD GLUCOSE  116 (06 Sep 2023 11:24)      POCT Blood Glucose.: 108 mg/dL (06 Sep 2023 07:29)  POCT Blood Glucose.: 157 mg/dL (05 Sep 2023 20:57)  POCT Blood Glucose.: 121 mg/dL (05 Sep 2023 16:19)                  Vital Signs Last 24 Hrs  T(C): 36.2 (06 Sep 2023 04:41), Max: 36.8 (05 Sep 2023 21:15)  T(F): 97.2 (06 Sep 2023 04:41), Max: 98.3 (05 Sep 2023 21:15)  HR: 84 (06 Sep 2023 04:41) (84 - 84)  BP: 137/85 (06 Sep 2023 04:41) (125/71 - 137/85)  BP(mean): --  RR: 18 (06 Sep 2023 04:41) (17 - 18)  SpO2: 100% (06 Sep 2023 04:41) (100% - 100%)    Parameters below as of 06 Sep 2023 04:41  Patient On (Oxygen Delivery Method): room air

## 2023-09-09 ENCOUNTER — EMERGENCY (EMERGENCY)
Facility: HOSPITAL | Age: 63
LOS: 1 days | Discharge: HOME | End: 2023-09-09
Attending: STUDENT IN AN ORGANIZED HEALTH CARE EDUCATION/TRAINING PROGRAM
Payer: COMMERCIAL

## 2023-09-09 VITALS
TEMPERATURE: 98 F | OXYGEN SATURATION: 99 % | HEART RATE: 60 BPM | RESPIRATION RATE: 18 BRPM | SYSTOLIC BLOOD PRESSURE: 115 MMHG | DIASTOLIC BLOOD PRESSURE: 73 MMHG

## 2023-09-09 PROCEDURE — 73030 X-RAY EXAM OF SHOULDER: CPT | Mod: 26,RT

## 2023-09-09 PROCEDURE — 99284 EMERGENCY DEPT VISIT MOD MDM: CPT

## 2023-09-09 PROCEDURE — 73060 X-RAY EXAM OF HUMERUS: CPT | Mod: 26,RT

## 2023-09-09 PROCEDURE — 93010 ELECTROCARDIOGRAM REPORT: CPT

## 2023-09-09 PROCEDURE — 71046 X-RAY EXAM CHEST 2 VIEWS: CPT | Mod: 26

## 2023-09-09 RX ORDER — SODIUM CHLORIDE 9 MG/ML
1000 INJECTION INTRAMUSCULAR; INTRAVENOUS; SUBCUTANEOUS ONCE
Refills: 0 | Status: COMPLETED | OUTPATIENT
Start: 2023-09-09 | End: 2023-09-09

## 2023-09-09 RX ORDER — OXYCODONE HYDROCHLORIDE 5 MG/1
5 TABLET ORAL ONCE
Refills: 0 | Status: DISCONTINUED | OUTPATIENT
Start: 2023-09-09 | End: 2023-09-09

## 2023-09-09 RX ORDER — ACETAMINOPHEN 500 MG
975 TABLET ORAL ONCE
Refills: 0 | Status: COMPLETED | OUTPATIENT
Start: 2023-09-09 | End: 2023-09-09

## 2023-09-09 RX ADMIN — OXYCODONE HYDROCHLORIDE 5 MILLIGRAM(S): 5 TABLET ORAL at 23:45

## 2023-09-09 RX ADMIN — Medication 975 MILLIGRAM(S): at 23:45

## 2023-09-09 RX ADMIN — SODIUM CHLORIDE 1000 MILLILITER(S): 9 INJECTION INTRAMUSCULAR; INTRAVENOUS; SUBCUTANEOUS at 23:46

## 2023-09-09 NOTE — ED PROVIDER NOTE - NSFOLLOWUPINSTRUCTIONS_ED_ALL_ED_FT
- Please keep sling in place.  - Follow up with orthopedic surgeon within 2 weeks  - Return to the emergency room for any new or worsening issues    ================================    Humerus Fracture Treated With Immobilization  Right arm and hand showing skeleton with a humerus fracture.  A humerus fracture is a break in the large bone in the upper arm (humerus). If the joint is stable and the bones are still in their normal position (nondisplaced), the injury may be treated with immobilization. This involves the use of a cast, splint, or sling to hold your arm in place. Immobilization ensures that your bones continue to stay in the correct position while your arm is healing.    What are the causes?  This condition may be caused by:  A fall.  A hard, direct hit to the arm.  A motor vehicle accident.  What increases the risk?  The following factors may make you more likely to develop this condition:  Having a disease that makes the bones thin and weak.  Being elderly.  What are the signs or symptoms?  Symptoms of this condition include:  Pain.  Swelling.  Bruising.  Not being able to move your arm normally.  How is this diagnosed?  This condition may be diagnosed based on:  A physical exam.  X-rays of your upper arm, elbow, and shoulder.  CT scan.  How is this treated?  Treatment for this condition involves wearing a cast, splint, or sling until the injured area is stable enough for you to begin range-of-motion exercises. You may also be prescribed pain medicine.    Follow these instructions at home:  If you have a nonremovable cast or splint:    Do not put pressure on any part of the cast or splint until it is fully hardened. This may take several hours.  Do not stick anything inside the cast or splint to scratch your skin. Doing that increases your risk of infection.  Check the skin around the cast or splint every day. Tell your health care provider about any concerns.  You may put lotion on dry skin around the edges of the cast or splint. Do not put lotion on the skin underneath the cast or splint.  Keep the cast or splint clean and dry.  If you have a removable splint or sling:    Wear the splint or sling as told by your health care provider. Remove it only as told by your health care provider.  Check the skin around the splint or sling every day. Tell your health care provider about any concerns.  Loosen the splint or sling if your fingers tingle, become numb, or turn cold and blue.  Keep the splint or sling clean and dry.  Bathing    Do not take baths, swim, or use a hot tub until your health care provider approves. Ask your health care provider if you may take showers. You may only be allowed to take sponge baths.  If the cast, splint, or sling is not waterproof:  Do not let it get wet.  Cover it with a watertight covering when you take a bath or shower.  Managing pain, stiffness, and swelling    Bag of ice on a towel on the skin.   If directed, put ice on the injured area. To do this:  If you have a removable splint or sling, remove it as told by your health care provider.  Put ice in a plastic bag.  Place a towel between your skin and the bag or between your nonremovable cast or sling and the bag.  Leave the ice on for 20 minutes, 2–3 times a day.  Remove the ice if your skin turns bright red. This is very important. If you cannot feel pain, heat, or cold, you have a greater risk of damage to the area.  Move your fingers often to reduce stiffness and swelling.  Raise the injured area above the level of your heart while you are sitting or lying down.  Driving    Do not drive or operate machinery while taking prescription pain medicine.  Ask your health care provider when it is safe to drive if you have a cast, splint, or sling on your arm.  Activity    Do not lift anything until your health care provider says that it is safe.  Return to your normal activities as told by your health care provider. Ask your health care provider what activities are safe for you.  Do range-of-motion exercises only as told by your health care provider or physical therapist.  General instructions    Do not use any products that contain nicotine or tobacco. These products include cigarettes, chewing tobacco, and vaping devices, such as e-cigarettes. These can delay bone healing. If you need help quitting, ask your health care provider.  Take over-the-counter and prescription medicines only as told by your health care provider.  Ask your health care provider if the medicine prescribed to you:  Can cause constipation. You may need to take these actions to prevent or treat constipation:  Drink enough fluid to keep your urine pale yellow.  Take over-the-counter or prescription medicines.  Eat foods that are high in fiber, such as beans, whole grains, and fresh fruits and vegetables.  Limit foods that are high in fat and processed sugars, such as fried or sweet foods.  Keep all follow-up visits. This is important.  Contact a health care provider if:  You have any new pain, swelling, or bruising.  Your pain, swelling, and bruising do not improve.  Your cast, splint, or sling becomes loose or damaged.  Get help right away if:  Your skin or fingers on your injured arm turn blue or gray.  Your arm feels cold or numb.  You have severe pain in your injured arm.  Summary  A humerus fracture is a break in the large bone in the upper arm.  Immobilization involves the use of a cast, splint, or sling to hold your arm in place while the injury heals.  Wear a splint or sling as told by your health care provider. Remove it only as told by your health care provider.  Move your fingers often to reduce stiffness and swelling.  This information is not intended to replace advice given to you by your health care provider. Make sure you discuss any questions you have with your health care provider.

## 2023-09-09 NOTE — ED PROVIDER NOTE - ATTENDING CONTRIBUTION TO CARE
62y F w/ hx schizophrenia, Afib on Xarelto, HTN, HLD, hypothyroidism, DM; presents from [facility] after a fall. Pt says she tripped and fell, injuring her R shoulder. Additional history obtained from NP at [facility], who says pt had few episodes of diarrhea earlier today, and was initially hypotensive prior to EMS arrival. On exam, pt in no acute distress. Not willing to range R shoulder 2/2 pain. No obvious deformity, 2+ radial pulse, no tenderness over clavicle or elbow. Lungs CTAB, abdomen soft and nontender. Mildly dry oral mucosa. Hemodynamically stable in the ED. Will check labs, EKG, X-rays, reassess.

## 2023-09-09 NOTE — ED PROVIDER NOTE - MUSCULOSKELETAL, MLM
Spine appears normal. R shoulder and arm nontender to palpation. Cannot raise arm due to pain. no swelling, bruising, abrasion or erythema to R arm and shoulder. Sensation intact and symmetric. Finger movement intact

## 2023-09-09 NOTE — ED PROVIDER NOTE - CLINICAL SUMMARY MEDICAL DECISION MAKING FREE TEXT BOX
Pt is a 63 yo female with PMH of schizophrenia, afib on xarelto, htn, hld, hypothyroidism presenting s/p fall. Pt had a fall tonight where she tripped and fell backwards landing on her right shoulder. Pt denies LOC, or head trauma. Pt reports of severe pain in R shoulder, and reports that she cannot move her arm due to pain. Pt can move her fingers and sensation is intact. Pt denies fever, headache, dizziness, SOB, chest pain, abdominal pain, N/V/D. Xrays, cbc, cmp, acetaminophen Pt is a 63 yo female with PMH of schizophrenia, afib on xarelto, htn, hld, hypothyroidism presenting s/p fall. Pt had a fall tonight where she tripped and fell backwards landing on her right shoulder. Pt denies LOC, or head trauma. Pt reports of severe pain in R shoulder, and reports that she cannot move her arm due to pain. Pt can move her fingers and sensation is intact. Pt denies fever, headache, dizziness, SOB, chest pain, abdominal pain, N/V/D. Xrays, cbc, cmp, acetaminophen. Pt is a poor historian, given hx by NP at [facility]. Pt is a 63 yo female with PMH of schizophrenia, afib on xarelto, htn, hld, hypothyroidism presenting s/p fall. Pt had a fall tonight where she tripped and fell backwards landing on her right shoulder. Pt denies LOC, or head trauma. Pt reports of severe pain in R shoulder, and reports that she cannot move her arm due to pain. Pt can move her fingers and sensation is intact. Pt denies fever, headache, dizziness, SOB, chest pain, abdominal pain, N/V/D. Xrays show humeral head fracture, given sling and instructed to keep immobilized. Given acetaminophen for pain control. Pt is a poor historian, given hx by NP at [facility]. CBC, CMP results reviewed with pt. Instructed to follow up with orthopedic surgeon. Pt is a 61 yo female with PMH of schizophrenia, afib on xarelto, htn, hld, hypothyroidism presenting s/p fall. Pt had a fall tonight where she tripped and fell backwards landing on her right shoulder. Pt denies LOC, or head trauma. Pt reports of severe pain in R shoulder, and reports that she cannot move her arm due to pain. Pt can move her fingers and sensation is intact. Pt denies fever, headache, dizziness, SOB, chest pain, abdominal pain, N/V/D. Xrays show humeral head fracture, given sling and instructed to keep immobilized. Given acetaminophen for pain control. Pt is a poor historian, given hx by NP at [facility]. CBC, CMP results reviewed with pt. Instructed to follow up with orthopedic surgeon for humeral head fracture.

## 2023-09-09 NOTE — ED PROVIDER NOTE - PATIENT PORTAL LINK FT
You can access the FollowMyHealth Patient Portal offered by [facility] by registering at the following website: [email or web address]. By joining [employer]’s FollowMyHealth portal, you will also be able to view your health information using other applications (apps) compatible with our system.

## 2023-09-09 NOTE — ED ADULT TRIAGE NOTE - CHIEF COMPLAINT QUOTE
patient JAX from [facility] after being found on floor in room, unwitnessed fall, now c/o right shoulder pain. per EMS patient was hypotensive at [facility], upon arrival patient alert and awake, normotensive.

## 2023-09-09 NOTE — ED PROVIDER NOTE - NSICDXPASTMEDICALHX_GEN_ALL_CORE_FT
PAST MEDICAL HISTORY:  Afib     HLD (hyperlipidemia)     HTN (hypertension)     Hypothyroidism     Schizophrenia

## 2023-09-09 NOTE — ED PROVIDER NOTE - CARE PROVIDER_API CALL
Deniz Heaton  Orthopaedic Surgery  [address] [place] [id number]  Phone: [phone]  Fax: [phone]  Follow Up Time:

## 2023-09-09 NOTE — ED PROVIDER NOTE - OBJECTIVE STATEMENT
Pt is a 61 yo female with PMH of schizophrenia, afib on xarelto, htn, hld, hypothyroidism presenting s/p fall. Pt had a fall tonight where she tripped and fell backwards landing on her right shoulder. Pt denies LOC, or head trauma. Pt reports of severe pain in R shoulder, and reports that she cannot move her arm due to pain. Pt can move her fingers and sensation is intact. Pt denies fever, headache, dizziness, SOB, chest pain, abdominal pain, N/V/D. Pt is a 61 yo female with PMH of schizophrenia, afib on xarelto, htn, hld, hypothyroidism presenting s/p fall. Pt had a fall tonight where she tripped and fell backwards landing on her right shoulder. Pt denies LOC, or head trauma. Pt reports of severe pain in R shoulder, and reports that she cannot move her arm due to pain. Pt can move her fingers and sensation is intact. Pt denies fever, headache, dizziness, SOB, chest pain, abdominal pain, N/V/D. Pt is a poor historian, given hx by NP at [facility].

## 2023-09-10 VITALS
RESPIRATION RATE: 20 BRPM | OXYGEN SATURATION: 98 % | HEART RATE: 75 BPM | TEMPERATURE: 99 F | SYSTOLIC BLOOD PRESSURE: 143 MMHG | DIASTOLIC BLOOD PRESSURE: 84 MMHG

## 2023-09-10 LAB
ALBUMIN SERPL ELPH-MCNC: 4.1 G/DL — SIGNIFICANT CHANGE UP (ref 3.3–5.2)
ALP SERPL-CCNC: 87 U/L — SIGNIFICANT CHANGE UP (ref 40–120)
ALT FLD-CCNC: 22 U/L — SIGNIFICANT CHANGE UP
ANION GAP SERPL CALC-SCNC: 14 MMOL/L — SIGNIFICANT CHANGE UP (ref 5–17)
AST SERPL-CCNC: 19 U/L — SIGNIFICANT CHANGE UP
BASOPHILS # BLD AUTO: 0.04 K/UL — SIGNIFICANT CHANGE UP (ref 0–0.2)
BASOPHILS NFR BLD AUTO: 0.3 % — SIGNIFICANT CHANGE UP (ref 0–2)
BILIRUB SERPL-MCNC: 0.3 MG/DL — LOW (ref 0.4–2)
BUN SERPL-MCNC: 10.1 MG/DL — SIGNIFICANT CHANGE UP (ref 8–20)
CALCIUM SERPL-MCNC: 9.4 MG/DL — SIGNIFICANT CHANGE UP (ref 8.4–10.5)
CHLORIDE SERPL-SCNC: 95 MMOL/L — LOW (ref 96–108)
CO2 SERPL-SCNC: 23 MMOL/L — SIGNIFICANT CHANGE UP (ref 22–29)
CREAT SERPL-MCNC: 0.59 MG/DL — SIGNIFICANT CHANGE UP (ref 0.5–1.3)
EGFR: 101 ML/MIN/1.73M2 — SIGNIFICANT CHANGE UP
EOSINOPHIL # BLD AUTO: 0.02 K/UL — SIGNIFICANT CHANGE UP (ref 0–0.5)
EOSINOPHIL NFR BLD AUTO: 0.2 % — SIGNIFICANT CHANGE UP (ref 0–6)
GLUCOSE SERPL-MCNC: 125 MG/DL — HIGH (ref 70–99)
HCT VFR BLD CALC: 30.1 % — LOW (ref 34.5–45)
HGB BLD-MCNC: 10 G/DL — LOW (ref 11.5–15.5)
IMM GRANULOCYTES NFR BLD AUTO: 0.6 % — SIGNIFICANT CHANGE UP (ref 0–0.9)
LYMPHOCYTES # BLD AUTO: 1.17 K/UL — SIGNIFICANT CHANGE UP (ref 1–3.3)
LYMPHOCYTES # BLD AUTO: 9 % — LOW (ref 13–44)
MCHC RBC-ENTMCNC: 28.8 PG — SIGNIFICANT CHANGE UP (ref 27–34)
MCHC RBC-ENTMCNC: 33.2 GM/DL — SIGNIFICANT CHANGE UP (ref 32–36)
MCV RBC AUTO: 86.7 FL — SIGNIFICANT CHANGE UP (ref 80–100)
MONOCYTES # BLD AUTO: 0.64 K/UL — SIGNIFICANT CHANGE UP (ref 0–0.9)
MONOCYTES NFR BLD AUTO: 4.9 % — SIGNIFICANT CHANGE UP (ref 2–14)
NEUTROPHILS # BLD AUTO: 11.04 K/UL — HIGH (ref 1.8–7.4)
NEUTROPHILS NFR BLD AUTO: 85 % — HIGH (ref 43–77)
PLATELET # BLD AUTO: 328 K/UL — SIGNIFICANT CHANGE UP (ref 150–400)
POTASSIUM SERPL-MCNC: 3.6 MMOL/L — SIGNIFICANT CHANGE UP (ref 3.5–5.3)
POTASSIUM SERPL-SCNC: 3.6 MMOL/L — SIGNIFICANT CHANGE UP (ref 3.5–5.3)
PROT SERPL-MCNC: 6.6 G/DL — SIGNIFICANT CHANGE UP (ref 6.6–8.7)
RBC # BLD: 3.47 M/UL — LOW (ref 3.8–5.2)
RBC # FLD: 12.4 % — SIGNIFICANT CHANGE UP (ref 10.3–14.5)
SODIUM SERPL-SCNC: 132 MMOL/L — LOW (ref 135–145)
WBC # BLD: 12.99 K/UL — HIGH (ref 3.8–10.5)
WBC # FLD AUTO: 12.99 K/UL — HIGH (ref 3.8–10.5)

## 2023-09-10 PROCEDURE — 99285 EMERGENCY DEPT VISIT HI MDM: CPT | Mod: 25

## 2023-09-10 PROCEDURE — 73030 X-RAY EXAM OF SHOULDER: CPT

## 2023-09-10 PROCEDURE — 93005 ELECTROCARDIOGRAM TRACING: CPT

## 2023-09-10 PROCEDURE — 71046 X-RAY EXAM CHEST 2 VIEWS: CPT

## 2023-09-10 PROCEDURE — 36415 COLL VENOUS BLD VENIPUNCTURE: CPT

## 2023-09-10 PROCEDURE — 85025 COMPLETE CBC W/AUTO DIFF WBC: CPT

## 2023-09-10 PROCEDURE — 80053 COMPREHEN METABOLIC PANEL: CPT

## 2023-09-10 PROCEDURE — 73060 X-RAY EXAM OF HUMERUS: CPT

## 2023-09-10 NOTE — ED ADULT NURSE NOTE - OBJECTIVE STATEMENT
pt is a 62y female aox4, from [facility].  pt presents sp fall where she tripped and landed on her right shoulder.  denies loc, head trauma, loc, nvd, headache, dizziness.  pt placed in sling.  no s/s acute distress noted.  [facility] aide at bedside

## 2023-09-10 NOTE — ED ADULT NURSE NOTE - NSFALLUNIVINTERV_ED_ALL_ED
Bed/Stretcher in lowest position, wheels locked, appropriate side rails in place/Call bell, personal items and telephone in reach/Instruct patient to call for assistance before getting out of bed/chair/stretcher/Non-slip footwear applied when patient is off stretcher/[place] to call system/Physically safe environment - no spills, clutter or unnecessary equipment/Purposeful proactive rounding/Room/bathroom lighting operational, light cord in reach

## 2023-09-10 NOTE — ED ADULT NURSE NOTE - CHIEF COMPLAINT QUOTE
patient JAX from [facility] after being found on floor in room, unwitnessed fall, now c/o right shoulder pain. per EMS patient was hypotensive at [facility], upon arrival patient alert and awake, normotensive.

## 2023-09-13 ENCOUNTER — EMERGENCY (EMERGENCY)
Facility: HOSPITAL | Age: 63
LOS: 1 days | Discharge: HOME | End: 2023-09-13
Attending: EMERGENCY MEDICINE
Payer: COMMERCIAL

## 2023-09-13 VITALS — HEIGHT: 64 IN | WEIGHT: 171.96 LBS

## 2023-09-13 VITALS
TEMPERATURE: 98 F | RESPIRATION RATE: 18 BRPM | DIASTOLIC BLOOD PRESSURE: 82 MMHG | OXYGEN SATURATION: 98 % | HEART RATE: 82 BPM | SYSTOLIC BLOOD PRESSURE: 164 MMHG

## 2023-09-13 LAB
ALBUMIN SERPL ELPH-MCNC: 4 G/DL — SIGNIFICANT CHANGE UP (ref 3.3–5.2)
ALP SERPL-CCNC: 84 U/L — SIGNIFICANT CHANGE UP (ref 40–120)
ALT FLD-CCNC: 19 U/L — SIGNIFICANT CHANGE UP
ANION GAP SERPL CALC-SCNC: 12 MMOL/L — SIGNIFICANT CHANGE UP (ref 5–17)
AST SERPL-CCNC: 17 U/L — SIGNIFICANT CHANGE UP
BASOPHILS # BLD AUTO: 0.05 K/UL — SIGNIFICANT CHANGE UP (ref 0–0.2)
BASOPHILS NFR BLD AUTO: 0.5 % — SIGNIFICANT CHANGE UP (ref 0–2)
BILIRUB SERPL-MCNC: 0.2 MG/DL — LOW (ref 0.4–2)
BUN SERPL-MCNC: 10.2 MG/DL — SIGNIFICANT CHANGE UP (ref 8–20)
CALCIUM SERPL-MCNC: 9.7 MG/DL — SIGNIFICANT CHANGE UP (ref 8.4–10.5)
CHLORIDE SERPL-SCNC: 98 MMOL/L — SIGNIFICANT CHANGE UP (ref 96–108)
CO2 SERPL-SCNC: 25 MMOL/L — SIGNIFICANT CHANGE UP (ref 22–29)
CREAT SERPL-MCNC: 0.51 MG/DL — SIGNIFICANT CHANGE UP (ref 0.5–1.3)
EGFR: 104 ML/MIN/1.73M2 — SIGNIFICANT CHANGE UP
EOSINOPHIL # BLD AUTO: 0.21 K/UL — SIGNIFICANT CHANGE UP (ref 0–0.5)
EOSINOPHIL NFR BLD AUTO: 2 % — SIGNIFICANT CHANGE UP (ref 0–6)
GLUCOSE SERPL-MCNC: 112 MG/DL — HIGH (ref 70–99)
HCT VFR BLD CALC: 31.5 % — LOW (ref 34.5–45)
HGB BLD-MCNC: 10.7 G/DL — LOW (ref 11.5–15.5)
IMM GRANULOCYTES NFR BLD AUTO: 0.5 % — SIGNIFICANT CHANGE UP (ref 0–0.9)
LYMPHOCYTES # BLD AUTO: 1.91 K/UL — SIGNIFICANT CHANGE UP (ref 1–3.3)
LYMPHOCYTES # BLD AUTO: 18.6 % — SIGNIFICANT CHANGE UP (ref 13–44)
MCHC RBC-ENTMCNC: 29.7 PG — SIGNIFICANT CHANGE UP (ref 27–34)
MCHC RBC-ENTMCNC: 34 GM/DL — SIGNIFICANT CHANGE UP (ref 32–36)
MCV RBC AUTO: 87.5 FL — SIGNIFICANT CHANGE UP (ref 80–100)
MONOCYTES # BLD AUTO: 0.87 K/UL — SIGNIFICANT CHANGE UP (ref 0–0.9)
MONOCYTES NFR BLD AUTO: 8.5 % — SIGNIFICANT CHANGE UP (ref 2–14)
NEUTROPHILS # BLD AUTO: 7.19 K/UL — SIGNIFICANT CHANGE UP (ref 1.8–7.4)
NEUTROPHILS NFR BLD AUTO: 69.9 % — SIGNIFICANT CHANGE UP (ref 43–77)
NT-PROBNP SERPL-SCNC: 274 PG/ML — SIGNIFICANT CHANGE UP (ref 0–300)
PLATELET # BLD AUTO: 280 K/UL — SIGNIFICANT CHANGE UP (ref 150–400)
POTASSIUM SERPL-MCNC: 4 MMOL/L — SIGNIFICANT CHANGE UP (ref 3.5–5.3)
POTASSIUM SERPL-SCNC: 4 MMOL/L — SIGNIFICANT CHANGE UP (ref 3.5–5.3)
PROT SERPL-MCNC: 6.7 G/DL — SIGNIFICANT CHANGE UP (ref 6.6–8.7)
RBC # BLD: 3.6 M/UL — LOW (ref 3.8–5.2)
RBC # FLD: 12.7 % — SIGNIFICANT CHANGE UP (ref 10.3–14.5)
SODIUM SERPL-SCNC: 134 MMOL/L — LOW (ref 135–145)
WBC # BLD: 10.28 K/UL — SIGNIFICANT CHANGE UP (ref 3.8–10.5)
WBC # FLD AUTO: 10.28 K/UL — SIGNIFICANT CHANGE UP (ref 3.8–10.5)

## 2023-09-13 PROCEDURE — 85025 COMPLETE CBC W/AUTO DIFF WBC: CPT

## 2023-09-13 PROCEDURE — 99284 EMERGENCY DEPT VISIT MOD MDM: CPT

## 2023-09-13 PROCEDURE — 93971 EXTREMITY STUDY: CPT

## 2023-09-13 PROCEDURE — 96374 THER/PROPH/DIAG INJ IV PUSH: CPT

## 2023-09-13 PROCEDURE — 80053 COMPREHEN METABOLIC PANEL: CPT

## 2023-09-13 PROCEDURE — 36415 COLL VENOUS BLD VENIPUNCTURE: CPT

## 2023-09-13 PROCEDURE — 93971 EXTREMITY STUDY: CPT | Mod: 26,LT

## 2023-09-13 PROCEDURE — 99285 EMERGENCY DEPT VISIT HI MDM: CPT | Mod: 25

## 2023-09-13 PROCEDURE — 83880 ASSAY OF NATRIURETIC PEPTIDE: CPT

## 2023-09-13 RX ORDER — FUROSEMIDE 40 MG
20 TABLET ORAL ONCE
Refills: 0 | Status: COMPLETED | OUTPATIENT
Start: 2023-09-13 | End: 2023-09-13

## 2023-09-13 RX ADMIN — Medication 20 MILLIGRAM(S): at 03:50

## 2023-09-13 NOTE — ED ADULT NURSE NOTE - CHIEF COMPLAINT QUOTE
BIBEMS c/o left lower leg swelling x1 day.   pt sent from [facility] to r/u DVT.  pt reports "there is water inside my leg, I need a water pill."  pt denies SOB, CP, palpitations.  pt recently dx with humeral head fx a few days ago and arm is in sling.

## 2023-09-13 NOTE — ED ADULT NURSE NOTE - NSFALLUNIVINTERV_ED_ALL_ED
Bed/Stretcher in lowest position, wheels locked, appropriate side rails in place/Call bell, personal items and telephone in reach/Instruct patient to call for assistance before getting out of bed/chair/stretcher/Non-slip footwear applied when patient is off stretcher/[place] to call system/Physically safe environment - no spills, clutter or unnecessary equipment/Purposeful proactive rounding/Room/bathroom lighting operational, light cord in reach

## 2023-09-13 NOTE — ED PROVIDER NOTE - CLINICAL SUMMARY MEDICAL DECISION MAKING FREE TEXT BOX
Pt is a 61yo female with PMH of schizophrenia, afib on xarelto, htn, hld, hypothyroidism presenting with LLE swelling. Pt is a poor historian, coming from [facility]. Pt reports that the LLE swelling started one day ago. Pt reports that she has had leg swelling in the past and treated with water pills. Pt denies tenderness to LLE. Pt denies SOB, chest pain, abdominal pain, N/V/D, fever. Pt is a 61yo female with PMH of schizophrenia, afib on xarelto, htn, hld, hypothyroidism presenting with LLE swelling. Pt is a poor historian, coming from [facility]. Pt reports that the LLE swelling started today in afternoon. Pt reports that she has had bilateral leg swelling in the past and treated with water pills. Pt denies tenderness to LLE. Pt denies SOB, chest pain, abdominal pain, N/V/D, fever. Doppler LLE, cbc, cmp. lasix 20mg IV given. Pt is a 61yo female with PMH of schizophrenia, afib on xarelto, htn, hld, hypothyroidism presenting with LLE swelling. Pt is a poor historian, coming from [facility]. Pt reports that the LLE swelling started today in afternoon. Pt reports that she has had bilateral leg swelling in the past and treated with water pills. Pt denies tenderness to LLE. Pt denies SOB, chest pain, abdominal pain, N/V/D, fever. Exam notable for mild left left lower extremity edema, no skin changes, no erythema, no warmth, no tenderness.  Plan for Doppler LLE, cbc, cmp. lasix 20mg IV given. Pt is a 63yo female with PMH of schizophrenia, afib on xarelto, htn, hld, hypothyroidism presenting with LLE swelling. Pt is a poor historian, coming from [facility]. Pt reports that the LLE swelling started today in afternoon. Pt reports that she has had bilateral leg swelling in the past and treated with water pills. Pt denies tenderness to LLE. Pt denies SOB, chest pain, abdominal pain, N/V/D, fever. Exam notable for mild left left lower extremity edema, no skin changes, no erythema, no warmth, no tenderness.  cbc, cmp WNL. lasix 20mg IV given. Venous Doppler LLE is negative for DVT. Results discussed with pt and [facility]. Pt is a 61yo female with PMH of schizophrenia, afib on xarelto, htn, hld, hypothyroidism presenting with LLE swelling. Pt is a poor historian, coming from [facility]. Pt reports that the LLE swelling started today in afternoon. Pt reports that she has had bilateral leg swelling in the past and treated with water pills. Pt denies tenderness to LLE. Pt denies SOB, chest pain, abdominal pain, N/V/D, fever. Exam notable for mild left left lower extremity edema, no skin changes, no erythema, no warmth, no tenderness.  cbc, cmp WNL. lasix 20mg IV given. Venous Doppler LLE is negative for DVT, awaiting official read. Pt is a 63yo female with PMH of schizophrenia, afib on xarelto, htn, hld, hypothyroidism presenting with LLE swelling. Pt is a poor historian, coming from [facility]. Pt reports that the LLE swelling started today in afternoon. Pt reports that she has had bilateral leg swelling in the past and treated with water pills. Pt denies tenderness to LLE. Pt denies SOB, chest pain, abdominal pain, N/V/D, fever. Exam notable for mild left left lower extremity edema, no skin changes, no erythema, no warmth, no tenderness.  cbc, cmp WNL. lasix 20mg IV given. Venous Doppler LLE is negative for DVT. Discussed result with pt and physician at [facility]. Pt is medically cleared for discharge.

## 2023-09-13 NOTE — ED ADULT NURSE REASSESSMENT NOTE - NS ED NURSE REASSESS COMMENT FT1
Pt remains resting in bed. 1:1 remains in place. Environment assessed for safety. Pt ambulated to bathroom by 1:1. No new complaints of pain or discomfort @ this time.

## 2023-09-13 NOTE — ED PROVIDER NOTE - ATTENDING CONTRIBUTION TO CARE
Pam: I performed a face to face bedside interview with patient regarding history of present illness, review of symptoms and past medical history. I completed an independent physical exam.  I have discussed patient's plan of care with resident.   I agree with note as stated above including HISTORY OF PRESENT ILLNESS, HIV, PAST MEDICAL/SURGICAL/FAMILY/SOCIAL HISTORY, ALLERGIES AND HOME MEDICATIONS, REVIEW OF SYSTEMS, PHYSICAL EXAM, MEDICAL DECISION MAKING and any PROGRESS NOTES during the time I functioned as the attending physician for this patient unless otherwise noted. My brief assessment is as follows: Pt is a 61yo female with PMH of schizophrenia, afib on xarelto, htn, hld, hypothyroidism presenting with LLE swelling. Pt is a poor historian, coming from [facility]. Pt reports that the LLE swelling started today in afternoon. Pt reports that she has had bilateral leg swelling in the past and treated with water pills. Pt denies tenderness to LLE. Pt denies SOB, chest pain, abdominal pain, N/V/D, fever. Exam notable for mild left left lower extremity edema, no skin changes, no erythema, no warmth, no tenderness.  Plan for Doppler LLE, cbc, cmp. lasix 20mg IV given.

## 2023-09-13 NOTE — ED ADULT TRIAGE NOTE - CHIEF COMPLAINT QUOTE
BIBEMS c/o left lower leg swelling x1 day.   pt sent from [facility] to r/u DVT.  pt reports "there is water inside my leg, I need a water pill."  pt denies SOB, CP, palpitations.  pt recently dx with humeral head fx a few days ago and arm is in sling.

## 2023-09-13 NOTE — ED PROVIDER NOTE - OBJECTIVE STATEMENT
Pt is a 61yo female with PMH of schizophrenia, afib on xarelto, htn, hld, hypothyroidism presenting with LLE swelling. Pt is a poor historian, coming from [facility]. Pt reports that the LLE swelling started one day ago. Pt reports that she has had leg swelling in the past and treated with water pills. Pt denies tenderness to LLE. Pt denies SOB, chest pain, abdominal pain, N/V/D, fever. Pt was seen in ED 4 days ago s/p fall, found to have L humeral head fracture, arm in sling, denies leg trauma in fall. Pt is a 61yo female with PMH of schizophrenia, afib on xarelto, htn, hld, hypothyroidism presenting with LLE swelling. Pt is a poor historian, coming from [facility]. Pt reports that the LLE swelling started today in afternoon. Pt reports that she has had leg swelling in the past and treated with water pills. Pt denies tenderness to LLE. Pt denies SOB, chest pain, abdominal pain, N/V/D, fever. Pt was seen in ED 4 days ago s/p fall, found to have L humeral head fracture, arm in sling, denies leg trauma in fall.

## 2023-09-13 NOTE — ED PROVIDER NOTE - PSYCHIATRIC, MLM
Alert and oriented to person, place, time/situation. perseverating about boyfriend and neighbor.  no apparent risk to self or others.

## 2023-09-13 NOTE — ED PROVIDER NOTE - PATIENT PORTAL LINK FT
You can access the FollowMyHealth Patient Portal offered by [facility] by registering at the following website: [email or web address]. By joining [employer]’s FollowMyHealth portal, you will also be able to view your health information using other applications (apps) compatible with our system.

## 2023-09-13 NOTE — CHART NOTE - NSCHARTNOTEFT_GEN_A_CORE
SW Note: Pt is medically cleared for discharge per MD. Plan is for patient to return to [facility]. Ambulance arranged for 10:00 AM  with clerical staff. Transportation letter provided. RN made aware. Per BREE Young at [facility], pt is accepted to return today.

## 2023-09-13 NOTE — ED PROVIDER NOTE - NSFOLLOWUPINSTRUCTIONS_ED_ALL_ED_FT
Please follow up with your primary care doctor for further management of the swelling in your left leg.    Please return to the ED if:  You cannot walk.  You have chest pain or trouble breathing that is worse when you lie down.  You suddenly feel lightheaded and have trouble breathing.  You have new and sudden chest pain. You may have more pain when you take deep breaths or cough.  You cough up blood.  You feel faint or confused.  Your skin turns blue or gray.  Your leg feels warm, tender, and painful. It may be swollen and red.  Call your doctor if:  You have a fever or feel more tired than usual.  The veins in your legs look larger than usual. They may look full or bulging.  Your legs itch or feel heavy.  You have red or white areas or sores on your legs. The skin may also appear dimpled or have indentations.  You are gaining weight.  You have trouble moving your ankles.  The swelling does not go away, or other parts of your body swell.  You have questions or concerns about your condition or care.    Self-care:  Elevate your legs. Raise your legs above the level of your heart as often as you can. This will help decrease swelling and pain. Prop your legs on pillows or blankets to keep them elevated comfortably.

## 2023-09-27 ENCOUNTER — APPOINTMENT (OUTPATIENT)
Dept: ORTHOPEDIC SURGERY | Facility: CLINIC | Age: 63
End: 2023-09-27
Payer: MEDICARE

## 2023-09-27 VITALS — WEIGHT: 200 LBS | HEIGHT: 65 IN | BODY MASS INDEX: 33.32 KG/M2

## 2023-09-27 DIAGNOSIS — S42.291A OTHER DISPLACED FRACTURE OF UPPER END OF RIGHT HUMERUS, INITIAL ENCOUNTER FOR CLOSED FRACTURE: ICD-10-CM

## 2023-09-27 PROCEDURE — 73060 X-RAY EXAM OF HUMERUS: CPT | Mod: RT

## 2023-09-27 PROCEDURE — 99214 OFFICE O/P EST MOD 30 MIN: CPT | Mod: 57

## 2023-09-27 PROCEDURE — 23600 CLTX PROX HUMRL FX W/O MNPJ: CPT | Mod: RT

## 2023-10-26 ENCOUNTER — RX RENEWAL (OUTPATIENT)
Age: 63
End: 2023-10-26

## 2023-10-27 ENCOUNTER — INPATIENT (INPATIENT)
Facility: HOSPITAL | Age: 63
LOS: 10 days | Discharge: NURSING, REHAB OR LONG-TERM CARE | DRG: 91 | End: 2023-11-07
Attending: STUDENT IN AN ORGANIZED HEALTH CARE EDUCATION/TRAINING PROGRAM | Admitting: STUDENT IN AN ORGANIZED HEALTH CARE EDUCATION/TRAINING PROGRAM
Payer: COMMERCIAL

## 2023-10-27 VITALS
RESPIRATION RATE: 16 BRPM | HEART RATE: 71 BPM | SYSTOLIC BLOOD PRESSURE: 143 MMHG | TEMPERATURE: 98 F | OXYGEN SATURATION: 96 % | WEIGHT: 179.9 LBS | DIASTOLIC BLOOD PRESSURE: 90 MMHG | HEIGHT: 64 IN

## 2023-10-27 DIAGNOSIS — R26.81 UNSTEADINESS ON FEET: ICD-10-CM

## 2023-10-27 LAB
ALBUMIN SERPL ELPH-MCNC: 4.2 G/DL — SIGNIFICANT CHANGE UP (ref 3.3–5.2)
ALBUMIN SERPL ELPH-MCNC: 4.2 G/DL — SIGNIFICANT CHANGE UP (ref 3.3–5.2)
ALP SERPL-CCNC: 151 U/L — HIGH (ref 40–120)
ALP SERPL-CCNC: 151 U/L — HIGH (ref 40–120)
ALT FLD-CCNC: 19 U/L — SIGNIFICANT CHANGE UP
ALT FLD-CCNC: 19 U/L — SIGNIFICANT CHANGE UP
ANION GAP SERPL CALC-SCNC: 14 MMOL/L — SIGNIFICANT CHANGE UP (ref 5–17)
ANION GAP SERPL CALC-SCNC: 14 MMOL/L — SIGNIFICANT CHANGE UP (ref 5–17)
APPEARANCE UR: ABNORMAL
APPEARANCE UR: ABNORMAL
AST SERPL-CCNC: 20 U/L — SIGNIFICANT CHANGE UP
AST SERPL-CCNC: 20 U/L — SIGNIFICANT CHANGE UP
BACTERIA # UR AUTO: ABNORMAL
BACTERIA # UR AUTO: ABNORMAL
BASOPHILS # BLD AUTO: 0.07 K/UL — SIGNIFICANT CHANGE UP (ref 0–0.2)
BASOPHILS # BLD AUTO: 0.07 K/UL — SIGNIFICANT CHANGE UP (ref 0–0.2)
BASOPHILS NFR BLD AUTO: 0.6 % — SIGNIFICANT CHANGE UP (ref 0–2)
BASOPHILS NFR BLD AUTO: 0.6 % — SIGNIFICANT CHANGE UP (ref 0–2)
BILIRUB SERPL-MCNC: 0.3 MG/DL — LOW (ref 0.4–2)
BILIRUB SERPL-MCNC: 0.3 MG/DL — LOW (ref 0.4–2)
BILIRUB UR-MCNC: NEGATIVE — SIGNIFICANT CHANGE UP
BILIRUB UR-MCNC: NEGATIVE — SIGNIFICANT CHANGE UP
BUN SERPL-MCNC: 12.8 MG/DL — SIGNIFICANT CHANGE UP (ref 8–20)
BUN SERPL-MCNC: 12.8 MG/DL — SIGNIFICANT CHANGE UP (ref 8–20)
CALCIUM SERPL-MCNC: 10.1 MG/DL — SIGNIFICANT CHANGE UP (ref 8.4–10.5)
CALCIUM SERPL-MCNC: 10.1 MG/DL — SIGNIFICANT CHANGE UP (ref 8.4–10.5)
CHLORIDE SERPL-SCNC: 101 MMOL/L — SIGNIFICANT CHANGE UP (ref 96–108)
CHLORIDE SERPL-SCNC: 101 MMOL/L — SIGNIFICANT CHANGE UP (ref 96–108)
CO2 SERPL-SCNC: 20 MMOL/L — LOW (ref 22–29)
CO2 SERPL-SCNC: 20 MMOL/L — LOW (ref 22–29)
COLOR SPEC: YELLOW — SIGNIFICANT CHANGE UP
COLOR SPEC: YELLOW — SIGNIFICANT CHANGE UP
CREAT SERPL-MCNC: 0.69 MG/DL — SIGNIFICANT CHANGE UP (ref 0.5–1.3)
CREAT SERPL-MCNC: 0.69 MG/DL — SIGNIFICANT CHANGE UP (ref 0.5–1.3)
DIFF PNL FLD: ABNORMAL
DIFF PNL FLD: ABNORMAL
EGFR: 96 ML/MIN/1.73M2 — SIGNIFICANT CHANGE UP
EGFR: 96 ML/MIN/1.73M2 — SIGNIFICANT CHANGE UP
EOSINOPHIL # BLD AUTO: 0.25 K/UL — SIGNIFICANT CHANGE UP (ref 0–0.5)
EOSINOPHIL # BLD AUTO: 0.25 K/UL — SIGNIFICANT CHANGE UP (ref 0–0.5)
EOSINOPHIL NFR BLD AUTO: 2 % — SIGNIFICANT CHANGE UP (ref 0–6)
EOSINOPHIL NFR BLD AUTO: 2 % — SIGNIFICANT CHANGE UP (ref 0–6)
EPI CELLS # UR: SIGNIFICANT CHANGE UP
EPI CELLS # UR: SIGNIFICANT CHANGE UP
GLUCOSE SERPL-MCNC: 102 MG/DL — HIGH (ref 70–99)
GLUCOSE SERPL-MCNC: 102 MG/DL — HIGH (ref 70–99)
GLUCOSE UR QL: NEGATIVE MG/DL — SIGNIFICANT CHANGE UP
GLUCOSE UR QL: NEGATIVE MG/DL — SIGNIFICANT CHANGE UP
HCT VFR BLD CALC: 34.9 % — SIGNIFICANT CHANGE UP (ref 34.5–45)
HCT VFR BLD CALC: 34.9 % — SIGNIFICANT CHANGE UP (ref 34.5–45)
HGB BLD-MCNC: 11.5 G/DL — SIGNIFICANT CHANGE UP (ref 11.5–15.5)
HGB BLD-MCNC: 11.5 G/DL — SIGNIFICANT CHANGE UP (ref 11.5–15.5)
IMM GRANULOCYTES NFR BLD AUTO: 0.5 % — SIGNIFICANT CHANGE UP (ref 0–0.9)
IMM GRANULOCYTES NFR BLD AUTO: 0.5 % — SIGNIFICANT CHANGE UP (ref 0–0.9)
KETONES UR-MCNC: NEGATIVE — SIGNIFICANT CHANGE UP
KETONES UR-MCNC: NEGATIVE — SIGNIFICANT CHANGE UP
LEUKOCYTE ESTERASE UR-ACNC: ABNORMAL
LEUKOCYTE ESTERASE UR-ACNC: ABNORMAL
LYMPHOCYTES # BLD AUTO: 0.94 K/UL — LOW (ref 1–3.3)
LYMPHOCYTES # BLD AUTO: 0.94 K/UL — LOW (ref 1–3.3)
LYMPHOCYTES # BLD AUTO: 7.5 % — LOW (ref 13–44)
LYMPHOCYTES # BLD AUTO: 7.5 % — LOW (ref 13–44)
MCHC RBC-ENTMCNC: 30.4 PG — SIGNIFICANT CHANGE UP (ref 27–34)
MCHC RBC-ENTMCNC: 30.4 PG — SIGNIFICANT CHANGE UP (ref 27–34)
MCHC RBC-ENTMCNC: 33 GM/DL — SIGNIFICANT CHANGE UP (ref 32–36)
MCHC RBC-ENTMCNC: 33 GM/DL — SIGNIFICANT CHANGE UP (ref 32–36)
MCV RBC AUTO: 92.3 FL — SIGNIFICANT CHANGE UP (ref 80–100)
MCV RBC AUTO: 92.3 FL — SIGNIFICANT CHANGE UP (ref 80–100)
MONOCYTES # BLD AUTO: 0.96 K/UL — HIGH (ref 0–0.9)
MONOCYTES # BLD AUTO: 0.96 K/UL — HIGH (ref 0–0.9)
MONOCYTES NFR BLD AUTO: 7.7 % — SIGNIFICANT CHANGE UP (ref 2–14)
MONOCYTES NFR BLD AUTO: 7.7 % — SIGNIFICANT CHANGE UP (ref 2–14)
NEUTROPHILS # BLD AUTO: 10.24 K/UL — HIGH (ref 1.8–7.4)
NEUTROPHILS # BLD AUTO: 10.24 K/UL — HIGH (ref 1.8–7.4)
NEUTROPHILS NFR BLD AUTO: 81.7 % — HIGH (ref 43–77)
NEUTROPHILS NFR BLD AUTO: 81.7 % — HIGH (ref 43–77)
NITRITE UR-MCNC: NEGATIVE — SIGNIFICANT CHANGE UP
NITRITE UR-MCNC: NEGATIVE — SIGNIFICANT CHANGE UP
PH UR: 8 — SIGNIFICANT CHANGE UP (ref 5–8)
PH UR: 8 — SIGNIFICANT CHANGE UP (ref 5–8)
PLATELET # BLD AUTO: 280 K/UL — SIGNIFICANT CHANGE UP (ref 150–400)
PLATELET # BLD AUTO: 280 K/UL — SIGNIFICANT CHANGE UP (ref 150–400)
POTASSIUM SERPL-MCNC: 4.3 MMOL/L — SIGNIFICANT CHANGE UP (ref 3.5–5.3)
POTASSIUM SERPL-MCNC: 4.3 MMOL/L — SIGNIFICANT CHANGE UP (ref 3.5–5.3)
POTASSIUM SERPL-SCNC: 4.3 MMOL/L — SIGNIFICANT CHANGE UP (ref 3.5–5.3)
POTASSIUM SERPL-SCNC: 4.3 MMOL/L — SIGNIFICANT CHANGE UP (ref 3.5–5.3)
PROT SERPL-MCNC: 6.6 G/DL — SIGNIFICANT CHANGE UP (ref 6.6–8.7)
PROT SERPL-MCNC: 6.6 G/DL — SIGNIFICANT CHANGE UP (ref 6.6–8.7)
PROT UR-MCNC: NEGATIVE — SIGNIFICANT CHANGE UP
PROT UR-MCNC: NEGATIVE — SIGNIFICANT CHANGE UP
RBC # BLD: 3.78 M/UL — LOW (ref 3.8–5.2)
RBC # BLD: 3.78 M/UL — LOW (ref 3.8–5.2)
RBC # FLD: 14.4 % — SIGNIFICANT CHANGE UP (ref 10.3–14.5)
RBC # FLD: 14.4 % — SIGNIFICANT CHANGE UP (ref 10.3–14.5)
RBC CASTS # UR COMP ASSIST: SIGNIFICANT CHANGE UP /HPF (ref 0–4)
RBC CASTS # UR COMP ASSIST: SIGNIFICANT CHANGE UP /HPF (ref 0–4)
SODIUM SERPL-SCNC: 134 MMOL/L — LOW (ref 135–145)
SODIUM SERPL-SCNC: 134 MMOL/L — LOW (ref 135–145)
SP GR SPEC: 1.01 — SIGNIFICANT CHANGE UP (ref 1.01–1.02)
SP GR SPEC: 1.01 — SIGNIFICANT CHANGE UP (ref 1.01–1.02)
TROPONIN T SERPL-MCNC: <0.01 NG/ML — SIGNIFICANT CHANGE UP (ref 0–0.06)
TROPONIN T SERPL-MCNC: <0.01 NG/ML — SIGNIFICANT CHANGE UP (ref 0–0.06)
UROBILINOGEN FLD QL: NEGATIVE MG/DL — SIGNIFICANT CHANGE UP
UROBILINOGEN FLD QL: NEGATIVE MG/DL — SIGNIFICANT CHANGE UP
WBC # BLD: 12.52 K/UL — HIGH (ref 3.8–10.5)
WBC # BLD: 12.52 K/UL — HIGH (ref 3.8–10.5)
WBC # FLD AUTO: 12.52 K/UL — HIGH (ref 3.8–10.5)
WBC # FLD AUTO: 12.52 K/UL — HIGH (ref 3.8–10.5)
WBC UR QL: ABNORMAL /HPF (ref 0–5)
WBC UR QL: ABNORMAL /HPF (ref 0–5)

## 2023-10-27 PROCEDURE — 70450 CT HEAD/BRAIN W/O DYE: CPT | Mod: 26,MA

## 2023-10-27 PROCEDURE — 99222 1ST HOSP IP/OBS MODERATE 55: CPT

## 2023-10-27 PROCEDURE — 99285 EMERGENCY DEPT VISIT HI MDM: CPT

## 2023-10-27 PROCEDURE — 93010 ELECTROCARDIOGRAM REPORT: CPT

## 2023-10-27 PROCEDURE — 99223 1ST HOSP IP/OBS HIGH 75: CPT

## 2023-10-27 RX ORDER — BENZTROPINE MESYLATE 1 MG
1 TABLET ORAL ONCE
Refills: 0 | Status: COMPLETED | OUTPATIENT
Start: 2023-10-27 | End: 2023-10-27

## 2023-10-27 RX ORDER — DIPHENHYDRAMINE HCL 50 MG
25 CAPSULE ORAL ONCE
Refills: 0 | Status: COMPLETED | OUTPATIENT
Start: 2023-10-27 | End: 2023-10-27

## 2023-10-27 RX ORDER — ONDANSETRON 8 MG/1
4 TABLET, FILM COATED ORAL EVERY 6 HOURS
Refills: 0 | Status: DISCONTINUED | OUTPATIENT
Start: 2023-10-27 | End: 2023-11-07

## 2023-10-27 RX ORDER — SODIUM CHLORIDE 9 MG/ML
1000 INJECTION INTRAMUSCULAR; INTRAVENOUS; SUBCUTANEOUS ONCE
Refills: 0 | Status: COMPLETED | OUTPATIENT
Start: 2023-10-27 | End: 2023-10-27

## 2023-10-27 RX ORDER — CEFTRIAXONE 500 MG/1
1000 INJECTION, POWDER, FOR SOLUTION INTRAMUSCULAR; INTRAVENOUS ONCE
Refills: 0 | Status: DISCONTINUED | OUTPATIENT
Start: 2023-10-27 | End: 2023-10-27

## 2023-10-27 RX ORDER — ENOXAPARIN SODIUM 100 MG/ML
40 INJECTION SUBCUTANEOUS EVERY 24 HOURS
Refills: 0 | Status: DISCONTINUED | OUTPATIENT
Start: 2023-10-27 | End: 2023-10-28

## 2023-10-27 RX ORDER — CEFTRIAXONE 500 MG/1
1000 INJECTION, POWDER, FOR SOLUTION INTRAMUSCULAR; INTRAVENOUS ONCE
Refills: 0 | Status: COMPLETED | OUTPATIENT
Start: 2023-10-27 | End: 2023-10-27

## 2023-10-27 RX ORDER — ACETAMINOPHEN 500 MG
650 TABLET ORAL EVERY 6 HOURS
Refills: 0 | Status: DISCONTINUED | OUTPATIENT
Start: 2023-10-27 | End: 2023-11-07

## 2023-10-27 RX ADMIN — Medication 1 MILLIGRAM(S): at 21:01

## 2023-10-27 RX ADMIN — Medication 25 MILLIGRAM(S): at 19:16

## 2023-10-27 RX ADMIN — CEFTRIAXONE 1000 MILLIGRAM(S): 500 INJECTION, POWDER, FOR SOLUTION INTRAMUSCULAR; INTRAVENOUS at 22:19

## 2023-10-27 RX ADMIN — SODIUM CHLORIDE 1000 MILLILITER(S): 9 INJECTION INTRAMUSCULAR; INTRAVENOUS; SUBCUTANEOUS at 19:16

## 2023-10-27 NOTE — H&P ADULT - NSHPLABSRESULTS_GEN_ALL_CORE
10-27    134<L>  |  101  |  12.8  ----------------------------<  102<H>  4.3   |  20.0<L>  |  0.69    Ca    10.1      27 Oct 2023 18:45    TPro  6.6  /  Alb  4.2  /  TBili  0.3<L>  /  DBili  x   /  AST  20  /  ALT  19  /  AlkPhos  151<H>  10-27                            11.5   12.52 )-----------( 280      ( 27 Oct 2023 18:45 )             34.9

## 2023-10-27 NOTE — ED ADULT TRIAGE NOTE - CHIEF COMPLAINT QUOTE
BIBEMS from [facility] for an increase in tremors. [facility] reports unsteady gait at baseline but states that the pt is weaker now than usual and is having trouble standing. Moves all extremities equally, face symmetrical. AAOx3 at this time,  in the field. Denies SI/HI. [facility] aide at the bedside.

## 2023-10-27 NOTE — ED PROVIDER NOTE - CLINICAL SUMMARY MEDICAL DECISION MAKING FREE TEXT BOX
pt w/ pmhx of DM, schizophrenia and depression presenting from [facility] for tremor and gait instability. Labs, EKG, urine ordered. 25mg benadryl IV given.

## 2023-10-27 NOTE — H&P ADULT - NSHPPHYSICALEXAM_GEN_ALL_CORE
Vital Signs Last 24 Hrs  T(C): 37.4 (28 Oct 2023 01:56), Max: 37.4 (28 Oct 2023 01:56)  T(F): 99.3 (28 Oct 2023 01:56), Max: 99.3 (28 Oct 2023 01:56)  HR: 76 (28 Oct 2023 01:56) (68 - 76)  BP: 126/97 (28 Oct 2023 01:56) (126/97 - 155/60)  BP(mean): 107 (28 Oct 2023 01:56) (107 - 107)  RR: 18 (28 Oct 2023 01:56) (16 - 19)  SpO2: 96% (28 Oct 2023 01:56) (96% - 97%)    Parameters below as of 28 Oct 2023 01:56  Patient On (Oxygen Delivery Method): room air    GENERAL:  Well-appearing, not in acute distress  EYES:  Clear conjunctiva, extraocular movement intact  ENT: Moist mucous membranes  RESP:  Non-labored breathing pattern, lungs clear to ausculation in anterior fields  CV: Regular rate and rhythm, no murmurs appreciated, no lower extremity edema  GI: Soft, non-tender, non-distended  NEURO: Awake, alert, conversant, bilateral hand tremors noted, able to lift arms and legs against gravity, light touch sensation grossly intact  PSYCH: Calm, cooperative  SKIN: No rash or lesions, warm and dry

## 2023-10-27 NOTE — ED ADULT NURSE NOTE - CHIEF COMPLAINT QUOTE
BIBEMS from [facility] for an increase in tremors. SOH reports unsteady gait at baseline but states that the pt is weaker now than usual and is having trouble standing. Moves all extremities equally, face symmetrical. AAOx3 at this time,  in the field. Denies SI/HI. SOH aide at the bedside.

## 2023-10-27 NOTE — ED PROVIDER NOTE - PROGRESS NOTE DETAILS
José Miguel: Pt received in signout from Dr. Go. On my assessment, pt very tremulous and unable to ambulate without assistance. States that symptoms have been progressive over the past 2 weeks. CT concerning for possible communicating hydrocephalus. Neurosurgery and neurology consults placed. José Miguel: Case discussed with hospitalist for admission. Will treat with rocephin for UTI.

## 2023-10-27 NOTE — CONSULT NOTE ADULT - ASSESSMENT
ASSESSMENT:       PLAN:    NEURO:  CT Head in AM, MRI post-op, Surgical drains per NSGY, Pain control  Activity: [] OOB as tolerated [] Bedrest [] PT [] OT [] PMNR    PULM:  Incentive spirometry, mobilize as tolerated    CV:  Keep -150mmHg, d/c a-line in AM    RENAL:  IVF until good PO intake, d/c nolasco in AM    GI:  Diet: Dysphagia screen and then advance diet as tolerated  GI prophylaxis [] not indicated [] PPI [] other:  Bowel regimen [] colace [] senna [] other:    ENDO:   Goal euglycemia (-180)    HEME/ONC:  VTE prophylaxis: [] SCDs [] chemoprophylaxis [] hold chemoprophylaxis due to: [] high risk of DVT/PE on admission due to:    ID:  Chantal-op antibiotics    MISC:    SOCIAL/FAMILY:  [] awaiting [] updated at bedside [] family meeting    CODE STATUS:  [] Full Code [] DNR [] DNI [] Palliative/Comfort Care    DISPOSITION:  [] ICU [] Stroke Unit [] Floor [] EMU [] RCU [] PCU ASSESSMENT:   63yF w/ PMHx of depression, anxiety, schizophrenia, DM presents w/ b/l LE weakness and gate instability x several months, worsening over the last several weeks. CTH showed mild enlargement in lateral ventricles w/ 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 for which neurosurgery was consulted.     PLAN:    -No acute neurosurgical intervention recommended at this time  -Recommend neurology consult  -Can f/u with Dr. Pappas as an outpatient with outpatient MRI brain w/wo in 2-4 weeks  -Will sign off, reconsult prn  -Further medical management per primary team  -Discussed case with Dr. Pappas

## 2023-10-27 NOTE — ED PROVIDER NOTE - OBJECTIVE STATEMENT
62 y/o F pt w/ PMHx of depression, schizophrenia, DM presenting w/ gait ataxia and increased tremor x2 days. Per SOH pt recently had her fluphenazine dose decreased in light of the tremors. Pt was sent here to make sure pt did not have a urine infection or NMS.

## 2023-10-27 NOTE — H&P ADULT - ASSESSMENT
63yoF hx schizophrenia, Afib on Xarelto, HTN, DM sent form [facility] for ataxia and tremor    Ataxia and tremor  -CT head showing 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  -Ddx include lithium toxicity, will hold and check level  -Unclear if hydrocephalus would contribute to symptoms deanna. if mild  -Seen by neurosurgery overnight, recommends outpatient MRI in 2-4 weeks  -Neuro checks q4hr  -Fall risk, PT eval  -Neuro consult for AM    UTI  -Reports urinary frequency, +UA  -Received ceftriaxone by ED, will continue  -Urine culture pending    Hydrocephalus  -On CT head as above  -Reported on PMHx on transfer document  -NSGY eval as above, no urgent intervention, outpatient MRI advised    Hx schizophrenia  -Holding lithium given concern for toxicity as above  -Fluphenazine 10mg BID  -Cogentin 1mg BID  -Trazadone PRN insomnia    Hx Afib  -Xarelto 20mg daily  -Metoprolol 50mg daily     Hx HLD/HTN  -Atorvastatin 80mg daily  -On metoprolol as above    Hx DM  -Hold metformin, start ISS    Hx hypothyroidism  -Levothyroxine     Prophylactic measure  -On Xarelto  63yoF hx schizophrenia, Afib on Xarelto, HTN, DM sent form [facility] for ataxia and tremor    Ataxia and tremor  -CT head showing 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  -Ddx include lithium toxicity, will hold and check level  -Unclear if hydrocephalus would contribute to symptoms deanna. if mild  -Seen by neurosurgery overnight, recommends outpatient MRI in 2-4 weeks  -Neuro checks q4hr  -Fall risk, PT eval  -Neuro consult for AM    UTI  -Reports urinary frequency, +UA, mild leukocytosis noted  -Received ceftriaxone by ED, will continue  -Urine culture pending    Hydrocephalus  -On CT head as above  -Reported on PMHx on transfer document  -NSGY eval as above, no urgent intervention, outpatient MRI advised    Hx schizophrenia  -Holding lithium given concern for toxicity as above  -Fluphenazine 10mg BID  -Cogentin 1mg BID  -Trazadone PRN insomnia    Hx Afib  -Xarelto 20mg daily  -Metoprolol 50mg daily     Hx HLD/HTN  -Atorvastatin 80mg daily  -On metoprolol as above    Hx DM  -Hold metformin, start ISS    Hx hypothyroidism  -Levothyroxine     Prophylactic measure  -On Xarelto  63yoF hx schizophrenia, Afib on Xarelto, HTN, DM sent form [facility] for ataxia and tremor    Ataxia and tremor  -CT head showing 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  -Ddx include lithium toxicity, will hold and check level  -Unclear if hydrocephalus would contribute to symptoms deanna. if mild  -Seen by neurosurgery overnight, recommends outpatient MRI in 2-4 weeks  -Neuro checks q4hr  -Fall risk, PT eval  -Neuro consult for AM    UTI  -Reports urinary frequency, +UA, mild leukocytosis noted  -Received ceftriaxone by ED, will continue  -Urine culture pending    Hydrocephalus  -On CT head as above  -Reported on PMHx on transfer document  -NSGY eval as above, no urgent intervention, outpatient MRI advised    Hx schizophrenia  -Currently calm and cooperative  -Holding lithium given concern for toxicity as above  -Fluphenazine 10mg BID  -Cogentin 1mg BID  -Trazadone 50mg PRN insomnia  -Prescribed PO haldol PRN at facility, can order if develops agitation    Hx Afib  -Xarelto 20mg daily  -Metoprolol 50mg daily     Hx HLD/HTN  -Atorvastatin 80mg daily  -On metoprolol as above    Hx DM  -Hold metformin, start ISS    Hx hypothyroidism  -Levothyroxine     Prophylactic measure  -On Xarelto

## 2023-10-27 NOTE — ED PROVIDER NOTE - CARE PLAN
1 Principal Discharge DX:	Unsteady gait   Principal Discharge DX:	Unsteady gait  Secondary Diagnosis:	Acute UTI

## 2023-10-27 NOTE — ED PROVIDER NOTE - PHYSICAL EXAMINATION
Gen: Well appearing in NAD  Head: NC/AT  Neck: trachea midline  Card: regular rate and rhythm  Resp:  CTAB  Abd: soft, non-distended, non-tender  Ext: no deformities above reported baseline  Neuro:  A&Ox4, CN 2-12 intact, +mild tremor. no rigidity. No fasciculations. No nystagmus. Normal finger to nose and heel to shin b/l. No dysmetria.  Normal gait. Normal sensation. Normal strength.   Skin:  Warm and dry as visualized  Psych:  Normal affect and mood

## 2023-10-27 NOTE — ED ADULT NURSE NOTE - OBJECTIVE STATEMENT
Pt comes in complaining of increased tremors throughout body in upper and lower arms. Pt states she has been having tremors for a long time but they have been getting increasingly worse. Trouble ambulating.

## 2023-10-27 NOTE — ED PROVIDER NOTE - ATTENDING CONTRIBUTION TO CARE
I, Branden Go, performed a face to face bedside interview with this patient regarding history of present illness, review of symptoms and relevant past medical, social and family history.  I completed an independent physical examination. I have communicated the patient’s plan of care and disposition with the resident.  63 year old female with PMh schizophrenia presents with 2 weeks of worsening tremor and gait instability.  Gen: NAD, well appearing  CV: RRR  Pul: CTA b/l  Abd: Soft, non-distended, non-tender  Neuro: no focal deficits  Pt with uti, possible hydrocephalus,, gait instability, admitted for tx of uti and neuro eval

## 2023-10-27 NOTE — CONSULT NOTE ADULT - SUBJECTIVE AND OBJECTIVE BOX
HISTORY OF PRESENT ILLNESS:   63yF w/ PMHx of depression, anxiety, schizophrenia, DM presents w/ b/l LE weakness and gate instability x several months, worsening over the last several weeks. Reports mild HA for several weeks and b/l UE tremors for last several days. Reports some back pain but denies any trauma or recent falls. Denies any dizziness, vision changes, N/V. CTH showed mild enlargement in lateral ventricles w/ 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. Neurosurgery consulted for this finding.     PAST MEDICAL & SURGICAL HISTORY:  Schizophrenia      Hypothyroidism      HTN (hypertension)      Afib      HLD (hyperlipidemia)        FAMILY HISTORY:      SOCIAL HISTORY:  Tobacco Use: Denies  EtOH use: Denies  Substance: Denies    Allergies    No Known Allergies    Intolerances        REVIEW OF SYSTEMS  Negative except as noted in HPI      HOME MEDICATIONS:  Home Medications: Pt reports taking medications at home but unsure what they are      Vital Signs Last 24 Hrs  T(C): 37.1 (27 Oct 2023 19:56), Max: 37.1 (27 Oct 2023 19:56)  T(F): 98.7 (27 Oct 2023 19:56), Max: 98.7 (27 Oct 2023 19:56)  HR: 70 (27 Oct 2023 19:56) (70 - 71)  BP: 153/82 (27 Oct 2023 19:56) (143/90 - 153/82)  BP(mean): --  RR: 18 (27 Oct 2023 19:56) (16 - 18)  SpO2: 96% (27 Oct 2023 19:56) (96% - 96%)    Parameters below as of 27 Oct 2023 19:56  Patient On (Oxygen Delivery Method): room air          PHYSICAL EXAM:  GENERAL: NAD, well-groomed  HEAD:  Atraumatic, normocephalic  DRAINS:   WOUND: Dressing clean dry intact; well healed  SHUNT: easily compressible and refills  EYES: Conjunctiva and sclera clear; corneal reflex intact  ENMT: No tonsillar erythema, exudates, or enlargement; moist mucous membranes, good dentition, no lesions  NECK: Supple, nontender to palpation  ALANA COMA SCORE: E- V- M- =       E: 4= opens eyes spontaneously 3= to voice 2= to noxious 1= no opening       V: 5= oriented 4= confused 3= inappropriate words 2= incomprehensible sounds 1= nonverbal 1T= intubated       M: 6= follows commands 5= localizes 4= withdraws 3= flexor posturing 2= extensor posturing 1= no movement  MENTAL STATUS: AAO x3; Awake/Comatose; Opens eyes spontaneously/to voice/to light touch/to noxious stimuli; Appropriately conversant without aphasia/Nonverbal; following simple commands/mimicking/not following commands  CRANIAL NERVES: Visual acuity normal for age, visual fields full to confrontation, PERRL. EOMI without nystagmus. Facial sensation intact V1-3 distribution b/l. Face symmetric w/ normal eye closure and smile, tongue midline. Hearing grossly intact. Speech clear. Head turning and shoulder shrug intact.   REFLEXES: PERRL. Corneals intact b/l. Gag intact. Cough intact. Oculocephalic reflex intact (Doll's eye). Negative Gross's b/l. Negative clonus b/l  MOTOR: strength 5/5 b/l upper and lower extremities  Uppers     Delt (C5/6)     Bicep (C5/6)     Wrist Extend (C6)     Tricep (C7)     HG (C8/T1)  R                     5/5                 5/5                         5/5                           5/5                   5/5  L                      5/5                 5/5                         5/5                           5/5                   5/5  Lowers      HF(L1/L2)     KE (L3)     DF (L4)     EHL (L5)     PF (S1)      R                     5/5              5/5           5/5           5/5            5/5  L                     5/5               5/5          5/5            5/5            5/5  SENSATION: grossly intact to light touch all extremities  COORDINATION: Gait testing deferred; no upper extremity dysmetria  CHEST/LUNG: Nonlabored on room air, no accessory muscle use  ABDOMEN: Soft, nontender, nondistended; bowel sounds present all four quadrants  EXTREMITIES:  2+ peripheral pulses, no clubbing, cyanosis, or edema  SKIN: Warm, dry    LABS:                        11.5   12.52 )-----------( 280      ( 27 Oct 2023 18:45 )             34.9     10-    134<L>  |  101  |  12.8  ----------------------------<  102<H>  4.3   |  20.0<L>  |  0.69    Ca    10.1      27 Oct 2023 18:45    TPro  6.6  /  Alb  4.2  /  TBili  0.3<L>  /  DBili  x   /  AST  20  /  ALT  19  /  AlkPhos  151<H>  10-27      Urinalysis Basic - ( 27 Oct 2023 20:44 )    Color: Yellow / Appearance: very cloudy / S.010 / pH: x  Gluc: x / Ketone: Negative  / Bili: Negative / Urobili: Negative mg/dL   Blood: x / Protein: Negative / Nitrite: Negative   Leuk Esterase: Moderate / RBC: 0-2 /HPF / WBC 26-50 /HPF   Sq Epi: x / Non Sq Epi: x / Bacteria: Few      RADIOLOGY & ADDITIONAL STUDIES:    CT Head No Cont (10.27. @ 19:24)   IMPRESSION:  Moderate to severe chronic microvascular changes without   evidence of an acute transcortical infarction or hemorrhage. 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 Correlate clinically.     HISTORY OF PRESENT ILLNESS:   63yF w/ PMHx of depression, anxiety, schizophrenia, DM presents w/ b/l LE weakness and gate instability x several months, worsening over the last several weeks. Reports mild HA for several weeks and b/l UE tremors for last several days. Reports some back pain but denies any trauma or recent falls. Denies any dizziness, vision changes, N/V. CTH showed mild enlargement in lateral ventricles w/ 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. Neurosurgery consulted for this finding.     PAST MEDICAL & SURGICAL HISTORY:  Schizophrenia      Hypothyroidism      HTN (hypertension)      Afib      HLD (hyperlipidemia)        FAMILY HISTORY:      SOCIAL HISTORY:  Tobacco Use: Denies  EtOH use: Denies  Substance: Denies    Allergies    No Known Allergies    Intolerances        REVIEW OF SYSTEMS  Negative except as noted in HPI      HOME MEDICATIONS:  Home Medications: Pt reports taking medications at home but unsure what they are      Vital Signs Last 24 Hrs  T(C): 37.1 (27 Oct 2023 19:56), Max: 37.1 (27 Oct 2023 19:56)  T(F): 98.7 (27 Oct 2023 19:56), Max: 98.7 (27 Oct 2023 19:56)  HR: 70 (27 Oct 2023 19:56) (70 - 71)  BP: 153/82 (27 Oct 2023 19:56) (143/90 - 153/82)  BP(mean): --  RR: 18 (27 Oct 2023 19:56) (16 - 18)  SpO2: 96% (27 Oct 2023 19:56) (96% - 96%)    Parameters below as of 27 Oct 2023 19:56  Patient On (Oxygen Delivery Method): room air          PHYSICAL EXAM:  GENERAL: NAD, well-groomed  HEAD:  Atraumatic, normocephalic  EYES: Conjunctiva and sclera clear  ENMT: Poor dentition  NECK: Supple  ALANA COMA SCORE: E- V- M- = 15       E: 4= opens eyes spontaneously 3= to voice 2= to noxious 1= no opening       V: 5= oriented 4= confused 3= inappropriate words 2= incomprehensible sounds 1= nonverbal 1T= intubated       M: 6= follows commands 5= localizes 4= withdraws 3= flexor posturing 2= extensor posturing 1= no movement  MENTAL STATUS: AAO x3; Awake; Opens eyes spontaneously; Appropriately conversant without aphasia; following simple commands  CRANIAL NERVES: Visual acuity normal for age, PERRL. EOMI without nystagmus. Facial sensation intact V1-3 distribution b/l. Face symmetric w/ normal eye closure and smile, tongue midline. Hearing grossly intact. Speech clear. Head turning and shoulder shrug intact.   REFLEXES: PERRL.   MOTOR: strength 5/5 b/l upper and lower extremities  SENSATION: grossly intact to light touch all extremities  COORDINATION: Gait testing deferred; no upper extremity dysmetria; significant b/l UE tremors   CHEST/LUNG: Nonlabored on room air, no accessory muscle use  SKIN: Warm, dry    LABS:                        11.5   12.52 )-----------( 280      ( 27 Oct 2023 18:45 )             34.9     10-27    134<L>  |  101  |  12.8  ----------------------------<  102<H>  4.3   |  20.0<L>  |  0.69    Ca    10.1      27 Oct 2023 18:45    TPro  6.6  /  Alb  4.2  /  TBili  0.3<L>  /  DBili  x   /  AST  20  /  ALT  19  /  AlkPhos  151<H>  10-27      Urinalysis Basic - ( 27 Oct 2023 20:44 )    Color: Yellow / Appearance: very cloudy / S.010 / pH: x  Gluc: x / Ketone: Negative  / Bili: Negative / Urobili: Negative mg/dL   Blood: x / Protein: Negative / Nitrite: Negative   Leuk Esterase: Moderate / RBC: 0-2 /HPF / WBC 26-50 /HPF   Sq Epi: x / Non Sq Epi: x / Bacteria: Few      RADIOLOGY & ADDITIONAL STUDIES:    CT Head No Cont (10.27.23 @ 19:24)   IMPRESSION:  Moderate to severe chronic microvascular changes without   evidence of an acute transcortical infarction or hemorrhage. 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 Correlate clinically.

## 2023-10-27 NOTE — H&P ADULT - HISTORY OF PRESENT ILLNESS
63yoF hx schizophrenia, Afib on Xarelto, HTN, DM sent from [facility] for ataxia and tremor.  Hx supplemented by chart review as pt is somewhat of a poor historian.  Staff at [facility] reported bilateral tremors of both hands for the past few days associated with gait instability though some parts of chart mention that the unsteady gait is the patient’s baseline.  Medication list includes lithium, fluphenazine and pt reportedly with adjustment in some of her psychiatric medications recently.  CT head shows possible mild communication hydrocephalus.  UA showing moderate leukocyte esterase and significant pyuria.  Pt reports urinary frequency, but denies dysuria, hematuria, fevers or chills.    63yoF hx schizophrenia, Afib on Xarelto, HTN, DM sent from [facility] for ataxia and tremor.  Hx supplemented by chart review as pt is somewhat of a poor historian.  Staff at [facility] reported bilateral tremors of both hands for the past few days associated with gait instability though some parts of chart mention that the unsteady gait is the patient’s baseline.  Medication list includes lithium, fluphenazine, haldol PRN and pt reportedly with adjustment in some of her psychiatric medications recently.  CT head shows possible mild communication hydrocephalus.  UA showing moderate leukocyte esterase and significant pyuria.  Pt reports urinary frequency, but denies dysuria, hematuria, fevers or chills.    63yoF hx schizophrenia, Afib on Xarelto, HTN, DM sent from [facility] for ataxia and tremor.  Hx supplemented by chart review as pt is somewhat of a poor historian.  Staff at [facility] reported bilateral tremors of both hands for the past few days associated with gait instability though some parts of chart mention that the unsteady gait is the patient’s baseline.  Medication list includes lithium, fluphenazine, haldol PRN and pt reportedly with adjustment in some of her psychiatric medications recently.  CT head shows possible mild communication hydrocephalus.  UA showing moderate leukocyte esterase and significant pyuria.  Pt reports urinary frequency, but denies dysuria, hematuria, fevers or chills.

## 2023-10-27 NOTE — ED ADULT NURSE REASSESSMENT NOTE - NS ED NURSE REASSESS COMMENT FT1
Pt received laying on stretcher, breathing with ease, no signs of acute distress, denies pain at this time, aid at bedside, will continue to monitor and assess.

## 2023-10-28 LAB
ALBUMIN SERPL ELPH-MCNC: 3.9 G/DL — SIGNIFICANT CHANGE UP (ref 3.3–5.2)
ALBUMIN SERPL ELPH-MCNC: 3.9 G/DL — SIGNIFICANT CHANGE UP (ref 3.3–5.2)
ALP SERPL-CCNC: 145 U/L — HIGH (ref 40–120)
ALP SERPL-CCNC: 145 U/L — HIGH (ref 40–120)
ALT FLD-CCNC: 15 U/L — SIGNIFICANT CHANGE UP
ALT FLD-CCNC: 15 U/L — SIGNIFICANT CHANGE UP
ANION GAP SERPL CALC-SCNC: 12 MMOL/L — SIGNIFICANT CHANGE UP (ref 5–17)
ANION GAP SERPL CALC-SCNC: 12 MMOL/L — SIGNIFICANT CHANGE UP (ref 5–17)
AST SERPL-CCNC: 25 U/L — SIGNIFICANT CHANGE UP
AST SERPL-CCNC: 25 U/L — SIGNIFICANT CHANGE UP
BASOPHILS # BLD AUTO: 0.08 K/UL — SIGNIFICANT CHANGE UP (ref 0–0.2)
BASOPHILS # BLD AUTO: 0.08 K/UL — SIGNIFICANT CHANGE UP (ref 0–0.2)
BASOPHILS NFR BLD AUTO: 0.7 % — SIGNIFICANT CHANGE UP (ref 0–2)
BASOPHILS NFR BLD AUTO: 0.7 % — SIGNIFICANT CHANGE UP (ref 0–2)
BILIRUB SERPL-MCNC: 0.3 MG/DL — LOW (ref 0.4–2)
BILIRUB SERPL-MCNC: 0.3 MG/DL — LOW (ref 0.4–2)
BUN SERPL-MCNC: 10.8 MG/DL — SIGNIFICANT CHANGE UP (ref 8–20)
BUN SERPL-MCNC: 10.8 MG/DL — SIGNIFICANT CHANGE UP (ref 8–20)
CALCIUM SERPL-MCNC: 9.6 MG/DL — SIGNIFICANT CHANGE UP (ref 8.4–10.5)
CALCIUM SERPL-MCNC: 9.6 MG/DL — SIGNIFICANT CHANGE UP (ref 8.4–10.5)
CHLORIDE SERPL-SCNC: 105 MMOL/L — SIGNIFICANT CHANGE UP (ref 96–108)
CHLORIDE SERPL-SCNC: 105 MMOL/L — SIGNIFICANT CHANGE UP (ref 96–108)
CO2 SERPL-SCNC: 20 MMOL/L — LOW (ref 22–29)
CO2 SERPL-SCNC: 20 MMOL/L — LOW (ref 22–29)
CREAT SERPL-MCNC: 0.7 MG/DL — SIGNIFICANT CHANGE UP (ref 0.5–1.3)
CREAT SERPL-MCNC: 0.7 MG/DL — SIGNIFICANT CHANGE UP (ref 0.5–1.3)
EGFR: 96 ML/MIN/1.73M2 — SIGNIFICANT CHANGE UP
EGFR: 96 ML/MIN/1.73M2 — SIGNIFICANT CHANGE UP
EOSINOPHIL # BLD AUTO: 0.25 K/UL — SIGNIFICANT CHANGE UP (ref 0–0.5)
EOSINOPHIL # BLD AUTO: 0.25 K/UL — SIGNIFICANT CHANGE UP (ref 0–0.5)
EOSINOPHIL NFR BLD AUTO: 2.2 % — SIGNIFICANT CHANGE UP (ref 0–6)
EOSINOPHIL NFR BLD AUTO: 2.2 % — SIGNIFICANT CHANGE UP (ref 0–6)
GLUCOSE BLDC GLUCOMTR-MCNC: 119 MG/DL — HIGH (ref 70–99)
GLUCOSE BLDC GLUCOMTR-MCNC: 119 MG/DL — HIGH (ref 70–99)
GLUCOSE BLDC GLUCOMTR-MCNC: 120 MG/DL — HIGH (ref 70–99)
GLUCOSE BLDC GLUCOMTR-MCNC: 120 MG/DL — HIGH (ref 70–99)
GLUCOSE BLDC GLUCOMTR-MCNC: 136 MG/DL — HIGH (ref 70–99)
GLUCOSE BLDC GLUCOMTR-MCNC: 136 MG/DL — HIGH (ref 70–99)
GLUCOSE BLDC GLUCOMTR-MCNC: 146 MG/DL — HIGH (ref 70–99)
GLUCOSE BLDC GLUCOMTR-MCNC: 146 MG/DL — HIGH (ref 70–99)
GLUCOSE SERPL-MCNC: 117 MG/DL — HIGH (ref 70–99)
GLUCOSE SERPL-MCNC: 117 MG/DL — HIGH (ref 70–99)
HCT VFR BLD CALC: 34 % — LOW (ref 34.5–45)
HCT VFR BLD CALC: 34 % — LOW (ref 34.5–45)
HCV AB S/CO SERPL IA: 0.07 S/CO — SIGNIFICANT CHANGE UP (ref 0–0.99)
HCV AB S/CO SERPL IA: 0.07 S/CO — SIGNIFICANT CHANGE UP (ref 0–0.99)
HCV AB SERPL-IMP: SIGNIFICANT CHANGE UP
HCV AB SERPL-IMP: SIGNIFICANT CHANGE UP
HGB BLD-MCNC: 11 G/DL — LOW (ref 11.5–15.5)
HGB BLD-MCNC: 11 G/DL — LOW (ref 11.5–15.5)
IMM GRANULOCYTES NFR BLD AUTO: 0.3 % — SIGNIFICANT CHANGE UP (ref 0–0.9)
IMM GRANULOCYTES NFR BLD AUTO: 0.3 % — SIGNIFICANT CHANGE UP (ref 0–0.9)
LITHIUM SERPL-MCNC: 0.99 MMOL/L — SIGNIFICANT CHANGE UP (ref 0.5–1.5)
LITHIUM SERPL-MCNC: 0.99 MMOL/L — SIGNIFICANT CHANGE UP (ref 0.5–1.5)
LYMPHOCYTES # BLD AUTO: 1.49 K/UL — SIGNIFICANT CHANGE UP (ref 1–3.3)
LYMPHOCYTES # BLD AUTO: 1.49 K/UL — SIGNIFICANT CHANGE UP (ref 1–3.3)
LYMPHOCYTES # BLD AUTO: 12.9 % — LOW (ref 13–44)
LYMPHOCYTES # BLD AUTO: 12.9 % — LOW (ref 13–44)
MCHC RBC-ENTMCNC: 31.3 PG — SIGNIFICANT CHANGE UP (ref 27–34)
MCHC RBC-ENTMCNC: 31.3 PG — SIGNIFICANT CHANGE UP (ref 27–34)
MCHC RBC-ENTMCNC: 32.4 GM/DL — SIGNIFICANT CHANGE UP (ref 32–36)
MCHC RBC-ENTMCNC: 32.4 GM/DL — SIGNIFICANT CHANGE UP (ref 32–36)
MCV RBC AUTO: 96.6 FL — SIGNIFICANT CHANGE UP (ref 80–100)
MCV RBC AUTO: 96.6 FL — SIGNIFICANT CHANGE UP (ref 80–100)
MONOCYTES # BLD AUTO: 1.08 K/UL — HIGH (ref 0–0.9)
MONOCYTES # BLD AUTO: 1.08 K/UL — HIGH (ref 0–0.9)
MONOCYTES NFR BLD AUTO: 9.4 % — SIGNIFICANT CHANGE UP (ref 2–14)
MONOCYTES NFR BLD AUTO: 9.4 % — SIGNIFICANT CHANGE UP (ref 2–14)
MRSA PCR RESULT.: SIGNIFICANT CHANGE UP
MRSA PCR RESULT.: SIGNIFICANT CHANGE UP
NEUTROPHILS # BLD AUTO: 8.61 K/UL — HIGH (ref 1.8–7.4)
NEUTROPHILS # BLD AUTO: 8.61 K/UL — HIGH (ref 1.8–7.4)
NEUTROPHILS NFR BLD AUTO: 74.5 % — SIGNIFICANT CHANGE UP (ref 43–77)
NEUTROPHILS NFR BLD AUTO: 74.5 % — SIGNIFICANT CHANGE UP (ref 43–77)
PLATELET # BLD AUTO: 253 K/UL — SIGNIFICANT CHANGE UP (ref 150–400)
PLATELET # BLD AUTO: 253 K/UL — SIGNIFICANT CHANGE UP (ref 150–400)
POTASSIUM SERPL-MCNC: 4 MMOL/L — SIGNIFICANT CHANGE UP (ref 3.5–5.3)
POTASSIUM SERPL-MCNC: 4 MMOL/L — SIGNIFICANT CHANGE UP (ref 3.5–5.3)
POTASSIUM SERPL-SCNC: 4 MMOL/L — SIGNIFICANT CHANGE UP (ref 3.5–5.3)
POTASSIUM SERPL-SCNC: 4 MMOL/L — SIGNIFICANT CHANGE UP (ref 3.5–5.3)
PROT SERPL-MCNC: 6.2 G/DL — LOW (ref 6.6–8.7)
PROT SERPL-MCNC: 6.2 G/DL — LOW (ref 6.6–8.7)
RBC # BLD: 3.52 M/UL — LOW (ref 3.8–5.2)
RBC # BLD: 3.52 M/UL — LOW (ref 3.8–5.2)
RBC # FLD: 14.5 % — SIGNIFICANT CHANGE UP (ref 10.3–14.5)
RBC # FLD: 14.5 % — SIGNIFICANT CHANGE UP (ref 10.3–14.5)
S AUREUS DNA NOSE QL NAA+PROBE: SIGNIFICANT CHANGE UP
S AUREUS DNA NOSE QL NAA+PROBE: SIGNIFICANT CHANGE UP
SODIUM SERPL-SCNC: 137 MMOL/L — SIGNIFICANT CHANGE UP (ref 135–145)
SODIUM SERPL-SCNC: 137 MMOL/L — SIGNIFICANT CHANGE UP (ref 135–145)
WBC # BLD: 11.55 K/UL — HIGH (ref 3.8–10.5)
WBC # BLD: 11.55 K/UL — HIGH (ref 3.8–10.5)
WBC # FLD AUTO: 11.55 K/UL — HIGH (ref 3.8–10.5)
WBC # FLD AUTO: 11.55 K/UL — HIGH (ref 3.8–10.5)

## 2023-10-28 PROCEDURE — 99223 1ST HOSP IP/OBS HIGH 75: CPT

## 2023-10-28 PROCEDURE — 99232 SBSQ HOSP IP/OBS MODERATE 35: CPT

## 2023-10-28 RX ORDER — BENZTROPINE MESYLATE 1 MG
1 TABLET ORAL
Refills: 0 | Status: DISCONTINUED | OUTPATIENT
Start: 2023-10-28 | End: 2023-11-07

## 2023-10-28 RX ORDER — DEXTROSE 50 % IN WATER 50 %
15 SYRINGE (ML) INTRAVENOUS ONCE
Refills: 0 | Status: DISCONTINUED | OUTPATIENT
Start: 2023-10-28 | End: 2023-11-07

## 2023-10-28 RX ORDER — SODIUM CHLORIDE 9 MG/ML
1000 INJECTION, SOLUTION INTRAVENOUS
Refills: 0 | Status: DISCONTINUED | OUTPATIENT
Start: 2023-10-28 | End: 2023-11-07

## 2023-10-28 RX ORDER — LITHIUM CARBONATE 300 MG/1
300 TABLET, EXTENDED RELEASE ORAL
Refills: 0 | Status: DISCONTINUED | OUTPATIENT
Start: 2023-10-28 | End: 2023-10-28

## 2023-10-28 RX ORDER — DEXTROSE 50 % IN WATER 50 %
25 SYRINGE (ML) INTRAVENOUS ONCE
Refills: 0 | Status: DISCONTINUED | OUTPATIENT
Start: 2023-10-28 | End: 2023-11-07

## 2023-10-28 RX ORDER — FOLIC ACID 0.8 MG
1 TABLET ORAL DAILY
Refills: 0 | Status: DISCONTINUED | OUTPATIENT
Start: 2023-10-28 | End: 2023-11-07

## 2023-10-28 RX ORDER — GLUCAGON INJECTION, SOLUTION 0.5 MG/.1ML
1 INJECTION, SOLUTION SUBCUTANEOUS ONCE
Refills: 0 | Status: DISCONTINUED | OUTPATIENT
Start: 2023-10-28 | End: 2023-11-07

## 2023-10-28 RX ORDER — RIVAROXABAN 15 MG-20MG
20 KIT ORAL
Refills: 0 | Status: DISCONTINUED | OUTPATIENT
Start: 2023-10-28 | End: 2023-11-07

## 2023-10-28 RX ORDER — ATORVASTATIN CALCIUM 80 MG/1
80 TABLET, FILM COATED ORAL AT BEDTIME
Refills: 0 | Status: DISCONTINUED | OUTPATIENT
Start: 2023-10-28 | End: 2023-11-07

## 2023-10-28 RX ORDER — LEVOTHYROXINE SODIUM 125 MCG
50 TABLET ORAL DAILY
Refills: 0 | Status: DISCONTINUED | OUTPATIENT
Start: 2023-10-28 | End: 2023-11-07

## 2023-10-28 RX ORDER — LITHIUM CARBONATE 300 MG/1
300 TABLET, EXTENDED RELEASE ORAL
Refills: 0 | Status: DISCONTINUED | OUTPATIENT
Start: 2023-10-28 | End: 2023-10-30

## 2023-10-28 RX ORDER — FLUPHENAZINE HYDROCHLORIDE 1 MG/1
10 TABLET, FILM COATED ORAL
Refills: 0 | Status: DISCONTINUED | OUTPATIENT
Start: 2023-10-28 | End: 2023-10-30

## 2023-10-28 RX ORDER — DEXTROSE 50 % IN WATER 50 %
12.5 SYRINGE (ML) INTRAVENOUS ONCE
Refills: 0 | Status: DISCONTINUED | OUTPATIENT
Start: 2023-10-28 | End: 2023-11-07

## 2023-10-28 RX ORDER — INSULIN LISPRO 100/ML
VIAL (ML) SUBCUTANEOUS AT BEDTIME
Refills: 0 | Status: DISCONTINUED | OUTPATIENT
Start: 2023-10-28 | End: 2023-11-07

## 2023-10-28 RX ORDER — TRAZODONE HCL 50 MG
50 TABLET ORAL AT BEDTIME
Refills: 0 | Status: DISCONTINUED | OUTPATIENT
Start: 2023-10-28 | End: 2023-11-07

## 2023-10-28 RX ORDER — CEFTRIAXONE 500 MG/1
1000 INJECTION, POWDER, FOR SOLUTION INTRAMUSCULAR; INTRAVENOUS EVERY 24 HOURS
Refills: 0 | Status: DISCONTINUED | OUTPATIENT
Start: 2023-10-28 | End: 2023-10-31

## 2023-10-28 RX ORDER — CHLORHEXIDINE GLUCONATE 213 G/1000ML
1 SOLUTION TOPICAL
Refills: 0 | Status: DISCONTINUED | OUTPATIENT
Start: 2023-10-28 | End: 2023-11-07

## 2023-10-28 RX ORDER — INSULIN LISPRO 100/ML
VIAL (ML) SUBCUTANEOUS
Refills: 0 | Status: DISCONTINUED | OUTPATIENT
Start: 2023-10-28 | End: 2023-11-07

## 2023-10-28 RX ORDER — METOPROLOL TARTRATE 50 MG
50 TABLET ORAL DAILY
Refills: 0 | Status: DISCONTINUED | OUTPATIENT
Start: 2023-10-28 | End: 2023-11-07

## 2023-10-28 RX ADMIN — CHLORHEXIDINE GLUCONATE 1 APPLICATION(S): 213 SOLUTION TOPICAL at 17:14

## 2023-10-28 RX ADMIN — Medication 1 MILLIGRAM(S): at 06:20

## 2023-10-28 RX ADMIN — Medication 50 MICROGRAM(S): at 06:20

## 2023-10-28 RX ADMIN — CEFTRIAXONE 1000 MILLIGRAM(S): 500 INJECTION, POWDER, FOR SOLUTION INTRAMUSCULAR; INTRAVENOUS at 06:17

## 2023-10-28 RX ADMIN — RIVAROXABAN 20 MILLIGRAM(S): KIT at 17:13

## 2023-10-28 RX ADMIN — ATORVASTATIN CALCIUM 80 MILLIGRAM(S): 80 TABLET, FILM COATED ORAL at 22:30

## 2023-10-28 RX ADMIN — Medication 50 MILLIGRAM(S): at 06:20

## 2023-10-28 RX ADMIN — Medication 1 MILLIGRAM(S): at 17:13

## 2023-10-28 RX ADMIN — FLUPHENAZINE HYDROCHLORIDE 10 MILLIGRAM(S): 1 TABLET, FILM COATED ORAL at 06:20

## 2023-10-28 RX ADMIN — Medication 1 MILLIGRAM(S): at 12:02

## 2023-10-28 RX ADMIN — LITHIUM CARBONATE 300 MILLIGRAM(S): 300 TABLET, EXTENDED RELEASE ORAL at 17:13

## 2023-10-28 RX ADMIN — FLUPHENAZINE HYDROCHLORIDE 10 MILLIGRAM(S): 1 TABLET, FILM COATED ORAL at 17:13

## 2023-10-28 NOTE — PATIENT PROFILE ADULT - FUNCTIONAL ASSESSMENT - BASIC MOBILITY 6.
4-calculated by average/Not able to assess (calculate score using [facility] averaging method)

## 2023-10-28 NOTE — PATIENT PROFILE ADULT - FALL HARM RISK - HARM RISK INTERVENTIONS
Assistance with ambulation/Assistance OOB with selected safe patient handling equipment/Communicate Risk of Fall with Harm to all staff/Discuss with provider need for PT consult/Monitor gait and stability/Reinforce activity limits and safety measures with patient and family/Tailored Fall Risk Interventions/Visual Cue: Yellow wristband and red socks/Bed in lowest position, wheels locked, appropriate side rails in place/Call bell, personal items and telephone in reach/Instruct patient to call for assistance before getting out of bed or chair/Non-slip footwear when patient is out of bed/[place] to call system/Physically safe environment - no spills, clutter or unnecessary equipment/Purposeful Proactive Rounding/Room/bathroom lighting operational, light cord in reach

## 2023-10-28 NOTE — PATIENT PROFILE ADULT - FALL HARM RISK - CONCLUSION
Fall with Harm Risk Render Note In Bullet Format When Appropriate: No Show Applicator Variable?: Yes Post-Care Instructions: I reviewed with the patient in detail post-care instructions. Patient is to wear sunprotection, and avoid picking at any of the treated lesions. Pt may apply Vaseline to crusted or scabbing areas. Duration Of Freeze Thaw-Cycle (Seconds): 7 Detail Level: Simple Number Of Freeze-Thaw Cycles: 2 freeze-thaw cycles Consent: The patient's consent was obtained including but not limited to risks of crusting, scabbing, blistering, scarring, darker or lighter pigmentary change, recurrence, incomplete removal and infection.

## 2023-10-28 NOTE — CONSULT NOTE ADULT - ASSESSMENT
The patient is a 63y Female with schizophrenia. Now with increased tremor and gait difficulty in setting of urinary tract infection.    Tremor/gait difficulty.   Likely secondary to neuroleptic medications.   Has extrapyramidal findings on examination.  Tremor can be seen with lithium as well, although typically does not give cogwheeling.   The symptoms may have been exacerbated by the urinary tract infection.    There is ventriculomegaly on CT likely related to atrophy and ischemic white matter disease.   She was seen by neurosurgery and I agree that her presentation is not suggestive of Normal Pressure hydrocephalus.    UTI.  Antibiotics per medicine.     Case discussed with Dr Shultz.

## 2023-10-28 NOTE — PROGRESS NOTE ADULT - ASSESSMENT
{\rtf1\sjlvuk08778\ansi\basscza4783\ftnbj\uc1\deff0  {\fonttbl{\f0 \fnil Segoe UI;}{\f1 \fnil \fcharset0 Segoe UI;}{\f2 \fnil Times New Waqas;}}  {\colortbl ;\wpy896\qzuwd073\nevi572 ;\red0\green0\blue0 ;\red0\green0\ljwy941 ;\red0\green0\blue0 ;}  {\stylesheet{\f0\fs20 Normal;}{\cs1 Default Paragraph Font;}{\cs2\f0\fs16 Line Number;}{\cs3\f2\fs24\ul\cf3 Hyperlink;}}  {\*\revtbl{Unknown;}}  \ydjlqc70208\ieknsn60442\kbiij9732\jhivb0910\vskst6530\awodw9838\epxvuze098\svnxwcu207\nogrowautofit\qzeuue480\formshade\nofeaturethrottle1\dntblnsbdb\fet4\aendnotes\aftnnrlc\pgbrdrhead\pgbrdrfoot  \sectd\tygnqf53520\tyhyml70405\guttersxn0\eqisoyxq2501\geeevfkt1934\lmoqkwty9468\ijgnyhpo8088\emzdjkx844\tgakxue815\sbkpage\pgncont\pgndec  \plain\plain\f0\fs24\ql\plain\f0\fs24\plain\f0\fs20\ilxv9438\hich\f0\dbch\f0\loch\f0\fs20 63yoF hx schizophrenia, Afib on Xarelto, HTN, DM sent form [facility] for ataxia and tremor\par  \par  *Ataxia and tremor likely secondary to neuroleptic extrapyramidal finding \par  CT head showing 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\par  Neurology and neurosurgery consult appreciated\par  recommends outpatient MRI in 2-4 weeks for mild communicating hydrocephalus \par  Neuro checks q4hr\par  PT eval\par  Litheium level 0.99, restart home lithium dose \par  \par  *UTI\par  Reports urinary frequency, +UA, mild leukocytosis noted\par  cont Rocephin \par  Urine culture pending\par  \par  *Hydrocephalus\par  On CT head as above\par  Reported on PMHx on transfer document\par  NSGY eval as above, no urgent intervention, outpatient MRI advised\par  \par  *Hx schizophrenia\par  restarted lithium given normal level \par  Fluphenazine 10mg BID\par  Cogentin 1mg BID\par  Trazadone 50mg PRN insomnia\par  Prescribed PO haldol PRN at facility, can order if develops agitation\par  \par  *Hx Afib\par  Xarelto 20mg daily\par  Metoprolol 50mg daily \par  \par  *Hx HLD/HTN\par  Atorvastatin 80mg daily\par  On metoprolol as above\par  \par  *Hx DM\par  Hold metformin,\par  ISS\par  \par  *Hx hypothyroidism\par  Levothyroxine \par  \par  Prophylactic measure\par  On Xarelto \par  \par  \par  Dispo: pending PT consult \par  \plain\f1\fs20\rkas4925\hich\f1\dbch\f1\loch\f1\cf2\fs20\strike\plain\f0\fs20\sfel1462\hich\f0\dbch\f0\loch\f0\fs20\par  }

## 2023-10-28 NOTE — CONSULT NOTE ADULT - SUBJECTIVE AND OBJECTIVE BOX
[facility]                                        Neurology at [place]                                  Lore Love, & Gerald                                      [address]                                           [place], [place], [address]                                                [phone]        CC: tremor and unsteady gait    HISTORY:  The patient is a 63y Female with schizophrenia now sent from [facility] secondary to tremors and unsteady gait.   She reports that this has been going on for the past few weeks.   She denies urinary incontinence.   She was found to have urinary tract infection.    PAST MEDICAL & SURGICAL HISTORY:  Schizophrenia  Hypothyroidism  HTN (hypertension)  Afib  HLD (hyperlipidemia)  No significant past surgical history    MEDICATION PRIOR TO ADMISSION:  · 	Cogentin 1 mg oral tablet: Last Dose Taken:  , 1 tab(s) orally 2 times a day  · 	Haldol 5 mg oral tablet: Last Dose Taken:  , 1 tab(s) orally every 8 hours as needed for  agitation  · 	Tylenol 325 mg oral tablet: Last Dose Taken:  , 3 tab(s) orally every 8 hours as needed for  pain  · 	lithium 300 mg oral capsule: Last Dose Taken:  , 1 cap(s) orally 2 times a day  · 	Toprol-XL 50 mg oral tablet, extended release: Last Dose Taken:  , 1 tab(s) orally once a day  · 	Prolixin 5 mg oral tablet: Last Dose Taken:  , 2 tab(s) orally 2 times a day  · 	folic acid 1 mg oral tablet: Last Dose Taken:  , 1 tab(s) orally once a day  · 	Synthroid 50 mcg (0.05 mg) oral tablet: Last Dose Taken:  , 1 tab(s) orally once a day  · 	traZODone 50 mg oral tablet:  1 tab(s) orally once a day (at bedtime) as needed for  insomnia  · 	Glucophage 850 mg oral tablet: Last Dose Taken:  , 1 tab(s) orally once a day  · 	Lipitor 80 mg oral tablet: Last Dose Taken:  , 1 tab(s) orally once a day (at bedtime)  · 	Admelog 100 units/mL injectable solution: Last Dose Taken:  , injectable sliding scale  · 	Mycostatin 100,000 units/g topical powder: Apply topically to affected area 2 times a day  · 	Xarelto 10 mg oral tablet: Last Dose Taken:  , 2 tab(s) orally once a day    MEDICATIONS  (STANDING):  atorvastatin 80 milliGRAM(s) Oral at bedtime  benztropine 1 milliGRAM(s) Oral two times a day  cefTRIAXone Injectable. 1000 milliGRAM(s) IV Push every 24 hours  dextrose 5%. 1000 milliLiter(s) (100 mL/Hr) IV Continuous <Continuous>  dextrose 5%. 1000 milliLiter(s) (50 mL/Hr) IV Continuous <Continuous>  fluPHENAZine 10 milliGRAM(s) Oral two times a day  folic acid 1 milliGRAM(s) Oral daily  glucagon  Injectable 1 milliGRAM(s) IntraMuscular once  insulin lispro (ADMELOG) corrective regimen sliding scale   SubCutaneous three times a day before meals  insulin lispro (ADMELOG) corrective regimen sliding scale   SubCutaneous at bedtime  levothyroxine 50 MICROGram(s) Oral daily  metoprolol succinate ER 50 milliGRAM(s) Oral daily  rivaroxaban 20 milliGRAM(s) Oral with dinner    MEDICATIONS  (PRN):  acetaminophen     Tablet .. 650 milliGRAM(s) Oral every 6 hours PRN Temp greater or equal to 38C (100.4F), Mild Pain (1 - 3), Moderate Pain (4 - 6)  dextrose Oral Gel 15 Gram(s) Oral once PRN Blood Glucose LESS THAN 70 milliGRAM(s)/deciliter  ondansetron Injectable 4 milliGRAM(s) IV Push every 6 hours PRN Nausea and/or Vomiting  traZODone 50 milliGRAM(s) Oral at bedtime PRN for insomnia    Allergies  No Known Allergies    SOCIAL HISTORY:  Non smoker.     FAMILY HISTORY:  FHx: hypertension (Mother)  No known family history of stroke.     ROS:  Constitutional: The patient denies fevers or weight changes.  Neuro: As per HPI.  Eyes: Denies blurry vision.  Ears/nose/throat: Denies Tinnitus.   Cardiac: Denies chest pain. Denies palpitations.  Respiratory: Denies shortness of breath.  GI: Denies abdominal pain, nausea, or vomiting.  : Denies change in urinary pattern.  Integumentary: Denies rash.  Psych: Denies recent mood changes.  Heme: denies easy bleeding/bruising.    Exam:  Vital Signs Last 24 Hrs  T(C): 36.8 (28 Oct 2023 08:58), Max: 37.4 (28 Oct 2023 01:56)  T(F): 98.3 (28 Oct 2023 08:58), Max: 99.3 (28 Oct 2023 01:56)  HR: 68 (28 Oct 2023 08:58) (65 - 76)  BP: 143/77 (28 Oct 2023 08:58) (126/97 - 155/60)  BP(mean): 107 (28 Oct 2023 01:56) (107 - 107)  RR: 18 (28 Oct 2023 08:58) (16 - 19)  SpO2: 96% (28 Oct 2023 08:58) (96% - 98%)    Parameters below as of 28 Oct 2023 08:58  Patient On (Oxygen Delivery Method): room air    General: NAD.   Carotid bruits absent.     Mental status: The patient is awake, alert, and fully oriented. There is no aphasia. Attention span is normal. Patient is aware of current events.     Cranial nerves: There is no papilledema. Pupils react symmetrically to light. There is no visual field deficit to confrontation. Extraocular motion is full with no nystagmus.  Facial sensation is intact. Facial musculature is symmetric. Palate elevates symmetrically. Tongue is midline.    Motor: There is normal bulk and tone. There is tremor of the upper extremities. there is cogwheeling of the wrists with reinforcement.  Strength is 5/5 in the right arm and leg.   Strength is 5/5 in the left arm and leg.    Sensation: Intact to light touch and pin. There is no extinction to double simultaneous stimulation.    Reflexes: 2+ throughout and plantar responses are flexor.    Cerebellar: There is no dysmetria on finger to nose testing.    LABS:                         11.0   11.55 )-----------( 253      ( 28 Oct 2023 05:20 )             34.0       10-28    137  |  105  |  10.8  ----------------------------<  117<H>  4.0   |  20.0<L>  |  0.70    Ca    9.6      28 Oct 2023 05:20    TPro  6.2<L>  /  Alb  3.9  /  TBili  0.3<L>  /  DBili  x   /  AST  25  /  ALT  15  /  AlkPhos  145<H>  10-28    RADIOLOGY   CT head images reviewed (and concur with report): There is no acute pathology. There is extensive chronic ischemic change in the white matter as well as atrophy and ventriculomegaly.

## 2023-10-29 ENCOUNTER — TRANSCRIPTION ENCOUNTER (OUTPATIENT)
Age: 63
End: 2023-10-29

## 2023-10-29 LAB
ANION GAP SERPL CALC-SCNC: 16 MMOL/L — SIGNIFICANT CHANGE UP (ref 5–17)
ANION GAP SERPL CALC-SCNC: 16 MMOL/L — SIGNIFICANT CHANGE UP (ref 5–17)
BUN SERPL-MCNC: 12.6 MG/DL — SIGNIFICANT CHANGE UP (ref 8–20)
BUN SERPL-MCNC: 12.6 MG/DL — SIGNIFICANT CHANGE UP (ref 8–20)
CALCIUM SERPL-MCNC: 9.9 MG/DL — SIGNIFICANT CHANGE UP (ref 8.4–10.5)
CALCIUM SERPL-MCNC: 9.9 MG/DL — SIGNIFICANT CHANGE UP (ref 8.4–10.5)
CHLORIDE SERPL-SCNC: 107 MMOL/L — SIGNIFICANT CHANGE UP (ref 96–108)
CHLORIDE SERPL-SCNC: 107 MMOL/L — SIGNIFICANT CHANGE UP (ref 96–108)
CO2 SERPL-SCNC: 19 MMOL/L — LOW (ref 22–29)
CO2 SERPL-SCNC: 19 MMOL/L — LOW (ref 22–29)
CREAT SERPL-MCNC: 0.6 MG/DL — SIGNIFICANT CHANGE UP (ref 0.5–1.3)
CREAT SERPL-MCNC: 0.6 MG/DL — SIGNIFICANT CHANGE UP (ref 0.5–1.3)
EGFR: 100 ML/MIN/1.73M2 — SIGNIFICANT CHANGE UP
EGFR: 100 ML/MIN/1.73M2 — SIGNIFICANT CHANGE UP
GLUCOSE BLDC GLUCOMTR-MCNC: 108 MG/DL — HIGH (ref 70–99)
GLUCOSE BLDC GLUCOMTR-MCNC: 108 MG/DL — HIGH (ref 70–99)
GLUCOSE BLDC GLUCOMTR-MCNC: 111 MG/DL — HIGH (ref 70–99)
GLUCOSE BLDC GLUCOMTR-MCNC: 112 MG/DL — HIGH (ref 70–99)
GLUCOSE BLDC GLUCOMTR-MCNC: 112 MG/DL — HIGH (ref 70–99)
GLUCOSE SERPL-MCNC: 108 MG/DL — HIGH (ref 70–99)
GLUCOSE SERPL-MCNC: 108 MG/DL — HIGH (ref 70–99)
HCT VFR BLD CALC: 34.9 % — SIGNIFICANT CHANGE UP (ref 34.5–45)
HCT VFR BLD CALC: 34.9 % — SIGNIFICANT CHANGE UP (ref 34.5–45)
HGB BLD-MCNC: 11.3 G/DL — LOW (ref 11.5–15.5)
HGB BLD-MCNC: 11.3 G/DL — LOW (ref 11.5–15.5)
LITHIUM SERPL-MCNC: 0.43 MMOL/L — LOW (ref 0.5–1.5)
LITHIUM SERPL-MCNC: 0.43 MMOL/L — LOW (ref 0.5–1.5)
MCHC RBC-ENTMCNC: 30.1 PG — SIGNIFICANT CHANGE UP (ref 27–34)
MCHC RBC-ENTMCNC: 30.1 PG — SIGNIFICANT CHANGE UP (ref 27–34)
MCHC RBC-ENTMCNC: 32.4 GM/DL — SIGNIFICANT CHANGE UP (ref 32–36)
MCHC RBC-ENTMCNC: 32.4 GM/DL — SIGNIFICANT CHANGE UP (ref 32–36)
MCV RBC AUTO: 93.1 FL — SIGNIFICANT CHANGE UP (ref 80–100)
MCV RBC AUTO: 93.1 FL — SIGNIFICANT CHANGE UP (ref 80–100)
PLATELET # BLD AUTO: 273 K/UL — SIGNIFICANT CHANGE UP (ref 150–400)
PLATELET # BLD AUTO: 273 K/UL — SIGNIFICANT CHANGE UP (ref 150–400)
POTASSIUM SERPL-MCNC: 3.4 MMOL/L — LOW (ref 3.5–5.3)
POTASSIUM SERPL-MCNC: 3.4 MMOL/L — LOW (ref 3.5–5.3)
POTASSIUM SERPL-SCNC: 3.4 MMOL/L — LOW (ref 3.5–5.3)
POTASSIUM SERPL-SCNC: 3.4 MMOL/L — LOW (ref 3.5–5.3)
RBC # BLD: 3.75 M/UL — LOW (ref 3.8–5.2)
RBC # BLD: 3.75 M/UL — LOW (ref 3.8–5.2)
RBC # FLD: 14.5 % — SIGNIFICANT CHANGE UP (ref 10.3–14.5)
RBC # FLD: 14.5 % — SIGNIFICANT CHANGE UP (ref 10.3–14.5)
SODIUM SERPL-SCNC: 142 MMOL/L — SIGNIFICANT CHANGE UP (ref 135–145)
SODIUM SERPL-SCNC: 142 MMOL/L — SIGNIFICANT CHANGE UP (ref 135–145)
WBC # BLD: 11.7 K/UL — HIGH (ref 3.8–10.5)
WBC # BLD: 11.7 K/UL — HIGH (ref 3.8–10.5)
WBC # FLD AUTO: 11.7 K/UL — HIGH (ref 3.8–10.5)
WBC # FLD AUTO: 11.7 K/UL — HIGH (ref 3.8–10.5)

## 2023-10-29 PROCEDURE — 99233 SBSQ HOSP IP/OBS HIGH 50: CPT

## 2023-10-29 PROCEDURE — 99232 SBSQ HOSP IP/OBS MODERATE 35: CPT

## 2023-10-29 RX ORDER — POTASSIUM CHLORIDE 20 MEQ
40 PACKET (EA) ORAL ONCE
Refills: 0 | Status: COMPLETED | OUTPATIENT
Start: 2023-10-29 | End: 2023-10-29

## 2023-10-29 RX ADMIN — FLUPHENAZINE HYDROCHLORIDE 10 MILLIGRAM(S): 1 TABLET, FILM COATED ORAL at 05:09

## 2023-10-29 RX ADMIN — FLUPHENAZINE HYDROCHLORIDE 10 MILLIGRAM(S): 1 TABLET, FILM COATED ORAL at 17:35

## 2023-10-29 RX ADMIN — Medication 50 MICROGRAM(S): at 05:10

## 2023-10-29 RX ADMIN — CEFTRIAXONE 1000 MILLIGRAM(S): 500 INJECTION, POWDER, FOR SOLUTION INTRAMUSCULAR; INTRAVENOUS at 06:14

## 2023-10-29 RX ADMIN — CHLORHEXIDINE GLUCONATE 1 APPLICATION(S): 213 SOLUTION TOPICAL at 05:10

## 2023-10-29 RX ADMIN — Medication 1 MILLIGRAM(S): at 05:09

## 2023-10-29 RX ADMIN — Medication 50 MILLIGRAM(S): at 05:10

## 2023-10-29 RX ADMIN — RIVAROXABAN 20 MILLIGRAM(S): KIT at 17:35

## 2023-10-29 RX ADMIN — Medication 1 MILLIGRAM(S): at 11:29

## 2023-10-29 RX ADMIN — ATORVASTATIN CALCIUM 80 MILLIGRAM(S): 80 TABLET, FILM COATED ORAL at 22:41

## 2023-10-29 RX ADMIN — Medication 1 MILLIGRAM(S): at 17:35

## 2023-10-29 RX ADMIN — LITHIUM CARBONATE 300 MILLIGRAM(S): 300 TABLET, EXTENDED RELEASE ORAL at 05:10

## 2023-10-29 RX ADMIN — Medication 40 MILLIEQUIVALENT(S): at 11:28

## 2023-10-29 RX ADMIN — LITHIUM CARBONATE 300 MILLIGRAM(S): 300 TABLET, EXTENDED RELEASE ORAL at 18:00

## 2023-10-29 NOTE — DISCHARGE NOTE PROVIDER - HOSPITAL COURSE
63yoF hx schizophrenia, Afib on Xarelto, HTN, DM sent form [facility] for ataxia and tremor.  CT head showing 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. Neurology and neurosurgery consult appreciated. Recommended outpatient MRI in 2-4 weeks for mild communicating hydrocephalus. Neuro believed symptoms likely secondary to neuroleptic medications, pt did have extrapyramidal findings on examination. Pt was also noted with UTI, +UA, mild leukocytosis, started on rocephin.      Patient is a 63 year old female with hx schizophrenia, Afib on Xarelto, HTN, DM sent form [facility] for ataxia and tremor due to neuroleptic drugs. CT with mild communicating hydrocephalus which is not consistent with patient's current presentation. Sen by NSG, recommended o/p MRI in 2-4 weeks. Patient was started on olanzapine with improvement; now awake and alert likely at baseline. Additionally, lithium and fluphenazine was held per psych.  Ataxia and tremor likely secondary to neuroleptic extrapyramidal finding from antipsychotic medications. Also noted with Enterococcus UTI treated, and hypernatremia. Now improved. Cleared by psych for rehab placement with PT re-eval recommending [facility] placement. Patient cleared for DC with psych / NSG follow up.              Patient is a 63 year old female with hx schizophrenia, Afib on Xarelto, HTN, DM sent form [facility] for ataxia and tremor likely due to neuroleptic drugs. CT head with mild communicating hydrocephalus which is not consistent with patient's current presentation. Seen by NSG, recommended o/p MRI in 2-4 weeks with neurology in agreement for outpatient further workup of hydrocephalus if clinical picture warrants at that time. Patient was started on olanzapine with improvement; now awake and alert likely at baseline. Additionally, lithium and fluphenazine were held per psych.  Ataxia and tremor likely secondary to neuroleptic extrapyramidal finding from antipsychotic medications. Also noted with Enterococcus UTI treated, and hypernatremia. Now improved. Cleared by psych for rehab placement with PT re-eval recommending [facility] placement. Patient cleared for DC with psych / NSG follow up. Patient is currently medically and hemodynamically stable for DC to [facility]. Patient will not need more than 120 days of rehab.

## 2023-10-29 NOTE — DISCHARGE NOTE PROVIDER - NSDCCPCAREPLAN_GEN_ALL_CORE_FT
PRINCIPAL DISCHARGE DIAGNOSIS  Diagnosis: Unsteady gait  Assessment and Plan of Treatment: [facility] on discharge      SECONDARY DISCHARGE DIAGNOSES  Diagnosis: Acute UTI  Assessment and Plan of Treatment: Complete course of antibiotics     PRINCIPAL DISCHARGE DIAGNOSIS  Diagnosis: Unsteady gait  Assessment and Plan of Treatment: [facility] on discharge      SECONDARY DISCHARGE DIAGNOSES  Diagnosis: Acute UTI  Assessment and Plan of Treatment: Complete course of antibiotics    Diagnosis: H/O schizophrenia  Assessment and Plan of Treatment: Continue with Lithium, Fluphenzazine, Cogentin and Trazadone      Diagnosis: Hydrocephalus  Assessment and Plan of Treatment: Found on CT scan  Reported on PMHx on transfer document  ALDEN vasquez as above, no urgent intervention, outpatient MRI advised in 2-4 weeks     PRINCIPAL DISCHARGE DIAGNOSIS  Diagnosis: Unsteady gait  Assessment and Plan of Treatment: [facility] on discharge      SECONDARY DISCHARGE DIAGNOSES  Diagnosis: Acute UTI  Assessment and Plan of Treatment: Completed course of antibiotics    Diagnosis: H/O schizophrenia  Assessment and Plan of Treatment: Continue with  Cogentin and Trazadone  Lithium and fluphenazine discontinued per psych      Diagnosis: Hydrocephalus  Assessment and Plan of Treatment: Found on CT scan  Reported on PMHx on transfer document  ALDEN vasquez as above, no urgent intervention, outpatient MRI advised in 2-4 weeks     PRINCIPAL DISCHARGE DIAGNOSIS  Diagnosis: Tardive dyskinesia  Assessment and Plan of Treatment: In setting of psychiatric medications, adjusted per Psych while inpatient   continue olanzapine as directed   Follow up with outpatient psych      SECONDARY DISCHARGE DIAGNOSES  Diagnosis: Acute UTI  Assessment and Plan of Treatment: Completed course of antibiotics    Diagnosis: H/O schizophrenia  Assessment and Plan of Treatment: Continue with  Cogentin and Trazadone  Lithium and fluphenazine discontinued per psych  Will now continue on olanzapine       Diagnosis: Delirium  Assessment and Plan of Treatment: improved while in hospital   encourage normal day night cycle    Diagnosis: Hydrocephalus  Assessment and Plan of Treatment: Found on CT scan  Reported on PMHx on transfer document  NSGY eval as above, no urgent intervention, outpatient MRI advised in 2-4 weeks with further testing if clinical symptoms are noted   Neurology follow up as well, with similar recs    Diagnosis: Atrial fibrillation  Assessment and Plan of Treatment: continue toprol / xarelto    Diagnosis: HLD (hyperlipidemia)  Assessment and Plan of Treatment: continue atorvastatin    Diagnosis: DM (diabetes mellitus)  Assessment and Plan of Treatment: continue home regiment    Diagnosis: Hypothyroidism  Assessment and Plan of Treatment: continue home regiment

## 2023-10-29 NOTE — DISCHARGE NOTE PROVIDER - CARE PROVIDERS DIRECT ADDRESSES
,[email or web address]

## 2023-10-29 NOTE — DISCHARGE NOTE PROVIDER - ATTENDING DISCHARGE PHYSICAL EXAMINATION:
T(C): 36.8 (11-07-23 @ 10:47), Max: 36.8 (11-07-23 @ 10:47)  HR: 80 (11-07-23 @ 04:45) (75 - 80)  BP: 132/71 (11-07-23 @ 04:45) (132/71 - 145/82)  RR: 18 (11-07-23 @ 04:45) (18 - 19)  SpO2: 97% (11-07-23 @ 04:45) (97% - 97%)    GENERAL: elderly female, alert /awake oriented x3, following all commands   HEAD:  Atraumatic, Normocephalic  EYES: conjunctiva and sclera clear  ENMT: Moist mucous membranes  NECK: Supple   NERVOUS SYSTEM:  mild improving UE tremor noted   CHEST/LUNG: coarse breath sounds  HEART: Regular rate and rhythm; + S1/S2  ABDOMEN: Soft, Nontender, Nondistended; Bowel sounds present  EXTREMITIES:  no pedal edema

## 2023-10-29 NOTE — DISCHARGE NOTE PROVIDER - PROVIDER TOKENS
PROVIDER:[TOKEN:[[id number]],FOLLOWUP:[1 week]] PROVIDER:[TOKEN:[[id number]],FOLLOWUP:[1 week]],PROVIDER:[TOKEN:[[id number]],FOLLOWUP:[2 weeks]]

## 2023-10-29 NOTE — PROGRESS NOTE ADULT - ASSESSMENT
63yoF hx schizophrenia, Afib on Xarelto, HTN, DM sent form [facility] for ataxia and tremor.    *Ataxia and tremor likely secondary to neuroleptic extrapyramidal finding from psych medications  CT head showing 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  recommends outpatient MRI in 2-4 weeks for mild communicating hydrocephalus   PT eval - [facility]  Fall precautions  Neurology and neurosurgery consult appreciated    *Mild Communicating Hydrocephalus  CT head reviewed  Reported on past medical history on transfer paperwork  NSGY eval as above, no urgent intervention, outpatient MRI advised  Neuro recs appreciated    *Metabolic Encephalopathy due to UTI  UA positive; f/u urine culture  cont Rocephin   Check b12, TSH and ammonia levels  CT head as above  Check lithium and cogentin levels  Psych evaluation for med review    *Hx schizophrenia  AAO x 1; unclear baseline  Continue Lithium, Fluphenazine, Cogentin, Trazadone PRN insomnia  Prescribed PO haldol PRN at facility but has not required it  Check lithium levels  Psych evaluation given concerns for EPS from antipsychotics    *Hx Afib  Continue Xarelto and Metoprolol     *Hx HLD   Continue Atorvastatin      *Hx HTN  Continue metoprolol      *Hx DM  continue to hold metformin  ISS  ADA diet    *Hx hypothyroidism  Check TSH  Continue Levothyroxine     *Hypokalemia  -supplement    DVT ppx- Xarelto    Dispo: Psych evaluation for med evaluation. SW to start insurance auth for [facility] placement.

## 2023-10-29 NOTE — DISCHARGE NOTE PROVIDER - DETAILS OF MALNUTRITION DIAGNOSIS/DIAGNOSES
This patient has been assessed with a concern for Malnutrition and was treated during this hospitalization for the following Nutrition diagnosis/diagnoses:     -  11/03/2023: Moderate protein-calorie malnutrition

## 2023-10-29 NOTE — DISCHARGE NOTE PROVIDER - CARE PROVIDER_API CALL
Ramirez Bullock  Neurology  [address] [place] [address], [address]  [place] [address]  Phone: [phone]  Fax: [phone]  Follow Up Time: 1 week   Ramirez Bullock  Neurology  [address], [address]  [place] [address]  Phone: [phone]  Fax: [phone]  Follow Up Time: 1 week    Reginald Pappas  Neurosurgery  [address] [place] [address]  Phone: [phone]  Fax: [phone]  Follow Up Time: 2 weeks

## 2023-10-29 NOTE — DISCHARGE NOTE PROVIDER - NSDCMRMEDTOKEN_GEN_ALL_CORE_FT
Admelog 100 units/mL injectable solution: injectable sliding scale  Cogentin 1 mg oral tablet: 1 tab(s) orally 2 times a day  Cogentin 1 mg oral tablet: 1 tab(s) orally every 8 hours as needed for EPS symptoms  folic acid 1 mg oral tablet: 1 tab(s) orally once a day  Glucophage 850 mg oral tablet: 1 tab(s) orally once a day  Haldol 5 mg oral tablet: 1 tab(s) orally every 8 hours as needed for  agitation  Lipitor 80 mg oral tablet: 1 tab(s) orally once a day (at bedtime)  lithium 300 mg oral capsule: 1 cap(s) orally 2 times a day  Mycostatin 100,000 units/g topical powder: Apply topically to affected area 2 times a day  Prolixin 5 mg oral tablet: 2 tab(s) orally 2 times a day  Synthroid 50 mcg (0.05 mg) oral tablet: 1 tab(s) orally once a day  Toprol-XL 50 mg oral tablet, extended release: 1 tab(s) orally once a day  traZODone 50 mg oral tablet: 1 tab(s) orally once a day (at bedtime) as needed for  insomnia  Tylenol 325 mg oral tablet: 3 tab(s) orally every 8 hours as needed for  pain  Xarelto 10 mg oral tablet: 2 tab(s) orally once a day   Admelog 100 units/mL injectable solution: injectable 3 times a day sliding scale  amLODIPine 5 mg oral tablet: 1 tab(s) orally once a day  Cogentin 1 mg oral tablet: 1 tab(s) orally 2 times a day  Cogentin 1 mg oral tablet: 1 tab(s) orally every 8 hours as needed for EPS symptoms  folic acid 1 mg oral tablet: 1 tab(s) orally once a day  Glucophage 850 mg oral tablet: 1 tab(s) orally once a day  Lipitor 80 mg oral tablet: 1 tab(s) orally once a day (at bedtime)  OLANZapine: 2.5 milligram(s) orally 2 times a day  Synthroid 50 mcg (0.05 mg) oral tablet: 1 tab(s) orally once a day  Toprol-XL 50 mg oral tablet, extended release: 1 tab(s) orally once a day  traZODone 50 mg oral tablet: 1 tab(s) orally once a day (at bedtime) as needed for  insomnia  Tylenol 325 mg oral tablet: 3 tab(s) orally every 8 hours as needed for  pain  Xarelto 10 mg oral tablet: 2 tab(s) orally once a day

## 2023-10-29 NOTE — PROGRESS NOTE ADULT - ASSESSMENT
63y Female with schizophrenia. Now with increased tremor and gait difficulty in setting of urinary tract infection.    Tremor/gait difficulty.   Likely secondary to neuroleptic medications.   Has extrapyramidal findings on examination.  Tremor can be seen with lithium as well, although typically does not give cogwheeling.   The symptoms may have been exacerbated by the urinary tract infection.    There is ventriculomegaly on CT likely related to atrophy and ischemic white matter disease.   She was seen by neurosurgery and I agree that her presentation is not suggestive of Normal Pressure hydrocephalus.    UTI.  Antibiotics per medicine.     Mobilize with physical therapy.

## 2023-10-30 LAB
AMMONIA BLD-MCNC: 21 UMOL/L — SIGNIFICANT CHANGE UP (ref 11–55)
AMMONIA BLD-MCNC: 21 UMOL/L — SIGNIFICANT CHANGE UP (ref 11–55)
ANION GAP SERPL CALC-SCNC: 18 MMOL/L — HIGH (ref 5–17)
ANION GAP SERPL CALC-SCNC: 18 MMOL/L — HIGH (ref 5–17)
BASOPHILS # BLD AUTO: 0.07 K/UL — SIGNIFICANT CHANGE UP (ref 0–0.2)
BASOPHILS # BLD AUTO: 0.07 K/UL — SIGNIFICANT CHANGE UP (ref 0–0.2)
BASOPHILS NFR BLD AUTO: 0.5 % — SIGNIFICANT CHANGE UP (ref 0–2)
BASOPHILS NFR BLD AUTO: 0.5 % — SIGNIFICANT CHANGE UP (ref 0–2)
BUN SERPL-MCNC: 16.7 MG/DL — SIGNIFICANT CHANGE UP (ref 8–20)
BUN SERPL-MCNC: 16.7 MG/DL — SIGNIFICANT CHANGE UP (ref 8–20)
CALCIUM SERPL-MCNC: 10.1 MG/DL — SIGNIFICANT CHANGE UP (ref 8.4–10.5)
CALCIUM SERPL-MCNC: 10.1 MG/DL — SIGNIFICANT CHANGE UP (ref 8.4–10.5)
CHLORIDE SERPL-SCNC: 111 MMOL/L — HIGH (ref 96–108)
CHLORIDE SERPL-SCNC: 111 MMOL/L — HIGH (ref 96–108)
CO2 SERPL-SCNC: 18 MMOL/L — LOW (ref 22–29)
CO2 SERPL-SCNC: 18 MMOL/L — LOW (ref 22–29)
CREAT SERPL-MCNC: 0.61 MG/DL — SIGNIFICANT CHANGE UP (ref 0.5–1.3)
CREAT SERPL-MCNC: 0.61 MG/DL — SIGNIFICANT CHANGE UP (ref 0.5–1.3)
EGFR: 99 ML/MIN/1.73M2 — SIGNIFICANT CHANGE UP
EGFR: 99 ML/MIN/1.73M2 — SIGNIFICANT CHANGE UP
EOSINOPHIL # BLD AUTO: 0.16 K/UL — SIGNIFICANT CHANGE UP (ref 0–0.5)
EOSINOPHIL # BLD AUTO: 0.16 K/UL — SIGNIFICANT CHANGE UP (ref 0–0.5)
EOSINOPHIL NFR BLD AUTO: 1.2 % — SIGNIFICANT CHANGE UP (ref 0–6)
EOSINOPHIL NFR BLD AUTO: 1.2 % — SIGNIFICANT CHANGE UP (ref 0–6)
GLUCOSE BLDC GLUCOMTR-MCNC: 112 MG/DL — HIGH (ref 70–99)
GLUCOSE BLDC GLUCOMTR-MCNC: 112 MG/DL — HIGH (ref 70–99)
GLUCOSE BLDC GLUCOMTR-MCNC: 128 MG/DL — HIGH (ref 70–99)
GLUCOSE BLDC GLUCOMTR-MCNC: 128 MG/DL — HIGH (ref 70–99)
GLUCOSE BLDC GLUCOMTR-MCNC: 133 MG/DL — HIGH (ref 70–99)
GLUCOSE BLDC GLUCOMTR-MCNC: 133 MG/DL — HIGH (ref 70–99)
GLUCOSE BLDC GLUCOMTR-MCNC: 134 MG/DL — HIGH (ref 70–99)
GLUCOSE BLDC GLUCOMTR-MCNC: 134 MG/DL — HIGH (ref 70–99)
GLUCOSE SERPL-MCNC: 120 MG/DL — HIGH (ref 70–99)
GLUCOSE SERPL-MCNC: 120 MG/DL — HIGH (ref 70–99)
HCT VFR BLD CALC: 39.1 % — SIGNIFICANT CHANGE UP (ref 34.5–45)
HCT VFR BLD CALC: 39.1 % — SIGNIFICANT CHANGE UP (ref 34.5–45)
HGB BLD-MCNC: 12.8 G/DL — SIGNIFICANT CHANGE UP (ref 11.5–15.5)
HGB BLD-MCNC: 12.8 G/DL — SIGNIFICANT CHANGE UP (ref 11.5–15.5)
IMM GRANULOCYTES NFR BLD AUTO: 0.5 % — SIGNIFICANT CHANGE UP (ref 0–0.9)
IMM GRANULOCYTES NFR BLD AUTO: 0.5 % — SIGNIFICANT CHANGE UP (ref 0–0.9)
LYMPHOCYTES # BLD AUTO: 1.75 K/UL — SIGNIFICANT CHANGE UP (ref 1–3.3)
LYMPHOCYTES # BLD AUTO: 1.75 K/UL — SIGNIFICANT CHANGE UP (ref 1–3.3)
LYMPHOCYTES # BLD AUTO: 13.4 % — SIGNIFICANT CHANGE UP (ref 13–44)
LYMPHOCYTES # BLD AUTO: 13.4 % — SIGNIFICANT CHANGE UP (ref 13–44)
MAGNESIUM SERPL-MCNC: 1.7 MG/DL — SIGNIFICANT CHANGE UP (ref 1.6–2.6)
MAGNESIUM SERPL-MCNC: 1.7 MG/DL — SIGNIFICANT CHANGE UP (ref 1.6–2.6)
MCHC RBC-ENTMCNC: 30.5 PG — SIGNIFICANT CHANGE UP (ref 27–34)
MCHC RBC-ENTMCNC: 30.5 PG — SIGNIFICANT CHANGE UP (ref 27–34)
MCHC RBC-ENTMCNC: 32.7 GM/DL — SIGNIFICANT CHANGE UP (ref 32–36)
MCHC RBC-ENTMCNC: 32.7 GM/DL — SIGNIFICANT CHANGE UP (ref 32–36)
MCV RBC AUTO: 93.1 FL — SIGNIFICANT CHANGE UP (ref 80–100)
MCV RBC AUTO: 93.1 FL — SIGNIFICANT CHANGE UP (ref 80–100)
MONOCYTES # BLD AUTO: 1.12 K/UL — HIGH (ref 0–0.9)
MONOCYTES # BLD AUTO: 1.12 K/UL — HIGH (ref 0–0.9)
MONOCYTES NFR BLD AUTO: 8.6 % — SIGNIFICANT CHANGE UP (ref 2–14)
MONOCYTES NFR BLD AUTO: 8.6 % — SIGNIFICANT CHANGE UP (ref 2–14)
NEUTROPHILS # BLD AUTO: 9.89 K/UL — HIGH (ref 1.8–7.4)
NEUTROPHILS # BLD AUTO: 9.89 K/UL — HIGH (ref 1.8–7.4)
NEUTROPHILS NFR BLD AUTO: 75.8 % — SIGNIFICANT CHANGE UP (ref 43–77)
NEUTROPHILS NFR BLD AUTO: 75.8 % — SIGNIFICANT CHANGE UP (ref 43–77)
PHOSPHATE SERPL-MCNC: 3.4 MG/DL — SIGNIFICANT CHANGE UP (ref 2.4–4.7)
PHOSPHATE SERPL-MCNC: 3.4 MG/DL — SIGNIFICANT CHANGE UP (ref 2.4–4.7)
PLATELET # BLD AUTO: 316 K/UL — SIGNIFICANT CHANGE UP (ref 150–400)
PLATELET # BLD AUTO: 316 K/UL — SIGNIFICANT CHANGE UP (ref 150–400)
POTASSIUM SERPL-MCNC: 4 MMOL/L — SIGNIFICANT CHANGE UP (ref 3.5–5.3)
POTASSIUM SERPL-MCNC: 4 MMOL/L — SIGNIFICANT CHANGE UP (ref 3.5–5.3)
POTASSIUM SERPL-SCNC: 4 MMOL/L — SIGNIFICANT CHANGE UP (ref 3.5–5.3)
POTASSIUM SERPL-SCNC: 4 MMOL/L — SIGNIFICANT CHANGE UP (ref 3.5–5.3)
RBC # BLD: 4.2 M/UL — SIGNIFICANT CHANGE UP (ref 3.8–5.2)
RBC # BLD: 4.2 M/UL — SIGNIFICANT CHANGE UP (ref 3.8–5.2)
RBC # FLD: 14.9 % — HIGH (ref 10.3–14.5)
RBC # FLD: 14.9 % — HIGH (ref 10.3–14.5)
SODIUM SERPL-SCNC: 146 MMOL/L — HIGH (ref 135–145)
SODIUM SERPL-SCNC: 146 MMOL/L — HIGH (ref 135–145)
TSH SERPL-MCNC: 1.41 UIU/ML — SIGNIFICANT CHANGE UP (ref 0.27–4.2)
TSH SERPL-MCNC: 1.41 UIU/ML — SIGNIFICANT CHANGE UP (ref 0.27–4.2)
VIT B12 SERPL-MCNC: 454 PG/ML — SIGNIFICANT CHANGE UP (ref 232–1245)
VIT B12 SERPL-MCNC: 454 PG/ML — SIGNIFICANT CHANGE UP (ref 232–1245)
WBC # BLD: 13.05 K/UL — HIGH (ref 3.8–10.5)
WBC # BLD: 13.05 K/UL — HIGH (ref 3.8–10.5)
WBC # FLD AUTO: 13.05 K/UL — HIGH (ref 3.8–10.5)
WBC # FLD AUTO: 13.05 K/UL — HIGH (ref 3.8–10.5)

## 2023-10-30 PROCEDURE — 70450 CT HEAD/BRAIN W/O DYE: CPT | Mod: 26

## 2023-10-30 PROCEDURE — 99233 SBSQ HOSP IP/OBS HIGH 50: CPT

## 2023-10-30 PROCEDURE — 99222 1ST HOSP IP/OBS MODERATE 55: CPT

## 2023-10-30 RX ORDER — SODIUM CHLORIDE 9 MG/ML
1000 INJECTION, SOLUTION INTRAVENOUS
Refills: 0 | Status: COMPLETED | OUTPATIENT
Start: 2023-10-30 | End: 2023-10-30

## 2023-10-30 RX ORDER — HYDRALAZINE HCL 50 MG
10 TABLET ORAL ONCE
Refills: 0 | Status: COMPLETED | OUTPATIENT
Start: 2023-10-30 | End: 2023-10-30

## 2023-10-30 RX ADMIN — ATORVASTATIN CALCIUM 80 MILLIGRAM(S): 80 TABLET, FILM COATED ORAL at 22:53

## 2023-10-30 RX ADMIN — Medication 1 MILLIGRAM(S): at 05:32

## 2023-10-30 RX ADMIN — SODIUM CHLORIDE 75 MILLILITER(S): 9 INJECTION, SOLUTION INTRAVENOUS at 09:14

## 2023-10-30 RX ADMIN — CHLORHEXIDINE GLUCONATE 1 APPLICATION(S): 213 SOLUTION TOPICAL at 05:33

## 2023-10-30 RX ADMIN — Medication 1 MILLIGRAM(S): at 17:44

## 2023-10-30 RX ADMIN — LITHIUM CARBONATE 300 MILLIGRAM(S): 300 TABLET, EXTENDED RELEASE ORAL at 05:33

## 2023-10-30 RX ADMIN — Medication 1 MILLIGRAM(S): at 11:58

## 2023-10-30 RX ADMIN — RIVAROXABAN 20 MILLIGRAM(S): KIT at 17:44

## 2023-10-30 RX ADMIN — CEFTRIAXONE 1000 MILLIGRAM(S): 500 INJECTION, POWDER, FOR SOLUTION INTRAMUSCULAR; INTRAVENOUS at 05:33

## 2023-10-30 RX ADMIN — Medication 50 MICROGRAM(S): at 05:32

## 2023-10-30 RX ADMIN — Medication 50 MILLIGRAM(S): at 05:32

## 2023-10-30 RX ADMIN — FLUPHENAZINE HYDROCHLORIDE 10 MILLIGRAM(S): 1 TABLET, FILM COATED ORAL at 05:32

## 2023-10-30 NOTE — PROGRESS NOTE ADULT - ASSESSMENT
63y Female with schizophrenia. Now with increased tremor and gait difficulty in setting of urinary tract infection.    Tremor/gait difficulty. Now with speech difficulty  Likely secondary to neuroleptic medications.   Head CT 10/30 no acute pathology  Has extrapyramidal findings on examination.  Tremor can be seen with lithium as well, although typically does not give cogwheeling.   The symptoms may have been exacerbated by the urinary tract infection.    There is ventriculomegaly on CT likely related to atrophy and ischemic white matter disease.   She was seen by neurosurgery and I agree that her presentation is not suggestive of Normal Pressure hydrocephalus.    UTI.  Antibiotics per medicine.     Mobilize with physical therapy.     discussed  with Dr Bose    will follow with you    Nelson Torrez MD PhD   [phone]

## 2023-10-30 NOTE — PROGRESS NOTE ADULT - ASSESSMENT
Patient is a 63 year old female with hx schizophrenia, Afib on Xarelto, HTN, DM sent form [facility] for ataxia and tremor.    *Ataxia and tremor likely secondary to neuroleptic extrapyramidal finding from psych medications  repeat CT head without acute infarct; mild hydrocephalus  PT eval - [facility]  Fall precautions  Neurology and neurosurgery consult appreciated    *Mild Communicating Hydrocephalus  CT head reviewed  outpatient MRI in 2-4 weeks for mild communicating hydrocephalus per NSG  NSGY recs appreciated  Neuro recs appreciated    *Metabolic Encephalopathy due to UTI  UA positive; f/u urine culture  cont Rocephin   b12, TSH and ammonia levels WNL  CT head as above  hold lithium and fluphenazine per psych  continue cogentin  Psych recs appreciated    *Hx schizophrenia  AAO x 1; unclear baseline  Continue Cogentin, Trazadone PRN insomnia  hold lithium and fluphenazine per psych  f/u lithium level  Psych recs appreciated    *Hx Afib  Continue Xarelto and Metoprolol     *Hx HLD   Continue Atorvastatin      *Hx HTN  Continue metoprolol      *Hx DM  ISS  ADA diet    *Hx hypothyroidism  TSH WNL  Continue Levothyroxine     *Hypokalemia  -supplemented  -monitor BMP    DVT ppx- Xarelto    Dispo:  Remains acute; hold antipsychotics and monitor response per psych. Eventual [facility].    Plan discussed with Dr. Farr, Dr. Torrez, medicine PA, SW, RN

## 2023-10-30 NOTE — BH CONSULTATION LIAISON ASSESSMENT NOTE - NSBHCHARTREVIEWVS_PSY_A_CORE FT
Vital Signs Last 24 Hrs  T(C): 36.7 (30 Oct 2023 10:58), Max: 37 (29 Oct 2023 16:35)  T(F): 98.1 (30 Oct 2023 10:58), Max: 98.6 (29 Oct 2023 16:35)  HR: 92 (30 Oct 2023 10:58) (76 - 92)  BP: 136/87 (30 Oct 2023 10:58) (136/87 - 182/90)  BP(mean): --  RR: 18 (30 Oct 2023 10:58) (18 - 18)  SpO2: 93% (30 Oct 2023 10:58) (93% - 97%)    Parameters below as of 30 Oct 2023 10:58  Patient On (Oxygen Delivery Method): room air

## 2023-10-30 NOTE — BH CONSULTATION LIAISON ASSESSMENT NOTE - NSBHCHARTREVIEWINVESTIGATE_PSY_A_CORE FT
ACC: [id number] EXAM:  CT BRAIN   ORDERED BY: JAMIR GARCIA     PROCEDURE DATE:  10/30/2023          INTERPRETATION:  CLINICAL INDICATIONS:  slurred speech    COMPARISON: Head CT dated 10/27/2023    TECHNIQUE: Noncontrast CT of the head. Multiplanar reformations are   submitted.    FINDINGS:  Mild motion artifact.  There is periventricular and subcortical white matter hypodensity without   mass effect, nonspecific, likely representing moderate to severe chronic   microvascular ischemic changes. There is no compelling evidence for an   acute transcortical infarction. There is no evidence of mass, mass   effect, midline shift or extra-axial fluid collection. The lateral   ventricles are mildly dilated out of proportion to the cortical sulci   suggesting communicating hydrocephalus. The orbits, mastoid air cells and   visualized paranasal sinuses are unremarkable. The calvarium is intact.   Consider MRI as clinically warranted.    IMPRESSION: Moderate to severe chronic microvascular changes without   evidence of an acute transcortical infarction or hemorrhage. 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 Correlate clinically.    --- End of Report ---  Ventricular Rate 70 BPM    Atrial Rate 70 BPM    P-R Interval 170 ms    QRS Duration 84 ms    Q-T Interval 376 ms    QTC Calculation(Bazett) 406 ms    P Axis 48 degrees    R Axis 7 degrees    T Axis 71 degrees    Diagnosis Line *** Poor data quality, interpretation may be adversely affected  Normal sinus rhythm  Normal ECG

## 2023-10-30 NOTE — BH CONSULTATION LIAISON ASSESSMENT NOTE - MSE UNSTRUCTURED FT
Patient experiencing dysarthriua, unable to communicate and answer questions. Patient becoming tearful due to not being able to communicate. Awaiting collateral from patient advocate Vanda Patient experiencing dysarthria, unable to communicate and answer questions. Patient becoming tearful due to not being able to communicate.

## 2023-10-30 NOTE — BH CONSULTATION LIAISON ASSESSMENT NOTE - SUMMARY
63y Female with schizophrenia, depression and anxiety now sent from [facility] secondary to tremors and unsteady gait. Patient was assessed at bedside today, she was experiencing tremor at rest while sitting in bed. Patient is currently experiencing dysarthria to which patient is unable to be understood. As per chart review, she is currently on lithium 300mg twice a day and fluphenazine 10mg twice a day. Patient became tearful when trying to communicate during interview. Called patient advocate Luciano ([phone]), waiting for call back.     Assessment: patient experiencing dysarthria unable to communicate, will trial d/c psych medications to see if dysarthria/ataxia improve    Plan: d/c fluphenazine 10mg twice daily  d/c lithium 300mg twice daily   Psych will continue to follow, reassess 10/31

## 2023-10-30 NOTE — BH CONSULTATION LIAISON ASSESSMENT NOTE - CURRENT MEDICATION
MEDICATIONS  (STANDING):  atorvastatin 80 milliGRAM(s) Oral at bedtime  benztropine 1 milliGRAM(s) Oral two times a day  cefTRIAXone Injectable. 1000 milliGRAM(s) IV Push every 24 hours  chlorhexidine 2% Cloths 1 Application(s) Topical <User Schedule>  dextrose 5%. 1000 milliLiter(s) (100 mL/Hr) IV Continuous <Continuous>  dextrose 5%. 1000 milliLiter(s) (50 mL/Hr) IV Continuous <Continuous>  dextrose 50% Injectable 25 Gram(s) IV Push once  dextrose 50% Injectable 25 Gram(s) IV Push once  dextrose 50% Injectable 12.5 Gram(s) IV Push once  fluPHENAZine 10 milliGRAM(s) Oral two times a day  folic acid 1 milliGRAM(s) Oral daily  glucagon  Injectable 1 milliGRAM(s) IntraMuscular once  insulin lispro (ADMELOG) corrective regimen sliding scale   SubCutaneous three times a day before meals  insulin lispro (ADMELOG) corrective regimen sliding scale   SubCutaneous at bedtime  levothyroxine 50 MICROGram(s) Oral daily  lithium 300 milliGRAM(s) Oral two times a day  metoprolol succinate ER 50 milliGRAM(s) Oral daily  rivaroxaban 20 milliGRAM(s) Oral with dinner    MEDICATIONS  (PRN):  acetaminophen     Tablet .. 650 milliGRAM(s) Oral every 6 hours PRN Temp greater or equal to 38C (100.4F), Mild Pain (1 - 3), Moderate Pain (4 - 6)  dextrose Oral Gel 15 Gram(s) Oral once PRN Blood Glucose LESS THAN 70 milliGRAM(s)/deciliter  ondansetron Injectable 4 milliGRAM(s) IV Push every 6 hours PRN Nausea and/or Vomiting  traZODone 50 milliGRAM(s) Oral at bedtime PRN for insomnia

## 2023-10-30 NOTE — BH CONSULTATION LIAISON ASSESSMENT NOTE - HPI (INCLUDE ILLNESS QUALITY, SEVERITY, DURATION, TIMING, CONTEXT, MODIFYING FACTORS, ASSOCIATED SIGNS AND SYMPTOMS)
63y Female with schizophrenia, depression and anxiety now sent from [facility] secondary to tremors and unsteady gait. Patient was assessed at bedside today, she was experiencing tremor at rest while sitting in bed. Patient is currently experiencing dysarthria to which patient is unable to be understood. As per chart review, she is currently on lithium 300mg twice a day and fluphenazine 10mg twice a day. Patient became tearful when trying to communicate during interview. Called patient advocate Luciano ([phone]), waiting for call back.

## 2023-10-31 LAB
-  AMPICILLIN: SIGNIFICANT CHANGE UP
-  AMPICILLIN: SIGNIFICANT CHANGE UP
-  CIPROFLOXACIN: SIGNIFICANT CHANGE UP
-  CIPROFLOXACIN: SIGNIFICANT CHANGE UP
-  LEVOFLOXACIN: SIGNIFICANT CHANGE UP
-  LEVOFLOXACIN: SIGNIFICANT CHANGE UP
-  NITROFURANTOIN: SIGNIFICANT CHANGE UP
-  NITROFURANTOIN: SIGNIFICANT CHANGE UP
-  TETRACYCLINE: SIGNIFICANT CHANGE UP
-  TETRACYCLINE: SIGNIFICANT CHANGE UP
-  VANCOMYCIN: SIGNIFICANT CHANGE UP
-  VANCOMYCIN: SIGNIFICANT CHANGE UP
ANION GAP SERPL CALC-SCNC: 18 MMOL/L — HIGH (ref 5–17)
ANION GAP SERPL CALC-SCNC: 18 MMOL/L — HIGH (ref 5–17)
BASOPHILS # BLD AUTO: 0.07 K/UL — SIGNIFICANT CHANGE UP (ref 0–0.2)
BASOPHILS # BLD AUTO: 0.07 K/UL — SIGNIFICANT CHANGE UP (ref 0–0.2)
BASOPHILS NFR BLD AUTO: 0.6 % — SIGNIFICANT CHANGE UP (ref 0–2)
BASOPHILS NFR BLD AUTO: 0.6 % — SIGNIFICANT CHANGE UP (ref 0–2)
BUN SERPL-MCNC: 23.7 MG/DL — HIGH (ref 8–20)
BUN SERPL-MCNC: 23.7 MG/DL — HIGH (ref 8–20)
CALCIUM SERPL-MCNC: 9.9 MG/DL — SIGNIFICANT CHANGE UP (ref 8.4–10.5)
CALCIUM SERPL-MCNC: 9.9 MG/DL — SIGNIFICANT CHANGE UP (ref 8.4–10.5)
CHLORIDE SERPL-SCNC: 113 MMOL/L — HIGH (ref 96–108)
CHLORIDE SERPL-SCNC: 113 MMOL/L — HIGH (ref 96–108)
CO2 SERPL-SCNC: 18 MMOL/L — LOW (ref 22–29)
CO2 SERPL-SCNC: 18 MMOL/L — LOW (ref 22–29)
CREAT SERPL-MCNC: 0.57 MG/DL — SIGNIFICANT CHANGE UP (ref 0.5–1.3)
CREAT SERPL-MCNC: 0.57 MG/DL — SIGNIFICANT CHANGE UP (ref 0.5–1.3)
CULTURE RESULTS: ABNORMAL
CULTURE RESULTS: ABNORMAL
EGFR: 101 ML/MIN/1.73M2 — SIGNIFICANT CHANGE UP
EGFR: 101 ML/MIN/1.73M2 — SIGNIFICANT CHANGE UP
EOSINOPHIL # BLD AUTO: 0.15 K/UL — SIGNIFICANT CHANGE UP (ref 0–0.5)
EOSINOPHIL # BLD AUTO: 0.15 K/UL — SIGNIFICANT CHANGE UP (ref 0–0.5)
EOSINOPHIL NFR BLD AUTO: 1.3 % — SIGNIFICANT CHANGE UP (ref 0–6)
EOSINOPHIL NFR BLD AUTO: 1.3 % — SIGNIFICANT CHANGE UP (ref 0–6)
GLUCOSE BLDC GLUCOMTR-MCNC: 127 MG/DL — HIGH (ref 70–99)
GLUCOSE BLDC GLUCOMTR-MCNC: 127 MG/DL — HIGH (ref 70–99)
GLUCOSE BLDC GLUCOMTR-MCNC: 128 MG/DL — HIGH (ref 70–99)
GLUCOSE BLDC GLUCOMTR-MCNC: 128 MG/DL — HIGH (ref 70–99)
GLUCOSE BLDC GLUCOMTR-MCNC: 129 MG/DL — HIGH (ref 70–99)
GLUCOSE BLDC GLUCOMTR-MCNC: 129 MG/DL — HIGH (ref 70–99)
GLUCOSE BLDC GLUCOMTR-MCNC: 164 MG/DL — HIGH (ref 70–99)
GLUCOSE BLDC GLUCOMTR-MCNC: 164 MG/DL — HIGH (ref 70–99)
GLUCOSE SERPL-MCNC: 136 MG/DL — HIGH (ref 70–99)
GLUCOSE SERPL-MCNC: 136 MG/DL — HIGH (ref 70–99)
HCT VFR BLD CALC: 38.7 % — SIGNIFICANT CHANGE UP (ref 34.5–45)
HCT VFR BLD CALC: 38.7 % — SIGNIFICANT CHANGE UP (ref 34.5–45)
HGB BLD-MCNC: 12.5 G/DL — SIGNIFICANT CHANGE UP (ref 11.5–15.5)
HGB BLD-MCNC: 12.5 G/DL — SIGNIFICANT CHANGE UP (ref 11.5–15.5)
IMM GRANULOCYTES NFR BLD AUTO: 0.4 % — SIGNIFICANT CHANGE UP (ref 0–0.9)
IMM GRANULOCYTES NFR BLD AUTO: 0.4 % — SIGNIFICANT CHANGE UP (ref 0–0.9)
LYMPHOCYTES # BLD AUTO: 1.47 K/UL — SIGNIFICANT CHANGE UP (ref 1–3.3)
LYMPHOCYTES # BLD AUTO: 1.47 K/UL — SIGNIFICANT CHANGE UP (ref 1–3.3)
LYMPHOCYTES # BLD AUTO: 13.1 % — SIGNIFICANT CHANGE UP (ref 13–44)
LYMPHOCYTES # BLD AUTO: 13.1 % — SIGNIFICANT CHANGE UP (ref 13–44)
MAGNESIUM SERPL-MCNC: 1.8 MG/DL — SIGNIFICANT CHANGE UP (ref 1.6–2.6)
MAGNESIUM SERPL-MCNC: 1.8 MG/DL — SIGNIFICANT CHANGE UP (ref 1.6–2.6)
MCHC RBC-ENTMCNC: 30.2 PG — SIGNIFICANT CHANGE UP (ref 27–34)
MCHC RBC-ENTMCNC: 30.2 PG — SIGNIFICANT CHANGE UP (ref 27–34)
MCHC RBC-ENTMCNC: 32.3 GM/DL — SIGNIFICANT CHANGE UP (ref 32–36)
MCHC RBC-ENTMCNC: 32.3 GM/DL — SIGNIFICANT CHANGE UP (ref 32–36)
MCV RBC AUTO: 93.5 FL — SIGNIFICANT CHANGE UP (ref 80–100)
MCV RBC AUTO: 93.5 FL — SIGNIFICANT CHANGE UP (ref 80–100)
METHOD TYPE: SIGNIFICANT CHANGE UP
METHOD TYPE: SIGNIFICANT CHANGE UP
MONOCYTES # BLD AUTO: 1.04 K/UL — HIGH (ref 0–0.9)
MONOCYTES # BLD AUTO: 1.04 K/UL — HIGH (ref 0–0.9)
MONOCYTES NFR BLD AUTO: 9.2 % — SIGNIFICANT CHANGE UP (ref 2–14)
MONOCYTES NFR BLD AUTO: 9.2 % — SIGNIFICANT CHANGE UP (ref 2–14)
NEUTROPHILS # BLD AUTO: 8.48 K/UL — HIGH (ref 1.8–7.4)
NEUTROPHILS # BLD AUTO: 8.48 K/UL — HIGH (ref 1.8–7.4)
NEUTROPHILS NFR BLD AUTO: 75.4 % — SIGNIFICANT CHANGE UP (ref 43–77)
NEUTROPHILS NFR BLD AUTO: 75.4 % — SIGNIFICANT CHANGE UP (ref 43–77)
ORGANISM # SPEC MICROSCOPIC CNT: ABNORMAL
ORGANISM # SPEC MICROSCOPIC CNT: ABNORMAL
ORGANISM # SPEC MICROSCOPIC CNT: SIGNIFICANT CHANGE UP
ORGANISM # SPEC MICROSCOPIC CNT: SIGNIFICANT CHANGE UP
PHOSPHATE SERPL-MCNC: 2.9 MG/DL — SIGNIFICANT CHANGE UP (ref 2.4–4.7)
PHOSPHATE SERPL-MCNC: 2.9 MG/DL — SIGNIFICANT CHANGE UP (ref 2.4–4.7)
PLATELET # BLD AUTO: 317 K/UL — SIGNIFICANT CHANGE UP (ref 150–400)
PLATELET # BLD AUTO: 317 K/UL — SIGNIFICANT CHANGE UP (ref 150–400)
POTASSIUM SERPL-MCNC: 3.4 MMOL/L — LOW (ref 3.5–5.3)
POTASSIUM SERPL-MCNC: 3.4 MMOL/L — LOW (ref 3.5–5.3)
POTASSIUM SERPL-SCNC: 3.4 MMOL/L — LOW (ref 3.5–5.3)
POTASSIUM SERPL-SCNC: 3.4 MMOL/L — LOW (ref 3.5–5.3)
RBC # BLD: 4.14 M/UL — SIGNIFICANT CHANGE UP (ref 3.8–5.2)
RBC # BLD: 4.14 M/UL — SIGNIFICANT CHANGE UP (ref 3.8–5.2)
RBC # FLD: 15.4 % — HIGH (ref 10.3–14.5)
RBC # FLD: 15.4 % — HIGH (ref 10.3–14.5)
SODIUM SERPL-SCNC: 149 MMOL/L — HIGH (ref 135–145)
SODIUM SERPL-SCNC: 149 MMOL/L — HIGH (ref 135–145)
SPECIMEN SOURCE: SIGNIFICANT CHANGE UP
SPECIMEN SOURCE: SIGNIFICANT CHANGE UP
WBC # BLD: 11.25 K/UL — HIGH (ref 3.8–10.5)
WBC # BLD: 11.25 K/UL — HIGH (ref 3.8–10.5)
WBC # FLD AUTO: 11.25 K/UL — HIGH (ref 3.8–10.5)
WBC # FLD AUTO: 11.25 K/UL — HIGH (ref 3.8–10.5)

## 2023-10-31 PROCEDURE — 99233 SBSQ HOSP IP/OBS HIGH 50: CPT

## 2023-10-31 RX ORDER — AMPICILLIN TRIHYDRATE 250 MG
1 CAPSULE ORAL EVERY 6 HOURS
Refills: 0 | Status: DISCONTINUED | OUTPATIENT
Start: 2023-11-01 | End: 2023-11-03

## 2023-10-31 RX ORDER — SODIUM CHLORIDE 9 MG/ML
1000 INJECTION, SOLUTION INTRAVENOUS
Refills: 0 | Status: DISCONTINUED | OUTPATIENT
Start: 2023-10-31 | End: 2023-11-01

## 2023-10-31 RX ORDER — POTASSIUM CHLORIDE 20 MEQ
40 PACKET (EA) ORAL ONCE
Refills: 0 | Status: COMPLETED | OUTPATIENT
Start: 2023-10-31 | End: 2023-10-31

## 2023-10-31 RX ORDER — AMPICILLIN TRIHYDRATE 250 MG
1 CAPSULE ORAL ONCE
Refills: 0 | Status: COMPLETED | OUTPATIENT
Start: 2023-10-31 | End: 2023-10-31

## 2023-10-31 RX ORDER — METOPROLOL TARTRATE 50 MG
2.5 TABLET ORAL ONCE
Refills: 0 | Status: COMPLETED | OUTPATIENT
Start: 2023-10-31 | End: 2023-10-31

## 2023-10-31 RX ORDER — AMPICILLIN TRIHYDRATE 250 MG
CAPSULE ORAL
Refills: 0 | Status: DISCONTINUED | OUTPATIENT
Start: 2023-10-31 | End: 2023-11-03

## 2023-10-31 RX ADMIN — Medication 1 MILLIGRAM(S): at 08:07

## 2023-10-31 RX ADMIN — RIVAROXABAN 20 MILLIGRAM(S): KIT at 17:18

## 2023-10-31 RX ADMIN — SODIUM CHLORIDE 75 MILLILITER(S): 9 INJECTION, SOLUTION INTRAVENOUS at 18:17

## 2023-10-31 RX ADMIN — Medication 108 GRAM(S): at 18:41

## 2023-10-31 RX ADMIN — Medication 50 MICROGRAM(S): at 08:07

## 2023-10-31 RX ADMIN — Medication 0.5 MILLIGRAM(S): at 11:14

## 2023-10-31 RX ADMIN — ATORVASTATIN CALCIUM 80 MILLIGRAM(S): 80 TABLET, FILM COATED ORAL at 22:09

## 2023-10-31 RX ADMIN — Medication 1 MILLIGRAM(S): at 11:14

## 2023-10-31 RX ADMIN — Medication 1 MILLIGRAM(S): at 17:18

## 2023-10-31 RX ADMIN — CEFTRIAXONE 1000 MILLIGRAM(S): 500 INJECTION, POWDER, FOR SOLUTION INTRAMUSCULAR; INTRAVENOUS at 05:16

## 2023-10-31 RX ADMIN — Medication 40 MILLIEQUIVALENT(S): at 18:17

## 2023-10-31 RX ADMIN — Medication 2.5 MILLIGRAM(S): at 05:29

## 2023-10-31 RX ADMIN — CHLORHEXIDINE GLUCONATE 1 APPLICATION(S): 213 SOLUTION TOPICAL at 05:19

## 2023-10-31 RX ADMIN — SODIUM CHLORIDE 75 MILLILITER(S): 9 INJECTION, SOLUTION INTRAVENOUS at 18:28

## 2023-10-31 NOTE — BH CONSULTATION LIAISON PROGRESS NOTE - NSBHFUPINTERVALHXFT_PSY_A_CORE
63y Female with schizophrenia, depression and anxiety now sent from [facility] secondary to tremors and unsteady gait. Patient assessed at bedside today, is still currently nonverbal. Patient is experiencing tremors at rest. Discussed with CNA patient is incontinent, tolerating PO and has only been able to mumble. Patient day 1 of d/c lithium and fluphenazine for tremors. Tried contacting patient advocate at 9:30am, left message awaiting call back.

## 2023-10-31 NOTE — PROGRESS NOTE ADULT - ASSESSMENT
Patient is a 63 year old female with hx schizophrenia, Afib on Xarelto, HTN, DM sent form [facility] for ataxia and tremor due to neuroleptic drugs.    *Ataxia and tremor likely secondary to neuroleptic extrapyramidal finding from antipsychotic medications  remains dysarthric with tremors; ativan recommended with no improvement  also with hypernatremia today; start parenteral free water  repeat CT head without acute infarct; mild hydrocephalus  PT eval - PUMA  Fall precautions  Neurology and neurosurgery consult appreciated    *Mild Communicating Hydrocephalus  CT head reviewed  outpatient MRI in 2-4 weeks for mild communicating hydrocephalus per NSG  NSGY and neuro recs appreciated    *Metabolic Encephalopathy due to Enterococcus UTI and hypernatremia  UA positive; f/u urine culture sensitivities  cont Rocephin   b12, TSH and ammonia levels WNL  CT head as above  continue to hold lithium and fluphenazine per psych  continue cogentin  s/p ativan without improvement  free water as above  Psych recs appreciated    *Hx schizophrenia  awake but disoriented and unable to communicate due to incomprehensible speech  Continue Cogentin, Trazadone PRN insomnia  hold lithium and fluphenazine per psych  f/u lithium level  Psych recs appreciated; 1 dose of ativan without improvement    *Hx Afib  Continue Xarelto and Metoprolol     *Hx HLD   Continue Atorvastatin      *Hx HTN  Continue metoprolol      *Hx DM  ISS  ADA diet    *Hx hypothyroidism  TSH WNL  Continue Levothyroxine     *Hypokalemia  -supplemented  -monitor BMP    DVT ppx- Xarelto    Dispo:  Remains acute; hold antipsychotics and monitor clinically. Free water. F/u urine culture sensitivities. Eventual PUMA.    Plan discussed with Dr. Farr, MARQUES, RN           Patient is a 63 year old female with hx schizophrenia, Afib on Xarelto, HTN, DM sent form [facility] for ataxia and tremor due to neuroleptic drugs.    *Ataxia and tremor likely secondary to neuroleptic extrapyramidal finding from antipsychotic medications  remains dysarthric with tremors; ativan recommended with no improvement  also with hypernatremia today; start parenteral free water  repeat CT head without acute infarct; mild hydrocephalus  PT eval - PUMA  Fall precautions  Neurology and neurosurgery consult appreciated    *Mild Communicating Hydrocephalus  CT head reviewed  outpatient MRI in 2-4 weeks for mild communicating hydrocephalus per NSG  NSGY and neuro recs appreciated    *Metabolic Encephalopathy due to Enterococcus UTI and hypernatremia  UA positive; urine culture sensitivities reviewed  change to ampicillin   b12, TSH and ammonia levels WNL  CT head as above  continue to hold lithium and fluphenazine per psych  continue cogentin  s/p ativan without improvement  free water as above  Psych recs appreciated    *Hx schizophrenia  awake but disoriented and unable to communicate due to incomprehensible speech  Continue Cogentin, Trazadone PRN insomnia  hold lithium and fluphenazine per psych  f/u lithium level  Psych recs appreciated; 1 dose of ativan without improvement    *Hx Afib  Continue Xarelto and Metoprolol     *Hx HLD   Continue Atorvastatin      *Hx HTN  Continue metoprolol      *Hx DM  ISS  ADA diet    *Hx hypothyroidism  TSH WNL  Continue Levothyroxine     *Hypokalemia  -supplemented  -monitor BMP    DVT ppx- Xarelto    Dispo:  Remains acute; hold antipsychotics and monitor clinically. Free water. Eventual PUMA.    Plan discussed with Dr. Farr, SW, RN

## 2023-10-31 NOTE — ED BEHAVIORAL HEALTH NOTE - BEHAVIORAL HEALTH NOTE
Spoke with  about symptoms patient is suffering from, agreed that tremors are most likely tardive in nature and not neurological. Spoke with  about symptoms patient is suffering from, agreed that tremors are most likely tardive in nature .

## 2023-10-31 NOTE — BH CONSULTATION LIAISON PROGRESS NOTE - NSBHASSESSMENTFT_PSY_ALL_CORE
63y Female with schizophrenia, depression and anxiety now sent from [facility] secondary to tremors and unsteady gait. Patient assessed at bedside today, is still currently nonverbal. Patient is experiencing tremors at rest. Discussed with CNA patient is incontinent, tolerating PO and has only been able to mumble. Patient day 1 of d/c lithium and fluphenazine for tremors. Tried contacting patient advocate for collateral at 9:30am, left message awaiting call back.    Assessment: Patient in bed experiencing tremors, incontinence. Will continue to follow up on progress    Plan: Check TFTs, CPK level  Will follow up with progress tomorrow  Waiting for collateral response

## 2023-11-01 DIAGNOSIS — F20.9 SCHIZOPHRENIA, UNSPECIFIED: ICD-10-CM

## 2023-11-01 LAB
ANION GAP SERPL CALC-SCNC: 12 MMOL/L — SIGNIFICANT CHANGE UP (ref 5–17)
ANION GAP SERPL CALC-SCNC: 12 MMOL/L — SIGNIFICANT CHANGE UP (ref 5–17)
ANION GAP SERPL CALC-SCNC: 15 MMOL/L — SIGNIFICANT CHANGE UP (ref 5–17)
ANION GAP SERPL CALC-SCNC: 15 MMOL/L — SIGNIFICANT CHANGE UP (ref 5–17)
BASOPHILS # BLD AUTO: 0.04 K/UL — SIGNIFICANT CHANGE UP (ref 0–0.2)
BASOPHILS # BLD AUTO: 0.04 K/UL — SIGNIFICANT CHANGE UP (ref 0–0.2)
BASOPHILS NFR BLD AUTO: 0.4 % — SIGNIFICANT CHANGE UP (ref 0–2)
BASOPHILS NFR BLD AUTO: 0.4 % — SIGNIFICANT CHANGE UP (ref 0–2)
BUN SERPL-MCNC: 20.6 MG/DL — HIGH (ref 8–20)
BUN SERPL-MCNC: 20.6 MG/DL — HIGH (ref 8–20)
BUN SERPL-MCNC: 22.7 MG/DL — HIGH (ref 8–20)
BUN SERPL-MCNC: 22.7 MG/DL — HIGH (ref 8–20)
CALCIUM SERPL-MCNC: 9.4 MG/DL — SIGNIFICANT CHANGE UP (ref 8.4–10.5)
CALCIUM SERPL-MCNC: 9.4 MG/DL — SIGNIFICANT CHANGE UP (ref 8.4–10.5)
CALCIUM SERPL-MCNC: 9.6 MG/DL — SIGNIFICANT CHANGE UP (ref 8.4–10.5)
CALCIUM SERPL-MCNC: 9.6 MG/DL — SIGNIFICANT CHANGE UP (ref 8.4–10.5)
CHLORIDE SERPL-SCNC: 112 MMOL/L — HIGH (ref 96–108)
CHLORIDE SERPL-SCNC: 112 MMOL/L — HIGH (ref 96–108)
CHLORIDE SERPL-SCNC: 118 MMOL/L — HIGH (ref 96–108)
CHLORIDE SERPL-SCNC: 118 MMOL/L — HIGH (ref 96–108)
CO2 SERPL-SCNC: 19 MMOL/L — LOW (ref 22–29)
CO2 SERPL-SCNC: 19 MMOL/L — LOW (ref 22–29)
CO2 SERPL-SCNC: 20 MMOL/L — LOW (ref 22–29)
CO2 SERPL-SCNC: 20 MMOL/L — LOW (ref 22–29)
CREAT SERPL-MCNC: 0.45 MG/DL — LOW (ref 0.5–1.3)
CREAT SERPL-MCNC: 0.45 MG/DL — LOW (ref 0.5–1.3)
CREAT SERPL-MCNC: 0.55 MG/DL — SIGNIFICANT CHANGE UP (ref 0.5–1.3)
CREAT SERPL-MCNC: 0.55 MG/DL — SIGNIFICANT CHANGE UP (ref 0.5–1.3)
EGFR: 102 ML/MIN/1.73M2 — SIGNIFICANT CHANGE UP
EGFR: 102 ML/MIN/1.73M2 — SIGNIFICANT CHANGE UP
EGFR: 107 ML/MIN/1.73M2 — SIGNIFICANT CHANGE UP
EGFR: 107 ML/MIN/1.73M2 — SIGNIFICANT CHANGE UP
EOSINOPHIL # BLD AUTO: 0.03 K/UL — SIGNIFICANT CHANGE UP (ref 0–0.5)
EOSINOPHIL # BLD AUTO: 0.03 K/UL — SIGNIFICANT CHANGE UP (ref 0–0.5)
EOSINOPHIL NFR BLD AUTO: 0.3 % — SIGNIFICANT CHANGE UP (ref 0–6)
EOSINOPHIL NFR BLD AUTO: 0.3 % — SIGNIFICANT CHANGE UP (ref 0–6)
GLUCOSE BLDC GLUCOMTR-MCNC: 118 MG/DL — HIGH (ref 70–99)
GLUCOSE BLDC GLUCOMTR-MCNC: 118 MG/DL — HIGH (ref 70–99)
GLUCOSE BLDC GLUCOMTR-MCNC: 149 MG/DL — HIGH (ref 70–99)
GLUCOSE BLDC GLUCOMTR-MCNC: 149 MG/DL — HIGH (ref 70–99)
GLUCOSE BLDC GLUCOMTR-MCNC: 153 MG/DL — HIGH (ref 70–99)
GLUCOSE SERPL-MCNC: 146 MG/DL — HIGH (ref 70–99)
GLUCOSE SERPL-MCNC: 146 MG/DL — HIGH (ref 70–99)
GLUCOSE SERPL-MCNC: 152 MG/DL — HIGH (ref 70–99)
GLUCOSE SERPL-MCNC: 152 MG/DL — HIGH (ref 70–99)
HCT VFR BLD CALC: 36.8 % — SIGNIFICANT CHANGE UP (ref 34.5–45)
HCT VFR BLD CALC: 36.8 % — SIGNIFICANT CHANGE UP (ref 34.5–45)
HGB BLD-MCNC: 12.1 G/DL — SIGNIFICANT CHANGE UP (ref 11.5–15.5)
HGB BLD-MCNC: 12.1 G/DL — SIGNIFICANT CHANGE UP (ref 11.5–15.5)
IMM GRANULOCYTES NFR BLD AUTO: 0.4 % — SIGNIFICANT CHANGE UP (ref 0–0.9)
IMM GRANULOCYTES NFR BLD AUTO: 0.4 % — SIGNIFICANT CHANGE UP (ref 0–0.9)
LYMPHOCYTES # BLD AUTO: 1.29 K/UL — SIGNIFICANT CHANGE UP (ref 1–3.3)
LYMPHOCYTES # BLD AUTO: 1.29 K/UL — SIGNIFICANT CHANGE UP (ref 1–3.3)
LYMPHOCYTES # BLD AUTO: 12.2 % — LOW (ref 13–44)
LYMPHOCYTES # BLD AUTO: 12.2 % — LOW (ref 13–44)
MAGNESIUM SERPL-MCNC: 1.8 MG/DL — SIGNIFICANT CHANGE UP (ref 1.6–2.6)
MAGNESIUM SERPL-MCNC: 1.8 MG/DL — SIGNIFICANT CHANGE UP (ref 1.6–2.6)
MCHC RBC-ENTMCNC: 30.6 PG — SIGNIFICANT CHANGE UP (ref 27–34)
MCHC RBC-ENTMCNC: 30.6 PG — SIGNIFICANT CHANGE UP (ref 27–34)
MCHC RBC-ENTMCNC: 32.9 GM/DL — SIGNIFICANT CHANGE UP (ref 32–36)
MCHC RBC-ENTMCNC: 32.9 GM/DL — SIGNIFICANT CHANGE UP (ref 32–36)
MCV RBC AUTO: 93.2 FL — SIGNIFICANT CHANGE UP (ref 80–100)
MCV RBC AUTO: 93.2 FL — SIGNIFICANT CHANGE UP (ref 80–100)
MONOCYTES # BLD AUTO: 0.99 K/UL — HIGH (ref 0–0.9)
MONOCYTES # BLD AUTO: 0.99 K/UL — HIGH (ref 0–0.9)
MONOCYTES NFR BLD AUTO: 9.4 % — SIGNIFICANT CHANGE UP (ref 2–14)
MONOCYTES NFR BLD AUTO: 9.4 % — SIGNIFICANT CHANGE UP (ref 2–14)
NEUTROPHILS # BLD AUTO: 8.19 K/UL — HIGH (ref 1.8–7.4)
NEUTROPHILS # BLD AUTO: 8.19 K/UL — HIGH (ref 1.8–7.4)
NEUTROPHILS NFR BLD AUTO: 77.3 % — HIGH (ref 43–77)
NEUTROPHILS NFR BLD AUTO: 77.3 % — HIGH (ref 43–77)
PHOSPHATE SERPL-MCNC: 2.5 MG/DL — SIGNIFICANT CHANGE UP (ref 2.4–4.7)
PHOSPHATE SERPL-MCNC: 2.5 MG/DL — SIGNIFICANT CHANGE UP (ref 2.4–4.7)
PLATELET # BLD AUTO: 310 K/UL — SIGNIFICANT CHANGE UP (ref 150–400)
PLATELET # BLD AUTO: 310 K/UL — SIGNIFICANT CHANGE UP (ref 150–400)
POTASSIUM SERPL-MCNC: 2.9 MMOL/L — CRITICAL LOW (ref 3.5–5.3)
POTASSIUM SERPL-MCNC: 2.9 MMOL/L — CRITICAL LOW (ref 3.5–5.3)
POTASSIUM SERPL-MCNC: 3.6 MMOL/L — SIGNIFICANT CHANGE UP (ref 3.5–5.3)
POTASSIUM SERPL-MCNC: 3.6 MMOL/L — SIGNIFICANT CHANGE UP (ref 3.5–5.3)
POTASSIUM SERPL-SCNC: 2.9 MMOL/L — CRITICAL LOW (ref 3.5–5.3)
POTASSIUM SERPL-SCNC: 2.9 MMOL/L — CRITICAL LOW (ref 3.5–5.3)
POTASSIUM SERPL-SCNC: 3.6 MMOL/L — SIGNIFICANT CHANGE UP (ref 3.5–5.3)
POTASSIUM SERPL-SCNC: 3.6 MMOL/L — SIGNIFICANT CHANGE UP (ref 3.5–5.3)
RBC # BLD: 3.95 M/UL — SIGNIFICANT CHANGE UP (ref 3.8–5.2)
RBC # BLD: 3.95 M/UL — SIGNIFICANT CHANGE UP (ref 3.8–5.2)
RBC # FLD: 15.7 % — HIGH (ref 10.3–14.5)
RBC # FLD: 15.7 % — HIGH (ref 10.3–14.5)
SODIUM SERPL-SCNC: 147 MMOL/L — HIGH (ref 135–145)
SODIUM SERPL-SCNC: 147 MMOL/L — HIGH (ref 135–145)
SODIUM SERPL-SCNC: 149 MMOL/L — HIGH (ref 135–145)
SODIUM SERPL-SCNC: 149 MMOL/L — HIGH (ref 135–145)
WBC # BLD: 10.58 K/UL — HIGH (ref 3.8–10.5)
WBC # BLD: 10.58 K/UL — HIGH (ref 3.8–10.5)
WBC # FLD AUTO: 10.58 K/UL — HIGH (ref 3.8–10.5)
WBC # FLD AUTO: 10.58 K/UL — HIGH (ref 3.8–10.5)

## 2023-11-01 PROCEDURE — 99232 SBSQ HOSP IP/OBS MODERATE 35: CPT

## 2023-11-01 PROCEDURE — 99233 SBSQ HOSP IP/OBS HIGH 50: CPT

## 2023-11-01 RX ORDER — SODIUM CHLORIDE 9 MG/ML
1000 INJECTION, SOLUTION INTRAVENOUS
Refills: 0 | Status: DISCONTINUED | OUTPATIENT
Start: 2023-11-01 | End: 2023-11-02

## 2023-11-01 RX ORDER — OLANZAPINE 15 MG/1
2.5 TABLET, FILM COATED ORAL EVERY 12 HOURS
Refills: 0 | Status: DISCONTINUED | OUTPATIENT
Start: 2023-11-01 | End: 2023-11-03

## 2023-11-01 RX ORDER — POTASSIUM CHLORIDE 20 MEQ
40 PACKET (EA) ORAL EVERY 4 HOURS
Refills: 0 | Status: COMPLETED | OUTPATIENT
Start: 2023-11-01 | End: 2023-11-01

## 2023-11-01 RX ADMIN — Medication 50 MILLIGRAM(S): at 06:16

## 2023-11-01 RX ADMIN — Medication 1 MILLIGRAM(S): at 17:47

## 2023-11-01 RX ADMIN — Medication 108 GRAM(S): at 17:46

## 2023-11-01 RX ADMIN — Medication 1 MILLIGRAM(S): at 12:17

## 2023-11-01 RX ADMIN — SODIUM CHLORIDE 75 MILLILITER(S): 9 INJECTION, SOLUTION INTRAVENOUS at 12:19

## 2023-11-01 RX ADMIN — CHLORHEXIDINE GLUCONATE 1 APPLICATION(S): 213 SOLUTION TOPICAL at 06:16

## 2023-11-01 RX ADMIN — Medication 40 MILLIEQUIVALENT(S): at 08:51

## 2023-11-01 RX ADMIN — Medication 108 GRAM(S): at 06:16

## 2023-11-01 RX ADMIN — SODIUM CHLORIDE 75 MILLILITER(S): 9 INJECTION, SOLUTION INTRAVENOUS at 21:20

## 2023-11-01 RX ADMIN — Medication 108 GRAM(S): at 12:18

## 2023-11-01 RX ADMIN — Medication 108 GRAM(S): at 00:09

## 2023-11-01 RX ADMIN — Medication 1: at 07:51

## 2023-11-01 RX ADMIN — RIVAROXABAN 20 MILLIGRAM(S): KIT at 17:47

## 2023-11-01 RX ADMIN — Medication 108 GRAM(S): at 23:54

## 2023-11-01 RX ADMIN — Medication 40 MILLIEQUIVALENT(S): at 14:35

## 2023-11-01 RX ADMIN — Medication 50 MICROGRAM(S): at 06:16

## 2023-11-01 RX ADMIN — OLANZAPINE 2.5 MILLIGRAM(S): 15 TABLET, FILM COATED ORAL at 18:23

## 2023-11-01 RX ADMIN — Medication 1 MILLIGRAM(S): at 06:16

## 2023-11-01 RX ADMIN — ATORVASTATIN CALCIUM 80 MILLIGRAM(S): 80 TABLET, FILM COATED ORAL at 21:25

## 2023-11-01 NOTE — BH CONSULTATION LIAISON PROGRESS NOTE - NSBHFUPINTERVALHXFT_PSY_A_CORE
63y Female with schizophrenia, depression and anxiety now sent from [facility] secondary to tremors and unsteady gait. Patient seen and assessed at bedside today. Patient is only responsive to name, CNA states that patient has become increasingly uncooperative with following commands. Patient did not eat this morning as per CNA. She is seen still in bed with resting tremors of entire body. Spoke with Dr. Torrez from Neurology regarding treatment options, will consider Ingrezza or Aco regarding tardive dyskinesiea as well as olanzapine IM. 63y Female with schizophrenia, depression and anxiety now sent from [facility] secondary to tremors and unsteady gait. Patient seen and assessed at bedside today. Patient is only responsive to name, CNA states that patient has become increasingly uncooperative with following commands. Patient did not eat this morning as per CNA. She is seen still in bed with resting tremors of entire body. Spoke with Dr. Torrez from Neurology regarding treatment options, will consider Ingrezza or Caridad regarding tardive dyskinesia as well as olanzapine IM.

## 2023-11-01 NOTE — BH CONSULTATION LIAISON PROGRESS NOTE - MSE UNSTRUCTURED FT
Patient unresponsive to commands, only oriented to name. Patient unable to answer rest of questions asked, received information from CNA on staff.
Patient assessed at bedside, still unable to articulate. Patient experiencing whole body tremors.

## 2023-11-01 NOTE — PROGRESS NOTE ADULT - ASSESSMENT
63y Female with schizophrenia. Now with increased tremor and gait difficulty in setting of urinary tract infection.    Tremor/gait difficulty. Now with speech difficulty  Likely secondary to neuroleptic medications.   Head CT 10/30 no acute pathology  Has extrapyramidal findings on examination. worsening mental status  Agrre with trial of Olanzapine for tardive dyskinesia, if not helpful can try ingrezza or othe VMAT2 inhibitor  Tremor can be seen with lithium as well, although typically does not give cogwheeling.   The symptoms may have been exacerbated by the urinary tract infection.    There is ventriculomegaly on CT likely related to atrophy and ischemic white matter disease.   She was seen by neurosurgery and I agree that her presentation is not suggestive of Normal Pressure hydrocephalus.    UTI.  Antibiotics per medicine.     Mobilize with physical therapy.       will follow with you    Nelson Torrez MD PhD   [id number]

## 2023-11-01 NOTE — PROGRESS NOTE ADULT - ASSESSMENT
Patient is a 63 year old female with hx schizophrenia, Afib on Xarelto, HTN, DM sent form [facility] for ataxia and tremor due to neuroleptic drugs.    *Ataxia and tremor likely secondary to neuroleptic extrapyramidal finding from antipsychotic medications  remains dysarthric with tremors; ativan recommended with no improvement  start olanzapine for tardive dyskinesia  repeat CT head without acute infarct; mild hydrocephalus  PT eval - PUMA  Fall precautions  Neurology and neurosurgery consult appreciated    *Mild Communicating Hydrocephalus  neuro exam not consistent with hydrocephalus  CT head reviewed  outpatient MRI in 2-4 weeks for mild communicating hydrocephalus per NSG  NSGY and neuro recs appreciated    *Metabolic Encephalopathy due to Enterococcus UTI and hypernatremia; concerns for catatonia  UA positive; urine culture sensitivities reviewed  changed to ampicillin   b12, TSH and ammonia levels WNL  CT head as above  continue to hold lithium and fluphenazine per psych  continue cogentin  s/p ativan without improvement  free water for hypernatremia  Psych/neuro recs appreciated    *Hx schizophrenia  awake but disoriented and unable to communicate due to incomprehensible speech  worsening status today ? catatonia. Add olanzapine per psych/neuro.  Continue Cogentin, Trazadone PRN insomnia  hold lithium and fluphenazine   Psych recs appreciated    *Hx Afib  Continue Xarelto and Metoprolol     *Hx HLD   Continue Atorvastatin      *Hx HTN  Continue metoprolol      *Hx DM  ISS  ADA diet    *Hx hypothyroidism  TSH WNL  Continue Levothyroxine     *Hypokalemia  -supplemented  -monitor BMP    DVT ppx- Xarelto    Dispo:  Remains acute; start olanzapine for tardive dyskinesia. Free water. Eventual PUMA.    Plan discussed with Dr. Farr, Dr. Torrez, SW, RN

## 2023-11-01 NOTE — BH CONSULTATION LIAISON PROGRESS NOTE - NSBHASSESSMENTFT_PSY_ALL_CORE
63y Female with schizophrenia, depression and anxiety now sent from [facility] secondary to tremors and unsteady gait. Patient seen and assessed at bedside today. Patient is only responsive to name, CNA states that patient has become increasingly uncooperative with following commands. Patient did not eat this morning as per CNA. She is seen still in bed with resting tremors of entire body. Spoke with Dr. Torrez from Neuro regarding treatment options, will consider Ingrezza or Austedo regarding tardive dyskinesiea as well as Zyprexa IM/ZYDIS.    Assessment: Patient uncooperative with commands, only oriented to self and experiencing tardive dyskinesia like tremors.    Plan: D/C ativan 1mg per agitation  Recommendations:  Start Zyprexa 2.5mg q12 per agitation, ZYDIS oral disintegrating tablet if possible, if not consider IM administration  Consider addition of Ingrezza or Austedo for tardive dyskinesia 63y Female with schizophrenia, depression and anxiety now sent from [facility] secondary to tremors and unsteady gait. Patient seen and assessed at bedside today. Patient is only responsive to name, CNA states that patient has become increasingly uncooperative with following commands. Patient did not eat this morning as per CNA. She is seen still in bed with resting tremors of entire body. Spoke with Dr. Torrez from Neuro regarding treatment options, will consider Ingrezza or Austedo regarding tardive dyskinesia as well as Zyprexa IM/ZYDIS.    Assessment: Patient uncooperative with commands, only oriented to self and experiencing tardive dyskinesia like tremors.    Plan: D/C ativan 1mg per agitation  Recommendations:  Start Zyprexa 2.5mg q12 per agitation, ZYDIS oral disintegrating tablet if possible, if not consider IM administration  Consider addition of Ingrezza or Austedo for tardive dyskinesia

## 2023-11-02 LAB
ANION GAP SERPL CALC-SCNC: 11 MMOL/L — SIGNIFICANT CHANGE UP (ref 5–17)
ANION GAP SERPL CALC-SCNC: 11 MMOL/L — SIGNIFICANT CHANGE UP (ref 5–17)
ANION GAP SERPL CALC-SCNC: 15 MMOL/L — SIGNIFICANT CHANGE UP (ref 5–17)
ANION GAP SERPL CALC-SCNC: 15 MMOL/L — SIGNIFICANT CHANGE UP (ref 5–17)
BASOPHILS # BLD AUTO: 0.04 K/UL — SIGNIFICANT CHANGE UP (ref 0–0.2)
BASOPHILS # BLD AUTO: 0.04 K/UL — SIGNIFICANT CHANGE UP (ref 0–0.2)
BASOPHILS NFR BLD AUTO: 0.4 % — SIGNIFICANT CHANGE UP (ref 0–2)
BASOPHILS NFR BLD AUTO: 0.4 % — SIGNIFICANT CHANGE UP (ref 0–2)
BUN SERPL-MCNC: 18.4 MG/DL — SIGNIFICANT CHANGE UP (ref 8–20)
BUN SERPL-MCNC: 18.4 MG/DL — SIGNIFICANT CHANGE UP (ref 8–20)
BUN SERPL-MCNC: 21.5 MG/DL — HIGH (ref 8–20)
BUN SERPL-MCNC: 21.5 MG/DL — HIGH (ref 8–20)
CALCIUM SERPL-MCNC: 9.2 MG/DL — SIGNIFICANT CHANGE UP (ref 8.4–10.5)
CALCIUM SERPL-MCNC: 9.2 MG/DL — SIGNIFICANT CHANGE UP (ref 8.4–10.5)
CALCIUM SERPL-MCNC: 9.3 MG/DL — SIGNIFICANT CHANGE UP (ref 8.4–10.5)
CALCIUM SERPL-MCNC: 9.3 MG/DL — SIGNIFICANT CHANGE UP (ref 8.4–10.5)
CHLORIDE SERPL-SCNC: 112 MMOL/L — HIGH (ref 96–108)
CHLORIDE SERPL-SCNC: 112 MMOL/L — HIGH (ref 96–108)
CHLORIDE SERPL-SCNC: 114 MMOL/L — HIGH (ref 96–108)
CHLORIDE SERPL-SCNC: 114 MMOL/L — HIGH (ref 96–108)
CO2 SERPL-SCNC: 20 MMOL/L — LOW (ref 22–29)
CREAT SERPL-MCNC: 0.46 MG/DL — LOW (ref 0.5–1.3)
EGFR: 106 ML/MIN/1.73M2 — SIGNIFICANT CHANGE UP
EOSINOPHIL # BLD AUTO: 0.24 K/UL — SIGNIFICANT CHANGE UP (ref 0–0.5)
EOSINOPHIL # BLD AUTO: 0.24 K/UL — SIGNIFICANT CHANGE UP (ref 0–0.5)
EOSINOPHIL NFR BLD AUTO: 2.5 % — SIGNIFICANT CHANGE UP (ref 0–6)
EOSINOPHIL NFR BLD AUTO: 2.5 % — SIGNIFICANT CHANGE UP (ref 0–6)
GLUCOSE BLDC GLUCOMTR-MCNC: 105 MG/DL — HIGH (ref 70–99)
GLUCOSE BLDC GLUCOMTR-MCNC: 105 MG/DL — HIGH (ref 70–99)
GLUCOSE BLDC GLUCOMTR-MCNC: 115 MG/DL — HIGH (ref 70–99)
GLUCOSE BLDC GLUCOMTR-MCNC: 115 MG/DL — HIGH (ref 70–99)
GLUCOSE BLDC GLUCOMTR-MCNC: 116 MG/DL — HIGH (ref 70–99)
GLUCOSE BLDC GLUCOMTR-MCNC: 116 MG/DL — HIGH (ref 70–99)
GLUCOSE BLDC GLUCOMTR-MCNC: 121 MG/DL — HIGH (ref 70–99)
GLUCOSE BLDC GLUCOMTR-MCNC: 121 MG/DL — HIGH (ref 70–99)
GLUCOSE SERPL-MCNC: 111 MG/DL — HIGH (ref 70–99)
GLUCOSE SERPL-MCNC: 111 MG/DL — HIGH (ref 70–99)
GLUCOSE SERPL-MCNC: 121 MG/DL — HIGH (ref 70–99)
GLUCOSE SERPL-MCNC: 121 MG/DL — HIGH (ref 70–99)
HCT VFR BLD CALC: 38.7 % — SIGNIFICANT CHANGE UP (ref 34.5–45)
HCT VFR BLD CALC: 38.7 % — SIGNIFICANT CHANGE UP (ref 34.5–45)
HGB BLD-MCNC: 12 G/DL — SIGNIFICANT CHANGE UP (ref 11.5–15.5)
HGB BLD-MCNC: 12 G/DL — SIGNIFICANT CHANGE UP (ref 11.5–15.5)
IMM GRANULOCYTES NFR BLD AUTO: 0.2 % — SIGNIFICANT CHANGE UP (ref 0–0.9)
IMM GRANULOCYTES NFR BLD AUTO: 0.2 % — SIGNIFICANT CHANGE UP (ref 0–0.9)
LYMPHOCYTES # BLD AUTO: 2.11 K/UL — SIGNIFICANT CHANGE UP (ref 1–3.3)
LYMPHOCYTES # BLD AUTO: 2.11 K/UL — SIGNIFICANT CHANGE UP (ref 1–3.3)
LYMPHOCYTES # BLD AUTO: 21.6 % — SIGNIFICANT CHANGE UP (ref 13–44)
LYMPHOCYTES # BLD AUTO: 21.6 % — SIGNIFICANT CHANGE UP (ref 13–44)
MAGNESIUM SERPL-MCNC: 1.8 MG/DL — SIGNIFICANT CHANGE UP (ref 1.6–2.6)
MAGNESIUM SERPL-MCNC: 1.8 MG/DL — SIGNIFICANT CHANGE UP (ref 1.6–2.6)
MCHC RBC-ENTMCNC: 29.6 PG — SIGNIFICANT CHANGE UP (ref 27–34)
MCHC RBC-ENTMCNC: 29.6 PG — SIGNIFICANT CHANGE UP (ref 27–34)
MCHC RBC-ENTMCNC: 31 GM/DL — LOW (ref 32–36)
MCHC RBC-ENTMCNC: 31 GM/DL — LOW (ref 32–36)
MCV RBC AUTO: 95.3 FL — SIGNIFICANT CHANGE UP (ref 80–100)
MCV RBC AUTO: 95.3 FL — SIGNIFICANT CHANGE UP (ref 80–100)
MONOCYTES # BLD AUTO: 1.02 K/UL — HIGH (ref 0–0.9)
MONOCYTES # BLD AUTO: 1.02 K/UL — HIGH (ref 0–0.9)
MONOCYTES NFR BLD AUTO: 10.4 % — SIGNIFICANT CHANGE UP (ref 2–14)
MONOCYTES NFR BLD AUTO: 10.4 % — SIGNIFICANT CHANGE UP (ref 2–14)
NEUTROPHILS # BLD AUTO: 6.34 K/UL — SIGNIFICANT CHANGE UP (ref 1.8–7.4)
NEUTROPHILS # BLD AUTO: 6.34 K/UL — SIGNIFICANT CHANGE UP (ref 1.8–7.4)
NEUTROPHILS NFR BLD AUTO: 64.9 % — SIGNIFICANT CHANGE UP (ref 43–77)
NEUTROPHILS NFR BLD AUTO: 64.9 % — SIGNIFICANT CHANGE UP (ref 43–77)
PHOSPHATE SERPL-MCNC: 2.5 MG/DL — SIGNIFICANT CHANGE UP (ref 2.4–4.7)
PHOSPHATE SERPL-MCNC: 2.5 MG/DL — SIGNIFICANT CHANGE UP (ref 2.4–4.7)
PLATELET # BLD AUTO: 252 K/UL — SIGNIFICANT CHANGE UP (ref 150–400)
PLATELET # BLD AUTO: 252 K/UL — SIGNIFICANT CHANGE UP (ref 150–400)
POTASSIUM SERPL-MCNC: 3 MMOL/L — LOW (ref 3.5–5.3)
POTASSIUM SERPL-MCNC: 3 MMOL/L — LOW (ref 3.5–5.3)
POTASSIUM SERPL-MCNC: 3.4 MMOL/L — LOW (ref 3.5–5.3)
POTASSIUM SERPL-MCNC: 3.4 MMOL/L — LOW (ref 3.5–5.3)
POTASSIUM SERPL-SCNC: 3 MMOL/L — LOW (ref 3.5–5.3)
POTASSIUM SERPL-SCNC: 3 MMOL/L — LOW (ref 3.5–5.3)
POTASSIUM SERPL-SCNC: 3.4 MMOL/L — LOW (ref 3.5–5.3)
POTASSIUM SERPL-SCNC: 3.4 MMOL/L — LOW (ref 3.5–5.3)
RBC # BLD: 4.06 M/UL — SIGNIFICANT CHANGE UP (ref 3.8–5.2)
RBC # BLD: 4.06 M/UL — SIGNIFICANT CHANGE UP (ref 3.8–5.2)
RBC # FLD: 15.4 % — HIGH (ref 10.3–14.5)
RBC # FLD: 15.4 % — HIGH (ref 10.3–14.5)
SODIUM SERPL-SCNC: 145 MMOL/L — SIGNIFICANT CHANGE UP (ref 135–145)
SODIUM SERPL-SCNC: 145 MMOL/L — SIGNIFICANT CHANGE UP (ref 135–145)
SODIUM SERPL-SCNC: 147 MMOL/L — HIGH (ref 135–145)
SODIUM SERPL-SCNC: 147 MMOL/L — HIGH (ref 135–145)
WBC # BLD: 9.77 K/UL — SIGNIFICANT CHANGE UP (ref 3.8–10.5)
WBC # BLD: 9.77 K/UL — SIGNIFICANT CHANGE UP (ref 3.8–10.5)
WBC # FLD AUTO: 9.77 K/UL — SIGNIFICANT CHANGE UP (ref 3.8–10.5)
WBC # FLD AUTO: 9.77 K/UL — SIGNIFICANT CHANGE UP (ref 3.8–10.5)

## 2023-11-02 PROCEDURE — 99233 SBSQ HOSP IP/OBS HIGH 50: CPT

## 2023-11-02 PROCEDURE — 99232 SBSQ HOSP IP/OBS MODERATE 35: CPT

## 2023-11-02 RX ORDER — POTASSIUM CHLORIDE 20 MEQ
40 PACKET (EA) ORAL ONCE
Refills: 0 | Status: COMPLETED | OUTPATIENT
Start: 2023-11-02 | End: 2023-11-02

## 2023-11-02 RX ORDER — POTASSIUM CHLORIDE 20 MEQ
40 PACKET (EA) ORAL EVERY 4 HOURS
Refills: 0 | Status: DISCONTINUED | OUTPATIENT
Start: 2023-11-02 | End: 2023-11-02

## 2023-11-02 RX ORDER — POTASSIUM CHLORIDE 20 MEQ
10 PACKET (EA) ORAL
Refills: 0 | Status: COMPLETED | OUTPATIENT
Start: 2023-11-02 | End: 2023-11-02

## 2023-11-02 RX ORDER — POTASSIUM CHLORIDE 20 MEQ
10 PACKET (EA) ORAL
Refills: 0 | Status: DISCONTINUED | OUTPATIENT
Start: 2023-11-02 | End: 2023-11-02

## 2023-11-02 RX ADMIN — Medication 1 MILLIGRAM(S): at 05:05

## 2023-11-02 RX ADMIN — Medication 100 MILLIEQUIVALENT(S): at 11:22

## 2023-11-02 RX ADMIN — Medication 50 MILLIGRAM(S): at 05:05

## 2023-11-02 RX ADMIN — Medication 108 GRAM(S): at 12:16

## 2023-11-02 RX ADMIN — Medication 50 MICROGRAM(S): at 05:07

## 2023-11-02 RX ADMIN — Medication 1 MILLIGRAM(S): at 17:12

## 2023-11-02 RX ADMIN — Medication 40 MILLIEQUIVALENT(S): at 18:57

## 2023-11-02 RX ADMIN — CHLORHEXIDINE GLUCONATE 1 APPLICATION(S): 213 SOLUTION TOPICAL at 05:06

## 2023-11-02 RX ADMIN — Medication 100 MILLIEQUIVALENT(S): at 10:24

## 2023-11-02 RX ADMIN — Medication 108 GRAM(S): at 17:12

## 2023-11-02 RX ADMIN — Medication 100 MILLIEQUIVALENT(S): at 09:13

## 2023-11-02 RX ADMIN — Medication 108 GRAM(S): at 05:06

## 2023-11-02 RX ADMIN — Medication 108 GRAM(S): at 23:37

## 2023-11-02 RX ADMIN — OLANZAPINE 2.5 MILLIGRAM(S): 15 TABLET, FILM COATED ORAL at 17:12

## 2023-11-02 RX ADMIN — OLANZAPINE 2.5 MILLIGRAM(S): 15 TABLET, FILM COATED ORAL at 05:05

## 2023-11-02 RX ADMIN — RIVAROXABAN 20 MILLIGRAM(S): KIT at 17:12

## 2023-11-02 RX ADMIN — ATORVASTATIN CALCIUM 80 MILLIGRAM(S): 80 TABLET, FILM COATED ORAL at 21:06

## 2023-11-02 NOTE — PROGRESS NOTE ADULT - ASSESSMENT
63y Female with schizophrenia. Now with increased tremor and gait difficulty in setting of urinary tract infection.    Tremor/gait difficulty. Now with speech difficulty  Likely secondary to neuroleptic medications.   Head CT 10/30 no acute pathology  Has extrapyramidal findings on examination.   Appears improved following trial of Olanzapine for tardive dyskinesia, would continue  Tremor can be seen with lithium as well, although typically does not give cogwheeling.   The symptoms may have been exacerbated by the urinary tract infection.    There is ventriculomegaly on CT likely related to atrophy and ischemic white matter disease.   She was seen by neurosurgery and I agree that her presentation is not suggestive of Normal Pressure hydrocephalus.  Outpatient follow up post d/c    UTI.  Antibiotics per medicine.     Mobilize with physical therapy as tolerated    discussed with Dr Bose    will follow with you    Nelson Torrez MD PhD   [id number]

## 2023-11-02 NOTE — PROGRESS NOTE ADULT - ASSESSMENT
Patient is a 63 year old female with hx schizophrenia, Afib on Xarelto, HTN, DM sent form [facility] for ataxia and tremor due to neuroleptic drugs.    *Ataxia and tremor likely secondary to neuroleptic extrapyramidal finding from antipsychotic medications  dysarthria and tremor improving with olanzapine   repeat CT head without acute infarct; mild hydrocephalus  PT eval - PUMA  Fall precautions  Neurology, psych and neurosurgery consult appreciated    *Mild Communicating Hydrocephalus  neuro exam not consistent with hydrocephalus  CT head reviewed  outpatient MRI in 2-4 weeks for mild communicating hydrocephalus per NSG  NSGY and neuro recs appreciated    *Metabolic Encephalopathy due to Enterococcus UTI and hypernatremia; concerns for catatonia  UA positive; urine culture sensitivities reviewed  continue ampicillin   b12, TSH and ammonia levels WNL  CT head as above  continue to hold lithium and fluphenazine per psych  continue cogentin  s/p ativan without improvement  free water for hypernatremia  Psych/neuro recs appreciated    *Hx schizophrenia  awake and following commands today  continue olanzapine per psych/neuro.  Continue Cogentin, Trazadone PRN insomnia  hold lithium and fluphenazine   Psych recs appreciated    *Hx Afib  Continue Xarelto and Metoprolol     *Hx HLD   Continue Atorvastatin      *Hx HTN  Continue metoprolol      *Hx DM  ISS  ADA diet    *Hx hypothyroidism  TSH WNL  Continue Levothyroxine     *Hypokalemia  -supplemented  -monitor BMP    DVT ppx- Xarelto    Dispo:  Remains acute; continue free water. Encourage PO intake. Eventual PUMA once medically stable.    Plan discussed with Dr. Torrez, MARQUES, RN

## 2023-11-03 LAB
ANION GAP SERPL CALC-SCNC: 15 MMOL/L — SIGNIFICANT CHANGE UP (ref 5–17)
ANION GAP SERPL CALC-SCNC: 15 MMOL/L — SIGNIFICANT CHANGE UP (ref 5–17)
BASOPHILS # BLD AUTO: 0.04 K/UL — SIGNIFICANT CHANGE UP (ref 0–0.2)
BASOPHILS # BLD AUTO: 0.04 K/UL — SIGNIFICANT CHANGE UP (ref 0–0.2)
BASOPHILS NFR BLD AUTO: 0.4 % — SIGNIFICANT CHANGE UP (ref 0–2)
BASOPHILS NFR BLD AUTO: 0.4 % — SIGNIFICANT CHANGE UP (ref 0–2)
BUN SERPL-MCNC: 21.3 MG/DL — HIGH (ref 8–20)
BUN SERPL-MCNC: 21.3 MG/DL — HIGH (ref 8–20)
CALCIUM SERPL-MCNC: 9.2 MG/DL — SIGNIFICANT CHANGE UP (ref 8.4–10.5)
CALCIUM SERPL-MCNC: 9.2 MG/DL — SIGNIFICANT CHANGE UP (ref 8.4–10.5)
CHLORIDE SERPL-SCNC: 116 MMOL/L — HIGH (ref 96–108)
CHLORIDE SERPL-SCNC: 116 MMOL/L — HIGH (ref 96–108)
CO2 SERPL-SCNC: 19 MMOL/L — LOW (ref 22–29)
CO2 SERPL-SCNC: 19 MMOL/L — LOW (ref 22–29)
CREAT SERPL-MCNC: 0.46 MG/DL — LOW (ref 0.5–1.3)
CREAT SERPL-MCNC: 0.46 MG/DL — LOW (ref 0.5–1.3)
EGFR: 106 ML/MIN/1.73M2 — SIGNIFICANT CHANGE UP
EGFR: 106 ML/MIN/1.73M2 — SIGNIFICANT CHANGE UP
EOSINOPHIL # BLD AUTO: 0.17 K/UL — SIGNIFICANT CHANGE UP (ref 0–0.5)
EOSINOPHIL # BLD AUTO: 0.17 K/UL — SIGNIFICANT CHANGE UP (ref 0–0.5)
EOSINOPHIL NFR BLD AUTO: 1.8 % — SIGNIFICANT CHANGE UP (ref 0–6)
EOSINOPHIL NFR BLD AUTO: 1.8 % — SIGNIFICANT CHANGE UP (ref 0–6)
GLUCOSE BLDC GLUCOMTR-MCNC: 110 MG/DL — HIGH (ref 70–99)
GLUCOSE BLDC GLUCOMTR-MCNC: 110 MG/DL — HIGH (ref 70–99)
GLUCOSE BLDC GLUCOMTR-MCNC: 115 MG/DL — HIGH (ref 70–99)
GLUCOSE BLDC GLUCOMTR-MCNC: 115 MG/DL — HIGH (ref 70–99)
GLUCOSE BLDC GLUCOMTR-MCNC: 96 MG/DL — SIGNIFICANT CHANGE UP (ref 70–99)
GLUCOSE BLDC GLUCOMTR-MCNC: 96 MG/DL — SIGNIFICANT CHANGE UP (ref 70–99)
GLUCOSE BLDC GLUCOMTR-MCNC: 99 MG/DL — SIGNIFICANT CHANGE UP (ref 70–99)
GLUCOSE BLDC GLUCOMTR-MCNC: 99 MG/DL — SIGNIFICANT CHANGE UP (ref 70–99)
GLUCOSE SERPL-MCNC: 96 MG/DL — SIGNIFICANT CHANGE UP (ref 70–99)
GLUCOSE SERPL-MCNC: 96 MG/DL — SIGNIFICANT CHANGE UP (ref 70–99)
HCT VFR BLD CALC: 37.7 % — SIGNIFICANT CHANGE UP (ref 34.5–45)
HCT VFR BLD CALC: 37.7 % — SIGNIFICANT CHANGE UP (ref 34.5–45)
HGB BLD-MCNC: 12.3 G/DL — SIGNIFICANT CHANGE UP (ref 11.5–15.5)
HGB BLD-MCNC: 12.3 G/DL — SIGNIFICANT CHANGE UP (ref 11.5–15.5)
IMM GRANULOCYTES NFR BLD AUTO: 1.2 % — HIGH (ref 0–0.9)
IMM GRANULOCYTES NFR BLD AUTO: 1.2 % — HIGH (ref 0–0.9)
LYMPHOCYTES # BLD AUTO: 1.87 K/UL — SIGNIFICANT CHANGE UP (ref 1–3.3)
LYMPHOCYTES # BLD AUTO: 1.87 K/UL — SIGNIFICANT CHANGE UP (ref 1–3.3)
LYMPHOCYTES # BLD AUTO: 19.8 % — SIGNIFICANT CHANGE UP (ref 13–44)
LYMPHOCYTES # BLD AUTO: 19.8 % — SIGNIFICANT CHANGE UP (ref 13–44)
MAGNESIUM SERPL-MCNC: 1.8 MG/DL — SIGNIFICANT CHANGE UP (ref 1.6–2.6)
MAGNESIUM SERPL-MCNC: 1.8 MG/DL — SIGNIFICANT CHANGE UP (ref 1.6–2.6)
MCHC RBC-ENTMCNC: 30.8 PG — SIGNIFICANT CHANGE UP (ref 27–34)
MCHC RBC-ENTMCNC: 30.8 PG — SIGNIFICANT CHANGE UP (ref 27–34)
MCHC RBC-ENTMCNC: 32.6 GM/DL — SIGNIFICANT CHANGE UP (ref 32–36)
MCHC RBC-ENTMCNC: 32.6 GM/DL — SIGNIFICANT CHANGE UP (ref 32–36)
MCV RBC AUTO: 94.3 FL — SIGNIFICANT CHANGE UP (ref 80–100)
MCV RBC AUTO: 94.3 FL — SIGNIFICANT CHANGE UP (ref 80–100)
MONOCYTES # BLD AUTO: 0.89 K/UL — SIGNIFICANT CHANGE UP (ref 0–0.9)
MONOCYTES # BLD AUTO: 0.89 K/UL — SIGNIFICANT CHANGE UP (ref 0–0.9)
MONOCYTES NFR BLD AUTO: 9.4 % — SIGNIFICANT CHANGE UP (ref 2–14)
MONOCYTES NFR BLD AUTO: 9.4 % — SIGNIFICANT CHANGE UP (ref 2–14)
NEUTROPHILS # BLD AUTO: 6.37 K/UL — SIGNIFICANT CHANGE UP (ref 1.8–7.4)
NEUTROPHILS # BLD AUTO: 6.37 K/UL — SIGNIFICANT CHANGE UP (ref 1.8–7.4)
NEUTROPHILS NFR BLD AUTO: 67.4 % — SIGNIFICANT CHANGE UP (ref 43–77)
NEUTROPHILS NFR BLD AUTO: 67.4 % — SIGNIFICANT CHANGE UP (ref 43–77)
PHOSPHATE SERPL-MCNC: 3.1 MG/DL — SIGNIFICANT CHANGE UP (ref 2.4–4.7)
PHOSPHATE SERPL-MCNC: 3.1 MG/DL — SIGNIFICANT CHANGE UP (ref 2.4–4.7)
PLATELET # BLD AUTO: 234 K/UL — SIGNIFICANT CHANGE UP (ref 150–400)
PLATELET # BLD AUTO: 234 K/UL — SIGNIFICANT CHANGE UP (ref 150–400)
POTASSIUM SERPL-MCNC: 3.2 MMOL/L — LOW (ref 3.5–5.3)
POTASSIUM SERPL-MCNC: 3.2 MMOL/L — LOW (ref 3.5–5.3)
POTASSIUM SERPL-SCNC: 3.2 MMOL/L — LOW (ref 3.5–5.3)
POTASSIUM SERPL-SCNC: 3.2 MMOL/L — LOW (ref 3.5–5.3)
RBC # BLD: 4 M/UL — SIGNIFICANT CHANGE UP (ref 3.8–5.2)
RBC # BLD: 4 M/UL — SIGNIFICANT CHANGE UP (ref 3.8–5.2)
RBC # FLD: 15 % — HIGH (ref 10.3–14.5)
RBC # FLD: 15 % — HIGH (ref 10.3–14.5)
SODIUM SERPL-SCNC: 150 MMOL/L — HIGH (ref 135–145)
SODIUM SERPL-SCNC: 150 MMOL/L — HIGH (ref 135–145)
WBC # BLD: 9.45 K/UL — SIGNIFICANT CHANGE UP (ref 3.8–10.5)
WBC # BLD: 9.45 K/UL — SIGNIFICANT CHANGE UP (ref 3.8–10.5)
WBC # FLD AUTO: 9.45 K/UL — SIGNIFICANT CHANGE UP (ref 3.8–10.5)
WBC # FLD AUTO: 9.45 K/UL — SIGNIFICANT CHANGE UP (ref 3.8–10.5)

## 2023-11-03 PROCEDURE — 99233 SBSQ HOSP IP/OBS HIGH 50: CPT

## 2023-11-03 PROCEDURE — 99232 SBSQ HOSP IP/OBS MODERATE 35: CPT

## 2023-11-03 RX ORDER — SODIUM CHLORIDE 9 MG/ML
1000 INJECTION, SOLUTION INTRAVENOUS
Refills: 0 | Status: COMPLETED | OUTPATIENT
Start: 2023-11-03 | End: 2023-11-03

## 2023-11-03 RX ORDER — AMPICILLIN TRIHYDRATE 250 MG
1 CAPSULE ORAL ONCE
Refills: 0 | Status: COMPLETED | OUTPATIENT
Start: 2023-11-03 | End: 2023-11-03

## 2023-11-03 RX ORDER — OLANZAPINE 15 MG/1
2.5 TABLET, FILM COATED ORAL
Refills: 0 | Status: DISCONTINUED | OUTPATIENT
Start: 2023-11-03 | End: 2023-11-07

## 2023-11-03 RX ORDER — POTASSIUM CHLORIDE 20 MEQ
40 PACKET (EA) ORAL ONCE
Refills: 0 | Status: COMPLETED | OUTPATIENT
Start: 2023-11-03 | End: 2023-11-03

## 2023-11-03 RX ORDER — AMLODIPINE BESYLATE 2.5 MG/1
5 TABLET ORAL DAILY
Refills: 0 | Status: DISCONTINUED | OUTPATIENT
Start: 2023-11-03 | End: 2023-11-07

## 2023-11-03 RX ORDER — POTASSIUM CHLORIDE 20 MEQ
10 PACKET (EA) ORAL
Refills: 0 | Status: COMPLETED | OUTPATIENT
Start: 2023-11-03 | End: 2023-11-03

## 2023-11-03 RX ADMIN — OLANZAPINE 2.5 MILLIGRAM(S): 15 TABLET, FILM COATED ORAL at 05:07

## 2023-11-03 RX ADMIN — Medication 108 GRAM(S): at 17:56

## 2023-11-03 RX ADMIN — Medication 100 MILLIEQUIVALENT(S): at 10:16

## 2023-11-03 RX ADMIN — Medication 50 MICROGRAM(S): at 05:07

## 2023-11-03 RX ADMIN — Medication 1 MILLIGRAM(S): at 05:06

## 2023-11-03 RX ADMIN — CHLORHEXIDINE GLUCONATE 1 APPLICATION(S): 213 SOLUTION TOPICAL at 05:06

## 2023-11-03 RX ADMIN — Medication 50 MILLIGRAM(S): at 05:06

## 2023-11-03 RX ADMIN — OLANZAPINE 2.5 MILLIGRAM(S): 15 TABLET, FILM COATED ORAL at 18:04

## 2023-11-03 RX ADMIN — Medication 108 GRAM(S): at 12:44

## 2023-11-03 RX ADMIN — Medication 40 MILLIEQUIVALENT(S): at 22:57

## 2023-11-03 RX ADMIN — Medication 1 MILLIGRAM(S): at 11:30

## 2023-11-03 RX ADMIN — Medication 1 MILLIGRAM(S): at 17:56

## 2023-11-03 RX ADMIN — Medication 108 GRAM(S): at 05:06

## 2023-11-03 RX ADMIN — Medication 100 MILLIEQUIVALENT(S): at 09:12

## 2023-11-03 RX ADMIN — ATORVASTATIN CALCIUM 80 MILLIGRAM(S): 80 TABLET, FILM COATED ORAL at 22:38

## 2023-11-03 RX ADMIN — SODIUM CHLORIDE 100 MILLILITER(S): 9 INJECTION, SOLUTION INTRAVENOUS at 13:17

## 2023-11-03 RX ADMIN — RIVAROXABAN 20 MILLIGRAM(S): KIT at 17:56

## 2023-11-03 RX ADMIN — Medication 100 MILLIEQUIVALENT(S): at 11:47

## 2023-11-03 NOTE — DIETITIAN INITIAL EVALUATION ADULT - ADD RECOMMEND
Consider liberalize to regular diet to increase menu options; Add Ensure HP/Enlive BID; Rx MVI daily; Consider appetite stimulant; Encourage HBV protein sources  Consider liberalize to regular diet to increase menu options; Add Glucerna BID; Rx MVI daily; Consider appetite stimulant; Encourage HBV protein sources

## 2023-11-03 NOTE — PROGRESS NOTE ADULT - ASSESSMENT
Patient is a 63 year old female with hx schizophrenia, Afib on Xarelto, HTN, DM sent form [facility] for ataxia and tremor due to neuroleptic drugs. CT with mild communicating hydrocephalus which is not consistent with patient's current presentation. Patient was started on olanzapine with improvement; now awake and alert but still not at baseline. Also, noted to have metabolic encephalopathy due to Enterococcus UTI and hypernatremia.    *Ataxia and tremor likely secondary to neuroleptic extrapyramidal finding from antipsychotic medications  dysarthria and tremor improving with olanzapine; switch from IM to oral given improvement in mentation  repeat CT head without acute infarct; mild hydrocephalus  PT eval - PUMA  Fall precautions  Neurology, psych and neurosurgery consult appreciated    *Mild Communicating Hydrocephalus  neuro exam not consistent with hydrocephalus  CT head reviewed  outpatient MRI in 2-4 weeks for mild communicating hydrocephalus per NSG  NSGY and neuro recs appreciated    *Metabolic Encephalopathy due to Enterococcus UTI and hypernatremia; concerns for catatonia  UA positive; urine culture sensitivities reviewed  complete last day of antibiotics today  b12, TSH and ammonia levels WNL  CT head as above  continue to hold lithium and fluphenazine per psych  continue cogentin  parenteral and enteral free water for hypernatremia  Psych/neuro recs appreciated    *Hx schizophrenia  awake and following commands today  continue olanzapine per psych/neuro.  Continue Cogentin, Trazadone PRN insomnia  hold lithium and fluphenazine   no longer on constant observation  Psych recs appreciated    *Hx Afib  Continue Xarelto and Metoprolol     *Hx HLD   Continue Atorvastatin      *Hx HTN  Continue metoprolol      *Hx DM  ISS  ADA diet    *Hx hypothyroidism  TSH WNL  Continue Levothyroxine     *Hypokalemia  -supplemented  -monitor BMP    DVT ppx- Xarelto    Dispo:  Remains acute; continue free water. Encourage PO intake. PT re-evaluation for PUMA since patient is more awake.      Plan discussed with Dr. Torrez, MARQUES, RN (from inpatient psych and without any family to update per MARQUES)

## 2023-11-03 NOTE — DIETITIAN INITIAL EVALUATION ADULT - ETIOLOGY
related to inability to meet sufficient protein-energy needs in setting of metabolic encephalopathy 2/2 UTI

## 2023-11-03 NOTE — DIETITIAN INITIAL EVALUATION ADULT - ORAL INTAKE PTA/DIET HISTORY
Pt admitted with metabolic encephalopathy 2/2 UTI, incomprehensible speech noted, unable to complete nutrition interview. Per RN Pt consumed <25% of breakfast meal this morning, documented with poor PO intake throughout hospitalization. Unclear accuracy of admission weight 180lbs, RD bed scale weight 150lbs visually appears more accurate.

## 2023-11-03 NOTE — BH CONSULTATION LIAISON PROGRESS NOTE - NSBHFUPINTERVALHXFT_PSY_A_CORE
63y Female with schizophrenia, depression and anxiety now sent from [facility] secondary to tremors and unsteady gait. Patient seen and assessed at bedside this morning. Patient has made significant improvement, sitting upright, decreased tremors and is currently tolerating PO. Patient was able to have a conversation and deny SI/HI/AVH. Patient mentions she is" feeling OK" this morning. Patient was calm, cooperative and pleasant during interview. Discussed improvements with .

## 2023-11-03 NOTE — DIETITIAN INITIAL EVALUATION ADULT - PERTINENT MEDS FT
MEDICATIONS  (STANDING):  ampicillin  IVPB      ampicillin  IVPB 1 Gram(s) IV Intermittent every 6 hours  atorvastatin 80 milliGRAM(s) Oral at bedtime  benztropine 1 milliGRAM(s) Oral two times a day  chlorhexidine 2% Cloths 1 Application(s) Topical <User Schedule>  dextrose 5%. 1000 milliLiter(s) (100 mL/Hr) IV Continuous <Continuous>  dextrose 5%. 1000 milliLiter(s) (50 mL/Hr) IV Continuous <Continuous>  dextrose 5%. 1000 milliLiter(s) (100 mL/Hr) IV Continuous <Continuous>  dextrose 50% Injectable 25 Gram(s) IV Push once  dextrose 50% Injectable 12.5 Gram(s) IV Push once  dextrose 50% Injectable 25 Gram(s) IV Push once  folic acid 1 milliGRAM(s) Oral daily  glucagon  Injectable 1 milliGRAM(s) IntraMuscular once  insulin lispro (ADMELOG) corrective regimen sliding scale   SubCutaneous three times a day before meals  insulin lispro (ADMELOG) corrective regimen sliding scale   SubCutaneous at bedtime  levothyroxine 50 MICROGram(s) Oral daily  metoprolol succinate ER 50 milliGRAM(s) Oral daily  OLANZapine Injectable 2.5 milliGRAM(s) IntraMuscular every 12 hours  potassium chloride  10 mEq/100 mL IVPB 10 milliEquivalent(s) IV Intermittent every 1 hour  rivaroxaban 20 milliGRAM(s) Oral with dinner    MEDICATIONS  (PRN):  acetaminophen     Tablet .. 650 milliGRAM(s) Oral every 6 hours PRN Temp greater or equal to 38C (100.4F), Mild Pain (1 - 3), Moderate Pain (4 - 6)  dextrose Oral Gel 15 Gram(s) Oral once PRN Blood Glucose LESS THAN 70 milliGRAM(s)/deciliter  ondansetron Injectable 4 milliGRAM(s) IV Push every 6 hours PRN Nausea and/or Vomiting  traZODone 50 milliGRAM(s) Oral at bedtime PRN for insomnia

## 2023-11-03 NOTE — DIETITIAN INITIAL EVALUATION ADULT - PERTINENT LABORATORY DATA
11-03    150<H>  |  116<H>  |  21.3<H>  ----------------------------<  96  3.2<L>   |  19.0<L>  |  0.46<L>    Ca    9.2      03 Nov 2023 05:40  Phos  3.1     11-03  Mg     1.8     11-03    POCT Blood Glucose.: 96 mg/dL (11-03-23 @ 07:23)

## 2023-11-03 NOTE — DIETITIAN NUTRITION RISK NOTIFICATION - MUSCLE MASS (LOSS OF MUSCLE)
Department of Medicine  Division of Endocrinology    Chief Complaint: Filomena Knutson is a 52year old female comes for thyroid evaluation, Referred by JESSA Justin     Assessment/Plan:  Thyrotoxicosis without thyroid storm, unspecified thyrotoxicosis type  Stable on Methimazole therapy  - Euthyroid on exam - No changes - Repeat thyroid labs prior to next visit    Class 2 severe obesity due to excess calories with serious comorbidity and body mass index (BMI) of 36.0 to 36.9 in adult (CMS/HCC)  Ongoing - She is continuing to lose weight and we discussed options for flavoring food so her boredom with diet is less. Continue bariatric follow-up - Commended on weight loss. Type 2 diabetes mellitus without complication, unspecified whether long term insulin use (CMS/[facility])  At goal - No changes  - POCT BLOOD SUGAR HAND HELD DEVICE  - VENIPUNCTURE FINGER HEEL EAR STICK    Type 2 diabetes mellitus without complication, without long-term current use of insulin (CMS/[facility])  At goal - Sent for diabetes foot care  - SERVICE TO PODIATRY  - GLYCOHEMOGLOBIN; Future    Onychomycosis  Referred for toenail care  - SERVICE TO PODIATRY        Patient Instructions   No medication changes  - Lab in 3 months follow-up then. See podiatry for toenails      Return in about 3 months (around 6/3/2021) for Diabetes, Thyroid. History of Present Illness:    Duration: Â Hyperthyroidism, diagnosed in August, 2007, untreated until February, 2013. She has been treated with methimazole, Feb 2013 - April 1, 2014, restarted October 9, 2014 to the present. Her dose of methimazole was 5 mg alternating with 2.5 mg qod,Â 10/19/16 - 12/23/16, on methimazole 5 mg daily from 12/23/16 -1/31/17. Â Methimazole was resumed at 5 mg daily on 4/20/17, increased to 5 mg bid on 8/28/17. Â She is now taking 10 mg bid since 8/13/19. Takes this as 20 mg in the am. Â She had a sleeve gastrectomy on 09/16/20 and has lost a total of 95 pounds.   Attempts to wean "this medication have resulted in return to a hyperthyroid state and she is not interested in I131. Patient  experiencing:  No difficulty swallowing food, hoarseness   No neck pressure, no neck pain  No shortness of breath  + heat intolerance - relates to fibromyalgia, nor cold intolerance  No palpitations  No depression, no memory problems, no tremors  + dry skin  No bowel movement changes  No fatigue  No proximal muscle weakness, no weight loss or gain. No loss of sexual drive  Menstrual function: Menopause - 1.5 years ago - Periods have restarted with weight loss. On menses now.    + smoking history - June 2000 Quit  No changes in vision:    No hx of radiation exposure.  + hx of thyroid disease in family - believes mother  No hx of autoimmune disease in family    Thyroid US: No  Uptake and scan: No  Previous thyroid biopsy: No  Previous thyroid surgery: No  Thyroid medications: Methimazole 10 mg twice daily - Takes 20 mg in the am.     She does have Type 2 diabetes - Started with Gestational in 2005. Dr. Ana Laura Chavis was her original provider and was seeing her for thyroid, for which I assumed care. Discharged from her care for Diabetes in 2018. She is now asking for diabetes care. The initial referral in 2014 was for diabetes. Diabetes is controlled with A1c at 6.2    Type 2 diabetes. Currently on No medication. Tests glucoses:  1-2  times daily  Any history of hypoglycemia:   Latest HbA1c:   Hemoglobin A1C (%)   Date Value   02/27/2021 5.8 (H)     No results found for: [facility]    Dietary compliance: Bariatric diet - ""boring\"" . Added some vegetables. Carbs are up to 10 per meal    Meals per day: 5   Uses carbohydrate count:  Yes  Diabetes education:  Yes and 2020,  Family history of DM:  Yes and mother    Preventative Care:  HTN:Yes and holding medication  On ACE-inhibitors/ARB:holding due to improved blood pressures  Last urine microalbumin/creatinine:  Microalbumin/ Creatinine Ratio (mg/g)   Date " [place]... Value   12/31/2020 8.7     On Statin: No and refuses - Explained benefit - and declines. Last eye exam: 7/10/2020 [place]  History of coronary artery disease: No  On aspirin: No  Has ID bracelet:  No. Has been informed of its benefit. History of gastroparesis: No  No history of MEN 2, gastroparesis, thyroid cancer in self or family  - + pancreatitis. Is patient up-to-date with immunizations including Influenza and Pneumococcal vaccinations?  Yes Both UTD  Self-foot examinations at home: Yes      Past Medical History:   Diagnosis Date   â¢ ADD (attention deficit disorder)    â¢ Allergic rhinitis    â¢ Anemia    â¢ Depression    â¢ Dry eyes    â¢ Fibromyalgia    â¢ Gallstones    â¢ Hiatal hernia    â¢ Hypertension    â¢ Hypothyroidism    â¢ Kidney stones    â¢ Lipoma of right upper extremity 8/11/2017   â¢ Obesity    â¢ Pancreatitis     in 2012   â¢ PCOD (polycystic ovarian disease)    â¢ Type 2 diabetes mellitus (CMS/HCC)        Past Surgical History:   Procedure Laterality Date   â¢ Colonoscopy Left 02/12/2016   â¢ Colonoscopy  01/25/2020   â¢ Esophagogastroduodenoscopy Left 02/12/2016   â¢ Esophagogastroduodenoscopy  01/25/2020   â¢ Extracorporeal shock wave lithotripsy Left 08/23/2018   â¢ Hernia repair     â¢ Incisional hernia repair  09/17/2013   â¢ Lipoma resection  03/25/2006    neck   â¢ Lipoma resection  2006    NECK   â¢ Paraesophageal hernia repair with gastropexy  09/16/2020   â¢ Pelvis laparoscopy,dx  11/00/1992    Laparoscopy diagnostic   â¢ Removal gallbladder  05/31/2012    Cholecystectomy (gallbladder)   â¢ Wrist surgery      plate and screws        Social History     Socioeconomic History   â¢ Marital status: Single     Spouse name: Not on file   â¢ Number of children: Not on file   â¢ Years of education: Not on file   â¢ Highest education level: Not on file   Occupational History   â¢ Not on file   Social Needs   â¢ Financial resource strain: Not on file   â¢ Food insecurity     Worry: Not on file     Inability: Not on file   â¢ Transportation needs     Medical: Not on file     Non-medical: Not on file   Tobacco Use   â¢ Smoking status: Former Smoker     Packs/day: 0.50     Years: 15.00     Pack years: 7.50     Types: Cigarettes     Quit date: 2000     Years since quittin.7   â¢ Smokeless tobacco: Never Used   Substance and Sexual Activity   â¢ Alcohol use: Not Currently     Alcohol/week: 1.0 standard drinks     Types: 1 Standard drinks or equivalent per week   â¢ Drug use: No   â¢ Sexual activity: Not on file   Lifestyle   â¢ Physical activity     Days per week: 0 days     Minutes per session: 0 min   â¢ Stress: Very much   Relationships   â¢ Social connections     Talks on phone: Twice a week     Gets together: Once a week     Attends Tenriism service: Patient refused     Active member of club or organization: No     Attends meetings of clubs or organizations: Never     Relationship status: Never    â¢ Intimate partner violence     Fear of current or ex partner: No     Emotionally abused: No     Physically abused: No     Forced sexual activity: No   Other Topics Concern   â¢ Not on file   Social History Narrative   â¢ Not on file       Family History   Problem Relation Age of Onset   â¢ Diabetes Mother    â¢ Heart disease Mother    â¢ High cholesterol Mother    â¢ Hypertension Mother    â¢ Diabetes Maternal Grandmother    â¢ Diabetes Paternal Grandmother          Medications:  Current Outpatient Medications   Medication Sig   â¢ empagliflozin (Jardiance) 10 MG tablet Take 1 tablet by mouth daily (before breakfast). â¢ methIMAzole (TAPAZOLE) 5 MG tablet Take 4 tablets by mouth daily. â¢ blood glucose test strip Test blood sugar 1 times daily as directed. Meter: Verio Flex   â¢ Lancets Thin Misc Brand per insurance preference. Test glucoses 1 times daily. (Verio Flex per patient)   â¢ HYDROcodone-acetaminophen (NORCO) 5-325 MG per tablet Take 1-2 tablets by mouth every 4 hours as needed for Pain.    â¢ lidocaine (LIDODERM) 5 % patch Place 1 "patch onto the skin every 24 hours. Remove patch 12 hours after applying   â¢ pantoprazole (PROTONIX) 40 MG tablet TAKE 1 TABLET BY MOUTH TWICE DAILY   â¢ acetaminophen (TYLENOL) 500 MG tablet Take 500 mg by mouth every 6 hours as needed for Pain. â¢ calcium carbonate (TUMS) 500 MG chewable tablet Chew 2 tablets by mouth daily. â¢ mometasone (NASONEX) 50 MCG/ACT nasal spray Spray 2 sprays in each nostril daily as needed (congestion). â¢ tiZANidine (ZANAFLEX) 4 MG tablet Take 4 mg by mouth every 6 hours as needed (pain). â¢ Cholecalciferol (Vitamin D3) 50 mcg (2,000 units) capsule Take 50 mcg by mouth daily. â¢ Cyanocobalamin (B-12) 1000 MCG SL Tab Place 500 mcg under the tongue daily. â¢ albuterol 108 (90 Base) MCG/ACT inhaler Inhale 2 puffs into the lungs every 4 hours as needed for Shortness of Breath or Wheezing. â¢ loratadine (CLARITIN) 10 MG tablet Take 10 mg by mouth daily as needed for Allergies. No current facility-administered medications for this visit. ALLERGIES:   Allergen Reactions   â¢ Amoxicillin Other (See Comments)     Blisters. â¢ Ciprofloxacin Other (See Comments)     blisters   â¢ Cefdinir RASH     Minor rash   â¢ Metformin NAUSEA     Reported - Remote       REVIEW OF SYSTEMS:  Completed by MA, I reviewed it with the patient and agree with it's content. Complaints are chronic or as mentioned in HPI. PHYSICAL EXAMINATION:  VITAL SIGNS:    Visit Vitals  /74 (BP Location: RUE - Right upper extremity, Patient Position: Sitting, Cuff Size: Large Adult)   Pulse 68   Ht 5' 5.5"" (1.664 m)   Wt 101.8 kg   LMP 10/18/2018   BMI 36.79 kg/mÂ²    Body mass index is 36.79 kg/mÂ². Constitutional: This is a(n) White female in no distress. She ambulates normally without assistance. AAO times 3. Fingerstick glucose is 90, 4 hours after eating. Psychiatric:  Alert and oriented, normal mood and affect, normal eye contact, clear speech. Neck: No masses.  No cervical or supraclavicular " lymphadenopathy. No thyromegaly, Trachea is midline. No JVD. Lungs: Chest symmetrical, Non-labored and clear to auscultation bilaterally, No rales, No Rhonchi. Cardiovascular: S1, S2 noted, regular rate and rhythm, no gallop or murmur. No edema. Gastrointestinal: Normal bowel sounds, nontender, non-distended, no masses or guarding, no hepatosplenomegaly. Resolving ecchymosis consistent with injection history. These are benign and without evidence of lipohypertrophy. Skin: Warm and dry, normal texture, no rashes or ulcerations. Musculoskeletal: Normal gait, no tremor, no clubbing or cyanosis. Neurologic:  Reflexes symmetrical and WNL, Cranial nerves 3-12 intact. Diabetic Foot Exam Documentation     Normal Left Foot Exam:   Skin integrity is normal. Dorsalis pedis and posterior tibial pulses are present. Pressure sensation using the [place] monofilament is present. Abnormal Right Foot Exam:   Skin integrity is negative for erythema, blisters, callosities and ulcers, and positive for onychomycosis. Dorsalis pedis and posterior tibial pulses are present. Pressure sensation using the [place] is present. Home glucose reading evaluation:   Jay Caba  YOB: 1967  Date of diagnosis:   Exported from [employer]: Mar 3, 2021    Reporting Period: Feb 18 - Mar 3, 2021    Avg. Daily Readings In Range (mg/dL)  >250     0  180-250  0     0.9  54-70    0  <54      0    Avg. Glucose (BGM): 103 mg/dL    Std. Deviation (BGM): 10 mg/dL    CV (BGM): 9%    BG Extents (BGM) (mg/dL)  Min BG  92  Max BG  121        Jay Caba  YOB: 1967  Date of diagnosis:   Exported from [employer]: Mar 3, 2021    Reporting Period: Feb 18 - Mar 3, 2021    Avg. Daily Readings In Range (mg/dL)  >250     0  180-250  0     0.9  54-70    0  <54      0    Avg. Glucose (BGM): 103 mg/dL    Std.  Deviation (BGM): 10 mg/dL    CV (BGM): 9%    BG Extents (BGM) (mg/dL)  Min BG 92  Max BG  121      Laboratory Results:  TSH (mcUnits/mL)   Date Value   11/27/2020 1.788     Lab Results   Component Value Date    SODIUM 140 12/31/2020    POTASSIUM 4.2 12/31/2020    CHLORIDE 105 12/31/2020    CO2 29 12/31/2020    BUN 19 12/31/2020    CREATININE 0.62 12/31/2020    GLUCOSE 100 (H) 12/31/2020    URMIC 2.16 08/12/2019    .00 12/31/2020    MALBCR 8.7 12/31/2020     Hemoglobin A1C (%)   Date Value   02/27/2021 5.8 (H)   11/27/2020 6.2 (H)     TSH (mcUnits/mL)   Date Value   11/27/2020 1.788     Cholesterol (mg/dL)   Date Value   12/31/2020 165     HDL (mg/dL)   Date Value   12/31/2020 60     Cholesterol/ HDL Ratio (no units)   Date Value   12/31/2020 2.8     Triglycerides (mg/dL)   Date Value   12/31/2020 83     LDL (mg/dL)   Date Value   12/31/2020 88     GFR Estimate, Non  (no units)   Date Value   01/31/2020 >90      Vitamin D, 25-Hydroxy (ng/mL)   Date Value   12/31/2020 79.2         Total time of visit was 30 minutes with greater than 50% spent in counseling and plan of care. A copy of this note was sent to JESSA Justin.   JESSA Roach

## 2023-11-03 NOTE — PROGRESS NOTE ADULT - ASSESSMENT
63y Female with schizophrenia. Now with increased tremor and gait difficulty in setting of urinary tract infection.    Tremor/gait difficulty. Now with speech difficulty  Likely secondary to neuroleptic medications.   Head CT 10/30 no acute pathology  Has extrapyramidal findings on examination.   Appears improved following trial of Olanzapine for tardive dyskinesia, would continue  Tremor can be seen with lithium as well, although typically does not give cogwheeling.   The symptoms may have been exacerbated by the urinary tract infection.    There is ventriculomegaly on CT likely related to atrophy and ischemic white matter disease.   She was seen by neurosurgery and I agree that her presentation is not suggestive of Normal Pressure hydrocephalus.  Outpatient follow up post d/c    UTI.  Antibiotics per medicine.     Renal  Pt is hyper natremia TU=958  per medicine    Mobilize with physical therapy as tolerated    discussed with Dr Bose    will follow with you    Nelson Torrez MD PhD   [id number]

## 2023-11-03 NOTE — BH CONSULTATION LIAISON PROGRESS NOTE - NSBHASSESSMENTFT_PSY_ALL_CORE
63y Female with schizophrenia, depression and anxiety now sent from [facility] secondary to tremors and unsteady gait. Patient seen and assessed at bedside this morning. Patient has made significant improvement, sitting upright, decreased tremors and is currently tolerating PO. Patient was able to have a conversation and deny SI/HI/AVH. Patient mentions she is" feeling OK" this morning. Patient was calm, cooperative and pleasant during interview. Discussed improvements with .    Assessment: patient improving upon initiation of Zyprexa 2.5mg q12. Patient experiencing tremors but not to severity of before. She is also now able to communicate has shown significant improvement regarding following commands and participating in interview.    Plan: continue ZYPREXA 2.5mg q12, however switch to ZYDIS dissolvable tablets as long as patient is able to tolerate PO.  Initiation of Ingrezza or Austedo for tardive dyskinesia  Remain on 1:1 due to unsteady gait until improvement

## 2023-11-03 NOTE — DIETITIAN NUTRITION RISK NOTIFICATION - ADDITIONAL COMMENTS/DIETITIAN RECOMMENDATIONS
1) Consider liberalize to regular diet to increase menu options  2) Add Glucerna BID  3) Rx MVI daily  4) Consider appetite stimulant  5) Encourage HBV protein sources  6) Monitor weights daily for trend/accuracy   7) Provide encouragement/assistance as needed during mealtimes to inc PO

## 2023-11-03 NOTE — DIETITIAN INITIAL EVALUATION ADULT - OTHER INFO
63yoF hx schizophrenia, Afib on Xarelto, HTN, DM sent from [facility] for ataxia and tremor. Hx supplemented by chart review as pt is somewhat of a poor historian. Staff at [facility] reported bilateral tremors of both hands for the past few days associated with gait instability though some parts of chart mention that the unsteady gait is the patient’s baseline. Medication list includes lithium, fluphenazine, haldol PRN and pt reportedly with adjustment in some of her psychiatric medications recently. CT head shows possible mild communication hydrocephalus. UA showing moderate leukocyte esterase and significant pyuria.

## 2023-11-03 NOTE — DIETITIAN INITIAL EVALUATION ADULT - OBTAIN DAILY WEIGHT
Detail Level: Detailed Number Of Freeze-Thaw Cycles: 2 freeze-thaw cycles Render Post-Care Instructions In Note?: yes Consent: The patient's consent was obtained including but not limited to risks of crusting, scabbing, blistering, scarring, darker or lighter pigmentary change, recurrence, incomplete removal and infection. Render Note In Bullet Format When Appropriate: No Duration Of Freeze Thaw-Cycle (Seconds): 10 Post-Care Instructions: I reviewed with the patient in detail post-care instructions. Patient is to wear sunprotection, and avoid picking at any of the treated lesions. Pt may apply Vaseline to crusted or scabbing areas. yes

## 2023-11-04 LAB
ANION GAP SERPL CALC-SCNC: 16 MMOL/L — SIGNIFICANT CHANGE UP (ref 5–17)
ANION GAP SERPL CALC-SCNC: 16 MMOL/L — SIGNIFICANT CHANGE UP (ref 5–17)
BASOPHILS # BLD AUTO: 0.04 K/UL — SIGNIFICANT CHANGE UP (ref 0–0.2)
BASOPHILS # BLD AUTO: 0.04 K/UL — SIGNIFICANT CHANGE UP (ref 0–0.2)
BASOPHILS NFR BLD AUTO: 0.5 % — SIGNIFICANT CHANGE UP (ref 0–2)
BASOPHILS NFR BLD AUTO: 0.5 % — SIGNIFICANT CHANGE UP (ref 0–2)
BUN SERPL-MCNC: 16.9 MG/DL — SIGNIFICANT CHANGE UP (ref 8–20)
BUN SERPL-MCNC: 16.9 MG/DL — SIGNIFICANT CHANGE UP (ref 8–20)
CALCIUM SERPL-MCNC: 9.1 MG/DL — SIGNIFICANT CHANGE UP (ref 8.4–10.5)
CALCIUM SERPL-MCNC: 9.1 MG/DL — SIGNIFICANT CHANGE UP (ref 8.4–10.5)
CHLORIDE SERPL-SCNC: 109 MMOL/L — HIGH (ref 96–108)
CHLORIDE SERPL-SCNC: 109 MMOL/L — HIGH (ref 96–108)
CO2 SERPL-SCNC: 18 MMOL/L — LOW (ref 22–29)
CO2 SERPL-SCNC: 18 MMOL/L — LOW (ref 22–29)
CREAT SERPL-MCNC: 0.45 MG/DL — LOW (ref 0.5–1.3)
CREAT SERPL-MCNC: 0.45 MG/DL — LOW (ref 0.5–1.3)
EGFR: 107 ML/MIN/1.73M2 — SIGNIFICANT CHANGE UP
EGFR: 107 ML/MIN/1.73M2 — SIGNIFICANT CHANGE UP
EOSINOPHIL # BLD AUTO: 0.23 K/UL — SIGNIFICANT CHANGE UP (ref 0–0.5)
EOSINOPHIL # BLD AUTO: 0.23 K/UL — SIGNIFICANT CHANGE UP (ref 0–0.5)
EOSINOPHIL NFR BLD AUTO: 2.6 % — SIGNIFICANT CHANGE UP (ref 0–6)
EOSINOPHIL NFR BLD AUTO: 2.6 % — SIGNIFICANT CHANGE UP (ref 0–6)
GLUCOSE BLDC GLUCOMTR-MCNC: 86 MG/DL — SIGNIFICANT CHANGE UP (ref 70–99)
GLUCOSE BLDC GLUCOMTR-MCNC: 86 MG/DL — SIGNIFICANT CHANGE UP (ref 70–99)
GLUCOSE BLDC GLUCOMTR-MCNC: 91 MG/DL — SIGNIFICANT CHANGE UP (ref 70–99)
GLUCOSE BLDC GLUCOMTR-MCNC: 91 MG/DL — SIGNIFICANT CHANGE UP (ref 70–99)
GLUCOSE BLDC GLUCOMTR-MCNC: 95 MG/DL — SIGNIFICANT CHANGE UP (ref 70–99)
GLUCOSE SERPL-MCNC: 88 MG/DL — SIGNIFICANT CHANGE UP (ref 70–99)
GLUCOSE SERPL-MCNC: 88 MG/DL — SIGNIFICANT CHANGE UP (ref 70–99)
HCT VFR BLD CALC: 38.3 % — SIGNIFICANT CHANGE UP (ref 34.5–45)
HCT VFR BLD CALC: 38.3 % — SIGNIFICANT CHANGE UP (ref 34.5–45)
HGB BLD-MCNC: 12.6 G/DL — SIGNIFICANT CHANGE UP (ref 11.5–15.5)
HGB BLD-MCNC: 12.6 G/DL — SIGNIFICANT CHANGE UP (ref 11.5–15.5)
IMM GRANULOCYTES NFR BLD AUTO: 0.2 % — SIGNIFICANT CHANGE UP (ref 0–0.9)
IMM GRANULOCYTES NFR BLD AUTO: 0.2 % — SIGNIFICANT CHANGE UP (ref 0–0.9)
LYMPHOCYTES # BLD AUTO: 1.93 K/UL — SIGNIFICANT CHANGE UP (ref 1–3.3)
LYMPHOCYTES # BLD AUTO: 1.93 K/UL — SIGNIFICANT CHANGE UP (ref 1–3.3)
LYMPHOCYTES # BLD AUTO: 21.8 % — SIGNIFICANT CHANGE UP (ref 13–44)
LYMPHOCYTES # BLD AUTO: 21.8 % — SIGNIFICANT CHANGE UP (ref 13–44)
MAGNESIUM SERPL-MCNC: 2.2 MG/DL — SIGNIFICANT CHANGE UP (ref 1.6–2.6)
MAGNESIUM SERPL-MCNC: 2.2 MG/DL — SIGNIFICANT CHANGE UP (ref 1.6–2.6)
MCHC RBC-ENTMCNC: 30.9 PG — SIGNIFICANT CHANGE UP (ref 27–34)
MCHC RBC-ENTMCNC: 30.9 PG — SIGNIFICANT CHANGE UP (ref 27–34)
MCHC RBC-ENTMCNC: 32.9 GM/DL — SIGNIFICANT CHANGE UP (ref 32–36)
MCHC RBC-ENTMCNC: 32.9 GM/DL — SIGNIFICANT CHANGE UP (ref 32–36)
MCV RBC AUTO: 93.9 FL — SIGNIFICANT CHANGE UP (ref 80–100)
MCV RBC AUTO: 93.9 FL — SIGNIFICANT CHANGE UP (ref 80–100)
MONOCYTES # BLD AUTO: 0.83 K/UL — SIGNIFICANT CHANGE UP (ref 0–0.9)
MONOCYTES # BLD AUTO: 0.83 K/UL — SIGNIFICANT CHANGE UP (ref 0–0.9)
MONOCYTES NFR BLD AUTO: 9.4 % — SIGNIFICANT CHANGE UP (ref 2–14)
MONOCYTES NFR BLD AUTO: 9.4 % — SIGNIFICANT CHANGE UP (ref 2–14)
NEUTROPHILS # BLD AUTO: 5.81 K/UL — SIGNIFICANT CHANGE UP (ref 1.8–7.4)
NEUTROPHILS # BLD AUTO: 5.81 K/UL — SIGNIFICANT CHANGE UP (ref 1.8–7.4)
NEUTROPHILS NFR BLD AUTO: 65.5 % — SIGNIFICANT CHANGE UP (ref 43–77)
NEUTROPHILS NFR BLD AUTO: 65.5 % — SIGNIFICANT CHANGE UP (ref 43–77)
PHOSPHATE SERPL-MCNC: 3 MG/DL — SIGNIFICANT CHANGE UP (ref 2.4–4.7)
PHOSPHATE SERPL-MCNC: 3 MG/DL — SIGNIFICANT CHANGE UP (ref 2.4–4.7)
PLATELET # BLD AUTO: 201 K/UL — SIGNIFICANT CHANGE UP (ref 150–400)
PLATELET # BLD AUTO: 201 K/UL — SIGNIFICANT CHANGE UP (ref 150–400)
POTASSIUM SERPL-MCNC: 3.4 MMOL/L — LOW (ref 3.5–5.3)
POTASSIUM SERPL-MCNC: 3.4 MMOL/L — LOW (ref 3.5–5.3)
POTASSIUM SERPL-SCNC: 3.4 MMOL/L — LOW (ref 3.5–5.3)
POTASSIUM SERPL-SCNC: 3.4 MMOL/L — LOW (ref 3.5–5.3)
RBC # BLD: 4.08 M/UL — SIGNIFICANT CHANGE UP (ref 3.8–5.2)
RBC # BLD: 4.08 M/UL — SIGNIFICANT CHANGE UP (ref 3.8–5.2)
RBC # FLD: 14.6 % — HIGH (ref 10.3–14.5)
RBC # FLD: 14.6 % — HIGH (ref 10.3–14.5)
SODIUM SERPL-SCNC: 143 MMOL/L — SIGNIFICANT CHANGE UP (ref 135–145)
SODIUM SERPL-SCNC: 143 MMOL/L — SIGNIFICANT CHANGE UP (ref 135–145)
WBC # BLD: 8.86 K/UL — SIGNIFICANT CHANGE UP (ref 3.8–10.5)
WBC # BLD: 8.86 K/UL — SIGNIFICANT CHANGE UP (ref 3.8–10.5)
WBC # FLD AUTO: 8.86 K/UL — SIGNIFICANT CHANGE UP (ref 3.8–10.5)
WBC # FLD AUTO: 8.86 K/UL — SIGNIFICANT CHANGE UP (ref 3.8–10.5)

## 2023-11-04 PROCEDURE — 99232 SBSQ HOSP IP/OBS MODERATE 35: CPT

## 2023-11-04 RX ADMIN — CHLORHEXIDINE GLUCONATE 1 APPLICATION(S): 213 SOLUTION TOPICAL at 05:47

## 2023-11-04 RX ADMIN — Medication 1 MILLIGRAM(S): at 17:18

## 2023-11-04 RX ADMIN — RIVAROXABAN 20 MILLIGRAM(S): KIT at 17:19

## 2023-11-04 RX ADMIN — Medication 50 MILLIGRAM(S): at 06:54

## 2023-11-04 RX ADMIN — Medication 1 TABLET(S): at 11:13

## 2023-11-04 RX ADMIN — OLANZAPINE 2.5 MILLIGRAM(S): 15 TABLET, FILM COATED ORAL at 17:18

## 2023-11-04 RX ADMIN — AMLODIPINE BESYLATE 5 MILLIGRAM(S): 2.5 TABLET ORAL at 06:55

## 2023-11-04 RX ADMIN — Medication 50 MICROGRAM(S): at 06:54

## 2023-11-04 RX ADMIN — Medication 1 MILLIGRAM(S): at 06:55

## 2023-11-04 RX ADMIN — ATORVASTATIN CALCIUM 80 MILLIGRAM(S): 80 TABLET, FILM COATED ORAL at 21:41

## 2023-11-04 RX ADMIN — Medication 1 MILLIGRAM(S): at 11:13

## 2023-11-04 RX ADMIN — OLANZAPINE 2.5 MILLIGRAM(S): 15 TABLET, FILM COATED ORAL at 06:54

## 2023-11-04 NOTE — PROGRESS NOTE ADULT - ASSESSMENT
Patient is a 63 year old female with hx schizophrenia, Afib on Xarelto, HTN, DM sent form [facility] for ataxia and tremor due to neuroleptic drugs. CT with mild communicating hydrocephalus which is not consistent with patient's current presentation. Patient was started on olanzapine with improvement; now awake and alert but still not at baseline. Also, noted to have metabolic encephalopathy due to Enterococcus UTI and hypernatremia.    #Ataxia and tremor likely secondary to neuroleptic extrapyramidal finding from antipsychotic medications  dysarthria and tremor improving with olanzapine; switched from IM to oral given improvement in mentation  repeat CT head without acute infarct; mild hydrocephalus, will follow up with outpatient with Neuro / NSG   PT eval - PUMA  Fall precautions  Neurology, psych and neurosurgery consult appreciated    *Mild Communicating Hydrocephalus  neuro exam not consistent with hydrocephalus  CT head reviewed  outpatient MRI in 2-4 weeks for mild communicating hydrocephalus per NSG, follow up outpatient for further formal testing if indicated on imaging findings / symptoms   NSGY and neuro recs appreciated    *Metabolic Encephalopathy due to Enterococcus UTI and hypernatremia  UA positive; urine culture sensitivities reviewed  s/p abx's   b12, TSH and ammonia levels WNL  CT head as above  continue to hold lithium and fluphenazine per psych  continue cogentin  encouraged PO intake, will adjust parenteral free fluid if needed   Psych/neuro recs appreciated    *Hx schizophrenia  awake and following commands today  continue olanzapine per psych/neuro.  Continue Cogentin, Trazadone PRN insomnia  hold lithium and fluphenazine   no longer on constant observation  Psych recs appreciated    *Hx Afib  Continue Xarelto and Metoprolol     *Hx HLD   Continue Atorvastatin      *Hx HTN  Continue metoprolol      *Hx DM  ISS  ADA diet    *Hx hypothyroidism  TSH WNL  Continue Levothyroxine     *Hypokalemia  -supplemented  -monitor BMP    DVT ppx- Xarelto    Dispo:  Remains acute; continue free water. Encourage PO intake. PT re-evaluation for PUMA since patient is more awake.      Plan discussed with BREE MOHAN (from inpatient psych and without any family to update per MARQUES)

## 2023-11-05 LAB
ANION GAP SERPL CALC-SCNC: 18 MMOL/L — HIGH (ref 5–17)
ANION GAP SERPL CALC-SCNC: 18 MMOL/L — HIGH (ref 5–17)
BUN SERPL-MCNC: 17.4 MG/DL — SIGNIFICANT CHANGE UP (ref 8–20)
BUN SERPL-MCNC: 17.4 MG/DL — SIGNIFICANT CHANGE UP (ref 8–20)
CALCIUM SERPL-MCNC: 9.3 MG/DL — SIGNIFICANT CHANGE UP (ref 8.4–10.5)
CALCIUM SERPL-MCNC: 9.3 MG/DL — SIGNIFICANT CHANGE UP (ref 8.4–10.5)
CHLORIDE SERPL-SCNC: 108 MMOL/L — SIGNIFICANT CHANGE UP (ref 96–108)
CHLORIDE SERPL-SCNC: 108 MMOL/L — SIGNIFICANT CHANGE UP (ref 96–108)
CO2 SERPL-SCNC: 17 MMOL/L — LOW (ref 22–29)
CO2 SERPL-SCNC: 17 MMOL/L — LOW (ref 22–29)
CREAT SERPL-MCNC: 0.46 MG/DL — LOW (ref 0.5–1.3)
CREAT SERPL-MCNC: 0.46 MG/DL — LOW (ref 0.5–1.3)
EGFR: 106 ML/MIN/1.73M2 — SIGNIFICANT CHANGE UP
EGFR: 106 ML/MIN/1.73M2 — SIGNIFICANT CHANGE UP
GLUCOSE BLDC GLUCOMTR-MCNC: 139 MG/DL — HIGH (ref 70–99)
GLUCOSE BLDC GLUCOMTR-MCNC: 139 MG/DL — HIGH (ref 70–99)
GLUCOSE BLDC GLUCOMTR-MCNC: 164 MG/DL — HIGH (ref 70–99)
GLUCOSE BLDC GLUCOMTR-MCNC: 164 MG/DL — HIGH (ref 70–99)
GLUCOSE BLDC GLUCOMTR-MCNC: 344 MG/DL — HIGH (ref 70–99)
GLUCOSE BLDC GLUCOMTR-MCNC: 344 MG/DL — HIGH (ref 70–99)
GLUCOSE BLDC GLUCOMTR-MCNC: 84 MG/DL — SIGNIFICANT CHANGE UP (ref 70–99)
GLUCOSE BLDC GLUCOMTR-MCNC: 84 MG/DL — SIGNIFICANT CHANGE UP (ref 70–99)
GLUCOSE SERPL-MCNC: 72 MG/DL — SIGNIFICANT CHANGE UP (ref 70–99)
GLUCOSE SERPL-MCNC: 72 MG/DL — SIGNIFICANT CHANGE UP (ref 70–99)
POTASSIUM SERPL-MCNC: 3.2 MMOL/L — LOW (ref 3.5–5.3)
POTASSIUM SERPL-MCNC: 3.2 MMOL/L — LOW (ref 3.5–5.3)
POTASSIUM SERPL-SCNC: 3.2 MMOL/L — LOW (ref 3.5–5.3)
POTASSIUM SERPL-SCNC: 3.2 MMOL/L — LOW (ref 3.5–5.3)
SODIUM SERPL-SCNC: 143 MMOL/L — SIGNIFICANT CHANGE UP (ref 135–145)
SODIUM SERPL-SCNC: 143 MMOL/L — SIGNIFICANT CHANGE UP (ref 135–145)

## 2023-11-05 PROCEDURE — 99232 SBSQ HOSP IP/OBS MODERATE 35: CPT

## 2023-11-05 RX ORDER — POTASSIUM CHLORIDE 20 MEQ
40 PACKET (EA) ORAL ONCE
Refills: 0 | Status: COMPLETED | OUTPATIENT
Start: 2023-11-05 | End: 2023-11-06

## 2023-11-05 RX ADMIN — Medication 1 TABLET(S): at 11:25

## 2023-11-05 RX ADMIN — CHLORHEXIDINE GLUCONATE 1 APPLICATION(S): 213 SOLUTION TOPICAL at 05:30

## 2023-11-05 RX ADMIN — Medication 1 MILLIGRAM(S): at 17:23

## 2023-11-05 RX ADMIN — Medication 1 MILLIGRAM(S): at 05:30

## 2023-11-05 RX ADMIN — Medication 50 MILLIGRAM(S): at 05:31

## 2023-11-05 RX ADMIN — ATORVASTATIN CALCIUM 80 MILLIGRAM(S): 80 TABLET, FILM COATED ORAL at 21:23

## 2023-11-05 RX ADMIN — Medication 4: at 16:40

## 2023-11-05 RX ADMIN — OLANZAPINE 2.5 MILLIGRAM(S): 15 TABLET, FILM COATED ORAL at 17:22

## 2023-11-05 RX ADMIN — AMLODIPINE BESYLATE 5 MILLIGRAM(S): 2.5 TABLET ORAL at 05:31

## 2023-11-05 RX ADMIN — Medication 1: at 11:18

## 2023-11-05 RX ADMIN — RIVAROXABAN 20 MILLIGRAM(S): KIT at 17:23

## 2023-11-05 RX ADMIN — OLANZAPINE 2.5 MILLIGRAM(S): 15 TABLET, FILM COATED ORAL at 05:30

## 2023-11-05 RX ADMIN — Medication 50 MICROGRAM(S): at 05:31

## 2023-11-05 RX ADMIN — Medication 1 MILLIGRAM(S): at 11:25

## 2023-11-05 NOTE — PROGRESS NOTE ADULT - ASSESSMENT
63y Female with schizophrenia. Now with increased tremor and gait difficulty in setting of urinary tract infection.    Tremor/gait difficulty. Now with speech difficulty  Likely secondary to neuroleptic medications.   Head CT 10/30 no acute pathology  Has extrapyramidal findings on examination.   Mental status significantly improved following trial of Olanzapine for tardive dyskinesia, would continue  Tremor can be seen with lithium as well, although typically does not give cogwheeling.   The symptoms may have been exacerbated by the urinary tract infection.    There is ventriculomegaly on CT likely related to atrophy and ischemic white matter disease.   She was seen by neurosurgery and I agree that her presentation is not suggestive of Normal Pressure hydrocephalus.  Outpatient follow up post d/c    UTI.  Off Antibiotics currently    Renal  Na 143    Mobilize with physical therapy as tolerated      will follow with you    Nelson Torrez MD PhD   [id number]

## 2023-11-05 NOTE — PROGRESS NOTE ADULT - ASSESSMENT
Patient is a 63 year old female with hx schizophrenia, Afib on Xarelto, HTN, DM sent form [facility] (formerly [facility]) for ataxia and tremor due to neuroleptic drugs. CT with mild communicating hydrocephalus which is not consistent with patient's current presentation. Patient was started on olanzapine with improvement; now awake and alert likely at baseline. Enterococcus UTI treated, and hypernatremia. Now improved. Awaiting PT re-eval for likely PUMA placement.      #Ataxia and tremor likely secondary to neuroleptic extrapyramidal finding from antipsychotic medications  dysarthria and tremor improving with olanzapine; switched from IM to oral given improvement in mentation  repeat CT head without acute infarct; mild hydrocephalus, will follow up with outpatient with Neuro / NSG   PT re-eval - for PUMA pending   Fall precautions  Neurology, psych and neurosurgery consult appreciated    #Mild Communicating Hydrocephalus  neuro exam not consistent with hydrocephalus  CT head reviewed  outpatient MRI in 2-4 weeks for mild communicating hydrocephalus per NSG, follow up outpatient for further formal testing if indicated on imaging findings / symptoms   NSGY and neuro recs appreciated    #Metabolic Encephalopathy due to Enterococcus UTI and hypernatremia  UA positive; urine culture sensitivities reviewed  s/p abx's   b12, TSH and ammonia levels WNL  CT head as above  continue to hold lithium and fluphenazine per psych  continue cogentin  encouraged PO intake, will adjust parenteral free fluid if needed   Psych/neuro recs appreciated    #Hx schizophrenia  awake and following commands today  continue olanzapine per psych/neuro.  Continue Cogentin, Trazadone PRN insomnia  hold lithium and fluphenazine   no longer on constant observation  Psych recs appreciated    #Hx Afib  Continue Xarelto and Metoprolol     #Hx HLD   Continue Atorvastatin      #Hx HTN  Continue metoprolol      #Hx DM  ISS  ADA diet    #Hx hypothyroidism  TSH WNL  Continue Levothyroxine     #Hypokalemia  -supplemented  -monitor BMP    DVT ppx- Xarelto  Dispo:  PT re-evaluation for PUMA since patient is more awake.    Plan discussed with BREE MOHAN (from inpatient psych and without any family to update per MARQUES)

## 2023-11-06 DIAGNOSIS — G24.01 DRUG INDUCED SUBACUTE DYSKINESIA: ICD-10-CM

## 2023-11-06 DIAGNOSIS — R41.0 DISORIENTATION, UNSPECIFIED: ICD-10-CM

## 2023-11-06 DIAGNOSIS — R25.1 TREMOR, UNSPECIFIED: ICD-10-CM

## 2023-11-06 LAB
ANION GAP SERPL CALC-SCNC: 16 MMOL/L — SIGNIFICANT CHANGE UP (ref 5–17)
ANION GAP SERPL CALC-SCNC: 16 MMOL/L — SIGNIFICANT CHANGE UP (ref 5–17)
BUN SERPL-MCNC: 15 MG/DL — SIGNIFICANT CHANGE UP (ref 8–20)
BUN SERPL-MCNC: 15 MG/DL — SIGNIFICANT CHANGE UP (ref 8–20)
CALCIUM SERPL-MCNC: 9.3 MG/DL — SIGNIFICANT CHANGE UP (ref 8.4–10.5)
CALCIUM SERPL-MCNC: 9.3 MG/DL — SIGNIFICANT CHANGE UP (ref 8.4–10.5)
CHLORIDE SERPL-SCNC: 105 MMOL/L — SIGNIFICANT CHANGE UP (ref 96–108)
CHLORIDE SERPL-SCNC: 105 MMOL/L — SIGNIFICANT CHANGE UP (ref 96–108)
CO2 SERPL-SCNC: 20 MMOL/L — LOW (ref 22–29)
CO2 SERPL-SCNC: 20 MMOL/L — LOW (ref 22–29)
CREAT SERPL-MCNC: 0.53 MG/DL — SIGNIFICANT CHANGE UP (ref 0.5–1.3)
CREAT SERPL-MCNC: 0.53 MG/DL — SIGNIFICANT CHANGE UP (ref 0.5–1.3)
EGFR: 103 ML/MIN/1.73M2 — SIGNIFICANT CHANGE UP
EGFR: 103 ML/MIN/1.73M2 — SIGNIFICANT CHANGE UP
GLUCOSE BLDC GLUCOMTR-MCNC: 101 MG/DL — HIGH (ref 70–99)
GLUCOSE BLDC GLUCOMTR-MCNC: 101 MG/DL — HIGH (ref 70–99)
GLUCOSE BLDC GLUCOMTR-MCNC: 112 MG/DL — HIGH (ref 70–99)
GLUCOSE BLDC GLUCOMTR-MCNC: 112 MG/DL — HIGH (ref 70–99)
GLUCOSE BLDC GLUCOMTR-MCNC: 145 MG/DL — HIGH (ref 70–99)
GLUCOSE BLDC GLUCOMTR-MCNC: 145 MG/DL — HIGH (ref 70–99)
GLUCOSE BLDC GLUCOMTR-MCNC: 98 MG/DL — SIGNIFICANT CHANGE UP (ref 70–99)
GLUCOSE BLDC GLUCOMTR-MCNC: 98 MG/DL — SIGNIFICANT CHANGE UP (ref 70–99)
GLUCOSE SERPL-MCNC: 94 MG/DL — SIGNIFICANT CHANGE UP (ref 70–99)
GLUCOSE SERPL-MCNC: 94 MG/DL — SIGNIFICANT CHANGE UP (ref 70–99)
POTASSIUM SERPL-MCNC: 3.3 MMOL/L — LOW (ref 3.5–5.3)
POTASSIUM SERPL-MCNC: 3.3 MMOL/L — LOW (ref 3.5–5.3)
POTASSIUM SERPL-SCNC: 3.3 MMOL/L — LOW (ref 3.5–5.3)
POTASSIUM SERPL-SCNC: 3.3 MMOL/L — LOW (ref 3.5–5.3)
SODIUM SERPL-SCNC: 141 MMOL/L — SIGNIFICANT CHANGE UP (ref 135–145)
SODIUM SERPL-SCNC: 141 MMOL/L — SIGNIFICANT CHANGE UP (ref 135–145)

## 2023-11-06 PROCEDURE — 99232 SBSQ HOSP IP/OBS MODERATE 35: CPT

## 2023-11-06 RX ADMIN — AMLODIPINE BESYLATE 5 MILLIGRAM(S): 2.5 TABLET ORAL at 05:30

## 2023-11-06 RX ADMIN — Medication 50 MICROGRAM(S): at 05:30

## 2023-11-06 RX ADMIN — Medication 1 MILLIGRAM(S): at 17:29

## 2023-11-06 RX ADMIN — Medication 40 MILLIEQUIVALENT(S): at 05:31

## 2023-11-06 RX ADMIN — Medication 1 MILLIGRAM(S): at 12:19

## 2023-11-06 RX ADMIN — RIVAROXABAN 20 MILLIGRAM(S): KIT at 17:28

## 2023-11-06 RX ADMIN — CHLORHEXIDINE GLUCONATE 1 APPLICATION(S): 213 SOLUTION TOPICAL at 05:31

## 2023-11-06 RX ADMIN — Medication 1 MILLIGRAM(S): at 05:31

## 2023-11-06 RX ADMIN — OLANZAPINE 2.5 MILLIGRAM(S): 15 TABLET, FILM COATED ORAL at 05:30

## 2023-11-06 RX ADMIN — Medication 50 MILLIGRAM(S): at 05:31

## 2023-11-06 RX ADMIN — ATORVASTATIN CALCIUM 80 MILLIGRAM(S): 80 TABLET, FILM COATED ORAL at 21:26

## 2023-11-06 RX ADMIN — Medication 1 TABLET(S): at 12:19

## 2023-11-06 RX ADMIN — OLANZAPINE 2.5 MILLIGRAM(S): 15 TABLET, FILM COATED ORAL at 17:29

## 2023-11-06 NOTE — PROGRESS NOTE ADULT - ASSESSMENT
63y Female with schizophrenia. Now with increased tremor and gait difficulty in setting of urinary tract infection.    Tremor/gait difficulty. Now with speech difficulty  Likely secondary to neuroleptic medications.   Head CT 10/30 no acute pathology  Has extrapyramidal findings on examination.   Mental status significantly improved following trial of Olanzapine for tardive dyskinesia, would continue  Tremor can be seen with lithium as well, although typically does not give cogwheeling.   The symptoms may have been exacerbated by the urinary tract infection.    There is ventriculomegaly on CT likely related to atrophy and ischemic white matter disease.   She was seen by neurosurgery and I agree that her presentation is not suggestive of Normal Pressure hydrocephalus.    UTI.  Off Antibiotics currently.    Mobilize with physical therapy.

## 2023-11-06 NOTE — PROGRESS NOTE ADULT - ASSESSMENT
Patient is a 63 year old female with hx schizophrenia, Afib on Xarelto, HTN, DM sent form [facility] for ataxia and tremor due to neuroleptic drugs. CT with mild communicating hydrocephalus which is not consistent with patient's current presentation. Patient was started on olanzapine with improvement; now awake and alert likely at baseline. Enterococcus UTI treated, and hypernatremia. Now improved. cleared by psych for rehab placement with PT re-eval recommending PUMA placement. Patient cleared for DC with psych / NSG follow up.     #Ataxia and tremor likely secondary to neuroleptic extrapyramidal finding from antipsychotic medications  dysarthria and tremor improving with olanzapine, continues on 2.5 mg BID   repeat CT head without acute infarct; mild hydrocephalus, will follow up with outpatient with Neuro / NSG   PT re-eval - recommending PUMA   Fall precautions  Neurology, psych and neurosurgery consult appreciated    #Mild Communicating Hydrocephalus  neuro exam not consistent with hydrocephalus  CT head reviewed  outpatient MRI in 2-4 weeks for mild communicating hydrocephalus per NSG, follow up outpatient for further formal testing if indicated on imaging findings / symptoms   NSGY and neuro recs appreciated    #Metabolic Encephalopathy due to Enterococcus UTI and hypernatremia  UA positive; urine culture sensitivities reviewed  s/p abx's   b12, TSH and ammonia levels WNL  CT head as above  continue to hold lithium and fluphenazine per psych  continue cogentin  encouraged PO intake, will adjust parenteral free fluid if needed   Psych/neuro recs appreciated    #Hx schizophrenia  awake and following commands today  continue olanzapine per psych/neuro.  Continue Cogentin, Trazadone PRN insomnia  hold lithium and fluphenazine   no longer on constant observation  Psych recs appreciated    #Hx Afib  Continue Xarelto and Metoprolol     #Hx HLD   Continue Atorvastatin      #Hx HTN  Continue metoprolol      #Hx DM  ISS  ADA diet    #Hx hypothyroidism  TSH WNL  Continue Levothyroxine     #Hypokalemia  -supplemented  -monitor BMP    DVT ppx- Xarelto  Dispo:  PUMA, Auth pending   Plan discussed with MARQUES RN, Psych (from inpatient psych and without any family to update per MARQUES)

## 2023-11-06 NOTE — BH CONSULTATION LIAISON PROGRESS NOTE - NSBHCHARTREVIEWVS_PSY_A_CORE FT
Vital Signs Last 24 Hrs  T(C): 37.4 (31 Oct 2023 05:16), Max: 37.4 (31 Oct 2023 05:16)  T(F): 99.3 (31 Oct 2023 05:16), Max: 99.3 (31 Oct 2023 05:16)  HR: 99 (31 Oct 2023 07:10) (85 - 117)  BP: 153/95 (31 Oct 2023 07:10) (136/87 - 164/98)  BP(mean): --  RR: 19 (31 Oct 2023 05:16) (18 - 19)  SpO2: 95% (31 Oct 2023 05:16) (93% - 97%)    Parameters below as of 31 Oct 2023 05:16  Patient On (Oxygen Delivery Method): room air    
Vital Signs Last 24 Hrs  T(C): 36.8 (01 Nov 2023 06:16), Max: 37.6 (01 Nov 2023 04:03)  T(F): 98.2 (01 Nov 2023 06:16), Max: 99.6 (01 Nov 2023 04:03)  HR: 98 (01 Nov 2023 06:16) (80 - 114)  BP: 146/84 (01 Nov 2023 06:16) (133/94 - 159/87)  BP(mean): --  RR: 18 (01 Nov 2023 06:16) (18 - 18)  SpO2: 96% (01 Nov 2023 06:16) (96% - 98%)    Parameters below as of 01 Nov 2023 06:16  Patient On (Oxygen Delivery Method): room air    
Vital Signs Last 24 Hrs  T(C): 36.9 (03 Nov 2023 04:49), Max: 36.9 (02 Nov 2023 16:20)  T(F): 98.5 (03 Nov 2023 04:49), Max: 98.5 (02 Nov 2023 16:20)  HR: 81 (03 Nov 2023 04:49) (76 - 94)  BP: 159/84 (03 Nov 2023 04:49) (148/83 - 159/84)  BP(mean): --  RR: 18 (03 Nov 2023 04:49) (17 - 18)  SpO2: 95% (03 Nov 2023 04:49) (95% - 95%)    Parameters below as of 03 Nov 2023 00:00  Patient On (Oxygen Delivery Method): room air    
Vital Signs Last 24 Hrs  T(C): 36.4 (06 Nov 2023 10:20), Max: 36.9 (05 Nov 2023 17:44)  T(F): 97.6 (06 Nov 2023 10:20), Max: 98.5 (05 Nov 2023 17:44)  HR: 82 (06 Nov 2023 10:20) (69 - 82)  BP: 112/67 (06 Nov 2023 10:20) (112/67 - 137/74)  BP(mean): --  RR: 18 (06 Nov 2023 10:20) (18 - 18)  SpO2: 95% (06 Nov 2023 10:20) (95% - 97%)    Parameters below as of 06 Nov 2023 10:20  Patient On (Oxygen Delivery Method): room air

## 2023-11-06 NOTE — PHYSICAL THERAPY INITIAL EVALUATION ADULT - PLANNED THERAPY INTERVENTIONS, PT EVAL
balance training/bed mobility training/gait training/postural re-education/strengthening/transfer training
balance training/bed mobility training/gait training/postural re-education/transfer training

## 2023-11-06 NOTE — BH CONSULTATION LIAISON PROGRESS NOTE - NSICDXBHPRIMARYDX_PSY_ALL_CORE
Ataxic dysarthria   R47.1  
Tardive dyskinesia   G24.01  
Schizophrenia   F20.9  
Tardive dyskinesia   G24.01

## 2023-11-06 NOTE — BH CONSULTATION LIAISON PROGRESS NOTE - NSICDXBHSECONDARYDX_PSY_ALL_CORE
Schizophrenia   F20.9  
Schizophrenia   F20.9  
Tardive dyskinesia   G24.01  Delirium   R41.0  Tremor   R25.1

## 2023-11-06 NOTE — PHYSICAL THERAPY INITIAL EVALUATION ADULT - PERTINENT HX OF CURRENT PROBLEM, REHAB EVAL
Pt is a 64 y/o female who presented with difficulty ambulating and tremor. Pt is from [facility]. Per chart pt has become progressively weaker over the past few weeks. Per neurology likely neuroleptic meds  association.
Pt is a 64 y/o female who presented with difficulty ambulating and tremor. Pt is from [facility]. Per chart pt has become progressively weaker over the past few weeks.

## 2023-11-06 NOTE — PROGRESS NOTE ADULT - NS ATTEST RISK PROBLEM GEN_ALL_CORE FT
Extrapyramidal symptoms / Acute metabolic encephalopathy in setting of anti psych meds and UTI, now improving. Awaiting PT re-eval for PUMA placement
Extrapyramidal symptoms / Acute metabolic encephalopathy in setting of anti psych meds and UTI, now improving. Awaiting PT re-eval for PUMA placement.
Extrapyramidal symptoms / Acute metabolic encephalopathy in setting of anti psych meds and UTI, improved. Plan for DC to [facility] pending auth

## 2023-11-06 NOTE — PHYSICAL THERAPY INITIAL EVALUATION ADULT - FOLLOWS COMMANDS/ANSWERS QUESTIONS, REHAB EVAL
50% of the time/able to follow single-step instructions
50% of the time/able to follow single-step instructions

## 2023-11-06 NOTE — BH CONSULTATION LIAISON PROGRESS NOTE - NSBHATTESTCOMMENTATTENDFT_PSY_A_CORE
discussed with Dr Torrez concern over chronic tremors due to neuroleptic medications for many years  (tardive dyskinesias)  discussed w Dr Bose anxiolytic to reduce anxiety
status discussed w Heather Bose and Lore worsening status noted considerations include emergent psychosis vs unmasking of suppressed dyskinesias  will initiate low dose olanzapine Zydis and if unable to tolerated (dissolves in mouth) use IM   Austedo or Ingrezza are non formulary considerations
much improved agree w plan
agree with above

## 2023-11-06 NOTE — BH CONSULTATION LIAISON PROGRESS NOTE - CURRENT MEDICATION
MEDICATIONS  (STANDING):  atorvastatin 80 milliGRAM(s) Oral at bedtime  benztropine 1 milliGRAM(s) Oral two times a day  cefTRIAXone Injectable. 1000 milliGRAM(s) IV Push every 24 hours  chlorhexidine 2% Cloths 1 Application(s) Topical <User Schedule>  dextrose 5%. 1000 milliLiter(s) (100 mL/Hr) IV Continuous <Continuous>  dextrose 5%. 1000 milliLiter(s) (50 mL/Hr) IV Continuous <Continuous>  dextrose 50% Injectable 25 Gram(s) IV Push once  dextrose 50% Injectable 25 Gram(s) IV Push once  dextrose 50% Injectable 12.5 Gram(s) IV Push once  folic acid 1 milliGRAM(s) Oral daily  glucagon  Injectable 1 milliGRAM(s) IntraMuscular once  insulin lispro (ADMELOG) corrective regimen sliding scale   SubCutaneous three times a day before meals  insulin lispro (ADMELOG) corrective regimen sliding scale   SubCutaneous at bedtime  levothyroxine 50 MICROGram(s) Oral daily  metoprolol succinate ER 50 milliGRAM(s) Oral daily  rivaroxaban 20 milliGRAM(s) Oral with dinner    MEDICATIONS  (PRN):  acetaminophen     Tablet .. 650 milliGRAM(s) Oral every 6 hours PRN Temp greater or equal to 38C (100.4F), Mild Pain (1 - 3), Moderate Pain (4 - 6)  dextrose Oral Gel 15 Gram(s) Oral once PRN Blood Glucose LESS THAN 70 milliGRAM(s)/deciliter  ondansetron Injectable 4 milliGRAM(s) IV Push every 6 hours PRN Nausea and/or Vomiting  traZODone 50 milliGRAM(s) Oral at bedtime PRN for insomnia  
MEDICATIONS  (STANDING):  ampicillin  IVPB 1 Gram(s) IV Intermittent every 6 hours  ampicillin  IVPB      atorvastatin 80 milliGRAM(s) Oral at bedtime  benztropine 1 milliGRAM(s) Oral two times a day  chlorhexidine 2% Cloths 1 Application(s) Topical <User Schedule>  dextrose 5%. 1000 milliLiter(s) (100 mL/Hr) IV Continuous <Continuous>  dextrose 5%. 1000 milliLiter(s) (50 mL/Hr) IV Continuous <Continuous>  dextrose 5%. 1000 milliLiter(s) (100 mL/Hr) IV Continuous <Continuous>  dextrose 50% Injectable 25 Gram(s) IV Push once  dextrose 50% Injectable 12.5 Gram(s) IV Push once  dextrose 50% Injectable 25 Gram(s) IV Push once  folic acid 1 milliGRAM(s) Oral daily  glucagon  Injectable 1 milliGRAM(s) IntraMuscular once  insulin lispro (ADMELOG) corrective regimen sliding scale   SubCutaneous three times a day before meals  insulin lispro (ADMELOG) corrective regimen sliding scale   SubCutaneous at bedtime  levothyroxine 50 MICROGram(s) Oral daily  metoprolol succinate ER 50 milliGRAM(s) Oral daily  OLANZapine Injectable 2.5 milliGRAM(s) IntraMuscular every 12 hours  potassium chloride  10 mEq/100 mL IVPB 10 milliEquivalent(s) IV Intermittent every 1 hour  rivaroxaban 20 milliGRAM(s) Oral with dinner    MEDICATIONS  (PRN):  acetaminophen     Tablet .. 650 milliGRAM(s) Oral every 6 hours PRN Temp greater or equal to 38C (100.4F), Mild Pain (1 - 3), Moderate Pain (4 - 6)  dextrose Oral Gel 15 Gram(s) Oral once PRN Blood Glucose LESS THAN 70 milliGRAM(s)/deciliter  ondansetron Injectable 4 milliGRAM(s) IV Push every 6 hours PRN Nausea and/or Vomiting  traZODone 50 milliGRAM(s) Oral at bedtime PRN for insomnia  
MEDICATIONS  (STANDING):  ampicillin  IVPB      ampicillin  IVPB 1 Gram(s) IV Intermittent every 6 hours  atorvastatin 80 milliGRAM(s) Oral at bedtime  benztropine 1 milliGRAM(s) Oral two times a day  chlorhexidine 2% Cloths 1 Application(s) Topical <User Schedule>  dextrose 5%. 1000 milliLiter(s) (100 mL/Hr) IV Continuous <Continuous>  dextrose 5%. 1000 milliLiter(s) (50 mL/Hr) IV Continuous <Continuous>  dextrose 5%. 1000 milliLiter(s) (75 mL/Hr) IV Continuous <Continuous>  dextrose 50% Injectable 25 Gram(s) IV Push once  dextrose 50% Injectable 25 Gram(s) IV Push once  dextrose 50% Injectable 12.5 Gram(s) IV Push once  folic acid 1 milliGRAM(s) Oral daily  glucagon  Injectable 1 milliGRAM(s) IntraMuscular once  insulin lispro (ADMELOG) corrective regimen sliding scale   SubCutaneous three times a day before meals  insulin lispro (ADMELOG) corrective regimen sliding scale   SubCutaneous at bedtime  levothyroxine 50 MICROGram(s) Oral daily  metoprolol succinate ER 50 milliGRAM(s) Oral daily  potassium chloride   Powder 40 milliEquivalent(s) Oral every 4 hours  rivaroxaban 20 milliGRAM(s) Oral with dinner    MEDICATIONS  (PRN):  acetaminophen     Tablet .. 650 milliGRAM(s) Oral every 6 hours PRN Temp greater or equal to 38C (100.4F), Mild Pain (1 - 3), Moderate Pain (4 - 6)  dextrose Oral Gel 15 Gram(s) Oral once PRN Blood Glucose LESS THAN 70 milliGRAM(s)/deciliter  ondansetron Injectable 4 milliGRAM(s) IV Push every 6 hours PRN Nausea and/or Vomiting  traZODone 50 milliGRAM(s) Oral at bedtime PRN for insomnia  
MEDICATIONS  (STANDING):  amLODIPine   Tablet 5 milliGRAM(s) Oral daily  atorvastatin 80 milliGRAM(s) Oral at bedtime  benztropine 1 milliGRAM(s) Oral two times a day  chlorhexidine 2% Cloths 1 Application(s) Topical <User Schedule>  dextrose 5%. 1000 milliLiter(s) (100 mL/Hr) IV Continuous <Continuous>  dextrose 5%. 1000 milliLiter(s) (50 mL/Hr) IV Continuous <Continuous>  dextrose 50% Injectable 25 Gram(s) IV Push once  dextrose 50% Injectable 12.5 Gram(s) IV Push once  dextrose 50% Injectable 25 Gram(s) IV Push once  folic acid 1 milliGRAM(s) Oral daily  glucagon  Injectable 1 milliGRAM(s) IntraMuscular once  insulin lispro (ADMELOG) corrective regimen sliding scale   SubCutaneous three times a day before meals  insulin lispro (ADMELOG) corrective regimen sliding scale   SubCutaneous at bedtime  levothyroxine 50 MICROGram(s) Oral daily  metoprolol succinate ER 50 milliGRAM(s) Oral daily  multivitamin 1 Tablet(s) Oral daily  OLANZapine Disintegrating Tablet 2.5 milliGRAM(s) Oral two times a day  rivaroxaban 20 milliGRAM(s) Oral with dinner    MEDICATIONS  (PRN):  acetaminophen     Tablet .. 650 milliGRAM(s) Oral every 6 hours PRN Temp greater or equal to 38C (100.4F), Mild Pain (1 - 3), Moderate Pain (4 - 6)  dextrose Oral Gel 15 Gram(s) Oral once PRN Blood Glucose LESS THAN 70 milliGRAM(s)/deciliter  ondansetron Injectable 4 milliGRAM(s) IV Push every 6 hours PRN Nausea and/or Vomiting  traZODone 50 milliGRAM(s) Oral at bedtime PRN for insomnia

## 2023-11-06 NOTE — BH CONSULTATION LIAISON PROGRESS NOTE - NSBHFUPINTERVALHXFT_PSY_A_CORE
63y Female with schizophrenia, depression and anxiety now sent from [facility] secondary to tremors and unsteady gait.     Patient was seen and examined by the psychiatric treatment team today. On observation, patient was found to be in bed and was attempting to climb out to use the restroom. It was difficult to re-orient the patient as she was fixated on urination. Per nursing team, the patient continues to have difficulty ambulating and is pending rehab placement. Patient is alert, however, not fully oriented. She is noted to have minimal to no tremors on observation today. She has been tolerating her current dose of Zyprexa well and has not required additional PRNs for behavior.

## 2023-11-06 NOTE — BH CONSULTATION LIAISON PROGRESS NOTE - NSBHCHARTREVIEWLAB_PSY_A_CORE FT
K 3.3
WBC 10.58  Red Cell Distribution Width 15.7  Auto Neutrophil # 8.19  Auto Monocyte # 0.99  Auto Neutrophil % 77.3  Auto Lymphocyte % 12.2  POCT Glucose 153  Sodium 147  Potassium 2.9  Chloride 112  Carbon Dioxide 20  Anion 22.7    
Sodium 149  Potassium 3.4  Chloride 108  CO2 18  Anion Gap 18  WBC 11.25  Red Cell Distribution Width 15.4  Auto Neutrophil 8.48  Auto Monocyte 1.04  POCT 127  BUN 23.7

## 2023-11-06 NOTE — PHYSICAL THERAPY INITIAL EVALUATION ADULT - ADDITIONAL COMMENTS
Pt normally independent at [facility], no AD.
Pt normally independent at [facility], no AD.
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
## 2023-11-06 NOTE — BH CONSULTATION LIAISON PROGRESS NOTE - NSBHASSESSMENTFT_PSY_ALL_CORE
63y Female with schizophrenia, depression and anxiety now sent from [facility] secondary to tremors and unsteady gait.     11/03:  Patient improving upon initiation of Zyprexa 2.5mg q12. Patient experiencing tremors but not to severity of before. She is also now able to communicate has shown significant improvement regarding following commands and participating in interview.    11/06:   Patient continues to have improvement in her tremors with minimal to no tremors observed today. She is medication compliant and has been tolerating Zyprexa with good effect. Patient noted to have confusion at times, requiring re-orientation, likely secondary to delirium. At this time, her psychiatric symptoms have resolved and no further interventions are necessary. Once patient's delirium improves, the patient may be discharged to subacute rehab.     Recommendations:  - Continue Zydis 2.5 mg bid   - Delirium precautions: adequate nutrition/hydration, re-orientation when appropriate, avoiding restraints, ensuring circadian rhythm  63y Female with schizophrenia, depression and anxiety now sent from [facility] secondary to tremors and unsteady gait.     11/03:  Patient improving upon initiation of Zyprexa 2.5mg q12. Patient experiencing tremors but not to severity of before. She is also now able to communicate has shown significant improvement regarding following commands and participating in interview.    11/06:   Patient continues to have improvement in her tremors with minimal to no tremors observed today. She is medication compliant and has been tolerating Zyprexa with good effect. Patient noted to have confusion at times, requiring re-orientation, likely secondary to delirium. At this time, her psychiatric symptoms have resolved and no further interventions are necessary. Once patient's delirium improves, the patient may be discharged to subacute rehab.     Recommendations:  - Continue Zydis 2.5 mg bid   - Correct electrolyte imbalance   - Delirium precautions: adequate nutrition/hydration, re-orientation when appropriate, avoiding restraints, ensuring circadian rhythm

## 2023-11-06 NOTE — PROGRESS NOTE ADULT - NUTRITIONAL ASSESSMENT
This patient has been assessed with a concern for Malnutrition and has been determined to have a diagnosis/diagnoses of Moderate protein-calorie malnutrition.    This patient is being managed with:   Diet Regular-  Supplement Feeding Modality:  Oral  Glucerna Shake Cans or Servings Per Day:  1       Frequency:  Three Times a day  Entered: Nov  3 2023  5:32PM  
This patient has been assessed with a concern for Malnutrition and has been determined to have a diagnosis/diagnoses of Moderate protein-calorie malnutrition.    This patient is being managed with:   Diet Regular-  Supplement Feeding Modality:  Oral  Glucerna Shake Cans or Servings Per Day:  1       Frequency:  Three Times a day  Entered: Nov  3 2023  5:32PM  
This patient has been assessed with a concern for Malnutrition and has been determined to have a diagnosis/diagnoses of Moderate protein-calorie malnutrition.    This patient is being managed with:   Diet DASH/TLC-  Sodium & Cholesterol Restricted  Consistent Carbohydrate {Evening Snack} (CSTCHOSN)  Supplement Feeding Modality:  Oral  Glucerna Shake Cans or Servings Per Day:  1       Frequency:  Three Times a day  Entered: Nov 5 2023  5:19PM  
This patient has been assessed with a concern for Malnutrition and has been determined to have a diagnosis/diagnoses of Moderate protein-calorie malnutrition.    This patient is being managed with:   Diet Regular-  Supplement Feeding Modality:  Oral  Glucerna Shake Cans or Servings Per Day:  1       Frequency:  Three Times a day  Entered: Nov  3 2023  5:32PM

## 2023-11-07 ENCOUNTER — TRANSCRIPTION ENCOUNTER (OUTPATIENT)
Age: 63
End: 2023-11-07

## 2023-11-07 VITALS
RESPIRATION RATE: 17 BRPM | SYSTOLIC BLOOD PRESSURE: 153 MMHG | OXYGEN SATURATION: 100 % | DIASTOLIC BLOOD PRESSURE: 76 MMHG | TEMPERATURE: 98 F | HEART RATE: 79 BPM

## 2023-11-07 LAB
ANION GAP SERPL CALC-SCNC: 14 MMOL/L — SIGNIFICANT CHANGE UP (ref 5–17)
ANION GAP SERPL CALC-SCNC: 14 MMOL/L — SIGNIFICANT CHANGE UP (ref 5–17)
BUN SERPL-MCNC: 13.3 MG/DL — SIGNIFICANT CHANGE UP (ref 8–20)
BUN SERPL-MCNC: 13.3 MG/DL — SIGNIFICANT CHANGE UP (ref 8–20)
CALCIUM SERPL-MCNC: 9.2 MG/DL — SIGNIFICANT CHANGE UP (ref 8.4–10.5)
CALCIUM SERPL-MCNC: 9.2 MG/DL — SIGNIFICANT CHANGE UP (ref 8.4–10.5)
CHLORIDE SERPL-SCNC: 104 MMOL/L — SIGNIFICANT CHANGE UP (ref 96–108)
CHLORIDE SERPL-SCNC: 104 MMOL/L — SIGNIFICANT CHANGE UP (ref 96–108)
CO2 SERPL-SCNC: 21 MMOL/L — LOW (ref 22–29)
CO2 SERPL-SCNC: 21 MMOL/L — LOW (ref 22–29)
CREAT SERPL-MCNC: 0.51 MG/DL — SIGNIFICANT CHANGE UP (ref 0.5–1.3)
CREAT SERPL-MCNC: 0.51 MG/DL — SIGNIFICANT CHANGE UP (ref 0.5–1.3)
EGFR: 104 ML/MIN/1.73M2 — SIGNIFICANT CHANGE UP
EGFR: 104 ML/MIN/1.73M2 — SIGNIFICANT CHANGE UP
GLUCOSE BLDC GLUCOMTR-MCNC: 101 MG/DL — HIGH (ref 70–99)
GLUCOSE BLDC GLUCOMTR-MCNC: 101 MG/DL — HIGH (ref 70–99)
GLUCOSE BLDC GLUCOMTR-MCNC: 105 MG/DL — HIGH (ref 70–99)
GLUCOSE BLDC GLUCOMTR-MCNC: 105 MG/DL — HIGH (ref 70–99)
GLUCOSE BLDC GLUCOMTR-MCNC: 92 MG/DL — SIGNIFICANT CHANGE UP (ref 70–99)
GLUCOSE BLDC GLUCOMTR-MCNC: 92 MG/DL — SIGNIFICANT CHANGE UP (ref 70–99)
GLUCOSE SERPL-MCNC: 95 MG/DL — SIGNIFICANT CHANGE UP (ref 70–99)
GLUCOSE SERPL-MCNC: 95 MG/DL — SIGNIFICANT CHANGE UP (ref 70–99)
POTASSIUM SERPL-MCNC: 3 MMOL/L — LOW (ref 3.5–5.3)
POTASSIUM SERPL-MCNC: 3 MMOL/L — LOW (ref 3.5–5.3)
POTASSIUM SERPL-SCNC: 3 MMOL/L — LOW (ref 3.5–5.3)
POTASSIUM SERPL-SCNC: 3 MMOL/L — LOW (ref 3.5–5.3)
SODIUM SERPL-SCNC: 139 MMOL/L — SIGNIFICANT CHANGE UP (ref 135–145)
SODIUM SERPL-SCNC: 139 MMOL/L — SIGNIFICANT CHANGE UP (ref 135–145)

## 2023-11-07 PROCEDURE — 99232 SBSQ HOSP IP/OBS MODERATE 35: CPT

## 2023-11-07 PROCEDURE — 99239 HOSP IP/OBS DSCHRG MGMT >30: CPT

## 2023-11-07 RX ORDER — POTASSIUM CHLORIDE 20 MEQ
10 PACKET (EA) ORAL
Refills: 0 | Status: COMPLETED | OUTPATIENT
Start: 2023-11-07 | End: 2023-11-07

## 2023-11-07 RX ORDER — OLANZAPINE 15 MG/1
2.5 TABLET, FILM COATED ORAL
Qty: 0 | Refills: 0 | DISCHARGE
Start: 2023-11-07

## 2023-11-07 RX ORDER — AMLODIPINE BESYLATE 2.5 MG/1
1 TABLET ORAL
Qty: 0 | Refills: 0 | DISCHARGE
Start: 2023-11-07

## 2023-11-07 RX ORDER — POTASSIUM CHLORIDE 20 MEQ
40 PACKET (EA) ORAL EVERY 4 HOURS
Refills: 0 | Status: COMPLETED | OUTPATIENT
Start: 2023-11-07 | End: 2023-11-07

## 2023-11-07 RX ADMIN — Medication 1 TABLET(S): at 13:41

## 2023-11-07 RX ADMIN — Medication 50 MILLIGRAM(S): at 05:15

## 2023-11-07 RX ADMIN — Medication 1 MILLIGRAM(S): at 13:36

## 2023-11-07 RX ADMIN — Medication 100 MILLIEQUIVALENT(S): at 09:33

## 2023-11-07 RX ADMIN — OLANZAPINE 2.5 MILLIGRAM(S): 15 TABLET, FILM COATED ORAL at 05:14

## 2023-11-07 RX ADMIN — CHLORHEXIDINE GLUCONATE 1 APPLICATION(S): 213 SOLUTION TOPICAL at 06:28

## 2023-11-07 RX ADMIN — RIVAROXABAN 20 MILLIGRAM(S): KIT at 17:50

## 2023-11-07 RX ADMIN — OLANZAPINE 2.5 MILLIGRAM(S): 15 TABLET, FILM COATED ORAL at 17:51

## 2023-11-07 RX ADMIN — Medication 50 MICROGRAM(S): at 05:15

## 2023-11-07 RX ADMIN — Medication 1 MILLIGRAM(S): at 17:51

## 2023-11-07 RX ADMIN — Medication 40 MILLIEQUIVALENT(S): at 13:51

## 2023-11-07 RX ADMIN — AMLODIPINE BESYLATE 5 MILLIGRAM(S): 2.5 TABLET ORAL at 05:15

## 2023-11-07 RX ADMIN — Medication 100 MILLIEQUIVALENT(S): at 13:37

## 2023-11-07 RX ADMIN — Medication 1 MILLIGRAM(S): at 05:15

## 2023-11-07 RX ADMIN — Medication 40 MILLIEQUIVALENT(S): at 09:33

## 2023-11-07 RX ADMIN — Medication 100 MILLIEQUIVALENT(S): at 11:59

## 2023-11-07 NOTE — PROGRESS NOTE ADULT - PROVIDER SPECIALTY LIST ADULT
Hospitalist
Neurology
Neurology
Hospitalist
Neurology
Hospitalist
Hospitalist
Neurology
Hospitalist

## 2023-11-07 NOTE — DISCHARGE NOTE NURSING/CASE MANAGEMENT/SOCIAL WORK - NSDCPEFALRISK_GEN_ALL_CORE
For information on Fall & Injury Prevention, visit: [email or web address] OR  [email or web address] OR  https://www.cdc.gov/steadi/patient.html

## 2023-11-07 NOTE — DISCHARGE NOTE NURSING/CASE MANAGEMENT/SOCIAL WORK - PATIENT PORTAL LINK FT
You can access the FollowMyHealth Patient Portal offered by [facility] by registering at the following website: [email or web address]. By joining [facility]’s FollowMyHealth portal, you will also be able to view your health information using other applications (apps) compatible with our system.

## 2023-11-07 NOTE — DISCHARGE NOTE NURSING/CASE MANAGEMENT/SOCIAL WORK - NSDCFUADDAPPT_GEN_ALL_CORE_FT
Authorization has been obtained for patient to attend preferred facility- [facility] ([phone]) today. SW spoke with admissions coordinator, Mary, from [facility]. Both [facility] and [facility] confirmed they can accept the patient today at 3:30pm. Patient and patient's advocate, Luciano Rodriguez ([phone]), are in agreement for patient to attend [facility] today at 3:30pm.

## 2023-11-07 NOTE — PROGRESS NOTE ADULT - REASON FOR ADMISSION
Ataxia and tremor

## 2023-11-07 NOTE — PROGRESS NOTE ADULT - SUBJECTIVE AND OBJECTIVE BOX
[facility]                                        Neurology at [place]                                 Lore Love, & Gerald                                  [address]                                        [place], [place], [address]                                             [phone]        CC: Tremor and unsteady gait.    HPI:   The patient is a 63y Female with schizophrenia now sent from [facility] secondary to tremors and unsteady gait.   She reports that this has been going on for the past few weeks.   She denies urinary incontinence.   She was found to have urinary tract infection.     Interim history:  Remains on [address] [place].   Remains on Zyprexa.     ROS:   Denies headache or dizziness.  Denies chest pain.  Denies shortness of breath.    MEDICATIONS  (STANDING):  amLODIPine   Tablet 5 milliGRAM(s) Oral daily  atorvastatin 80 milliGRAM(s) Oral at bedtime  benztropine 1 milliGRAM(s) Oral two times a day  chlorhexidine 2% Cloths 1 Application(s) Topical <User Schedule>  dextrose 5%. 1000 milliLiter(s) (100 mL/Hr) IV Continuous <Continuous>  folic acid 1 milliGRAM(s) Oral daily  glucagon  Injectable 1 milliGRAM(s) IntraMuscular once  insulin lispro (ADMELOG) corrective regimen sliding scale   SubCutaneous three times a day before meals  insulin lispro (ADMELOG) corrective regimen sliding scale   SubCutaneous at bedtime  levothyroxine 50 MICROGram(s) Oral daily  metoprolol succinate ER 50 milliGRAM(s) Oral daily  multivitamin 1 Tablet(s) Oral daily  OLANZapine Disintegrating Tablet 2.5 milliGRAM(s) Oral two times a day  potassium chloride    Tablet ER 40 milliEquivalent(s) Oral every 4 hours  potassium chloride  10 mEq/100 mL IVPB 10 milliEquivalent(s) IV Intermittent every 1 hour  rivaroxaban 20 milliGRAM(s) Oral with dinner    Vital Signs Last 24 Hrs  T(C): 36.8 (07 Nov 2023 10:47), Max: 36.8 (07 Nov 2023 10:47)  T(F): 98.2 (07 Nov 2023 10:47), Max: 98.2 (07 Nov 2023 10:47)  HR: 80 (07 Nov 2023 04:45) (75 - 80)  BP: 132/71 (07 Nov 2023 04:45) (132/71 - 145/82)  RR: 18 (07 Nov 2023 04:45) (18 - 19)  SpO2: 97% (07 Nov 2023 04:45) (97% - 97%)    Parameters below as of 06 Nov 2023 17:08  Patient On (Oxygen Delivery Method): room air    Detailed Neurologic Exam:    Mental status: The patient is awake and alert. There is no aphasia. There is no dysarthria.     Cranial nerves: Pupils equal and react symmetrically to light. There is no visual field deficit to threat. (+) tracking Facial musculature is grossly symmetric. Palate and tongue cannot be assessed.    Motor: There is normal bulk. ild b/l cogwheeling at wrists. 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   Strength grossly 5/5 bilaterally.    Sensation: Grossly intact to light touch.    Reflexes: 2+ throughout and plantar responses are flexor.    Cerebellar: No dysmetria on finger nose testing.    Labs:     11-07    139  |  104  |  13.3  ----------------------------<  95  3.0<L>   |  21.0<L>  |  0.51    Ca    9.2      07 Nov 2023 05:00      Rad:   ***      
                            [facility]                                        Neurology at [place]                                 Lore Love, & Gerald                                  [address]                                        [place], [place], [address]                                             [phone]        CC: Tremor and unsteady gait.    HPI:   The patient is a 63y Female with schizophrenia now sent from [facility] secondary to tremors and unsteady gait.   She reports that this has been going on for the past few weeks.   She denies urinary incontinence.   She was found to have urinary tract infection. (JW)    Interim history:  even more alert, carries on conversation    ROS:   denies headache, dizziness  (+) tremor      MEDICATIONS  (STANDING):  amLODIPine   Tablet 5 milliGRAM(s) Oral daily  atorvastatin 80 milliGRAM(s) Oral at bedtime  benztropine 1 milliGRAM(s) Oral two times a day  chlorhexidine 2% Cloths 1 Application(s) Topical <User Schedule>  dextrose 5%. 1000 milliLiter(s) (100 mL/Hr) IV Continuous <Continuous>  dextrose 5%. 1000 milliLiter(s) (50 mL/Hr) IV Continuous <Continuous>  dextrose 50% Injectable 25 Gram(s) IV Push once  dextrose 50% Injectable 12.5 Gram(s) IV Push once  dextrose 50% Injectable 25 Gram(s) IV Push once  folic acid 1 milliGRAM(s) Oral daily  glucagon  Injectable 1 milliGRAM(s) IntraMuscular once  insulin lispro (ADMELOG) corrective regimen sliding scale   SubCutaneous three times a day before meals  insulin lispro (ADMELOG) corrective regimen sliding scale   SubCutaneous at bedtime  levothyroxine 50 MICROGram(s) Oral daily  metoprolol succinate ER 50 milliGRAM(s) Oral daily  multivitamin 1 Tablet(s) Oral daily  OLANZapine Disintegrating Tablet 2.5 milliGRAM(s) Oral two times a day  rivaroxaban 20 milliGRAM(s) Oral with dinner    MEDICATIONS  (PRN):  acetaminophen     Tablet .. 650 milliGRAM(s) Oral every 6 hours PRN Temp greater or equal to 38C (100.4F), Mild Pain (1 - 3), Moderate Pain (4 - 6)  dextrose Oral Gel 15 Gram(s) Oral once PRN Blood Glucose LESS THAN 70 milliGRAM(s)/deciliter  ondansetron Injectable 4 milliGRAM(s) IV Push every 6 hours PRN Nausea and/or Vomiting  traZODone 50 milliGRAM(s) Oral at bedtime PRN for insomnia      Vital Signs Last 24 Hrs  T(C): 36.3 (05 Nov 2023 11:00), Max: 36.9 (04 Nov 2023 17:37)  T(F): 97.4 (05 Nov 2023 11:00), Max: 98.5 (04 Nov 2023 17:37)  HR: 68 (05 Nov 2023 11:00) (68 - 84)  BP: 115/73 (05 Nov 2023 11:00) (112/70 - 149/75)  BP(mean): --  RR: 18 (05 Nov 2023 11:00) (18 - 18)  SpO2: 97% (05 Nov 2023 11:00) (95% - 97%)    Parameters below as of 05 Nov 2023 11:00  Patient On (Oxygen Delivery Method): room air      Detailed Neurologic Exam:    Mental status: The patient is more alert, oriented to self, hospital 2023 not month, following simple commands. no aphasia    Cranial nerves: Pupils equal and react symmetrically to light. There is no visual field deficit to threat. (+) tracking Facial musculature is grossly symmetric.     Motor: There is normal bulk. ild b/l cogwheeling at wrists. 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   Strength grossly 5/5 bilaterally.    Sensation: Grossly intact to light touch     Cerebellar: unable to assess for dysmetria on finger nose testing.    Labs:                                             12.6   8.86  )-----------( 201      ( 04 Nov 2023 05:00 )             38.3     11-05    143  |  108  |  17.4  ----------------------------<  72  3.2<L>   |  17.0<L>  |  0.46<L>    Ca    9.3      05 Nov 2023 06:30  Phos  3.0     11-04  Mg     2.2     11-04      TSH/T4: normal     Radiology:  CT Head No Cont (10.30.23 @ 10:22)  IMPRESSION: Moderate to severe chronic microvascular changes without   evidence of an acute transcortical infarction or hemorrhage. 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 Correlate clinically.        
                            [facility]                                        Neurology at [place]                                 Lore Love, & Gerald                                  [address]                                        [place], [place], [address]                                             [phone]        CC: Tremor and unsteady gait.    HPI:   The patient is a 63y Female with schizophrenia now sent from [facility] secondary to tremors and unsteady gait.   She reports that this has been going on for the past few weeks.   She denies urinary incontinence.   She was found to have urinary tract infection. (JW)    Interim history:  new speech issue noted ? dysarthria, remains tremulous    ROS:   Denies headache or dizziness.  Denies chest pain.  Denies shortness of breath.    MEDICATIONS  (STANDING):  atorvastatin 80 milliGRAM(s) Oral at bedtime  benztropine 1 milliGRAM(s) Oral two times a day  cefTRIAXone Injectable. 1000 milliGRAM(s) IV Push every 24 hours  chlorhexidine 2% Cloths 1 Application(s) Topical <User Schedule>  dextrose 5%. 1000 milliLiter(s) (100 mL/Hr) IV Continuous <Continuous>  dextrose 5%. 1000 milliLiter(s) (50 mL/Hr) IV Continuous <Continuous>  dextrose 50% Injectable 25 Gram(s) IV Push once  dextrose 50% Injectable 12.5 Gram(s) IV Push once  dextrose 50% Injectable 25 Gram(s) IV Push once  fluPHENAZine 10 milliGRAM(s) Oral two times a day  folic acid 1 milliGRAM(s) Oral daily  glucagon  Injectable 1 milliGRAM(s) IntraMuscular once  insulin lispro (ADMELOG) corrective regimen sliding scale   SubCutaneous three times a day before meals  insulin lispro (ADMELOG) corrective regimen sliding scale   SubCutaneous at bedtime  levothyroxine 50 MICROGram(s) Oral daily  lithium 300 milliGRAM(s) Oral two times a day  metoprolol succinate ER 50 milliGRAM(s) Oral daily  rivaroxaban 20 milliGRAM(s) Oral with dinner    MEDICATIONS  (PRN):  acetaminophen     Tablet .. 650 milliGRAM(s) Oral every 6 hours PRN Temp greater or equal to 38C (100.4F), Mild Pain (1 - 3), Moderate Pain (4 - 6)  dextrose Oral Gel 15 Gram(s) Oral once PRN Blood Glucose LESS THAN 70 milliGRAM(s)/deciliter  ondansetron Injectable 4 milliGRAM(s) IV Push every 6 hours PRN Nausea and/or Vomiting  traZODone 50 milliGRAM(s) Oral at bedtime PRN for insomnia      Vital Signs Last 24 Hrs  T(C): 36.7 (30 Oct 2023 10:58), Max: 37 (29 Oct 2023 16:35)  T(F): 98.1 (30 Oct 2023 10:58), Max: 98.6 (29 Oct 2023 16:35)  HR: 92 (30 Oct 2023 10:58) (76 - 92)  BP: 136/87 (30 Oct 2023 10:58) (136/87 - 182/90)  BP(mean): --  RR: 18 (30 Oct 2023 10:58) (18 - 18)  SpO2: 93% (30 Oct 2023 10:58) (93% - 97%)    Parameters below as of 30 Oct 2023 10:58  Patient On (Oxygen Delivery Method): room air      Detailed Neurologic Exam:    Mental status: The patient is awake and alert. There is no aphasia. There is mild dysarthria.     Cranial nerves: Pupils equal and react symmetrically to light. There is no visual field deficit to threat. Extraocular motion is full with no nystagmus. Facial sensation is intact. Facial musculature is symmetric. Palate elevates symmetrically. Tongue is midline.    Motor: There is normal bulk and tone. There is tremor of the upper extremities. there is cogwheeling of the wrists   Strength grossly 5/5 bilaterally.    Sensation: Grossly intact to light touch and pin.    Reflexes: 2+ throughout and plantar responses are flexor.    Cerebellar: No dysmetria on finger nose testing.    Labs:                           12.8   13.05 )-----------( 316      ( 30 Oct 2023 05:45 )             39.1     10-30    146<H>  |  111<H>  |  16.7  ----------------------------<  120<H>  4.0   |  18.0<L>  |  0.61    Ca    10.1      30 Oct 2023 05:45  Phos  3.4     10-30  Mg     1.7     10-30    TSH/T4: normal     Radiology:  CT Head No Cont (10.30.23 @ 10:22)  IMPRESSION: Moderate to severe chronic microvascular changes without   evidence of an acute transcortical infarction or hemorrhage. 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 Correlate clinically.        
                            [facility]                                        Neurology at [place]                                 Lore oLve, & Gerald                                  [address]                                        [place], [address]                                             [phone]        CC: Tremor and unsteady gait.    HPI:   The patient is a 63y Female with schizophrenia now sent from [facility] secondary to tremors and unsteady gait.   She reports that this has been going on for the past few weeks.   She denies urinary incontinence.   She was found to have urinary tract infection. (JW)    Interim history:  appears more altered today, still tremulous, not speaking    ROS:   unable to obtain    MEDICATIONS  (STANDING):  ampicillin  IVPB 1 Gram(s) IV Intermittent every 6 hours  ampicillin  IVPB      atorvastatin 80 milliGRAM(s) Oral at bedtime  benztropine 1 milliGRAM(s) Oral two times a day  chlorhexidine 2% Cloths 1 Application(s) Topical <User Schedule>  dextrose 5%. 1000 milliLiter(s) (50 mL/Hr) IV Continuous <Continuous>  dextrose 5%. 1000 milliLiter(s) (75 mL/Hr) IV Continuous <Continuous>  dextrose 5%. 1000 milliLiter(s) (100 mL/Hr) IV Continuous <Continuous>  dextrose 50% Injectable 25 Gram(s) IV Push once  dextrose 50% Injectable 25 Gram(s) IV Push once  dextrose 50% Injectable 12.5 Gram(s) IV Push once  folic acid 1 milliGRAM(s) Oral daily  glucagon  Injectable 1 milliGRAM(s) IntraMuscular once  insulin lispro (ADMELOG) corrective regimen sliding scale   SubCutaneous three times a day before meals  insulin lispro (ADMELOG) corrective regimen sliding scale   SubCutaneous at bedtime  levothyroxine 50 MICROGram(s) Oral daily  metoprolol succinate ER 50 milliGRAM(s) Oral daily  rivaroxaban 20 milliGRAM(s) Oral with dinner    MEDICATIONS  (PRN):  acetaminophen     Tablet .. 650 milliGRAM(s) Oral every 6 hours PRN Temp greater or equal to 38C (100.4F), Mild Pain (1 - 3), Moderate Pain (4 - 6)  dextrose Oral Gel 15 Gram(s) Oral once PRN Blood Glucose LESS THAN 70 milliGRAM(s)/deciliter  ondansetron Injectable 4 milliGRAM(s) IV Push every 6 hours PRN Nausea and/or Vomiting  traZODone 50 milliGRAM(s) Oral at bedtime PRN for insomnia      Vital Signs Last 24 Hrs  T(C): 37.2 (01 Nov 2023 11:58), Max: 37.6 (01 Nov 2023 04:03)  T(F): 99 (01 Nov 2023 11:58), Max: 99.6 (01 Nov 2023 04:03)  HR: 87 (01 Nov 2023 11:58) (80 - 114)  BP: 165/92 (01 Nov 2023 11:58) (133/94 - 165/92)  BP(mean): --  RR: 18 (01 Nov 2023 11:58) (18 - 18)  SpO2: 92% (01 Nov 2023 11:58) (92% - 98%)    Parameters below as of 01 Nov 2023 11:58  Patient On (Oxygen Delivery Method): room air        Detailed Neurologic Exam:    Mental status: The patient is lethargic, opens eye to voice, not following commands or speaking    Cranial nerves: Pupils equal and react symmetrically to light. There is no visual field deficit to threat. no tracking Facial sensation is intact. Facial musculature is grossly symmetric.     Motor: There is normal bulk and tone. There is tremor of the upper extremities. there is cogwheeling of the wrists   Strength grossly 5/5 bilaterally.    Sensation: Grossly intact to light touch and pin.    Cerebellar: unable to assess for dysmetria on finger nose testing.    Labs:                                               12.1   10.58 )-----------( 310      ( 01 Nov 2023 05:36 )             36.8     11-01    147<H>  |  112<H>  |  22.7<H>  ----------------------------<  152<H>  2.9<LL>   |  20.0<L>  |  0.55    Ca    9.6      01 Nov 2023 05:36  Phos  2.5     11-01  Mg     1.8     11-01      TSH/T4: normal     Radiology:  CT Head No Cont (10.30.23 @ 10:22)  IMPRESSION: Moderate to severe chronic microvascular changes without   evidence of an acute transcortical infarction or hemorrhage. 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 Correlate clinically.        
[facility] Division of Hospital Medicine    Chief Complaint:      SUBJECTIVE / OVERNIGHT EVENTS:    Patient seen and examined at bedside, no acute events overnight patient remains with improved UE tremors, now cleared by psych for PUMA placement. Worked with PT with rec for PUMA.     MEDICATIONS  (STANDING):  amLODIPine   Tablet 5 milliGRAM(s) Oral daily  atorvastatin 80 milliGRAM(s) Oral at bedtime  benztropine 1 milliGRAM(s) Oral two times a day  chlorhexidine 2% Cloths 1 Application(s) Topical <User Schedule>  dextrose 5%. 1000 milliLiter(s) (100 mL/Hr) IV Continuous <Continuous>  dextrose 5%. 1000 milliLiter(s) (50 mL/Hr) IV Continuous <Continuous>  dextrose 50% Injectable 25 Gram(s) IV Push once  dextrose 50% Injectable 12.5 Gram(s) IV Push once  dextrose 50% Injectable 25 Gram(s) IV Push once  folic acid 1 milliGRAM(s) Oral daily  glucagon  Injectable 1 milliGRAM(s) IntraMuscular once  insulin lispro (ADMELOG) corrective regimen sliding scale   SubCutaneous three times a day before meals  insulin lispro (ADMELOG) corrective regimen sliding scale   SubCutaneous at bedtime  levothyroxine 50 MICROGram(s) Oral daily  metoprolol succinate ER 50 milliGRAM(s) Oral daily  multivitamin 1 Tablet(s) Oral daily  OLANZapine Disintegrating Tablet 2.5 milliGRAM(s) Oral two times a day  rivaroxaban 20 milliGRAM(s) Oral with dinner    MEDICATIONS  (PRN):  acetaminophen     Tablet .. 650 milliGRAM(s) Oral every 6 hours PRN Temp greater or equal to 38C (100.4F), Mild Pain (1 - 3), Moderate Pain (4 - 6)  dextrose Oral Gel 15 Gram(s) Oral once PRN Blood Glucose LESS THAN 70 milliGRAM(s)/deciliter  ondansetron Injectable 4 milliGRAM(s) IV Push every 6 hours PRN Nausea and/or Vomiting  traZODone 50 milliGRAM(s) Oral at bedtime PRN for insomnia        I&O's Summary    06 Nov 2023 07:01  -  06 Nov 2023 14:07  --------------------------------------------------------  IN: 280 mL / OUT: 200 mL / NET: 80 mL        PHYSICAL EXAM:  Vital Signs Last 24 Hrs  T(C): 36.4 (06 Nov 2023 10:20), Max: 36.9 (05 Nov 2023 17:44)  T(F): 97.6 (06 Nov 2023 10:20), Max: 98.5 (05 Nov 2023 17:44)  HR: 82 (06 Nov 2023 10:20) (69 - 82)  BP: 112/67 (06 Nov 2023 10:20) (112/67 - 137/74)  BP(mean): --  RR: 18 (06 Nov 2023 10:20) (18 - 18)  SpO2: 95% (06 Nov 2023 10:20) (95% - 97%)    Parameters below as of 06 Nov 2023 10:20  Patient On (Oxygen Delivery Method): room air      GENERAL: elderly female, alert /awake oriented x3, following all commands   HEAD:  Atraumatic, Normocephalic  EYES: conjunctiva and sclera clear  ENMT: Moist mucous membranes  NECK: Supple   NERVOUS SYSTEM:  mild improving UE tremor noted   CHEST/LUNG: coarse breath sounds  HEART: Regular rate and rhythm; + S1/S2  ABDOMEN: Soft, Nontender, Nondistended; Bowel sounds present  EXTREMITIES:  no pedal edema    LABS:    11-06    141  |  105  |  15.0  ----------------------------<  94  3.3<L>   |  20.0<L>  |  0.53    Ca    9.3      06 Nov 2023 05:30            Urinalysis Basic - ( 06 Nov 2023 05:30 )    Color: x / Appearance: x / SG: x / pH: x  Gluc: 94 mg/dL / Ketone: x  / Bili: x / Urobili: x   Blood: x / Protein: x / Nitrite: x   Leuk Esterase: x / RBC: x / WBC x   Sq Epi: x / Non Sq Epi: x / Bacteria: x        CAPILLARY BLOOD GLUCOSE      POCT Blood Glucose.: 145 mg/dL (06 Nov 2023 11:40)  POCT Blood Glucose.: 98 mg/dL (06 Nov 2023 08:15)  POCT Blood Glucose.: 139 mg/dL (05 Nov 2023 21:22)  POCT Blood Glucose.: 344 mg/dL (05 Nov 2023 16:39)        RADIOLOGY & ADDITIONAL TESTS:  Results Reviewed:   Imaging Personally Reviewed:  Electrocardiogram Personally Reviewed:    
HPI  Pt is a 64yo F from [facility] admitted to [facility] for gait instability and UTI.   Pt was seen and examined at bedside. No overnight complaints. want to return to [facility].     Vital Signs Last 24 Hrs  T(C): 37.3 (28 Oct 2023 13:20), Max: 37.4 (28 Oct 2023 01:56)  T(F): 99.1 (28 Oct 2023 13:20), Max: 99.3 (28 Oct 2023 01:56)  HR: 70 (28 Oct 2023 13:20) (65 - 76)  BP: 160/78 (28 Oct 2023 13:20) (126/97 - 160/78)  BP(mean): 107 (28 Oct 2023 01:56) (107 - 107)  RR: 18 (28 Oct 2023 13:20) (16 - 19)  SpO2: 96% (28 Oct 2023 13:20) (96% - 98%)    Parameters below as of 28 Oct 2023 13:20  Patient On (Oxygen Delivery Method): room air        I&O's Summary    28 Oct 2023 07:01  -  28 Oct 2023 14:38  --------------------------------------------------------  IN: 0 mL / OUT: 800 mL / NET: -800 mL        CAPILLARY BLOOD GLUCOSE      POCT Blood Glucose.: 119 mg/dL (28 Oct 2023 12:01)  POCT Blood Glucose.: 136 mg/dL (28 Oct 2023 08:13)  POCT Blood Glucose.: 107 mg/dL (27 Oct 2023 17:47)      PHYSICAL EXAM:    Constitutional: NAD,   HEENT: PERR, EOMI,    Neck: Soft and supple,    Respiratory: Breath sounds are clear bilaterally,    Cardiovascular: S1 and S2,   Gastrointestinal: Bowel Sounds present, soft, nontender,    Extremities: No peripheral edema  Vascular: 2+ peripheral pulses  Neurological: A/O x 3,   Musculoskeletal: 5/5 strength b/l upper and lower extremities  Skin: No rashes    MEDICATIONS:  MEDICATIONS  (STANDING):  atorvastatin 80 milliGRAM(s) Oral at bedtime  benztropine 1 milliGRAM(s) Oral two times a day  cefTRIAXone Injectable. 1000 milliGRAM(s) IV Push every 24 hours  dextrose 5%. 1000 milliLiter(s) (50 mL/Hr) IV Continuous <Continuous>  dextrose 5%. 1000 milliLiter(s) (100 mL/Hr) IV Continuous <Continuous>  dextrose 50% Injectable 25 Gram(s) IV Push once  dextrose 50% Injectable 25 Gram(s) IV Push once  dextrose 50% Injectable 12.5 Gram(s) IV Push once  fluPHENAZine 10 milliGRAM(s) Oral two times a day  folic acid 1 milliGRAM(s) Oral daily  glucagon  Injectable 1 milliGRAM(s) IntraMuscular once  insulin lispro (ADMELOG) corrective regimen sliding scale   SubCutaneous three times a day before meals  insulin lispro (ADMELOG) corrective regimen sliding scale   SubCutaneous at bedtime  levothyroxine 50 MICROGram(s) Oral daily  lithium 300 milliGRAM(s) Oral two times a day  metoprolol succinate ER 50 milliGRAM(s) Oral daily  rivaroxaban 20 milliGRAM(s) Oral with dinner      LABS: All Labs Reviewed:                        11.0   11.55 )-----------( 253      ( 28 Oct 2023 05:20 )             34.0     10-28    137  |  105  |  10.8  ----------------------------<  117<H>  4.0   |  20.0<L>  |  0.70    Ca    9.6      28 Oct 2023 05:20    TPro  6.2<L>  /  Alb  3.9  /  TBili  0.3<L>  /  DBili  x   /  AST  25  /  ALT  15  /  AlkPhos  145<H>  10-28      CARDIAC MARKERS ( 27 Oct 2023 18:45 )  x     / <0.01 ng/mL / 25 U/L / x     / x          Blood Culture:     RADIOLOGY/EKG:    DVT PPX:    ADVANCED DIRECTIVE:    DISPOSITION:
                            [facility]                                        Neurology at [place]                                 Lore Love, & Gerald                                  [address]                                        [place], [place], [address]                                             [phone]        CC: Tremor and unsteady gait.    HPI:   The patient is a 63y Female with schizophrenia now sent from [facility] secondary to tremors and unsteady gait.   She reports that this has been going on for the past few weeks.   She denies urinary incontinence.   She was found to have urinary tract infection.    Interim history:  Remains on [address] [place].    ROS:   Denies headache or dizziness.  Denies chest pain.  Denies shortness of breath.    MEDICATIONS  (STANDING):  atorvastatin 80 milliGRAM(s) Oral at bedtime  benztropine 1 milliGRAM(s) Oral two times a day  cefTRIAXone Injectable. 1000 milliGRAM(s) IV Push every 24 hours  chlorhexidine 2% Cloths 1 Application(s) Topical <User Schedule>  dextrose 5%. 1000 milliLiter(s) (100 mL/Hr) IV Continuous <Continuous>  dextrose 5%. 1000 milliLiter(s) (50 mL/Hr) IV Continuous <Continuous>  fluPHENAZine 10 milliGRAM(s) Oral two times a day  folic acid 1 milliGRAM(s) Oral daily  glucagon  Injectable 1 milliGRAM(s) IntraMuscular once  insulin lispro (ADMELOG) corrective regimen sliding scale   SubCutaneous three times a day before meals  insulin lispro (ADMELOG) corrective regimen sliding scale   SubCutaneous at bedtime  levothyroxine 50 MICROGram(s) Oral daily  lithium 300 milliGRAM(s) Oral two times a day  metoprolol succinate ER 50 milliGRAM(s) Oral daily  potassium chloride    Tablet ER 40 milliEquivalent(s) Oral once  rivaroxaban 20 milliGRAM(s) Oral with dinner    Vital Signs Last 24 Hrs  T(C): 37.1 (29 Oct 2023 08:28), Max: 37.6 (28 Oct 2023 16:35)  T(F): 98.8 (29 Oct 2023 08:28), Max: 99.6 (28 Oct 2023 16:35)  HR: 71 (29 Oct 2023 08:28) (66 - 71)  BP: 148/98 (29 Oct 2023 08:28) (129/94 - 160/78)  RR: 17 (29 Oct 2023 08:28) (17 - 18)  SpO2: 95% (29 Oct 2023 08:28) (95% - 97%)    Parameters below as of 29 Oct 2023 08:28  Patient On (Oxygen Delivery Method): room air    Detailed Neurologic Exam:    Mental status: The patient is awake and alert. There is no aphasia. There is no dysarthria.     Cranial nerves: Pupils equal and react symmetrically to light. There is no visual field deficit to threat. Extraocular motion is full with no nystagmus. Facial sensation is intact. Facial musculature is symmetric. Palate elevates symmetrically. Tongue is midline.    Motor: There is normal bulk and tone. There is tremor of the upper extremities. there is cogwheeling of the wrists with reinforcement.  Strength grossly 5/5 bilaterally.    Sensation: Grossly intact to light touch and pin.    Reflexes: 2+ throughout and plantar responses are flexor.    Cerebellar: No dysmetria on finger nose testing.    Labs:     10-29    142  |  107  |  12.6  ----------------------------<  108<H>  3.4<L>   |  19.0<L>  |  0.60    Ca    9.9      29 Oct 2023 06:35    TPro  6.2<L>  /  Alb  3.9  /  TBili  0.3<L>  /  DBili  x   /  AST  25  /  ALT  15  /  AlkPhos  145<H>  10-28                            11.3   11.70 )-----------( 273      ( 29 Oct 2023 06:35 )             34.9       TSH/T4: normal     
                            [facility]                                        Neurology at [place]                                 Lore Love & Gerald                                  [address]                                        [place], [place], [address]                                             [phone]        CC: Tremor and unsteady gait.    HPI:   The patient is a 63y Female with schizophrenia now sent from [facility] secondary to tremors and unsteady gait.   She reports that this has been going on for the past few weeks.   She denies urinary incontinence.   She was found to have urinary tract infection.     Interim history:  Remains on 6 [place].   Now on Zyprexa.     ROS:   Denies headache or dizziness.  Denies chest pain.  Denies shortness of breath.    MEDICATIONS  (STANDING):  amLODIPine   Tablet 5 milliGRAM(s) Oral daily  atorvastatin 80 milliGRAM(s) Oral at bedtime  benztropine 1 milliGRAM(s) Oral two times a day  chlorhexidine 2% Cloths 1 Application(s) Topical <User Schedule>  dextrose 5%. 1000 milliLiter(s) (100 mL/Hr) IV Continuous <Continuous>  dextrose 5%. 1000 milliLiter(s) (50 mL/Hr) IV Continuous <Continuous>  folic acid 1 milliGRAM(s) Oral daily  glucagon  Injectable 1 milliGRAM(s) IntraMuscular once  insulin lispro (ADMELOG) corrective regimen sliding scale   SubCutaneous at bedtime  insulin lispro (ADMELOG) corrective regimen sliding scale   SubCutaneous three times a day before meals  levothyroxine 50 MICROGram(s) Oral daily  metoprolol succinate ER 50 milliGRAM(s) Oral daily  multivitamin 1 Tablet(s) Oral daily  OLANZapine Disintegrating Tablet 2.5 milliGRAM(s) Oral two times a day  rivaroxaban 20 milliGRAM(s) Oral with dinner    Vital Signs Last 24 Hrs  T(C): 36.9 (06 Nov 2023 04:14), Max: 36.9 (05 Nov 2023 17:44)  T(F): 98.5 (06 Nov 2023 04:14), Max: 98.5 (05 Nov 2023 17:44)  HR: 69 (06 Nov 2023 04:14) (68 - 81)  BP: 137/74 (06 Nov 2023 04:14) (115/73 - 137/74)  RR: 18 (06 Nov 2023 04:14) (18 - 18)  SpO2: 96% (06 Nov 2023 04:14) (96% - 97%)    Parameters below as of 05 Nov 2023 17:44  Patient On (Oxygen Delivery Method): room air    Detailed Neurologic Exam:    Mental status: The patient is sleepy but opens eyes to voice and follows instructions. There is no dysarthria.     Cranial nerves: Pupils equal and react symmetrically to light. There is no visual field deficit to threat. (+) tracking Facial musculature is grossly symmetric. Palate and tongue cannot be assessed.    Motor: There is normal bulk. ild b/l cogwheeling at wrists. 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   Strength grossly 5/5 bilaterally.    Sensation: Grossly intact to light touch     Cerebellar: unable to assess for dysmetria on finger nose testing.    Labs:     11-06    141  |  105  |  15.0  ----------------------------<  94  3.3<L>   |  20.0<L>  |  0.53    Ca    9.3      06 Nov 2023 05:30            Rad:   ***      
                            [facility]                                        Neurology at [place]                                 Lore Love, & Gerald                                  [address]                                        [place], [place], [address]                                             [phone]        CC: Tremor and unsteady gait.    HPI:   The patient is a 63y Female with schizophrenia now sent from [facility] secondary to tremors and unsteady gait.   She reports that this has been going on for the past few weeks.   She denies urinary incontinence.   She was found to have urinary tract infection. (JW)    Interim history:  continues to be more alert today, still tremulous but less so, answering simple questions, following requests intermittently    ROS:   denies headache, dizziness  (+) tremor    MEDICATIONS  (STANDING):  ampicillin  IVPB      ampicillin  IVPB 1 Gram(s) IV Intermittent every 6 hours  atorvastatin 80 milliGRAM(s) Oral at bedtime  benztropine 1 milliGRAM(s) Oral two times a day  chlorhexidine 2% Cloths 1 Application(s) Topical <User Schedule>  dextrose 5%. 1000 milliLiter(s) (100 mL/Hr) IV Continuous <Continuous>  dextrose 5%. 1000 milliLiter(s) (100 mL/Hr) IV Continuous <Continuous>  dextrose 5%. 1000 milliLiter(s) (50 mL/Hr) IV Continuous <Continuous>  dextrose 50% Injectable 25 Gram(s) IV Push once  dextrose 50% Injectable 25 Gram(s) IV Push once  dextrose 50% Injectable 12.5 Gram(s) IV Push once  folic acid 1 milliGRAM(s) Oral daily  glucagon  Injectable 1 milliGRAM(s) IntraMuscular once  insulin lispro (ADMELOG) corrective regimen sliding scale   SubCutaneous three times a day before meals  insulin lispro (ADMELOG) corrective regimen sliding scale   SubCutaneous at bedtime  levothyroxine 50 MICROGram(s) Oral daily  metoprolol succinate ER 50 milliGRAM(s) Oral daily  OLANZapine Injectable 2.5 milliGRAM(s) IntraMuscular every 12 hours  potassium chloride  10 mEq/100 mL IVPB 10 milliEquivalent(s) IV Intermittent every 1 hour  rivaroxaban 20 milliGRAM(s) Oral with dinner    MEDICATIONS  (PRN):  acetaminophen     Tablet .. 650 milliGRAM(s) Oral every 6 hours PRN Temp greater or equal to 38C (100.4F), Mild Pain (1 - 3), Moderate Pain (4 - 6)  dextrose Oral Gel 15 Gram(s) Oral once PRN Blood Glucose LESS THAN 70 milliGRAM(s)/deciliter  ondansetron Injectable 4 milliGRAM(s) IV Push every 6 hours PRN Nausea and/or Vomiting  traZODone 50 milliGRAM(s) Oral at bedtime PRN for insomnia      Vital Signs Last 24 Hrs  T(C): 36.9 (03 Nov 2023 04:49), Max: 36.9 (02 Nov 2023 16:20)  T(F): 98.5 (03 Nov 2023 04:49), Max: 98.5 (02 Nov 2023 16:20)  HR: 81 (03 Nov 2023 04:49) (76 - 94)  BP: 159/84 (03 Nov 2023 04:49) (148/83 - 159/84)  BP(mean): --  RR: 18 (03 Nov 2023 04:49) (17 - 18)  SpO2: 95% (03 Nov 2023 04:49) (95% - 95%)    Parameters below as of 03 Nov 2023 00:00  Patient On (Oxygen Delivery Method): room air      Detailed Neurologic Exam:    Mental status: The patient is more alert, oriented to self only,says her name, answers yes/no, following simple commands. no idea of location or date/year    Cranial nerves: Pupils equal and react symmetrically to light. There is no visual field deficit to threat. (+) tracking Facial musculature is grossly symmetric.     Motor: There is normal bulk and tone. There is tremor of the upper extremities.   Strength grossly 5/5 bilaterally.    Sensation: Grossly intact to light touch     Cerebellar: unable to assess for dysmetria on finger nose testing.    Labs:                                                  12.3   9.45  )-----------( 234      ( 03 Nov 2023 05:40 )             37.7     11-03    150<H>  |  116<H>  |  21.3<H>  ----------------------------<  96  3.2<L>   |  19.0<L>  |  0.46<L>    Ca    9.2      03 Nov 2023 05:40  Phos  3.1     11-03  Mg     1.8     11-03      TSH/T4: normal     Radiology:  CT Head No Cont (10.30.23 @ 10:22)  IMPRESSION: Moderate to severe chronic microvascular changes without   evidence of an acute transcortical infarction or hemorrhage. 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 Correlate clinically.        
CHIEF COMPLAINT/INTERVAL HISTORY:    Patient is a 63y old  Female who presents with a chief complaint of Ataxia and tremor (02 Nov 2023 15:03)    SUBJECTIVE & OBJECTIVE: Pt seen and examined at bedside. No overnight events. More awake today, able to state name and follow simple commands. Sodium improving.    ROS: Nods no to pain; limited    ICU Vital Signs Last 24 Hrs  T(C): 36.9 (02 Nov 2023 16:20), Max: 37 (02 Nov 2023 06:32)  T(F): 98.5 (02 Nov 2023 16:20), Max: 98.6 (02 Nov 2023 06:32)  HR: 87 (02 Nov 2023 16:20) (76 - 87)  BP: 155/52 (02 Nov 2023 16:20) (152/91 - 166/79)  RR: 18 (02 Nov 2023 16:20) (18 - 18)  SpO2: 95% (02 Nov 2023 16:20) (94% - 95%)    O2 Parameters below as of 02 Nov 2023 11:00  Patient On (Oxygen Delivery Method): room air      MEDICATIONS  (STANDING):  ampicillin  IVPB 1 Gram(s) IV Intermittent every 6 hours  ampicillin  IVPB      atorvastatin 80 milliGRAM(s) Oral at bedtime  benztropine 1 milliGRAM(s) Oral two times a day  chlorhexidine 2% Cloths 1 Application(s) Topical <User Schedule>  dextrose 5%. 1000 milliLiter(s) (100 mL/Hr) IV Continuous <Continuous>  dextrose 5%. 1000 milliLiter(s) (50 mL/Hr) IV Continuous <Continuous>  dextrose 50% Injectable 25 Gram(s) IV Push once  dextrose 50% Injectable 12.5 Gram(s) IV Push once  dextrose 50% Injectable 25 Gram(s) IV Push once  folic acid 1 milliGRAM(s) Oral daily  glucagon  Injectable 1 milliGRAM(s) IntraMuscular once  insulin lispro (ADMELOG) corrective regimen sliding scale   SubCutaneous three times a day before meals  insulin lispro (ADMELOG) corrective regimen sliding scale   SubCutaneous at bedtime  levothyroxine 50 MICROGram(s) Oral daily  metoprolol succinate ER 50 milliGRAM(s) Oral daily  OLANZapine Injectable 2.5 milliGRAM(s) IntraMuscular every 12 hours  rivaroxaban 20 milliGRAM(s) Oral with dinner    MEDICATIONS  (PRN):  acetaminophen     Tablet .. 650 milliGRAM(s) Oral every 6 hours PRN Temp greater or equal to 38C (100.4F), Mild Pain (1 - 3), Moderate Pain (4 - 6)  dextrose Oral Gel 15 Gram(s) Oral once PRN Blood Glucose LESS THAN 70 milliGRAM(s)/deciliter  ondansetron Injectable 4 milliGRAM(s) IV Push every 6 hours PRN Nausea and/or Vomiting  traZODone 50 milliGRAM(s) Oral at bedtime PRN for insomnia      LABS:                        12.0   9.77  )-----------( 252      ( 02 Nov 2023 04:57 )             38.7     11-02    147<H>  |  112<H>  |  18.4  ----------------------------<  121<H>  3.0<L>   |  20.0<L>  |  0.46<L>    Ca    9.2      02 Nov 2023 04:57  Phos  2.5     11-02  Mg     1.8     11-02        Urinalysis Basic - ( 02 Nov 2023 04:57 )    Color: x / Appearance: x / SG: x / pH: x  Gluc: 121 mg/dL / Ketone: x  / Bili: x / Urobili: x   Blood: x / Protein: x / Nitrite: x   Leuk Esterase: x / RBC: x / WBC x   Sq Epi: x / Non Sq Epi: x / Bacteria: x        CAPILLARY BLOOD GLUCOSE      POCT Blood Glucose.: 121 mg/dL (02 Nov 2023 17:08)  POCT Blood Glucose.: 115 mg/dL (02 Nov 2023 12:14)  POCT Blood Glucose.: 116 mg/dL (02 Nov 2023 07:43)  POCT Blood Glucose.: 118 mg/dL (01 Nov 2023 21:33)    PHYSICAL EXAM:    GENERAL: elderly female, more awake, following simple commands  HEAD:  Atraumatic, Normocephalic  EYES: conjunctiva and sclera clear  ENMT: Moist mucous membranes  NECK: Supple   NERVOUS SYSTEM:  awake and following commands  CHEST/LUNG: coarse breath sounds  HEART: Regular rate and rhythm; + S1/S2  ABDOMEN: Soft, Nontender, Nondistended; Bowel sounds present  EXTREMITIES:  no pedal edema      
CHIEF COMPLAINT/INTERVAL HISTORY:    Patient is a 63y old  Female who presents with a chief complaint of Ataxia and tremor (30 Oct 2023 12:37)    SUBJECTIVE & OBJECTIVE: Pt seen and examined at bedside. No overnight events. Slurred speech today; CT head negative. Discussed with neuro - likely due to meds. D/c lithium and fluphenazine per psych.    ROS: Unobtainable    ICU Vital Signs Last 24 Hrs  T(C): 37.1 (30 Oct 2023 17:14), Max: 37.1 (30 Oct 2023 17:14)  T(F): 98.8 (30 Oct 2023 17:14), Max: 98.8 (30 Oct 2023 17:14)  HR: 96 (30 Oct 2023 17:14) (80 - 96)  BP: 158/90 (30 Oct 2023 17:14) (136/87 - 182/90)  RR: 18 (30 Oct 2023 17:14) (18 - 18)  SpO2: 96% (30 Oct 2023 17:14) (93% - 97%)    O2 Parameters below as of 30 Oct 2023 17:14  Patient On (Oxygen Delivery Method): room air      MEDICATIONS  (STANDING):  atorvastatin 80 milliGRAM(s) Oral at bedtime  benztropine 1 milliGRAM(s) Oral two times a day  cefTRIAXone Injectable. 1000 milliGRAM(s) IV Push every 24 hours  chlorhexidine 2% Cloths 1 Application(s) Topical <User Schedule>  dextrose 5%. 1000 milliLiter(s) (100 mL/Hr) IV Continuous <Continuous>  dextrose 5%. 1000 milliLiter(s) (50 mL/Hr) IV Continuous <Continuous>  dextrose 50% Injectable 25 Gram(s) IV Push once  dextrose 50% Injectable 25 Gram(s) IV Push once  dextrose 50% Injectable 12.5 Gram(s) IV Push once  folic acid 1 milliGRAM(s) Oral daily  glucagon  Injectable 1 milliGRAM(s) IntraMuscular once  insulin lispro (ADMELOG) corrective regimen sliding scale   SubCutaneous three times a day before meals  insulin lispro (ADMELOG) corrective regimen sliding scale   SubCutaneous at bedtime  levothyroxine 50 MICROGram(s) Oral daily  metoprolol succinate ER 50 milliGRAM(s) Oral daily  rivaroxaban 20 milliGRAM(s) Oral with dinner    MEDICATIONS  (PRN):  acetaminophen     Tablet .. 650 milliGRAM(s) Oral every 6 hours PRN Temp greater or equal to 38C (100.4F), Mild Pain (1 - 3), Moderate Pain (4 - 6)  dextrose Oral Gel 15 Gram(s) Oral once PRN Blood Glucose LESS THAN 70 milliGRAM(s)/deciliter  ondansetron Injectable 4 milliGRAM(s) IV Push every 6 hours PRN Nausea and/or Vomiting  traZODone 50 milliGRAM(s) Oral at bedtime PRN for insomnia      LABS:                        12.8   13.05 )-----------( 316      ( 30 Oct 2023 05:45 )             39.1     10-30    146<H>  |  111<H>  |  16.7  ----------------------------<  120<H>  4.0   |  18.0<L>  |  0.61    Ca    10.1      30 Oct 2023 05:45  Phos  3.4     10-30  Mg     1.7     10-30        Urinalysis Basic - ( 30 Oct 2023 05:45 )    Color: x / Appearance: x / SG: x / pH: x  Gluc: 120 mg/dL / Ketone: x  / Bili: x / Urobili: x   Blood: x / Protein: x / Nitrite: x   Leuk Esterase: x / RBC: x / WBC x   Sq Epi: x / Non Sq Epi: x / Bacteria: x      CAPILLARY BLOOD GLUCOSE      POCT Blood Glucose.: 133 mg/dL (30 Oct 2023 16:12)  POCT Blood Glucose.: 134 mg/dL (30 Oct 2023 11:56)  POCT Blood Glucose.: 112 mg/dL (30 Oct 2023 07:52)  POCT Blood Glucose.: 111 mg/dL (29 Oct 2023 22:39)    PHYSICAL EXAM:    GENERAL: elderly female, confused, slurred speech  HEAD:  Atraumatic, Normocephalic  EYES: EOMI, PERRLA, conjunctiva and sclera clear  ENMT: Moist mucous membranes  NECK: Supple   NERVOUS SYSTEM:  Alert & Oriented X1, following simple commands  CHEST/LUNG: coarse breath sounds  HEART: Regular rate and rhythm; + S1/S2  ABDOMEN: Soft, Nontender, Nondistended; Bowel sounds present  EXTREMITIES:  no pedal edema  
CHIEF COMPLAINT/INTERVAL HISTORY:    Patient is a 63y old  Female who presents with a chief complaint of Ataxia and tremor (30 Oct 2023 19:38)    SUBJECTIVE & OBJECTIVE: Pt seen and examined at bedside. No overnight events. Dysarthric with tremors; due to neuroleptic drugs as discussed with psych. 1 dose of Ativan without any improvement. Start free water.    ROS: Unobtainable     ICU Vital Signs Last 24 Hrs  T(C): 36.7 (31 Oct 2023 16:27), Max: 37.4 (31 Oct 2023 05:16)  T(F): 98.1 (31 Oct 2023 16:27), Max: 99.3 (31 Oct 2023 05:16)  HR: 80 (31 Oct 2023 16:27) (80 - 117)  BP: 136/87 (31 Oct 2023 16:27) (136/87 - 164/98)  RR: 18 (31 Oct 2023 16:27) (18 - 19)  SpO2: 97% (31 Oct 2023 16:27) (94% - 97%)    O2 Parameters below as of 31 Oct 2023 16:27  Patient On (Oxygen Delivery Method): room air    MEDICATIONS  (STANDING):  atorvastatin 80 milliGRAM(s) Oral at bedtime  benztropine 1 milliGRAM(s) Oral two times a day  cefTRIAXone Injectable. 1000 milliGRAM(s) IV Push every 24 hours  chlorhexidine 2% Cloths 1 Application(s) Topical <User Schedule>  dextrose 5%. 1000 milliLiter(s) (100 mL/Hr) IV Continuous <Continuous>  dextrose 5%. 1000 milliLiter(s) (75 mL/Hr) IV Continuous <Continuous>  dextrose 5%. 1000 milliLiter(s) (50 mL/Hr) IV Continuous <Continuous>  dextrose 50% Injectable 25 Gram(s) IV Push once  dextrose 50% Injectable 12.5 Gram(s) IV Push once  dextrose 50% Injectable 25 Gram(s) IV Push once  folic acid 1 milliGRAM(s) Oral daily  glucagon  Injectable 1 milliGRAM(s) IntraMuscular once  insulin lispro (ADMELOG) corrective regimen sliding scale   SubCutaneous three times a day before meals  insulin lispro (ADMELOG) corrective regimen sliding scale   SubCutaneous at bedtime  levothyroxine 50 MICROGram(s) Oral daily  metoprolol succinate ER 50 milliGRAM(s) Oral daily  potassium chloride   Powder 40 milliEquivalent(s) Oral once  rivaroxaban 20 milliGRAM(s) Oral with dinner    MEDICATIONS  (PRN):  acetaminophen     Tablet .. 650 milliGRAM(s) Oral every 6 hours PRN Temp greater or equal to 38C (100.4F), Mild Pain (1 - 3), Moderate Pain (4 - 6)  dextrose Oral Gel 15 Gram(s) Oral once PRN Blood Glucose LESS THAN 70 milliGRAM(s)/deciliter  ondansetron Injectable 4 milliGRAM(s) IV Push every 6 hours PRN Nausea and/or Vomiting  traZODone 50 milliGRAM(s) Oral at bedtime PRN for insomnia      LABS:                        12.5   11.25 )-----------( 317      ( 31 Oct 2023 05:20 )             38.7     10-31    149<H>  |  113<H>  |  23.7<H>  ----------------------------<  136<H>  3.4<L>   |  18.0<L>  |  0.57    Ca    9.9      31 Oct 2023 05:20  Phos  2.9     10-31  Mg     1.8     10-31        Urinalysis Basic - ( 31 Oct 2023 05:20 )    Color: x / Appearance: x / SG: x / pH: x  Gluc: 136 mg/dL / Ketone: x  / Bili: x / Urobili: x   Blood: x / Protein: x / Nitrite: x   Leuk Esterase: x / RBC: x / WBC x   Sq Epi: x / Non Sq Epi: x / Bacteria: x        CAPILLARY BLOOD GLUCOSE      POCT Blood Glucose.: 129 mg/dL (31 Oct 2023 16:04)  POCT Blood Glucose.: 128 mg/dL (31 Oct 2023 11:13)  POCT Blood Glucose.: 127 mg/dL (31 Oct 2023 07:54)  POCT Blood Glucose.: 128 mg/dL (30 Oct 2023 22:38)      PHYSICAL EXAM:    GENERAL: elderly female, confused, slurred speech  HEAD:  Atraumatic, Normocephalic  EYES: EOMI, PERRLA, conjunctiva and sclera clear  ENMT: Moist mucous membranes  NECK: Supple   NERVOUS SYSTEM:  Alert & Oriented X1, following simple commands  CHEST/LUNG: coarse breath sounds  HEART: Regular rate and rhythm; + S1/S2  ABDOMEN: Soft, Nontender, Nondistended; Bowel sounds present  EXTREMITIES:  no pedal edema    
CHIEF COMPLAINT/INTERVAL HISTORY:    Patient is a 63y old  Female who presents with a chief complaint of Unsteadiness on feet     (03 Nov 2023 10:17)    SUBJECTIVE & OBJECTIVE: Pt seen and examined at bedside. No overnight events. More awake, communicative and following commands. Increase PO diet and water intake. Restart D5.    ROS: Denies chest pain or SOB    ICU Vital Signs Last 24 Hrs  T(C): 36.5 (03 Nov 2023 10:43), Max: 36.9 (03 Nov 2023 04:49)  T(F): 97.7 (03 Nov 2023 10:43), Max: 98.5 (03 Nov 2023 04:49)  HR: 71 (03 Nov 2023 10:43) (71 - 94)  BP: 160/95 (03 Nov 2023 10:43) (148/83 - 160/95)  RR: 19 (03 Nov 2023 10:43) (17 - 19)  SpO2: 96% (03 Nov 2023 10:43) (95% - 96%)    O2 Parameters below as of 03 Nov 2023 00:00  Patient On (Oxygen Delivery Method): room air      MEDICATIONS  (STANDING):  amLODIPine   Tablet 5 milliGRAM(s) Oral daily  ampicillin  IVPB 1 Gram(s) IV Intermittent once  atorvastatin 80 milliGRAM(s) Oral at bedtime  benztropine 1 milliGRAM(s) Oral two times a day  chlorhexidine 2% Cloths 1 Application(s) Topical <User Schedule>  dextrose 5%. 1000 milliLiter(s) (100 mL/Hr) IV Continuous <Continuous>  dextrose 5%. 1000 milliLiter(s) (50 mL/Hr) IV Continuous <Continuous>  dextrose 50% Injectable 25 Gram(s) IV Push once  dextrose 50% Injectable 25 Gram(s) IV Push once  dextrose 50% Injectable 12.5 Gram(s) IV Push once  folic acid 1 milliGRAM(s) Oral daily  glucagon  Injectable 1 milliGRAM(s) IntraMuscular once  insulin lispro (ADMELOG) corrective regimen sliding scale   SubCutaneous three times a day before meals  insulin lispro (ADMELOG) corrective regimen sliding scale   SubCutaneous at bedtime  levothyroxine 50 MICROGram(s) Oral daily  metoprolol succinate ER 50 milliGRAM(s) Oral daily  multivitamin 1 Tablet(s) Oral daily  OLANZapine Injectable 2.5 milliGRAM(s) IntraMuscular every 12 hours  potassium chloride   Powder 40 milliEquivalent(s) Oral once  rivaroxaban 20 milliGRAM(s) Oral with dinner    MEDICATIONS  (PRN):  acetaminophen     Tablet .. 650 milliGRAM(s) Oral every 6 hours PRN Temp greater or equal to 38C (100.4F), Mild Pain (1 - 3), Moderate Pain (4 - 6)  dextrose Oral Gel 15 Gram(s) Oral once PRN Blood Glucose LESS THAN 70 milliGRAM(s)/deciliter  ondansetron Injectable 4 milliGRAM(s) IV Push every 6 hours PRN Nausea and/or Vomiting  traZODone 50 milliGRAM(s) Oral at bedtime PRN for insomnia      LABS:                        12.3   9.45  )-----------( 234      ( 03 Nov 2023 05:40 )             37.7     11-03    150<H>  |  116<H>  |  21.3<H>  ----------------------------<  96  3.2<L>   |  19.0<L>  |  0.46<L>    Ca    9.2      03 Nov 2023 05:40  Phos  3.1     11-03  Mg     1.8     11-03        Urinalysis Basic - ( 03 Nov 2023 05:40 )    Color: x / Appearance: x / SG: x / pH: x  Gluc: 96 mg/dL / Ketone: x  / Bili: x / Urobili: x   Blood: x / Protein: x / Nitrite: x   Leuk Esterase: x / RBC: x / WBC x   Sq Epi: x / Non Sq Epi: x / Bacteria: x        CAPILLARY BLOOD GLUCOSE      POCT Blood Glucose.: 115 mg/dL (03 Nov 2023 16:28)  POCT Blood Glucose.: 99 mg/dL (03 Nov 2023 11:26)  POCT Blood Glucose.: 96 mg/dL (03 Nov 2023 07:23)  POCT Blood Glucose.: 105 mg/dL (02 Nov 2023 21:05)      
                            [facility]                                        Neurology at [place]                                 Lore Love, & Gerald                                  [address]                                        [place], [place], [address]                                             [phone]        CC: Tremor and unsteady gait.    HPI:   The patient is a 63y Female with schizophrenia now sent from [facility] secondary to tremors and unsteady gait.   She reports that this has been going on for the past few weeks.   She denies urinary incontinence.   She was found to have urinary tract infection. (JW)    Interim history:  appears more alert today, still tremulous but less so, answering some simple questions, following requests intermittently    ROS:   unable to obtain    MEDICATIONS  (STANDING):  ampicillin  IVPB      ampicillin  IVPB 1 Gram(s) IV Intermittent every 6 hours  atorvastatin 80 milliGRAM(s) Oral at bedtime  benztropine 1 milliGRAM(s) Oral two times a day  chlorhexidine 2% Cloths 1 Application(s) Topical <User Schedule>  dextrose 5%. 1000 milliLiter(s) (100 mL/Hr) IV Continuous <Continuous>  dextrose 5%. 1000 milliLiter(s) (50 mL/Hr) IV Continuous <Continuous>  dextrose 50% Injectable 25 Gram(s) IV Push once  dextrose 50% Injectable 12.5 Gram(s) IV Push once  dextrose 50% Injectable 25 Gram(s) IV Push once  folic acid 1 milliGRAM(s) Oral daily  glucagon  Injectable 1 milliGRAM(s) IntraMuscular once  insulin lispro (ADMELOG) corrective regimen sliding scale   SubCutaneous three times a day before meals  insulin lispro (ADMELOG) corrective regimen sliding scale   SubCutaneous at bedtime  levothyroxine 50 MICROGram(s) Oral daily  metoprolol succinate ER 50 milliGRAM(s) Oral daily  OLANZapine Injectable 2.5 milliGRAM(s) IntraMuscular every 12 hours  rivaroxaban 20 milliGRAM(s) Oral with dinner    MEDICATIONS  (PRN):  acetaminophen     Tablet .. 650 milliGRAM(s) Oral every 6 hours PRN Temp greater or equal to 38C (100.4F), Mild Pain (1 - 3), Moderate Pain (4 - 6)  dextrose Oral Gel 15 Gram(s) Oral once PRN Blood Glucose LESS THAN 70 milliGRAM(s)/deciliter  ondansetron Injectable 4 milliGRAM(s) IV Push every 6 hours PRN Nausea and/or Vomiting  traZODone 50 milliGRAM(s) Oral at bedtime PRN for insomnia      Vital Signs Last 24 Hrs  T(C): 36.4 (02 Nov 2023 11:00), Max: 37.1 (01 Nov 2023 17:52)  T(F): 97.5 (02 Nov 2023 11:00), Max: 98.7 (01 Nov 2023 17:52)  HR: 76 (02 Nov 2023 11:00) (76 - 88)  BP: 152/91 (02 Nov 2023 11:00) (152/91 - 166/79)  BP(mean): --  RR: 18 (02 Nov 2023 06:32) (18 - 18)  SpO2: 95% (02 Nov 2023 11:00) (94% - 95%)    Parameters below as of 02 Nov 2023 11:00  Patient On (Oxygen Delivery Method): room air    Detailed Neurologic Exam:    Mental status: The patient is more alert, oriented to slef, says her name, answers yes/no, following simple commands     Cranial nerves: Pupils equal and react symmetrically to light. There is no visual field deficit to threat. (+) tracking Facial musculature is grossly symmetric.     Motor: There is normal bulk and tone. There is tremor of the upper extremities.   Strength grossly 5/5 bilaterally.    Sensation: Grossly intact to light touch     Cerebellar: unable to assess for dysmetria on finger nose testing.    Labs:                                    12.0   9.77  )-----------( 252      ( 02 Nov 2023 04:57 )             38.7     11-02    147<H>  |  112<H>  |  18.4  ----------------------------<  121<H>  3.0<L>   |  20.0<L>  |  0.46<L>    Ca    9.2      02 Nov 2023 04:57  Phos  2.5     11-02  Mg     1.8     11-02    TSH/T4: normal     Radiology:  CT Head No Cont (10.30.23 @ 10:22)  IMPRESSION: Moderate to severe chronic microvascular changes without   evidence of an acute transcortical infarction or hemorrhage. 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 Correlate clinically.        
CHIEF COMPLAINT/INTERVAL HISTORY:    Patient is a 63y old  Female who presents with a chief complaint of Ataxia and tremor (01 Nov 2023 17:49)    SUBJECTIVE & OBJECTIVE: Pt seen and examined at bedside. No overnight events. Worsening status today; unable to communicate or follow directions - concerns for catatonic state per psych. Start Olanzapine per psych/neuro for tardive dyskinesia.     ROS: Unobtainsble    ICU Vital Signs Last 24 Hrs  T(C): 37.1 (01 Nov 2023 17:52), Max: 37.6 (01 Nov 2023 04:03)  T(F): 98.7 (01 Nov 2023 17:52), Max: 99.6 (01 Nov 2023 04:03)  HR: 88 (01 Nov 2023 17:52) (87 - 114)  BP: 165/92 (01 Nov 2023 11:58) (133/94 - 165/92)  RR: 18 (01 Nov 2023 17:52) (18 - 18)  SpO2: 94% (01 Nov 2023 17:52) (92% - 98%)    O2 Parameters below as of 01 Nov 2023 11:58  Patient On (Oxygen Delivery Method): room air    MEDICATIONS  (STANDING):  ampicillin  IVPB      ampicillin  IVPB 1 Gram(s) IV Intermittent every 6 hours  atorvastatin 80 milliGRAM(s) Oral at bedtime  benztropine 1 milliGRAM(s) Oral two times a day  chlorhexidine 2% Cloths 1 Application(s) Topical <User Schedule>  dextrose 5%. 1000 milliLiter(s) (100 mL/Hr) IV Continuous <Continuous>  dextrose 5%. 1000 milliLiter(s) (50 mL/Hr) IV Continuous <Continuous>  dextrose 5%. 1000 milliLiter(s) (75 mL/Hr) IV Continuous <Continuous>  dextrose 50% Injectable 25 Gram(s) IV Push once  dextrose 50% Injectable 25 Gram(s) IV Push once  dextrose 50% Injectable 12.5 Gram(s) IV Push once  folic acid 1 milliGRAM(s) Oral daily  glucagon  Injectable 1 milliGRAM(s) IntraMuscular once  insulin lispro (ADMELOG) corrective regimen sliding scale   SubCutaneous three times a day before meals  insulin lispro (ADMELOG) corrective regimen sliding scale   SubCutaneous at bedtime  levothyroxine 50 MICROGram(s) Oral daily  metoprolol succinate ER 50 milliGRAM(s) Oral daily  OLANZapine Injectable 2.5 milliGRAM(s) IntraMuscular every 12 hours  rivaroxaban 20 milliGRAM(s) Oral with dinner    MEDICATIONS  (PRN):  acetaminophen     Tablet .. 650 milliGRAM(s) Oral every 6 hours PRN Temp greater or equal to 38C (100.4F), Mild Pain (1 - 3), Moderate Pain (4 - 6)  dextrose Oral Gel 15 Gram(s) Oral once PRN Blood Glucose LESS THAN 70 milliGRAM(s)/deciliter  ondansetron Injectable 4 milliGRAM(s) IV Push every 6 hours PRN Nausea and/or Vomiting  traZODone 50 milliGRAM(s) Oral at bedtime PRN for insomnia      LABS:                        12.1   10.58 )-----------( 310      ( 01 Nov 2023 05:36 )             36.8     11-01    149<H>  |  118<H>  |  20.6<H>  ----------------------------<  146<H>  3.6   |  19.0<L>  |  0.45<L>    Ca    9.4      01 Nov 2023 19:25  Phos  2.5     11-01  Mg     1.8     11-01        Urinalysis Basic - ( 01 Nov 2023 19:25 )    Color: x / Appearance: x / SG: x / pH: x  Gluc: 146 mg/dL / Ketone: x  / Bili: x / Urobili: x   Blood: x / Protein: x / Nitrite: x   Leuk Esterase: x / RBC: x / WBC x   Sq Epi: x / Non Sq Epi: x / Bacteria: x        CAPILLARY BLOOD GLUCOSE      POCT Blood Glucose.: 118 mg/dL (01 Nov 2023 21:33)  POCT Blood Glucose.: 153 mg/dL (01 Nov 2023 16:50)  POCT Blood Glucose.: 149 mg/dL (01 Nov 2023 12:16)  POCT Blood Glucose.: 153 mg/dL (01 Nov 2023 07:46)    PHYSICAL EXAM:    GENERAL: elderly female, confused, not following simple commands  HEAD:  Atraumatic, Normocephalic  EYES: conjunctiva and sclera clear  ENMT: Moist mucous membranes  NECK: Supple   NERVOUS SYSTEM:  awake but not following commands, tremulous   CHEST/LUNG: coarse breath sounds  HEART: Regular rate and rhythm; + S1/S2  ABDOMEN: Soft, Nontender, Nondistended; Bowel sounds present  EXTREMITIES:  no pedal edema      
CHIEF COMPLAINT/INTERVAL HISTORY:    Patient is a 63y old  Female who presents with a chief complaint of Ataxia and tremor (29 Oct 2023 14:13)    SUBJECTIVE & OBJECTIVE: Pt seen and examined at bedside. No overnight events. Patient AAO x 1; unclear baseline. Continue Ceftriaxone for UTI.     ROS: Unobtainable due to confusion    ICU Vital Signs Last 24 Hrs  T(C): 37 (29 Oct 2023 16:35), Max: 37.1 (29 Oct 2023 08:28)  T(F): 98.6 (29 Oct 2023 16:35), Max: 98.8 (29 Oct 2023 08:28)  HR: 76 (29 Oct 2023 16:35) (71 - 76)  BP: 140/80 (29 Oct 2023 17:04) (129/94 - 148/98)  RR: 18 (29 Oct 2023 16:35) (17 - 18)  SpO2: 96% (29 Oct 2023 16:35) (95% - 97%)    O2 Parameters below as of 29 Oct 2023 08:28  Patient On (Oxygen Delivery Method): room air    MEDICATIONS  (STANDING):  atorvastatin 80 milliGRAM(s) Oral at bedtime  benztropine 1 milliGRAM(s) Oral two times a day  cefTRIAXone Injectable. 1000 milliGRAM(s) IV Push every 24 hours  chlorhexidine 2% Cloths 1 Application(s) Topical <User Schedule>  dextrose 5%. 1000 milliLiter(s) (100 mL/Hr) IV Continuous <Continuous>  dextrose 5%. 1000 milliLiter(s) (50 mL/Hr) IV Continuous <Continuous>  dextrose 50% Injectable 25 Gram(s) IV Push once  dextrose 50% Injectable 25 Gram(s) IV Push once  dextrose 50% Injectable 12.5 Gram(s) IV Push once  fluPHENAZine 10 milliGRAM(s) Oral two times a day  folic acid 1 milliGRAM(s) Oral daily  glucagon  Injectable 1 milliGRAM(s) IntraMuscular once  insulin lispro (ADMELOG) corrective regimen sliding scale   SubCutaneous three times a day before meals  insulin lispro (ADMELOG) corrective regimen sliding scale   SubCutaneous at bedtime  levothyroxine 50 MICROGram(s) Oral daily  lithium 300 milliGRAM(s) Oral two times a day  metoprolol succinate ER 50 milliGRAM(s) Oral daily  rivaroxaban 20 milliGRAM(s) Oral with dinner    MEDICATIONS  (PRN):  acetaminophen     Tablet .. 650 milliGRAM(s) Oral every 6 hours PRN Temp greater or equal to 38C (100.4F), Mild Pain (1 - 3), Moderate Pain (4 - 6)  dextrose Oral Gel 15 Gram(s) Oral once PRN Blood Glucose LESS THAN 70 milliGRAM(s)/deciliter  ondansetron Injectable 4 milliGRAM(s) IV Push every 6 hours PRN Nausea and/or Vomiting  traZODone 50 milliGRAM(s) Oral at bedtime PRN for insomnia      LABS:                        11.3   11.70 )-----------( 273      ( 29 Oct 2023 06:35 )             34.9     10-29    142  |  107  |  12.6  ----------------------------<  108<H>  3.4<L>   |  19.0<L>  |  0.60    Ca    9.9      29 Oct 2023 06:35    TPro  6.2<L>  /  Alb  3.9  /  TBili  0.3<L>  /  DBili  x   /  AST  25  /  ALT  15  /  AlkPhos  145<H>  10-28      Urinalysis Basic - ( 29 Oct 2023 06:35 )    Color: x / Appearance: x / SG: x / pH: x  Gluc: 108 mg/dL / Ketone: x  / Bili: x / Urobili: x   Blood: x / Protein: x / Nitrite: x   Leuk Esterase: x / RBC: x / WBC x   Sq Epi: x / Non Sq Epi: x / Bacteria: x    CAPILLARY BLOOD GLUCOSE      POCT Blood Glucose.: 108 mg/dL (29 Oct 2023 16:15)  POCT Blood Glucose.: 112 mg/dL (29 Oct 2023 11:27)  POCT Blood Glucose.: 111 mg/dL (29 Oct 2023 08:00)  POCT Blood Glucose.: 120 mg/dL (28 Oct 2023 22:29)      PHYSICAL EXAM:    GENERAL: elderly female, confused, not in acute distress  HEAD:  Atraumatic, Normocephalic  EYES: EOMI, PERRLA, conjunctiva and sclera clear  ENMT: Moist mucous membranes  NECK: Supple   NERVOUS SYSTEM:  Alert & Oriented X1, following simple commands  CHEST/LUNG: coarse breath sounds  HEART: Regular rate and rhythm; + S1/S2  ABDOMEN: Soft, Nontender, Nondistended; Bowel sounds present  EXTREMITIES:  no pedal edema
[facility] Division of Hospital Medicine    Chief Complaint:      SUBJECTIVE / OVERNIGHT EVENTS:    Patient seen and examined at bedside, no acute events overnight. Patient more interactive today, states name / following intermittent commands. Continues on Olanzapine oral at this time, awaiting PT eval for potential PUMA placement, Neuro / BH following.      MEDICATIONS  (STANDING):  amLODIPine   Tablet 5 milliGRAM(s) Oral daily  atorvastatin 80 milliGRAM(s) Oral at bedtime  benztropine 1 milliGRAM(s) Oral two times a day  chlorhexidine 2% Cloths 1 Application(s) Topical <User Schedule>  dextrose 5%. 1000 milliLiter(s) (50 mL/Hr) IV Continuous <Continuous>  dextrose 5%. 1000 milliLiter(s) (100 mL/Hr) IV Continuous <Continuous>  dextrose 50% Injectable 25 Gram(s) IV Push once  dextrose 50% Injectable 12.5 Gram(s) IV Push once  dextrose 50% Injectable 25 Gram(s) IV Push once  folic acid 1 milliGRAM(s) Oral daily  glucagon  Injectable 1 milliGRAM(s) IntraMuscular once  insulin lispro (ADMELOG) corrective regimen sliding scale   SubCutaneous three times a day before meals  insulin lispro (ADMELOG) corrective regimen sliding scale   SubCutaneous at bedtime  levothyroxine 50 MICROGram(s) Oral daily  metoprolol succinate ER 50 milliGRAM(s) Oral daily  multivitamin 1 Tablet(s) Oral daily  OLANZapine Disintegrating Tablet 2.5 milliGRAM(s) Oral two times a day  rivaroxaban 20 milliGRAM(s) Oral with dinner    MEDICATIONS  (PRN):  acetaminophen     Tablet .. 650 milliGRAM(s) Oral every 6 hours PRN Temp greater or equal to 38C (100.4F), Mild Pain (1 - 3), Moderate Pain (4 - 6)  dextrose Oral Gel 15 Gram(s) Oral once PRN Blood Glucose LESS THAN 70 milliGRAM(s)/deciliter  ondansetron Injectable 4 milliGRAM(s) IV Push every 6 hours PRN Nausea and/or Vomiting  traZODone 50 milliGRAM(s) Oral at bedtime PRN for insomnia        I&O's Summary      PHYSICAL EXAM:  Vital Signs Last 24 Hrs  T(C): 36.7 (04 Nov 2023 11:00), Max: 36.8 (04 Nov 2023 05:10)  T(F): 98.1 (04 Nov 2023 11:00), Max: 98.2 (04 Nov 2023 05:10)  HR: 59 (04 Nov 2023 11:00) (59 - 83)  BP: 149/69 (04 Nov 2023 11:00) (134/75 - 158/88)  BP(mean): --  RR: 17 (04 Nov 2023 11:00) (17 - 18)  SpO2: 98% (04 Nov 2023 11:00) (94% - 98%)      CONSTITUTIONAL: NAD, resting in bed   ENMT: Moist oral mucosa  RESPIRATORY: Normal respiratory effort; lungs are clear to auscultation bilaterally  CARDIOVASCULAR: Regular rate and rhythm, normal S1 and S2, no peripheral edema  ABDOMEN: Nontender to palpation, normoactive bowel sounds,   MUSCULOSKELETAL:  ROM wnl in all extremities tested, gait deferred  PSYCH: Alert to person, partially place, follows commands intermittently, requires cues  NEUROLOGY: no focal deficits noted, tremors in UE's still noted   SKIN: No rashes; no palpable lesions    LABS:                        12.6   8.86  )-----------( 201      ( 04 Nov 2023 05:00 )             38.3     11-04    143  |  109<H>  |  16.9  ----------------------------<  88  3.4<L>   |  18.0<L>  |  0.45<L>    Ca    9.1      04 Nov 2023 05:00  Phos  3.0     11-04  Mg     2.2     11-04            Urinalysis Basic - ( 04 Nov 2023 05:00 )    Color: x / Appearance: x / SG: x / pH: x  Gluc: 88 mg/dL / Ketone: x  / Bili: x / Urobili: x   Blood: x / Protein: x / Nitrite: x   Leuk Esterase: x / RBC: x / WBC x   Sq Epi: x / Non Sq Epi: x / Bacteria: x        CAPILLARY BLOOD GLUCOSE      POCT Blood Glucose.: 86 mg/dL (04 Nov 2023 11:11)  POCT Blood Glucose.: 95 mg/dL (04 Nov 2023 07:36)  POCT Blood Glucose.: 110 mg/dL (03 Nov 2023 22:19)  POCT Blood Glucose.: 115 mg/dL (03 Nov 2023 16:28)        RADIOLOGY & ADDITIONAL TESTS:  Results Reviewed:   Imaging Personally Reviewed:  Electrocardiogram Personally Reviewed:  
[facility] Division of Hospital Medicine    Chief Complaint:      SUBJECTIVE / OVERNIGHT EVENTS:    Patient seen and examined at bedside, no acute events overnight, patient mentation much improved, likely at baseline currently, tremors improved in UE's. Awaiting PT eval for likely PUMA placement.     MEDICATIONS  (STANDING):  amLODIPine   Tablet 5 milliGRAM(s) Oral daily  atorvastatin 80 milliGRAM(s) Oral at bedtime  benztropine 1 milliGRAM(s) Oral two times a day  chlorhexidine 2% Cloths 1 Application(s) Topical <User Schedule>  dextrose 5%. 1000 milliLiter(s) (100 mL/Hr) IV Continuous <Continuous>  dextrose 5%. 1000 milliLiter(s) (50 mL/Hr) IV Continuous <Continuous>  dextrose 50% Injectable 25 Gram(s) IV Push once  dextrose 50% Injectable 12.5 Gram(s) IV Push once  dextrose 50% Injectable 25 Gram(s) IV Push once  folic acid 1 milliGRAM(s) Oral daily  glucagon  Injectable 1 milliGRAM(s) IntraMuscular once  insulin lispro (ADMELOG) corrective regimen sliding scale   SubCutaneous three times a day before meals  insulin lispro (ADMELOG) corrective regimen sliding scale   SubCutaneous at bedtime  levothyroxine 50 MICROGram(s) Oral daily  metoprolol succinate ER 50 milliGRAM(s) Oral daily  multivitamin 1 Tablet(s) Oral daily  OLANZapine Disintegrating Tablet 2.5 milliGRAM(s) Oral two times a day  rivaroxaban 20 milliGRAM(s) Oral with dinner    MEDICATIONS  (PRN):  acetaminophen     Tablet .. 650 milliGRAM(s) Oral every 6 hours PRN Temp greater or equal to 38C (100.4F), Mild Pain (1 - 3), Moderate Pain (4 - 6)  dextrose Oral Gel 15 Gram(s) Oral once PRN Blood Glucose LESS THAN 70 milliGRAM(s)/deciliter  ondansetron Injectable 4 milliGRAM(s) IV Push every 6 hours PRN Nausea and/or Vomiting  traZODone 50 milliGRAM(s) Oral at bedtime PRN for insomnia        I&O's Summary      PHYSICAL EXAM:  Vital Signs Last 24 Hrs  T(C): 36.7 (05 Nov 2023 05:21), Max: 36.9 (04 Nov 2023 17:37)  T(F): 98 (05 Nov 2023 05:21), Max: 98.5 (04 Nov 2023 17:37)  HR: 84 (05 Nov 2023 05:21) (73 - 84)  BP: 149/75 (05 Nov 2023 05:21) (112/70 - 149/75)  BP(mean): --  RR: 18 (05 Nov 2023 05:21) (18 - 18)  SpO2: 96% (05 Nov 2023 05:21) (95% - 96%)      GENERAL: elderly female, alert /awake oriented x3, following all commands   HEAD:  Atraumatic, Normocephalic  EYES: conjunctiva and sclera clear  ENMT: Moist mucous membranes  NECK: Supple   NERVOUS SYSTEM:  mild improving UE tremor noted   CHEST/LUNG: coarse breath sounds  HEART: Regular rate and rhythm; + S1/S2  ABDOMEN: Soft, Nontender, Nondistended; Bowel sounds present  EXTREMITIES:  no pedal edema  LABS:                        12.6   8.86  )-----------( 201      ( 04 Nov 2023 05:00 )             38.3     11-05    143  |  108  |  17.4  ----------------------------<  72  3.2<L>   |  17.0<L>  |  0.46<L>    Ca    9.3      05 Nov 2023 06:30  Phos  3.0     11-04  Mg     2.2     11-04            Urinalysis Basic - ( 05 Nov 2023 06:30 )    Color: x / Appearance: x / SG: x / pH: x  Gluc: 72 mg/dL / Ketone: x  / Bili: x / Urobili: x   Blood: x / Protein: x / Nitrite: x   Leuk Esterase: x / RBC: x / WBC x   Sq Epi: x / Non Sq Epi: x / Bacteria: x        CAPILLARY BLOOD GLUCOSE      POCT Blood Glucose.: 164 mg/dL (05 Nov 2023 11:16)  POCT Blood Glucose.: 84 mg/dL (05 Nov 2023 07:49)  POCT Blood Glucose.: 95 mg/dL (04 Nov 2023 21:39)  POCT Blood Glucose.: 91 mg/dL (04 Nov 2023 16:37)        RADIOLOGY & ADDITIONAL TESTS:  Results Reviewed:   Imaging Personally Reviewed:  Electrocardiogram Personally Reviewed:

## 2023-11-07 NOTE — PROGRESS NOTE ADULT - ASSESSMENT
63y Female with schizophrenia. Now with increased tremor and gait difficulty in setting of urinary tract infection.    Tremor/gait difficulty. Now with speech difficulty  Likely secondary to neuroleptic medications.   Head CT 10/30 no acute pathology  Has extrapyramidal findings on examination.   Mental status significantly improved following trial of Olanzapine for tardive dyskinesia, would continue  Tremor can be seen with lithium as well, although typically does not give cogwheeling.   The symptoms may have been exacerbated by the urinary tract infection.    There is ventriculomegaly on CT likely related to atrophy and ischemic white matter disease.   She was seen by neurosurgery and I agree that her presentation is not suggestive of Normal Pressure hydrocephalus.    UTI.  Off Antibiotics currently.    Discharge planning.   Eventual subacute rehabilitation.

## 2023-11-08 LAB
BENZTROPINE SERPL-MCNC: 3.7 NG/ML — LOW (ref 5–25)
BENZTROPINE SERPL-MCNC: 3.7 NG/ML — LOW (ref 5–25)

## 2023-11-13 PROCEDURE — 85027 COMPLETE CBC AUTOMATED: CPT

## 2023-11-13 PROCEDURE — 82550 ASSAY OF CK (CPK): CPT

## 2023-11-13 PROCEDURE — 80178 ASSAY OF LITHIUM: CPT

## 2023-11-13 PROCEDURE — 82140 ASSAY OF AMMONIA: CPT

## 2023-11-13 PROCEDURE — 93005 ELECTROCARDIOGRAM TRACING: CPT

## 2023-11-13 PROCEDURE — 84484 ASSAY OF TROPONIN QUANT: CPT

## 2023-11-13 PROCEDURE — 99285 EMERGENCY DEPT VISIT HI MDM: CPT | Mod: 25

## 2023-11-13 PROCEDURE — 84436 ASSAY OF TOTAL THYROXINE: CPT

## 2023-11-13 PROCEDURE — 96374 THER/PROPH/DIAG INJ IV PUSH: CPT

## 2023-11-13 PROCEDURE — 83735 ASSAY OF MAGNESIUM: CPT

## 2023-11-13 PROCEDURE — 82962 GLUCOSE BLOOD TEST: CPT

## 2023-11-13 PROCEDURE — 96375 TX/PRO/DX INJ NEW DRUG ADDON: CPT

## 2023-11-13 PROCEDURE — 87641 MR-STAPH DNA AMP PROBE: CPT

## 2023-11-13 PROCEDURE — 87640 STAPH A DNA AMP PROBE: CPT

## 2023-11-13 PROCEDURE — 80048 BASIC METABOLIC PNL TOTAL CA: CPT

## 2023-11-13 PROCEDURE — 85025 COMPLETE CBC W/AUTO DIFF WBC: CPT

## 2023-11-13 PROCEDURE — 86803 HEPATITIS C AB TEST: CPT

## 2023-11-13 PROCEDURE — 84443 ASSAY THYROID STIM HORMONE: CPT

## 2023-11-13 PROCEDURE — 87086 URINE CULTURE/COLONY COUNT: CPT

## 2023-11-13 PROCEDURE — 70450 CT HEAD/BRAIN W/O DYE: CPT

## 2023-11-13 PROCEDURE — 87077 CULTURE AEROBIC IDENTIFY: CPT

## 2023-11-13 PROCEDURE — 36415 COLL VENOUS BLD VENIPUNCTURE: CPT

## 2023-11-13 PROCEDURE — 87186 SC STD MICRODIL/AGAR DIL: CPT

## 2023-11-13 PROCEDURE — G0480: CPT

## 2023-11-13 PROCEDURE — 80053 COMPREHEN METABOLIC PANEL: CPT

## 2023-11-13 PROCEDURE — 81001 URINALYSIS AUTO W/SCOPE: CPT

## 2023-11-13 PROCEDURE — 82607 VITAMIN B-12: CPT

## 2023-11-13 PROCEDURE — 84100 ASSAY OF PHOSPHORUS: CPT

## 2023-11-24 RX ORDER — ACETAMINOPHEN 500 MG
3 TABLET ORAL
Refills: 0 | DISCHARGE

## 2023-11-24 RX ORDER — FLUPHENAZINE HYDROCHLORIDE 1 MG/1
2 TABLET, FILM COATED ORAL
Refills: 0 | DISCHARGE

## 2023-11-24 RX ORDER — HALOPERIDOL DECANOATE 100 MG/ML
1 INJECTION INTRAMUSCULAR
Refills: 0 | DISCHARGE

## 2023-11-24 RX ORDER — LITHIUM CARBONATE 300 MG/1
1 TABLET, EXTENDED RELEASE ORAL
Refills: 0 | DISCHARGE

## 2023-11-24 RX ORDER — NYSTATIN CREAM 100000 [USP'U]/G
1 CREAM TOPICAL
Refills: 0 | DISCHARGE

## 2023-11-25 NOTE — PROGRESS NOTE BEHAVIORAL HEALTH - NS ED [facility] MED ROS PSYCHIATRIC
See HPI
25-Nov-2023 15:27
See HPI

## 2023-12-29 PROBLEM — E03.9 HYPOTHYROIDISM, UNSPECIFIED: Chronic | Status: ACTIVE | Noted: 2023-09-09

## 2023-12-29 PROBLEM — I10 ESSENTIAL (PRIMARY) HYPERTENSION: Chronic | Status: ACTIVE | Noted: 2023-09-09

## 2023-12-29 PROBLEM — F20.9 SCHIZOPHRENIA, UNSPECIFIED: Chronic | Status: ACTIVE | Noted: 2023-09-09

## 2023-12-29 PROBLEM — E78.5 HYPERLIPIDEMIA, UNSPECIFIED: Chronic | Status: ACTIVE | Noted: 2023-09-09

## 2023-12-29 PROBLEM — I48.91 UNSPECIFIED ATRIAL FIBRILLATION: Chronic | Status: ACTIVE | Noted: 2023-09-09

## 2024-01-10 ENCOUNTER — APPOINTMENT (OUTPATIENT)
Dept: CARDIOLOGY | Facility: CLINIC | Age: 64
End: 2024-01-10

## 2024-01-12 ENCOUNTER — TRANSCRIPTION ENCOUNTER (OUTPATIENT)
Age: 64
End: 2024-01-12

## 2024-01-17 ENCOUNTER — APPOINTMENT (OUTPATIENT)
Dept: CARDIOLOGY | Facility: CLINIC | Age: 64
End: 2024-01-17
Payer: MEDICARE

## 2024-01-17 VITALS
HEART RATE: 7 BPM | DIASTOLIC BLOOD PRESSURE: 80 MMHG | OXYGEN SATURATION: 98 % | SYSTOLIC BLOOD PRESSURE: 130 MMHG | HEIGHT: 65 IN

## 2024-01-17 DIAGNOSIS — G91.9 HYDROCEPHALUS, UNSPECIFIED: ICD-10-CM

## 2024-01-17 DIAGNOSIS — F20.9 SCHIZOPHRENIA, UNSPECIFIED: ICD-10-CM

## 2024-01-17 DIAGNOSIS — Z86.69 PERSONAL HISTORY OF OTHER DISEASES OF THE NERVOUS SYSTEM AND SENSE ORGANS: ICD-10-CM

## 2024-01-17 PROCEDURE — 99214 OFFICE O/P EST MOD 30 MIN: CPT

## 2024-01-17 PROCEDURE — 93000 ELECTROCARDIOGRAM COMPLETE: CPT

## 2024-01-17 NOTE — BEHAVIORAL HEALTH ASSESSMENT NOTE - NS ED [facility] MSE SPEECH ARTICULATION
A user error has taken place: encounter opened in error, closed for administrative reasons.    Normal

## 2024-01-18 DIAGNOSIS — R79.89 OTHER SPECIFIED ABNORMAL FINDINGS OF BLOOD CHEMISTRY: ICD-10-CM

## 2024-01-18 LAB
ALBUMIN SERPL ELPH-MCNC: 4.5 G/DL
ALP BLD-CCNC: 208 U/L
ALT SERPL-CCNC: 89 U/L
ANION GAP SERPL CALC-SCNC: 12 MMOL/L
AST SERPL-CCNC: 64 U/L
BILIRUB DIRECT SERPL-MCNC: 0.1 MG/DL
BILIRUB INDIRECT SERPL-MCNC: 0.2 MG/DL
BILIRUB SERPL-MCNC: 0.3 MG/DL
BUN SERPL-MCNC: 16 MG/DL
CALCIUM SERPL-MCNC: 10 MG/DL
CHLORIDE SERPL-SCNC: 105 MMOL/L
CHOLEST SERPL-MCNC: 116 MG/DL
CK SERPL-CCNC: 31 U/L
CO2 SERPL-SCNC: 24 MMOL/L
CREAT SERPL-MCNC: 0.55 MG/DL
CRP SERPL HS-MCNC: 0.54 MG/L
EGFR: 102 ML/MIN/1.73M2
ESTIMATED AVERAGE GLUCOSE: 126 MG/DL
GLUCOSE SERPL-MCNC: 74 MG/DL
HBA1C MFR BLD HPLC: 6 %
HDLC SERPL-MCNC: 35 MG/DL
LDLC SERPL CALC-MCNC: 61 MG/DL
LDLC SERPL DIRECT ASSAY-MCNC: 54 MG/DL
NONHDLC SERPL-MCNC: 81 MG/DL
POTASSIUM SERPL-SCNC: 4.1 MMOL/L
PROT SERPL-MCNC: 7.7 G/DL
SODIUM SERPL-SCNC: 141 MMOL/L
TRIGL SERPL-MCNC: 101 MG/DL

## 2024-01-21 NOTE — ADDENDUM
[FreeTextEntry1] : Attempted multiple times to reach pt. Could not leave a voicemail d/t full mail box. Sent certified letter

## 2024-01-21 NOTE — HISTORY OF PRESENT ILLNESS
[FreeTextEntry1] : SINDHU MUÑOZ  is a 63 year old F w/ pmhx of  Elevated D Dimer, HLD, HTN, DM, Hypothyroid, schizophrenia, Afib on xarelto, mild communicating hydrocephalus  who presents today for hospital follow up. today, The patient denies fever, chills, sore throat, loss of taste or smell, muscle aches weight loss, malaise, rash, recent travel, insect bites, alteration bowel habits, headaches, weakness, abdominal  pain, bloating, changes in urination, visual disturbances, shortness of breath, chest pain, dizziness, palpitations.  Hospital course: [facility] for ataxia and tremor likely due to neuroleptic drugs. CT head with mild communicating hydrocephalus which is not consistent with patient's current presentation. Seen by NSG, recommended o/p MRI in 2-4 weeks with neurology in agreement for outpatient further workup of hydrocephalus if clinical picture warrants at that time. Patient was started on olanzapine with improvement; now awake and alert likely at baseline. Additionally, lithium and fluphenazine were held per psych.  Ataxia and tremor likely secondary to neuroleptic extrapyramidal finding from antipsychotic medications. Also noted with Enterococcus UTI treated, and hypernatremia. Now improved. Cleared by psych for rehab placement with PT re-eval recommending PUMA placement. Patient cleared for DC with psych / NSG follow up. Patient is currently medically and hemodynamically stable for DC to PUMA. Patient will not need more than 120 days of rehab.   1. Left ventricular cavity is normal. Left ventricular systolic function is normal with an ejection fraction of 60 % by Chacon's method of disks. There are no regional wall motion abnormalities seen.  2. Normal left ventricular diastolic function.  3. Normal right ventricular cavity size, wall thickness, and systolic function. Tricuspid annular plane systolic excursion (TAPSE) is 2.2 cm (normal >=1.7 cm).  4. Normal atria.  5. No significant valvular disease.  6. No pericardial effusion seen.  7. No prior echocardiogram is available for comparison.  8. There is normal LV mass and concentric remodeling.

## 2024-01-26 ENCOUNTER — APPOINTMENT (OUTPATIENT)
Dept: ENDOCRINOLOGY | Facility: CLINIC | Age: 64
End: 2024-01-26
Payer: MEDICARE

## 2024-01-26 VITALS
HEIGHT: 65 IN | BODY MASS INDEX: 26.84 KG/M2 | TEMPERATURE: 98.4 F | WEIGHT: 161.06 LBS | OXYGEN SATURATION: 98 % | HEART RATE: 86 BPM | DIASTOLIC BLOOD PRESSURE: 78 MMHG | SYSTOLIC BLOOD PRESSURE: 126 MMHG

## 2024-01-26 DIAGNOSIS — E78.5 HYPERLIPIDEMIA, UNSPECIFIED: ICD-10-CM

## 2024-01-26 DIAGNOSIS — I10 ESSENTIAL (PRIMARY) HYPERTENSION: ICD-10-CM

## 2024-01-26 DIAGNOSIS — E11.9 TYPE 2 DIABETES MELLITUS W/OUT COMPLICATIONS: ICD-10-CM

## 2024-01-26 DIAGNOSIS — E03.9 HYPOTHYROIDISM, UNSPECIFIED: ICD-10-CM

## 2024-01-26 LAB — GLUCOSE BLDC GLUCOMTR-MCNC: 117

## 2024-01-26 PROCEDURE — 99214 OFFICE O/P EST MOD 30 MIN: CPT

## 2024-01-26 PROCEDURE — 82962 GLUCOSE BLOOD TEST: CPT

## 2024-01-26 PROCEDURE — 36415 COLL VENOUS BLD VENIPUNCTURE: CPT

## 2024-01-26 RX ORDER — METFORMIN HYDROCHLORIDE 850 MG/1
850 TABLET, COATED ORAL
Qty: 90 | Refills: 3 | Status: ACTIVE | COMMUNITY
Start: 2020-08-04 | End: 1900-01-01

## 2024-01-26 RX ORDER — BLOOD SUGAR DIAGNOSTIC
STRIP MISCELLANEOUS
Qty: 2 | Refills: 3 | Status: ACTIVE | COMMUNITY
Start: 2019-05-24 | End: 1900-01-01

## 2024-01-26 RX ORDER — LANCETS 28 GAUGE
EACH MISCELLANEOUS
Qty: 2 | Refills: 0 | Status: ACTIVE | COMMUNITY
Start: 2019-12-16 | End: 1900-01-01

## 2024-01-26 NOTE — ADDENDUM
[FreeTextEntry1] :  blood will be drawn in the office today Today's evaluation reviewed with Dr. Yarbrough along with any changes and plan of care.

## 2024-01-26 NOTE — HISTORY OF PRESENT ILLNESS
[FreeTextEntry1] : Ms. MUÑOZ is a 61 year old female who presents for endocrine evaluation. She presents with regard to a history of type 2 diabetes mellitus. She denies any history of retinopathy or nephropathy. With regard to neuropathy,she denies symptoms/signs  Patient presents today with her health care proxy. Contact health care proxy (friend) [phone] as per patient    She was taking metformin 850 mg once daily before rehab but has been out of for 3 weeks and off metformin   Patient was in rehab due to not taking medications and was in mental hospital. She is now at home and lives alone , will be getting an aide for 3 hours daily. in rehab for 3 months   In rehab she was given novalog instead of metformin    Home nurse comes every 3 days to check BG , unsure what BG she had but health care proxy states that he was told by nurse "it was perfect"   a1c returned at 6.0 on 1/17/24  POCT glucose returned today at   117 mg/dl  She denies polyuria, polydipsia, or any visual changes. She too denies any skin lesions, skin breakdown or non-healing areas of skin. She too denies any podiatric concerns. Ophthalmologic evaluation is up to date with no diabetic retinopathy s/s noted.  too with additional hx of hypothyroidism and is taking lt4 50 mcg daily   Additional medical history includes that of hypertension and hyperlipidemia. Taking  amlodipine 5mg, xarelto 20 mg , metoprolol 50 mg, olanzapine 2.5 mg, benztropine 1 mg BID  off cartia and simvastatin

## 2024-01-29 LAB
CREAT SPEC-SCNC: 62 MG/DL
MICROALBUMIN 24H UR DL<=1MG/L-MCNC: 2.8 MG/DL
MICROALBUMIN/CREAT 24H UR-RTO: 45 MG/G
T3FREE SERPL-MCNC: 2.76 PG/ML
T4 FREE SERPL-MCNC: 1.8 NG/DL
TSH SERPL-ACNC: 1.2 UIU/ML

## 2024-01-31 RX ORDER — LEVOTHYROXINE SODIUM 0.05 MG/1
50 TABLET ORAL DAILY
Qty: 90 | Refills: 1 | Status: ACTIVE | OUTPATIENT
Start: 2019-09-17

## 2024-01-31 RX ORDER — LOSARTAN POTASSIUM AND HYDROCHLOROTHIAZIDE 25; 100 MG/1; MG/1
100-25 TABLET ORAL
Qty: 90 | Refills: 1 | Status: ACTIVE | COMMUNITY
Start: 2019-09-06 | End: 1900-01-01

## 2024-02-22 ENCOUNTER — OUTPATIENT (OUTPATIENT)
Dept: OUTPATIENT SERVICES | Facility: HOSPITAL | Age: 64
LOS: 1 days | Discharge: STILL ADMITTED OR ADMINISTRATIVE | End: 2024-02-22

## 2024-02-22 ENCOUNTER — INPATIENT (INPATIENT)
Facility: HOSPITAL | Age: 64
LOS: 56 days | Discharge: NURSING, REHAB OR LONG-TERM CARE | End: 2024-04-19
Attending: STUDENT IN AN ORGANIZED HEALTH CARE EDUCATION/TRAINING PROGRAM | Admitting: PSYCHIATRY & NEUROLOGY
Payer: MEDICARE

## 2024-02-22 VITALS
DIASTOLIC BLOOD PRESSURE: 84 MMHG | HEART RATE: 84 BPM | SYSTOLIC BLOOD PRESSURE: 148 MMHG | OXYGEN SATURATION: 97 % | RESPIRATION RATE: 20 BRPM | TEMPERATURE: 99 F

## 2024-02-22 DIAGNOSIS — F25.9 SCHIZOAFFECTIVE DISORDER, UNSPECIFIED: ICD-10-CM

## 2024-02-22 LAB
ALBUMIN SERPL ELPH-MCNC: 4.3 G/DL — SIGNIFICANT CHANGE UP (ref 3.3–5)
ALP SERPL-CCNC: 104 U/L — SIGNIFICANT CHANGE UP (ref 40–120)
ALT FLD-CCNC: 12 U/L — SIGNIFICANT CHANGE UP (ref 4–33)
AMPHET UR-MCNC: NEGATIVE — SIGNIFICANT CHANGE UP
ANION GAP SERPL CALC-SCNC: 15 MMOL/L — HIGH (ref 7–14)
APAP SERPL-MCNC: <10 UG/ML — LOW (ref 15–25)
APPEARANCE UR: CLEAR — SIGNIFICANT CHANGE UP
AST SERPL-CCNC: 14 U/L — SIGNIFICANT CHANGE UP (ref 4–32)
BACTERIA # UR AUTO: ABNORMAL /HPF
BARBITURATES UR SCN-MCNC: NEGATIVE — SIGNIFICANT CHANGE UP
BASOPHILS # BLD AUTO: 0.06 K/UL — SIGNIFICANT CHANGE UP (ref 0–0.2)
BASOPHILS NFR BLD AUTO: 0.5 % — SIGNIFICANT CHANGE UP (ref 0–2)
BENZODIAZ UR-MCNC: NEGATIVE — SIGNIFICANT CHANGE UP
BILIRUB SERPL-MCNC: <0.2 MG/DL — SIGNIFICANT CHANGE UP (ref 0.2–1.2)
BILIRUB UR-MCNC: NEGATIVE — SIGNIFICANT CHANGE UP
BUN SERPL-MCNC: 24 MG/DL — HIGH (ref 7–23)
CALCIUM SERPL-MCNC: 9.8 MG/DL — SIGNIFICANT CHANGE UP (ref 8.4–10.5)
CAST: 0 /LPF — SIGNIFICANT CHANGE UP (ref 0–4)
CHLORIDE SERPL-SCNC: 101 MMOL/L — SIGNIFICANT CHANGE UP (ref 98–107)
CO2 SERPL-SCNC: 20 MMOL/L — LOW (ref 22–31)
COCAINE METAB.OTHER UR-MCNC: NEGATIVE — SIGNIFICANT CHANGE UP
COLOR SPEC: YELLOW — SIGNIFICANT CHANGE UP
CREAT SERPL-MCNC: 0.66 MG/DL — SIGNIFICANT CHANGE UP (ref 0.5–1.3)
CREATININE URINE RESULT, DAU: 20 MG/DL — SIGNIFICANT CHANGE UP
DIFF PNL FLD: NEGATIVE — SIGNIFICANT CHANGE UP
EGFR: 97 ML/MIN/1.73M2 — SIGNIFICANT CHANGE UP
EOSINOPHIL # BLD AUTO: 0.14 K/UL — SIGNIFICANT CHANGE UP (ref 0–0.5)
EOSINOPHIL NFR BLD AUTO: 1.3 % — SIGNIFICANT CHANGE UP (ref 0–6)
ETHANOL SERPL-MCNC: <10 MG/DL — SIGNIFICANT CHANGE UP
GLUCOSE BLDC GLUCOMTR-MCNC: 103 MG/DL — HIGH (ref 70–99)
GLUCOSE SERPL-MCNC: 103 MG/DL — HIGH (ref 70–99)
GLUCOSE UR QL: NEGATIVE MG/DL — SIGNIFICANT CHANGE UP
HCT VFR BLD CALC: 34.2 % — LOW (ref 34.5–45)
HGB BLD-MCNC: 11.6 G/DL — SIGNIFICANT CHANGE UP (ref 11.5–15.5)
IANC: 7.5 K/UL — HIGH (ref 1.8–7.4)
IMM GRANULOCYTES NFR BLD AUTO: 0.3 % — SIGNIFICANT CHANGE UP (ref 0–0.9)
KETONES UR-MCNC: NEGATIVE MG/DL — SIGNIFICANT CHANGE UP
LEUKOCYTE ESTERASE UR-ACNC: ABNORMAL
LYMPHOCYTES # BLD AUTO: 2.48 K/UL — SIGNIFICANT CHANGE UP (ref 1–3.3)
LYMPHOCYTES # BLD AUTO: 22.4 % — SIGNIFICANT CHANGE UP (ref 13–44)
MCHC RBC-ENTMCNC: 28.7 PG — SIGNIFICANT CHANGE UP (ref 27–34)
MCHC RBC-ENTMCNC: 33.9 GM/DL — SIGNIFICANT CHANGE UP (ref 32–36)
MCV RBC AUTO: 84.7 FL — SIGNIFICANT CHANGE UP (ref 80–100)
METHADONE UR-MCNC: NEGATIVE — SIGNIFICANT CHANGE UP
MONOCYTES # BLD AUTO: 0.86 K/UL — SIGNIFICANT CHANGE UP (ref 0–0.9)
MONOCYTES NFR BLD AUTO: 7.8 % — SIGNIFICANT CHANGE UP (ref 2–14)
NEUTROPHILS # BLD AUTO: 7.5 K/UL — HIGH (ref 1.8–7.4)
NEUTROPHILS NFR BLD AUTO: 67.7 % — SIGNIFICANT CHANGE UP (ref 43–77)
NITRITE UR-MCNC: POSITIVE
NRBC # BLD: 0 /100 WBCS — SIGNIFICANT CHANGE UP (ref 0–0)
NRBC # FLD: 0 K/UL — SIGNIFICANT CHANGE UP (ref 0–0)
OPIATES UR-MCNC: NEGATIVE — SIGNIFICANT CHANGE UP
OXYCODONE UR-MCNC: NEGATIVE — SIGNIFICANT CHANGE UP
PCP SPEC-MCNC: SIGNIFICANT CHANGE UP
PCP UR-MCNC: NEGATIVE — SIGNIFICANT CHANGE UP
PH UR: 6 — SIGNIFICANT CHANGE UP (ref 5–8)
PLATELET # BLD AUTO: 331 K/UL — SIGNIFICANT CHANGE UP (ref 150–400)
POTASSIUM SERPL-MCNC: 3.6 MMOL/L — SIGNIFICANT CHANGE UP (ref 3.5–5.3)
POTASSIUM SERPL-SCNC: 3.6 MMOL/L — SIGNIFICANT CHANGE UP (ref 3.5–5.3)
PROT SERPL-MCNC: 6.8 G/DL — SIGNIFICANT CHANGE UP (ref 6–8.3)
PROT UR-MCNC: NEGATIVE MG/DL — SIGNIFICANT CHANGE UP
RBC # BLD: 4.04 M/UL — SIGNIFICANT CHANGE UP (ref 3.8–5.2)
RBC # FLD: 12.7 % — SIGNIFICANT CHANGE UP (ref 10.3–14.5)
RBC CASTS # UR COMP ASSIST: 0 /HPF — SIGNIFICANT CHANGE UP (ref 0–4)
SALICYLATES SERPL-MCNC: <0.3 MG/DL — LOW (ref 15–30)
SARS-COV-2 RNA SPEC QL NAA+PROBE: SIGNIFICANT CHANGE UP
SODIUM SERPL-SCNC: 136 MMOL/L — SIGNIFICANT CHANGE UP (ref 135–145)
SP GR SPEC: 1.01 — SIGNIFICANT CHANGE UP (ref 1–1.03)
SQUAMOUS # UR AUTO: 0 /HPF — SIGNIFICANT CHANGE UP (ref 0–5)
THC UR QL: NEGATIVE — SIGNIFICANT CHANGE UP
TOXICOLOGY SCREEN, DRUGS OF ABUSE, SERUM RESULT: SIGNIFICANT CHANGE UP
TSH SERPL-MCNC: 1.54 UIU/ML — SIGNIFICANT CHANGE UP (ref 0.27–4.2)
UROBILINOGEN FLD QL: 0.2 MG/DL — SIGNIFICANT CHANGE UP (ref 0.2–1)
WBC # BLD: 11.07 K/UL — HIGH (ref 3.8–10.5)
WBC # FLD AUTO: 11.07 K/UL — HIGH (ref 3.8–10.5)
WBC UR QL: 41 /HPF — HIGH (ref 0–5)

## 2024-02-22 PROCEDURE — 99285 EMERGENCY DEPT VISIT HI MDM: CPT

## 2024-02-22 RX ORDER — OLANZAPINE 15 MG/1
2.5 TABLET, FILM COATED ORAL ONCE
Refills: 0 | Status: DISCONTINUED | OUTPATIENT
Start: 2024-02-23 | End: 2024-02-23

## 2024-02-22 RX ORDER — BENZTROPINE MESYLATE 1 MG
2 TABLET ORAL DAILY
Refills: 0 | Status: DISCONTINUED | OUTPATIENT
Start: 2024-02-23 | End: 2024-04-19

## 2024-02-22 RX ORDER — LANOLIN ALCOHOL/MO/W.PET/CERES
3 CREAM (GRAM) TOPICAL AT BEDTIME
Refills: 0 | Status: DISCONTINUED | OUTPATIENT
Start: 2024-03-17 | End: 2024-04-19

## 2024-02-22 RX ORDER — CLONAZEPAM 1 MG
0.5 TABLET ORAL DAILY
Refills: 0 | Status: DISCONTINUED | OUTPATIENT
Start: 2024-02-23 | End: 2024-03-01

## 2024-02-22 RX ORDER — ATORVASTATIN CALCIUM 80 MG/1
40 TABLET, FILM COATED ORAL AT BEDTIME
Refills: 0 | Status: DISCONTINUED | OUTPATIENT
Start: 2024-02-23 | End: 2024-03-04

## 2024-02-22 RX ORDER — GABAPENTIN 400 MG/1
100 CAPSULE ORAL AT BEDTIME
Refills: 0 | Status: DISCONTINUED | OUTPATIENT
Start: 2024-02-23 | End: 2024-03-01

## 2024-02-22 RX ORDER — RIVAROXABAN 15 MG-20MG
20 KIT ORAL
Refills: 0 | Status: DISCONTINUED | OUTPATIENT
Start: 2024-02-23 | End: 2024-04-19

## 2024-02-22 RX ORDER — LEVOTHYROXINE SODIUM 125 MCG
50 TABLET ORAL DAILY
Refills: 0 | Status: DISCONTINUED | OUTPATIENT
Start: 2024-02-23 | End: 2024-04-19

## 2024-02-22 RX ORDER — HALOPERIDOL DECANOATE 100 MG/ML
5 INJECTION INTRAMUSCULAR ONCE
Refills: 0 | Status: COMPLETED | OUTPATIENT
Start: 2024-02-22 | End: 2024-02-22

## 2024-02-22 RX ORDER — METFORMIN HYDROCHLORIDE 850 MG/1
850 TABLET ORAL DAILY
Refills: 0 | Status: DISCONTINUED | OUTPATIENT
Start: 2024-02-23 | End: 2024-04-19

## 2024-02-22 RX ADMIN — Medication 1 TABLET(S): at 23:23

## 2024-02-22 RX ADMIN — HALOPERIDOL DECANOATE 5 MILLIGRAM(S): 100 INJECTION INTRAMUSCULAR at 18:49

## 2024-02-22 RX ADMIN — Medication 2 MILLIGRAM(S): at 18:49

## 2024-02-22 NOTE — ED PROVIDER NOTE - PATIENT'S PREFERRED PRONOUN
Anesthesia Evaluation      Patient summary reviewed   No history of anesthetic complications     Airway   Mallampati: II  Neck ROM: full   Pulmonary - negative ROS and normal exam                          Cardiovascular - negative ROS and normal exam   Neuro/Psych - negative ROS     Endo/Other    (+) pregnant     GI/Hepatic/Renal - negative ROS           Dental - normal exam                        Anesthesia Plan  Planned anesthetic: epidural    ASA 2     Anesthetic plan and risks discussed with: patient    Post-op plan: routine recovery          
Her/She

## 2024-02-22 NOTE — ED ADULT NURSE NOTE - CHIEF COMPLAINT QUOTE
brought in from [facility] after being aggressive with staff and hallucinating (missed fluphanezine shot). Pt calm and cooperative Phx schizoprenia, HTN, afib .NP Dorian made aware. Brought back to .   Hamid

## 2024-02-22 NOTE — ED ADULT TRIAGE NOTE - CHIEF COMPLAINT QUOTE
brought in from [facility] center after being aggressive with staff and hallucinating (missed fluphanezine shot). Pt calm and cooperative Phx schizoprenia, HTN, afib .NP Dorian made aware. Brought back to .   Hamid

## 2024-02-22 NOTE — ED PROVIDER NOTE - NSICDXPASTMEDICALHX_GEN_ALL_CORE_FT
PAST MEDICAL HISTORY:  Afib     Alcohol abuse     Atrial fibrillation     Diabetes mellitus of other type without complication     HLD (hyperlipidemia)     HTN (hypertension)     Hypothyroidism     Hypothyroidism     Schizophrenia     Schizophrenia

## 2024-02-22 NOTE — ED BEHAVIORAL HEALTH ASSESSMENT NOTE - SUMMARY
Patient is a 64 y/o   woman,  x 10 years, no children, no family, noncaregiver, retired  at [employer], domiciled alone in a private house (facing foreclosure from the bank due to unpaid debt), [facility] services Mon-Friday for 3 hours/day, PUMA mohan 2022 for 3 months, on SSD, with psychiatric history of Schizophrenia and Alcohol Abuse, hx of Cannabis Abuse, Personality Disorder, multiple psychiatric hospitalizations last at [facility] 11/2023, last at [facility] from 8/2018-11/2018, attended [facility] starting on 11/26/2018 - 04/28/2023 (discharged for failure to attend appointments), no history of SI, SA, or HI. No history of aggression or legal issues, per records, drinking 3-6 beers nightly and using marijuana on the weekends, no known history of withdrawal symptoms/DTs or seizures, PMH of DM2, hypothyroidism, Afib on Xarelto, hx of hypovolemic and septic shock secondary to UTI requiring pressors 01/20/23; NPH (normal pressure hydrocephalus; onset of difficulty walking 12/22; [facility] discharge recommendation to f/u with neuro as outpt for large volume LP on 01/20/23), who was [facility] for agitation and paranoid thoughts.    Patient is guarded, paranoid and irritable.  Patient is an acute danger to self and unable to care for self at this time.  Patient lacks insight and judgment into illness and remains an acute safety risk and warrants inpatient psychiatric hospitalization for safety and stabilization.

## 2024-02-22 NOTE — ED BEHAVIORAL HEALTH ASSESSMENT NOTE - PSYCHIATRIC ISSUES AND PLAN (INCLUDE STANDING AND PRN MEDICATION)
noncompliant with Prolixin Decanoate 12.5mg IM MURILLO; Cogentin 2mg PO qd, buspirone 15mg PO qhs; clonazepam 0.5mg po qd prn, gabapentin 100 mg oral PO qhs

## 2024-02-22 NOTE — ED BEHAVIORAL HEALTH ASSESSMENT NOTE - AXIS III
DM2, hypothyroidism, Afib on Xarelto, hx of hypovolemic and septic shock secondary to UTI requiring pressors 01/20/23; NPH (normal pressure hydrocephalus; onset of difficulty walking 12/22

## 2024-02-22 NOTE — ED ADULT NURSE REASSESSMENT NOTE - NS ED NURSE REASSESS COMMENT FT1
Pt is impulsive in hallway, not cooperative with verbal instructions, refused blood draws and COVID swab, pt requests to be medicated " I just need a shot" as stated by the patient. NP made aware and is in agreement.

## 2024-02-22 NOTE — ED BEHAVIORAL HEALTH ASSESSMENT NOTE - OTHER PAST PSYCHIATRIC HISTORY (INCLUDE DETAILS REGARDING ONSET, COURSE OF ILLNESS, INPATIENT/OUTPATIENT TREATMENT)
3 [facility] inpatient admissions , , 2019; [facility] stints in , 1997 x 2,   - remote  1/15/2015 [facility] note: "After her hospitalization in  the pt accepted her need for antipsychotic medication and had remained compliant until her discharge. From following her last hospitalization in  the pt lived a very isolated lifestyle often sleeping late into the day and then calling her  at work two or three times and waited until he arrived home and prepared dinner. He was satisfied with her living like as described above since it kept her safe and out of the hospital. She consistently presented in individual therapy with blunted affect and expressing that everything was okay. On the weekends she and her  would shop and go out to eat together. Any attempt to explore any need to participate more in life like being with others in some way was resisted. Her life changed drastically when her  suddenly  of a heart attack in 2013. She had very little family or friends to turn to. She was seen weekly often spending much of the session crying and expressing feelings of being overwhelmed with loneliness and despair. She has two cousins in [place]; cousins of her  who became more available to her and helped her to adapt to paying bills etc. She had to become the caretaker for her dog, who became a  for her. Neighbors on her block reached out to her and were of some assistance. However after some months she became aware that the neighbors had their own lives and were not going to be that available."

## 2024-02-22 NOTE — ED ADULT NURSE NOTE - OBJECTIVE STATEMENT
Hx: afib, schizophrenia, HTN. Hx: afib, schizophrenia, HTN. Pt from [facility]. Pt aggressive having hallucinations of babies running around her department. Pt has not received her Fluphenazine injection, requesting it. Pt cooperative changing after several attempts of direction. Denies; SI, SIB, HI, hallucinations, depression, anxiety, ETOH/drug use. Pt calm at this time.

## 2024-02-22 NOTE — ED PROVIDER NOTE - CLINICAL SUMMARY MEDICAL DECISION MAKING FREE TEXT BOX
62 yo F PMH Schizophrenia, Afib, HTN, HLD, Hypothyroidism, OCD, MDD p/w increased agitation after going to [facility] for her monthly shot of fluphenazine. Patient reports increased hallucinations of babies running around her house. Patient became combative with staff at [facility]. Patient report she missed a lot of doses of her medication. Denies fever, headache, dizziness, SI/HI, chills, chest pain, shortness of breath, abdominal pain, sick contact or recent travel. Denies alcohol use or other drugs.   Labs, EKG, COVID  SW collateral  Psych consult  Dispo as per consult

## 2024-02-22 NOTE — ED BEHAVIORAL HEALTH ASSESSMENT NOTE - HPI (INCLUDE ILLNESS QUALITY, SEVERITY, DURATION, TIMING, CONTEXT, MODIFYING FACTORS, ASSOCIATED SIGNS AND SYMPTOMS)
Patient is a 64 y/o   woman,  x 10 years, no children, no family, noncaregiver, retired  at [employer], domiciled alone in a private house (facing foreclosure from the bank due to unpaid debt), [facility] services Mon-Friday for 3 hours/day, PUMA mohan 2022 for 3 months, on SSD, with psychiatric history of Schizophrenia and Alcohol Abuse, hx of Cannabis Abuse, Personality Disorder, multiple psychiatric hospitalizations last at [facility] 11/2023, last at [facility] from 8/2018-11/2018, attended [facility] A[facility] starting on 11/26/2018 - 04/28/2023 (discharged for failure to attend appointments), no history of SI, SA, or HI. No history of aggression or legal issues, per records, drinking 3-6 beers nightly and using marijuana on the weekends, no known history of withdrawal symptoms/DTs or seizures, PMH of DM2, hypothyroidism, Afib on Xarelto, hx of hypovolemic and septic shock secondary to UTI requiring pressors 01/20/23; NPH (normal pressure hydrocephalus; onset of difficulty walking 12/22; [facility] discharge recommendation to f/u with neuro as outpt for large volume LP on 01/20/23), who was [facility] Crisis [facility] for agitation and paranoid thoughts.    Per referral: Pt is a 63yr old F with schizophrenia, alcohol use disorder. 5 prior admissions. Last admitted to [facility] due to psychosis December 2023. Patient presents with disorganized speech and thought, pt was irritable and argumentative. Pt presented with multiple delusions, reporting that someone left something in her head stating that a procedure was conducted. She reports possibly being drugged with LSD. Pt stated that her house was haunted and described being scared and confused. Pt denied SI/HI. Denied past SA and hx of NSSIB.    Patient oriented to person.  Patient states "call Hamid .. [phone] and tell Jazzmine to go make meatballs." When asked why she presented she continues to be confrontational and asks why are you asking that? States that she knows why she is here and asks provider why she wants to know.  States that she missed her medication only today and has "always take her injection."  States that they want to medicate her "because they are jealous and just want the house."  Report that her  passed away of heart disease years ago.  Patient continued to interject and told this provider to "stop it .. why are you asking that ... I want a ."  When asked about suicidal/homicidal ideations, intent or plan she replied "Why would I do that?"     Per previous note from 8/23. COLLATERAL FROM FRIEND/AA SPONSOR Luciano Herrera at [phone]: he has known Patient for >9 years; he last saw Patient well 4 days ago. At baseline, Patient is "normal" does her own shopping, takes care of herself, carries a fluent conversation and functioning swell. Patient usually calls him everyday at 6pm to let him know how she is doing. At times, Mr Wolf goes over to run an errand for her, grocery shopping etc. In the last few days, he has found Patient on the floor after falling and no being able to get back up. Luciano ambivalent into what exactly Patient needs - says "she needs help. whatever you guys think."  On one hand, he does not think Patient "needs to be locked into psychiatry" at this time if she does not want to, and he sent her in to get "services" such as more help at home. Luciano thinks Patient needs to go to a sub acute rehab facility to get her strength back, be medically tuned up and also psychiatrically treated. No aggression, no violence, no suicidality. Patient is a 62 y/o   woman,  x 10 years, no children, no family, noncaregiver, retired  at [employer], domiciled alone in a private house (facing foreclosure from the bank due to unpaid debt), [facility] services Mon-Friday for 3 hours/day, PUMA mohan 2022 for 3 months, on SSD, with psychiatric history of Schizophrenia and Alcohol Abuse, hx of Cannabis Abuse, Personality Disorder, multiple psychiatric hospitalizations last at [facility] 11/2023, last at [facility] from 8/2018-11/2018, attended [facility] starting on 11/26/2018 - 04/28/2023 (discharged for failure to attend appointments), no history of SI, SA, or HI. No history of aggression or legal issues, per records, drinking 3-6 beers nightly and using marijuana on the weekends, no known history of withdrawal symptoms/DTs or seizures, PMH of DM2, hypothyroidism, Afib on Xarelto, hx of hypovolemic and septic shock secondary to UTI requiring pressors 01/20/23; NPH (normal pressure hydrocephalus; onset of difficulty walking 12/22; [facility] discharge recommendation to f/u with neuro as outpt for large volume LP on 01/20/23), who was [facility] for agitation and paranoid thoughts.    Per referral: Pt is a 63yr old F with schizophrenia, alcohol use disorder. 5 prior admissions. Last admitted to [facility] due to psychosis December 2023. Patient presents with disorganized speech and thought, pt was irritable and argumentative. Pt presented with multiple delusions, reporting that someone left something in her head stating that a procedure was conducted. She reports possibly being drugged with LSD. Pt stated that her house was haunted and described being scared and confused. Pt denied SI/HI. Denied past SA and hx of NSSIB.    Patient oriented to person.  Patient states "call Hamid .. [phone] and tell Jazzmine to go make meatballs." When asked why she presented she continues to be confrontational and asks why are you asking that? States that she knows why she is here and asks provider why she wants to know.  States that she missed her medication only today and has "always take her injection."  States that they want to medicate her "because they are jealous and just want the house."  Report that her  passed away of heart disease years ago.  Patient continued to interject and told this provider to "stop it .. why are you asking that ... I want a ."  When asked about suicidal/homicidal ideations, intent or plan she replied "Why would I do that?"     Per previous note from 8/23. COLLATERAL FROM FRIEND/AA SPONSOR Shai Herrera at [phone]: he has known Patient for >9 years; he last saw Patient well 4 days ago. At baseline, Patient is "normal" does her own shopping, takes care of herself, carries a fluent conversation and functioning swell. Patient usually calls him everyday at 6pm to let him know how she is doing. At times, Mr Wolf goes over to run an errand for her, grocery shopping etc. In the last few days, he has found Patient on the floor after falling and no being able to get back up. Shai ambivalent into what exactly Patient needs - says "she needs help. whatever you guys think."  On one hand, he does not think Patient "needs to be locked into psychiatry" at this time if she does not want to, and he sent her in to get "services" such as more help at home. Shai thinks Patient needs to go to a sub acute rehab facility to get her strength back, be medically tuned up and also psychiatrically treated. No aggression, no violence, no suicidality.    Please see  note for additional current collateral information obtained by . Reason for ed visit: shai brought pt to [facility] crisis center to get injection which she hasn’t received in 2 months. he says pt’s symptoms have worsened for the past week and has been paranoid, delusional and not caring for herself.  Symptoms/hx: no si or hi reported. He is unaware of any past suicide attempts. he says that pt does endorse hearing voices of a eric she dated in high school. He says pt is paranoid saying people are trying to get into the house at night and rape her. He reports pt hasn’t let the aid in the house some days due to the paranoia. He also reports pt is delusional saying someone opened her skull and put something inside her. Pt also tells hamid that she has babies coming out of the basement. He says pt’s appetite has decreased, hygiene is okay and pt wakes up in the night screaming which is effecting her sleep.

## 2024-02-22 NOTE — ED ADULT NURSE NOTE - NSFALLRISKINTERV_ED_ALL_ED

## 2024-02-22 NOTE — ED BEHAVIORAL HEALTH ASSESSMENT NOTE - PAST PSYCHOTROPIC MEDICATION
noncompliant with Prolixin Decanoate 12.5mg IM MURILLO; atorvastatin 40 mg PO qhs, Cogentin 2mg PO qd, buspirone 15mg PO qhs; clonazepam 0.5mg po qd prn, gabapentin 100 mg oral PO qhs, Synthroid 50mcg po qd, melatonin 3mg po qhs prn; metformin 850mg PO qd; metoprolol ER 50mg PO qd, rivaroxaban 20 mg oral tablet: 1 tab(s) orally once a day  Geodon, Zoloft, Zyprexa Zydis, Klonopin; Seroquel, Vraylar (cost.), Prolixin PO and MURILLO, BuSpar 15mg hs, and Cogentin 1mg bid, haldol, ativan

## 2024-02-22 NOTE — ED BEHAVIORAL HEALTH NOTE - BEHAVIORAL HEALTH NOTE
COVID Exposure Screen- Patient    Have you been tested for COVID-19 in the last 90 days?  (  ) Yes  (  ) No   ( x ) Unknown- Reason: patient is irritable and paranoid  IF YES: Date of test(s), type of test(s), result(s) for ALL tests in last 90 days: ________    In the past 10 days, have you been around anyone with a positive COVID-19 test? (  ) Yes  (  ) No   ( x ) Unknown- Reason: patient is irritable and paranoid  IF YES: Were you closer than 6 feet of them for a total of 15 minutes or more in a 24 hour period? (  ) Yes   (  ) No   ( ) Unknown- Reason: _____

## 2024-02-22 NOTE — ED BEHAVIORAL HEALTH ASSESSMENT NOTE - DESCRIPTION
Patient was irritable, paranoid, tangential in the ED and did not exhibit any aggression. Patient did not require any PRN medications or any physical restraints.    Vital Signs Last 24 Hrs  T(C): 37.2 (22 Feb 2024 16:36), Max: 37.2 (22 Feb 2024 16:36)  T(F): 99 (22 Feb 2024 16:36), Max: 99 (22 Feb 2024 16:36)  HR: 84 (22 Feb 2024 16:36) (84 - 84)  BP: 148/84 (22 Feb 2024 16:36) (148/84 - 148/84)  BP(mean): --  RR: 20 (22 Feb 2024 16:36) (20 - 20)  SpO2: 97% (22 Feb 2024 16:36) (97% - 97%)    Parameters below as of 22 Feb 2024 16:36  Patient On (Oxygen Delivery Method): room air DM2, hypothyroidism, Afib on Xarelto, hx of hypovolemic and septic shock secondary to UTI requiring pressors 01/20/23; NPH (normal pressure hydrocephalus; onset of difficulty walking 12/22; [facility] discharge recommendation to f/u with neuro as outpt for large volume LP on 01/20/23) see HPI

## 2024-02-22 NOTE — ED ADULT TRIAGE NOTE - AS PAIN REST
ELECTRONIC FOLLOW-UP NOTE / TELEMEDICINE ENCOUNTER     Patient Active Problem List    Diagnosis Date Noted   • Encounter for antineoplastic chemotherapy and immunotherapy 01/13/2021     Priority: High   • Non-small cell lung cancer, right (CMS/HCC) 04/09/2020     Priority: High     Overview Note:     Oncologic History:  --2016            CXR demonstrating right middle lobe fullness, concerning for possible underlying neoplasm  --3/6/20          Chest x-ray, PA and lateral - evidence of slight enlargement of a right infrahilar density, and some adjacent reticular opacity involving middle lobe  --3/16/20        CT chest with contrast - masslike airspace disease right middle lobe, larger multiple bilateral pulmonary nodules have developed, suspicious for neoplastic process  --3/30/20        Lung RML biopsy - adenocarcinoma of lung origin.  Lung cancer fusion panel and solid tumor mutation panel - EGFR mutation, Exon 19 deletion, [id number]; TP53 T256P, Exon 7, PD-L1 expressed (5%),   --4/6/20          PET/CT scan - right middle lobe mass is FDG avid, consistent with primary lung neoplasm.  FDG avid metastasis involving thoracic lymph nodes and osseous metastases, including lytic lesion of the right C5 lamina extending into the facet measures SUV max 6.4, with lytic lesion of the T3 vertebral body measuring SUV max 6.6, with FDG extension into the left pedicle and adjacent rib.  Small lucent lesion of the right 3rd rib, with minimal activity measuring SUV max 1.7.  Possible lytic lesion of the right superior pubic ramus with associated FDG uptake measuring SUV max 4.3.  Small mildly avid lytic lesion within the T2 vertebral body suspected  --4/6/20          MRI brain demonstrating no evidence for intracranial metastatic disease  -- 4/16/20       Initiation of afatinib 40 mg daily  --4/27/20        Stop afatinib, in context of mucositis  --5/13/20        Start osimertinib  -12/2/20         Tolerating therapy well.   Partial response on follow-up imaging studies     • Osseous metastasis (CMS/HCC) 04/09/2020     Priority: Medium   • Hypothyroidism due to acquired atrophy of thyroid 07/21/2016     Priority: Low   • Encounter for monitoring chronic NSAID therapy 07/21/2016     Priority: Low   • Migraine without aura and without status migrainosus, not intractable 07/21/2016     Priority: Low   • Post-menopause on HRT (hormone replacement therapy) 07/21/2016     Priority: Low   • S/P LASIK (laser assisted in situ keratomileusis) of both eyes 01/20/2015     Priority: Low   • Lumbar stenosis 08/20/2014     Priority: Low   • Lumbar radiculopathy 12/30/2013     Priority: Low   • Depression, major, in remission (CMS/HCC) 12/17/2013     Priority: Low   • Chronic lower back pain 12/17/2013     Priority: Low     Overview Note:     Mild spinal stenosis         HISTORY OF PRESENT ILLNESS AND MEDICAL ISSUES DISCUSSED:  Kecia was called in follow-up of her metastatic EGFR mutated non-small cell lung cancer.  She is currently being treated with osimertinib.  She is doing well and denies any new complaints.  Her breathing is stable.  She denies any new neurologic symptoms.  Her pain is under better control following radiofrequency ablation.  Denies any diarrhea or significant GI toxicities with therapy with osimertinib.    PAST MEDICAL HISTORY AND PAST SURGICAL HISTORY:  Reviewed and interval changes as noted above.    ALLERGIES AND DRUG INTOLERANCE:  Reviewed and interval changes as noted above    CURRENT MEDICATIONS:  Reviewed and interval changes as noted above    FAMILY HISTORY AND SOCIAL HISTORY:  Reviewed and interval changes as noted above.    REVIEW OF SYSTEMS:      REPORTED ECOG Performance Status: 0    SIGNIFICANT LABORATORY AND RADIOLOGY DATA:  No visits with results within 2 Week(s) from this visit.   Latest known visit with results is:   Office Visit on 02/24/2021   Component Date Value   • VIA MED ONC PATHWAYS TAG 02/24/2021 [id number]         CLINICAL IMPRESSION AND PLAN:  1. Metastatic EGFR mutated lung cancer.  Patient clinically is doing very well.  Is tolerating therapy without any toxicities.  Has no evidence of disease progression.  We had a discussion regarding merits of follow-up imaging studies since her last scan was done in October of 2020 I recommended that she should have a CT scan performed prior to next visit in approximately 6 weeks.  2. Bone metastases.  Has been treated with denosumab.  Discussed the issue of rebound loss of bone mineral density with discontinuation or increase interval dosing of denosumab may consider obtaining a bone mineral density study versus switching to zoledronic acid.  3. Ongoing therapy with osimertinib.  Continue monitor for therapy related toxicities.   Medication adjustments and refill:  Osimertinib treatment plan reviewed and updated.  Follow-up arrangements:  6 weeks  Laboratory tests:  CBC, CMP, LDH  Imaging studies:  CT scan of the chest, abdomen pelvis prior to MD visit.    Discussed oral chemotherapy/oncolytic therapy adherence and side effects of therapy with patient.   Reviewed nurse's note.    Patient is taking medication as prescribed.      Patient contacted by telephone today. Telephone/ Video contact was initiated to provide care and to minimize the patient's potential exposure and / or transmission of COVID-19.    Patient consented to telephone/video visit. They were made aware that this visit is taking the place of an in-person visit.     0 (no pain/absence of nonverbal indicators of pain)

## 2024-02-22 NOTE — ED BEHAVIORAL HEALTH NOTE - BEHAVIORAL HEALTH NOTE
As per request of provider, writer contacted pt’s healthcare proxy/ friend of 10 years Luciano Rodriguez ([phone]) to obtain collateral information. The following information is per the friend.    Pt is a 62 yo female domiciled alone, hx of schizophrenia, , not working or studying, sent from crisis center.    Reason for ed visit: luciano brought pt to [facility] crisis center to get injection which she hasn’t received in 2 months. he says pt’s symptoms have worsened for the past week and has been paranoid, delusional and not caring for herself.      Symptoms/hx: no si or hi reported. He is unaware of any past suicide attempts. he says that pt does endorse hearing voices of a eric she dated in high school. He says pt is paranoid saying people are trying to get into the house at night and rape her. He reports pt hasn’t let the aid in the house some days due to the paranoia. He also reports pt is delusional saying someone opened her skull and put something inside her. Pt also tells luciano that she has babies coming out of the basement. He says pt’s appetite has decreased, hygiene is okay and pt wakes up in the night screaming which is effecting her sleep. he says pt walks with a walker, has an aid that assist with showering, medication, cooking meals and cleaning the house. The aid comes from 12-3pm daily.    Baseline: he says pt appeared at baseline a few weeks ago and presented fine. He says pt Eats and sleeps okay, is not paranoid or delusional. He describes her as a sweet heart.    Stressors: debts, doesn’t want to leave the home.  passed away 9 years ago.    Drugs/alcohol use: hx of alcohol abuse 5 years ago. no recent drug or alcohol use.    Treatment team:  he reports pt was hospitalized psychiatrically at the end of 2022 [facility] and july 2023 [facility]. pt presented with similar symptoms at that time and was triggered due to paranoia and pt not caring for herself.     Medical problems: diabetes, liver issues, leg pain.     Medication: complaint w/ oral medication. he did not know medication list.    Family hx: father hx of addiction.    Violence/aggression: not violent. Can be aggressive due to delusions. No access to firearms or weapons.    Dispo:  he is advocating for psych admission. As per request of provider, writer contacted pt’s healthcare proxy/ friend of 10 years Luciano Rodriguez ([phone]) to obtain collateral information. The following information is per the friend.    Pt is a 62 yo female domiciled alone, hx of schizophrenia, , not working or studying, sent from crisis center.    Reason for ed visit: luciano brought pt to [facility] crisis center to get injection which she hasn’t received in 2 months. he says pt’s symptoms have worsened for the past week and has been paranoid, delusional and not caring for herself.      Symptoms/hx: no si or hi reported. He is unaware of any past suicide attempts. he says that pt does endorse hearing voices of a reic she dated in high school. He says pt is paranoid saying people are trying to get into the house at night and rape her. He reports pt hasn’t let the aid in the house some days due to the paranoia. He also reports pt is delusional saying someone opened her skull and put something inside her. Pt also tells luciano that she has babies coming out of the basement. He says pt’s appetite has decreased, hygiene is okay and pt wakes up in the night screaming which is effecting her sleep. he says pt walks with a walker, has an aid that assist with showering, medication, cooking meals and cleaning the house. The aid comes from 12-3pm daily.    Baseline: he says pt appeared at baseline a few weeks ago and presented fine. He says pt Eats and sleeps okay, is not paranoid or delusional. He describes her as a sweet heart.    Stressors: debts, doesn’t want to leave the home.  passed away 9 years ago.    Drugs/alcohol use: hx of alcohol abuse 5 years ago. no recent drug or alcohol use.    Treatment team:  he reports pt was hospitalized psychiatrically at the end of 2022 [facility] and july 2023 [facility]. pt presented with similar symptoms at that time and was triggered due to paranoia and pt not caring for herself.     Medical problems: diabetes, liver issues, leg pain.     Medication: complaint w/ oral medication. he did not know medication list.    Family hx: father hx of addiction.    Violence/aggression: not violent. Can be aggressive due to delusions. No access to firearms or weapons.    Dispo:  he is advocating for psych admission.    Writer contacted Luciano Rodriguez ([phone]) to provide an update. writer left a vm requesting a return call.

## 2024-02-22 NOTE — ED BEHAVIORAL HEALTH ASSESSMENT NOTE - OTHER
unable to tolerate an MMSE at this time psychotic, delusion, irritable, unable to care for self tenuous limited deferred cannot rule out perceptual distortions Crisis Center guarded, paranoid

## 2024-02-23 PROCEDURE — 99222 1ST HOSP IP/OBS MODERATE 55: CPT

## 2024-02-23 RX ORDER — OLANZAPINE 15 MG/1
2.5 TABLET, FILM COATED ORAL EVERY 12 HOURS
Refills: 0 | Status: DISCONTINUED | OUTPATIENT
Start: 2024-02-23 | End: 2024-02-23

## 2024-02-23 RX ORDER — FLUPHENAZINE HYDROCHLORIDE 1 MG/1
2 TABLET, FILM COATED ORAL ONCE
Refills: 0 | Status: DISCONTINUED | OUTPATIENT
Start: 2024-02-23 | End: 2024-04-19

## 2024-02-23 RX ORDER — FLUPHENAZINE HYDROCHLORIDE 1 MG/1
2.5 TABLET, FILM COATED ORAL AT BEDTIME
Refills: 0 | Status: DISCONTINUED | OUTPATIENT
Start: 2024-02-23 | End: 2024-02-27

## 2024-02-23 RX ORDER — FLUPHENAZINE HYDROCHLORIDE 1 MG/1
2.5 TABLET, FILM COATED ORAL EVERY 8 HOURS
Refills: 0 | Status: DISCONTINUED | OUTPATIENT
Start: 2024-02-23 | End: 2024-04-19

## 2024-02-23 RX ORDER — METOPROLOL TARTRATE 50 MG
50 TABLET ORAL DAILY
Refills: 0 | Status: DISCONTINUED | OUTPATIENT
Start: 2024-02-23 | End: 2024-04-19

## 2024-02-23 RX ADMIN — RIVAROXABAN 20 MILLIGRAM(S): KIT at 16:16

## 2024-02-23 RX ADMIN — Medication 2 MILLIGRAM(S): at 09:11

## 2024-02-23 RX ADMIN — Medication 1 TABLET(S): at 09:10

## 2024-02-23 RX ADMIN — Medication 50 MICROGRAM(S): at 07:00

## 2024-02-23 RX ADMIN — OLANZAPINE 2.5 MILLIGRAM(S): 15 TABLET, FILM COATED ORAL at 09:12

## 2024-02-23 RX ADMIN — METFORMIN HYDROCHLORIDE 850 MILLIGRAM(S): 850 TABLET ORAL at 09:11

## 2024-02-23 NOTE — BH INPATIENT PSYCHIATRY ASSESSMENT NOTE - NSBHMSEAFFCONG_PSY_A_CORE
Cardiac Cath Lab Procedure Area Arrival Note:    Kiesha aVca arrived to Cardiac Cath Lab, Procedure Area. Patient identifiers verified with NAME and DATE OF BIRTH. Procedure verified with patient. Consent forms verified. Allergies verified. Patient informed of procedure and plan of care. Questions answered with review. Patient voiced understanding of procedure and plan of care. Patient on cardiac monitor, non-invasive blood pressure, SPO2 monitor. On room air then placed on O2 @ 2 lpm via nc. IV of normal saline on pump at 100 ml/hr. Patient status doing well without problems. Patient is A&Ox 4. Patient reports no pain. Patient medicated during procedure with orders obtained and verified by Dr. Mali Bailey. Refer to patients Cardiac Cath Lab PROCEDURE REPORT for vital signs, assessment, status, and response during procedure, printed at end of case. Printed report on chart or scanned into chart. Non-congruent

## 2024-02-23 NOTE — BH SOCIAL WORK INITIAL PSYCHOSOCIAL EVALUATION - OTHER PAST PSYCHIATRIC HISTORY (INCLUDE DETAILS REGARDING ONSET, COURSE OF ILLNESS, INPATIENT/OUTPATIENT TREATMENT)
Pt has a long history of formal psychiatric treatment for schizophrenia with multiple admissions and reliance on outpatient services.  Pt currently followed in the [facility] since 10/23, [facility] and [facility] in the past.  Pt  with ETOH abuse in the past with possible current use.  Pt with substance abuse rehab stay in 1999.  Also rehab stay at [facility] following spouse's death as per her friend.  Pt's last psychiatric admission was in [facility] last fall.  Pt broke her hand while at [facility], then [facility], discharging 12/23.  Pt went off MURILLO medication while at [facility], and stopped taking po when medication ran out.   Pt has a long history of formal psychiatric treatment for schizophrenia with multiple admissions and reliance on outpatient services.  Pt with no current outpatient psychiatric follow up.  Pt has been in  [facility] (discharged 4/2023) and [facility] (last episode in 2015).  Pt  with ETOH abuse in the past with possible current use.  Pt with substance abuse rehab stay in 1999.  Also rehab stay at [facility] following spouse's death as per her friend.  Pt's last psychiatric admission was in [facility] last fall.  Pt was referred to [facility] by [place], as part of their DC,  but didn't link. Pt broke her hand while at [facility], then went to [facility] for PUMA x three months, discharging 12/23.  Pt went off MURILLO medication while at [facility], and stopped taking po when medication ran out.

## 2024-02-23 NOTE — BH INPATIENT PSYCHIATRY ASSESSMENT NOTE - HPI (INCLUDE ILLNESS QUALITY, SEVERITY, DURATION, TIMING, CONTEXT, MODIFYING FACTORS, ASSOCIATED SIGNS AND SYMPTOMS)
Patient is a 62 y/o   woman,  x 10 years, no children, no family, noncaregiver, retired  at [employer], domiciled alone in a private house (facing foreclosure from the bank due to unpaid debt), [facility] services Mon-Friday for 3 hours/day, [facility] 2022 for 3 months, on SSD, with psychiatric history of Schizophrenia and Alcohol Abuse, hx of Cannabis Abuse, Personality Disorder, multiple psychiatric hospitalizations last at [facility] 11/2023, last at [facility] from 8/2018-11/2018, attended [facility] starting on 11/26/2018 - 04/28/2023 (discharged for failure to attend appointments), no history of SI, SA, or HI. No history of aggression or legal issues, per records, drinking 3-6 beers nightly and using marijuana on the weekends, no known history of withdrawal symptoms/DTs or seizures, PMH of DM2, hypothyroidism, Afib on Xarelto, hx of hypovolemic and septic shock secondary to UTI requiring pressors 01/20/23; NPH (normal pressure hydrocephalus; onset of difficulty walking 12/22; [facility] discharge recommendation to f/u with neuro as outpt for large volume LP on 01/20/23), who was BIB Crisis Center for agitation and paranoid thoughts.    Per referral: Pt is a 63yr old F with schizophrenia, alcohol use disorder. 5 prior admissions. Last admitted to [facility] due to psychosis December 2023. Patient presents with disorganized speech and thought, pt was irritable and argumentative. Pt presented with multiple delusions, reporting that someone left something in her head stating that a procedure was conducted. She reports possibly being drugged with LSD. Pt stated that her house was haunted and described being scared and confused. Pt denied SI/HI. Denied past SA and hx of NSSIB.    Patient oriented to person.  Patient states "call Hamid .. [phone] and tell Jazzmine to go make meatballs." When asked why she presented she continues to be confrontational and asks why are you asking that? States that she knows why she is here and asks provider why she wants to know.  States that she missed her medication only today and has "always take her injection."  States that they want to medicate her "because they are jealous and just want the house."  Report that her  passed away of heart disease years ago.  Patient continued to interject and told this provider to "stop it .. why are you asking that ... I want a ."  When asked about suicidal/homicidal ideations, intent or plan she replied "Why would I do that?"     Per previous note from 8/23. COLLATERAL FROM FRIEND/AA SPONSOR Shai Herrera at [phone]: he has known Patient for >9 years; he last saw Patient well 4 days ago. At baseline, Patient is "normal" does her own shopping, takes care of herself, carries a fluent conversation and functioning swell. Patient usually calls him everyday at 6pm to let him know how she is doing. At times, Mr Wolf goes over to run an errand for her, grocery shopping etc. In the last few days, he has found Patient on the floor after falling and no being able to get back up. Shai ambivalent into what exactly Patient needs - says "she needs help. whatever you guys think."  On one hand, he does not think Patient "needs to be locked into psychiatry" at this time if she does not want to, and he sent her in to get "services" such as more help at home. Shai thinks Patient needs to go to a sub acute rehab facility to get her strength back, be medically tuned up and also psychiatrically treated. No aggression, no violence, no suicidality.    Please see  note for additional current collateral information obtained by . Reason for ed visit: shai brought pt to [facility] crisis center to get injection which she hasn’t received in 2 months. he says pt’s symptoms have worsened for the past week and has been paranoid, delusional and not caring for herself.  Symptoms/hx: no si or hi reported. He is unaware of any past suicide attempts. he says that pt does endorse hearing voices of a eric she dated in high school. He says pt is paranoid saying people are trying to get into the house at night and rape her. He reports pt hasn’t let the aid in the house some days due to the paranoia. He also reports pt is delusional saying someone opened her skull and put something inside her. Pt also tells hamid that she has babies coming out of the basement. He says pt’s appetite has decreased, hygiene is okay and pt wakes up in the night screaming which is effecting her sleep. Patient is a 62 y/o woman,  x 10 years with PMHx of Schizophrenia, HTN, Afib, Hypothyroidism, NPH and hx of hypovolemic/septic shock from UTI admitted for management of paranoia in the context of treatment non-adherence.    Ms. Acosta has no children or family and lives alone in a private house (facing foreclosure from the bank due to unpaid debt). Receives [facility] services Mon-Friday for 3 hours/day and has 1 friend (AA sponsor Shai ) who brought her into the hospital this time. As per Shai pt had been stable for years after her last d/c from [facility] on Prolixin dec. However pt had to go into rehab for 2 months, discharged late Dec 2023 and in there she did not receive her MURILLO, as per Shai "they gave her another equivalent medication but it wasn't the same. Since coming out of rehab pt was lost to mental health follow up and slowly decompensated. As per Shai she has been refusing to let aides in the house, refusing food delivery, making bizarre statements such as there being babies coming out of her basement and people coming into the house to rape her.     During encounter this morning pt refused to engage and speak to writer. Appeared fixated on obtaining practical things such as towels and "the good shampoo" to have a shower. Refused to speak with writer about events leading up to her hospitalization, insisting on speaking about her shower only. Requesting writer to help her shower. Provided redirection.       As per ED eval:    Past Psych hx: [facility] 2022 for 3 months, on SSD, with psychiatric history of Schizophrenia and Alcohol Abuse, hx of Cannabis Abuse, Personality Disorder, multiple psychiatric hospitalizations last at [facility] 11/2023, last at [facility] from 8/2018-11/2018, attended [facility] starting on 11/26/2018 - 04/28/2023 (discharged for failure to attend appointments), no history of SI, SA, or HI. No history of aggression or legal issues, per records, drinking 3-6 beers nightly and using marijuana on the weekends, no known history of withdrawal symptoms/DTs or seizures, PMH of DM2, hypothyroidism, Afib on Xarelto, hx of hypovolemic and septic shock secondary to UTI requiring pressors 01/20/23; NPH (normal pressure hydrocephalus; onset of difficulty walking 12/22; [facility] discharge recommendation to f/u with neuro as outpt for large volume LP on 01/20/23).    Per referral: Pt is a 63yr old F with schizophrenia, alcohol use disorder. 5 prior admissions. Last admitted to [facility] due to psychosis December 2023. Patient presents with disorganized speech and thought, pt was irritable and argumentative. Pt presented with multiple delusions, reporting that someone left something in her head stating that a procedure was conducted. She reports possibly being drugged with LSD. Pt stated that her house was haunted and described being scared and confused. Pt denied SI/HI. Denied past SA and hx of NSSIB.    Patient oriented to person.  Patient states "call Shai .. [phone] and tell Jazzmine to go make meatballs." When asked why she presented she continues to be confrontational and asks why are you asking that? States that she knows why she is here and asks provider why she wants to know.  States that she missed her medication only today and has "always take her injection."  States that they want to medicate her "because they are jealous and just want the house."  Report that her  passed away of heart disease years ago.  Patient continued to interject and told this provider to "stop it .. why are you asking that ... I want a ."  When asked about suicidal/homicidal ideations, intent or plan she replied "Why would I do that?"     Per previous note from 8/23. COLLATERAL FROM FRIEND/AA SPONSOR Shai Herrera at [phone]: he has known Patient for >9 years; he last saw Patient well 4 days ago. At baseline, Patient is "normal" does her own shopping, takes care of herself, carries a fluent conversation and functioning swell. Patient usually calls him everyday at 6pm to let him know how she is doing. At times, Mr Wolf goes over to run an errand for her, grocery shopping etc. In the last few days, he has found Patient on the floor after falling and no being able to get back up. Shai ambivalent into what exactly Patient needs - says "she needs help. whatever you guys think."  On one hand, he does not think Patient "needs to be locked into psychiatry" at this time if she does not want to, and he sent her in to get "services" such as more help at home. Shai thinks Patient needs to go to a sub acute rehab facility to get her strength back, be medically tuned up and also psychiatrically treated. No aggression, no violence, no suicidality.    Please see  note for additional current collateral information obtained by SW. Reason for ed visit: shai brought pt to [facility] crisis center to get injection which she hasn’t received in 2 months. he says pt’s symptoms have worsened for the past week and has been paranoid, delusional and not caring for herself.  Symptoms/hx: no si or hi reported. He is unaware of any past suicide attempts. he says that pt does endorse hearing voices of a eric she dated in high school. He says pt is paranoid saying people are trying to get into the house at night and rape her. He reports pt hasn’t let the aid in the house some days due to the paranoia. He also reports pt is delusional saying someone opened her skull and put something inside her. Pt also tells shai that she has babies coming out of the basement. He says pt’s appetite has decreased, hygiene is okay and pt wakes up in the night screaming which is effecting her sleep.

## 2024-02-23 NOTE — BH INPATIENT PSYCHIATRY ASSESSMENT NOTE - OTHER PAST PSYCHIATRIC HISTORY (INCLUDE DETAILS REGARDING ONSET, COURSE OF ILLNESS, INPATIENT/OUTPATIENT TREATMENT)
3 [facility] inpatient admissions , , 2019; [facility] stints in , 1997 x 2,   - remote  1/15/2015 [facility] note: "After her hospitalization in  the pt accepted her need for antipsychotic medication and had remained compliant until her discharge. From following her last hospitalization in  the pt lived a very isolated lifestyle often sleeping late into the day and then calling her  at work two or three times and waited until he arrived home and prepared dinner. He was satisfied with her living like as described above since it kept her safe and out of the hospital. She consistently presented in individual therapy with blunted affect and expressing that everything was okay. On the weekends she and her  would shop and go out to eat together. Any attempt to explore any need to participate more in life like being with others in some way was resisted. Her life changed drastically when her  suddenly  of a heart attack in 2013. She had very little family or friends to turn to. She was seen weekly often spending much of the session crying and expressing feelings of being overwhelmed with loneliness and despair. She has two cousins in [place]; cousins of her  who became more available to her and helped her to adapt to paying bills etc. She had to become the caretaker for her dog, who became a  for her. Neighbors on her block reached out to her and were of some assistance. However after some months she became aware that the neighbors had their own lives and were not going to be that available."

## 2024-02-23 NOTE — PSYCHIATRIC REHAB INITIAL EVALUATION - NSBHPRRECOMMEND_PSY_ALL_CORE
Writer met with the patient to orient her to the psych rehab services and to establish therapeutic goals. However, patient did not wish to speak with writer. According to the chart patient was hospitalized due to increased symptoms of paranoia, anxiety, auditory hallucinations and decreased daily functioning. According to the chart patient also believes people are entering her home and raping her. According to the chart patient believes there are babies coming out of her basement and that someone planted a device in her brain. According to the chart patient has a long history of mental illness and multiple psychiatric hospitalizations. According to the chart patient has a history of ETOH and substance abuse.

## 2024-02-23 NOTE — BH INPATIENT PSYCHIATRY ASSESSMENT NOTE - NSBHCHARTREVIEWVS_PSY_A_CORE FT
Vital Signs Last 24 Hrs  T(C): 36.1 (02-23-24 @ 05:55), Max: 37.2 (02-22-24 @ 16:36)  T(F): 97 (02-23-24 @ 05:55), Max: 99 (02-22-24 @ 16:36)  HR: 85 (02-23-24 @ 04:05) (70 - 85)  BP: 126/75 (02-23-24 @ 04:05) (116/73 - 148/84)  BP(mean): --  RR: 16 (02-23-24 @ 04:05) (16 - 20)  SpO2: 98% (02-23-24 @ 04:05) (97% - 98%)    Orthostatic VS  02-23-24 @ 05:55  Lying BP: 114/64 HR: 64  Sitting BP: 113/73 HR: 71  Standing BP: --/-- HR: --  Site: upper right arm  Mode: electronic

## 2024-02-23 NOTE — BH SOCIAL WORK INITIAL PSYCHOSOCIAL EVALUATION - NSBHSASOBERHOWFT_PSY_ALL_CORE
Pt attended [facility], and currently has a sponsor.  Pt attended [facility] for many years, ending in 2015

## 2024-02-23 NOTE — BH INPATIENT PSYCHIATRY ASSESSMENT NOTE - NSBHMETABOLIC_PSY_ALL_CORE_FT
BMI: BMI (kg/m2): 30.9 (10-27-23 @ 16:32)  HbA1c: A1C with Estimated Average Glucose Result: 5.7 % (08-26-23 @ 13:54)    Glucose: POCT Blood Glucose.: 103 mg/dL (02-22-24 @ 23:01)    BP: 126/75 (02-23-24 @ 04:05) (116/73 - 148/84)Vital Signs Last 24 Hrs  T(C): 36.1 (02-23-24 @ 05:55), Max: 37.2 (02-22-24 @ 16:36)  T(F): 97 (02-23-24 @ 05:55), Max: 99 (02-22-24 @ 16:36)  HR: 85 (02-23-24 @ 04:05) (70 - 85)  BP: 126/75 (02-23-24 @ 04:05) (116/73 - 148/84)  BP(mean): --  RR: 16 (02-23-24 @ 04:05) (16 - 20)  SpO2: 98% (02-23-24 @ 04:05) (97% - 98%)    Orthostatic VS  02-23-24 @ 05:55  Lying BP: 114/64 HR: 64  Sitting BP: 113/73 HR: 71  Standing BP: --/-- HR: --  Site: upper right arm  Mode: electronic    Lipid Panel: Date/Time: 08-26-23 @ 13:54  Cholesterol, Serum: 155  LDL Cholesterol Calculated: 99  HDL Cholesterol, Serum: 35  Total Cholesterol/HDL Ration Measurement: --  Triglycerides, Serum: 105

## 2024-02-23 NOTE — BH SOCIAL WORK INITIAL PSYCHOSOCIAL EVALUATION - NSBHFINANCE_PSY_ALL_CORE
Pt states she receives SSD and social security benefits through her late /Social Security Disability (SSD)

## 2024-02-23 NOTE — BH INPATIENT PSYCHIATRY ASSESSMENT NOTE - CURRENT MEDICATION
MEDICATIONS  (STANDING):  atorvastatin 40 milliGRAM(s) Oral at bedtime  benztropine 2 milliGRAM(s) Oral daily  gabapentin 100 milliGRAM(s) Oral at bedtime  levothyroxine 50 MICROGram(s) Oral daily  metFORMIN 850 milliGRAM(s) Oral daily  OLANZapine Disintegrating Tablet 2.5 milliGRAM(s) Oral every 12 hours  rivaroxaban 20 milliGRAM(s) Oral with dinner  trimethoprim  160 mG/sulfamethoxazole 800 mG 1 Tablet(s) Oral two times a day    MEDICATIONS  (PRN):  clonazePAM  Tablet 0.5 milliGRAM(s) Oral daily PRN anxiety, agitation  OLANZapine Injectable 2.5 milliGRAM(s) IntraMuscular Once PRN agitation   MEDICATIONS  (STANDING):  atorvastatin 40 milliGRAM(s) Oral at bedtime  benztropine 2 milliGRAM(s) Oral daily  fluPHENAZine 2.5 milliGRAM(s) Oral at bedtime  gabapentin 100 milliGRAM(s) Oral at bedtime  levothyroxine 50 MICROGram(s) Oral daily  metFORMIN 850 milliGRAM(s) Oral daily  rivaroxaban 20 milliGRAM(s) Oral with dinner  trimethoprim  160 mG/sulfamethoxazole 800 mG 1 Tablet(s) Oral two times a day    MEDICATIONS  (PRN):  clonazePAM  Tablet 0.5 milliGRAM(s) Oral daily PRN anxiety, agitation  fluPHENAZine 2.5 milliGRAM(s) Oral every 8 hours PRN Agitation stemming from psychosis  fluPHENAZine  Injectable 2 milliGRAM(s) IntraMuscular once PRN Severe agitation stemming from psychosis   MEDICATIONS  (STANDING):  atorvastatin 40 milliGRAM(s) Oral at bedtime  benztropine 2 milliGRAM(s) Oral daily  fluPHENAZine 2.5 milliGRAM(s) Oral at bedtime  gabapentin 100 milliGRAM(s) Oral at bedtime  levothyroxine 50 MICROGram(s) Oral daily  metFORMIN 850 milliGRAM(s) Oral daily  metoprolol succinate ER 50 milliGRAM(s) Oral daily  rivaroxaban 20 milliGRAM(s) Oral with dinner  trimethoprim  160 mG/sulfamethoxazole 800 mG 1 Tablet(s) Oral two times a day    MEDICATIONS  (PRN):  clonazePAM  Tablet 0.5 milliGRAM(s) Oral daily PRN anxiety, agitation  fluPHENAZine 2.5 milliGRAM(s) Oral every 8 hours PRN Agitation stemming from psychosis  fluPHENAZine  Injectable 2 milliGRAM(s) IntraMuscular once PRN Severe agitation stemming from psychosis

## 2024-02-23 NOTE — ED ADULT NURSE REASSESSMENT NOTE - NS ED NURSE REASSESS COMMENT FT1
Pt sleeping comfortably in bed, respirations are even and unlabored. VSS, NAD. Pt awaiting for EMS for transportation to [facility]

## 2024-02-23 NOTE — BH TREATMENT PLAN - NSTXPSYCHODATETRGT_PSY_ALL_CORE
01/12/21 1400   C-SSRS (Frequent Screen)   2. Have you actually had any thoughts of killing yourself? Yes   Suicide Evaluation Low Risk   Nursing Suicide Assessment Note - PHP    Current assessment:    Current C-SSRS score: Low Risk     Protective Factors / Reason for Living: Responsibility to children    Interventions:    -   Maintain current plan of care      01-Mar-2024

## 2024-02-23 NOTE — BH INPATIENT PSYCHIATRY ASSESSMENT NOTE - RISK ASSESSMENT
Chronic risk factors include hx of Schizophrenia, multiple psych admissions, , poor social support, house in [facility], medical comorbidities. Acute risk factors include decompensated psychosis. Protective factors include hx of long standing stability on the community and good response to treatment.

## 2024-02-23 NOTE — BH INPATIENT PSYCHIATRY ASSESSMENT NOTE - OTHER
limited guarded, paranoid unable to tolerate an MMSE at this time tenuous deferred cannot rule out perceptual distortions

## 2024-02-23 NOTE — BH SOCIAL WORK INITIAL PSYCHOSOCIAL EVALUATION - NSBHPROFILEREVIEW_PSY_ALL_CORE
Patient:   TORI GARCIA            MRN: [id number]            FIN: [id number]              Age:   84 years     Sex:  MALE     :  31   Associated Diagnoses:   None   Author:   JUN RODRIGUEZ     Subjective   Additional information Edema still significant, but breathing is much more comfortable.  Minimal basilar crackles.  Denies chest pain. .       Health Status   Allergies:    Allergic Reactions (All)  NKA   Current medications:    Medications (11) Active  Scheduled: (9)  AmLODIPine 10 mg tab  10 mg 1 tab, Oral, Daily  ApixaBAN 2.5 mg tab  2.5 mg 1 tab, Oral, Q12H  azithromycin  500 mg, IVPB, Daily  cefTRIAXone  1,000 mg 10 mL, Slow IV Push, Daily  Furosemide 20 mg/2 mL inj  20 mg 2 mL, Slow IV Push, BID  HydrALAZINE 50 mg tab  50 mg 1 tab, Oral, Q8H (variable)  Lisinopril 40 mg tab  40 mg 1 tab, Oral, Daily  Metoprolol tartrate 25 mg tab  25 mg 1 tab, Oral, Q12H  Pravastatin 10 mg tab  10 mg 1 tab, Oral, Daily  Continuous: (0)  PRN: (2)  Acetaminophen 500 mg tab  500 mg 1 tab, Oral, Q4H  Ondansetron 4 mg/2 mL inj SDV  4 mg 2 mL, Slow IV Push, Q6H      Review of Systems   Cardiovascular:  Peripheral edema, No chest pain.    Respiratory:  Shortness of breath.    All other systems ROS reviewed as documented in chart.     Objective   Intake and Output   I&O 24 hr   I & O between:  13-AUG-2019 15:01 TO 14-AUG-2019 15:01  Med Dosing Weight:  91.9  kg   12-AUG-2019  24 Hour Intake:   264.00  ( 2.87 mL/kg )  24 Hour Output:   1200.00           24 Hour Urine/Stool Output:   0.0  24 Hour Balance:   -936.00           24 Hour Urine Output:   1200.00  ( 0.54 mL/kg/hr )    VS/Measurements   Vital Signs   19 10:56 CDT Heart/Pulse Rate 58 beats/min  Normal    SpO2 94 %  LOW   19 10:56 CDT Respiration Rate 16 breaths/min  Normal   19 10:53 CDT NIBP Systolic 136 mm Hg  Normal    NIBP Diastolic 64 mm Hg  Normal    NIBP Source Right Arm    NIBP MAP Manual Entry 88   19 10:53 CDT  Temperature - VS 36.4 deg_C  Normal    Temperature Source - VS Oral     ,    Vitals between:   13-AUG-2019 15:01:55   TO   14-AUG-2019 15:01:55                   LAST RESULT MINIMUM MAXIMUM  Temperature 36.4 36.4 36.7  Heart Rate 58 56 60  Respiratory Rate 16 16 18  NISBP           136 129 158  NIDBP           64 60 74  SpO2                    94 93 98  , Measurements from flowsheet : Height and Weight   08/14/19 05:35 CDT CLINICALWEIGHT 86.9 kg   08/13/19 04:42 CDT CLINICALWEIGHT 88.5 kg       General:  Alert and oriented, Mild distress.    Neck:  Supple.    Respiratory:  Breath sounds are equal, mild basilar crackles, improved air movement.   Cardiovascular:  Normal rate, Regular rhythm, 3+ BLE edema calves down to feet.   Musculoskeletal:  Normal range of motion.    Integumentary:  Warm, Dry, Pink.    Neurologic:  Alert, Oriented.    Psychiatric:  Cooperative, Appropriate mood & affect.      Results Review   General results   Interpretation:   Labs between:  13-AUG-2019 15:01 to 14-AUG-2019 15:01  CBC:                 WBC  HgB  Hct  Plt  MCV  RDW   14-AUG-2019 6.7  14.2  44.4  175  96.1  13.4   DIFF:                 Seg  Neutroph//ABS  Lymph//ABS  Mono//ABS  EOS/ABS  14-AUG-2019 NOT APPLICABLE  67 // 4.5 19 // 1.3 11 // 0.7 3 // 0.2  BMP:                 Na  Cl  BUN  Glu   14-AUG-2019 142  (H) 108  (H) 30  (H) 102                              K  CO2  Cr  Ca                              3.9  28  (H) 1.26  9.0                        Result title:  XR CHEST PORTABLE 1V  Result status:  Final  Verified by:  DARRIN CHAVEZ on 08/14/2019 3:44  IMPRESSION:1. Findings consistent with persistent congestive heart failure and interstitial pulmonary edema.2. Persistent bilateral pleural effusions, greater on the right but unchanged.3.  Right basilar infiltrate or layering of pleural fluid unchanged.         Impression and Plan   Assessment and Plan:       Diagnosis:   IMPRESSION:  1. Acute-on-chronic congestive heart  failure, diastolic in nature with  preserved ejection fraction.  2. History of hypertension.  3. Mixed hyperlipidemia.  4. Cerebrovascular accident, acute thalamic infarction.  5. Obesity, sign and symptoms suggestive of sleep apnea.  6. History of craniotomy.  RECOMMENDATIONS:  Recent echocardiogram shows normal LVEF with diastolic dysfunction.  Continue IV diuresis today - increased to TID by hospitalist.    Off oxygen, CXR still shows pulmonary vascular congestion   Edema still significant throughout calves/ankles/feet, 3+   Strict Is and Os   Daily weights.    Monitor creatinine   D/w hospitalist  Otherwise continue current medications.   Will follow. CPM   .   Yes

## 2024-02-23 NOTE — BH INPATIENT PSYCHIATRY ASSESSMENT NOTE - NSBHASSESSSUMMFT_PSY_ALL_CORE
Patient is a 62 y/o woman,  x 10 years with PMHx of Schizophrenia, HTN, Afib, Hypothyroidism, NPH and hx of hypovolemic/septic shock from UTI admitted for management of paranoia in the context of treatment non-adherence.  Pt unable to engage in intake interview during initial encounter, fixated on obtaining a shower and refusing to engage in other topics. As per collateral patient has decompensated since being discharged from [facility] in December and being lost to f/u in terms of mental health treatment. From collateral pt presenting sx suggestive of psychotic decompensation including paranoid delusions, disorganized behavior and suspected AH in the context of non-adherence to pharmacotherapy. At home she was refusing meals, refusing to let HHA in and not leaving the house due to paranoia  Pt currently requires inpatient level of care as she is unable to care for herself in her current state, would benefit from med adjustments for stabilization and safe aftercare planning.    Impression: Schizophrenia    -Admit to [facility] 2S  -Legal status 2PC  -Routine checks  -D/C Olanzapine, START Prolixin 2.5mg QHS and titrate up to lowest effective dose as tolerated. Plans to transition to Prolixin decanoate for ease of compliance.  -Confirmed med list from patient's pharmacy [address]:    -Losartan 100/25 (losartan/hctz???)    -Metoprolol ER 50mg QD    -Olanzapine 2.5mg QHS    -Cogentin 1mg BID    -Xarelto 20mg QHS    -Amlodipine 5mg QD    -Levothyroxine 50 micrograms QD Patient is a 64 y/o woman,  x 10 years with PMHx of Schizophrenia, HTN, Afib, Hypothyroidism, NPH and hx of hypovolemic/septic shock from UTI admitted for management of paranoia in the context of treatment non-adherence.  Pt unable to engage in intake interview during initial encounter, fixated on obtaining a shower and refusing to engage in other topics. As per collateral patient has decompensated since being discharged from [facility] in December and being lost to f/u in terms of mental health treatment. From collateral pt presenting sx suggestive of psychotic decompensation including paranoid delusions, disorganized behavior and suspected AH in the context of non-adherence to pharmacotherapy. At home she was refusing meals, refusing to let HHA in and not leaving the house due to paranoia  Pt currently requires inpatient level of care as she is unable to care for herself in her current state, would benefit from med adjustments for stabilization and safe aftercare planning.    Impression: Schizophrenia    -Admit to [facility] 2S  -Legal status 2PC  -Routine checks  -D/C Olanzapine, START Prolixin 2.5mg QHS and titrate up to lowest effective dose as tolerated. Plans to transition to Prolixin decanoate for ease of compliance.  -Confirmed med list from patient's pharmacy [address]:    -Losartan 100/25 (losartan/hctz???)    -Metoprolol ER 50mg QD    -Olanzapine 2.5mg QHS    -Cogentin 1mg BID    -Xarelto 20mg QHS    -Amlodipine 5mg QD    -Levothyroxine 50 micrograms QD  -Will resume Metoprolol with hold parameters. Will hold Losartan, Amlodipine and monitor BP, may resume if indicated.

## 2024-02-24 PROCEDURE — 99232 SBSQ HOSP IP/OBS MODERATE 35: CPT

## 2024-02-24 RX ORDER — LOSARTAN POTASSIUM 100 MG/1
25 TABLET, FILM COATED ORAL DAILY
Refills: 0 | Status: DISCONTINUED | OUTPATIENT
Start: 2024-02-24 | End: 2024-04-19

## 2024-02-24 RX ADMIN — Medication 2 MILLIGRAM(S): at 08:21

## 2024-02-24 RX ADMIN — FLUPHENAZINE HYDROCHLORIDE 2.5 MILLIGRAM(S): 1 TABLET, FILM COATED ORAL at 08:22

## 2024-02-24 RX ADMIN — Medication 50 MILLIGRAM(S): at 08:22

## 2024-02-24 RX ADMIN — Medication 1 TABLET(S): at 08:21

## 2024-02-24 RX ADMIN — METFORMIN HYDROCHLORIDE 850 MILLIGRAM(S): 850 TABLET ORAL at 08:21

## 2024-02-24 NOTE — BH INPATIENT PSYCHIATRY PROGRESS NOTE - NSBHASSESSSUMMFT_PSY_ALL_CORE
Patient is a 64 y/o woman,  x 10 years with PMHx of Schizophrenia, HTN, Afib, Hypothyroidism, NPH and hx of hypovolemic/septic shock from UTI admitted for management of paranoia in the context of treatment non-adherence.  Pt unable to engage in intake interview during initial encounter, fixated on obtaining a shower and refusing to engage in other topics. As per collateral patient has decompensated since being discharged from [facility] in December and being lost to f/u in terms of mental health treatment. From collateral pt presenting sx suggestive of psychotic decompensation including paranoid delusions, disorganized behavior and suspected AH in the context of non-adherence to pharmacotherapy. At home she was refusing meals, refusing to let HHA in and not leaving the house due to paranoia  Pt currently requires inpatient level of care as she is unable to care for herself in her current state, would benefit from med adjustments for stabilization and safe aftercare planning.    Impression: Schizophrenia    -Admit to [facility] 2S  -Legal status 2PC  -Routine checks  -D/C Olanzapine, START Prolixin 2.5mg QHS and titrate up to lowest effective dose as tolerated. Plans to transition to Prolixin decanoate for ease of compliance.  -Confirmed med list from patient's pharmacy [address]:    -Losartan 100/25 (losartan/hctz???)    -Metoprolol ER 50mg QD    -Olanzapine 2.5mg QHS    -Cogentin 1mg BID    -Xarelto 20mg QHS    -Amlodipine 5mg QD    -Levothyroxine 50 micrograms QD  -Will resume Metoprolol with hold parameters. Initially held Losartan, Amlodipine and monitor BP, may resume if indicated.   -Resume Losartan, starting at 25mg QD with hold parameters, titrate as needed.

## 2024-02-24 NOTE — BH INPATIENT PSYCHIATRY PROGRESS NOTE - CURRENT MEDICATION
MEDICATIONS  (STANDING):  atorvastatin 40 milliGRAM(s) Oral at bedtime  benztropine 2 milliGRAM(s) Oral daily  fluPHENAZine 2.5 milliGRAM(s) Oral at bedtime  gabapentin 100 milliGRAM(s) Oral at bedtime  levothyroxine 50 MICROGram(s) Oral daily  metFORMIN 850 milliGRAM(s) Oral daily  metoprolol succinate ER 50 milliGRAM(s) Oral daily  rivaroxaban 20 milliGRAM(s) Oral with dinner  trimethoprim  160 mG/sulfamethoxazole 800 mG 1 Tablet(s) Oral two times a day    MEDICATIONS  (PRN):  clonazePAM  Tablet 0.5 milliGRAM(s) Oral daily PRN anxiety, agitation  fluPHENAZine 2.5 milliGRAM(s) Oral every 8 hours PRN Agitation stemming from psychosis  fluPHENAZine  Injectable 2 milliGRAM(s) IntraMuscular once PRN Severe agitation stemming from psychosis

## 2024-02-24 NOTE — BH INPATIENT PSYCHIATRY PROGRESS NOTE - NSBHFUPINTERVALHXFT_PSY_A_CORE
BP was more elevated this morning compared to yesterday. Pt denies sx. Pt has been refusing PO meds, accepted them this morning, needing extensive encouragement from staff to take them. Took metoprolol this morning. Will continue monitoring BP. Will add Losartan 25mg QD with hold parameters (as per patient's pharmacy she used to take 100mg losartan, however it's unclear what she actually took at home), if BP still elevated may titrate and/or reintroduce remaining home BP meds (amlodipine). On encounter pt engages minimally in terms of her emotional state, fixating on practical things. Pt asked writer to show her how to operate the sink in her bathroom, however after seeing writer do it she refused to do it herself and asked writer to turn on the sink again when for her. Writer provided redirection.

## 2024-02-24 NOTE — BH INPATIENT PSYCHIATRY PROGRESS NOTE - OTHER
guarded, paranoid tenuous unable to tolerate an MMSE at this time limited cannot rule out perceptual distortions deferred

## 2024-02-25 LAB
GLUCOSE BLDC GLUCOMTR-MCNC: 103 MG/DL — HIGH (ref 70–99)
GLUCOSE BLDC GLUCOMTR-MCNC: 84 MG/DL — SIGNIFICANT CHANGE UP (ref 70–99)

## 2024-02-25 PROCEDURE — 99231 SBSQ HOSP IP/OBS SF/LOW 25: CPT

## 2024-02-25 RX ORDER — FLUPHENAZINE HYDROCHLORIDE 1 MG/1
2 TABLET, FILM COATED ORAL ONCE
Refills: 0 | Status: COMPLETED | OUTPATIENT
Start: 2024-02-25 | End: 2024-02-25

## 2024-02-25 RX ORDER — INSULIN LISPRO 100/ML
VIAL (ML) SUBCUTANEOUS AT BEDTIME
Refills: 0 | Status: DISCONTINUED | OUTPATIENT
Start: 2024-02-25 | End: 2024-03-07

## 2024-02-25 RX ORDER — INSULIN LISPRO 100/ML
VIAL (ML) SUBCUTANEOUS
Refills: 0 | Status: DISCONTINUED | OUTPATIENT
Start: 2024-02-25 | End: 2024-03-07

## 2024-02-25 RX ADMIN — Medication 50 MICROGRAM(S): at 05:53

## 2024-02-25 RX ADMIN — Medication 1 MILLIGRAM(S): at 21:57

## 2024-02-25 RX ADMIN — Medication 1 TABLET(S): at 08:39

## 2024-02-25 RX ADMIN — METFORMIN HYDROCHLORIDE 850 MILLIGRAM(S): 850 TABLET ORAL at 08:40

## 2024-02-25 RX ADMIN — LOSARTAN POTASSIUM 25 MILLIGRAM(S): 100 TABLET, FILM COATED ORAL at 08:38

## 2024-02-25 RX ADMIN — Medication 50 MILLIGRAM(S): at 08:39

## 2024-02-25 RX ADMIN — Medication 2 MILLIGRAM(S): at 08:39

## 2024-02-25 RX ADMIN — FLUPHENAZINE HYDROCHLORIDE 2 MILLIGRAM(S): 1 TABLET, FILM COATED ORAL at 21:55

## 2024-02-25 NOTE — BH INPATIENT PSYCHIATRY PROGRESS NOTE - NSBHFUPINTERVALHXFT_PSY_A_CORE
Patient seen for Schizophrenia. Chart reviewed and discussed with nursing staff. Patient refused her prolixin and neurontin last night. This morning  she is confused, disorganized, went to other pt's room. On approach she is uncooperative, irritable, loud and verbally aggressive, refuses to speak.  Patient seen for Schizophrenia. Chart reviewed and discussed with nursing staff. Patient refused her prolixin and neurontin last night. This morning  she is tearful, disorganized, and agitated with staff while showering. On approach she is uncooperative, irritable, loud and refuses to speak.

## 2024-02-25 NOTE — BH INPATIENT PSYCHIATRY PROGRESS NOTE - NSBHASSESSSUMMFT_PSY_ALL_CORE
Patient is a 64 y/o woman,  x 10 years with PMHx of Schizophrenia, HTN, Afib, Hypothyroidism, NPH and hx of hypovolemic/septic shock from UTI admitted for management of paranoia in the context of treatment non-adherence.  Pt unable to engage in intake interview during initial encounter, fixated on obtaining a shower and refusing to engage in other topics. As per collateral patient has decompensated since being discharged from [facility] in December and being lost to f/u in terms of mental health treatment. From collateral pt presenting sx suggestive of psychotic decompensation including paranoid delusions, disorganized behavior and suspected AH in the context of non-adherence to pharmacotherapy. At home she was refusing meals, refusing to let HHA in and not leaving the house due to paranoia  Pt currently requires inpatient level of care as she is unable to care for herself in her current state, would benefit from med adjustments for stabilization and safe aftercare planning.    Impression: Schizophrenia    -Admit to [facility] 2S  -Legal status 2PC  -Routine checks  -D/C Olanzapine, START Prolixin 2.5mg QHS and titrate up to lowest effective dose as tolerated. Plans to transition to Prolixin decanoate for ease of compliance.  -Confirmed med list from patient's pharmacy [facility] [address] [facility] [place]:    -Losartan 100/25 (losartan/hctz???)    -Metoprolol ER 50mg QD    -Olanzapine 2.5mg QHS    -Cogentin 1mg BID    -Xarelto 20mg QHS    -Amlodipine 5mg QD    -Levothyroxine 50 micrograms QD  -Will resume Metoprolol with hold parameters. Initially held Losartan, Amlodipine and monitor BP, may resume if indicated.   -Resume Losartan, starting at 25mg QD with hold parameters, titrate as needed.     2/25: agitated, disorganized, loud, uncooperative, no SI/HI. Patient is a 62 y/o woman,  x 10 years with PMHx of Schizophrenia, HTN, Afib, Hypothyroidism, NPH and hx of hypovolemic/septic shock from UTI admitted for management of paranoia in the context of treatment non-adherence.  Pt unable to engage in intake interview during initial encounter, fixated on obtaining a shower and refusing to engage in other topics. As per collateral patient has decompensated since being discharged from [facility] in December and being lost to f/u in terms of mental health treatment. From collateral pt presenting sx suggestive of psychotic decompensation including paranoid delusions, disorganized behavior and suspected AH in the context of non-adherence to pharmacotherapy. At home she was refusing meals, refusing to let HHA in and not leaving the house due to paranoia  Pt currently requires inpatient level of care as she is unable to care for herself in her current state, would benefit from med adjustments for stabilization and safe aftercare planning.    Impression: Schizophrenia    -Admit to [facility] 2S  -Legal status 2PC  -Routine checks  -D/C Olanzapine, START Prolixin 2.5mg QHS and titrate up to lowest effective dose as tolerated. Plans to transition to Prolixin decanoate for ease of compliance.  -Confirmed med list from patient's pharmacy [facility] [address] [facility]:    -Losartan 100/25 (losartan/hctz???)    -Metoprolol ER 50mg QD    -Olanzapine 2.5mg QHS    -Cogentin 1mg BID    -Xarelto 20mg QHS    -Amlodipine 5mg QD    -Levothyroxine 50 micrograms QD  -Will resume Metoprolol with hold parameters. Initially held Losartan, Amlodipine and monitor BP, may resume if indicated.   -Resume Losartan, starting at 25mg QD with hold parameters, titrate as needed.     2/25: agitated, disorganized, loud, uncooperative, no SI/HI, refused prolixin and neurontin last night, to f/u compliance.

## 2024-02-25 NOTE — BH INPATIENT PSYCHIATRY PROGRESS NOTE - OTHER
guarded, paranoid unable to tolerate an MMSE at this time cannot rule out perceptual distortions tenuous

## 2024-02-25 NOTE — BH INPATIENT PSYCHIATRY PROGRESS NOTE - CURRENT MEDICATION
MEDICATIONS  (STANDING):  atorvastatin 40 milliGRAM(s) Oral at bedtime  benztropine 2 milliGRAM(s) Oral daily  fluPHENAZine 2.5 milliGRAM(s) Oral at bedtime  gabapentin 100 milliGRAM(s) Oral at bedtime  insulin lispro (ADMELOG) corrective regimen sliding scale   SubCutaneous three times a day before meals  insulin lispro (ADMELOG) corrective regimen sliding scale   SubCutaneous at bedtime  levothyroxine 50 MICROGram(s) Oral daily  losartan 25 milliGRAM(s) Oral daily  metFORMIN 850 milliGRAM(s) Oral daily  metoprolol succinate ER 50 milliGRAM(s) Oral daily  rivaroxaban 20 milliGRAM(s) Oral with dinner  trimethoprim  160 mG/sulfamethoxazole 800 mG 1 Tablet(s) Oral two times a day    MEDICATIONS  (PRN):  clonazePAM  Tablet 0.5 milliGRAM(s) Oral daily PRN anxiety, agitation  fluPHENAZine 2.5 milliGRAM(s) Oral every 8 hours PRN Agitation stemming from psychosis  fluPHENAZine  Injectable 2 milliGRAM(s) IntraMuscular once PRN Severe agitation stemming from psychosis   MEDICATIONS  (STANDING):  atorvastatin 40 milliGRAM(s) Oral at bedtime  benztropine 2 milliGRAM(s) Oral daily  fluPHENAZine 2.5 milliGRAM(s) Oral at bedtime  gabapentin 100 milliGRAM(s) Oral at bedtime  insulin lispro (ADMELOG) corrective regimen sliding scale   SubCutaneous at bedtime  insulin lispro (ADMELOG) corrective regimen sliding scale   SubCutaneous three times a day before meals  levothyroxine 50 MICROGram(s) Oral daily  losartan 25 milliGRAM(s) Oral daily  metFORMIN 850 milliGRAM(s) Oral daily  metoprolol succinate ER 50 milliGRAM(s) Oral daily  rivaroxaban 20 milliGRAM(s) Oral with dinner  trimethoprim  160 mG/sulfamethoxazole 800 mG 1 Tablet(s) Oral two times a day    MEDICATIONS  (PRN):  clonazePAM  Tablet 0.5 milliGRAM(s) Oral daily PRN anxiety, agitation  fluPHENAZine 2.5 milliGRAM(s) Oral every 8 hours PRN Agitation stemming from psychosis  fluPHENAZine  Injectable 2 milliGRAM(s) IntraMuscular once PRN Severe agitation stemming from psychosis

## 2024-02-26 LAB
-  AMOXICILLIN/CLAVULANIC ACID: SIGNIFICANT CHANGE UP
-  AMPICILLIN/SULBACTAM: SIGNIFICANT CHANGE UP
-  AMPICILLIN: SIGNIFICANT CHANGE UP
-  AZTREONAM: SIGNIFICANT CHANGE UP
-  CEFAZOLIN: SIGNIFICANT CHANGE UP
-  CEFEPIME: SIGNIFICANT CHANGE UP
-  CEFOXITIN: SIGNIFICANT CHANGE UP
-  CEFTRIAXONE: SIGNIFICANT CHANGE UP
-  CEFUROXIME: SIGNIFICANT CHANGE UP
-  CIPROFLOXACIN: SIGNIFICANT CHANGE UP
-  ERTAPENEM: SIGNIFICANT CHANGE UP
-  GENTAMICIN: SIGNIFICANT CHANGE UP
-  IMIPENEM: SIGNIFICANT CHANGE UP
-  LEVOFLOXACIN: SIGNIFICANT CHANGE UP
-  MEROPENEM: SIGNIFICANT CHANGE UP
-  NITROFURANTOIN: SIGNIFICANT CHANGE UP
-  PIPERACILLIN/TAZOBACTAM: SIGNIFICANT CHANGE UP
-  TOBRAMYCIN: SIGNIFICANT CHANGE UP
-  TRIMETHOPRIM/SULFAMETHOXAZOLE: SIGNIFICANT CHANGE UP
A1C WITH ESTIMATED AVERAGE GLUCOSE RESULT: 5.5 % — SIGNIFICANT CHANGE UP (ref 4–5.6)
CULTURE RESULTS: ABNORMAL
ESTIMATED AVERAGE GLUCOSE: 111 — SIGNIFICANT CHANGE UP
GLUCOSE BLDC GLUCOMTR-MCNC: 94 MG/DL — SIGNIFICANT CHANGE UP (ref 70–99)
METHOD TYPE: SIGNIFICANT CHANGE UP
ORGANISM # SPEC MICROSCOPIC CNT: ABNORMAL
ORGANISM # SPEC MICROSCOPIC CNT: ABNORMAL
SPECIMEN SOURCE: SIGNIFICANT CHANGE UP

## 2024-02-26 PROCEDURE — 99232 SBSQ HOSP IP/OBS MODERATE 35: CPT

## 2024-02-26 RX ORDER — SENNA PLUS 8.6 MG/1
2 TABLET ORAL DAILY
Refills: 0 | Status: DISCONTINUED | OUTPATIENT
Start: 2024-02-26 | End: 2024-04-19

## 2024-02-26 RX ADMIN — Medication 2 MILLIGRAM(S): at 09:10

## 2024-02-26 RX ADMIN — GABAPENTIN 100 MILLIGRAM(S): 400 CAPSULE ORAL at 21:15

## 2024-02-26 RX ADMIN — Medication 50 MICROGRAM(S): at 06:26

## 2024-02-26 RX ADMIN — FLUPHENAZINE HYDROCHLORIDE 2.5 MILLIGRAM(S): 1 TABLET, FILM COATED ORAL at 21:15

## 2024-02-26 RX ADMIN — Medication 50 MILLIGRAM(S): at 09:09

## 2024-02-26 RX ADMIN — LOSARTAN POTASSIUM 25 MILLIGRAM(S): 100 TABLET, FILM COATED ORAL at 09:09

## 2024-02-26 RX ADMIN — RIVAROXABAN 20 MILLIGRAM(S): KIT at 16:11

## 2024-02-26 RX ADMIN — METFORMIN HYDROCHLORIDE 850 MILLIGRAM(S): 850 TABLET ORAL at 09:09

## 2024-02-26 NOTE — BH INPATIENT PSYCHIATRY PROGRESS NOTE - OTHER
tenuous unable to tolerate an MMSE at this time guarded, paranoid cannot rule out perceptual distortions

## 2024-02-26 NOTE — BH INPATIENT PSYCHIATRY PROGRESS NOTE - NSBHASSESSSUMMFT_PSY_ALL_CORE
Patient is a 62 y/o woman,  x 10 years with PMHx of Schizophrenia, HTN, Afib, Hypothyroidism, NPH and hx of hypovolemic/septic shock from UTI admitted for management of paranoia in the context of treatment non-adherence.  Pt unable to engage in intake interview during initial encounter, fixated on obtaining a shower and refusing to engage in other topics. As per collateral patient has decompensated since being discharged from [facility] in December and being lost to f/u in terms of mental health treatment. From collateral pt presenting sx suggestive of psychotic decompensation including paranoid delusions, disorganized behavior and suspected AH in the context of non-adherence to pharmacotherapy. At home she was refusing meals, refusing to let HHA in and not leaving the house due to paranoia  Pt currently requires inpatient level of care as she is unable to care for herself in her current state, would benefit from med adjustments for stabilization and safe aftercare planning.    Impression: Schizophrenia    2/26: Pt taking meds inconsistently, requesting them and then refusing when they are offered, presenting oppositional behavior, disorganized, unable to engage meaningfully in discussions about her treatment with writer. May pursue TOO.     -Admit to [facility] 2S  -Legal status 2PC  -Routine checks  -D/C Olanzapine, START Prolixin 2.5mg QHS and titrate up to lowest effective dose as tolerated. Plans to transition to Prolixin decanoate for ease of compliance.  -Confirmed med list from patient's pharmacy [address]:    -Losartan 100/25 (losartan/hctz???)    -Metoprolol ER 50mg QD    -Olanzapine 2.5mg QHS    -Cogentin 1mg BID    -Xarelto 20mg QHS    -Amlodipine 5mg QD    -Levothyroxine 50 micrograms QD  -Will resume Metoprolol with hold parameters. Initially held Losartan, Amlodipine and monitor BP, may resume if indicated.   -Resume Losartan, starting at 25mg QD with hold parameters, titrate as needed.     2/25: agitated, disorganized, loud, uncooperative, no SI/HI, refused prolixin and neurontin last night, to f/u compliance.

## 2024-02-26 NOTE — BH INPATIENT PSYCHIATRY PROGRESS NOTE - NSBHFUPINTERVALHXFT_PSY_A_CORE
Patient seen for Schizophrenia. Chart reviewed and discussed with nursing staff. Patient has been taking her medications inconsistently throughout the weekend. Refused her meds last night. Presents oppositional behavior, often asking for her meds and once RN brings them she refuses or demands other things be done first in order to take her meds and keeps adding things ("change my sheets", "I need to use the phone first" etc). On encounter this morning pt asked writer to clean her toilet, change her linens and bring her coffee. Unable to engage meaningfully in discussions about her treatment. Pt then asked to switch doctors "I want a make doctor" "now I'm going to go over you on everything".  Patient seen for Schizophrenia. Chart reviewed and discussed with nursing staff. Patient has been taking her medications inconsistently throughout the weekend. Refused her meds last night. Presents oppositional behavior, often asking for her meds and once RN brings them she refuses or demands other things be done first in order to take her meds and keeps adding things ("change my sheets", "I need to use the phone first" etc). On encounter this morning pt asked writer to clean her toilet, change her linens and bring her coffee. Unable to engage meaningfully in discussions about her treatment. Pt then asked to switch doctors "I want a male doctor" "now I'm going to go over you on everything".

## 2024-02-27 LAB
GLUCOSE BLDC GLUCOMTR-MCNC: 166 MG/DL — HIGH (ref 70–99)
GLUCOSE BLDC GLUCOMTR-MCNC: 88 MG/DL — SIGNIFICANT CHANGE UP (ref 70–99)

## 2024-02-27 PROCEDURE — 99232 SBSQ HOSP IP/OBS MODERATE 35: CPT

## 2024-02-27 RX ORDER — FLUPHENAZINE HYDROCHLORIDE 1 MG/1
2.5 TABLET, FILM COATED ORAL
Refills: 0 | Status: DISCONTINUED | OUTPATIENT
Start: 2024-02-27 | End: 2024-02-29

## 2024-02-27 RX ADMIN — GABAPENTIN 100 MILLIGRAM(S): 400 CAPSULE ORAL at 21:37

## 2024-02-27 RX ADMIN — FLUPHENAZINE HYDROCHLORIDE 2.5 MILLIGRAM(S): 1 TABLET, FILM COATED ORAL at 21:37

## 2024-02-27 RX ADMIN — FLUPHENAZINE HYDROCHLORIDE 2.5 MILLIGRAM(S): 1 TABLET, FILM COATED ORAL at 09:55

## 2024-02-27 RX ADMIN — SENNA PLUS 2 TABLET(S): 8.6 TABLET ORAL at 04:49

## 2024-02-27 RX ADMIN — Medication 50 MILLIGRAM(S): at 09:55

## 2024-02-27 RX ADMIN — LOSARTAN POTASSIUM 25 MILLIGRAM(S): 100 TABLET, FILM COATED ORAL at 09:55

## 2024-02-27 RX ADMIN — METFORMIN HYDROCHLORIDE 850 MILLIGRAM(S): 850 TABLET ORAL at 09:55

## 2024-02-27 RX ADMIN — Medication 2 MILLIGRAM(S): at 09:55

## 2024-02-27 RX ADMIN — Medication 50 MICROGRAM(S): at 06:14

## 2024-02-27 NOTE — BH INPATIENT PSYCHIATRY PROGRESS NOTE - CURRENT MEDICATION
MEDICATIONS  (STANDING):  atorvastatin 40 milliGRAM(s) Oral at bedtime  benztropine 2 milliGRAM(s) Oral daily  fluPHENAZine 2.5 milliGRAM(s) Oral two times a day  gabapentin 100 milliGRAM(s) Oral at bedtime  insulin lispro (ADMELOG) corrective regimen sliding scale   SubCutaneous three times a day before meals  insulin lispro (ADMELOG) corrective regimen sliding scale   SubCutaneous at bedtime  levothyroxine 50 MICROGram(s) Oral daily  losartan 25 milliGRAM(s) Oral daily  metFORMIN 850 milliGRAM(s) Oral daily  metoprolol succinate ER 50 milliGRAM(s) Oral daily  rivaroxaban 20 milliGRAM(s) Oral with dinner  trimethoprim  160 mG/sulfamethoxazole 800 mG 1 Tablet(s) Oral two times a day    MEDICATIONS  (PRN):  clonazePAM  Tablet 0.5 milliGRAM(s) Oral daily PRN anxiety, agitation  fluPHENAZine 2.5 milliGRAM(s) Oral every 8 hours PRN Agitation stemming from psychosis  fluPHENAZine  Injectable 2 milliGRAM(s) IntraMuscular once PRN Severe agitation stemming from psychosis  senna 2 Tablet(s) Oral daily PRN constipation

## 2024-02-27 NOTE — BH INPATIENT PSYCHIATRY PROGRESS NOTE - OTHER
cannot rule out perceptual distortions unable to tolerate an MMSE at this time tenuous guarded, paranoid

## 2024-02-27 NOTE — BH INPATIENT PSYCHIATRY PROGRESS NOTE - NSBHASSESSSUMMFT_PSY_ALL_CORE
Patient is a 62 y/o woman,  x 10 years with PMHx of Schizophrenia, HTN, Afib, Hypothyroidism, NPH and hx of hypovolemic/septic shock from UTI admitted for management of paranoia in the context of treatment non-adherence.  Pt unable to engage in intake interview during initial encounter, fixated on obtaining a shower and refusing to engage in other topics. As per collateral patient has decompensated since being discharged from [facility] in December and being lost to f/u in terms of mental health treatment. From collateral pt presenting sx suggestive of psychotic decompensation including paranoid delusions, disorganized behavior and suspected AH in the context of non-adherence to pharmacotherapy. At home she was refusing meals, refusing to let HHA in and not leaving the house due to paranoia  Pt currently requires inpatient level of care as she is unable to care for herself in her current state, would benefit from med adjustments for stabilization and safe aftercare planning.    Impression: Schizophrenia    2/26: Pt taking meds inconsistently, requesting them and then refusing when they are offered, presenting oppositional behavior, disorganized, unable to engage meaningfully in discussions about her treatment with writer. May pursue TOO.   2/27: Pt compliant with psychiatric meds last night and this morning. Disorganized, tangential, with flight of ideas. Will titrate Prolixin to 2.5mg BID.     -Admit to [facility] 2S  -Legal status 2PC  -Routine checks  -D/C Olanzapine, increase Prolixin to 2.5mg BID and titrate up to lowest effective dose as tolerated. Plans to transition to Prolixin decanoate for ease of compliance.  -Confirmed med list from patient's pharmacy [address]:    -Losartan 100/25 (losartan/hctz???)    -Metoprolol ER 50mg QD    -Olanzapine 2.5mg QHS    -Cogentin 1mg BID    -Xarelto 20mg QHS    -Amlodipine 5mg QD    -Levothyroxine 50 micrograms QD  -Will resume Metoprolol with hold parameters. Initially held Losartan, Amlodipine and monitor BP, may resume if indicated.   -Resume Losartan, starting at 25mg QD with hold parameters, titrate as needed.     2/25: agitated, disorganized, loud, uncooperative, no SI/HI, refused prolixin and neurontin last night, to f/u compliance.

## 2024-02-27 NOTE — BH INPATIENT PSYCHIATRY PROGRESS NOTE - NSBHFUPINTERVALHXFT_PSY_A_CORE
Patient seen for Schizophrenia. Chart reviewed and discussed with nursing staff. Patient has been taking her medications inconsistently, however accepted most of her medications last night (refused Atorvastatin only) and took all her meds this morning. Required 1 dose of PO PRN Prolixin this morning for agitation. Continues to present oppositional behavior, refusing meds when offered then asking for them later. Making multiple requests every time she sees writer. Disorganized and tangential, with flight of ideas, unable to engage meaningfully. This morning pt asked writer to sit down and talk and immediately after walked away saying :I need to wash my hands". Asked writer to wait, but then went away to do something else.   Later during rounds pt approached writer again while writer was filling up a cup of water for one of her peers and asked for a coffee refill. Then stated she was pregnant and asked writer where she could have an  done.

## 2024-02-28 LAB
GLUCOSE BLDC GLUCOMTR-MCNC: 103 MG/DL — HIGH (ref 70–99)
GLUCOSE BLDC GLUCOMTR-MCNC: 105 MG/DL — HIGH (ref 70–99)

## 2024-02-28 PROCEDURE — 99232 SBSQ HOSP IP/OBS MODERATE 35: CPT

## 2024-02-28 RX ADMIN — METFORMIN HYDROCHLORIDE 850 MILLIGRAM(S): 850 TABLET ORAL at 10:09

## 2024-02-28 RX ADMIN — SENNA PLUS 2 TABLET(S): 8.6 TABLET ORAL at 17:07

## 2024-02-28 RX ADMIN — Medication 50 MILLIGRAM(S): at 10:09

## 2024-02-28 RX ADMIN — RIVAROXABAN 20 MILLIGRAM(S): KIT at 17:08

## 2024-02-28 RX ADMIN — FLUPHENAZINE HYDROCHLORIDE 2.5 MILLIGRAM(S): 1 TABLET, FILM COATED ORAL at 10:09

## 2024-02-28 RX ADMIN — Medication 2 MILLIGRAM(S): at 10:09

## 2024-02-28 RX ADMIN — Medication 50 MICROGRAM(S): at 06:15

## 2024-02-28 RX ADMIN — Medication 0.5 MILLIGRAM(S): at 10:09

## 2024-02-28 RX ADMIN — LOSARTAN POTASSIUM 25 MILLIGRAM(S): 100 TABLET, FILM COATED ORAL at 10:09

## 2024-02-28 NOTE — BH INPATIENT PSYCHIATRY PROGRESS NOTE - OTHER
Caller: Tamara Singh    Relationship: Self    Best call back number: [phone]     What is the medical concern/diagnosis: TOE NAIL ISSUES    What specialty or service is being requested: PODIATRIST          guarded, paranoid unable to tolerate an MMSE at this time tenuous cannot rule out perceptual distortions

## 2024-02-28 NOTE — BH INPATIENT PSYCHIATRY PROGRESS NOTE - NSBHASSESSSUMMFT_PSY_ALL_CORE
Patient is a 64 y/o woman,  x 10 years with PMHx of Schizophrenia, HTN, Afib, Hypothyroidism, NPH and hx of hypovolemic/septic shock from UTI admitted for management of paranoia in the context of treatment non-adherence.  Pt unable to engage in intake interview during initial encounter, fixated on obtaining a shower and refusing to engage in other topics. As per collateral patient has decompensated since being discharged from [facility] in December and being lost to f/u in terms of mental health treatment. From collateral pt presenting sx suggestive of psychotic decompensation including paranoid delusions, disorganized behavior and suspected AH in the context of non-adherence to pharmacotherapy. At home she was refusing meals, refusing to let HHA in and not leaving the house due to paranoia  Pt currently requires inpatient level of care as she is unable to care for herself in her current state, would benefit from med adjustments for stabilization and safe aftercare planning.    Impression: Schizophrenia    2/26: Pt taking meds inconsistently, requesting them and then refusing when they are offered, presenting oppositional behavior, disorganized, unable to engage meaningfully in discussions about her treatment with writer. May pursue TOO.   2/27: Pt compliant with psychiatric meds last night and this morning. Disorganized, tangential, with flight of ideas. Will titrate Prolixin to 2.5mg BID.   2/28: Compliant with psych meds, refusing Xarelto and Atorvastatin despite psychoeducation. Demanding and concrete.    -Admit to [facility] 2S  -Legal status 2PC  -Routine checks  -D/C Olanzapine, increase Prolixin to 2.5mg BID and titrate up to lowest effective dose as tolerated. Plans to transition to Prolixin decanoate for ease of compliance.  -Confirmed med list from patient's pharmacy [address]:    -Losartan 100/25 (losartan/hctz???)    -Metoprolol ER 50mg QD    -Olanzapine 2.5mg QHS    -Cogentin 1mg BID    -Xarelto 20mg QHS    -Amlodipine 5mg QD    -Levothyroxine 50 micrograms QD  -Will resume Metoprolol with hold parameters. Initially held Losartan, Amlodipine and monitor BP, may resume if indicated.   -Resume Losartan, starting at 25mg QD with hold parameters, titrate as needed.     2/25: agitated, disorganized, loud, uncooperative, no SI/HI, refused prolixin and neurontin last night, to f/u compliance.

## 2024-02-28 NOTE — BH INPATIENT PSYCHIATRY PROGRESS NOTE - NSBHFUPINTERVALHXFT_PSY_A_CORE
Patient seen for Schizophrenia. Chart reviewed and discussed with nursing staff. VSS. Pt has been taking her psychiatric medications consistently over the past few days, however continues to refuse her Xarelto and Atorvastatin despite treatment team encouragement and psychoeducation. Pt is unable to engage meaningully. Constantly interrupts writer by making demands. During rounds today pt asked writer to being her meds. Writer explained her nurse was passing meds as we were speaking and would get to her soon. Tried to redirect writer to discuss other topics such as her mood, sleep however pt continued demanding pt go and physically bring her medications to her. Needing frequent staff redirection.

## 2024-02-29 LAB
GLUCOSE BLDC GLUCOMTR-MCNC: 101 MG/DL — HIGH (ref 70–99)
GLUCOSE BLDC GLUCOMTR-MCNC: 119 MG/DL — HIGH (ref 70–99)
GLUCOSE BLDC GLUCOMTR-MCNC: 131 MG/DL — HIGH (ref 70–99)
GLUCOSE BLDC GLUCOMTR-MCNC: 84 MG/DL — SIGNIFICANT CHANGE UP (ref 70–99)

## 2024-02-29 PROCEDURE — 99232 SBSQ HOSP IP/OBS MODERATE 35: CPT

## 2024-02-29 RX ORDER — FLUPHENAZINE HYDROCHLORIDE 1 MG/1
5 TABLET, FILM COATED ORAL AT BEDTIME
Refills: 0 | Status: DISCONTINUED | OUTPATIENT
Start: 2024-02-29 | End: 2024-03-01

## 2024-02-29 RX ORDER — FLUPHENAZINE HYDROCHLORIDE 1 MG/1
2.5 TABLET, FILM COATED ORAL DAILY
Refills: 0 | Status: DISCONTINUED | OUTPATIENT
Start: 2024-02-29 | End: 2024-03-01

## 2024-02-29 RX ADMIN — FLUPHENAZINE HYDROCHLORIDE 2.5 MILLIGRAM(S): 1 TABLET, FILM COATED ORAL at 09:21

## 2024-02-29 RX ADMIN — Medication 50 MICROGRAM(S): at 05:19

## 2024-02-29 RX ADMIN — RIVAROXABAN 20 MILLIGRAM(S): KIT at 16:42

## 2024-02-29 RX ADMIN — Medication 2 MILLIGRAM(S): at 09:22

## 2024-02-29 RX ADMIN — Medication 50 MILLIGRAM(S): at 09:21

## 2024-02-29 RX ADMIN — METFORMIN HYDROCHLORIDE 850 MILLIGRAM(S): 850 TABLET ORAL at 09:21

## 2024-02-29 RX ADMIN — LOSARTAN POTASSIUM 25 MILLIGRAM(S): 100 TABLET, FILM COATED ORAL at 10:41

## 2024-02-29 NOTE — BH INPATIENT PSYCHIATRY PROGRESS NOTE - CURRENT MEDICATION
MEDICATIONS  (STANDING):  atorvastatin 40 milliGRAM(s) Oral at bedtime  benztropine 2 milliGRAM(s) Oral daily  fluPHENAZine 2.5 milliGRAM(s) Oral two times a day  gabapentin 100 milliGRAM(s) Oral at bedtime  insulin lispro (ADMELOG) corrective regimen sliding scale   SubCutaneous at bedtime  insulin lispro (ADMELOG) corrective regimen sliding scale   SubCutaneous three times a day before meals  levothyroxine 50 MICROGram(s) Oral daily  losartan 25 milliGRAM(s) Oral daily  metFORMIN 850 milliGRAM(s) Oral daily  metoprolol succinate ER 50 milliGRAM(s) Oral daily  rivaroxaban 20 milliGRAM(s) Oral with dinner    MEDICATIONS  (PRN):  clonazePAM  Tablet 0.5 milliGRAM(s) Oral daily PRN anxiety, agitation  fluPHENAZine 2.5 milliGRAM(s) Oral every 8 hours PRN Agitation stemming from psychosis  fluPHENAZine  Injectable 2 milliGRAM(s) IntraMuscular once PRN Severe agitation stemming from psychosis  senna 2 Tablet(s) Oral daily PRN constipation

## 2024-02-29 NOTE — BH INPATIENT PSYCHIATRY PROGRESS NOTE - NSBHASSESSSUMMFT_PSY_ALL_CORE
Patient is a 64 y/o woman,  x 10 years with PMHx of Schizophrenia, HTN, Afib, Hypothyroidism, NPH and hx of hypovolemic/septic shock from UTI admitted for management of paranoia in the context of treatment non-adherence.  Pt unable to engage in intake interview during initial encounter, fixated on obtaining a shower and refusing to engage in other topics. As per collateral patient has decompensated since being discharged from [facility] in December and being lost to f/u in terms of mental health treatment. From collateral pt presenting sx suggestive of psychotic decompensation including paranoid delusions, disorganized behavior and suspected AH in the context of non-adherence to pharmacotherapy. At home she was refusing meals, refusing to let HHA in and not leaving the house due to paranoia  Pt currently requires inpatient level of care as she is unable to care for herself in her current state, would benefit from med adjustments for stabilization and safe aftercare planning.    Impression: Schizophrenia    2/26: Pt taking meds inconsistently, requesting them and then refusing when they are offered, presenting oppositional behavior, disorganized, unable to engage meaningfully in discussions about her treatment with writer. May pursue TOO.   2/27: Pt compliant with psychiatric meds last night and this morning. Disorganized, tangential, with flight of ideas. Will titrate Prolixin to 2.5mg BID.   2/28: Compliant with psych meds, refusing Xarelto and Atorvastatin despite psychoeducation. Demanding and concrete.    -Admit to [facility] 2S  -Legal status 2PC  -Routine checks  -D/C Olanzapine, increase Prolixin to 2.5mg QD + 5mg QHS and titrate up to lowest effective dose as tolerated. Plans to transition to Prolixin decanoate for ease of compliance.  -Confirmed med list from patient's pharmacy [address]:    -Losartan 100/25 (losartan/hctz???)    -Metoprolol ER 50mg QD    -Olanzapine 2.5mg QHS    -Cogentin 1mg BID    -Xarelto 20mg QHS    -Amlodipine 5mg QD    -Levothyroxine 50 micrograms QD  -Will resume Metoprolol with hold parameters. Initially held Losartan, Amlodipine and monitor BP, may resume if indicated.   -Resume Losartan, starting at 25mg QD with hold parameters, titrate as needed.     2/25: agitated, disorganized, loud, uncooperative, no SI/HI, refused prolixin and neurontin last night, to f/u compliance.

## 2024-02-29 NOTE — BH INPATIENT PSYCHIATRY PROGRESS NOTE - OTHER
unable to tolerate an MMSE at this time absent guarded, paranoid tenuous cannot rule out perceptual distortions

## 2024-02-29 NOTE — BH INPATIENT PSYCHIATRY PROGRESS NOTE - NSBHFUPINTERVALHXFT_PSY_A_CORE
Patient seen for Schizophrenia. Chart reviewed and discussed with nursing staff. VSS. Pt has been taking her psychiatric medications consistently over the past few days. Accepted her Xarelto last night but refused the rest of her meds. Took all her meds this morning. During rounds this morning she demands writer go in the room to fix her shower: "I want to be able to take a shower without all the water coming out". Writer explained she should not be showering alone in her room as the water pools and creates a fall risk for herself and her roommate. Discussed with pt that this is why we have the aquatic room where she can bathe and shower safely. Pt dismissed this, insisting writer go in and fix the shower. Pt appears more suspicious of meds today, asking writer if Xarelto is safe to take during pregnancy. May pursue TOO.

## 2024-03-01 LAB
GLUCOSE BLDC GLUCOMTR-MCNC: 118 MG/DL — HIGH (ref 70–99)
GLUCOSE BLDC GLUCOMTR-MCNC: 119 MG/DL — HIGH (ref 70–99)
GLUCOSE BLDC GLUCOMTR-MCNC: 126 MG/DL — HIGH (ref 70–99)

## 2024-03-01 PROCEDURE — 99232 SBSQ HOSP IP/OBS MODERATE 35: CPT

## 2024-03-01 RX ORDER — FLUPHENAZINE HYDROCHLORIDE 1 MG/1
5 TABLET, FILM COATED ORAL DAILY
Refills: 0 | Status: DISCONTINUED | OUTPATIENT
Start: 2024-03-02 | End: 2024-03-04

## 2024-03-01 RX ORDER — CLONAZEPAM 1 MG
0.5 TABLET ORAL DAILY
Refills: 0 | Status: DISCONTINUED | OUTPATIENT
Start: 2024-03-01 | End: 2024-03-01

## 2024-03-01 RX ADMIN — LOSARTAN POTASSIUM 25 MILLIGRAM(S): 100 TABLET, FILM COATED ORAL at 09:20

## 2024-03-01 RX ADMIN — RIVAROXABAN 20 MILLIGRAM(S): KIT at 16:58

## 2024-03-01 RX ADMIN — FLUPHENAZINE HYDROCHLORIDE 2.5 MILLIGRAM(S): 1 TABLET, FILM COATED ORAL at 09:20

## 2024-03-01 RX ADMIN — Medication 2 MILLIGRAM(S): at 09:21

## 2024-03-01 RX ADMIN — Medication 50 MILLIGRAM(S): at 09:20

## 2024-03-01 RX ADMIN — METFORMIN HYDROCHLORIDE 850 MILLIGRAM(S): 850 TABLET ORAL at 09:21

## 2024-03-01 RX ADMIN — Medication 50 MICROGRAM(S): at 05:20

## 2024-03-01 NOTE — BH INPATIENT PSYCHIATRY PROGRESS NOTE - NSBHASSESSSUMMFT_PSY_ALL_CORE
Patient is a 62 y/o woman,  x 10 years with PMHx of Schizophrenia, HTN, Afib, Hypothyroidism, NPH and hx of hypovolemic/septic shock from UTI admitted for management of paranoia in the context of treatment non-adherence.  Pt unable to engage in intake interview during initial encounter, fixated on obtaining a shower and refusing to engage in other topics. As per collateral patient has decompensated since being discharged from [facility] in December and being lost to f/u in terms of mental health treatment. From collateral pt presenting sx suggestive of psychotic decompensation including paranoid delusions, disorganized behavior and suspected AH in the context of non-adherence to pharmacotherapy. At home she was refusing meals, refusing to let HHA in and not leaving the house due to paranoia  Pt currently requires inpatient level of care as she is unable to care for herself in her current state, would benefit from med adjustments for stabilization and safe aftercare planning.    Impression: Schizophrenia    2/26: Pt taking meds inconsistently, requesting them and then refusing when they are offered, presenting oppositional behavior, disorganized, unable to engage meaningfully in discussions about her treatment with writer. May pursue TOO.   2/25: agitated, disorganized, loud, uncooperative, no SI/HI, refused prolixin and neurontin last night, to f/u compliance.  2/27: Pt compliant with psychiatric meds last night and this morning. Disorganized, tangential, with flight of ideas. Will titrate Prolixin to 2.5mg BID.   2/28: Compliant with psych meds, refusing Xarelto and Atorvastatin despite psychoeducation. Demanding and concrete.  3/1: Pt engaging superficially. Will minimize nighttime medications since pt has been refusing them. Takes meds in the morning only. Consolidate prolixin in the morning.     -Admit to [facility] 2S  -Legal status 2PC  -Routine checks  -Continue Prolixin 5mg in the morning.  -Confirmed med list from patient's pharmacy [address]:    -Losartan 100/25 (losartan/hctz???)    -Metoprolol ER 50mg QD    -Cogentin 1mg BID    -Xarelto 20mg with dinner.    -Amlodipine 5mg QD    -Levothyroxine 50 micrograms QD  -Will resume Metoprolol with hold parameters. Initially held Losartan, Amlodipine and monitor BP, may resume if indicated.   -Resume Losartan, starting at 25mg QD with hold parameters, titrate as needed.

## 2024-03-01 NOTE — BH INPATIENT PSYCHIATRY PROGRESS NOTE - NSBHFUPINTERVALHXFT_PSY_A_CORE
Patient seen for Schizophrenia. Chart reviewed and discussed with nursing staff. VSS.   Pt continues to refuse meds at nighttime only. Discontinued gabapentin 100mg QHS due to unlikely benefit and to minimize nighttime meds. Will consolidate her Prolixin in the morning since she has been accepting it at that time. Pt has been taking her xarelto with dinner around 5pm-6pm. Pt kept encounter brief this morning. Reports she did not take meds at night because "they didn't bring me any, I was asleep". As soon as she said this she said she had to go, stood up and walked away.

## 2024-03-01 NOTE — BH INPATIENT PSYCHIATRY PROGRESS NOTE - CURRENT MEDICATION
MEDICATIONS  (STANDING):  atorvastatin 40 milliGRAM(s) Oral at bedtime  benztropine 2 milliGRAM(s) Oral daily  insulin lispro (ADMELOG) corrective regimen sliding scale   SubCutaneous three times a day before meals  insulin lispro (ADMELOG) corrective regimen sliding scale   SubCutaneous at bedtime  levothyroxine 50 MICROGram(s) Oral daily  losartan 25 milliGRAM(s) Oral daily  metFORMIN 850 milliGRAM(s) Oral daily  metoprolol succinate ER 50 milliGRAM(s) Oral daily  rivaroxaban 20 milliGRAM(s) Oral with dinner    MEDICATIONS  (PRN):  clonazePAM  Tablet 0.5 milliGRAM(s) Oral daily PRN anxiety, agitation  fluPHENAZine 2.5 milliGRAM(s) Oral every 8 hours PRN Agitation stemming from psychosis  fluPHENAZine  Injectable 2 milliGRAM(s) IntraMuscular once PRN Severe agitation stemming from psychosis  senna 2 Tablet(s) Oral daily PRN constipation

## 2024-03-02 LAB
GLUCOSE BLDC GLUCOMTR-MCNC: 112 MG/DL — HIGH (ref 70–99)
GLUCOSE BLDC GLUCOMTR-MCNC: 114 MG/DL — HIGH (ref 70–99)
GLUCOSE BLDC GLUCOMTR-MCNC: 142 MG/DL — HIGH (ref 70–99)

## 2024-03-02 RX ADMIN — Medication 50 MICROGRAM(S): at 05:28

## 2024-03-02 RX ADMIN — SENNA PLUS 2 TABLET(S): 8.6 TABLET ORAL at 15:53

## 2024-03-02 RX ADMIN — LOSARTAN POTASSIUM 25 MILLIGRAM(S): 100 TABLET, FILM COATED ORAL at 09:04

## 2024-03-02 RX ADMIN — FLUPHENAZINE HYDROCHLORIDE 5 MILLIGRAM(S): 1 TABLET, FILM COATED ORAL at 15:54

## 2024-03-02 RX ADMIN — Medication 50 MILLIGRAM(S): at 09:04

## 2024-03-02 RX ADMIN — Medication 2 MILLIGRAM(S): at 09:04

## 2024-03-02 RX ADMIN — METFORMIN HYDROCHLORIDE 850 MILLIGRAM(S): 850 TABLET ORAL at 09:04

## 2024-03-02 RX ADMIN — RIVAROXABAN 20 MILLIGRAM(S): KIT at 15:53

## 2024-03-03 LAB
GLUCOSE BLDC GLUCOMTR-MCNC: 102 MG/DL — HIGH (ref 70–99)
GLUCOSE BLDC GLUCOMTR-MCNC: 123 MG/DL — HIGH (ref 70–99)
GLUCOSE BLDC GLUCOMTR-MCNC: 99 MG/DL — SIGNIFICANT CHANGE UP (ref 70–99)

## 2024-03-03 RX ADMIN — Medication 50 MILLIGRAM(S): at 09:25

## 2024-03-03 RX ADMIN — LOSARTAN POTASSIUM 25 MILLIGRAM(S): 100 TABLET, FILM COATED ORAL at 09:26

## 2024-03-03 RX ADMIN — RIVAROXABAN 20 MILLIGRAM(S): KIT at 16:52

## 2024-03-03 RX ADMIN — FLUPHENAZINE HYDROCHLORIDE 5 MILLIGRAM(S): 1 TABLET, FILM COATED ORAL at 09:26

## 2024-03-03 RX ADMIN — Medication 50 MICROGRAM(S): at 06:55

## 2024-03-03 RX ADMIN — Medication 2 MILLIGRAM(S): at 09:26

## 2024-03-03 RX ADMIN — METFORMIN HYDROCHLORIDE 850 MILLIGRAM(S): 850 TABLET ORAL at 09:25

## 2024-03-04 LAB
GLUCOSE BLDC GLUCOMTR-MCNC: 139 MG/DL — HIGH (ref 70–99)
GLUCOSE BLDC GLUCOMTR-MCNC: 90 MG/DL — SIGNIFICANT CHANGE UP (ref 70–99)
GLUCOSE BLDC GLUCOMTR-MCNC: 93 MG/DL — SIGNIFICANT CHANGE UP (ref 70–99)

## 2024-03-04 PROCEDURE — 99232 SBSQ HOSP IP/OBS MODERATE 35: CPT

## 2024-03-04 RX ORDER — ATORVASTATIN CALCIUM 80 MG/1
40 TABLET, FILM COATED ORAL
Refills: 0 | Status: DISCONTINUED | OUTPATIENT
Start: 2024-03-04 | End: 2024-04-19

## 2024-03-04 RX ORDER — FLUPHENAZINE HYDROCHLORIDE 1 MG/1
2 TABLET, FILM COATED ORAL ONCE
Refills: 0 | Status: COMPLETED | OUTPATIENT
Start: 2024-03-04 | End: 2024-03-04

## 2024-03-04 RX ORDER — FLUPHENAZINE HYDROCHLORIDE 1 MG/1
5 TABLET, FILM COATED ORAL
Refills: 0 | Status: DISCONTINUED | OUTPATIENT
Start: 2024-03-04 | End: 2024-04-10

## 2024-03-04 RX ADMIN — Medication 2 MILLIGRAM(S): at 09:51

## 2024-03-04 RX ADMIN — FLUPHENAZINE HYDROCHLORIDE 2 MILLIGRAM(S): 1 TABLET, FILM COATED ORAL at 17:40

## 2024-03-04 RX ADMIN — FLUPHENAZINE HYDROCHLORIDE 2.5 MILLIGRAM(S): 1 TABLET, FILM COATED ORAL at 10:49

## 2024-03-04 RX ADMIN — METFORMIN HYDROCHLORIDE 850 MILLIGRAM(S): 850 TABLET ORAL at 09:51

## 2024-03-04 RX ADMIN — LOSARTAN POTASSIUM 25 MILLIGRAM(S): 100 TABLET, FILM COATED ORAL at 09:52

## 2024-03-04 NOTE — BH INPATIENT PSYCHIATRY PROGRESS NOTE - CURRENT MEDICATION
MEDICATIONS  (STANDING):  atorvastatin 40 milliGRAM(s) Oral <User Schedule>  benztropine 2 milliGRAM(s) Oral daily  fluPHENAZine 5 milliGRAM(s) Oral <User Schedule>  insulin lispro (ADMELOG) corrective regimen sliding scale   SubCutaneous three times a day before meals  insulin lispro (ADMELOG) corrective regimen sliding scale   SubCutaneous at bedtime  levothyroxine 50 MICROGram(s) Oral daily  losartan 25 milliGRAM(s) Oral daily  metFORMIN 850 milliGRAM(s) Oral daily  metoprolol succinate ER 50 milliGRAM(s) Oral daily  rivaroxaban 20 milliGRAM(s) Oral with dinner    MEDICATIONS  (PRN):  clonazePAM  Tablet 0.5 milliGRAM(s) Oral daily PRN anxiety, agitation  fluPHENAZine 2.5 milliGRAM(s) Oral every 8 hours PRN Agitation stemming from psychosis  fluPHENAZine  Injectable 2 milliGRAM(s) IntraMuscular once PRN Severe agitation stemming from psychosis  senna 2 Tablet(s) Oral daily PRN constipation

## 2024-03-04 NOTE — BH CONSULTATION LIAISON PROGRESS NOTE - NSBHMSEMOVE_PSY_A_CORE
First call regarding type 2 diabetes. Talked with pt. Pt will discuss with wife and call us back. Provided dates for basics classes.  
Tremors
Tremors

## 2024-03-04 NOTE — BH INPATIENT PSYCHIATRY PROGRESS NOTE - NSBHFUPINTERVALHXFT_PSY_A_CORE
Patient seen for Schizophrenia. Chart reviewed and discussed with nursing staff. VSS.   Pt took her prolixin yesterday morning, however refused it today stating the last time she took it in the morning she "slept all day". Will move it to dinnertime, she has been accepting her Xarelto with dinner consistently thus this may be a better time to offer her meds. Remains concrete, demanding, with poor boundaries.

## 2024-03-04 NOTE — BH INPATIENT PSYCHIATRY PROGRESS NOTE - OTHER
unable to tolerate an MMSE at this time guarded, paranoid tenuous cannot rule out perceptual distortions

## 2024-03-04 NOTE — BH INPATIENT PSYCHIATRY PROGRESS NOTE - NSBHASSESSSUMMFT_PSY_ALL_CORE
Patient is a 62 y/o woman,  x 10 years with PMHx of Schizophrenia, HTN, Afib, Hypothyroidism, NPH and hx of hypovolemic/septic shock from UTI admitted for management of paranoia in the context of treatment non-adherence.  Pt unable to engage in intake interview during initial encounter, fixated on obtaining a shower and refusing to engage in other topics. As per collateral patient has decompensated since being discharged from [facility] in December and being lost to f/u in terms of mental health treatment. From collateral pt presenting sx suggestive of psychotic decompensation including paranoid delusions, disorganized behavior and suspected AH in the context of non-adherence to pharmacotherapy. At home she was refusing meals, refusing to let HHA in and not leaving the house due to paranoia  Pt currently requires inpatient level of care as she is unable to care for herself in her current state, would benefit from med adjustments for stabilization and safe aftercare planning.    Impression: Schizophrenia    2/26: Pt taking meds inconsistently, requesting them and then refusing when they are offered, presenting oppositional behavior, disorganized, unable to engage meaningfully in discussions about her treatment with writer. May pursue TOO.   2/25: agitated, disorganized, loud, uncooperative, no SI/HI, refused prolixin and neurontin last night, to f/u compliance.  2/27: Pt compliant with psychiatric meds last night and this morning. Disorganized, tangential, with flight of ideas. Will titrate Prolixin to 2.5mg BID.   2/28: Compliant with psych meds, refusing Xarelto and Atorvastatin despite psychoeducation. Demanding and concrete.  3/1: Pt engaging superficially. Will minimize nighttime medications since pt has been refusing them. Takes meds in the morning only. Consolidate prolixin in the morning.     -Admit to [facility] 2S  -Legal status 2PC  -Routine checks  -Continue Prolixin 5mg in the morning.  -Confirmed med list from patient's pharmacy [address]:    -Losartan 100/25 (losartan/hctz???)    -Metoprolol ER 50mg QD    -Cogentin 1mg BID    -Xarelto 20mg with dinner.    -Amlodipine 5mg QD    -Levothyroxine 50 micrograms QD  -Will resume Metoprolol with hold parameters. Initially held Losartan, Amlodipine and monitor BP, may resume if indicated.   -Resume Losartan, starting at 25mg QD with hold parameters, titrate as needed.

## 2024-03-05 LAB
GLUCOSE BLDC GLUCOMTR-MCNC: 100 MG/DL — HIGH (ref 70–99)
GLUCOSE BLDC GLUCOMTR-MCNC: 101 MG/DL — HIGH (ref 70–99)
GLUCOSE BLDC GLUCOMTR-MCNC: 115 MG/DL — HIGH (ref 70–99)
GLUCOSE BLDC GLUCOMTR-MCNC: 122 MG/DL — HIGH (ref 70–99)

## 2024-03-05 PROCEDURE — 99232 SBSQ HOSP IP/OBS MODERATE 35: CPT

## 2024-03-05 RX ADMIN — ATORVASTATIN CALCIUM 40 MILLIGRAM(S): 80 TABLET, FILM COATED ORAL at 16:33

## 2024-03-05 RX ADMIN — LOSARTAN POTASSIUM 25 MILLIGRAM(S): 100 TABLET, FILM COATED ORAL at 08:40

## 2024-03-05 RX ADMIN — METFORMIN HYDROCHLORIDE 850 MILLIGRAM(S): 850 TABLET ORAL at 08:41

## 2024-03-05 RX ADMIN — Medication 2 MILLIGRAM(S): at 08:40

## 2024-03-05 RX ADMIN — SENNA PLUS 2 TABLET(S): 8.6 TABLET ORAL at 10:13

## 2024-03-05 RX ADMIN — Medication 50 MICROGRAM(S): at 06:35

## 2024-03-05 RX ADMIN — Medication 50 MILLIGRAM(S): at 08:40

## 2024-03-05 RX ADMIN — RIVAROXABAN 20 MILLIGRAM(S): KIT at 16:33

## 2024-03-05 RX ADMIN — FLUPHENAZINE HYDROCHLORIDE 5 MILLIGRAM(S): 1 TABLET, FILM COATED ORAL at 16:34

## 2024-03-05 NOTE — BH INPATIENT PSYCHIATRY PROGRESS NOTE - NSBHFUPINTERVALHXFT_PSY_A_CORE
Patient seen for Schizophrenia. Chart reviewed and discussed with nursing staff. VSS.   Pt continues to take medications inconsistently. Refused her Prolixin yesterday and required PRN meds for agitation. Pt is demanding, and oppositional. Despite treatment team making attempts to work with her and offer her medications at a time wants to take them, Ms. Acosta continues to change her mind about when to take her meds, refuses them and then asks for the meds to be brought to her later. When her meds are offered she often picks a few to take and others to refuse for inconsistent reasons. Will proceed with TOO application.  Patient seen for Schizophrenia. Chart reviewed and discussed with nursing staff. VSS.   Pt continues to take medications inconsistently. Refused her Prolixin yesterday and required PRN meds for agitation. Pt is demanding, and oppositional. Despite treatment team making attempts to work with her and offer her medications at a time wants to take them, Ms. Acosta continues to change her mind about when to take her meds, refuses them and then asks for the meds to be brought to her later. When her meds are offered she often picks a few to take and others to refuse for inconsistent reasons. Her Prolixin 5mg was moved to dinnertime yesterday after pt refused to take it at 9am, stating it made her "sleep all day" the day before. Considering sedation is a possible side effect of Prolixin, writer decided to change the time this medication is offered. This morning however pt insisted her meds be changed back to morning time. When confronted with her own requests yesterday pt is dismissive "well I want it NOW". Discussed with pt that she needs to take her medications consistently and cannot continue changing the time. Will proceed with TOO application.

## 2024-03-05 NOTE — BH INPATIENT PSYCHIATRY PROGRESS NOTE - NSBHASSESSSUMMFT_PSY_ALL_CORE
Patient is a 64 y/o woman,  x 10 years with PMHx of Schizophrenia, HTN, Afib, Hypothyroidism, NPH and hx of hypovolemic/septic shock from UTI admitted for management of paranoia in the context of treatment non-adherence.  Pt unable to engage in intake interview during initial encounter, fixated on obtaining a shower and refusing to engage in other topics. As per collateral patient has decompensated since being discharged from [facility] in December and being lost to f/u in terms of mental health treatment. From collateral pt presenting sx suggestive of psychotic decompensation including paranoid delusions, disorganized behavior and suspected AH in the context of non-adherence to pharmacotherapy. At home she was refusing meals, refusing to let HHA in and not leaving the house due to paranoia  Pt currently requires inpatient level of care as she is unable to care for herself in her current state, would benefit from med adjustments for stabilization and safe aftercare planning.    Impression: Schizophrenia    2/26: Pt taking meds inconsistently, requesting them and then refusing when they are offered, presenting oppositional behavior, disorganized, unable to engage meaningfully in discussions about her treatment with writer. May pursue TOO.   2/25: agitated, disorganized, loud, uncooperative, no SI/HI, refused prolixin and neurontin last night, to f/u compliance.  2/27: Pt compliant with psychiatric meds last night and this morning. Disorganized, tangential, with flight of ideas. Will titrate Prolixin to 2.5mg BID.   2/28: Compliant with psych meds, refusing Xarelto and Atorvastatin despite psychoeducation. Demanding and concrete.  3/1: Pt engaging superficially. Will minimize nighttime medications since pt has been refusing them. Takes meds in the morning only. Consolidate prolixin in the morning.     -Admit to [facility] 2S  -Legal status 2PC  -Routine checks  -Continue Prolixin 5mg in the morning.  -Confirmed med list from patient's pharmacy [address]:    -Losartan 100/25 (losartan/hctz???)    -Metoprolol ER 50mg QD    -Cogentin 1mg BID    -Xarelto 20mg with dinner.    -Amlodipine 5mg QD    -Levothyroxine 50 micrograms QD  -Will resume Metoprolol with hold parameters. Initially held Losartan, Amlodipine and monitor BP, may resume if indicated.   -Resume Losartan, starting at 25mg QD with hold parameters, titrate as needed.

## 2024-03-05 NOTE — BH INPATIENT PSYCHIATRY PROGRESS NOTE - CURRENT MEDICATION
MEDICATIONS  (STANDING):  atorvastatin 40 milliGRAM(s) Oral <User Schedule>  benztropine 2 milliGRAM(s) Oral daily  fluPHENAZine 5 milliGRAM(s) Oral <User Schedule>  insulin lispro (ADMELOG) corrective regimen sliding scale   SubCutaneous three times a day before meals  insulin lispro (ADMELOG) corrective regimen sliding scale   SubCutaneous at bedtime  levothyroxine 50 MICROGram(s) Oral daily  losartan 25 milliGRAM(s) Oral daily  metFORMIN 850 milliGRAM(s) Oral daily  metoprolol succinate ER 50 milliGRAM(s) Oral daily  rivaroxaban 20 milliGRAM(s) Oral with dinner    MEDICATIONS  (PRN):  clonazePAM  Tablet 0.5 milliGRAM(s) Oral daily PRN anxiety, agitation  fluPHENAZine 2.5 milliGRAM(s) Oral every 8 hours PRN Agitation stemming from psychosis  fluPHENAZine  Injectable 2 milliGRAM(s) IntraMuscular once PRN Severe agitation stemming from psychosis  senna 2 Tablet(s) Oral daily PRN constipation   MEDICATIONS  (STANDING):  atorvastatin 40 milliGRAM(s) Oral <User Schedule>  benztropine 2 milliGRAM(s) Oral daily  fluPHENAZine 5 milliGRAM(s) Oral <User Schedule>  insulin lispro (ADMELOG) corrective regimen sliding scale   SubCutaneous at bedtime  insulin lispro (ADMELOG) corrective regimen sliding scale   SubCutaneous three times a day before meals  levothyroxine 50 MICROGram(s) Oral daily  losartan 25 milliGRAM(s) Oral daily  metFORMIN 850 milliGRAM(s) Oral daily  metoprolol succinate ER 50 milliGRAM(s) Oral daily  rivaroxaban 20 milliGRAM(s) Oral with dinner    MEDICATIONS  (PRN):  clonazePAM  Tablet 0.5 milliGRAM(s) Oral daily PRN anxiety, agitation  fluPHENAZine 2.5 milliGRAM(s) Oral every 8 hours PRN Agitation stemming from psychosis  fluPHENAZine  Injectable 2 milliGRAM(s) IntraMuscular once PRN Severe agitation stemming from psychosis  senna 2 Tablet(s) Oral daily PRN constipation

## 2024-03-05 NOTE — BH INPATIENT PSYCHIATRY PROGRESS NOTE - OTHER
unable to tolerate an MMSE at this time cannot rule out perceptual distortions tenuous guarded, paranoid

## 2024-03-06 LAB
GLUCOSE BLDC GLUCOMTR-MCNC: 104 MG/DL — HIGH (ref 70–99)
GLUCOSE BLDC GLUCOMTR-MCNC: 93 MG/DL — SIGNIFICANT CHANGE UP (ref 70–99)

## 2024-03-06 PROCEDURE — 99232 SBSQ HOSP IP/OBS MODERATE 35: CPT

## 2024-03-06 RX ADMIN — SENNA PLUS 2 TABLET(S): 8.6 TABLET ORAL at 08:30

## 2024-03-06 RX ADMIN — ATORVASTATIN CALCIUM 40 MILLIGRAM(S): 80 TABLET, FILM COATED ORAL at 16:53

## 2024-03-06 RX ADMIN — Medication 50 MICROGRAM(S): at 05:38

## 2024-03-06 RX ADMIN — FLUPHENAZINE HYDROCHLORIDE 5 MILLIGRAM(S): 1 TABLET, FILM COATED ORAL at 16:53

## 2024-03-06 RX ADMIN — LOSARTAN POTASSIUM 25 MILLIGRAM(S): 100 TABLET, FILM COATED ORAL at 08:30

## 2024-03-06 RX ADMIN — METFORMIN HYDROCHLORIDE 850 MILLIGRAM(S): 850 TABLET ORAL at 08:30

## 2024-03-06 RX ADMIN — Medication 2 MILLIGRAM(S): at 08:30

## 2024-03-06 RX ADMIN — Medication 50 MILLIGRAM(S): at 08:30

## 2024-03-06 RX ADMIN — RIVAROXABAN 20 MILLIGRAM(S): KIT at 16:53

## 2024-03-06 NOTE — BH INPATIENT PSYCHIATRY PROGRESS NOTE - NSBHASSESSSUMMFT_PSY_ALL_CORE
Patient is a 64 y/o woman,  x 10 years with PMHx of Schizophrenia, HTN, Afib, Hypothyroidism, NPH and hx of hypovolemic/septic shock from UTI admitted for management of paranoia in the context of treatment non-adherence.  Pt unable to engage in intake interview during initial encounter, fixated on obtaining a shower and refusing to engage in other topics. As per collateral patient has decompensated since being discharged from [facility] in December and being lost to f/u in terms of mental health treatment. From collateral pt presenting sx suggestive of psychotic decompensation including paranoid delusions, disorganized behavior and suspected AH in the context of non-adherence to pharmacotherapy. At home she was refusing meals, refusing to let HHA in and not leaving the house due to paranoia  Pt currently requires inpatient level of care as she is unable to care for herself in her current state, would benefit from med adjustments for stabilization and safe aftercare planning.    Impression: Schizophrenia    2/26: Pt taking meds inconsistently, requesting them and then refusing when they are offered, presenting oppositional behavior, disorganized, unable to engage meaningfully in discussions about her treatment with writer. May pursue TOO.   2/25: agitated, disorganized, loud, uncooperative, no SI/HI, refused prolixin and neurontin last night, to f/u compliance.  2/27: Pt compliant with psychiatric meds last night and this morning. Disorganized, tangential, with flight of ideas. Will titrate Prolixin to 2.5mg BID.   2/28: Compliant with psych meds, refusing Xarelto and Atorvastatin despite psychoeducation. Demanding and concrete.  3/1: Pt engaging superficially. Will minimize nighttime medications since pt has been refusing them. Takes meds in the morning only. Consolidate prolixin in the morning.     -Admit to [facility] 2S  -Legal status 2PC  -Routine checks  -Continue Prolixin 5mg in the morning.  -Confirmed med list from patient's pharmacy [address]:    -Losartan 100/25 (losartan/hctz???)    -Metoprolol ER 50mg QD    -Cogentin 1mg BID    -Xarelto 20mg with dinner.    -Amlodipine 5mg QD    -Levothyroxine 50 micrograms QD  -Will resume Metoprolol with hold parameters. Initially held Losartan, Amlodipine and monitor BP, may resume if indicated.   -Resume Losartan, starting at 25mg QD with hold parameters, titrate as needed.

## 2024-03-06 NOTE — BH INPATIENT PSYCHIATRY PROGRESS NOTE - OTHER
tenuous guarded, paranoid cannot rule out perceptual distortions unable to tolerate an MMSE at this time

## 2024-03-06 NOTE — BH INPATIENT PSYCHIATRY PROGRESS NOTE - CURRENT MEDICATION
MEDICATIONS  (STANDING):  atorvastatin 40 milliGRAM(s) Oral <User Schedule>  benztropine 2 milliGRAM(s) Oral daily  fluPHENAZine 5 milliGRAM(s) Oral <User Schedule>  insulin lispro (ADMELOG) corrective regimen sliding scale   SubCutaneous at bedtime  insulin lispro (ADMELOG) corrective regimen sliding scale   SubCutaneous three times a day before meals  levothyroxine 50 MICROGram(s) Oral daily  losartan 25 milliGRAM(s) Oral daily  metFORMIN 850 milliGRAM(s) Oral daily  metoprolol succinate ER 50 milliGRAM(s) Oral daily  rivaroxaban 20 milliGRAM(s) Oral with dinner    MEDICATIONS  (PRN):  clonazePAM  Tablet 0.5 milliGRAM(s) Oral daily PRN anxiety, agitation  fluPHENAZine 2.5 milliGRAM(s) Oral every 8 hours PRN Agitation stemming from psychosis  fluPHENAZine  Injectable 2 milliGRAM(s) IntraMuscular once PRN Severe agitation stemming from psychosis  senna 2 Tablet(s) Oral daily PRN constipation

## 2024-03-06 NOTE — BH INPATIENT PSYCHIATRY PROGRESS NOTE - NSBHFUPINTERVALHXFT_PSY_A_CORE
Patient seen for Schizophrenia. Chart reviewed and discussed with nursing staff. VSS.   Pt accepted her medications yesterday around dinnertime and again this morning. Refused to talk to writer today "I don't want to talk to you now". As per nursing pt has been calmer.

## 2024-03-07 PROCEDURE — 99232 SBSQ HOSP IP/OBS MODERATE 35: CPT

## 2024-03-07 RX ORDER — FLUPHENAZINE HYDROCHLORIDE 1 MG/1
2.5 TABLET, FILM COATED ORAL DAILY
Refills: 0 | Status: DISCONTINUED | OUTPATIENT
Start: 2024-03-07 | End: 2024-03-13

## 2024-03-07 RX ADMIN — METFORMIN HYDROCHLORIDE 850 MILLIGRAM(S): 850 TABLET ORAL at 09:56

## 2024-03-07 RX ADMIN — FLUPHENAZINE HYDROCHLORIDE 5 MILLIGRAM(S): 1 TABLET, FILM COATED ORAL at 16:14

## 2024-03-07 RX ADMIN — ATORVASTATIN CALCIUM 40 MILLIGRAM(S): 80 TABLET, FILM COATED ORAL at 16:14

## 2024-03-07 RX ADMIN — Medication 50 MILLIGRAM(S): at 09:57

## 2024-03-07 RX ADMIN — Medication 2 MILLIGRAM(S): at 09:57

## 2024-03-07 RX ADMIN — FLUPHENAZINE HYDROCHLORIDE 2.5 MILLIGRAM(S): 1 TABLET, FILM COATED ORAL at 09:56

## 2024-03-07 RX ADMIN — Medication 50 MICROGRAM(S): at 06:49

## 2024-03-07 RX ADMIN — RIVAROXABAN 20 MILLIGRAM(S): KIT at 16:13

## 2024-03-07 RX ADMIN — LOSARTAN POTASSIUM 25 MILLIGRAM(S): 100 TABLET, FILM COATED ORAL at 09:57

## 2024-03-07 NOTE — BH INPATIENT PSYCHIATRY PROGRESS NOTE - NSBHFUPINTERVALHXFT_PSY_A_CORE
Patient seen for Schizophrenia. Chart reviewed and discussed with nursing staff. VSS.   Pt Has been taking her meds more consistently. Irritability has gone down, pt is responsive to redirection and less argumentative. Denies SI/HI/AH/VH.

## 2024-03-07 NOTE — BH INPATIENT PSYCHIATRY PROGRESS NOTE - OTHER
tenuous guarded, paranoid unable to tolerate an MMSE at this time cannot rule out perceptual distortions

## 2024-03-07 NOTE — BH INPATIENT PSYCHIATRY PROGRESS NOTE - CURRENT MEDICATION
MEDICATIONS  (STANDING):  atorvastatin 40 milliGRAM(s) Oral <User Schedule>  benztropine 2 milliGRAM(s) Oral daily  fluPHENAZine 5 milliGRAM(s) Oral <User Schedule>  fluPHENAZine 2.5 milliGRAM(s) Oral daily  levothyroxine 50 MICROGram(s) Oral daily  losartan 25 milliGRAM(s) Oral daily  metFORMIN 850 milliGRAM(s) Oral daily  metoprolol succinate ER 50 milliGRAM(s) Oral daily  rivaroxaban 20 milliGRAM(s) Oral with dinner    MEDICATIONS  (PRN):  clonazePAM  Tablet 0.5 milliGRAM(s) Oral daily PRN anxiety, agitation  fluPHENAZine 2.5 milliGRAM(s) Oral every 8 hours PRN Agitation stemming from psychosis  fluPHENAZine  Injectable 2 milliGRAM(s) IntraMuscular once PRN Severe agitation stemming from psychosis  senna 2 Tablet(s) Oral daily PRN constipation

## 2024-03-07 NOTE — BH INPATIENT PSYCHIATRY PROGRESS NOTE - NSBHASSESSSUMMFT_PSY_ALL_CORE
Patient is a 62 y/o woman,  x 10 years with PMHx of Schizophrenia, HTN, Afib, Hypothyroidism, NPH and hx of hypovolemic/septic shock from UTI admitted for management of paranoia in the context of treatment non-adherence.  Pt unable to engage in intake interview during initial encounter, fixated on obtaining a shower and refusing to engage in other topics. As per collateral patient has decompensated since being discharged from [facility] in December and being lost to f/u in terms of mental health treatment. From collateral pt presenting sx suggestive of psychotic decompensation including paranoid delusions, disorganized behavior and suspected AH in the context of non-adherence to pharmacotherapy. At home she was refusing meals, refusing to let HHA in and not leaving the house due to paranoia  Pt currently requires inpatient level of care as she is unable to care for herself in her current state, would benefit from med adjustments for stabilization and safe aftercare planning.    Impression: Schizophrenia    2/26: Pt taking meds inconsistently, requesting them and then refusing when they are offered, presenting oppositional behavior, disorganized, unable to engage meaningfully in discussions about her treatment with writer. May pursue TOO.   2/25: agitated, disorganized, loud, uncooperative, no SI/HI, refused prolixin and neurontin last night, to f/u compliance.  2/27: Pt compliant with psychiatric meds last night and this morning. Disorganized, tangential, with flight of ideas. Will titrate Prolixin to 2.5mg BID.   2/28: Compliant with psych meds, refusing Xarelto and Atorvastatin despite psychoeducation. Demanding and concrete.  3/1: Pt engaging superficially. Will minimize nighttime medications since pt has been refusing them. Takes meds in the morning only. Consolidate prolixin in the morning.   3/7: Compliant with her medications. Less irritable and responsive to redirection. Denies si/hi.    -Admit to [facility] 2S  -Legal status 2PC  -Routine checks  -Continue Prolixin 5mg in the morning.  -Confirmed med list from patient's pharmacy [address]:    -Losartan 100/25 (losartan/hctz???)    -Metoprolol ER 50mg QD    -Cogentin 1mg BID    -Xarelto 20mg with dinner.    -Amlodipine 5mg QD    -Levothyroxine 50 micrograms QD  -Will resume Metoprolol with hold parameters. Initially held Losartan, Amlodipine and monitor BP, may resume if indicated.   -Resume Losartan, starting at 25mg QD with hold parameters, titrate as needed.

## 2024-03-08 LAB — GLUCOSE BLDC GLUCOMTR-MCNC: 228 MG/DL — HIGH (ref 70–99)

## 2024-03-08 PROCEDURE — 99232 SBSQ HOSP IP/OBS MODERATE 35: CPT

## 2024-03-08 RX ADMIN — ATORVASTATIN CALCIUM 40 MILLIGRAM(S): 80 TABLET, FILM COATED ORAL at 16:57

## 2024-03-08 RX ADMIN — FLUPHENAZINE HYDROCHLORIDE 2.5 MILLIGRAM(S): 1 TABLET, FILM COATED ORAL at 08:15

## 2024-03-08 RX ADMIN — Medication 50 MILLIGRAM(S): at 08:15

## 2024-03-08 RX ADMIN — METFORMIN HYDROCHLORIDE 850 MILLIGRAM(S): 850 TABLET ORAL at 08:15

## 2024-03-08 RX ADMIN — RIVAROXABAN 20 MILLIGRAM(S): KIT at 16:57

## 2024-03-08 RX ADMIN — SENNA PLUS 2 TABLET(S): 8.6 TABLET ORAL at 08:15

## 2024-03-08 RX ADMIN — LOSARTAN POTASSIUM 25 MILLIGRAM(S): 100 TABLET, FILM COATED ORAL at 08:15

## 2024-03-08 RX ADMIN — FLUPHENAZINE HYDROCHLORIDE 5 MILLIGRAM(S): 1 TABLET, FILM COATED ORAL at 16:57

## 2024-03-08 RX ADMIN — Medication 50 MICROGRAM(S): at 05:14

## 2024-03-08 RX ADMIN — Medication 2 MILLIGRAM(S): at 08:15

## 2024-03-08 NOTE — BH INPATIENT PSYCHIATRY PROGRESS NOTE - NSBHFUPINTERVALHXFT_PSY_A_CORE
Patient seen for Schizophrenia. Chart reviewed and discussed with nursing staff. VSS.   Pt has been compliant with meds. Pt seems less irritable, more organized and responding to redirection. Still quite impulsive and demanding at times. Denies SI/HI/AH/VH.

## 2024-03-08 NOTE — BH INPATIENT PSYCHIATRY PROGRESS NOTE - NSBHASSESSSUMMFT_PSY_ALL_CORE
Patient is a 64 y/o woman,  x 10 years with PMHx of Schizophrenia, HTN, Afib, Hypothyroidism, NPH and hx of hypovolemic/septic shock from UTI admitted for management of paranoia in the context of treatment non-adherence.  Pt unable to engage in intake interview during initial encounter, fixated on obtaining a shower and refusing to engage in other topics. As per collateral patient has decompensated since being discharged from [facility] in December and being lost to f/u in terms of mental health treatment. From collateral pt presenting sx suggestive of psychotic decompensation including paranoid delusions, disorganized behavior and suspected AH in the context of non-adherence to pharmacotherapy. At home she was refusing meals, refusing to let HHA in and not leaving the house due to paranoia  Pt currently requires inpatient level of care as she is unable to care for herself in her current state, would benefit from med adjustments for stabilization and safe aftercare planning.    Impression: Schizophrenia    2/26: Pt taking meds inconsistently, requesting them and then refusing when they are offered, presenting oppositional behavior, disorganized, unable to engage meaningfully in discussions about her treatment with writer. May pursue TOO.   2/25: agitated, disorganized, loud, uncooperative, no SI/HI, refused prolixin and neurontin last night, to f/u compliance.  2/27: Pt compliant with psychiatric meds last night and this morning. Disorganized, tangential, with flight of ideas. Will titrate Prolixin to 2.5mg BID.   2/28: Compliant with psych meds, refusing Xarelto and Atorvastatin despite psychoeducation. Demanding and concrete.  3/1: Pt engaging superficially. Will minimize nighttime medications since pt has been refusing them. Takes meds in the morning only. Consolidate prolixin in the morning.   3/7: Compliant with her medications. Less irritable and responsive to redirection. Denies si/hi.    -Admit to [facility] 2S  -Legal status 2PC  -Routine checks  -Continue Prolixin 5mg in the morning.  -Confirmed med list from patient's pharmacy [address]:    -Losartan 100/25 (losartan/hctz???)    -Metoprolol ER 50mg QD    -Cogentin 1mg BID    -Xarelto 20mg with dinner.    -Amlodipine 5mg QD    -Levothyroxine 50 micrograms QD  -Will resume Metoprolol with hold parameters. Initially held Losartan, Amlodipine and monitor BP, may resume if indicated.   -Resume Losartan, starting at 25mg QD with hold parameters, titrate as needed.

## 2024-03-08 NOTE — BH INPATIENT PSYCHIATRY PROGRESS NOTE - OTHER
guarded, paranoid cannot rule out perceptual distortions unable to tolerate an MMSE at this time tenuous

## 2024-03-09 LAB — GLUCOSE BLDC GLUCOMTR-MCNC: 92 MG/DL — SIGNIFICANT CHANGE UP (ref 70–99)

## 2024-03-09 RX ADMIN — Medication 50 MILLIGRAM(S): at 08:08

## 2024-03-09 RX ADMIN — FLUPHENAZINE HYDROCHLORIDE 2.5 MILLIGRAM(S): 1 TABLET, FILM COATED ORAL at 08:08

## 2024-03-09 RX ADMIN — Medication 50 MICROGRAM(S): at 06:12

## 2024-03-09 RX ADMIN — FLUPHENAZINE HYDROCHLORIDE 2.5 MILLIGRAM(S): 1 TABLET, FILM COATED ORAL at 13:28

## 2024-03-09 RX ADMIN — LOSARTAN POTASSIUM 25 MILLIGRAM(S): 100 TABLET, FILM COATED ORAL at 08:08

## 2024-03-09 RX ADMIN — SENNA PLUS 2 TABLET(S): 8.6 TABLET ORAL at 08:08

## 2024-03-09 RX ADMIN — ATORVASTATIN CALCIUM 40 MILLIGRAM(S): 80 TABLET, FILM COATED ORAL at 16:42

## 2024-03-09 RX ADMIN — RIVAROXABAN 20 MILLIGRAM(S): KIT at 16:42

## 2024-03-09 RX ADMIN — Medication 2 MILLIGRAM(S): at 08:08

## 2024-03-09 RX ADMIN — FLUPHENAZINE HYDROCHLORIDE 5 MILLIGRAM(S): 1 TABLET, FILM COATED ORAL at 16:42

## 2024-03-09 RX ADMIN — METFORMIN HYDROCHLORIDE 850 MILLIGRAM(S): 850 TABLET ORAL at 08:08

## 2024-03-10 LAB — GLUCOSE BLDC GLUCOMTR-MCNC: 102 MG/DL — HIGH (ref 70–99)

## 2024-03-10 RX ADMIN — Medication 50 MILLIGRAM(S): at 08:16

## 2024-03-10 RX ADMIN — LOSARTAN POTASSIUM 25 MILLIGRAM(S): 100 TABLET, FILM COATED ORAL at 08:16

## 2024-03-10 RX ADMIN — ATORVASTATIN CALCIUM 40 MILLIGRAM(S): 80 TABLET, FILM COATED ORAL at 16:59

## 2024-03-10 RX ADMIN — Medication 50 MICROGRAM(S): at 06:34

## 2024-03-10 RX ADMIN — FLUPHENAZINE HYDROCHLORIDE 2.5 MILLIGRAM(S): 1 TABLET, FILM COATED ORAL at 08:16

## 2024-03-10 RX ADMIN — METFORMIN HYDROCHLORIDE 850 MILLIGRAM(S): 850 TABLET ORAL at 08:17

## 2024-03-10 RX ADMIN — RIVAROXABAN 20 MILLIGRAM(S): KIT at 16:59

## 2024-03-10 RX ADMIN — SENNA PLUS 2 TABLET(S): 8.6 TABLET ORAL at 08:17

## 2024-03-10 RX ADMIN — Medication 2 MILLIGRAM(S): at 08:17

## 2024-03-10 RX ADMIN — FLUPHENAZINE HYDROCHLORIDE 5 MILLIGRAM(S): 1 TABLET, FILM COATED ORAL at 16:59

## 2024-03-11 LAB
GLUCOSE BLDC GLUCOMTR-MCNC: 102 MG/DL — HIGH (ref 70–99)
GLUCOSE BLDC GLUCOMTR-MCNC: 77 MG/DL — SIGNIFICANT CHANGE UP (ref 70–99)

## 2024-03-11 PROCEDURE — 99232 SBSQ HOSP IP/OBS MODERATE 35: CPT

## 2024-03-11 RX ORDER — ACETAMINOPHEN 500 MG
650 TABLET ORAL ONCE
Refills: 0 | Status: COMPLETED | OUTPATIENT
Start: 2024-03-11 | End: 2024-03-11

## 2024-03-11 RX ADMIN — METFORMIN HYDROCHLORIDE 850 MILLIGRAM(S): 850 TABLET ORAL at 09:57

## 2024-03-11 RX ADMIN — FLUPHENAZINE HYDROCHLORIDE 2.5 MILLIGRAM(S): 1 TABLET, FILM COATED ORAL at 20:11

## 2024-03-11 RX ADMIN — Medication 50 MICROGRAM(S): at 05:40

## 2024-03-11 RX ADMIN — RIVAROXABAN 20 MILLIGRAM(S): KIT at 17:46

## 2024-03-11 RX ADMIN — Medication 650 MILLIGRAM(S): at 21:57

## 2024-03-11 RX ADMIN — FLUPHENAZINE HYDROCHLORIDE 5 MILLIGRAM(S): 1 TABLET, FILM COATED ORAL at 17:46

## 2024-03-11 RX ADMIN — Medication 50 MILLIGRAM(S): at 09:56

## 2024-03-11 RX ADMIN — Medication 2 MILLIGRAM(S): at 09:56

## 2024-03-11 RX ADMIN — LOSARTAN POTASSIUM 25 MILLIGRAM(S): 100 TABLET, FILM COATED ORAL at 09:56

## 2024-03-11 RX ADMIN — FLUPHENAZINE HYDROCHLORIDE 2.5 MILLIGRAM(S): 1 TABLET, FILM COATED ORAL at 09:56

## 2024-03-11 RX ADMIN — ATORVASTATIN CALCIUM 40 MILLIGRAM(S): 80 TABLET, FILM COATED ORAL at 17:46

## 2024-03-11 RX ADMIN — Medication 650 MILLIGRAM(S): at 22:57

## 2024-03-11 NOTE — BH INPATIENT PSYCHIATRY PROGRESS NOTE - CURRENT MEDICATION
MEDICATIONS  (STANDING):  atorvastatin 40 milliGRAM(s) Oral <User Schedule>  benztropine 2 milliGRAM(s) Oral daily  fluPHENAZine 5 milliGRAM(s) Oral <User Schedule>  fluPHENAZine 2.5 milliGRAM(s) Oral daily  levothyroxine 50 MICROGram(s) Oral daily  losartan 25 milliGRAM(s) Oral daily  metFORMIN 850 milliGRAM(s) Oral daily  metoprolol succinate ER 50 milliGRAM(s) Oral daily  rivaroxaban 20 milliGRAM(s) Oral with dinner    MEDICATIONS  (PRN):  fluPHENAZine 2.5 milliGRAM(s) Oral every 8 hours PRN Agitation stemming from psychosis  fluPHENAZine  Injectable 2 milliGRAM(s) IntraMuscular once PRN Severe agitation stemming from psychosis  senna 2 Tablet(s) Oral daily PRN constipation

## 2024-03-11 NOTE — BH INPATIENT PSYCHIATRY PROGRESS NOTE - NSBHASSESSSUMMFT_PSY_ALL_CORE
Patient is a 62 y/o woman,  x 10 years with PMHx of Schizophrenia, HTN, Afib, Hypothyroidism, NPH and hx of hypovolemic/septic shock from UTI admitted for management of paranoia in the context of treatment non-adherence.  Pt unable to engage in intake interview during initial encounter, fixated on obtaining a shower and refusing to engage in other topics. As per collateral patient has decompensated since being discharged from [facility] in December and being lost to f/u in terms of mental health treatment. From collateral pt presenting sx suggestive of psychotic decompensation including paranoid delusions, disorganized behavior and suspected AH in the context of non-adherence to pharmacotherapy. At home she was refusing meals, refusing to let HHA in and not leaving the house due to paranoia  Pt currently requires inpatient level of care as she is unable to care for herself in her current state, would benefit from med adjustments for stabilization and safe aftercare planning.    Impression: Schizophrenia    2/26: Pt taking meds inconsistently, requesting them and then refusing when they are offered, presenting oppositional behavior, disorganized, unable to engage meaningfully in discussions about her treatment with writer. May pursue TOO.   2/25: agitated, disorganized, loud, uncooperative, no SI/HI, refused prolixin and neurontin last night, to f/u compliance.  2/27: Pt compliant with psychiatric meds last night and this morning. Disorganized, tangential, with flight of ideas. Will titrate Prolixin to 2.5mg BID.   2/28: Compliant with psych meds, refusing Xarelto and Atorvastatin despite psychoeducation. Demanding and concrete.  3/1: Pt engaging superficially. Will minimize nighttime medications since pt has been refusing them. Takes meds in the morning only. Consolidate prolixin in the morning.   3/7: Compliant with her medications. Less irritable and responsive to redirection. Denies si/hi.    -Admit to [facility] 2S  -Legal status 2PC  -Routine checks  -Continue Prolixin 5mg in the morning.  -Confirmed med list from patient's pharmacy [address]:    -Losartan 100/25 (losartan/hctz???)    -Metoprolol ER 50mg QD    -Cogentin 1mg BID    -Xarelto 20mg with dinner.    -Amlodipine 5mg QD    -Levothyroxine 50 micrograms QD  -Will resume Metoprolol with hold parameters. Initially held Losartan, Amlodipine and monitor BP, may resume if indicated.   -Resume Losartan, starting at 25mg QD with hold parameters, titrate as needed.

## 2024-03-12 LAB — GLUCOSE BLDC GLUCOMTR-MCNC: 96 MG/DL — SIGNIFICANT CHANGE UP (ref 70–99)

## 2024-03-12 PROCEDURE — 99232 SBSQ HOSP IP/OBS MODERATE 35: CPT

## 2024-03-12 RX ADMIN — FLUPHENAZINE HYDROCHLORIDE 2.5 MILLIGRAM(S): 1 TABLET, FILM COATED ORAL at 08:57

## 2024-03-12 RX ADMIN — Medication 50 MILLIGRAM(S): at 08:57

## 2024-03-12 RX ADMIN — Medication 50 MICROGRAM(S): at 06:21

## 2024-03-12 RX ADMIN — LOSARTAN POTASSIUM 25 MILLIGRAM(S): 100 TABLET, FILM COATED ORAL at 08:58

## 2024-03-12 RX ADMIN — Medication 2 MILLIGRAM(S): at 08:57

## 2024-03-12 RX ADMIN — METFORMIN HYDROCHLORIDE 850 MILLIGRAM(S): 850 TABLET ORAL at 08:57

## 2024-03-12 NOTE — BH INPATIENT PSYCHIATRY PROGRESS NOTE - NSBHFUPINTERVALHXFT_PSY_A_CORE
Patient seen for Schizophrenia. Chart reviewed and discussed with nursing staff. VSS.   Pt has been eating/sleeping well. Taking care of her adls Pt seems less irritable, more organized and responding to redirection. Compliant with her medications. Tolerating short interval encounters before ending abruptly and walking away. Denies paranoid/suspicious thoughts. When asked about letting aides in her house she says "Oh I have no problem with that". Denies AH/VH/SI/HI.

## 2024-03-12 NOTE — BH INPATIENT PSYCHIATRY PROGRESS NOTE - CURRENT MEDICATION
MEDICATIONS  (STANDING):  atorvastatin 40 milliGRAM(s) Oral <User Schedule>  benztropine 2 milliGRAM(s) Oral daily  fluPHENAZine 2.5 milliGRAM(s) Oral daily  fluPHENAZine 5 milliGRAM(s) Oral <User Schedule>  levothyroxine 50 MICROGram(s) Oral daily  losartan 25 milliGRAM(s) Oral daily  metFORMIN 850 milliGRAM(s) Oral daily  metoprolol succinate ER 50 milliGRAM(s) Oral daily  rivaroxaban 20 milliGRAM(s) Oral with dinner    MEDICATIONS  (PRN):  fluPHENAZine 2.5 milliGRAM(s) Oral every 8 hours PRN Agitation stemming from psychosis  fluPHENAZine  Injectable 2 milliGRAM(s) IntraMuscular once PRN Severe agitation stemming from psychosis  senna 2 Tablet(s) Oral daily PRN constipation

## 2024-03-12 NOTE — BH INPATIENT PSYCHIATRY PROGRESS NOTE - NSBHASSESSSUMMFT_PSY_ALL_CORE
Patient is a 64 y/o woman,  x 10 years with PMHx of Schizophrenia, HTN, Afib, Hypothyroidism, NPH and hx of hypovolemic/septic shock from UTI admitted for management of paranoia in the context of treatment non-adherence.  Pt unable to engage in intake interview during initial encounter, fixated on obtaining a shower and refusing to engage in other topics. As per collateral patient has decompensated since being discharged from [facility] in December and being lost to f/u in terms of mental health treatment. From collateral pt presenting sx suggestive of psychotic decompensation including paranoid delusions, disorganized behavior and suspected AH in the context of non-adherence to pharmacotherapy. At home she was refusing meals, refusing to let HHA in and not leaving the house due to paranoia  Pt currently requires inpatient level of care as she is unable to care for herself in her current state, would benefit from med adjustments for stabilization and safe aftercare planning.    Impression: Schizophrenia    2/26: Pt taking meds inconsistently, requesting them and then refusing when they are offered, presenting oppositional behavior, disorganized, unable to engage meaningfully in discussions about her treatment with writer. May pursue TOO.   2/25: agitated, disorganized, loud, uncooperative, no SI/HI, refused prolixin and neurontin last night, to f/u compliance.  2/27: Pt compliant with psychiatric meds last night and this morning. Disorganized, tangential, with flight of ideas. Will titrate Prolixin to 2.5mg BID.   2/28: Compliant with psych meds, refusing Xarelto and Atorvastatin despite psychoeducation. Demanding and concrete.  3/1: Pt engaging superficially. Will minimize nighttime medications since pt has been refusing them. Takes meds in the morning only. Consolidate prolixin in the morning.   3/7: Compliant with her medications. Less irritable and responsive to redirection. Denies si/hi.  3/12: Pt has been calmer, less irritable, less pressured, able to tolerate short conversations. Plan is to transition to prolixin dec before discharge.     -Admit to [facility] 2S  -Legal status 2PC  -Routine checks  -Continue Prolixin 5mg in the evenings and 2.5mg in the morning  -Confirmed med list from patient's pharmacy [address]:    -Losartan 100/25 (losartan/hctz???)    -Metoprolol ER 50mg QD    -Cogentin 1mg BID    -Xarelto 20mg with dinner.    -Amlodipine 5mg QD    -Levothyroxine 50 micrograms QD  -Will resume Metoprolol with hold parameters. Initially held Losartan, Amlodipine and monitor BP, may resume if indicated.   -Resume Losartan, starting at 25mg QD with hold parameters, titrate as needed.

## 2024-03-13 LAB — GLUCOSE BLDC GLUCOMTR-MCNC: 105 MG/DL — HIGH (ref 70–99)

## 2024-03-13 PROCEDURE — 99232 SBSQ HOSP IP/OBS MODERATE 35: CPT

## 2024-03-13 RX ORDER — FLUPHENAZINE HYDROCHLORIDE 1 MG/1
5 TABLET, FILM COATED ORAL DAILY
Refills: 0 | Status: DISCONTINUED | OUTPATIENT
Start: 2024-03-13 | End: 2024-03-26

## 2024-03-13 RX ORDER — ACETAMINOPHEN 500 MG
650 TABLET ORAL EVERY 6 HOURS
Refills: 0 | Status: DISCONTINUED | OUTPATIENT
Start: 2024-03-13 | End: 2024-04-19

## 2024-03-13 RX ADMIN — ATORVASTATIN CALCIUM 40 MILLIGRAM(S): 80 TABLET, FILM COATED ORAL at 16:53

## 2024-03-13 RX ADMIN — Medication 50 MILLIGRAM(S): at 08:20

## 2024-03-13 RX ADMIN — Medication 650 MILLIGRAM(S): at 22:53

## 2024-03-13 RX ADMIN — FLUPHENAZINE HYDROCHLORIDE 5 MILLIGRAM(S): 1 TABLET, FILM COATED ORAL at 16:53

## 2024-03-13 RX ADMIN — METFORMIN HYDROCHLORIDE 850 MILLIGRAM(S): 850 TABLET ORAL at 08:21

## 2024-03-13 RX ADMIN — Medication 2 MILLIGRAM(S): at 08:21

## 2024-03-13 RX ADMIN — RIVAROXABAN 20 MILLIGRAM(S): KIT at 16:53

## 2024-03-13 RX ADMIN — FLUPHENAZINE HYDROCHLORIDE 2.5 MILLIGRAM(S): 1 TABLET, FILM COATED ORAL at 08:20

## 2024-03-13 RX ADMIN — Medication 50 MICROGRAM(S): at 05:54

## 2024-03-13 RX ADMIN — LOSARTAN POTASSIUM 25 MILLIGRAM(S): 100 TABLET, FILM COATED ORAL at 08:20

## 2024-03-13 NOTE — DIETITIAN INITIAL EVALUATION ADULT - PERTINENT MEDS FT
Osteoporosis    Ulcerative colitis MEDICATIONS  (STANDING):  atorvastatin 40 milliGRAM(s) Oral <User Schedule>  benztropine 2 milliGRAM(s) Oral daily  fluPHENAZine 5 milliGRAM(s) Oral <User Schedule>  fluPHENAZine 2.5 milliGRAM(s) Oral daily  levothyroxine 50 MICROGram(s) Oral daily  losartan 25 milliGRAM(s) Oral daily  metFORMIN 850 milliGRAM(s) Oral daily  metoprolol succinate ER 50 milliGRAM(s) Oral daily  rivaroxaban 20 milliGRAM(s) Oral with dinner    MEDICATIONS  (PRN):  fluPHENAZine 2.5 milliGRAM(s) Oral every 8 hours PRN Agitation stemming from psychosis  fluPHENAZine  Injectable 2 milliGRAM(s) IntraMuscular once PRN Severe agitation stemming from psychosis  senna 2 Tablet(s) Oral daily PRN constipation

## 2024-03-13 NOTE — BH INPATIENT PSYCHIATRY PROGRESS NOTE - NSBHASSESSSUMMFT_PSY_ALL_CORE
Patient is a 64 y/o woman,  x 10 years with PMHx of Schizophrenia, HTN, Afib, Hypothyroidism, NPH and hx of hypovolemic/septic shock from UTI admitted for management of paranoia in the context of treatment non-adherence.  Pt unable to engage in intake interview during initial encounter, fixated on obtaining a shower and refusing to engage in other topics. As per collateral patient has decompensated since being discharged from [facility] in December and being lost to f/u in terms of mental health treatment. From collateral pt presenting sx suggestive of psychotic decompensation including paranoid delusions, disorganized behavior and suspected AH in the context of non-adherence to pharmacotherapy. At home she was refusing meals, refusing to let HHA in and not leaving the house due to paranoia  Pt currently requires inpatient level of care as she is unable to care for herself in her current state, would benefit from med adjustments for stabilization and safe aftercare planning.    Impression: Schizophrenia    2/26: Pt taking meds inconsistently, requesting them and then refusing when they are offered, presenting oppositional behavior, disorganized, unable to engage meaningfully in discussions about her treatment with writer. May pursue TOO.   2/25: agitated, disorganized, loud, uncooperative, no SI/HI, refused prolixin and neurontin last night, to f/u compliance.  2/27: Pt compliant with psychiatric meds last night and this morning. Disorganized, tangential, with flight of ideas. Will titrate Prolixin to 2.5mg BID.   2/28: Compliant with psych meds, refusing Xarelto and Atorvastatin despite psychoeducation. Demanding and concrete.  3/1: Pt engaging superficially. Will minimize nighttime medications since pt has been refusing them. Takes meds in the morning only. Consolidate prolixin in the morning.   3/7: Compliant with her medications. Less irritable and responsive to redirection. Denies si/hi.  3/12: Pt has been calmer, less irritable, less pressured, able to tolerate short conversations. Plan is to transition to prolixin dec before discharge.     -Admit to [facility] 2S  -Legal status 2PC  -Routine checks  -Continue Prolixin 5mg BID  -Confirmed med list from patient's pharmacy [address]:    -Losartan 100/25 (losartan/hctz???)    -Metoprolol ER 50mg QD    -Cogentin 1mg BID    -Xarelto 20mg with dinner.    -Amlodipine 5mg QD    -Levothyroxine 50 micrograms QD  -Will resume Metoprolol with hold parameters. Initially held Losartan, Amlodipine and monitor BP, may resume if indicated.   -Resume Losartan, starting at 25mg QD with hold parameters, titrate as needed.

## 2024-03-13 NOTE — BH INPATIENT PSYCHIATRY PROGRESS NOTE - NSBHFUPINTERVALHXFT_PSY_A_CORE
Patient seen for Schizophrenia. Chart reviewed and discussed with nursing staff. VSS.   Pt has been eating/sleeping well. Taking care of her adls Pt seems less irritable, more organized and responding to redirection. Compliant with her medications. Requesting discharge. Denies AH/VH/SI/HI.

## 2024-03-13 NOTE — DIETITIAN INITIAL EVALUATION ADULT - NS FNS DIET ORDER
Diet, Regular:   Consistent Carbohydrate {No Snacks} (CSTCHO)  DASH/TLC {Sodium & Cholesterol Restricted} (DASH) (02-25-24 @ 10:17) [Active]

## 2024-03-13 NOTE — BH INPATIENT PSYCHIATRY PROGRESS NOTE - OTHER
unable to tolerate an MMSE at this time tenuous cannot rule out perceptual distortions guarded, paranoid

## 2024-03-13 NOTE — DIETITIAN INITIAL EVALUATION ADULT - OTHER INFO
Patient is a 64 y/o woman,  x 10 years with PMHx of Schizophrenia, HTN, Afib, Hypothyroidism, NPH and hx of hypovolemic/septic shock from UTI admitted for management of paranoia in the context of treatment non-adherence.    Met with pt. in day room today. Pt. reports fair appetite with eating waffles and potato at breakfast. Explored food preferences with pt. Preferences updated on Cboard. Pt. reveals missing teeth but reports chewing ok on current diet. No swallowing issues reported. No GI issues (N/V/C/D) reported. Pt. reports desires to lose weight. Current  lbs (03-09-24). Wt. hx 179.8 lbs (02-23-24). 7 lb weight loss likely due to decreased protein intake. Writer encouraged increased protein intake at meals. Pt. had no further questions at this time.  Patient is a 62 y/o woman,  x 10 years with PMHx of Schizophrenia, HTN, Afib, Hypothyroidism, NPH and hx of hypovolemic/septic shock from UTI admitted for management of paranoia in the context of treatment non-adherence.    Met with pt. in day room today. Pt. reports fair appetite with eating waffles and potato at breakfast. Explored food preferences with pt. Preferences updated on Cboard. Pt. reveals missing teeth but reports chewing ok on current diet. No swallowing issues reported. No GI issues (N/V/C/D) reported. Pt. reports desires to lose weight. Current  lbs (03-09-24). Wt. hx 179.8 lbs (02-23-24). 7 lb weight loss likely due to decreased energy/protein intake. Writer encouraged increased energy/protein intake at meals. Pt. had no further questions at this time.

## 2024-03-13 NOTE — DIETITIAN INITIAL EVALUATION ADULT - PERTINENT LABORATORY DATA
POCT Blood Glucose.: 105 mg/dL (03-13-24 @ 07:43)  A1C with Estimated Average Glucose Result: 5.5 % (02-26-24 @ 08:01)  A1C with Estimated Average Glucose Result: 5.7 % (08-26-23 @ 13:54)

## 2024-03-13 NOTE — DIETITIAN INITIAL EVALUATION ADULT - ADD RECOMMEND
Continue with CC and DASH diet per MD order  Encourage and monitor PO intake  Monitor weight, labs, skin and GI Continue with Consistent carbohydrate and DASH diet per MD order  Encourage and monitor PO intake  Monitor weight, labs, skin and GI

## 2024-03-14 LAB — GLUCOSE BLDC GLUCOMTR-MCNC: 145 MG/DL — HIGH (ref 70–99)

## 2024-03-14 PROCEDURE — 99232 SBSQ HOSP IP/OBS MODERATE 35: CPT

## 2024-03-14 RX ADMIN — RIVAROXABAN 20 MILLIGRAM(S): KIT at 17:15

## 2024-03-14 RX ADMIN — Medication 2 MILLIGRAM(S): at 08:15

## 2024-03-14 RX ADMIN — METFORMIN HYDROCHLORIDE 850 MILLIGRAM(S): 850 TABLET ORAL at 08:15

## 2024-03-14 RX ADMIN — Medication 50 MILLIGRAM(S): at 08:15

## 2024-03-14 RX ADMIN — Medication 50 MICROGRAM(S): at 06:02

## 2024-03-14 RX ADMIN — FLUPHENAZINE HYDROCHLORIDE 5 MILLIGRAM(S): 1 TABLET, FILM COATED ORAL at 17:16

## 2024-03-14 RX ADMIN — ATORVASTATIN CALCIUM 40 MILLIGRAM(S): 80 TABLET, FILM COATED ORAL at 17:15

## 2024-03-14 RX ADMIN — FLUPHENAZINE HYDROCHLORIDE 5 MILLIGRAM(S): 1 TABLET, FILM COATED ORAL at 08:15

## 2024-03-14 RX ADMIN — Medication 650 MILLIGRAM(S): at 00:00

## 2024-03-14 RX ADMIN — SENNA PLUS 2 TABLET(S): 8.6 TABLET ORAL at 08:15

## 2024-03-14 RX ADMIN — LOSARTAN POTASSIUM 25 MILLIGRAM(S): 100 TABLET, FILM COATED ORAL at 08:15

## 2024-03-14 NOTE — BH INPATIENT PSYCHIATRY PROGRESS NOTE - NSBHFUPINTERVALHXFT_PSY_A_CORE
Patient seen for Schizophrenia. Chart reviewed and discussed with nursing staff. VSS.   On encounter pt is calm, denies AH/VH/SI/HI. Preoccupied with discharge and demands to leave today. Pt was able to tolerate frustration well when told by her treatment team she could not be discharged today. Monitoring dose increase of Prolixin in preparation for MURILLO. Pt still endorsing delusional ideas on and off, for instance this morning verbalized regretting taking Tylenol earlier today "because im pregnant".

## 2024-03-14 NOTE — BH INPATIENT PSYCHIATRY PROGRESS NOTE - NSBHASSESSSUMMFT_PSY_ALL_CORE
Patient is a 62 y/o woman,  x 10 years with PMHx of Schizophrenia, HTN, Afib, Hypothyroidism, NPH and hx of hypovolemic/septic shock from UTI admitted for management of paranoia in the context of treatment non-adherence.  Pt unable to engage in intake interview during initial encounter, fixated on obtaining a shower and refusing to engage in other topics. As per collateral patient has decompensated since being discharged from [facility] in December and being lost to f/u in terms of mental health treatment. From collateral pt presenting sx suggestive of psychotic decompensation including paranoid delusions, disorganized behavior and suspected AH in the context of non-adherence to pharmacotherapy. At home she was refusing meals, refusing to let HHA in and not leaving the house due to paranoia  Pt currently requires inpatient level of care as she is unable to care for herself in her current state, would benefit from med adjustments for stabilization and safe aftercare planning.    Impression: Schizophrenia    2/26: Pt taking meds inconsistently, requesting them and then refusing when they are offered, presenting oppositional behavior, disorganized, unable to engage meaningfully in discussions about her treatment with writer. May pursue TOO.   2/25: agitated, disorganized, loud, uncooperative, no SI/HI, refused prolixin and neurontin last night, to f/u compliance.  2/27: Pt compliant with psychiatric meds last night and this morning. Disorganized, tangential, with flight of ideas. Will titrate Prolixin to 2.5mg BID.   2/28: Compliant with psych meds, refusing Xarelto and Atorvastatin despite psychoeducation. Demanding and concrete.  3/1: Pt engaging superficially. Will minimize nighttime medications since pt has been refusing them. Takes meds in the morning only. Consolidate prolixin in the morning.   3/7: Compliant with her medications. Less irritable and responsive to redirection. Denies si/hi.  3/12: Pt has been calmer, less irritable, less pressured, able to tolerate short conversations. Plan is to transition to prolixin dec before discharge.     -Admit to [facility] 2S  -Legal status 2PC  -Routine checks  -Continue Prolixin 5mg BID  -Confirmed med list from patient's pharmacy [address]:    -Losartan 100/25 (losartan/hctz???)    -Metoprolol ER 50mg QD    -Cogentin 1mg BID    -Xarelto 20mg with dinner.    -Amlodipine 5mg QD    -Levothyroxine 50 micrograms QD  -Will resume Metoprolol with hold parameters. Initially held Losartan, Amlodipine and monitor BP, may resume if indicated.   -Resume Losartan, starting at 25mg QD with hold parameters, titrate as needed.

## 2024-03-14 NOTE — BH INPATIENT PSYCHIATRY PROGRESS NOTE - CURRENT MEDICATION
MEDICATIONS  (STANDING):  atorvastatin 40 milliGRAM(s) Oral <User Schedule>  benztropine 2 milliGRAM(s) Oral daily  fluPHENAZine 5 milliGRAM(s) Oral daily  fluPHENAZine 5 milliGRAM(s) Oral <User Schedule>  levothyroxine 50 MICROGram(s) Oral daily  losartan 25 milliGRAM(s) Oral daily  metFORMIN 850 milliGRAM(s) Oral daily  metoprolol succinate ER 50 milliGRAM(s) Oral daily  rivaroxaban 20 milliGRAM(s) Oral with dinner    MEDICATIONS  (PRN):  acetaminophen     Tablet .. 650 milliGRAM(s) Oral every 6 hours PRN Temp greater or equal to 38C (100.4F), Mild Pain (1 - 3), Moderate Pain (4 - 6)  fluPHENAZine 2.5 milliGRAM(s) Oral every 8 hours PRN Agitation stemming from psychosis  fluPHENAZine  Injectable 2 milliGRAM(s) IntraMuscular once PRN Severe agitation stemming from psychosis  senna 2 Tablet(s) Oral daily PRN constipation

## 2024-03-14 NOTE — BH INPATIENT PSYCHIATRY PROGRESS NOTE - OTHER
cannot rule out perceptual distortions guarded, paranoid unable to tolerate an MMSE at this time tenuous

## 2024-03-15 LAB — GLUCOSE BLDC GLUCOMTR-MCNC: 111 MG/DL — HIGH (ref 70–99)

## 2024-03-15 PROCEDURE — 99232 SBSQ HOSP IP/OBS MODERATE 35: CPT

## 2024-03-15 RX ADMIN — Medication 50 MICROGRAM(S): at 05:40

## 2024-03-15 RX ADMIN — FLUPHENAZINE HYDROCHLORIDE 5 MILLIGRAM(S): 1 TABLET, FILM COATED ORAL at 09:34

## 2024-03-15 RX ADMIN — FLUPHENAZINE HYDROCHLORIDE 5 MILLIGRAM(S): 1 TABLET, FILM COATED ORAL at 17:06

## 2024-03-15 RX ADMIN — METFORMIN HYDROCHLORIDE 850 MILLIGRAM(S): 850 TABLET ORAL at 09:34

## 2024-03-15 RX ADMIN — LOSARTAN POTASSIUM 25 MILLIGRAM(S): 100 TABLET, FILM COATED ORAL at 09:36

## 2024-03-15 RX ADMIN — Medication 50 MILLIGRAM(S): at 09:34

## 2024-03-15 RX ADMIN — ATORVASTATIN CALCIUM 40 MILLIGRAM(S): 80 TABLET, FILM COATED ORAL at 17:07

## 2024-03-15 RX ADMIN — RIVAROXABAN 20 MILLIGRAM(S): KIT at 17:07

## 2024-03-15 RX ADMIN — Medication 2 MILLIGRAM(S): at 09:34

## 2024-03-15 NOTE — BH INPATIENT PSYCHIATRY PROGRESS NOTE - OTHER
guarded, paranoid cannot rule out perceptual distortions tenuous unable to tolerate an MMSE at this time

## 2024-03-15 NOTE — BH INPATIENT PSYCHIATRY PROGRESS NOTE - NSBHFUPINTERVALHXFT_PSY_A_CORE
Patient seen for Schizophrenia. Chart reviewed and discussed with nursing staff. VSS.   On encounter pt is calm, denies AH/VH/SI/HI. Preoccupied with discharge. Pt was able to tolerate frustration well when told by her treatment team she could not be discharged today. Monitoring dose increase of Prolixin in preparation for MURILLO. Pt still endorsing delusional thoughts surrounding pregnancy and abortions.

## 2024-03-15 NOTE — BH INPATIENT PSYCHIATRY PROGRESS NOTE - NSBHASSESSSUMMFT_PSY_ALL_CORE
Patient is a 62 y/o woman,  x 10 years with PMHx of Schizophrenia, HTN, Afib, Hypothyroidism, NPH and hx of hypovolemic/septic shock from UTI admitted for management of paranoia in the context of treatment non-adherence.  Pt unable to engage in intake interview during initial encounter, fixated on obtaining a shower and refusing to engage in other topics. As per collateral patient has decompensated since being discharged from [facility] in December and being lost to f/u in terms of mental health treatment. From collateral pt presenting sx suggestive of psychotic decompensation including paranoid delusions, disorganized behavior and suspected AH in the context of non-adherence to pharmacotherapy. At home she was refusing meals, refusing to let HHA in and not leaving the house due to paranoia  Pt currently requires inpatient level of care as she is unable to care for herself in her current state, would benefit from med adjustments for stabilization and safe aftercare planning.    Impression: Schizophrenia    2/26: Pt taking meds inconsistently, requesting them and then refusing when they are offered, presenting oppositional behavior, disorganized, unable to engage meaningfully in discussions about her treatment with writer. May pursue TOO.   2/25: agitated, disorganized, loud, uncooperative, no SI/HI, refused prolixin and neurontin last night, to f/u compliance.  2/27: Pt compliant with psychiatric meds last night and this morning. Disorganized, tangential, with flight of ideas. Will titrate Prolixin to 2.5mg BID.   2/28: Compliant with psych meds, refusing Xarelto and Atorvastatin despite psychoeducation. Demanding and concrete.  3/1: Pt engaging superficially. Will minimize nighttime medications since pt has been refusing them. Takes meds in the morning only. Consolidate prolixin in the morning.   3/7: Compliant with her medications. Less irritable and responsive to redirection. Denies si/hi.  3/12: Pt has been calmer, less irritable, less pressured, able to tolerate short conversations. Plan is to transition to prolixin dec before discharge.   3/15: Calmer, still delusional at times. Continue monitoring for safety.   -Admit to [facility] 2S  -Legal status 2PC  -Routine checks  -Continue Prolixin 5mg BID  -Confirmed med list from patient's pharmacy [address]:    -Losartan 100/25 (losartan/hctz???)    -Metoprolol ER 50mg QD    -Cogentin 1mg BID    -Xarelto 20mg with dinner.    -Amlodipine 5mg QD    -Levothyroxine 50 micrograms QD  -Will resume Metoprolol with hold parameters. Initially held Losartan, Amlodipine and monitor BP, may resume if indicated.   -Resume Losartan, starting at 25mg QD with hold parameters, titrate as needed.

## 2024-03-16 LAB — GLUCOSE BLDC GLUCOMTR-MCNC: 108 MG/DL — HIGH (ref 70–99)

## 2024-03-16 RX ADMIN — LOSARTAN POTASSIUM 25 MILLIGRAM(S): 100 TABLET, FILM COATED ORAL at 09:44

## 2024-03-16 RX ADMIN — RIVAROXABAN 20 MILLIGRAM(S): KIT at 17:03

## 2024-03-16 RX ADMIN — Medication 50 MICROGRAM(S): at 05:52

## 2024-03-16 RX ADMIN — FLUPHENAZINE HYDROCHLORIDE 5 MILLIGRAM(S): 1 TABLET, FILM COATED ORAL at 09:44

## 2024-03-16 RX ADMIN — FLUPHENAZINE HYDROCHLORIDE 5 MILLIGRAM(S): 1 TABLET, FILM COATED ORAL at 17:04

## 2024-03-16 RX ADMIN — Medication 2 MILLIGRAM(S): at 09:44

## 2024-03-16 RX ADMIN — METFORMIN HYDROCHLORIDE 850 MILLIGRAM(S): 850 TABLET ORAL at 09:44

## 2024-03-16 RX ADMIN — Medication 50 MILLIGRAM(S): at 09:46

## 2024-03-16 RX ADMIN — ATORVASTATIN CALCIUM 40 MILLIGRAM(S): 80 TABLET, FILM COATED ORAL at 17:04

## 2024-03-17 LAB — GLUCOSE BLDC GLUCOMTR-MCNC: 142 MG/DL — HIGH (ref 70–99)

## 2024-03-17 RX ADMIN — METFORMIN HYDROCHLORIDE 850 MILLIGRAM(S): 850 TABLET ORAL at 09:24

## 2024-03-17 RX ADMIN — Medication 50 MILLIGRAM(S): at 09:24

## 2024-03-17 RX ADMIN — Medication 50 MICROGRAM(S): at 05:09

## 2024-03-17 RX ADMIN — ATORVASTATIN CALCIUM 40 MILLIGRAM(S): 80 TABLET, FILM COATED ORAL at 17:15

## 2024-03-17 RX ADMIN — FLUPHENAZINE HYDROCHLORIDE 5 MILLIGRAM(S): 1 TABLET, FILM COATED ORAL at 09:25

## 2024-03-17 RX ADMIN — FLUPHENAZINE HYDROCHLORIDE 5 MILLIGRAM(S): 1 TABLET, FILM COATED ORAL at 17:15

## 2024-03-17 RX ADMIN — LOSARTAN POTASSIUM 25 MILLIGRAM(S): 100 TABLET, FILM COATED ORAL at 09:24

## 2024-03-17 RX ADMIN — RIVAROXABAN 20 MILLIGRAM(S): KIT at 17:15

## 2024-03-17 RX ADMIN — Medication 2 MILLIGRAM(S): at 09:25

## 2024-03-17 RX ADMIN — Medication 15 MILLIGRAM(S): at 21:21

## 2024-03-18 LAB — GLUCOSE BLDC GLUCOMTR-MCNC: 89 MG/DL — SIGNIFICANT CHANGE UP (ref 70–99)

## 2024-03-18 PROCEDURE — 99232 SBSQ HOSP IP/OBS MODERATE 35: CPT

## 2024-03-18 RX ORDER — FLUPHENAZINE HYDROCHLORIDE 1 MG/1
12.5 TABLET, FILM COATED ORAL ONCE
Refills: 0 | Status: COMPLETED | OUTPATIENT
Start: 2024-03-18 | End: 2024-03-18

## 2024-03-18 RX ADMIN — ATORVASTATIN CALCIUM 40 MILLIGRAM(S): 80 TABLET, FILM COATED ORAL at 16:38

## 2024-03-18 RX ADMIN — LOSARTAN POTASSIUM 25 MILLIGRAM(S): 100 TABLET, FILM COATED ORAL at 08:51

## 2024-03-18 RX ADMIN — Medication 15 MILLIGRAM(S): at 23:15

## 2024-03-18 RX ADMIN — METFORMIN HYDROCHLORIDE 850 MILLIGRAM(S): 850 TABLET ORAL at 08:51

## 2024-03-18 RX ADMIN — RIVAROXABAN 20 MILLIGRAM(S): KIT at 16:38

## 2024-03-18 RX ADMIN — Medication 2 MILLIGRAM(S): at 08:52

## 2024-03-18 RX ADMIN — FLUPHENAZINE HYDROCHLORIDE 5 MILLIGRAM(S): 1 TABLET, FILM COATED ORAL at 16:38

## 2024-03-18 RX ADMIN — Medication 50 MICROGRAM(S): at 06:03

## 2024-03-18 RX ADMIN — Medication 50 MILLIGRAM(S): at 08:51

## 2024-03-18 RX ADMIN — FLUPHENAZINE HYDROCHLORIDE 5 MILLIGRAM(S): 1 TABLET, FILM COATED ORAL at 08:51

## 2024-03-18 RX ADMIN — FLUPHENAZINE HYDROCHLORIDE 12.5 MILLIGRAM(S): 1 TABLET, FILM COATED ORAL at 15:45

## 2024-03-18 NOTE — BH INPATIENT PSYCHIATRY PROGRESS NOTE - NSBHFUPINTERVALHXFT_PSY_A_CORE
Patient seen for Schizophrenia. Chart reviewed and discussed with nursing staff. VSS. No overnight events or prns needed.  Pt is calm, tolerating back and forth conversation. Reports stable mood, denies AH/VH/SI/HI. Agreed to take decanoate today after speaking with her friend. Received Prolixin dec 12.5mg IM today, next dose due in 3 weeks (4/8/24).

## 2024-03-18 NOTE — BH INPATIENT PSYCHIATRY PROGRESS NOTE - CURRENT MEDICATION
MEDICATIONS  (STANDING):  atorvastatin 40 milliGRAM(s) Oral <User Schedule>  benztropine 2 milliGRAM(s) Oral daily  busPIRone 15 milliGRAM(s) Oral <User Schedule>  fluPHENAZine 5 milliGRAM(s) Oral <User Schedule>  fluPHENAZine 5 milliGRAM(s) Oral daily  fluPHENAZine decanoate Injectable, Long Acting 12.5 milliGRAM(s) IntraMuscular once  levothyroxine 50 MICROGram(s) Oral daily  losartan 25 milliGRAM(s) Oral daily  metFORMIN 850 milliGRAM(s) Oral daily  metoprolol succinate ER 50 milliGRAM(s) Oral daily  rivaroxaban 20 milliGRAM(s) Oral with dinner    MEDICATIONS  (PRN):  acetaminophen     Tablet .. 650 milliGRAM(s) Oral every 6 hours PRN Temp greater or equal to 38C (100.4F), Mild Pain (1 - 3), Moderate Pain (4 - 6)  fluPHENAZine 2.5 milliGRAM(s) Oral every 8 hours PRN Agitation stemming from psychosis  fluPHENAZine  Injectable 2 milliGRAM(s) IntraMuscular once PRN Severe agitation stemming from psychosis  melatonin 3 milliGRAM(s) Oral at bedtime PRN Insomnia  senna 2 Tablet(s) Oral daily PRN constipation

## 2024-03-18 NOTE — BH INPATIENT PSYCHIATRY PROGRESS NOTE - NSBHASSESSSUMMFT_PSY_ALL_CORE
Patient is a 64 y/o woman,  x 10 years with PMHx of Schizophrenia, HTN, Afib, Hypothyroidism, NPH and hx of hypovolemic/septic shock from UTI admitted for management of paranoia in the context of treatment non-adherence.  Pt unable to engage in intake interview during initial encounter, fixated on obtaining a shower and refusing to engage in other topics. As per collateral patient has decompensated since being discharged from [facility] in December and being lost to f/u in terms of mental health treatment. From collateral pt presenting sx suggestive of psychotic decompensation including paranoid delusions, disorganized behavior and suspected AH in the context of non-adherence to pharmacotherapy. At home she was refusing meals, refusing to let HHA in and not leaving the house due to paranoia  Pt currently requires inpatient level of care as she is unable to care for herself in her current state, would benefit from med adjustments for stabilization and safe aftercare planning.    Impression: Schizophrenia    2/26: Pt taking meds inconsistently, requesting them and then refusing when they are offered, presenting oppositional behavior, disorganized, unable to engage meaningfully in discussions about her treatment with writer. May pursue TOO.   2/25: agitated, disorganized, loud, uncooperative, no SI/HI, refused prolixin and neurontin last night, to f/u compliance.  2/27: Pt compliant with psychiatric meds last night and this morning. Disorganized, tangential, with flight of ideas. Will titrate Prolixin to 2.5mg BID.   2/28: Compliant with psych meds, refusing Xarelto and Atorvastatin despite psychoeducation. Demanding and concrete.  3/1: Pt engaging superficially. Will minimize nighttime medications since pt has been refusing them. Takes meds in the morning only. Consolidate prolixin in the morning.   3/7: Compliant with her medications. Less irritable and responsive to redirection. Denies si/hi.  3/12: Pt has been calmer, less irritable, less pressured, able to tolerate short conversations. Plan is to transition to prolixin dec before discharge.   3/15: Calmer, still delusional at times. Continue monitoring for safety.   3/18: Reports stable mood, denies AH/VH/SI/HI. Agreed to take decanoate today after speaking with her friend. Received Prolixin dec 12.5mg IM today, next dose due in 3 weeks (4/8/24).  -Admit to [facility] 2S  -Legal status 2PC  -Routine checks  -Continue Prolixin 5mg BID  -Confirmed med list from patient's pharmacy [address]:    -Losartan 100/25 (losartan/hctz???)    -Metoprolol ER 50mg QD    -Cogentin 1mg BID    -Xarelto 20mg with dinner.    -Amlodipine 5mg QD    -Levothyroxine 50 micrograms QD  -Will resume Metoprolol with hold parameters. Initially held Losartan, Amlodipine and monitor BP, may resume if indicated.   -Resume Losartan, starting at 25mg QD with hold parameters, titrate as needed.

## 2024-03-19 LAB — GLUCOSE BLDC GLUCOMTR-MCNC: 138 MG/DL — HIGH (ref 70–99)

## 2024-03-19 PROCEDURE — 99232 SBSQ HOSP IP/OBS MODERATE 35: CPT

## 2024-03-19 RX ADMIN — Medication 15 MILLIGRAM(S): at 22:03

## 2024-03-19 RX ADMIN — FLUPHENAZINE HYDROCHLORIDE 5 MILLIGRAM(S): 1 TABLET, FILM COATED ORAL at 08:31

## 2024-03-19 RX ADMIN — FLUPHENAZINE HYDROCHLORIDE 5 MILLIGRAM(S): 1 TABLET, FILM COATED ORAL at 16:55

## 2024-03-19 RX ADMIN — Medication 50 MICROGRAM(S): at 05:48

## 2024-03-19 RX ADMIN — ATORVASTATIN CALCIUM 40 MILLIGRAM(S): 80 TABLET, FILM COATED ORAL at 16:56

## 2024-03-19 RX ADMIN — RIVAROXABAN 20 MILLIGRAM(S): KIT at 16:55

## 2024-03-19 RX ADMIN — Medication 50 MILLIGRAM(S): at 08:30

## 2024-03-19 RX ADMIN — METFORMIN HYDROCHLORIDE 850 MILLIGRAM(S): 850 TABLET ORAL at 08:31

## 2024-03-19 RX ADMIN — Medication 2 MILLIGRAM(S): at 08:31

## 2024-03-19 RX ADMIN — LOSARTAN POTASSIUM 25 MILLIGRAM(S): 100 TABLET, FILM COATED ORAL at 08:30

## 2024-03-19 NOTE — BH INPATIENT PSYCHIATRY PROGRESS NOTE - OTHER
unable to tolerate an MMSE at this time tenuous guarded, paranoid cannot rule out perceptual distortions

## 2024-03-19 NOTE — BH INPATIENT PSYCHIATRY PROGRESS NOTE - NSBHFUPINTERVALHXFT_PSY_A_CORE
Patient seen for Schizophrenia. Chart reviewed and discussed with nursing staff. VSS. No overnight events or prns needed. Received Prolixin dec 12.5mg IM yesterday 3/18, next dose due in 3 weeks (4/8/24).  Pt is calm, tolerating back and forth conversation. Reports stable mood, denies AH/VH/SI/HI. Taking care of her ADLs independently. Visible in the unit.

## 2024-03-19 NOTE — BH INPATIENT PSYCHIATRY PROGRESS NOTE - CURRENT MEDICATION
MEDICATIONS  (STANDING):  atorvastatin 40 milliGRAM(s) Oral <User Schedule>  benztropine 2 milliGRAM(s) Oral daily  busPIRone 15 milliGRAM(s) Oral <User Schedule>  fluPHENAZine 5 milliGRAM(s) Oral daily  fluPHENAZine 5 milliGRAM(s) Oral <User Schedule>  levothyroxine 50 MICROGram(s) Oral daily  losartan 25 milliGRAM(s) Oral daily  metFORMIN 850 milliGRAM(s) Oral daily  metoprolol succinate ER 50 milliGRAM(s) Oral daily  rivaroxaban 20 milliGRAM(s) Oral with dinner    MEDICATIONS  (PRN):  acetaminophen     Tablet .. 650 milliGRAM(s) Oral every 6 hours PRN Temp greater or equal to 38C (100.4F), Mild Pain (1 - 3), Moderate Pain (4 - 6)  fluPHENAZine 2.5 milliGRAM(s) Oral every 8 hours PRN Agitation stemming from psychosis  fluPHENAZine  Injectable 2 milliGRAM(s) IntraMuscular once PRN Severe agitation stemming from psychosis  melatonin 3 milliGRAM(s) Oral at bedtime PRN Insomnia  senna 2 Tablet(s) Oral daily PRN constipation

## 2024-03-19 NOTE — BH SAFETY PLAN - SUICIDE AND CRISIS LIFELINE, CALL [phone]
Impression: Open angle with borderline findings, low risk, bilateral: H40.013. Plan: Glaucoma suspect - Fundus photos show no change from previous. The pathophysiology of glaucoma and the difference between having glaucoma and being a glaucoma suspect were reviewed with the patient. All of the patients questions were answered and they stated they understand their finding. Patient scheduled for repeat visual field testing and RNFL scans. Suicide and Crisis Lifeline, call [phone]

## 2024-03-19 NOTE — BH INPATIENT PSYCHIATRY PROGRESS NOTE - NSBHASSESSSUMMFT_PSY_ALL_CORE
Patient is a 62 y/o woman,  x 10 years with PMHx of Schizophrenia, HTN, Afib, Hypothyroidism, NPH and hx of hypovolemic/septic shock from UTI admitted for management of paranoia in the context of treatment non-adherence.  Pt unable to engage in intake interview during initial encounter, fixated on obtaining a shower and refusing to engage in other topics. As per collateral patient has decompensated since being discharged from [facility] in December and being lost to f/u in terms of mental health treatment. From collateral pt presenting sx suggestive of psychotic decompensation including paranoid delusions, disorganized behavior and suspected AH in the context of non-adherence to pharmacotherapy. At home she was refusing meals, refusing to let HHA in and not leaving the house due to paranoia  Pt currently requires inpatient level of care as she is unable to care for herself in her current state, would benefit from med adjustments for stabilization and safe aftercare planning.    Impression: Schizophrenia    2/26: Pt taking meds inconsistently, requesting them and then refusing when they are offered, presenting oppositional behavior, disorganized, unable to engage meaningfully in discussions about her treatment with writer. May pursue TOO.   2/25: agitated, disorganized, loud, uncooperative, no SI/HI, refused prolixin and neurontin last night, to f/u compliance.  2/27: Pt compliant with psychiatric meds last night and this morning. Disorganized, tangential, with flight of ideas. Will titrate Prolixin to 2.5mg BID.   2/28: Compliant with psych meds, refusing Xarelto and Atorvastatin despite psychoeducation. Demanding and concrete.  3/1: Pt engaging superficially. Will minimize nighttime medications since pt has been refusing them. Takes meds in the morning only. Consolidate prolixin in the morning.   3/7: Compliant with her medications. Less irritable and responsive to redirection. Denies si/hi.  3/12: Pt has been calmer, less irritable, less pressured, able to tolerate short conversations. Plan is to transition to prolixin dec before discharge.   3/15: Calmer, still delusional at times. Continue monitoring for safety.   3/18: Reports stable mood, denies AH/VH/SI/HI. Agreed to take decanoate today after speaking with her friend. Received Prolixin dec 12.5mg IM today, next dose due in 3 weeks (4/8/24).  -Admit to [facility] 2S  -Legal status 2PC  -Routine checks  -Continue Prolixin 5mg BID  -Confirmed med list from patient's pharmacy [address]:    -Losartan 100/25 (losartan/hctz???)    -Metoprolol ER 50mg QD    -Cogentin 1mg BID    -Xarelto 20mg with dinner.    -Amlodipine 5mg QD    -Levothyroxine 50 micrograms QD  -Will resume Metoprolol with hold parameters. Initially held Losartan, Amlodipine and monitor BP, may resume if indicated.   -Resume Losartan, starting at 25mg QD with hold parameters, titrate as needed.

## 2024-03-20 LAB — GLUCOSE BLDC GLUCOMTR-MCNC: 104 MG/DL — HIGH (ref 70–99)

## 2024-03-20 PROCEDURE — 99232 SBSQ HOSP IP/OBS MODERATE 35: CPT

## 2024-03-20 RX ADMIN — ATORVASTATIN CALCIUM 40 MILLIGRAM(S): 80 TABLET, FILM COATED ORAL at 16:34

## 2024-03-20 RX ADMIN — Medication 15 MILLIGRAM(S): at 21:14

## 2024-03-20 RX ADMIN — SENNA PLUS 2 TABLET(S): 8.6 TABLET ORAL at 23:40

## 2024-03-20 RX ADMIN — METFORMIN HYDROCHLORIDE 850 MILLIGRAM(S): 850 TABLET ORAL at 08:03

## 2024-03-20 RX ADMIN — Medication 50 MICROGRAM(S): at 06:19

## 2024-03-20 RX ADMIN — FLUPHENAZINE HYDROCHLORIDE 5 MILLIGRAM(S): 1 TABLET, FILM COATED ORAL at 16:34

## 2024-03-20 RX ADMIN — Medication 50 MILLIGRAM(S): at 08:03

## 2024-03-20 RX ADMIN — Medication 2 MILLIGRAM(S): at 08:03

## 2024-03-20 RX ADMIN — FLUPHENAZINE HYDROCHLORIDE 5 MILLIGRAM(S): 1 TABLET, FILM COATED ORAL at 08:03

## 2024-03-20 RX ADMIN — LOSARTAN POTASSIUM 25 MILLIGRAM(S): 100 TABLET, FILM COATED ORAL at 08:03

## 2024-03-20 RX ADMIN — RIVAROXABAN 20 MILLIGRAM(S): KIT at 16:34

## 2024-03-20 NOTE — BH INPATIENT PSYCHIATRY PROGRESS NOTE - NSBHASSESSSUMMFT_PSY_ALL_CORE
Patient is a 64 y/o woman,  x 10 years with PMHx of Schizophrenia, HTN, Afib, Hypothyroidism, NPH and hx of hypovolemic/septic shock from UTI admitted for management of paranoia in the context of treatment non-adherence.  Pt unable to engage in intake interview during initial encounter, fixated on obtaining a shower and refusing to engage in other topics. As per collateral patient has decompensated since being discharged from [facility] in December and being lost to f/u in terms of mental health treatment. From collateral pt presenting sx suggestive of psychotic decompensation including paranoid delusions, disorganized behavior and suspected AH in the context of non-adherence to pharmacotherapy. At home she was refusing meals, refusing to let HHA in and not leaving the house due to paranoia  Pt currently requires inpatient level of care as she is unable to care for herself in her current state, would benefit from med adjustments for stabilization and safe aftercare planning.    Impression: Schizophrenia    2/26: Pt taking meds inconsistently, requesting them and then refusing when they are offered, presenting oppositional behavior, disorganized, unable to engage meaningfully in discussions about her treatment with writer. May pursue TOO.   2/25: agitated, disorganized, loud, uncooperative, no SI/HI, refused prolixin and neurontin last night, to f/u compliance.  2/27: Pt compliant with psychiatric meds last night and this morning. Disorganized, tangential, with flight of ideas. Will titrate Prolixin to 2.5mg BID.   2/28: Compliant with psych meds, refusing Xarelto and Atorvastatin despite psychoeducation. Demanding and concrete.  3/1: Pt engaging superficially. Will minimize nighttime medications since pt has been refusing them. Takes meds in the morning only. Consolidate prolixin in the morning.   3/7: Compliant with her medications. Less irritable and responsive to redirection. Denies si/hi.  3/12: Pt has been calmer, less irritable, less pressured, able to tolerate short conversations. Plan is to transition to prolixin dec before discharge.   3/15: Calmer, still delusional at times. Continue monitoring for safety.   3/18: Reports stable mood, denies AH/VH/SI/HI. Agreed to take decanoate today after speaking with her friend. Received Prolixin dec 12.5mg IM today, next dose due in 3 weeks (4/8/24).  -Admit to [facility] 2S  -Legal status 2PC  -Routine checks  -Continue Prolixin 5mg BID  -Confirmed med list from patient's pharmacy [address]:    -Losartan 100/25 (losartan/hctz???)    -Metoprolol ER 50mg QD    -Cogentin 1mg BID    -Xarelto 20mg with dinner.    -Amlodipine 5mg QD    -Levothyroxine 50 micrograms QD  -Will resume Metoprolol with hold parameters. Initially held Losartan, Amlodipine and monitor BP, may resume if indicated.   -Resume Losartan, starting at 25mg QD with hold parameters, titrate as needed.

## 2024-03-20 NOTE — BH INPATIENT PSYCHIATRY PROGRESS NOTE - OTHER
unable to tolerate an MMSE at this time guarded, paranoid cannot rule out perceptual distortions tenuous

## 2024-03-20 NOTE — BH INPATIENT PSYCHIATRY PROGRESS NOTE - NSBHFUPINTERVALHXFT_PSY_A_CORE
Patient is seen and evaluated for f/u for Schizophrenia. Chart reviewed and discussed with nursing staff. VSS. No overnight events or prns needed. Received Prolixin dec 12.5mg IM yesterday 3/18, next dose due in 3 weeks (4/8/24).  Pt is calm, tolerating back and forth conversation. Reports stable mood, denies AH/VH/SI/HI. Taking care of her ADLs independently. Visible in the unit.

## 2024-03-21 LAB — GLUCOSE BLDC GLUCOMTR-MCNC: 120 MG/DL — HIGH (ref 70–99)

## 2024-03-21 PROCEDURE — 99232 SBSQ HOSP IP/OBS MODERATE 35: CPT

## 2024-03-21 RX ADMIN — ATORVASTATIN CALCIUM 40 MILLIGRAM(S): 80 TABLET, FILM COATED ORAL at 16:46

## 2024-03-21 RX ADMIN — RIVAROXABAN 20 MILLIGRAM(S): KIT at 16:46

## 2024-03-21 RX ADMIN — FLUPHENAZINE HYDROCHLORIDE 5 MILLIGRAM(S): 1 TABLET, FILM COATED ORAL at 16:46

## 2024-03-21 RX ADMIN — LOSARTAN POTASSIUM 25 MILLIGRAM(S): 100 TABLET, FILM COATED ORAL at 08:50

## 2024-03-21 RX ADMIN — Medication 50 MICROGRAM(S): at 06:39

## 2024-03-21 RX ADMIN — Medication 2 MILLIGRAM(S): at 08:52

## 2024-03-21 RX ADMIN — Medication 50 MILLIGRAM(S): at 08:50

## 2024-03-21 RX ADMIN — METFORMIN HYDROCHLORIDE 850 MILLIGRAM(S): 850 TABLET ORAL at 08:51

## 2024-03-21 RX ADMIN — FLUPHENAZINE HYDROCHLORIDE 5 MILLIGRAM(S): 1 TABLET, FILM COATED ORAL at 08:51

## 2024-03-21 RX ADMIN — Medication 15 MILLIGRAM(S): at 21:49

## 2024-03-21 NOTE — BH INPATIENT PSYCHIATRY PROGRESS NOTE - NSBHASSESSSUMMFT_PSY_ALL_CORE
Patient is a 64 y/o woman,  x 10 years with PMHx of Schizophrenia, HTN, Afib, Hypothyroidism, NPH and hx of hypovolemic/septic shock from UTI admitted for management of paranoia in the context of treatment non-adherence.  Pt unable to engage in intake interview during initial encounter, fixated on obtaining a shower and refusing to engage in other topics. As per collateral patient has decompensated since being discharged from [facility] in December and being lost to f/u in terms of mental health treatment. From collateral pt presenting sx suggestive of psychotic decompensation including paranoid delusions, disorganized behavior and suspected AH in the context of non-adherence to pharmacotherapy. At home she was refusing meals, refusing to let HHA in and not leaving the house due to paranoia  Pt currently requires inpatient level of care as she is unable to care for herself in her current state, would benefit from med adjustments for stabilization and safe aftercare planning.    Impression: Schizophrenia    2/26: Pt taking meds inconsistently, requesting them and then refusing when they are offered, presenting oppositional behavior, disorganized, unable to engage meaningfully in discussions about her treatment with writer. May pursue TOO.   2/25: agitated, disorganized, loud, uncooperative, no SI/HI, refused prolixin and neurontin last night, to f/u compliance.  2/27: Pt compliant with psychiatric meds last night and this morning. Disorganized, tangential, with flight of ideas. Will titrate Prolixin to 2.5mg BID.   2/28: Compliant with psych meds, refusing Xarelto and Atorvastatin despite psychoeducation. Demanding and concrete.  3/1: Pt engaging superficially. Will minimize nighttime medications since pt has been refusing them. Takes meds in the morning only. Consolidate prolixin in the morning.   3/7: Compliant with her medications. Less irritable and responsive to redirection. Denies si/hi.  3/12: Pt has been calmer, less irritable, less pressured, able to tolerate short conversations. Plan is to transition to prolixin dec before discharge.   3/15: Calmer, still delusional at times. Continue monitoring for safety.   3/18: Reports stable mood, denies AH/VH/SI/HI. Agreed to take decanoate today after speaking with her friend. Received Prolixin dec 12.5mg IM today, next dose due in 3 weeks (4/8/24).  -Admit to [facility] 2S  -Legal status 2PC  -Routine checks  -Prolixin dec 12.5mg IM, next dose due 4/8/24  -Confirmed med list from patient's pharmacy [address]:    -Losartan 100/25 (losartan/hctz???)    -Metoprolol ER 50mg QD    -Cogentin 1mg BID    -Xarelto 20mg with dinner.    -Amlodipine 5mg QD    -Levothyroxine 50 micrograms QD  -Will resume Metoprolol with hold parameters. Initially held Losartan, Amlodipine and monitor BP, may resume if indicated.   -Resume Losartan, starting at 25mg QD with hold parameters, titrate as needed.

## 2024-03-21 NOTE — BH INPATIENT PSYCHIATRY PROGRESS NOTE - CURRENT MEDICATION
MEDICATIONS  (STANDING):  atorvastatin 40 milliGRAM(s) Oral <User Schedule>  benztropine 2 milliGRAM(s) Oral daily  busPIRone 15 milliGRAM(s) Oral <User Schedule>  fluPHENAZine 5 milliGRAM(s) Oral <User Schedule>  fluPHENAZine 5 milliGRAM(s) Oral daily  levothyroxine 50 MICROGram(s) Oral daily  losartan 25 milliGRAM(s) Oral daily  metFORMIN 850 milliGRAM(s) Oral daily  metoprolol succinate ER 50 milliGRAM(s) Oral daily  rivaroxaban 20 milliGRAM(s) Oral with dinner    MEDICATIONS  (PRN):  acetaminophen     Tablet .. 650 milliGRAM(s) Oral every 6 hours PRN Temp greater or equal to 38C (100.4F), Mild Pain (1 - 3), Moderate Pain (4 - 6)  fluPHENAZine 2.5 milliGRAM(s) Oral every 8 hours PRN Agitation stemming from psychosis  fluPHENAZine  Injectable 2 milliGRAM(s) IntraMuscular once PRN Severe agitation stemming from psychosis  melatonin 3 milliGRAM(s) Oral at bedtime PRN Insomnia  senna 2 Tablet(s) Oral daily PRN constipation

## 2024-03-21 NOTE — BH INPATIENT PSYCHIATRY PROGRESS NOTE - NSBHFUPINTERVALHXFT_PSY_A_CORE
Patient is seen and evaluated for f/u for Schizophrenia. Chart reviewed and discussed with nursing staff. VSS. No overnight events or prns needed. Received Prolixin dec 12.5mg IM yesterday 3/18, next dose due in 3 weeks (4/8/24).  Pt is calm, tolerating back and forth conversation. Reports stable mood, denies AH/VH/SI/HI. Taking care of her ADLs. Thought process is concrete at baseline Visible in the unit. Pt would benefit from placement given tenuous housing situation and limited support.

## 2024-03-22 LAB — GLUCOSE BLDC GLUCOMTR-MCNC: 100 MG/DL — HIGH (ref 70–99)

## 2024-03-22 PROCEDURE — 99232 SBSQ HOSP IP/OBS MODERATE 35: CPT

## 2024-03-22 RX ADMIN — FLUPHENAZINE HYDROCHLORIDE 5 MILLIGRAM(S): 1 TABLET, FILM COATED ORAL at 17:17

## 2024-03-22 RX ADMIN — LOSARTAN POTASSIUM 25 MILLIGRAM(S): 100 TABLET, FILM COATED ORAL at 08:09

## 2024-03-22 RX ADMIN — Medication 50 MICROGRAM(S): at 06:21

## 2024-03-22 RX ADMIN — ATORVASTATIN CALCIUM 40 MILLIGRAM(S): 80 TABLET, FILM COATED ORAL at 17:17

## 2024-03-22 RX ADMIN — FLUPHENAZINE HYDROCHLORIDE 5 MILLIGRAM(S): 1 TABLET, FILM COATED ORAL at 08:08

## 2024-03-22 RX ADMIN — METFORMIN HYDROCHLORIDE 850 MILLIGRAM(S): 850 TABLET ORAL at 08:09

## 2024-03-22 RX ADMIN — Medication 50 MILLIGRAM(S): at 08:09

## 2024-03-22 RX ADMIN — RIVAROXABAN 20 MILLIGRAM(S): KIT at 17:17

## 2024-03-22 RX ADMIN — Medication 15 MILLIGRAM(S): at 21:42

## 2024-03-22 RX ADMIN — Medication 2 MILLIGRAM(S): at 08:09

## 2024-03-22 RX ADMIN — SENNA PLUS 2 TABLET(S): 8.6 TABLET ORAL at 02:49

## 2024-03-22 NOTE — BH INPATIENT PSYCHIATRY PROGRESS NOTE - OTHER
cannot rule out perceptual distortions unable to tolerate an MMSE at this time guarded, paranoid tenuous

## 2024-03-22 NOTE — BH INPATIENT PSYCHIATRY PROGRESS NOTE - NSBHASSESSSUMMFT_PSY_ALL_CORE
Patient is a 64 y/o woman,  x 10 years with PMHx of Schizophrenia, HTN, Afib, Hypothyroidism, NPH and hx of hypovolemic/septic shock from UTI admitted for management of paranoia in the context of treatment non-adherence.  Pt unable to engage in intake interview during initial encounter, fixated on obtaining a shower and refusing to engage in other topics. As per collateral patient has decompensated since being discharged from [facility] in December and being lost to f/u in terms of mental health treatment. From collateral pt presenting sx suggestive of psychotic decompensation including paranoid delusions, disorganized behavior and suspected AH in the context of non-adherence to pharmacotherapy. At home she was refusing meals, refusing to let HHA in and not leaving the house due to paranoia  Pt currently requires inpatient level of care as she is unable to care for herself in her current state, would benefit from med adjustments for stabilization and safe aftercare planning.    Impression: Schizophrenia    2/26: Pt taking meds inconsistently, requesting them and then refusing when they are offered, presenting oppositional behavior, disorganized, unable to engage meaningfully in discussions about her treatment with writer. May pursue TOO.   2/25: agitated, disorganized, loud, uncooperative, no SI/HI, refused prolixin and neurontin last night, to f/u compliance.  2/27: Pt compliant with psychiatric meds last night and this morning. Disorganized, tangential, with flight of ideas. Will titrate Prolixin to 2.5mg BID.   2/28: Compliant with psych meds, refusing Xarelto and Atorvastatin despite psychoeducation. Demanding and concrete.  3/1: Pt engaging superficially. Will minimize nighttime medications since pt has been refusing them. Takes meds in the morning only. Consolidate prolixin in the morning.   3/7: Compliant with her medications. Less irritable and responsive to redirection. Denies si/hi.  3/12: Pt has been calmer, less irritable, less pressured, able to tolerate short conversations. Plan is to transition to prolixin dec before discharge.   3/15: Calmer, still delusional at times. Continue monitoring for safety.   3/18: Reports stable mood, denies AH/VH/SI/HI. Agreed to take decanoate today after speaking with her friend. Received Prolixin dec 12.5mg IM today, next dose due in 3 weeks (4/8/24).  -Admit to [facility] 2S  -Legal status 2PC  -Routine checks  -Prolixin dec 12.5mg IM, next dose due 4/8/24  -Confirmed med list from patient's pharmacy [address]:    -Losartan 100/25 (losartan/hctz???)    -Metoprolol ER 50mg QD    -Cogentin 1mg BID    -Xarelto 20mg with dinner.    -Amlodipine 5mg QD    -Levothyroxine 50 micrograms QD  -Will resume Metoprolol with hold parameters. Initially held Losartan, Amlodipine and monitor BP, may resume if indicated.   -Resume Losartan, starting at 25mg QD with hold parameters, titrate as needed.

## 2024-03-22 NOTE — BH INPATIENT PSYCHIATRY PROGRESS NOTE - NSBHFUPINTERVALHXFT_PSY_A_CORE
Patient is seen and evaluated for f/u for Schizophrenia. Chart reviewed and discussed with nursing staff. VSS. No overnight events or prns needed. Received Prolixin dec 12.5mg IM on 3/18, next dose due in 3 weeks (4/8/24).  Pt is calm, tolerating back and forth conversation. Reports stable mood, denies AH/VH/SI/HI. Taking care of her ADLs. Thought process is concrete at baseline Visible in the unit. Pt would benefit from placement given tenuous housing situation and limited support.

## 2024-03-23 RX ADMIN — FLUPHENAZINE HYDROCHLORIDE 5 MILLIGRAM(S): 1 TABLET, FILM COATED ORAL at 08:53

## 2024-03-23 RX ADMIN — Medication 2 MILLIGRAM(S): at 08:52

## 2024-03-23 RX ADMIN — Medication 15 MILLIGRAM(S): at 22:27

## 2024-03-23 RX ADMIN — Medication 50 MICROGRAM(S): at 06:11

## 2024-03-23 RX ADMIN — METFORMIN HYDROCHLORIDE 850 MILLIGRAM(S): 850 TABLET ORAL at 08:52

## 2024-03-23 RX ADMIN — Medication 50 MILLIGRAM(S): at 08:53

## 2024-03-23 RX ADMIN — LOSARTAN POTASSIUM 25 MILLIGRAM(S): 100 TABLET, FILM COATED ORAL at 08:53

## 2024-03-24 LAB — GLUCOSE BLDC GLUCOMTR-MCNC: 163 MG/DL — HIGH (ref 70–99)

## 2024-03-24 RX ADMIN — FLUPHENAZINE HYDROCHLORIDE 5 MILLIGRAM(S): 1 TABLET, FILM COATED ORAL at 16:14

## 2024-03-24 RX ADMIN — Medication 15 MILLIGRAM(S): at 21:39

## 2024-03-24 RX ADMIN — FLUPHENAZINE HYDROCHLORIDE 5 MILLIGRAM(S): 1 TABLET, FILM COATED ORAL at 08:06

## 2024-03-24 RX ADMIN — METFORMIN HYDROCHLORIDE 850 MILLIGRAM(S): 850 TABLET ORAL at 08:07

## 2024-03-24 RX ADMIN — LOSARTAN POTASSIUM 25 MILLIGRAM(S): 100 TABLET, FILM COATED ORAL at 08:07

## 2024-03-24 RX ADMIN — Medication 50 MILLIGRAM(S): at 08:07

## 2024-03-24 RX ADMIN — Medication 2 MILLIGRAM(S): at 08:06

## 2024-03-24 RX ADMIN — ATORVASTATIN CALCIUM 40 MILLIGRAM(S): 80 TABLET, FILM COATED ORAL at 16:13

## 2024-03-24 RX ADMIN — RIVAROXABAN 20 MILLIGRAM(S): KIT at 16:13

## 2024-03-24 RX ADMIN — Medication 50 MICROGRAM(S): at 06:45

## 2024-03-25 LAB — GLUCOSE BLDC GLUCOMTR-MCNC: 116 MG/DL — HIGH (ref 70–99)

## 2024-03-25 PROCEDURE — 99232 SBSQ HOSP IP/OBS MODERATE 35: CPT

## 2024-03-25 RX ADMIN — ATORVASTATIN CALCIUM 40 MILLIGRAM(S): 80 TABLET, FILM COATED ORAL at 17:11

## 2024-03-25 RX ADMIN — FLUPHENAZINE HYDROCHLORIDE 5 MILLIGRAM(S): 1 TABLET, FILM COATED ORAL at 08:43

## 2024-03-25 RX ADMIN — Medication 15 MILLIGRAM(S): at 21:26

## 2024-03-25 RX ADMIN — Medication 50 MILLIGRAM(S): at 08:43

## 2024-03-25 RX ADMIN — FLUPHENAZINE HYDROCHLORIDE 5 MILLIGRAM(S): 1 TABLET, FILM COATED ORAL at 17:11

## 2024-03-25 RX ADMIN — Medication 50 MICROGRAM(S): at 06:55

## 2024-03-25 RX ADMIN — LOSARTAN POTASSIUM 25 MILLIGRAM(S): 100 TABLET, FILM COATED ORAL at 08:43

## 2024-03-25 RX ADMIN — METFORMIN HYDROCHLORIDE 850 MILLIGRAM(S): 850 TABLET ORAL at 08:43

## 2024-03-25 RX ADMIN — RIVAROXABAN 20 MILLIGRAM(S): KIT at 17:11

## 2024-03-25 RX ADMIN — Medication 2 MILLIGRAM(S): at 08:44

## 2024-03-25 NOTE — BH INPATIENT PSYCHIATRY PROGRESS NOTE - NSBHASSESSSUMMFT_PSY_ALL_CORE
Patient is a 62 y/o woman,  x 10 years with PMHx of Schizophrenia, HTN, Afib, Hypothyroidism, NPH and hx of hypovolemic/septic shock from UTI admitted for management of paranoia in the context of treatment non-adherence.  Pt unable to engage in intake interview during initial encounter, fixated on obtaining a shower and refusing to engage in other topics. As per collateral patient has decompensated since being discharged from [facility] in December and being lost to f/u in terms of mental health treatment. From collateral pt presenting sx suggestive of psychotic decompensation including paranoid delusions, disorganized behavior and suspected AH in the context of non-adherence to pharmacotherapy. At home she was refusing meals, refusing to let HHA in and not leaving the house due to paranoia  Pt currently requires inpatient level of care as she is unable to care for herself in her current state, would benefit from med adjustments for stabilization and safe aftercare planning.    Impression: Schizophrenia    2/26: Pt taking meds inconsistently, requesting them and then refusing when they are offered, presenting oppositional behavior, disorganized, unable to engage meaningfully in discussions about her treatment with writer. May pursue TOO.   2/25: agitated, disorganized, loud, uncooperative, no SI/HI, refused prolixin and neurontin last night, to f/u compliance.  2/27: Pt compliant with psychiatric meds last night and this morning. Disorganized, tangential, with flight of ideas. Will titrate Prolixin to 2.5mg BID.   2/28: Compliant with psych meds, refusing Xarelto and Atorvastatin despite psychoeducation. Demanding and concrete.  3/1: Pt engaging superficially. Will minimize nighttime medications since pt has been refusing them. Takes meds in the morning only. Consolidate prolixin in the morning.   3/7: Compliant with her medications. Less irritable and responsive to redirection. Denies si/hi.  3/12: Pt has been calmer, less irritable, less pressured, able to tolerate short conversations. Plan is to transition to prolixin dec before discharge.   3/15: Calmer, still delusional at times. Continue monitoring for safety.   3/18: Reports stable mood, denies AH/VH/SI/HI. Agreed to take decanoate today after speaking with her friend. Received Prolixin dec 12.5mg IM today, next dose due in 3 weeks (4/8/24).  -Admit to [facility] 2S  -Legal status 2PC  -Routine checks  -Prolixin dec 12.5mg IM, next dose due 4/8/24  -Confirmed med list from patient's pharmacy [address]:    -Losartan 100/25 (losartan/hctz???)    -Metoprolol ER 50mg QD    -Cogentin 1mg BID    -Xarelto 20mg with dinner.    -Amlodipine 5mg QD    -Levothyroxine 50 micrograms QD  -Will resume Metoprolol with hold parameters. Initially held Losartan, Amlodipine and monitor BP, may resume if indicated.   -Resume Losartan, starting at 25mg QD with hold parameters, titrate as needed.

## 2024-03-25 NOTE — BH INPATIENT PSYCHIATRY PROGRESS NOTE - NSBHFUPINTERVALHXFT_PSY_A_CORE
Patient is seen and evaluated for f/u for Schizophrenia. Chart reviewed and discussed with nursing staff. VSS. No overnight events or prns needed. Received Prolixin dec 12.5mg IM on 3/18, next dose due in 3 weeks (4/8/24).  Pt is calm, tolerating back and forth conversation. Reports stable mood, denies AH/VH/SI/HI. Taking care of her ADLs. Thought process is concrete at baseline Visible in the unit. Discharge focused.

## 2024-03-26 LAB — GLUCOSE BLDC GLUCOMTR-MCNC: 116 MG/DL — HIGH (ref 70–99)

## 2024-03-26 PROCEDURE — 99231 SBSQ HOSP IP/OBS SF/LOW 25: CPT

## 2024-03-26 RX ADMIN — Medication 50 MILLIGRAM(S): at 09:50

## 2024-03-26 RX ADMIN — FLUPHENAZINE HYDROCHLORIDE 5 MILLIGRAM(S): 1 TABLET, FILM COATED ORAL at 09:50

## 2024-03-26 RX ADMIN — FLUPHENAZINE HYDROCHLORIDE 5 MILLIGRAM(S): 1 TABLET, FILM COATED ORAL at 16:58

## 2024-03-26 RX ADMIN — Medication 15 MILLIGRAM(S): at 22:17

## 2024-03-26 RX ADMIN — Medication 2 MILLIGRAM(S): at 09:50

## 2024-03-26 RX ADMIN — LOSARTAN POTASSIUM 25 MILLIGRAM(S): 100 TABLET, FILM COATED ORAL at 09:49

## 2024-03-26 RX ADMIN — ATORVASTATIN CALCIUM 40 MILLIGRAM(S): 80 TABLET, FILM COATED ORAL at 16:58

## 2024-03-26 RX ADMIN — RIVAROXABAN 20 MILLIGRAM(S): KIT at 16:58

## 2024-03-26 RX ADMIN — Medication 50 MICROGRAM(S): at 06:24

## 2024-03-26 RX ADMIN — METFORMIN HYDROCHLORIDE 850 MILLIGRAM(S): 850 TABLET ORAL at 09:50

## 2024-03-26 NOTE — BH INPATIENT PSYCHIATRY PROGRESS NOTE - NSBHASSESSSUMMFT_PSY_ALL_CORE
Patient is a 62 y/o woman,  x 10 years with PMHx of Schizophrenia, HTN, Afib, Hypothyroidism, NPH and hx of hypovolemic/septic shock from UTI admitted for management of paranoia in the context of treatment non-adherence.  Pt unable to engage in intake interview during initial encounter, fixated on obtaining a shower and refusing to engage in other topics. As per collateral patient has decompensated since being discharged from [facility] in December and being lost to f/u in terms of mental health treatment. From collateral pt presenting sx suggestive of psychotic decompensation including paranoid delusions, disorganized behavior and suspected AH in the context of non-adherence to pharmacotherapy. At home she was refusing meals, refusing to let HHA in and not leaving the house due to paranoia  Pt currently requires inpatient level of care as she is unable to care for herself in her current state, would benefit from med adjustments for stabilization and safe aftercare planning.    Impression: Schizophrenia    3/26 Clinical update: Tolerating Prolixin dec. Pt is able to verbalize her needs to staff and tends to her ADLs. Denies AH/VH/SI/HI. Thought process remains concrete. Patient's close friend now voicing concerns about pt being discharged home and her ability to care for herself with her very limited HHA hours.     -Admit to [facility] 2S  -Legal status 2PC  -Routine checks  -Prolixin dec 12.5mg IM, next dose due 4/8/24  -Confirmed med list from patient's pharmacy [address]:    -Losartan 100/25 (losartan/hctz???)    -Metoprolol ER 50mg QD    -Cogentin 1mg BID    -Xarelto 20mg with dinner.    -Amlodipine 5mg QD    -Levothyroxine 50 micrograms QD  -Will resume Metoprolol with hold parameters. Initially held Losartan, Amlodipine and monitor BP, may resume if indicated.   -Resume Losartan, starting at 25mg QD with hold parameters, titrate as needed.

## 2024-03-26 NOTE — BH INPATIENT PSYCHIATRY PROGRESS NOTE - NSBHFUPINTERVALHXFT_PSY_A_CORE
Patient is seen and evaluated for f/u for Schizophrenia. Chart reviewed and discussed with nursing staff. VSS. No overnight events or prns needed. Received Prolixin dec 12.5mg IM on 3/18, next dose due in 3 weeks (4/8/24).  Pt is calm, tolerating back and forth conversation. Reports stable mood, denies AH/VH/SI/HI. Taking care of her ADLs. Thought process is concrete at baseline Visible in the unit. Tolerating Prolixin dec, will taper oral prolixin to 5mg QHS and eventually discontinue. Focused on being discharged home despite concerns about her ability to care for self alone at home as voiced by her close friend.

## 2024-03-27 LAB
GLUCOSE BLDC GLUCOMTR-MCNC: 174 MG/DL — HIGH (ref 70–99)
GLUCOSE BLDC GLUCOMTR-MCNC: 84 MG/DL — SIGNIFICANT CHANGE UP (ref 70–99)

## 2024-03-27 PROCEDURE — 99231 SBSQ HOSP IP/OBS SF/LOW 25: CPT

## 2024-03-27 RX ADMIN — FLUPHENAZINE HYDROCHLORIDE 5 MILLIGRAM(S): 1 TABLET, FILM COATED ORAL at 17:13

## 2024-03-27 RX ADMIN — RIVAROXABAN 20 MILLIGRAM(S): KIT at 17:12

## 2024-03-27 RX ADMIN — Medication 50 MICROGRAM(S): at 06:29

## 2024-03-27 RX ADMIN — Medication 15 MILLIGRAM(S): at 21:54

## 2024-03-27 RX ADMIN — ATORVASTATIN CALCIUM 40 MILLIGRAM(S): 80 TABLET, FILM COATED ORAL at 17:12

## 2024-03-27 RX ADMIN — Medication 50 MILLIGRAM(S): at 09:01

## 2024-03-27 RX ADMIN — LOSARTAN POTASSIUM 25 MILLIGRAM(S): 100 TABLET, FILM COATED ORAL at 09:00

## 2024-03-27 RX ADMIN — METFORMIN HYDROCHLORIDE 850 MILLIGRAM(S): 850 TABLET ORAL at 09:01

## 2024-03-27 RX ADMIN — Medication 2 MILLIGRAM(S): at 09:00

## 2024-03-27 NOTE — BH INPATIENT PSYCHIATRY PROGRESS NOTE - NSBHFUPINTERVALHXFT_PSY_A_CORE
Patient is seen and evaluated for f/u for Schizophrenia. Chart reviewed and discussed with nursing staff. VSS. No overnight events or prns needed. Received Prolixin dec 12.5mg IM on 3/18, next dose due in 3 weeks (4/8/24).  Pt is calm, tolerating back and forth conversation. Reports stable mood, denies AH/VH/SI/HI. Taking care of her ADLs. Will continue tapering PO Prolixin and continue MURILLO every 3 weeks. Ambivalent about discharge to a nursing home/rehab temporarily. Will start process.

## 2024-03-27 NOTE — BH INPATIENT PSYCHIATRY PROGRESS NOTE - CURRENT MEDICATION
MEDICATIONS  (STANDING):  atorvastatin 40 milliGRAM(s) Oral <User Schedule>  benztropine 2 milliGRAM(s) Oral daily  busPIRone 15 milliGRAM(s) Oral <User Schedule>  fluPHENAZine 5 milliGRAM(s) Oral <User Schedule>  levothyroxine 50 MICROGram(s) Oral daily  losartan 25 milliGRAM(s) Oral daily  metFORMIN 850 milliGRAM(s) Oral daily  metoprolol succinate ER 50 milliGRAM(s) Oral daily  rivaroxaban 20 milliGRAM(s) Oral with dinner    MEDICATIONS  (PRN):  acetaminophen     Tablet .. 650 milliGRAM(s) Oral every 6 hours PRN Temp greater or equal to 38C (100.4F), Mild Pain (1 - 3), Moderate Pain (4 - 6)  fluPHENAZine 2.5 milliGRAM(s) Oral every 8 hours PRN Agitation stemming from psychosis  fluPHENAZine  Injectable 2 milliGRAM(s) IntraMuscular once PRN Severe agitation stemming from psychosis  melatonin 3 milliGRAM(s) Oral at bedtime PRN Insomnia  senna 2 Tablet(s) Oral daily PRN constipation

## 2024-03-27 NOTE — BH INPATIENT PSYCHIATRY PROGRESS NOTE - OTHER
cannot rule out perceptual distortions tenuous unable to tolerate an MMSE at this time Normal vision: sees adequately in most situations; can see medication labels, newsprint guarded, paranoid

## 2024-03-27 NOTE — BH INPATIENT PSYCHIATRY PROGRESS NOTE - NSBHASSESSSUMMFT_PSY_ALL_CORE
Patient is a 62 y/o woman,  x 10 years with PMHx of Schizophrenia, HTN, Afib, Hypothyroidism, NPH and hx of hypovolemic/septic shock from UTI admitted for management of paranoia in the context of treatment non-adherence.  Pt unable to engage in intake interview during initial encounter, fixated on obtaining a shower and refusing to engage in other topics. As per collateral patient has decompensated since being discharged from [facility] in December and being lost to f/u in terms of mental health treatment. From collateral pt presenting sx suggestive of psychotic decompensation including paranoid delusions, disorganized behavior and suspected AH in the context of non-adherence to pharmacotherapy. At home she was refusing meals, refusing to let HHA in and not leaving the house due to paranoia  Pt currently requires inpatient level of care as she is unable to care for herself in her current state, would benefit from med adjustments for stabilization and safe aftercare planning.    Impression: Schizophrenia    3/26 Clinical update: Tolerating Prolixin dec. Pt is able to verbalize her needs to staff and tends to her ADLs. Denies AH/VH/SI/HI. Thought process remains concrete. Patient's close friend now voicing concerns about pt being discharged home and her ability to care for herself with her very limited HHA hours.     -Admit to [facility] 2S  -Legal status 2PC  -Routine checks  -Prolixin dec 12.5mg IM, next dose due 4/8/24  -Confirmed med list from patient's pharmacy [address]:    -Losartan 100/25 (losartan/hctz???)    -Metoprolol ER 50mg QD    -Cogentin 1mg BID    -Xarelto 20mg with dinner.    -Amlodipine 5mg QD    -Levothyroxine 50 micrograms QD  -Will resume Metoprolol with hold parameters. Initially held Losartan, Amlodipine and monitor BP, may resume if indicated.   -Resume Losartan, starting at 25mg QD with hold parameters, titrate as needed.

## 2024-03-28 LAB — GLUCOSE BLDC GLUCOMTR-MCNC: 127 MG/DL — HIGH (ref 70–99)

## 2024-03-28 PROCEDURE — 99231 SBSQ HOSP IP/OBS SF/LOW 25: CPT

## 2024-03-28 RX ADMIN — Medication 50 MILLIGRAM(S): at 10:06

## 2024-03-28 RX ADMIN — RIVAROXABAN 20 MILLIGRAM(S): KIT at 17:07

## 2024-03-28 RX ADMIN — Medication 2 MILLIGRAM(S): at 10:06

## 2024-03-28 RX ADMIN — METFORMIN HYDROCHLORIDE 850 MILLIGRAM(S): 850 TABLET ORAL at 10:06

## 2024-03-28 RX ADMIN — ATORVASTATIN CALCIUM 40 MILLIGRAM(S): 80 TABLET, FILM COATED ORAL at 17:06

## 2024-03-28 RX ADMIN — FLUPHENAZINE HYDROCHLORIDE 5 MILLIGRAM(S): 1 TABLET, FILM COATED ORAL at 17:07

## 2024-03-28 RX ADMIN — Medication 15 MILLIGRAM(S): at 21:20

## 2024-03-28 RX ADMIN — Medication 50 MICROGRAM(S): at 06:53

## 2024-03-28 RX ADMIN — LOSARTAN POTASSIUM 25 MILLIGRAM(S): 100 TABLET, FILM COATED ORAL at 10:07

## 2024-03-28 NOTE — BH INPATIENT PSYCHIATRY PROGRESS NOTE - NSBHASSESSSUMMFT_PSY_ALL_CORE
Patient is a 62 y/o woman,  x 10 years with PMHx of Schizophrenia, HTN, Afib, Hypothyroidism, NPH and hx of hypovolemic/septic shock from UTI admitted for management of paranoia in the context of treatment non-adherence.  Pt unable to engage in intake interview during initial encounter, fixated on obtaining a shower and refusing to engage in other topics. As per collateral patient has decompensated since being discharged from [facility] in December and being lost to f/u in terms of mental health treatment. From collateral pt presenting sx suggestive of psychotic decompensation including paranoid delusions, disorganized behavior and suspected AH in the context of non-adherence to pharmacotherapy. At home she was refusing meals, refusing to let HHA in and not leaving the house due to paranoia  Pt currently requires inpatient level of care as she is unable to care for herself in her current state, would benefit from med adjustments for stabilization and safe aftercare planning.    Impression: Schizophrenia    3/26 Clinical update: Tolerating Prolixin dec. Pt is able to verbalize her needs to staff and tends to her ADLs. Denies AH/VH/SI/HI. Thought process remains concrete. Patient's close friend now voicing concerns about pt being discharged home and her ability to care for herself with her very limited HHA hours.     -Admit to [facility] 2S  -Legal status 2PC  -Routine checks  -Prolixin dec 12.5mg IM, next dose due 4/8/24  -Confirmed med list from patient's pharmacy [address]:    -Losartan 100/25 (losartan/hctz???)    -Metoprolol ER 50mg QD    -Cogentin 1mg BID    -Xarelto 20mg with dinner.    -Amlodipine 5mg QD    -Levothyroxine 50 micrograms QD  -Will resume Metoprolol with hold parameters. Initially held Losartan, Amlodipine and monitor BP, may resume if indicated.   -Resume Losartan, starting at 25mg QD with hold parameters, titrate as needed.

## 2024-03-29 LAB — GLUCOSE BLDC GLUCOMTR-MCNC: 175 MG/DL — HIGH (ref 70–99)

## 2024-03-29 PROCEDURE — 99231 SBSQ HOSP IP/OBS SF/LOW 25: CPT

## 2024-03-29 RX ORDER — FLUPHENAZINE HYDROCHLORIDE 1 MG/1
5 TABLET, FILM COATED ORAL DAILY
Refills: 0 | Status: DISCONTINUED | OUTPATIENT
Start: 2024-03-29 | End: 2024-04-05

## 2024-03-29 RX ADMIN — Medication 50 MICROGRAM(S): at 05:59

## 2024-03-29 RX ADMIN — Medication 2 MILLIGRAM(S): at 09:48

## 2024-03-29 RX ADMIN — FLUPHENAZINE HYDROCHLORIDE 5 MILLIGRAM(S): 1 TABLET, FILM COATED ORAL at 17:12

## 2024-03-29 RX ADMIN — RIVAROXABAN 20 MILLIGRAM(S): KIT at 17:12

## 2024-03-29 RX ADMIN — ATORVASTATIN CALCIUM 40 MILLIGRAM(S): 80 TABLET, FILM COATED ORAL at 17:12

## 2024-03-29 RX ADMIN — FLUPHENAZINE HYDROCHLORIDE 5 MILLIGRAM(S): 1 TABLET, FILM COATED ORAL at 09:48

## 2024-03-29 RX ADMIN — Medication 15 MILLIGRAM(S): at 21:15

## 2024-03-29 RX ADMIN — METFORMIN HYDROCHLORIDE 850 MILLIGRAM(S): 850 TABLET ORAL at 09:48

## 2024-03-29 RX ADMIN — Medication 50 MILLIGRAM(S): at 09:48

## 2024-03-29 RX ADMIN — LOSARTAN POTASSIUM 25 MILLIGRAM(S): 100 TABLET, FILM COATED ORAL at 09:48

## 2024-03-29 NOTE — BH INPATIENT PSYCHIATRY PROGRESS NOTE - NSBHASSESSSUMMFT_PSY_ALL_CORE
Patient is a 62 y/o woman,  x 10 years with PMHx of Schizophrenia, HTN, Afib, Hypothyroidism, NPH and hx of hypovolemic/septic shock from UTI admitted for management of paranoia in the context of treatment non-adherence.  Pt unable to engage in intake interview during initial encounter, fixated on obtaining a shower and refusing to engage in other topics. As per collateral patient has decompensated since being discharged from [facility] in December and being lost to f/u in terms of mental health treatment. From collateral pt presenting sx suggestive of psychotic decompensation including paranoid delusions, disorganized behavior and suspected AH in the context of non-adherence to pharmacotherapy. At home she was refusing meals, refusing to let HHA in and not leaving the house due to paranoia  Pt currently requires inpatient level of care as she is unable to care for herself in her current state, would benefit from med adjustments for stabilization and safe aftercare planning.    Impression: Schizophrenia    3/26 Clinical update: Tolerating Prolixin dec. Pt is able to verbalize her needs to staff and tends to her ADLs. Denies AH/VH/SI/HI. Thought process remains concrete. Patient's close friend now voicing concerns about pt being discharged home and her ability to care for herself with her very limited HHA hours.     -Admit to [facility] 2S  -Legal status 2PC  -Routine checks  -Prolixin dec 12.5mg IM, next dose due 4/8/24  -Confirmed med list from patient's pharmacy [address]:    -Losartan 100/25 (losartan/hctz???)    -Metoprolol ER 50mg QD    -Cogentin 1mg BID    -Xarelto 20mg with dinner.    -Amlodipine 5mg QD    -Levothyroxine 50 micrograms QD  -Will resume Metoprolol with hold parameters. Initially held Losartan, Amlodipine and monitor BP, may resume if indicated.   -Resume Losartan, starting at 25mg QD with hold parameters, titrate as needed.

## 2024-03-29 NOTE — BH INPATIENT PSYCHIATRY PROGRESS NOTE - NSBHFUPINTERVALHXFT_PSY_A_CORE
Patient is seen and evaluated for f/u for Schizophrenia. Chart reviewed and discussed with nursing staff. VSS. No overnight events or prns needed. Received Prolixin dec 12.5mg IM on 3/18, next dose due in 3 weeks (4/8/24).  Pt reports feeling her thoughts are racing today and asked if her meds have been changed. Denies AH/VH/SI/HI. Will add Prolixin 5mg QD again.

## 2024-03-30 LAB — GLUCOSE BLDC GLUCOMTR-MCNC: 125 MG/DL — HIGH (ref 70–99)

## 2024-03-30 RX ADMIN — Medication 50 MICROGRAM(S): at 06:16

## 2024-03-30 RX ADMIN — FLUPHENAZINE HYDROCHLORIDE 5 MILLIGRAM(S): 1 TABLET, FILM COATED ORAL at 09:25

## 2024-03-30 RX ADMIN — Medication 2 MILLIGRAM(S): at 09:25

## 2024-03-30 RX ADMIN — Medication 15 MILLIGRAM(S): at 21:29

## 2024-03-30 RX ADMIN — ATORVASTATIN CALCIUM 40 MILLIGRAM(S): 80 TABLET, FILM COATED ORAL at 16:46

## 2024-03-30 RX ADMIN — RIVAROXABAN 20 MILLIGRAM(S): KIT at 16:46

## 2024-03-30 RX ADMIN — METFORMIN HYDROCHLORIDE 850 MILLIGRAM(S): 850 TABLET ORAL at 09:25

## 2024-03-30 RX ADMIN — LOSARTAN POTASSIUM 25 MILLIGRAM(S): 100 TABLET, FILM COATED ORAL at 09:25

## 2024-03-30 RX ADMIN — FLUPHENAZINE HYDROCHLORIDE 5 MILLIGRAM(S): 1 TABLET, FILM COATED ORAL at 16:47

## 2024-03-30 RX ADMIN — Medication 50 MILLIGRAM(S): at 09:25

## 2024-03-31 LAB — GLUCOSE BLDC GLUCOMTR-MCNC: 115 MG/DL — HIGH (ref 70–99)

## 2024-03-31 RX ADMIN — Medication 15 MILLIGRAM(S): at 21:25

## 2024-03-31 RX ADMIN — METFORMIN HYDROCHLORIDE 850 MILLIGRAM(S): 850 TABLET ORAL at 09:44

## 2024-03-31 RX ADMIN — LOSARTAN POTASSIUM 25 MILLIGRAM(S): 100 TABLET, FILM COATED ORAL at 09:43

## 2024-03-31 RX ADMIN — ATORVASTATIN CALCIUM 40 MILLIGRAM(S): 80 TABLET, FILM COATED ORAL at 17:02

## 2024-03-31 RX ADMIN — RIVAROXABAN 20 MILLIGRAM(S): KIT at 17:02

## 2024-03-31 RX ADMIN — Medication 50 MILLIGRAM(S): at 09:43

## 2024-03-31 RX ADMIN — FLUPHENAZINE HYDROCHLORIDE 5 MILLIGRAM(S): 1 TABLET, FILM COATED ORAL at 09:43

## 2024-03-31 RX ADMIN — Medication 2 MILLIGRAM(S): at 09:43

## 2024-03-31 RX ADMIN — FLUPHENAZINE HYDROCHLORIDE 5 MILLIGRAM(S): 1 TABLET, FILM COATED ORAL at 17:02

## 2024-03-31 RX ADMIN — Medication 50 MICROGRAM(S): at 06:53

## 2024-04-01 LAB — GLUCOSE BLDC GLUCOMTR-MCNC: 112 MG/DL — HIGH (ref 70–99)

## 2024-04-01 PROCEDURE — 99231 SBSQ HOSP IP/OBS SF/LOW 25: CPT

## 2024-04-01 RX ADMIN — Medication 2 MILLIGRAM(S): at 08:06

## 2024-04-01 RX ADMIN — Medication 50 MILLIGRAM(S): at 08:07

## 2024-04-01 RX ADMIN — LOSARTAN POTASSIUM 25 MILLIGRAM(S): 100 TABLET, FILM COATED ORAL at 08:06

## 2024-04-01 RX ADMIN — Medication 15 MILLIGRAM(S): at 21:12

## 2024-04-01 RX ADMIN — ATORVASTATIN CALCIUM 40 MILLIGRAM(S): 80 TABLET, FILM COATED ORAL at 17:34

## 2024-04-01 RX ADMIN — METFORMIN HYDROCHLORIDE 850 MILLIGRAM(S): 850 TABLET ORAL at 08:06

## 2024-04-01 RX ADMIN — Medication 50 MICROGRAM(S): at 06:40

## 2024-04-01 RX ADMIN — FLUPHENAZINE HYDROCHLORIDE 5 MILLIGRAM(S): 1 TABLET, FILM COATED ORAL at 08:06

## 2024-04-01 RX ADMIN — RIVAROXABAN 20 MILLIGRAM(S): KIT at 17:33

## 2024-04-01 RX ADMIN — FLUPHENAZINE HYDROCHLORIDE 5 MILLIGRAM(S): 1 TABLET, FILM COATED ORAL at 17:34

## 2024-04-01 NOTE — BH INPATIENT PSYCHIATRY PROGRESS NOTE - OTHER
unable to tolerate an MMSE at this time cannot rule out perceptual distortions guarded, paranoid tenuous

## 2024-04-01 NOTE — BH INPATIENT PSYCHIATRY PROGRESS NOTE - NSBHFUPINTERVALHXFT_PSY_A_CORE
Patient is seen and evaluated for f/u for Schizophrenia. Chart reviewed and discussed with nursing staff. VSS. No overnight events or prns needed. Received Prolixin dec 12.5mg IM on 3/18, next dose due in 3 weeks (4/8/24).  Pt back to baseline after adding 5mg prolixin in the morning again, may benefit from higher dose of Prolixin dec next time. Amenable to transfer to nursing home temporarily. Denies SI/HI/AH/VH. Visible in the unit and caring for her ADLs.

## 2024-04-01 NOTE — BH INPATIENT PSYCHIATRY PROGRESS NOTE - NSBHASSESSSUMMFT_PSY_ALL_CORE
Patient is a 62 y/o woman,  x 10 years with PMHx of Schizophrenia, HTN, Afib, Hypothyroidism, NPH and hx of hypovolemic/septic shock from UTI admitted for management of paranoia in the context of treatment non-adherence.  Pt unable to engage in intake interview during initial encounter, fixated on obtaining a shower and refusing to engage in other topics. As per collateral patient has decompensated since being discharged from [facility] in December and being lost to f/u in terms of mental health treatment. From collateral pt presenting sx suggestive of psychotic decompensation including paranoid delusions, disorganized behavior and suspected AH in the context of non-adherence to pharmacotherapy. At home she was refusing meals, refusing to let HHA in and not leaving the house due to paranoia  Pt currently requires inpatient level of care as she is unable to care for herself in her current state, would benefit from med adjustments for stabilization and safe aftercare planning.    Impression: Schizophrenia    4/1 Clinical update: Tolerating Prolixin dec. Pt is able to verbalize her needs to staff and tends to her ADLs. Denies AH/VH/SI/HI. May benefit from higher dose of Prolixin dec next time given she was unable to tolerate tapering PO Prolixin after 2 weeks. Amenable to transfer to nursing home temporarily.  -Admit to [facility] 2S  -Legal status 2PC  -Routine checks  -Prolixin dec 12.5mg IM, next dose due 4/8/24  -Confirmed med list from patient's pharmacy [address]:    -Losartan 100/25 (losartan/hctz???)    -Metoprolol ER 50mg QD    -Cogentin 1mg BID    -Xarelto 20mg with dinner.    -Amlodipine 5mg QD    -Levothyroxine 50 micrograms QD  -Will resume Metoprolol with hold parameters. Initially held Losartan, Amlodipine and monitor BP, may resume if indicated.   -Resume Losartan, starting at 25mg QD with hold parameters, titrate as needed.

## 2024-04-02 LAB — GLUCOSE BLDC GLUCOMTR-MCNC: 103 MG/DL — HIGH (ref 70–99)

## 2024-04-02 PROCEDURE — 99231 SBSQ HOSP IP/OBS SF/LOW 25: CPT

## 2024-04-02 RX ADMIN — FLUPHENAZINE HYDROCHLORIDE 5 MILLIGRAM(S): 1 TABLET, FILM COATED ORAL at 16:50

## 2024-04-02 RX ADMIN — Medication 2 MILLIGRAM(S): at 08:12

## 2024-04-02 RX ADMIN — FLUPHENAZINE HYDROCHLORIDE 5 MILLIGRAM(S): 1 TABLET, FILM COATED ORAL at 08:12

## 2024-04-02 RX ADMIN — Medication 15 MILLIGRAM(S): at 21:06

## 2024-04-02 RX ADMIN — RIVAROXABAN 20 MILLIGRAM(S): KIT at 16:50

## 2024-04-02 RX ADMIN — LOSARTAN POTASSIUM 25 MILLIGRAM(S): 100 TABLET, FILM COATED ORAL at 08:12

## 2024-04-02 RX ADMIN — Medication 50 MILLIGRAM(S): at 08:12

## 2024-04-02 RX ADMIN — ATORVASTATIN CALCIUM 40 MILLIGRAM(S): 80 TABLET, FILM COATED ORAL at 16:49

## 2024-04-02 RX ADMIN — Medication 50 MICROGRAM(S): at 06:26

## 2024-04-02 RX ADMIN — METFORMIN HYDROCHLORIDE 850 MILLIGRAM(S): 850 TABLET ORAL at 08:12

## 2024-04-02 NOTE — BH INPATIENT PSYCHIATRY PROGRESS NOTE - NSBHMSEKNOWHOW_PSY_ALL_CORE
Vocabulary

## 2024-04-02 NOTE — BH INPATIENT PSYCHIATRY PROGRESS NOTE - NSBHASSESSSUMMFT_PSY_ALL_CORE
Patient is a 64 y/o woman,  x 10 years with PMHx of Schizophrenia, HTN, Afib, Hypothyroidism, NPH and hx of hypovolemic/septic shock from UTI admitted for management of paranoia in the context of treatment non-adherence.  Pt unable to engage in intake interview during initial encounter, fixated on obtaining a shower and refusing to engage in other topics. As per collateral patient has decompensated since being discharged from [facility] in December and being lost to f/u in terms of mental health treatment. From collateral pt presenting sx suggestive of psychotic decompensation including paranoid delusions, disorganized behavior and suspected AH in the context of non-adherence to pharmacotherapy. At home she was refusing meals, refusing to let HHA in and not leaving the house due to paranoia  Pt currently requires inpatient level of care as she is unable to care for herself in her current state, would benefit from med adjustments for stabilization and safe aftercare planning.    Impression: Schizophrenia    4/1 Clinical update: Tolerating Prolixin dec. Pt is able to verbalize her needs to staff and tends to her ADLs. Denies AH/VH/SI/HI. May benefit from higher dose of Prolixin dec next time given she was unable to tolerate tapering PO Prolixin after 2 weeks. Amenable to transfer to nursing home temporarily.  -Admit to [facility] 2S  -Legal status 2PC  -Routine checks  -Prolixin dec 12.5mg IM, next dose due 4/8/24  -Confirmed med list from patient's pharmacy [address]:    -Losartan 100/25 (losartan/hctz???)    -Metoprolol ER 50mg QD    -Cogentin 1mg BID    -Xarelto 20mg with dinner.    -Amlodipine 5mg QD    -Levothyroxine 50 micrograms QD  -Will resume Metoprolol with hold parameters. Initially held Losartan, Amlodipine and monitor BP, may resume if indicated.   -Resume Losartan, starting at 25mg QD with hold parameters, titrate as needed.

## 2024-04-03 LAB — GLUCOSE BLDC GLUCOMTR-MCNC: 106 MG/DL — HIGH (ref 70–99)

## 2024-04-03 RX ADMIN — Medication 15 MILLIGRAM(S): at 21:20

## 2024-04-03 RX ADMIN — RIVAROXABAN 20 MILLIGRAM(S): KIT at 17:08

## 2024-04-03 RX ADMIN — LOSARTAN POTASSIUM 25 MILLIGRAM(S): 100 TABLET, FILM COATED ORAL at 08:01

## 2024-04-03 RX ADMIN — Medication 50 MICROGRAM(S): at 05:21

## 2024-04-03 RX ADMIN — ATORVASTATIN CALCIUM 40 MILLIGRAM(S): 80 TABLET, FILM COATED ORAL at 17:08

## 2024-04-03 RX ADMIN — Medication 2 MILLIGRAM(S): at 08:01

## 2024-04-03 RX ADMIN — METFORMIN HYDROCHLORIDE 850 MILLIGRAM(S): 850 TABLET ORAL at 08:01

## 2024-04-03 RX ADMIN — FLUPHENAZINE HYDROCHLORIDE 5 MILLIGRAM(S): 1 TABLET, FILM COATED ORAL at 08:01

## 2024-04-03 RX ADMIN — FLUPHENAZINE HYDROCHLORIDE 5 MILLIGRAM(S): 1 TABLET, FILM COATED ORAL at 17:08

## 2024-04-03 RX ADMIN — Medication 50 MILLIGRAM(S): at 08:01

## 2024-04-03 NOTE — BH INPATIENT PSYCHIATRY PROGRESS NOTE - NSBHFUPINTERVALHXFT_PSY_A_CORE
Patient is seen and evaluated for f/u for Schizophrenia. Chart reviewed and discussed with nursing staff. VSS. Adherent with oral meds. No overnight events or prns needed. Received Prolixin dec 12.5mg IM on 3/18, next dose due in 3 weeks (4/8/24).  Pt back to baseline after adding 5mg prolixin in the morning again, may benefit from higher dose of Prolixin dec next time. Patient reports sleeping and eating well. Reports she has been better than before. Discussed about going to nursing home and patient and patient was amenable. Denies suicidal ideation, intent or plan. No paranoia or AH elicited. Visible in the unit and caring for her ADLs.

## 2024-04-03 NOTE — BH INPATIENT PSYCHIATRY PROGRESS NOTE - NSBHASSESSSUMMFT_PSY_ALL_CORE
Patient is a 64 y/o woman,  x 10 years with PMHx of Schizophrenia, HTN, Afib, Hypothyroidism, NPH and hx of hypovolemic/septic shock from UTI admitted for management of paranoia in the context of treatment non-adherence.  Pt unable to engage in intake interview during initial encounter, fixated on obtaining a shower and refusing to engage in other topics. As per collateral patient has decompensated since being discharged from [facility] in December and being lost to f/u in terms of mental health treatment. From collateral pt presenting sx suggestive of psychotic decompensation including paranoid delusions, disorganized behavior and suspected AH in the context of non-adherence to pharmacotherapy. At home she was refusing meals, refusing to let HHA in and not leaving the house due to paranoia  Pt currently requires inpatient level of care as she is unable to care for herself in her current state, would benefit from med adjustments for stabilization and safe aftercare planning.    Impression: Schizophrenia    4/3 Clinical update: Patient overall improving. May benefit from higher dose of Prolixin dec next time given she was unable to tolerate tapering PO Prolixin after 2 weeks. Continue current medications. Amenable to transfer to nursing home temporarily.  -Admit to [facility] 2S  -Legal status 2PC  -Routine checks  -Prolixin dec 12.5mg IM, next dose due 4/8/24  -Confirmed med list from patient's pharmacy [address]:    -Losartan 100/25 (losartan/hctz???)    -Metoprolol ER 50mg QD    -Cogentin 1mg BID    -Xarelto 20mg with dinner.    -Amlodipine 5mg QD    -Levothyroxine 50 micrograms QD  -Will resume Metoprolol with hold parameters. Initially held Losartan, Amlodipine and monitor BP, may resume if indicated.   -Resume Losartan, starting at 25mg QD with hold parameters, titrate as needed.

## 2024-04-04 LAB — GLUCOSE BLDC GLUCOMTR-MCNC: 99 MG/DL — SIGNIFICANT CHANGE UP (ref 70–99)

## 2024-04-04 PROCEDURE — 99231 SBSQ HOSP IP/OBS SF/LOW 25: CPT

## 2024-04-04 RX ADMIN — ATORVASTATIN CALCIUM 40 MILLIGRAM(S): 80 TABLET, FILM COATED ORAL at 17:03

## 2024-04-04 RX ADMIN — Medication 2 MILLIGRAM(S): at 08:26

## 2024-04-04 RX ADMIN — FLUPHENAZINE HYDROCHLORIDE 5 MILLIGRAM(S): 1 TABLET, FILM COATED ORAL at 08:26

## 2024-04-04 RX ADMIN — Medication 50 MICROGRAM(S): at 06:30

## 2024-04-04 RX ADMIN — RIVAROXABAN 20 MILLIGRAM(S): KIT at 17:03

## 2024-04-04 RX ADMIN — Medication 15 MILLIGRAM(S): at 21:24

## 2024-04-04 RX ADMIN — Medication 50 MILLIGRAM(S): at 08:25

## 2024-04-04 RX ADMIN — LOSARTAN POTASSIUM 25 MILLIGRAM(S): 100 TABLET, FILM COATED ORAL at 08:25

## 2024-04-04 RX ADMIN — METFORMIN HYDROCHLORIDE 850 MILLIGRAM(S): 850 TABLET ORAL at 08:26

## 2024-04-04 RX ADMIN — FLUPHENAZINE HYDROCHLORIDE 5 MILLIGRAM(S): 1 TABLET, FILM COATED ORAL at 17:03

## 2024-04-04 NOTE — BH INPATIENT PSYCHIATRY PROGRESS NOTE - NSBHFUPINTERVALHXFT_PSY_A_CORE
Patient is seen and evaluated for f/u for Schizophrenia. Chart reviewed and discussed with nursing staff. VSS. Adherent with oral meds. No overnight events or prns needed. Received Prolixin dec 12.5mg IM on 3/18, next dose due in 3 weeks (4/8/24).  No interval changes. Writer went over discharge plan to SNF temporarily until proper homecare can be established. Pt insisting she would rather go home directly, stating she can drive and take care of herself. Despite the fact she has not been driving for a long time now. Pt stated she would call her friend Luciano and discuss.

## 2024-04-04 NOTE — BH INPATIENT PSYCHIATRY PROGRESS NOTE - NSBHASSESSSUMMFT_PSY_ALL_CORE
Patient is a 62 y/o woman,  x 10 years with PMHx of Schizophrenia, HTN, Afib, Hypothyroidism, NPH and hx of hypovolemic/septic shock from UTI admitted for management of paranoia in the context of treatment non-adherence.  Pt unable to engage in intake interview during initial encounter, fixated on obtaining a shower and refusing to engage in other topics. As per collateral patient has decompensated since being discharged from [facility] in December and being lost to f/u in terms of mental health treatment. From collateral pt presenting sx suggestive of psychotic decompensation including paranoid delusions, disorganized behavior and suspected AH in the context of non-adherence to pharmacotherapy. At home she was refusing meals, refusing to let HHA in and not leaving the house due to paranoia  Pt currently requires inpatient level of care as she is unable to care for herself in her current state, would benefit from med adjustments for stabilization and safe aftercare planning.    Impression: Schizophrenia    4/4 Clinical update: Patient overall improving. May benefit from higher dose of Prolixin dec next time given she was unable to tolerate tapering PO Prolixin after 2 weeks. Continue current medications. Amenable to transfer to nursing home temporarily.  -Admit to [facility] 2S  -Legal status 2PC  -Routine checks  -Prolixin dec 12.5mg IM, next dose due 4/8/24  -Confirmed med list from patient's pharmacy [address]:    -Losartan 100/25 (losartan/hctz???)    -Metoprolol ER 50mg QD    -Cogentin 1mg BID    -Xarelto 20mg with dinner.    -Amlodipine 5mg QD    -Levothyroxine 50 micrograms QD  -Will resume Metoprolol with hold parameters. Initially held Losartan, Amlodipine and monitor BP, may resume if indicated.   -Resume Losartan, starting at 25mg QD with hold parameters, titrate as needed.

## 2024-04-05 LAB — GLUCOSE BLDC GLUCOMTR-MCNC: 109 MG/DL — HIGH (ref 70–99)

## 2024-04-05 PROCEDURE — 99231 SBSQ HOSP IP/OBS SF/LOW 25: CPT

## 2024-04-05 RX ORDER — FLUPHENAZINE HYDROCHLORIDE 1 MG/1
2.5 TABLET, FILM COATED ORAL DAILY
Refills: 0 | Status: DISCONTINUED | OUTPATIENT
Start: 2024-04-05 | End: 2024-04-08

## 2024-04-05 RX ORDER — FLUPHENAZINE HYDROCHLORIDE 1 MG/1
18.75 TABLET, FILM COATED ORAL ONCE
Refills: 0 | Status: COMPLETED | OUTPATIENT
Start: 2024-04-08 | End: 2024-04-08

## 2024-04-05 RX ADMIN — METFORMIN HYDROCHLORIDE 850 MILLIGRAM(S): 850 TABLET ORAL at 08:49

## 2024-04-05 RX ADMIN — Medication 50 MICROGRAM(S): at 05:39

## 2024-04-05 RX ADMIN — Medication 2 MILLIGRAM(S): at 08:50

## 2024-04-05 RX ADMIN — Medication 50 MILLIGRAM(S): at 08:50

## 2024-04-05 RX ADMIN — Medication 15 MILLIGRAM(S): at 21:21

## 2024-04-05 RX ADMIN — ATORVASTATIN CALCIUM 40 MILLIGRAM(S): 80 TABLET, FILM COATED ORAL at 16:58

## 2024-04-05 RX ADMIN — FLUPHENAZINE HYDROCHLORIDE 5 MILLIGRAM(S): 1 TABLET, FILM COATED ORAL at 16:58

## 2024-04-05 RX ADMIN — LOSARTAN POTASSIUM 25 MILLIGRAM(S): 100 TABLET, FILM COATED ORAL at 08:50

## 2024-04-05 RX ADMIN — FLUPHENAZINE HYDROCHLORIDE 5 MILLIGRAM(S): 1 TABLET, FILM COATED ORAL at 10:22

## 2024-04-05 RX ADMIN — RIVAROXABAN 20 MILLIGRAM(S): KIT at 16:58

## 2024-04-05 NOTE — BH INPATIENT PSYCHIATRY PROGRESS NOTE - NSBHASSESSSUMMFT_PSY_ALL_CORE
Patient is a 64 y/o woman,  x 10 years with PMHx of Schizophrenia, HTN, Afib, Hypothyroidism, NPH and hx of hypovolemic/septic shock from UTI admitted for management of paranoia in the context of treatment non-adherence.  Pt unable to engage in intake interview during initial encounter, fixated on obtaining a shower and refusing to engage in other topics. As per collateral patient has decompensated since being discharged from [facility] in December and being lost to f/u in terms of mental health treatment. From collateral pt presenting sx suggestive of psychotic decompensation including paranoid delusions, disorganized behavior and suspected AH in the context of non-adherence to pharmacotherapy. At home she was refusing meals, refusing to let HHA in and not leaving the house due to paranoia  Pt currently requires inpatient level of care as she is unable to care for herself in her current state, would benefit from med adjustments for stabilization and safe aftercare planning.    Impression: Schizophrenia    4/4 Clinical update: Patient overall improving. May benefit from higher dose of Prolixin dec next time given she was unable to tolerate tapering PO Prolixin after 2 weeks. Continue current medications. Amenable to transfer to nursing home temporarily.  -Admit to [facility] 2S  -Legal status 2PC  -Routine checks  -Prolixin dec 12.5mg IM, next dose due 4/8/24  -Confirmed med list from patient's pharmacy [address]:    -Losartan 100/25 (losartan/hctz???)    -Metoprolol ER 50mg QD    -Cogentin 1mg BID    -Xarelto 20mg with dinner.    -Amlodipine 5mg QD    -Levothyroxine 50 micrograms QD  -Will resume Metoprolol with hold parameters. Initially held Losartan, Amlodipine and monitor BP, may resume if indicated.   -Resume Losartan, starting at 25mg QD with hold parameters, titrate as needed.

## 2024-04-05 NOTE — BH INPATIENT PSYCHIATRY PROGRESS NOTE - CURRENT MEDICATION
MEDICATIONS  (STANDING):  atorvastatin 40 milliGRAM(s) Oral <User Schedule>  benztropine 2 milliGRAM(s) Oral daily  busPIRone 15 milliGRAM(s) Oral <User Schedule>  fluPHENAZine 5 milliGRAM(s) Oral <User Schedule>  fluPHENAZine 5 milliGRAM(s) Oral daily  levothyroxine 50 MICROGram(s) Oral daily  losartan 25 milliGRAM(s) Oral daily  metFORMIN 850 milliGRAM(s) Oral daily  metoprolol succinate ER 50 milliGRAM(s) Oral daily  rivaroxaban 20 milliGRAM(s) Oral with dinner    MEDICATIONS  (PRN):  acetaminophen     Tablet .. 650 milliGRAM(s) Oral every 6 hours PRN Temp greater or equal to 38C (100.4F), Mild Pain (1 - 3), Moderate Pain (4 - 6)  fluPHENAZine 2.5 milliGRAM(s) Oral every 8 hours PRN Agitation stemming from psychosis  fluPHENAZine  Injectable 2 milliGRAM(s) IntraMuscular once PRN Severe agitation stemming from psychosis  melatonin 3 milliGRAM(s) Oral at bedtime PRN Insomnia  senna 2 Tablet(s) Oral daily PRN constipation   MEDICATIONS  (STANDING):  atorvastatin 40 milliGRAM(s) Oral <User Schedule>  benztropine 2 milliGRAM(s) Oral daily  busPIRone 15 milliGRAM(s) Oral <User Schedule>  fluPHENAZine 5 milliGRAM(s) Oral daily  fluPHENAZine 5 milliGRAM(s) Oral <User Schedule>  levothyroxine 50 MICROGram(s) Oral daily  losartan 25 milliGRAM(s) Oral daily  metFORMIN 850 milliGRAM(s) Oral daily  metoprolol succinate ER 50 milliGRAM(s) Oral daily  rivaroxaban 20 milliGRAM(s) Oral with dinner    MEDICATIONS  (PRN):  acetaminophen     Tablet .. 650 milliGRAM(s) Oral every 6 hours PRN Temp greater or equal to 38C (100.4F), Mild Pain (1 - 3), Moderate Pain (4 - 6)  fluPHENAZine 2.5 milliGRAM(s) Oral every 8 hours PRN Agitation stemming from psychosis  fluPHENAZine  Injectable 2 milliGRAM(s) IntraMuscular once PRN Severe agitation stemming from psychosis  melatonin 3 milliGRAM(s) Oral at bedtime PRN Insomnia  senna 2 Tablet(s) Oral daily PRN constipation

## 2024-04-05 NOTE — BH INPATIENT PSYCHIATRY PROGRESS NOTE - NSBHFUPINTERVALHXFT_PSY_A_CORE
Patient is seen and evaluated for f/u for Schizophrenia. Chart reviewed and discussed with nursing staff. VSS. Adherent with oral meds. No overnight events or prns needed. Received Prolixin dec 12.5mg IM on 3/18, next dose due in 3 weeks (4/8/24).  No interval changes. Writer went over discharge plan to SNF temporarily until proper homecare can be established. Pt due for her next dose of Prolixin on Monday.  Patient is seen and evaluated for f/u for Schizophrenia. Chart reviewed and discussed with nursing staff. VSS. Adherent with oral meds. No overnight events or prns needed. Received Prolixin dec 12.5mg IM on 3/18, next dose due in 3 weeks (4/8/24).  No interval changes. Writer went over discharge plan to SNF temporarily until proper homecare can be established. Pt due for her next dose of Prolixin on Monday. Discussed case with pharmacy today. Will increase decanoate dose to 18.75mg IM given pt started presenting symptom relapse when her PO dose was lowered after her first dose of decanoate. Will taper off PO Prolixing after Monday.   Patient is seen and evaluated for f/u for Schizophrenia. Chart reviewed and discussed with nursing staff. VSS. Adherent with oral meds. No overnight events or prns needed. Received Prolixin dec 12.5mg IM on 3/18, next dose due in 3 weeks (4/8/24).  No interval changes. Writer went over discharge plan to SNF temporarily until proper homecare can be established. Pt due for her next dose of Prolixin on Monday. Discussed case with pharmacy today. Will increase decanoate dose to 18.75mg IM given pt started presenting symptom relapse when her PO dose was lowered after her first dose of decanoate. Will taper off PO Prolixin after Monday.

## 2024-04-06 LAB — GLUCOSE BLDC GLUCOMTR-MCNC: 126 MG/DL — HIGH (ref 70–99)

## 2024-04-06 RX ADMIN — Medication 50 MICROGRAM(S): at 06:17

## 2024-04-06 RX ADMIN — FLUPHENAZINE HYDROCHLORIDE 2.5 MILLIGRAM(S): 1 TABLET, FILM COATED ORAL at 08:19

## 2024-04-06 RX ADMIN — Medication 15 MILLIGRAM(S): at 21:27

## 2024-04-06 RX ADMIN — LOSARTAN POTASSIUM 25 MILLIGRAM(S): 100 TABLET, FILM COATED ORAL at 08:19

## 2024-04-06 RX ADMIN — ATORVASTATIN CALCIUM 40 MILLIGRAM(S): 80 TABLET, FILM COATED ORAL at 17:01

## 2024-04-06 RX ADMIN — RIVAROXABAN 20 MILLIGRAM(S): KIT at 17:01

## 2024-04-06 RX ADMIN — Medication 2 MILLIGRAM(S): at 08:20

## 2024-04-06 RX ADMIN — FLUPHENAZINE HYDROCHLORIDE 5 MILLIGRAM(S): 1 TABLET, FILM COATED ORAL at 17:01

## 2024-04-06 RX ADMIN — METFORMIN HYDROCHLORIDE 850 MILLIGRAM(S): 850 TABLET ORAL at 08:19

## 2024-04-06 RX ADMIN — Medication 50 MILLIGRAM(S): at 08:19

## 2024-04-07 LAB — GLUCOSE BLDC GLUCOMTR-MCNC: 113 MG/DL — HIGH (ref 70–99)

## 2024-04-07 RX ADMIN — FLUPHENAZINE HYDROCHLORIDE 2.5 MILLIGRAM(S): 1 TABLET, FILM COATED ORAL at 08:51

## 2024-04-07 RX ADMIN — Medication 50 MILLIGRAM(S): at 08:51

## 2024-04-07 RX ADMIN — ATORVASTATIN CALCIUM 40 MILLIGRAM(S): 80 TABLET, FILM COATED ORAL at 16:46

## 2024-04-07 RX ADMIN — Medication 15 MILLIGRAM(S): at 21:08

## 2024-04-07 RX ADMIN — LOSARTAN POTASSIUM 25 MILLIGRAM(S): 100 TABLET, FILM COATED ORAL at 08:51

## 2024-04-07 RX ADMIN — METFORMIN HYDROCHLORIDE 850 MILLIGRAM(S): 850 TABLET ORAL at 08:51

## 2024-04-07 RX ADMIN — RIVAROXABAN 20 MILLIGRAM(S): KIT at 16:46

## 2024-04-07 RX ADMIN — Medication 50 MICROGRAM(S): at 06:56

## 2024-04-07 RX ADMIN — FLUPHENAZINE HYDROCHLORIDE 5 MILLIGRAM(S): 1 TABLET, FILM COATED ORAL at 16:46

## 2024-04-07 RX ADMIN — Medication 2 MILLIGRAM(S): at 08:52

## 2024-04-08 LAB — GLUCOSE BLDC GLUCOMTR-MCNC: 115 MG/DL — HIGH (ref 70–99)

## 2024-04-08 PROCEDURE — 99231 SBSQ HOSP IP/OBS SF/LOW 25: CPT

## 2024-04-08 RX ADMIN — FLUPHENAZINE HYDROCHLORIDE 5 MILLIGRAM(S): 1 TABLET, FILM COATED ORAL at 17:25

## 2024-04-08 RX ADMIN — METFORMIN HYDROCHLORIDE 850 MILLIGRAM(S): 850 TABLET ORAL at 09:04

## 2024-04-08 RX ADMIN — FLUPHENAZINE HYDROCHLORIDE 18.75 MILLIGRAM(S): 1 TABLET, FILM COATED ORAL at 13:07

## 2024-04-08 RX ADMIN — Medication 2 MILLIGRAM(S): at 09:05

## 2024-04-08 RX ADMIN — RIVAROXABAN 20 MILLIGRAM(S): KIT at 17:25

## 2024-04-08 RX ADMIN — Medication 50 MICROGRAM(S): at 06:44

## 2024-04-08 RX ADMIN — FLUPHENAZINE HYDROCHLORIDE 2.5 MILLIGRAM(S): 1 TABLET, FILM COATED ORAL at 09:04

## 2024-04-08 RX ADMIN — Medication 50 MILLIGRAM(S): at 09:04

## 2024-04-08 RX ADMIN — Medication 15 MILLIGRAM(S): at 21:02

## 2024-04-08 RX ADMIN — LOSARTAN POTASSIUM 25 MILLIGRAM(S): 100 TABLET, FILM COATED ORAL at 09:04

## 2024-04-08 RX ADMIN — ATORVASTATIN CALCIUM 40 MILLIGRAM(S): 80 TABLET, FILM COATED ORAL at 17:25

## 2024-04-08 NOTE — BH INPATIENT PSYCHIATRY PROGRESS NOTE - NSBHASSESSSUMMFT_PSY_ALL_CORE
Patient is a 64 y/o woman,  x 10 years with PMHx of Schizophrenia, HTN, Afib, Hypothyroidism, NPH and hx of hypovolemic/septic shock from UTI admitted for management of paranoia in the context of treatment non-adherence.  Pt unable to engage in intake interview during initial encounter, fixated on obtaining a shower and refusing to engage in other topics. As per collateral patient has decompensated since being discharged from [facility] in December and being lost to f/u in terms of mental health treatment. From collateral pt presenting sx suggestive of psychotic decompensation including paranoid delusions, disorganized behavior and suspected AH in the context of non-adherence to pharmacotherapy. At home she was refusing meals, refusing to let HHA in and not leaving the house due to paranoia  Pt currently requires inpatient level of care as she is unable to care for herself in her current state, would benefit from med adjustments for stabilization and safe aftercare planning.    Impression: Schizophrenia    4/8 Clinical update: Patient overall improving. Pt to receive Prolixin Decanoate 18.75mg IM today. D/c PO Prolixin after decanoate. Continue other medications.      -Admit to [facility] 2S  -Legal status 2PC  -Routine checks  -Prolixin dec 12.5mg IM, next dose due 4/8/24  -Confirmed med list from patient's pharmacy [address]:    -Losartan 100/25 (losartan/hctz???)    -Metoprolol ER 50mg QD    -Cogentin 1mg BID    -Xarelto 20mg with dinner.    -Amlodipine 5mg QD    -Levothyroxine 50 micrograms QD  -Will resume Metoprolol with hold parameters. Initially held Losartan, Amlodipine and monitor BP, may resume if indicated.   -Resume Losartan, starting at 25mg QD with hold parameters, titrate as needed.

## 2024-04-08 NOTE — BH INPATIENT PSYCHIATRY PROGRESS NOTE - CURRENT MEDICATION
MEDICATIONS  (STANDING):  atorvastatin 40 milliGRAM(s) Oral <User Schedule>  benztropine 2 milliGRAM(s) Oral daily  busPIRone 15 milliGRAM(s) Oral <User Schedule>  fluPHENAZine 5 milliGRAM(s) Oral <User Schedule>  fluPHENAZine 2.5 milliGRAM(s) Oral daily  levothyroxine 50 MICROGram(s) Oral daily  losartan 25 milliGRAM(s) Oral daily  metFORMIN 850 milliGRAM(s) Oral daily  metoprolol succinate ER 50 milliGRAM(s) Oral daily  rivaroxaban 20 milliGRAM(s) Oral with dinner    MEDICATIONS  (PRN):  acetaminophen     Tablet .. 650 milliGRAM(s) Oral every 6 hours PRN Temp greater or equal to 38C (100.4F), Mild Pain (1 - 3), Moderate Pain (4 - 6)  fluPHENAZine 2.5 milliGRAM(s) Oral every 8 hours PRN Agitation stemming from psychosis  fluPHENAZine  Injectable 2 milliGRAM(s) IntraMuscular once PRN Severe agitation stemming from psychosis  melatonin 3 milliGRAM(s) Oral at bedtime PRN Insomnia  senna 2 Tablet(s) Oral daily PRN constipation

## 2024-04-08 NOTE — BH INPATIENT PSYCHIATRY PROGRESS NOTE - NSBHFUPINTERVALHXFT_PSY_A_CORE
Patient is seen and evaluated for f/u for Schizophrenia. Chart reviewed and discussed with nursing staff. VSS. Adherent with oral meds. No overnight events or prns needed. Received Prolixin dec 12.5mg IM on 3/18, next dose due in 3 weeks (4/8/24).  Pt due for her next dose of Prolixin decanoate 18.75mg IM as patient had started to have relapse of symptoms after PO prolixin was lowered after 1st prolixin decanoate. Patient reports feeling fell. Denies any mood or psychotic symptoms currently. Reports eating and sleeping well. No adverse effects noted or reported.

## 2024-04-09 LAB — GLUCOSE BLDC GLUCOMTR-MCNC: 115 MG/DL — HIGH (ref 70–99)

## 2024-04-09 RX ADMIN — LOSARTAN POTASSIUM 25 MILLIGRAM(S): 100 TABLET, FILM COATED ORAL at 08:44

## 2024-04-09 RX ADMIN — Medication 2 MILLIGRAM(S): at 08:45

## 2024-04-09 RX ADMIN — FLUPHENAZINE HYDROCHLORIDE 5 MILLIGRAM(S): 1 TABLET, FILM COATED ORAL at 17:01

## 2024-04-09 RX ADMIN — Medication 50 MICROGRAM(S): at 06:44

## 2024-04-09 RX ADMIN — Medication 50 MILLIGRAM(S): at 08:44

## 2024-04-09 RX ADMIN — Medication 15 MILLIGRAM(S): at 22:22

## 2024-04-09 RX ADMIN — RIVAROXABAN 20 MILLIGRAM(S): KIT at 17:01

## 2024-04-09 RX ADMIN — METFORMIN HYDROCHLORIDE 850 MILLIGRAM(S): 850 TABLET ORAL at 08:44

## 2024-04-09 RX ADMIN — ATORVASTATIN CALCIUM 40 MILLIGRAM(S): 80 TABLET, FILM COATED ORAL at 17:01

## 2024-04-10 LAB — GLUCOSE BLDC GLUCOMTR-MCNC: 93 MG/DL — SIGNIFICANT CHANGE UP (ref 70–99)

## 2024-04-10 PROCEDURE — 99231 SBSQ HOSP IP/OBS SF/LOW 25: CPT

## 2024-04-10 RX ORDER — FLUPHENAZINE HYDROCHLORIDE 1 MG/1
2.5 TABLET, FILM COATED ORAL
Refills: 0 | Status: DISCONTINUED | OUTPATIENT
Start: 2024-04-10 | End: 2024-04-10

## 2024-04-10 RX ADMIN — LOSARTAN POTASSIUM 25 MILLIGRAM(S): 100 TABLET, FILM COATED ORAL at 08:54

## 2024-04-10 RX ADMIN — ATORVASTATIN CALCIUM 40 MILLIGRAM(S): 80 TABLET, FILM COATED ORAL at 17:02

## 2024-04-10 RX ADMIN — METFORMIN HYDROCHLORIDE 850 MILLIGRAM(S): 850 TABLET ORAL at 08:54

## 2024-04-10 RX ADMIN — Medication 50 MILLIGRAM(S): at 08:54

## 2024-04-10 RX ADMIN — Medication 50 MICROGRAM(S): at 06:49

## 2024-04-10 RX ADMIN — Medication 15 MILLIGRAM(S): at 21:25

## 2024-04-10 RX ADMIN — RIVAROXABAN 20 MILLIGRAM(S): KIT at 17:02

## 2024-04-10 RX ADMIN — Medication 2 MILLIGRAM(S): at 08:54

## 2024-04-10 NOTE — BH INPATIENT PSYCHIATRY PROGRESS NOTE - CURRENT MEDICATION
MEDICATIONS  (STANDING):  atorvastatin 40 milliGRAM(s) Oral <User Schedule>  benztropine 2 milliGRAM(s) Oral daily  busPIRone 15 milliGRAM(s) Oral <User Schedule>  levothyroxine 50 MICROGram(s) Oral daily  losartan 25 milliGRAM(s) Oral daily  metFORMIN 850 milliGRAM(s) Oral daily  metoprolol succinate ER 50 milliGRAM(s) Oral daily  rivaroxaban 20 milliGRAM(s) Oral with dinner    MEDICATIONS  (PRN):  acetaminophen     Tablet .. 650 milliGRAM(s) Oral every 6 hours PRN Temp greater or equal to 38C (100.4F), Mild Pain (1 - 3), Moderate Pain (4 - 6)  fluPHENAZine 2.5 milliGRAM(s) Oral every 8 hours PRN Agitation stemming from psychosis  fluPHENAZine  Injectable 2 milliGRAM(s) IntraMuscular once PRN Severe agitation stemming from psychosis  melatonin 3 milliGRAM(s) Oral at bedtime PRN Insomnia  senna 2 Tablet(s) Oral daily PRN constipation

## 2024-04-10 NOTE — BH INPATIENT PSYCHIATRY PROGRESS NOTE - NSBHASSESSSUMMFT_PSY_ALL_CORE
Patient is a 62 y/o woman,  x 10 years with PMHx of Schizophrenia, HTN, Afib, Hypothyroidism, NPH and hx of hypovolemic/septic shock from UTI admitted for management of paranoia in the context of treatment non-adherence.  Pt unable to engage in intake interview during initial encounter, fixated on obtaining a shower and refusing to engage in other topics. As per collateral patient has decompensated since being discharged from [facility] in December and being lost to f/u in terms of mental health treatment. From collateral pt presenting sx suggestive of psychotic decompensation including paranoid delusions, disorganized behavior and suspected AH in the context of non-adherence to pharmacotherapy. At home she was refusing meals, refusing to let HHA in and not leaving the house due to paranoia  Pt currently requires inpatient level of care as she is unable to care for herself in her current state, would benefit from med adjustments for stabilization and safe aftercare planning.    Impression: Schizophrenia    4/10 Clinical update: Patient presenting sustained improvement.  Pt received Prolixin Decanoate 18.75mg IM on 4/8/24. D/c PO Prolixin after decanoate. Continue other medications.      -Admit to [facility] 2S  -Legal status 2PC  -Routine checks  -Prolixin dec 12.5mg IM, next dose due 4/8/24  -Confirmed med list from patient's pharmacy [address]:    -Losartan 100/25 (losartan/hctz???)    -Metoprolol ER 50mg QD    -Cogentin 1mg BID    -Xarelto 20mg with dinner.    -Amlodipine 5mg QD    -Levothyroxine 50 micrograms QD  -Will resume Metoprolol with hold parameters. Initially held Losartan, Amlodipine and monitor BP, may resume if indicated.   -Resume Losartan, starting at 25mg QD with hold parameters, titrate as needed.

## 2024-04-10 NOTE — BH INPATIENT PSYCHIATRY PROGRESS NOTE - NSBHFUPINTERVALHXFT_PSY_A_CORE
Patient is seen and evaluated for f/u for Schizophrenia. Chart reviewed and discussed with nursing staff. VSS. Adherent with oral meds. No overnight events or prns needed. Received Prolixin dec 12.5mg IM on 3/18 and 18.75mg IM on 4/8/24. Dose was increased due to relapse in symptoms after tapering PO Prolixin after 1st MURILLO. Next dose due on 4/29/24.  Pt tolerating MURILLO, will discontinue evening dose of PO Prolixin today and monitor for any signs of relapse. Pt has been stable, calm, organized and cooperative. Thinking still concrete. Pt refusing to sign screen form for SNF placement, insisting her friend Luciano always told her to discuss with him before she signs anything. Pt admits that Luciano has talked to her about going to NH in [place] and she has discussed this plan with Luciano and SW on multiple occasions. However because she has not discussed this specific form with Luciano she is refusing to sign it until she is able to talk to him. Joseid is traveling at the time and will be back on April 19th. Pt has been unable to reach him on the phone thus far.

## 2024-04-11 LAB — GLUCOSE BLDC GLUCOMTR-MCNC: 101 MG/DL — HIGH (ref 70–99)

## 2024-04-11 PROCEDURE — 99231 SBSQ HOSP IP/OBS SF/LOW 25: CPT

## 2024-04-11 RX ADMIN — RIVAROXABAN 20 MILLIGRAM(S): KIT at 17:38

## 2024-04-11 RX ADMIN — Medication 15 MILLIGRAM(S): at 21:01

## 2024-04-11 RX ADMIN — METFORMIN HYDROCHLORIDE 850 MILLIGRAM(S): 850 TABLET ORAL at 09:54

## 2024-04-11 RX ADMIN — LOSARTAN POTASSIUM 25 MILLIGRAM(S): 100 TABLET, FILM COATED ORAL at 09:54

## 2024-04-11 RX ADMIN — ATORVASTATIN CALCIUM 40 MILLIGRAM(S): 80 TABLET, FILM COATED ORAL at 17:38

## 2024-04-11 RX ADMIN — Medication 2 MILLIGRAM(S): at 09:54

## 2024-04-11 RX ADMIN — Medication 50 MICROGRAM(S): at 06:39

## 2024-04-11 RX ADMIN — Medication 50 MILLIGRAM(S): at 09:54

## 2024-04-11 NOTE — BH INPATIENT PSYCHIATRY PROGRESS NOTE - NSBHFUPINTERVALHXFT_PSY_A_CORE
Patient is seen and evaluated for f/u for Schizophrenia. Chart reviewed and discussed with nursing staff. VSS. Adherent with oral meds. No overnight events or prns needed. Received Prolixin dec 12.5mg IM on 3/18 and 18.75mg IM on 4/8/24. Dose was increased due to relapse in symptoms after tapering PO Prolixin after 1st MURILLO. Next dose due on 4/29/24.  Pt tolerating MURILLO, and being off PO Prolixin. Pt has been stable, calm, organized and cooperative. Thinking still concrete. Pt refusing to sign screen form for SNF placement, insisting her friend Luciano always told her to discuss with him before she signs anything. Pt admits that Luciano has talked to her about going to NH in [place] and she has discussed this plan with Luciano and MARQEUS on multiple occasions. However because she has not discussed this specific form with Luciano she is refusing to sign it until she is able to talk to him. Luciano is traveling at the time and will be back on April 19th. Pt has been unable to reach him on the phone thus far.

## 2024-04-11 NOTE — BH INPATIENT PSYCHIATRY PROGRESS NOTE - NSBHASSESSSUMMFT_PSY_ALL_CORE
Patient is a 64 y/o woman,  x 10 years with PMHx of Schizophrenia, HTN, Afib, Hypothyroidism, NPH and hx of hypovolemic/septic shock from UTI admitted for management of paranoia in the context of treatment non-adherence.  Pt unable to engage in intake interview during initial encounter, fixated on obtaining a shower and refusing to engage in other topics. As per collateral patient has decompensated since being discharged from [facility] in December and being lost to f/u in terms of mental health treatment. From collateral pt presenting sx suggestive of psychotic decompensation including paranoid delusions, disorganized behavior and suspected AH in the context of non-adherence to pharmacotherapy. At home she was refusing meals, refusing to let HHA in and not leaving the house due to paranoia  Pt currently requires inpatient level of care as she is unable to care for herself in her current state, would benefit from med adjustments for stabilization and safe aftercare planning.    Impression: Schizophrenia    4/10 Clinical update: Patient presenting sustained improvement.  Pt received Prolixin Decanoate 18.75mg IM on 4/8/24. D/c PO Prolixin after decanoate. Continue other medications.      -Admit to [facility] 2S  -Legal status 2PC  -Routine checks  -Prolixin dec 12.5mg IM, next dose due 4/8/24  -Confirmed med list from patient's pharmacy [address]:    -Losartan 100/25 (losartan/hctz???)    -Metoprolol ER 50mg QD    -Cogentin 1mg BID    -Xarelto 20mg with dinner.    -Amlodipine 5mg QD    -Levothyroxine 50 micrograms QD  -Will resume Metoprolol with hold parameters. Initially held Losartan, Amlodipine and monitor BP, may resume if indicated.   -Resume Losartan, starting at 25mg QD with hold parameters, titrate as needed.

## 2024-04-12 LAB — GLUCOSE BLDC GLUCOMTR-MCNC: 103 MG/DL — HIGH (ref 70–99)

## 2024-04-12 PROCEDURE — 99231 SBSQ HOSP IP/OBS SF/LOW 25: CPT

## 2024-04-12 RX ADMIN — Medication 2 MILLIGRAM(S): at 14:13

## 2024-04-12 RX ADMIN — Medication 50 MILLIGRAM(S): at 14:13

## 2024-04-12 RX ADMIN — Medication 15 MILLIGRAM(S): at 21:12

## 2024-04-12 RX ADMIN — Medication 50 MICROGRAM(S): at 06:42

## 2024-04-12 RX ADMIN — ATORVASTATIN CALCIUM 40 MILLIGRAM(S): 80 TABLET, FILM COATED ORAL at 17:39

## 2024-04-12 RX ADMIN — RIVAROXABAN 20 MILLIGRAM(S): KIT at 17:40

## 2024-04-12 RX ADMIN — METFORMIN HYDROCHLORIDE 850 MILLIGRAM(S): 850 TABLET ORAL at 14:13

## 2024-04-12 RX ADMIN — LOSARTAN POTASSIUM 25 MILLIGRAM(S): 100 TABLET, FILM COATED ORAL at 14:13

## 2024-04-12 NOTE — BH INPATIENT PSYCHIATRY PROGRESS NOTE - NSBHFUPINTERVALHXFT_PSY_A_CORE
Patient is seen and evaluated for f/u for Schizophrenia. Chart reviewed and discussed with nursing staff. VSS. Adherent with oral meds. No overnight events or prns needed. Received Prolixin dec 12.5mg IM on 3/18 and 18.75mg IM on 4/8/24. Dose was increased due to relapse in symptoms after tapering PO Prolixin after 1st MURILLO. Next dose due on 4/29/24.  Pt tolerating MURILLO, and being off PO Prolixin. Pt has been stable, calm, organized and cooperative. Thinking still concrete. Pt refusing to sign screen form for SNF placement, insisting her friend Luciano always told her to discuss with him before she signs anything. Pt admits that Luciano has talked to her about going to NH in [place] and she has discussed this plan with Luciano and MARQUES on multiple occasions. However because she has not discussed this specific form with Luciano she is refusing to sign it until she is able to talk to him. Luciano is traveling at the time and will be back on April 19th. Pt has been unable to reach him on the phone thus far.  Patient is seen and evaluated for f/u for Schizophrenia. Chart reviewed and discussed with nursing staff. VSS. Adherent with oral meds. No overnight events or prns needed. Received Prolixin dec 12.5mg IM on 3/18 and 18.75mg IM on 4/8/24. Dose was increased due to relapse in symptoms after tapering PO Prolixin after 1st MURILLO. Next dose due on 4/29/24.    Pt tolerating MURILLO, and being off PO Prolixin. Pt has been stable, calm, organized and cooperative. Thinking still concrete. Pt refusing to sign screen form for SNF placement, insisting her friend Luciano always told her to discuss with him before she signs anything. She continues trying to reach Luciano on his work phone, despite the fact he is away on vacation until 4/19.

## 2024-04-12 NOTE — BH INPATIENT PSYCHIATRY PROGRESS NOTE - NSBHASSESSSUMMFT_PSY_ALL_CORE
Patient is a 64 y/o woman,  x 10 years with PMHx of Schizophrenia, HTN, Afib, Hypothyroidism, NPH and hx of hypovolemic/septic shock from UTI admitted for management of paranoia in the context of treatment non-adherence.  Pt unable to engage in intake interview during initial encounter, fixated on obtaining a shower and refusing to engage in other topics. As per collateral patient has decompensated since being discharged from [facility] [place] in December and being lost to f/u in terms of mental health treatment. From collateral pt presenting sx suggestive of psychotic decompensation including paranoid delusions, disorganized behavior and suspected AH in the context of non-adherence to pharmacotherapy. At home she was refusing meals, refusing to let HHA in and not leaving the house due to paranoia  Pt currently requires inpatient level of care as she is unable to care for herself in her current state, would benefit from med adjustments for stabilization and safe aftercare planning.    Impression: Schizophrenia    4/10 Clinical update: Patient presenting sustained improvement.  Pt received Prolixin Decanoate 18.75mg IM on 4/8/24. D/c PO Prolixin after decanoate. Continue other medications.      -Admit to [facility] 2S  -Legal status 2PC  -Routine checks  -Prolixin dec 12.5mg IM, next dose due 4/8/24  -Confirmed med list from patient's pharmacy [address]:    -Losartan 100/25 (losartan/hctz???)    -Metoprolol ER 50mg QD    -Cogentin 1mg BID    -Xarelto 20mg with dinner.    -Amlodipine 5mg QD    -Levothyroxine 50 micrograms QD  -Will resume Metoprolol with hold parameters. Initially held Losartan, Amlodipine and monitor BP, may resume if indicated.   -Resume Losartan, starting at 25mg QD with hold parameters, titrate as needed.

## 2024-04-13 RX ADMIN — Medication 50 MILLIGRAM(S): at 09:00

## 2024-04-13 RX ADMIN — RIVAROXABAN 20 MILLIGRAM(S): KIT at 17:25

## 2024-04-13 RX ADMIN — LOSARTAN POTASSIUM 25 MILLIGRAM(S): 100 TABLET, FILM COATED ORAL at 09:00

## 2024-04-13 RX ADMIN — Medication 50 MICROGRAM(S): at 06:23

## 2024-04-13 RX ADMIN — METFORMIN HYDROCHLORIDE 850 MILLIGRAM(S): 850 TABLET ORAL at 09:00

## 2024-04-13 RX ADMIN — Medication 2 MILLIGRAM(S): at 09:00

## 2024-04-13 RX ADMIN — Medication 15 MILLIGRAM(S): at 21:37

## 2024-04-13 RX ADMIN — ATORVASTATIN CALCIUM 40 MILLIGRAM(S): 80 TABLET, FILM COATED ORAL at 17:25

## 2024-04-14 RX ADMIN — Medication 15 MILLIGRAM(S): at 22:11

## 2024-04-14 RX ADMIN — LOSARTAN POTASSIUM 25 MILLIGRAM(S): 100 TABLET, FILM COATED ORAL at 09:48

## 2024-04-14 RX ADMIN — ATORVASTATIN CALCIUM 40 MILLIGRAM(S): 80 TABLET, FILM COATED ORAL at 17:10

## 2024-04-14 RX ADMIN — Medication 2 MILLIGRAM(S): at 09:48

## 2024-04-14 RX ADMIN — Medication 50 MILLIGRAM(S): at 09:48

## 2024-04-14 RX ADMIN — RIVAROXABAN 20 MILLIGRAM(S): KIT at 16:44

## 2024-04-14 RX ADMIN — METFORMIN HYDROCHLORIDE 850 MILLIGRAM(S): 850 TABLET ORAL at 09:48

## 2024-04-14 RX ADMIN — Medication 50 MICROGRAM(S): at 06:22

## 2024-04-15 ENCOUNTER — EMERGENCY (EMERGENCY)
Facility: HOSPITAL | Age: 64
LOS: 1 days | Discharge: HOME | End: 2024-04-15
Attending: EMERGENCY MEDICINE
Payer: MEDICARE

## 2024-04-15 VITALS
OXYGEN SATURATION: 97 % | HEIGHT: 63 IN | SYSTOLIC BLOOD PRESSURE: 124 MMHG | TEMPERATURE: 98 F | WEIGHT: 149.91 LBS | RESPIRATION RATE: 16 BRPM | DIASTOLIC BLOOD PRESSURE: 77 MMHG | HEART RATE: 60 BPM

## 2024-04-15 VITALS
DIASTOLIC BLOOD PRESSURE: 78 MMHG | HEART RATE: 63 BPM | TEMPERATURE: 98 F | RESPIRATION RATE: 18 BRPM | SYSTOLIC BLOOD PRESSURE: 133 MMHG | OXYGEN SATURATION: 98 %

## 2024-04-15 PROCEDURE — 99232 SBSQ HOSP IP/OBS MODERATE 35: CPT

## 2024-04-15 PROCEDURE — 70450 CT HEAD/BRAIN W/O DYE: CPT | Mod: 26,MC

## 2024-04-15 PROCEDURE — 99284 EMERGENCY DEPT VISIT MOD MDM: CPT | Mod: 25

## 2024-04-15 PROCEDURE — 99284 EMERGENCY DEPT VISIT MOD MDM: CPT | Mod: GC

## 2024-04-15 PROCEDURE — 70450 CT HEAD/BRAIN W/O DYE: CPT | Mod: MC

## 2024-04-15 RX ORDER — ACETAMINOPHEN 500 MG
650 TABLET ORAL ONCE
Refills: 0 | Status: COMPLETED | OUTPATIENT
Start: 2024-04-15 | End: 2024-04-15

## 2024-04-15 RX ADMIN — LOSARTAN POTASSIUM 25 MILLIGRAM(S): 100 TABLET, FILM COATED ORAL at 10:35

## 2024-04-15 RX ADMIN — METFORMIN HYDROCHLORIDE 850 MILLIGRAM(S): 850 TABLET ORAL at 10:36

## 2024-04-15 RX ADMIN — RIVAROXABAN 20 MILLIGRAM(S): KIT at 17:17

## 2024-04-15 RX ADMIN — Medication 650 MILLIGRAM(S): at 07:40

## 2024-04-15 RX ADMIN — Medication 2 MILLIGRAM(S): at 10:35

## 2024-04-15 RX ADMIN — Medication 650 MILLIGRAM(S): at 07:01

## 2024-04-15 RX ADMIN — Medication 15 MILLIGRAM(S): at 23:10

## 2024-04-15 RX ADMIN — Medication 50 MILLIGRAM(S): at 10:35

## 2024-04-15 RX ADMIN — ATORVASTATIN CALCIUM 40 MILLIGRAM(S): 80 TABLET, FILM COATED ORAL at 17:17

## 2024-04-15 NOTE — ED PROVIDER NOTE - PATIENT PORTAL LINK FT
You can access the FollowMyHealth Patient Portal offered by [facility] by registering at the following website: [email or web address]. By joining [employer]’s FollowMyHealth portal, you will also be able to view your health information using other applications (apps) compatible with our system.

## 2024-04-15 NOTE — ED PROVIDER NOTE - CLINICAL SUMMARY MEDICAL DECISION MAKING FREE TEXT BOX
63F admitted to [facility] for schizophrenia w/ PMHx of HTN, Afib on xarelto, HLD, T2DM p/w unwitnessed mechanical fall. pt reports was in bed Sleeping and rolled out of bed.  Patient was awake when she fell hit the back of her head.  Did not lose consciousness recalls entire event.  Patient was then helped up by staff only on the ground for a few minutes.  Denies any changes in vision nausea vomiting dizziness.  Denies any weakness or changes in sensation.  No prodrome prior to fall.  No fevers chills chest pain shortness of breath abdominal pain urinary symptoms.  On arrival vital signs stable.  Exam with well-appearing female no acute distress.  Head with one by one area of abrasion and swelling no lacerations.  Cranial nerves grossly intact.  Full range of motion of neck.  No cervical spinal tenderness.  No spinal tenderness.  Patient moving all extremities spontaneously.  5 out of 5 strength full sensation all extremities.  Patient able to ambulate.  Presentation concerning for contusion versus ICH given AC.  Plan for analgesia CT head.  If CT negative safe for discharge back to [facility].

## 2024-04-15 NOTE — ED ADULT NURSE NOTE - CAS TRG GENERAL NORM CIRC DET
10/24/22 1200   Appointment Info   Signing Clinician's Name / Credentials (PT) WONG WardT, PT   Living Environment   People in Home alone   Current Living Arrangements house   Home Accessibility stairs to enter home;stairs within home   Number of Stairs, Main Entrance 3   Stair Railings, Main Entrance railings safe and in good condition   Number of Stairs, Within Home, Primary greater than 10 stairs   Stair Railings, Within Home, Primary railings safe and in good condition   Transportation Anticipated car, drives self   Living Environment Comments pt lives along at home. IND with all ADLs and mobility. Daughter and son do come over regularly to help with house chorse and some ADLs as needed. Pt does have back pain at baseline,which limits her mobility. Does drive occasionally.   General Information   Onset of Illness/Injury or Date of Surgery 10/23/22   Referring Physician Carmen Tang   Patient/Family Therapy Goals Statement (PT) pt wanting to return home, daughter interested in TCU   Pertinent History of Current Problem (include personal factors and/or comorbidities that impact the POC) 82 year old female with past medical history significant for type II DM, CKD, and osteoarthritis admitted on 10/23/2022 with leg weakness, low back pain and headache after a fall two days ago.   Existing Precautions/Restrictions fall   General Observations very [place] and pleasant   Cognition   Affect/Mental Status (Cognition) WNL   Orientation Status (Cognition) oriented x 4   Follows Commands (Cognition) WFL   Posture    Posture Kyphosis   Range of Motion (ROM)   Range of Motion ROM is WFL   Strength (Manual Muscle Testing)   Strength (Manual Muscle Testing) strength is WFL   Bed Mobility   Comment, (Bed Mobility) Supine to sit with CGAx1   Transfers   Comment, (Transfers) STS with FWW and CGAx1   Gait/Stairs (Locomotion)   Distance in Feet (Gait) 150   Comment, (Gait/Stairs) Ambulates 10ft with CGAx1 and FWW    Balance   Balance Comments good standing balance   Clinical Impression   Criteria for Skilled Therapeutic Intervention Yes, treatment indicated   PT Diagnosis (PT) impaired functional mobility   Influenced by the following impairments weakness, back pain   Clinical Presentation (PT Evaluation Complexity) Stable/Uncomplicated   Clinical Presentation Rationale clinical judgement   Clinical Decision Making (Complexity) low complexity   Planned Therapy Interventions (PT) balance training;gait training;stair training;transfer training   Risk & Benefits of therapy have been explained evaluation/treatment results reviewed;care plan/treatment goals reviewed;risks/benefits reviewed;current/potential barriers reviewed;participants voiced agreement with care plan;participants included;patient;daughter   PT Total Evaluation Time   PT Eval, Low Complexity Minutes (57662) 15   Therapy Certification   Start of care date 10/24/22   Certification date from 10/24/22   Certification date to 10/31/22   Medical Diagnosis fall   Physical Therapy Goals   PT Frequency Daily   PT Predicted Duration/Target Date for Goal Attainment 10/31/22   PT Goals Gait;Stairs;Transfers   PT: Transfers Sit to/from stand;Bed to/from chair;Independent   PT: Gait Modified independent;Greater than 200 feet;Rolling walker   PT: Stairs Greater than 10 stairs;Modified independent;Rail on left   Interventions   Interventions Quick Adds Gait Training   Gait Training   Gait Training Minutes (49423) 10   Treatment Detail/Skilled Intervention Ambulates 150ft with FWW and CGAx1. Demo kyphotic posture, slow speed, low foot clearance and back pain. Despite vc, pt unable to correct impairements. Futher gait stopped 2/2 fatgiue and pain. Left in chair with daugher present and all needs in reach.   PT Discharge Planning   PT Plan progress gait, stair trial   PT Discharge Recommendation (DC Rec) home with assist;home with home care physical therapy;Transitional Care  Facility   PT Rationale for DC Rec currently pt is below baseline 2/2 back pain and generalized fatigue. Would benefit from skilled PT at TCU in order to increase functional mobility IND.  If d/c home, pt would requiring Ax1 and FWW with all mobility to decrease falls risk and HHPT.   PT Brief overview of current status CGAx1 with FWW during gait   Total Session Time   Timed Code Treatment Minutes 10   Total Session Time (sum of timed and untimed services) 25     [facility]  OUTPATIENT PHYSICAL THERAPY EVALUATION  PLAN OF TREATMENT FOR OUTPATIENT REHABILITATION  (COMPLETE FOR INITIAL CLAIMS ONLY)  Patient's Last Name, First Name, M.I.  YOB: 1940  Rhys Camarillonche  BRIGHT                        Provider's Name  [facility] Medical Record No.  [id number]                             Onset Date:  10/23/22   Start of Care Date:  10/24/22   Type:     _X_PT   ___OT   ___SLP Medical Diagnosis:  fall              PT Diagnosis:  impaired functional mobility Visits from SOC:  1     See note for plan of treatment, functional goals and certification details    I CERTIFY THE NEED FOR THESE SERVICES FURNISHED UNDER        THIS PLAN OF TREATMENT AND WHILE UNDER MY CARE     (Physician co-signature of this document indicates review and certification of the therapy plan).              Strong peripheral pulses

## 2024-04-15 NOTE — ED ADULT NURSE NOTE - OBJECTIVE STATEMENT
pt is 63y F, pmhx schizophrenia, HTN, AFIB on xarelto, BIBEMS from [facility] from unwitnessed fall from bed, pt states she hit head, no pain,

## 2024-04-15 NOTE — ED PROVIDER NOTE - ATTENDING CONTRIBUTION TO CARE
MD Deleon:  patient seen and evaluated personally.   I agree with the History & Physical,  Impression & Plan other than what was detailed in my note.  MD Deleon  63-year-old female history of schizophrenia, coming from [facility] [place], history of A-fib on Xarelto was an unwitnessed fall rolled out of bed earlier tonight.  States that she remembers it.  She states that he had her head.  She had some mild pain there that is since improved.  No loss of consciousness.  No nausea or vomiting.  She is afebrile,  non toxic well appearing, NC/AT no max face ttp,  conjunctiva non conjected, sclera anicteric PERRL, moist mucous membranes, neck supple, no midline c/t/l/s spine ttp,  heart sounds, normal, no mrg, lungs cta b/l no wrr, no chest ttp,  abd soft non distended w/ no tenderness, pelvis stable, no visual deformities of extremities, from of all joints, no ttp, axox3, , normal mood and affect  Given the fact that patient did not hit her head and is on blood thinners we will get CT head to rule out intracranial hemorrhage.  If negative likely discharge back to facility.

## 2024-04-15 NOTE — BH INPATIENT PSYCHIATRY PROGRESS NOTE - NSBHASSESSSUMMFT_PSY_ALL_CORE
Patient is a 62 y/o woman,  x 10 years with PMHx of Schizophrenia, HTN, Afib, Hypothyroidism, NPH and hx of hypovolemic/septic shock from UTI admitted for management of paranoia in the context of treatment non-adherence.  Pt unable to engage in intake interview during initial encounter, fixated on obtaining a shower and refusing to engage in other topics. As per collateral patient has decompensated since being discharged from [facility] in December and being lost to f/u in terms of mental health treatment. From collateral pt presenting sx suggestive of psychotic decompensation including paranoid delusions, disorganized behavior and suspected AH in the context of non-adherence to pharmacotherapy. At home she was refusing meals, refusing to let HHA in and not leaving the house due to paranoia  Pt currently requires inpatient level of care as she is unable to care for herself in her current state, would benefit from med adjustments for stabilization and safe aftercare planning.    Impression: Schizophrenia    4/15 Clinical update: Patient presenting sustained improvement and tolerating MURILLO. Pt received Prolixin Decanoate 18.75mg IM on 4/8/24. Pt had a fall last night when she rolled out of bed while asleep and hit her head. She was transferred to [place] ED. Head imaging revealed no trauma/bleeds. Pt was seen this morning when she returned from the ED. She was awake and alert. Reported feeling well and shared with writer about her fall and trip to the ED. Asked if she could change her clothes and eat breakfast. Vitals were stable this morning, no orthostatic hypotension noted. Given way pt fell (rolling out of bed while sleeping) it is unlikely blood pressure changes were precipitating factors. Went over fall precautions.    -Admit to [facility] 2S  -Legal status 2PC  -Routine checks  -Prolixin dec 12.5mg IM, next dose due 4/8/24  -Confirmed med list from patient's pharmacy [address]:    -Losartan 100/25 (losartan/hctz???)    -Metoprolol ER 50mg QD    -Cogentin 1mg BID    -Xarelto 20mg with dinner.    -Amlodipine 5mg QD    -Levothyroxine 50 micrograms QD  -Will resume Metoprolol with hold parameters. Initially held Losartan, Amlodipine and monitor BP, may resume if indicated.   -Resume Losartan, starting at 25mg QD with hold parameters, titrate as needed.

## 2024-04-15 NOTE — BH INPATIENT PSYCHIATRY PROGRESS NOTE - NSBHFUPINTERVALHXFT_PSY_A_CORE
Patient is seen and evaluated for f/u for Schizophrenia. Chart reviewed and discussed with nursing staff. VSS. Adherent with oral meds. No overnight events or prns needed. Received Prolixin dec 12.5mg IM on 3/18 and 18.75mg IM on 4/8/24. Dose was increased due to relapse in symptoms after tapering PO Prolixin after 1st MURILLO. Next dose due on 4/29/24.    Pt had a fall last night when she rolled out of bed while asleep and hit her head. She was transferred to [place] ED. Head imaging revealed no trauma/bleeds. Pt was seen this morning when she returned from the ED. She was awake and alert. Reported feeling well and shared with writer about her fall and trip to the ED. Asked if she could change her clothes and eat breakfast. Vitals were stable this morning, no orthostatic hypotension noted. Went over fall precautions.

## 2024-04-15 NOTE — CHART NOTE - NSCHARTNOTEFT_GEN_A_CORE
[facility] Inpatient to ED Transfer Summary    Reason for Transfer/Medical Summary:  63F admitted to [facility] for schizophrenia w/ PMHx of HTN, Afib on xarelto, HLD, T2DM p/w unwitnessed mechanical fall. Patient reports that she rolled off her bed while asleep and hit her head. Patient c/o pain to the occipital region w/ tenderness to palpation. Patient denies headache, dizziness, lightheadedness, SOB, CP, neck/back/hip pain. Physical exam unremarkable. VSS. Will transfer to [facility] ED for CTH to r/o intracranial hemorrhage.       PAST MEDICAL & SURGICAL HISTORY:  Diabetes mellitus of other type without complication  Alcohol abuse  Hypothyroidism  Schizophrenia  Atrial fibrillation  Schizophrenia  HTN (hypertension)  HLD (hyperlipidemia)  No significant past surgical history      ALLERGIES:  No Known Allergies      MEDICATIONS  (STANDING):  atorvastatin 40 milliGRAM(s) Oral <User Schedule>  benztropine 2 milliGRAM(s) Oral daily  busPIRone 15 milliGRAM(s) Oral <User Schedule>  levothyroxine 50 MICROGram(s) Oral daily  losartan 25 milliGRAM(s) Oral daily  metFORMIN 850 milliGRAM(s) Oral daily  metoprolol succinate ER 50 milliGRAM(s) Oral daily  rivaroxaban 20 milliGRAM(s) Oral with dinner    MEDICATIONS  (PRN):  acetaminophen     Tablet .. 650 milliGRAM(s) Oral every 6 hours PRN Temp greater or equal to 38C (100.4F), Mild Pain (1 - 3), Moderate Pain (4 - 6)  fluPHENAZine 2.5 milliGRAM(s) Oral every 8 hours PRN Agitation stemming from psychosis  fluPHENAZine  Injectable 2 milliGRAM(s) IntraMuscular once PRN Severe agitation stemming from psychosis  melatonin 3 milliGRAM(s) Oral at bedtime PRN Insomnia  senna 2 Tablet(s) Oral daily PRN constipation      Vital Signs Last 24 Hrs  T(F): 98 (14 Apr 2024 09:40), Max: 98 (14 Apr 2024 09:40)  HR: --  BP: --  RR: 16 (14 Apr 2024 09:40) (16 - 16)  SpO2: 100% (14 Apr 2024 09:40) (100% - 100%)      PHYSICAL EXAM:  GENERAL: NAD  HEAD: Atraumatic, Normocephalic  EYES: Conjunctiva and sclera clear  NECK: Supple, No JVD  CHEST/LUNG: Clear to auscultation bilaterally; No wheeze  HEART: Regular rate and rhythm; No murmurs, rubs, or gallops      Psychiatry Section:  Psychiatric Summary/[facility] admitting diagnosis: Schizoaffective disorder    Psychiatric Recommendations: Patient on CO 1:1 w/ [facility] staff. Please page [phone]) for any questions/dispo.     Observation status (check one):   ( X ) Constant Observation  ( ) Enhanced care  ( ) Routine checks    Risk Status (check all that apply if present):  ( ) at risk for suicide/self-injury  ( ) at risk for aggressive behavior  ( ) at risk for elopement  ( ) other risk: [facility] Inpatient to ED Transfer Summary    Reason for Transfer/Medical Summary:  63F admitted to [facility] for schizophrenia w/ PMHx of HTN, Afib on xarelto, HLD, T2DM p/w unwitnessed mechanical fall. Patient reports that she rolled off her bed while asleep and hit her head. Patient c/o pain to the occipital region w/ tenderness to palpation. Patient denies headache, dizziness, lightheadedness, SOB, CP, neck/back/hip pain. Physical exam unremarkable. VSS. Will transfer to ED for CTH to r/o intracranial hemorrhage.       PAST MEDICAL & SURGICAL HISTORY:  Diabetes mellitus of other type without complication  Alcohol abuse  Hypothyroidism  Schizophrenia  Atrial fibrillation  Schizophrenia  HTN (hypertension)  HLD (hyperlipidemia)  No significant past surgical history      ALLERGIES:  No Known Allergies      MEDICATIONS  (STANDING):  atorvastatin 40 milliGRAM(s) Oral <User Schedule>  benztropine 2 milliGRAM(s) Oral daily  busPIRone 15 milliGRAM(s) Oral <User Schedule>  levothyroxine 50 MICROGram(s) Oral daily  losartan 25 milliGRAM(s) Oral daily  metFORMIN 850 milliGRAM(s) Oral daily  metoprolol succinate ER 50 milliGRAM(s) Oral daily  rivaroxaban 20 milliGRAM(s) Oral with dinner    MEDICATIONS  (PRN):  acetaminophen     Tablet .. 650 milliGRAM(s) Oral every 6 hours PRN Temp greater or equal to 38C (100.4F), Mild Pain (1 - 3), Moderate Pain (4 - 6)  fluPHENAZine 2.5 milliGRAM(s) Oral every 8 hours PRN Agitation stemming from psychosis  fluPHENAZine  Injectable 2 milliGRAM(s) IntraMuscular once PRN Severe agitation stemming from psychosis  melatonin 3 milliGRAM(s) Oral at bedtime PRN Insomnia  senna 2 Tablet(s) Oral daily PRN constipation      Vital Signs Last 24 Hrs  T(F): 98 (14 Apr 2024 09:40), Max: 98 (14 Apr 2024 09:40)  HR: --  BP: --  RR: 16 (14 Apr 2024 09:40) (16 - 16)  SpO2: 100% (14 Apr 2024 09:40) (100% - 100%)      PHYSICAL EXAM:  GENERAL: NAD  HEAD: Atraumatic, Normocephalic  EYES: Conjunctiva and sclera clear  NECK: Supple, No JVD  CHEST/LUNG: Clear to auscultation bilaterally; No wheeze  HEART: Regular rate and rhythm; No murmurs, rubs, or gallops      Psychiatry Section:  Psychiatric Summary/[facility] admitting diagnosis: Schizoaffective disorder    Psychiatric Recommendations: Patient on CO 1:1 w/ [facility] staff. Please page BETTY [phone]) for any questions/dispo.     Observation status (check one):   ( X ) Constant Observation  ( ) Enhanced care  ( ) Routine checks    Risk Status (check all that apply if present):  ( ) at risk for suicide/self-injury  ( ) at risk for aggressive behavior  ( ) at risk for elopement  ( ) other risk:

## 2024-04-15 NOTE — ED ADULT NURSE REASSESSMENT NOTE - NS ED NURSE REASSESS COMMENT FT1
0700 Report received from RN. Pt AAOx4, NAD, resp nonlabored, skin warm/dry, resting comfortably in bed with aide at bedside. Pt c/o lightheaded . Pt denies headache, chest pain, palpitations, SOB, abd pain, n/v/d, urinary symptoms, fevers, chills, weakness at this time. Pt awaiting for non emergent transportation  . Safety maintained with call bell within reach. 0700 Report received from RN. Pt AAOx4, NAD, resp nonlabored, skin warm/dry, resting comfortably in bed with aide at bedside. Pt c/o lightheaded . Pt denies headache, chest pain, palpitations, SOB, abd pain, n/v/d, urinary symptoms, fevers, chills, weakness at this time. Pt awaiting for non emergent transportation  . Safety maintained with call bell within reach., as per RN Nikolay Castano he gave report to \[facility]\""

## 2024-04-15 NOTE — ED PROVIDER NOTE - PRINCIPAL DIAGNOSIS
How Severe Are Your Spot(S)?: mild What Is The Reason For Today's Visit?: Full Body Skin Examination What Is The Reason For Today's Visit? (Being Monitored For X): concerning skin lesions on an annual basis Head contusion

## 2024-04-15 NOTE — ED ADULT NURSE NOTE - BREATHING, MLM
Urine from admission sent down for universal drug screen, pt signed consent. Spontaneous, unlabored and symmetrical

## 2024-04-15 NOTE — ED ADULT NURSE REASSESSMENT NOTE - NS ED NURSE REASSESS COMMENT FT1
0945 Report given to [facility] EMT  patient is in no acute distress. Patient vital signs stable, aaox3 with AIDE at the bedside

## 2024-04-16 ENCOUNTER — APPOINTMENT (OUTPATIENT)
Dept: CARDIOLOGY | Facility: CLINIC | Age: 64
End: 2024-04-16

## 2024-04-16 PROCEDURE — 99232 SBSQ HOSP IP/OBS MODERATE 35: CPT

## 2024-04-16 RX ADMIN — Medication 50 MILLIGRAM(S): at 08:42

## 2024-04-16 RX ADMIN — ATORVASTATIN CALCIUM 40 MILLIGRAM(S): 80 TABLET, FILM COATED ORAL at 16:22

## 2024-04-16 RX ADMIN — Medication 15 MILLIGRAM(S): at 21:51

## 2024-04-16 RX ADMIN — LOSARTAN POTASSIUM 25 MILLIGRAM(S): 100 TABLET, FILM COATED ORAL at 08:43

## 2024-04-16 RX ADMIN — RIVAROXABAN 20 MILLIGRAM(S): KIT at 16:22

## 2024-04-16 RX ADMIN — METFORMIN HYDROCHLORIDE 850 MILLIGRAM(S): 850 TABLET ORAL at 08:43

## 2024-04-16 RX ADMIN — Medication 2 MILLIGRAM(S): at 08:43

## 2024-04-16 RX ADMIN — Medication 50 MICROGRAM(S): at 06:52

## 2024-04-16 NOTE — BH INPATIENT PSYCHIATRY PROGRESS NOTE - NSBHFUPINTERVALHXFT_PSY_A_CORE
Patient is seen and evaluated for f/u for Schizophrenia. Chart reviewed and discussed with nursing staff. VSS. Adherent with oral meds. No overnight events or prns needed. Received Prolixin dec 12.5mg IM on 3/18 and 18.75mg IM on 4/8/24. Dose was increased due to relapse in symptoms after tapering PO Prolixin after 1st MURILLO. Next dose due on 4/29/24.    No interval changes. Pt tolerating Prolixin MURILLO, denies AH/VH/SI/HI. Denies racing thoughts or delusional thoughts. Her thought process if concrete but goal directed and logical. Dispo: NH, in process.

## 2024-04-16 NOTE — BH INPATIENT PSYCHIATRY PROGRESS NOTE - NSBHASSESSSUMMFT_PSY_ALL_CORE
Patient is a 64 y/o woman,  x 10 years with PMHx of Schizophrenia, HTN, Afib, Hypothyroidism, NPH and hx of hypovolemic/septic shock from UTI admitted for management of paranoia in the context of treatment non-adherence.  Pt unable to engage in intake interview during initial encounter, fixated on obtaining a shower and refusing to engage in other topics. As per collateral patient has decompensated since being discharged from [facility] in December and being lost to f/u in terms of mental health treatment. From collateral pt presenting sx suggestive of psychotic decompensation including paranoid delusions, disorganized behavior and suspected AH in the context of non-adherence to pharmacotherapy. At home she was refusing meals, refusing to let HHA in and not leaving the house due to paranoia  Pt currently requires inpatient level of care as she is unable to care for herself in her current state, would benefit from med adjustments for stabilization and safe aftercare planning.    Impression: Schizophrenia    4/16 Clinical update: Patient presenting sustained improvement and tolerating MURILLO. Pt received Prolixin Decanoate 18.75mg IM on 4/8/24. No interval changes. Pt tolerating Prolixin MURILLO, denies AH/VH/SI/HI. Denies racing thoughts or delusional thoughts. Her thought process if concrete but goal directed and logical. Dispo: NH, in process.     -Admit to [facility] 2S  -Legal status 2PC  -Routine checks  -Prolixin dec 18.75mg IM, next dose due 4/29/24  -Confirmed med list from patient's pharmacy [address]:    -Losartan 100/25 (losartan/hctz???)    -Metoprolol ER 50mg QD    -Cogentin 1mg BID    -Xarelto 20mg with dinner.    -Amlodipine 5mg QD    -Levothyroxine 50 micrograms QD  -Will resume Metoprolol with hold parameters. Initially held Losartan, Amlodipine and monitor BP, may resume if indicated.   -Resume Losartan, starting at 25mg QD with hold parameters, titrate as needed.

## 2024-04-17 PROCEDURE — 99231 SBSQ HOSP IP/OBS SF/LOW 25: CPT

## 2024-04-17 RX ADMIN — SENNA PLUS 2 TABLET(S): 8.6 TABLET ORAL at 14:39

## 2024-04-17 RX ADMIN — Medication 15 MILLIGRAM(S): at 21:14

## 2024-04-17 RX ADMIN — RIVAROXABAN 20 MILLIGRAM(S): KIT at 16:45

## 2024-04-17 RX ADMIN — ATORVASTATIN CALCIUM 40 MILLIGRAM(S): 80 TABLET, FILM COATED ORAL at 16:45

## 2024-04-17 RX ADMIN — LOSARTAN POTASSIUM 25 MILLIGRAM(S): 100 TABLET, FILM COATED ORAL at 09:43

## 2024-04-17 RX ADMIN — Medication 2 MILLIGRAM(S): at 09:43

## 2024-04-17 RX ADMIN — Medication 50 MILLIGRAM(S): at 09:43

## 2024-04-17 RX ADMIN — METFORMIN HYDROCHLORIDE 850 MILLIGRAM(S): 850 TABLET ORAL at 09:43

## 2024-04-17 RX ADMIN — Medication 50 MICROGRAM(S): at 06:29

## 2024-04-17 NOTE — BH INPATIENT PSYCHIATRY PROGRESS NOTE - NSBHFUPINTERVALHXFT_PSY_A_CORE
Patient is seen and evaluated for f/u for Schizophrenia. Chart reviewed and discussed with nursing staff. VSS. Adherent with oral meds. No overnight events or prns needed. Received Prolixin dec 12.5mg IM on 3/18 and 18.75mg IM on 4/8/24. Dose was increased due to relapse in symptoms after tapering PO Prolixin after 1st MURILLO. Next dose due on 4/29/24.    No interval changes. Pt denies auditory hallucinations, racing thoughts or paranoia. Tolerating Prolixin MURILLO. Dispo: NH, in process.

## 2024-04-17 NOTE — BH INPATIENT PSYCHIATRY PROGRESS NOTE - NSBHASSESSSUMMFT_PSY_ALL_CORE
Patient is a 62 y/o woman,  x 10 years with PMHx of Schizophrenia, HTN, Afib, Hypothyroidism, NPH and hx of hypovolemic/septic shock from UTI admitted for management of paranoia in the context of treatment non-adherence.  Pt unable to engage in intake interview during initial encounter, fixated on obtaining a shower and refusing to engage in other topics. As per collateral patient has decompensated since being discharged from [facility] in December and being lost to f/u in terms of mental health treatment. From collateral pt presenting sx suggestive of psychotic decompensation including paranoid delusions, disorganized behavior and suspected AH in the context of non-adherence to pharmacotherapy. At home she was refusing meals, refusing to let HHA in and not leaving the house due to paranoia  Pt currently requires inpatient level of care as she is unable to care for herself in her current state, would benefit from med adjustments for stabilization and safe aftercare planning.    Impression: Schizophrenia    4/17 Clinical update: Patient presenting sustained improvement and tolerating MURILLO. Pt received Prolixin Decanoate 18.75mg IM on 4/8/24. No interval changes. Pt tolerating Prolixin MURILLO, denies AH/VH/SI/HI. Denies racing thoughts or delusional thoughts. Her thought process if concrete but goal directed and logical. Dispo: NH, in process.     -Admit to [facility] 2S  -Legal status 2PC  -Routine checks  -Prolixin dec 18.75mg IM, next dose due 4/29/24  -Confirmed med list from patient's pharmacy [address]:    -Losartan 100/25 (losartan/hctz???)    -Metoprolol ER 50mg QD    -Cogentin 1mg BID    -Xarelto 20mg with dinner.    -Amlodipine 5mg QD    -Levothyroxine 50 micrograms QD  -Will resume Metoprolol with hold parameters. Initially held Losartan, Amlodipine and monitor BP, may resume if indicated.   -Resume Losartan, starting at 25mg QD with hold parameters, titrate as needed.

## 2024-04-18 PROCEDURE — 99231 SBSQ HOSP IP/OBS SF/LOW 25: CPT

## 2024-04-18 RX ADMIN — METFORMIN HYDROCHLORIDE 850 MILLIGRAM(S): 850 TABLET ORAL at 09:21

## 2024-04-18 RX ADMIN — Medication 15 MILLIGRAM(S): at 21:03

## 2024-04-18 RX ADMIN — ATORVASTATIN CALCIUM 40 MILLIGRAM(S): 80 TABLET, FILM COATED ORAL at 16:49

## 2024-04-18 RX ADMIN — LOSARTAN POTASSIUM 25 MILLIGRAM(S): 100 TABLET, FILM COATED ORAL at 09:21

## 2024-04-18 RX ADMIN — Medication 50 MILLIGRAM(S): at 09:21

## 2024-04-18 RX ADMIN — Medication 50 MICROGRAM(S): at 05:49

## 2024-04-18 RX ADMIN — RIVAROXABAN 20 MILLIGRAM(S): KIT at 16:49

## 2024-04-18 RX ADMIN — Medication 650 MILLIGRAM(S): at 06:19

## 2024-04-18 RX ADMIN — Medication 2 MILLIGRAM(S): at 09:21

## 2024-04-18 NOTE — BH INPATIENT PSYCHIATRY PROGRESS NOTE - NSBHMSETHTPROC_PSY_A_CORE
Disorganized/Perseverative/Illogical/Impaired reasoning
Perseverative/Impaired reasoning
Disorganized/Perseverative/Illogical/Impaired reasoning
Perseverative/Impaired reasoning
Disorganized/Perseverative/Illogical/Impaired reasoning
Perseverative/Impaired reasoning
Disorganized/Perseverative/Illogical/Impaired reasoning
Perseverative/Impaired reasoning
Disorganized/Perseverative/Illogical/Impaired reasoning
Perseverative/Impaired reasoning
Perseverative/Impaired reasoning
Disorganized/Perseverative/Illogical/Impaired reasoning
Perseverative/Impaired reasoning
Tangential/Perseverative/Illogical
Disorganized/Perseverative/Illogical/Impaired reasoning
Perseverative/Impaired reasoning
Disorganized/Perseverative/Illogical/Impaired reasoning

## 2024-04-18 NOTE — BH INPATIENT PSYCHIATRY PROGRESS NOTE - NSBHMETABOLIC_PSY_ALL_CORE_FT
BMI: BMI (kg/m2): 26.6 (04-15-24 @ 04:07)  HbA1c: A1C with Estimated Average Glucose Result: 5.5 % (02-26-24 @ 08:01)    Glucose: POCT Blood Glucose.: 103 mg/dL (04-12-24 @ 08:08)    BP: --Vital Signs Last 24 Hrs  T(C): 36.6 (04-15-24 @ 09:38), Max: 36.9 (04-15-24 @ 06:19)  T(F): 97.8 (04-15-24 @ 09:38), Max: 98.5 (04-15-24 @ 06:19)  HR: 63 (04-15-24 @ 09:38) (60 - 72)  BP: 133/78 (04-15-24 @ 09:38) (124/77 - 150/68)  BP(mean): --  RR: 18 (04-15-24 @ 09:38) (16 - 18)  SpO2: 98% (04-15-24 @ 09:38) (97% - 100%)    Orthostatic VS  04-15-24 @ 03:06  Lying BP: 127/63 HR: 63  Sitting BP: 133/66 HR: 71  Standing BP: --/-- HR: --  Site: upper right arm  Mode: electronic  Orthostatic VS  04-14-24 @ 09:40  Lying BP: --/-- HR: --  Sitting BP: 135/76 HR: 72  Standing BP: 128/72 HR: 78  Site: --  Mode: --    Lipid Panel: Date/Time: 08-26-23 @ 13:54  Cholesterol, Serum: 155  LDL Cholesterol Calculated: 99  HDL Cholesterol, Serum: 35  Total Cholesterol/HDL Ration Measurement: --  Triglycerides, Serum: 105  
BMI: BMI (kg/m2): 30.9 (02-23-24 @ 15:44)  HbA1c: A1C with Estimated Average Glucose Result: 5.5 % (02-26-24 @ 08:01)    Glucose: POCT Blood Glucose.: 116 mg/dL (03-25-24 @ 08:20)    BP: --Vital Signs Last 24 Hrs  T(C): 36.3 (03-25-24 @ 07:49), Max: 36.3 (03-25-24 @ 07:49)  T(F): 97.4 (03-25-24 @ 07:49), Max: 97.4 (03-25-24 @ 07:49)  HR: --  BP: --  BP(mean): --  RR: --  SpO2: --    Orthostatic VS  03-25-24 @ 07:49  Lying BP: --/-- HR: --  Sitting BP: 138/65 HR: 69  Standing BP: 137/67 HR: 75  Site: --  Mode: --  Orthostatic VS  03-24-24 @ 07:47  Lying BP: --/-- HR: --  Sitting BP: 129/62 HR: 70  Standing BP: 122/71 HR: 77  Site: --  Mode: --    Lipid Panel: Date/Time: 08-26-23 @ 13:54  Cholesterol, Serum: 155  LDL Cholesterol Calculated: 99  HDL Cholesterol, Serum: 35  Total Cholesterol/HDL Ration Measurement: --  Triglycerides, Serum: 105  
BMI: BMI (kg/m2): 30.9 (02-23-24 @ 15:44)  HbA1c: A1C with Estimated Average Glucose Result: 5.5 % (02-26-24 @ 08:01)    Glucose: POCT Blood Glucose.: 100 mg/dL (03-05-24 @ 12:14)    BP: 144/73 (03-05-24 @ 07:06) (144/73 - 144/73)Vital Signs Last 24 Hrs  T(C): 36.7 (03-05-24 @ 07:06), Max: 36.7 (03-05-24 @ 07:06)  T(F): 98.1 (03-05-24 @ 07:06), Max: 98.1 (03-05-24 @ 07:06)  HR: 77 (03-05-24 @ 07:06) (77 - 77)  BP: 144/73 (03-05-24 @ 07:06) (144/73 - 144/73)  BP(mean): --  RR: --  SpO2: --    Orthostatic VS  03-04-24 @ 07:57  Lying BP: --/-- HR: --  Sitting BP: 139/81 HR: 63  Standing BP: 143/79 HR: 65  Site: --  Mode: --    Lipid Panel: Date/Time: 08-26-23 @ 13:54  Cholesterol, Serum: 155  LDL Cholesterol Calculated: 99  HDL Cholesterol, Serum: 35  Total Cholesterol/HDL Ration Measurement: --  Triglycerides, Serum: 105  
BMI: BMI (kg/m2): 30.9 (02-23-24 @ 15:44)  HbA1c: A1C with Estimated Average Glucose Result: 5.5 % (02-26-24 @ 08:01)    Glucose: POCT Blood Glucose.: 118 mg/dL (03-01-24 @ 12:13)    BP: 132/93 (03-01-24 @ 07:51) (132/93 - 132/93)Vital Signs Last 24 Hrs  T(C): 36.2 (03-01-24 @ 07:51), Max: 36.2 (03-01-24 @ 07:51)  T(F): 97.1 (03-01-24 @ 07:51), Max: 97.1 (03-01-24 @ 07:51)  HR: --  BP: 132/93 (03-01-24 @ 07:51) (132/93 - 132/93)  BP(mean): 55 (03-01-24 @ 07:51) (55 - 55)  RR: --  SpO2: --    Orthostatic VS  02-29-24 @ 07:50  Lying BP: --/-- HR: --  Sitting BP: 120/61 HR: 65  Standing BP: 123/65 HR: 68  Site: --  Mode: --    Lipid Panel: Date/Time: 08-26-23 @ 13:54  Cholesterol, Serum: 155  LDL Cholesterol Calculated: 99  HDL Cholesterol, Serum: 35  Total Cholesterol/HDL Ration Measurement: --  Triglycerides, Serum: 105  
BMI: BMI (kg/m2): 30.9 (02-23-24 @ 15:44)  HbA1c: A1C with Estimated Average Glucose Result: 5.5 % (02-26-24 @ 08:01)    Glucose: POCT Blood Glucose.: 112 mg/dL (04-01-24 @ 08:19)    BP: --Vital Signs Last 24 Hrs  T(C): 36.8 (04-01-24 @ 07:57), Max: 36.8 (04-01-24 @ 07:57)  T(F): 98.3 (04-01-24 @ 07:57), Max: 98.3 (04-01-24 @ 07:57)  HR: --  BP: --  BP(mean): --  RR: --  SpO2: --    Orthostatic VS  04-01-24 @ 07:57  Lying BP: --/-- HR: --  Sitting BP: 134/73 HR: 67  Standing BP: 133/78 HR: 78  Site: --  Mode: --  Orthostatic VS  03-31-24 @ 07:56  Lying BP: --/-- HR: --  Sitting BP: 117/68 HR: 69  Standing BP: 114/72 HR: 79  Site: --  Mode: --    Lipid Panel: Date/Time: 08-26-23 @ 13:54  Cholesterol, Serum: 155  LDL Cholesterol Calculated: 99  HDL Cholesterol, Serum: 35  Total Cholesterol/HDL Ration Measurement: --  Triglycerides, Serum: 105  
BMI: BMI (kg/m2): 30.9 (02-23-24 @ 15:44)  HbA1c: A1C with Estimated Average Glucose Result: 5.5 % (02-26-24 @ 08:01)    Glucose: POCT Blood Glucose.: 228 mg/dL (03-08-24 @ 08:19)    BP: --Vital Signs Last 24 Hrs  T(C): 36.7 (03-08-24 @ 07:55), Max: 36.7 (03-08-24 @ 07:55)  T(F): 98.1 (03-08-24 @ 07:55), Max: 98.1 (03-08-24 @ 07:55)  HR: --  BP: --  BP(mean): --  RR: --  SpO2: --    Orthostatic VS  03-08-24 @ 07:55  Lying BP: --/-- HR: --  Sitting BP: 149/83 HR: 72  Standing BP: 122/66 HR: 99  Site: --  Mode: --  Orthostatic VS  03-07-24 @ 05:44  Lying BP: --/-- HR: --  Sitting BP: 127/68 HR: 67  Standing BP: 121/66 HR: 70  Site: --  Mode: --    Lipid Panel: Date/Time: 08-26-23 @ 13:54  Cholesterol, Serum: 155  LDL Cholesterol Calculated: 99  HDL Cholesterol, Serum: 35  Total Cholesterol/HDL Ration Measurement: --  Triglycerides, Serum: 105  
BMI: BMI (kg/m2): 30.9 (02-23-24 @ 15:44)  HbA1c: A1C with Estimated Average Glucose Result: 5.5 % (02-26-24 @ 08:01)    Glucose: POCT Blood Glucose.: 96 mg/dL (03-12-24 @ 08:17)    BP: 136/73 (03-11-24 @ 07:42) (136/73 - 136/73)Vital Signs Last 24 Hrs  T(C): 36.6 (03-12-24 @ 07:54), Max: 36.6 (03-12-24 @ 07:54)  T(F): 97.9 (03-12-24 @ 07:54), Max: 97.9 (03-12-24 @ 07:54)  HR: --  BP: --  BP(mean): --  RR: 18 (03-12-24 @ 11:45) (18 - 18)  SpO2: --    Orthostatic VS  03-12-24 @ 07:54  Lying BP: --/-- HR: --  Sitting BP: 122/68 HR: 57  Standing BP: 126/72 HR: 70  Site: --  Mode: --    Lipid Panel: Date/Time: 08-26-23 @ 13:54  Cholesterol, Serum: 155  LDL Cholesterol Calculated: 99  HDL Cholesterol, Serum: 35  Total Cholesterol/HDL Ration Measurement: --  Triglycerides, Serum: 105  
BMI: BMI (kg/m2): 30.9 (02-23-24 @ 15:44)  HbA1c: A1C with Estimated Average Glucose Result: 5.5 % (02-26-24 @ 08:01)    Glucose: POCT Blood Glucose.: 88 mg/dL (02-27-24 @ 08:13)    BP: 120/57 (02-26-24 @ 08:06) (120/57 - 120/57)Vital Signs Last 24 Hrs  T(C): 35.6 (02-27-24 @ 05:50), Max: 35.6 (02-27-24 @ 05:50)  T(F): 96.1 (02-27-24 @ 05:50), Max: 96.1 (02-27-24 @ 05:50)  HR: --  BP: --  BP(mean): --  RR: --  SpO2: --    Orthostatic VS  02-27-24 @ 05:50  Lying BP: --/-- HR: --  Sitting BP: 118/-- HR: --  Standing BP: 120/75 HR: 86  Site: upper left arm  Mode: electronic    Lipid Panel: Date/Time: 08-26-23 @ 13:54  Cholesterol, Serum: 155  LDL Cholesterol Calculated: 99  HDL Cholesterol, Serum: 35  Total Cholesterol/HDL Ration Measurement: --  Triglycerides, Serum: 105  
BMI: BMI (kg/m2): 30.9 (02-23-24 @ 15:44)  HbA1c: A1C with Estimated Average Glucose Result: 5.5 % (02-26-24 @ 08:01)    Glucose: POCT Blood Glucose.: 105 mg/dL (03-13-24 @ 07:43)    BP: 136/73 (03-11-24 @ 07:42) (136/73 - 136/73)Vital Signs Last 24 Hrs  T(C): 36.6 (03-13-24 @ 07:57), Max: 36.6 (03-13-24 @ 07:57)  T(F): 97.8 (03-13-24 @ 07:57), Max: 97.8 (03-13-24 @ 07:57)  HR: --  BP: --  BP(mean): --  RR: --  SpO2: --    Orthostatic VS  03-13-24 @ 07:57  Lying BP: --/-- HR: --  Sitting BP: 126/71 HR: 71  Standing BP: 145/68 HR: 76  Site: --  Mode: --  Orthostatic VS  03-12-24 @ 07:54  Lying BP: --/-- HR: --  Sitting BP: 122/68 HR: 57  Standing BP: 126/72 HR: 70  Site: --  Mode: --    Lipid Panel: Date/Time: 08-26-23 @ 13:54  Cholesterol, Serum: 155  LDL Cholesterol Calculated: 99  HDL Cholesterol, Serum: 35  Total Cholesterol/HDL Ration Measurement: --  Triglycerides, Serum: 105  
BMI: BMI (kg/m2): 30.9 (02-23-24 @ 15:44)  HbA1c: A1C with Estimated Average Glucose Result: 5.5 % (02-26-24 @ 08:01)    Glucose: POCT Blood Glucose.: 175 mg/dL (03-29-24 @ 08:25)    BP: 126/72 (03-27-24 @ 07:49) (126/72 - 126/72)Vital Signs Last 24 Hrs  T(C): 36.3 (03-29-24 @ 07:45), Max: 36.3 (03-29-24 @ 07:45)  T(F): 97.3 (03-29-24 @ 07:45), Max: 97.3 (03-29-24 @ 07:45)  HR: --  BP: --  BP(mean): --  RR: --  SpO2: --    Orthostatic VS  03-29-24 @ 07:45  Lying BP: --/-- HR: --  Sitting BP: 184/94 HR: 74  Standing BP: 169/95 HR: 79  Site: --  Mode: --  Orthostatic VS  03-28-24 @ 07:44  Lying BP: --/-- HR: --  Sitting BP: 124/68 HR: 73  Standing BP: 119/73 HR: 94  Site: --  Mode: --    Lipid Panel: Date/Time: 08-26-23 @ 13:54  Cholesterol, Serum: 155  LDL Cholesterol Calculated: 99  HDL Cholesterol, Serum: 35  Total Cholesterol/HDL Ration Measurement: --  Triglycerides, Serum: 105  
BMI: BMI (kg/m2): 30.9 (02-23-24 @ 15:44)  HbA1c: A1C with Estimated Average Glucose Result: 5.5 % (02-26-24 @ 08:01)    Glucose: POCT Blood Glucose.: 90 mg/dL (03-04-24 @ 12:11)    BP: 133/72 (03-02-24 @ 05:58) (133/72 - 133/72)Vital Signs Last 24 Hrs  T(C): 36.4 (03-04-24 @ 07:57), Max: 36.4 (03-04-24 @ 07:57)  T(F): 97.6 (03-04-24 @ 07:57), Max: 97.6 (03-04-24 @ 07:57)  HR: --  BP: --  BP(mean): --  RR: --  SpO2: --    Orthostatic VS  03-04-24 @ 07:57  Lying BP: --/-- HR: --  Sitting BP: 139/81 HR: 63  Standing BP: 143/79 HR: 65  Site: --  Mode: --    Lipid Panel: Date/Time: 08-26-23 @ 13:54  Cholesterol, Serum: 155  LDL Cholesterol Calculated: 99  HDL Cholesterol, Serum: 35  Total Cholesterol/HDL Ration Measurement: --  Triglycerides, Serum: 105  
BMI: BMI (kg/m2): 30.9 (02-23-24 @ 15:44)  HbA1c: A1C with Estimated Average Glucose Result: 5.5 % (02-26-24 @ 08:01)    Glucose: POCT Blood Glucose.: 109 mg/dL (04-05-24 @ 08:06)    BP: --Vital Signs Last 24 Hrs  T(C): 36.3 (04-05-24 @ 07:50), Max: 36.3 (04-05-24 @ 07:50)  T(F): 97.4 (04-05-24 @ 07:50), Max: 97.4 (04-05-24 @ 07:50)  HR: --  BP: --  BP(mean): --  RR: 19 (04-05-24 @ 07:50) (19 - 19)  SpO2: --    Orthostatic VS  04-05-24 @ 07:50  Lying BP: --/-- HR: --  Sitting BP: 144/76 HR: 69  Standing BP: 129/77 HR: 76  Site: --  Mode: --  Orthostatic VS  04-04-24 @ 08:37  Lying BP: --/-- HR: --  Sitting BP: 118/77 HR: 67  Standing BP: 121/79 HR: 75  Site: --  Mode: --    Lipid Panel: Date/Time: 08-26-23 @ 13:54  Cholesterol, Serum: 155  LDL Cholesterol Calculated: 99  HDL Cholesterol, Serum: 35  Total Cholesterol/HDL Ration Measurement: --  Triglycerides, Serum: 105  
BMI: BMI (kg/m2): 30.9 (02-23-24 @ 15:44)  HbA1c: A1C with Estimated Average Glucose Result: 5.5 % (02-26-24 @ 08:01)    Glucose: POCT Blood Glucose.: 138 mg/dL (03-19-24 @ 07:24)    BP: --Vital Signs Last 24 Hrs  T(C): 36.5 (03-19-24 @ 05:36), Max: 36.5 (03-19-24 @ 05:36)  T(F): 97.7 (03-19-24 @ 05:36), Max: 97.7 (03-19-24 @ 05:36)  HR: --  BP: --  BP(mean): --  RR: --  SpO2: --    Orthostatic VS  03-19-24 @ 05:36  Lying BP: --/-- HR: --  Sitting BP: 145/66 HR: 66  Standing BP: 136/80 HR: 72  Site: --  Mode: --  Orthostatic VS  03-18-24 @ 07:49  Lying BP: --/-- HR: --  Sitting BP: 146/73 HR: 70  Standing BP: 150/80 HR: 80  Site: --  Mode: --    Lipid Panel: Date/Time: 08-26-23 @ 13:54  Cholesterol, Serum: 155  LDL Cholesterol Calculated: 99  HDL Cholesterol, Serum: 35  Total Cholesterol/HDL Ration Measurement: --  Triglycerides, Serum: 105  
BMI: BMI (kg/m2): 30.9 (02-23-24 @ 15:44)  HbA1c: A1C with Estimated Average Glucose Result: 5.5 % (02-26-24 @ 08:01)    Glucose: POCT Blood Glucose.: 145 mg/dL (03-14-24 @ 07:41)    BP: --Vital Signs Last 24 Hrs  T(C): 36.4 (03-15-24 @ 07:59), Max: 36.4 (03-15-24 @ 07:59)  T(F): 97.6 (03-15-24 @ 07:59), Max: 97.6 (03-15-24 @ 07:59)  HR: --  BP: --  BP(mean): --  RR: --  SpO2: --    Orthostatic VS  03-15-24 @ 07:59  Lying BP: --/-- HR: --  Sitting BP: 135/76 HR: 74  Standing BP: 135/79 HR: 79  Site: --  Mode: --  Orthostatic VS  03-14-24 @ 07:46  Lying BP: --/-- HR: --  Sitting BP: 140/70 HR: 75  Standing BP: 131/69 HR: 83  Site: --  Mode: --    Lipid Panel: Date/Time: 08-26-23 @ 13:54  Cholesterol, Serum: 155  LDL Cholesterol Calculated: 99  HDL Cholesterol, Serum: 35  Total Cholesterol/HDL Ration Measurement: --  Triglycerides, Serum: 105  
BMI: BMI (kg/m2): 30.9 (02-23-24 @ 15:44)  HbA1c: A1C with Estimated Average Glucose Result: 5.5 % (02-26-24 @ 08:01)    Glucose: POCT Blood Glucose.: 93 mg/dL (04-10-24 @ 08:28)    BP: --Vital Signs Last 24 Hrs  T(C): 36.6 (04-10-24 @ 08:17), Max: 36.6 (04-10-24 @ 08:17)  T(F): 97.8 (04-10-24 @ 08:17), Max: 97.8 (04-10-24 @ 08:17)  HR: --  BP: --  BP(mean): --  RR: --  SpO2: --    Orthostatic VS  04-10-24 @ 08:17  Lying BP: --/-- HR: --  Sitting BP: 129/76 HR: 92  Standing BP: 145/88 HR: 107  Site: upper left arm  Mode: --  Orthostatic VS  04-09-24 @ 08:03  Lying BP: --/-- HR: --  Sitting BP: 140/77 HR: 63  Standing BP: 150/72 HR: 66  Site: --  Mode: --    Lipid Panel: Date/Time: 08-26-23 @ 13:54  Cholesterol, Serum: 155  LDL Cholesterol Calculated: 99  HDL Cholesterol, Serum: 35  Total Cholesterol/HDL Ration Measurement: --  Triglycerides, Serum: 105  
BMI: BMI (kg/m2): 30.9 (02-23-24 @ 15:44)  HbA1c: A1C with Estimated Average Glucose Result: 5.5 % (02-26-24 @ 08:01)    Glucose: POCT Blood Glucose.: 84 mg/dL (02-29-24 @ 12:30)    BP: --Vital Signs Last 24 Hrs  T(C): 36.4 (02-29-24 @ 07:50), Max: 36.4 (02-29-24 @ 07:50)  T(F): 97.6 (02-29-24 @ 07:50), Max: 97.6 (02-29-24 @ 07:50)  HR: --  BP: --  BP(mean): --  RR: 16 (02-29-24 @ 07:50) (16 - 16)  SpO2: --    Orthostatic VS  02-29-24 @ 07:50  Lying BP: --/-- HR: --  Sitting BP: 120/61 HR: 65  Standing BP: 123/65 HR: 68  Site: --  Mode: --  Orthostatic VS  02-28-24 @ 07:53  Lying BP: --/-- HR: --  Sitting BP: 133/82 HR: 64  Standing BP: 147/82 HR: 66  Site: upper left arm  Mode: electronic    Lipid Panel: Date/Time: 08-26-23 @ 13:54  Cholesterol, Serum: 155  LDL Cholesterol Calculated: 99  HDL Cholesterol, Serum: 35  Total Cholesterol/HDL Ration Measurement: --  Triglycerides, Serum: 105  
BMI: BMI (kg/m2): 30.9 (02-23-24 @ 15:44)  HbA1c: A1C with Estimated Average Glucose Result: 5.5 % (02-26-24 @ 08:01)    Glucose: POCT Blood Glucose.: 89 mg/dL (03-18-24 @ 08:19)    BP: --Vital Signs Last 24 Hrs  T(C): 36.2 (03-18-24 @ 07:49), Max: 36.2 (03-18-24 @ 07:49)  T(F): 97.1 (03-18-24 @ 07:49), Max: 97.1 (03-18-24 @ 07:49)  HR: --  BP: --  BP(mean): --  RR: --  SpO2: --    Orthostatic VS  03-18-24 @ 07:49  Lying BP: --/-- HR: --  Sitting BP: 146/73 HR: 70  Standing BP: 150/80 HR: 80  Site: --  Mode: --  Orthostatic VS  03-17-24 @ 08:13  Lying BP: --/-- HR: --  Sitting BP: 126/74 HR: 69  Standing BP: 116/69 HR: 75  Site: --  Mode: --    Lipid Panel: Date/Time: 08-26-23 @ 13:54  Cholesterol, Serum: 155  LDL Cholesterol Calculated: 99  HDL Cholesterol, Serum: 35  Total Cholesterol/HDL Ration Measurement: --  Triglycerides, Serum: 105  
BMI: BMI (kg/m2): 26.6 (04-15-24 @ 04:07)  HbA1c: A1C with Estimated Average Glucose Result: 5.5 % (02-26-24 @ 08:01)    Glucose: POCT Blood Glucose.: 103 mg/dL (04-12-24 @ 08:08)    BP: --Vital Signs Last 24 Hrs  T(C): --  T(F): --  HR: --  BP: --  BP(mean): --  RR: --  SpO2: --    Orthostatic VS  04-15-24 @ 03:06  Lying BP: 127/63 HR: 63  Sitting BP: 133/66 HR: 71  Standing BP: --/-- HR: --  Site: upper right arm  Mode: electronic    Lipid Panel: Date/Time: 08-26-23 @ 13:54  Cholesterol, Serum: 155  LDL Cholesterol Calculated: 99  HDL Cholesterol, Serum: 35  Total Cholesterol/HDL Ration Measurement: --  Triglycerides, Serum: 105  
BMI: BMI (kg/m2): 30.9 (02-23-24 @ 15:44)  HbA1c: A1C with Estimated Average Glucose Result: 5.5 % (02-26-24 @ 08:01)    Glucose: POCT Blood Glucose.: 103 mg/dL (02-28-24 @ 12:13)    BP: 120/57 (02-26-24 @ 08:06) (120/57 - 120/57)Vital Signs Last 24 Hrs  T(C): 36.8 (02-28-24 @ 07:53), Max: 36.8 (02-28-24 @ 07:53)  T(F): 98.2 (02-28-24 @ 07:53), Max: 98.2 (02-28-24 @ 07:53)  HR: --  BP: --  BP(mean): --  RR: --  SpO2: --    Orthostatic VS  02-28-24 @ 07:53  Lying BP: --/-- HR: --  Sitting BP: 133/82 HR: 64  Standing BP: 147/82 HR: 66  Site: upper left arm  Mode: electronic  Orthostatic VS  02-27-24 @ 05:50  Lying BP: --/-- HR: --  Sitting BP: 118/-- HR: --  Standing BP: 120/75 HR: 86  Site: upper left arm  Mode: electronic    Lipid Panel: Date/Time: 08-26-23 @ 13:54  Cholesterol, Serum: 155  LDL Cholesterol Calculated: 99  HDL Cholesterol, Serum: 35  Total Cholesterol/HDL Ration Measurement: --  Triglycerides, Serum: 105  
BMI: BMI (kg/m2): 30.9 (02-23-24 @ 15:44)  HbA1c: A1C with Estimated Average Glucose Result: 5.5 % (02-26-24 @ 08:01)    Glucose: POCT Blood Glucose.: 103 mg/dL (04-02-24 @ 08:33)    BP: --Vital Signs Last 24 Hrs  T(C): 36.8 (04-02-24 @ 07:36), Max: 36.8 (04-02-24 @ 07:36)  T(F): 98.2 (04-02-24 @ 07:36), Max: 98.2 (04-02-24 @ 07:36)  HR: --  BP: --  BP(mean): --  RR: --  SpO2: --    Orthostatic VS  04-02-24 @ 07:36  Lying BP: --/-- HR: --  Sitting BP: 133/74 HR: 69  Standing BP: 133/69 HR: 73  Site: --  Mode: --  Orthostatic VS  04-01-24 @ 07:57  Lying BP: --/-- HR: --  Sitting BP: 134/73 HR: 67  Standing BP: 133/78 HR: 78  Site: --  Mode: --    Lipid Panel: Date/Time: 08-26-23 @ 13:54  Cholesterol, Serum: 155  LDL Cholesterol Calculated: 99  HDL Cholesterol, Serum: 35  Total Cholesterol/HDL Ration Measurement: --  Triglycerides, Serum: 105  
BMI: BMI (kg/m2): 26.6 (04-15-24 @ 04:07)  HbA1c: A1C with Estimated Average Glucose Result: 5.5 % (02-26-24 @ 08:01)    Glucose: POCT Blood Glucose.: 103 mg/dL (04-12-24 @ 08:08)    BP: --Vital Signs Last 24 Hrs  T(C): 36.8 (04-17-24 @ 08:04), Max: 36.8 (04-17-24 @ 08:04)  T(F): 98.2 (04-17-24 @ 08:04), Max: 98.2 (04-17-24 @ 08:04)  HR: --  BP: --  BP(mean): --  RR: --  SpO2: --    Orthostatic VS  04-17-24 @ 08:04  Lying BP: --/-- HR: --  Sitting BP: 124/76 HR: 72  Standing BP: 116/72 HR: 77  Site: --  Mode: --    Lipid Panel: Date/Time: 08-26-23 @ 13:54  Cholesterol, Serum: 155  LDL Cholesterol Calculated: 99  HDL Cholesterol, Serum: 35  Total Cholesterol/HDL Ration Measurement: --  Triglycerides, Serum: 105  
BMI: BMI (kg/m2): 30.9 (02-23-24 @ 15:44)  HbA1c: A1C with Estimated Average Glucose Result: 5.5 % (02-26-24 @ 08:01)    Glucose: POCT Blood Glucose.: 145 mg/dL (03-14-24 @ 07:41)    BP: --Vital Signs Last 24 Hrs  T(C): 36.4 (03-14-24 @ 07:46), Max: 36.4 (03-14-24 @ 07:46)  T(F): 97.5 (03-14-24 @ 07:46), Max: 97.5 (03-14-24 @ 07:46)  HR: --  BP: --  BP(mean): --  RR: --  SpO2: --    Orthostatic VS  03-14-24 @ 07:46  Lying BP: --/-- HR: --  Sitting BP: 140/70 HR: 75  Standing BP: 131/69 HR: 83  Site: --  Mode: --  Orthostatic VS  03-13-24 @ 07:57  Lying BP: --/-- HR: --  Sitting BP: 126/71 HR: 71  Standing BP: 145/68 HR: 76  Site: --  Mode: --    Lipid Panel: Date/Time: 08-26-23 @ 13:54  Cholesterol, Serum: 155  LDL Cholesterol Calculated: 99  HDL Cholesterol, Serum: 35  Total Cholesterol/HDL Ration Measurement: --  Triglycerides, Serum: 105  
BMI: BMI (kg/m2): 30.9 (02-23-24 @ 15:44)  HbA1c: A1C with Estimated Average Glucose Result: 5.5 % (02-26-24 @ 08:01)    Glucose: POCT Blood Glucose.: 101 mg/dL (04-11-24 @ 08:24)    BP: --Vital Signs Last 24 Hrs  T(C): --  T(F): --  HR: --  BP: --  BP(mean): --  RR: --  SpO2: --    Orthostatic VS  04-10-24 @ 08:17  Lying BP: --/-- HR: --  Sitting BP: 129/76 HR: 92  Standing BP: 145/88 HR: 107  Site: upper left arm  Mode: --    Lipid Panel: Date/Time: 08-26-23 @ 13:54  Cholesterol, Serum: 155  LDL Cholesterol Calculated: 99  HDL Cholesterol, Serum: 35  Total Cholesterol/HDL Ration Measurement: --  Triglycerides, Serum: 105  
BMI: BMI (kg/m2): 30.9 (02-23-24 @ 15:44)  HbA1c: A1C with Estimated Average Glucose Result: 5.5 % (02-26-24 @ 08:01)    Glucose: POCT Blood Glucose.: 106 mg/dL (04-03-24 @ 07:50)    BP: --Vital Signs Last 24 Hrs  T(C): 36.7 (04-03-24 @ 08:19), Max: 36.7 (04-03-24 @ 08:19)  T(F): 98.1 (04-03-24 @ 08:19), Max: 98.1 (04-03-24 @ 08:19)  HR: --  BP: --  BP(mean): --  RR: --  SpO2: --    Orthostatic VS  04-03-24 @ 08:19  Lying BP: --/-- HR: --  Sitting BP: 131/78 HR: 63  Standing BP: 140/75 HR: 70  Site: upper left arm  Mode: electronic  Orthostatic VS  04-02-24 @ 07:36  Lying BP: --/-- HR: --  Sitting BP: 133/74 HR: 69  Standing BP: 133/69 HR: 73  Site: --  Mode: --    Lipid Panel: Date/Time: 08-26-23 @ 13:54  Cholesterol, Serum: 155  LDL Cholesterol Calculated: 99  HDL Cholesterol, Serum: 35  Total Cholesterol/HDL Ration Measurement: --  Triglycerides, Serum: 105  
BMI: BMI (kg/m2): 30.9 (02-23-24 @ 15:44)  HbA1c: A1C with Estimated Average Glucose Result: 5.5 % (02-26-24 @ 08:01)    Glucose: POCT Blood Glucose.: 84 mg/dL (03-27-24 @ 12:32)    BP: 126/72 (03-27-24 @ 07:49) (126/72 - 126/72)Vital Signs Last 24 Hrs  T(C): 36.8 (03-27-24 @ 07:49), Max: 36.8 (03-27-24 @ 07:49)  T(F): 98.2 (03-27-24 @ 07:49), Max: 98.2 (03-27-24 @ 07:49)  HR: 84 (03-27-24 @ 07:49) (84 - 84)  BP: 126/72 (03-27-24 @ 07:49) (126/72 - 126/72)  BP(mean): --  RR: 16 (03-27-24 @ 07:49) (16 - 16)  SpO2: --    Orthostatic VS  03-27-24 @ 07:49  Lying BP: --/-- HR: --  Sitting BP: 129/66 HR: 78  Standing BP: --/-- HR: --  Site: --  Mode: --  Orthostatic VS  03-26-24 @ 07:56  Lying BP: --/-- HR: --  Sitting BP: 140/77 HR: 83  Standing BP: 146/77 HR: 87  Site: --  Mode: --    Lipid Panel: Date/Time: 08-26-23 @ 13:54  Cholesterol, Serum: 155  LDL Cholesterol Calculated: 99  HDL Cholesterol, Serum: 35  Total Cholesterol/HDL Ration Measurement: --  Triglycerides, Serum: 105  
BMI: BMI (kg/m2): 30.9 (02-23-24 @ 15:44)  HbA1c: A1C with Estimated Average Glucose Result: 5.5 % (02-26-24 @ 08:01)    Glucose: POCT Blood Glucose.: 103 mg/dL (04-12-24 @ 08:08)    BP: --Vital Signs Last 24 Hrs  T(C): 36.7 (04-12-24 @ 07:52), Max: 36.7 (04-12-24 @ 07:52)  T(F): 98.1 (04-12-24 @ 07:52), Max: 98.1 (04-12-24 @ 07:52)  HR: --  BP: --  BP(mean): --  RR: --  SpO2: --    Orthostatic VS  04-12-24 @ 07:52  Lying BP: --/-- HR: --  Sitting BP: 136/80 HR: 71  Standing BP: 119/73 HR: 83  Site: --  Mode: --    Lipid Panel: Date/Time: 08-26-23 @ 13:54  Cholesterol, Serum: 155  LDL Cholesterol Calculated: 99  HDL Cholesterol, Serum: 35  Total Cholesterol/HDL Ration Measurement: --  Triglycerides, Serum: 105  
BMI: BMI (kg/m2): 30.9 (02-23-24 @ 15:44)  HbA1c: A1C with Estimated Average Glucose Result: 5.5 % (02-26-24 @ 08:01)    Glucose: POCT Blood Glucose.: 99 mg/dL (04-04-24 @ 07:53)    BP: --Vital Signs Last 24 Hrs  T(C): 36.4 (04-04-24 @ 08:37), Max: 36.4 (04-04-24 @ 08:37)  T(F): 97.6 (04-04-24 @ 08:37), Max: 97.6 (04-04-24 @ 08:37)  HR: --  BP: --  BP(mean): --  RR: --  SpO2: --    Orthostatic VS  04-04-24 @ 08:37  Lying BP: --/-- HR: --  Sitting BP: 118/77 HR: 67  Standing BP: 121/79 HR: 75  Site: --  Mode: --  Orthostatic VS  04-03-24 @ 08:19  Lying BP: --/-- HR: --  Sitting BP: 131/78 HR: 63  Standing BP: 140/75 HR: 70  Site: upper left arm  Mode: electronic    Lipid Panel: Date/Time: 08-26-23 @ 13:54  Cholesterol, Serum: 155  LDL Cholesterol Calculated: 99  HDL Cholesterol, Serum: 35  Total Cholesterol/HDL Ration Measurement: --  Triglycerides, Serum: 105  
BMI: BMI (kg/m2): 30.9 (02-23-24 @ 15:44)  HbA1c: A1C with Estimated Average Glucose Result: 5.5 % (02-26-24 @ 08:01)    Glucose: POCT Blood Glucose.: 100 mg/dL (03-22-24 @ 07:46)    BP: --Vital Signs Last 24 Hrs  T(C): 36.7 (03-22-24 @ 08:22), Max: 36.7 (03-22-24 @ 08:22)  T(F): 98 (03-22-24 @ 08:22), Max: 98 (03-22-24 @ 08:22)  HR: --  BP: --  BP(mean): --  RR: --  SpO2: --    Orthostatic VS  03-22-24 @ 08:22  Lying BP: --/-- HR: --  Sitting BP: 129/81 HR: 70  Standing BP: 126/80 HR: 75  Site: upper left arm  Mode: electronic  Orthostatic VS  03-21-24 @ 08:23  Lying BP: --/-- HR: --  Sitting BP: 141/72 HR: 70  Standing BP: 128/77 HR: 80  Site: --  Mode: --    Lipid Panel: Date/Time: 08-26-23 @ 13:54  Cholesterol, Serum: 155  LDL Cholesterol Calculated: 99  HDL Cholesterol, Serum: 35  Total Cholesterol/HDL Ration Measurement: --  Triglycerides, Serum: 105  
BMI: BMI (kg/m2): 30.9 (02-23-24 @ 15:44)  HbA1c: A1C with Estimated Average Glucose Result: 5.5 % (02-26-24 @ 08:01)    Glucose: POCT Blood Glucose.: 104 mg/dL (03-20-24 @ 07:36)    BP: --Vital Signs Last 24 Hrs  T(C): 36.9 (03-20-24 @ 07:54), Max: 36.9 (03-20-24 @ 07:54)  T(F): 98.4 (03-20-24 @ 07:54), Max: 98.4 (03-20-24 @ 07:54)  HR: --  BP: --  BP(mean): --  RR: --  SpO2: --    Orthostatic VS  03-20-24 @ 07:54  Lying BP: --/-- HR: --  Sitting BP: 139/81 HR: 76  Standing BP: 141/70 HR: 80  Site: --  Mode: --  Orthostatic VS  03-19-24 @ 05:36  Lying BP: --/-- HR: --  Sitting BP: 145/66 HR: 66  Standing BP: 136/80 HR: 72  Site: --  Mode: --    Lipid Panel: Date/Time: 08-26-23 @ 13:54  Cholesterol, Serum: 155  LDL Cholesterol Calculated: 99  HDL Cholesterol, Serum: 35  Total Cholesterol/HDL Ration Measurement: --  Triglycerides, Serum: 105  
BMI: BMI (kg/m2): 30.9 (02-23-24 @ 15:44)  HbA1c: A1C with Estimated Average Glucose Result: 5.5 % (02-26-24 @ 08:01)    Glucose: POCT Blood Glucose.: 127 mg/dL (03-28-24 @ 08:32)    BP: 126/72 (03-27-24 @ 07:49) (126/72 - 126/72)Vital Signs Last 24 Hrs  T(C): 36.8 (03-28-24 @ 07:44), Max: 36.8 (03-28-24 @ 07:44)  T(F): 98.2 (03-28-24 @ 07:44), Max: 98.2 (03-28-24 @ 07:44)  HR: --  BP: --  BP(mean): --  RR: --  SpO2: --    Orthostatic VS  03-28-24 @ 07:44  Lying BP: --/-- HR: --  Sitting BP: 124/68 HR: 73  Standing BP: 119/73 HR: 94  Site: --  Mode: --  Orthostatic VS  03-27-24 @ 07:49  Lying BP: --/-- HR: --  Sitting BP: 129/66 HR: 78  Standing BP: --/-- HR: --  Site: --  Mode: --    Lipid Panel: Date/Time: 08-26-23 @ 13:54  Cholesterol, Serum: 155  LDL Cholesterol Calculated: 99  HDL Cholesterol, Serum: 35  Total Cholesterol/HDL Ration Measurement: --  Triglycerides, Serum: 105  
BMI: BMI (kg/m2): 30.9 (02-23-24 @ 15:44)  HbA1c: A1C with Estimated Average Glucose Result: 5.5 % (02-26-24 @ 08:01)    Glucose: POCT Blood Glucose.: 116 mg/dL (03-26-24 @ 08:17)    BP: --Vital Signs Last 24 Hrs  T(C): 36.4 (03-26-24 @ 07:56), Max: 36.4 (03-26-24 @ 07:56)  T(F): 97.5 (03-26-24 @ 07:56), Max: 97.5 (03-26-24 @ 07:56)  HR: --  BP: --  BP(mean): --  RR: --  SpO2: --    Orthostatic VS  03-26-24 @ 07:56  Lying BP: --/-- HR: --  Sitting BP: 140/77 HR: 83  Standing BP: 146/77 HR: 87  Site: --  Mode: --  Orthostatic VS  03-25-24 @ 07:49  Lying BP: --/-- HR: --  Sitting BP: 138/65 HR: 69  Standing BP: 137/67 HR: 75  Site: --  Mode: --    Lipid Panel: Date/Time: 08-26-23 @ 13:54  Cholesterol, Serum: 155  LDL Cholesterol Calculated: 99  HDL Cholesterol, Serum: 35  Total Cholesterol/HDL Ration Measurement: --  Triglycerides, Serum: 105  
BMI: BMI (kg/m2): 30.9 (02-23-24 @ 15:44)  HbA1c: A1C with Estimated Average Glucose Result: 5.5 % (02-26-24 @ 08:01)    Glucose: POCT Blood Glucose.: 120 mg/dL (03-21-24 @ 08:12)    BP: --Vital Signs Last 24 Hrs  T(C): 36 (03-21-24 @ 08:23), Max: 36 (03-21-24 @ 08:23)  T(F): 96.8 (03-21-24 @ 08:23), Max: 96.8 (03-21-24 @ 08:23)  HR: --  BP: --  BP(mean): --  RR: --  SpO2: --    Orthostatic VS  03-21-24 @ 08:23  Lying BP: --/-- HR: --  Sitting BP: 141/72 HR: 70  Standing BP: 128/77 HR: 80  Site: --  Mode: --  Orthostatic VS  03-20-24 @ 07:54  Lying BP: --/-- HR: --  Sitting BP: 139/81 HR: 76  Standing BP: 141/70 HR: 80  Site: --  Mode: --    Lipid Panel: Date/Time: 08-26-23 @ 13:54  Cholesterol, Serum: 155  LDL Cholesterol Calculated: 99  HDL Cholesterol, Serum: 35  Total Cholesterol/HDL Ration Measurement: --  Triglycerides, Serum: 105  
BMI: BMI (kg/m2): 30.9 (02-23-24 @ 15:44)  HbA1c: A1C with Estimated Average Glucose Result: 5.5 % (02-26-24 @ 08:01)    Glucose: POCT Blood Glucose.: 115 mg/dL (04-08-24 @ 08:16)    BP: --Vital Signs Last 24 Hrs  T(C): 36.6 (04-08-24 @ 07:44), Max: 36.6 (04-08-24 @ 07:44)  T(F): 97.9 (04-08-24 @ 07:44), Max: 97.9 (04-08-24 @ 07:44)  HR: --  BP: --  BP(mean): --  RR: --  SpO2: --    Orthostatic VS  04-08-24 @ 07:44  Lying BP: --/-- HR: --  Sitting BP: 140/75 HR: 67  Standing BP: 129/77 HR: 72  Site: --  Mode: --  Orthostatic VS  04-07-24 @ 07:58  Lying BP: --/-- HR: --  Sitting BP: 107/68 HR: 74  Standing BP: 125/76 HR: 78  Site: --  Mode: --    Lipid Panel: Date/Time: 08-26-23 @ 13:54  Cholesterol, Serum: 155  LDL Cholesterol Calculated: 99  HDL Cholesterol, Serum: 35  Total Cholesterol/HDL Ration Measurement: --  Triglycerides, Serum: 105  
BMI: BMI (kg/m2): 30.9 (02-23-24 @ 15:44)  HbA1c: A1C with Estimated Average Glucose Result: 5.5 % (02-26-24 @ 08:01)    Glucose: POCT Blood Glucose.: 94 mg/dL (02-26-24 @ 08:13)    BP: 120/57 (02-26-24 @ 08:06) (120/57 - 120/57)Vital Signs Last 24 Hrs  T(C): 36.4 (02-26-24 @ 08:06), Max: 36.4 (02-26-24 @ 08:06)  T(F): 97.5 (02-26-24 @ 08:06), Max: 97.5 (02-26-24 @ 08:06)  HR: 68 (02-26-24 @ 08:06) (68 - 68)  BP: 120/57 (02-26-24 @ 08:06) (120/57 - 120/57)  BP(mean): --  RR: --  SpO2: --    Orthostatic VS  02-25-24 @ 05:45  Lying BP: --/-- HR: --  Sitting BP: 108/61 HR: 66  Standing BP: --/-- HR: --  Site: --  Mode: --    Lipid Panel: Date/Time: 08-26-23 @ 13:54  Cholesterol, Serum: 155  LDL Cholesterol Calculated: 99  HDL Cholesterol, Serum: 35  Total Cholesterol/HDL Ration Measurement: --  Triglycerides, Serum: 105  
BMI: BMI (kg/m2): 30.9 (02-23-24 @ 15:44)  HbA1c: A1C with Estimated Average Glucose Result: 5.7 % (08-26-23 @ 13:54)    Glucose: POCT Blood Glucose.: 103 mg/dL (02-22-24 @ 23:01)    BP: 126/75 (02-23-24 @ 04:05) (116/73 - 148/84)Vital Signs Last 24 Hrs  T(C): 36.5 (02-24-24 @ 05:57), Max: 36.5 (02-24-24 @ 05:57)  T(F): 97.7 (02-24-24 @ 05:57), Max: 97.7 (02-24-24 @ 05:57)  HR: --  BP: --  BP(mean): --  RR: --  SpO2: --    Orthostatic VS  02-24-24 @ 05:57  Lying BP: --/-- HR: --  Sitting BP: 171/80 HR: 90  Standing BP: 166/83 HR: 100  Site: upper right arm  Mode: electronic  Orthostatic VS  02-23-24 @ 05:55  Lying BP: 114/64 HR: 64  Sitting BP: 113/73 HR: 71  Standing BP: --/-- HR: --  Site: upper right arm  Mode: electronic    Lipid Panel: Date/Time: 08-26-23 @ 13:54  Cholesterol, Serum: 155  LDL Cholesterol Calculated: 99  HDL Cholesterol, Serum: 35  Total Cholesterol/HDL Ration Measurement: --  Triglycerides, Serum: 105  
BMI: BMI (kg/m2): 30.9 (02-23-24 @ 15:44)  HbA1c: A1C with Estimated Average Glucose Result: 5.7 % (08-26-23 @ 13:54)    Glucose: POCT Blood Glucose.: 103 mg/dL (02-22-24 @ 23:01)    BP: 126/75 (02-23-24 @ 04:05) (116/73 - 148/84)Vital Signs Last 24 Hrs  T(C): 36.1 (02-25-24 @ 05:45), Max: 36.1 (02-25-24 @ 05:45)  T(F): 97 (02-25-24 @ 05:45), Max: 97 (02-25-24 @ 05:45)  HR: --  BP: --  BP(mean): --  RR: --  SpO2: --    Orthostatic VS  02-25-24 @ 05:45  Lying BP: --/-- HR: --  Sitting BP: 108/61 HR: 66  Standing BP: --/-- HR: --  Site: --  Mode: --  Orthostatic VS  02-24-24 @ 05:57  Lying BP: --/-- HR: --  Sitting BP: 171/80 HR: 90  Standing BP: 166/83 HR: 100  Site: upper right arm  Mode: electronic    Lipid Panel: Date/Time: 08-26-23 @ 13:54  Cholesterol, Serum: 155  LDL Cholesterol Calculated: 99  HDL Cholesterol, Serum: 35  Total Cholesterol/HDL Ration Measurement: --  Triglycerides, Serum: 105  
BMI: BMI (kg/m2): 30.9 (02-23-24 @ 15:44)  HbA1c: A1C with Estimated Average Glucose Result: 5.5 % (02-26-24 @ 08:01)    Glucose: POCT Blood Glucose.: 77 mg/dL (03-11-24 @ 12:15)    BP: 136/73 (03-11-24 @ 07:42) (136/73 - 136/73)Vital Signs Last 24 Hrs  T(C): 36.6 (03-11-24 @ 07:42), Max: 36.6 (03-11-24 @ 07:42)  T(F): 97.8 (03-11-24 @ 07:42), Max: 97.8 (03-11-24 @ 07:42)  HR: --  BP: 136/73 (03-11-24 @ 07:42) (136/73 - 136/73)  BP(mean): 71 (03-11-24 @ 07:42) (71 - 71)  RR: --  SpO2: --    Orthostatic VS  03-10-24 @ 07:40  Lying BP: --/-- HR: --  Sitting BP: 140/71 HR: 68  Standing BP: 121/81 HR: 80  Site: --  Mode: --    Lipid Panel: Date/Time: 08-26-23 @ 13:54  Cholesterol, Serum: 155  LDL Cholesterol Calculated: 99  HDL Cholesterol, Serum: 35  Total Cholesterol/HDL Ration Measurement: --  Triglycerides, Serum: 105  
BMI: BMI (kg/m2): 30.9 (02-23-24 @ 15:44)  HbA1c: A1C with Estimated Average Glucose Result: 5.5 % (02-26-24 @ 08:01)    Glucose: POCT Blood Glucose.: 104 mg/dL (03-06-24 @ 12:18)    BP: 144/73 (03-05-24 @ 07:06) (144/73 - 144/73)Vital Signs Last 24 Hrs  T(C): 36.6 (03-06-24 @ 07:50), Max: 36.6 (03-06-24 @ 07:50)  T(F): 97.9 (03-06-24 @ 07:50), Max: 97.9 (03-06-24 @ 07:50)  HR: --  BP: --  BP(mean): --  RR: --  SpO2: --    Orthostatic VS  03-06-24 @ 07:50  Lying BP: --/-- HR: --  Sitting BP: 149/78 HR: 68  Standing BP: --/-- HR: --  Site: --  Mode: --    Lipid Panel: Date/Time: 08-26-23 @ 13:54  Cholesterol, Serum: 155  LDL Cholesterol Calculated: 99  HDL Cholesterol, Serum: 35  Total Cholesterol/HDL Ration Measurement: --  Triglycerides, Serum: 105  
BMI: BMI (kg/m2): 26.6 (04-15-24 @ 04:07)  HbA1c: A1C with Estimated Average Glucose Result: 5.5 % (02-26-24 @ 08:01)    Glucose: POCT Blood Glucose.: 103 mg/dL (04-12-24 @ 08:08)    BP: --Vital Signs Last 24 Hrs  T(C): 36.3 (04-18-24 @ 07:49), Max: 36.3 (04-18-24 @ 07:49)  T(F): 97.4 (04-18-24 @ 07:49), Max: 97.4 (04-18-24 @ 07:49)  HR: --  BP: --  BP(mean): --  RR: --  SpO2: --    Orthostatic VS  04-18-24 @ 07:49  Lying BP: --/-- HR: --  Sitting BP: 127/69 HR: 63  Standing BP: 120/71 HR: 71  Site: --  Mode: --  Orthostatic VS  04-17-24 @ 08:04  Lying BP: --/-- HR: --  Sitting BP: 124/76 HR: 72  Standing BP: 116/72 HR: 77  Site: --  Mode: --    Lipid Panel: Date/Time: 08-26-23 @ 13:54  Cholesterol, Serum: 155  LDL Cholesterol Calculated: 99  HDL Cholesterol, Serum: 35  Total Cholesterol/HDL Ration Measurement: --  Triglycerides, Serum: 105  
BMI: BMI (kg/m2): 30.9 (02-23-24 @ 15:44)  HbA1c: A1C with Estimated Average Glucose Result: 5.5 % (02-26-24 @ 08:01)    Glucose: POCT Blood Glucose.: 104 mg/dL (03-06-24 @ 12:18)    BP: 144/73 (03-05-24 @ 07:06) (144/73 - 144/73)Vital Signs Last 24 Hrs  T(C): 36.4 (03-07-24 @ 05:44), Max: 36.4 (03-07-24 @ 05:44)  T(F): 97.5 (03-07-24 @ 05:44), Max: 97.5 (03-07-24 @ 05:44)  HR: --  BP: --  BP(mean): --  RR: --  SpO2: --    Orthostatic VS  03-07-24 @ 05:44  Lying BP: --/-- HR: --  Sitting BP: 127/68 HR: 67  Standing BP: 121/66 HR: 70  Site: --  Mode: --  Orthostatic VS  03-06-24 @ 07:50  Lying BP: --/-- HR: --  Sitting BP: 149/78 HR: 68  Standing BP: --/-- HR: --  Site: --  Mode: --    Lipid Panel: Date/Time: 08-26-23 @ 13:54  Cholesterol, Serum: 155  LDL Cholesterol Calculated: 99  HDL Cholesterol, Serum: 35  Total Cholesterol/HDL Ration Measurement: --  Triglycerides, Serum: 105

## 2024-04-18 NOTE — BH INPATIENT PSYCHIATRY PROGRESS NOTE - NSICDXBHPRIMARYDX_PSY_ALL_CORE
Schizophrenia   F20.9  

## 2024-04-18 NOTE — BH INPATIENT PSYCHIATRY PROGRESS NOTE - NSBHMSEGAIT_PSY_A_CORE
Normal gait / station
Other
Normal gait / station

## 2024-04-18 NOTE — BH INPATIENT PSYCHIATRY PROGRESS NOTE - NSTXDCOTHRDATEEST_PSY_ALL_CORE
17-Apr-2024
23-Feb-2024
29-Mar-2024
01-Mar-2024
08-Mar-2024
17-Apr-2024
08-Mar-2024
20-Mar-2024
01-Mar-2024
03-Apr-2024
10-Apr-2024
29-Mar-2024
01-Mar-2024
20-Mar-2024
20-Mar-2024
10-Apr-2024
20-Mar-2024
08-Mar-2024
20-Mar-2024
20-Mar-2024
13-Mar-2024
23-Feb-2024
29-Mar-2024
23-Feb-2024
13-Mar-2024
23-Feb-2024
03-Apr-2024
23-Feb-2024
20-Mar-2024
13-Mar-2024
01-Mar-2024
01-Mar-2024
20-Mar-2024
10-Apr-2024
23-Feb-2024
10-Apr-2024
10-Apr-2024
13-Mar-2024
03-Apr-2024
23-Feb-2024

## 2024-04-18 NOTE — BH INPATIENT PSYCHIATRY PROGRESS NOTE - NSTXCOPEDATEEST_PSY_ALL_CORE
23-Feb-2024
02-Apr-2024
06-Mar-2024
09-Apr-2024
02-Apr-2024
02-Apr-2024
12-Mar-2024
23-Feb-2024
06-Mar-2024
23-Feb-2024
16-Apr-2024
19-Mar-2024
06-Mar-2024
19-Mar-2024
23-Feb-2024
23-Feb-2024
18-Apr-2024
09-Apr-2024
09-Apr-2024
12-Mar-2024
23-Feb-2024
23-Feb-2024
26-Mar-2024
02-Apr-2024
14-Apr-2024
19-Mar-2024
23-Feb-2024
26-Mar-2024
19-Mar-2024
26-Mar-2024
12-Mar-2024
23-Feb-2024
06-Mar-2024
02-Apr-2024
19-Mar-2024
26-Mar-2024
12-Mar-2024
12-Mar-2024
16-Apr-2024
26-Mar-2024

## 2024-04-18 NOTE — BH INPATIENT PSYCHIATRY PROGRESS NOTE - NSTXCOPEDATETRGT_PSY_ALL_CORE
02-Apr-2024
19-Mar-2024
01-Mar-2024
26-Mar-2024
13-Mar-2024
02-Apr-2024
01-Mar-2024
16-Apr-2024
13-Mar-2024
23-Apr-2024
19-Mar-2024
01-Mar-2024
09-Apr-2024
28-Mar-2024
01-Mar-2024
16-Apr-2024
19-Mar-2024
13-Mar-2024
11-Apr-2024
09-Apr-2024
01-Mar-2024
11-Apr-2024
13-Mar-2024
26-Mar-2024
25-Apr-2024
28-Mar-2024
21-Apr-2024
02-Apr-2024
01-Mar-2024
19-Mar-2024
26-Mar-2024
19-Mar-2024
23-Apr-2024
02-Apr-2024
16-Apr-2024
11-Apr-2024
02-Apr-2024
01-Mar-2024

## 2024-04-18 NOTE — BH INPATIENT PSYCHIATRY PROGRESS NOTE - NSBHMETABOLICLABS_PSY_ALL_CORE
Labs within last 12 months

## 2024-04-18 NOTE — BH INPATIENT PSYCHIATRY PROGRESS NOTE - NSDCCRITERIA_PSY_ALL_CORE
remission of psychosis

## 2024-04-18 NOTE — BH INPATIENT PSYCHIATRY PROGRESS NOTE - NSTXPSYCHOINTERMD_PSY_ALL_CORE
Continue pharmacotherapy and psychoeducation. 

## 2024-04-18 NOTE — BH INPATIENT PSYCHIATRY PROGRESS NOTE - NSBHASSESSSUMMFT_PSY_ALL_CORE
Patient is a 64 y/o woman,  x 10 years with PMHx of Schizophrenia, HTN, Afib, Hypothyroidism, NPH and hx of hypovolemic/septic shock from UTI admitted for management of paranoia in the context of treatment non-adherence.  Pt unable to engage in intake interview during initial encounter, fixated on obtaining a shower and refusing to engage in other topics. As per collateral patient has decompensated since being discharged from [facility] in December and being lost to f/u in terms of mental health treatment. From collateral pt presenting sx suggestive of psychotic decompensation including paranoid delusions, disorganized behavior and suspected AH in the context of non-adherence to pharmacotherapy. At home she was refusing meals, refusing to let HHA in and not leaving the house due to paranoia  Pt currently requires inpatient level of care as she is unable to care for herself in her current state, would benefit from med adjustments for stabilization and safe aftercare planning.    Impression: Schizophrenia    4/17 Clinical update: Patient presenting sustained improvement and tolerating MURILLO. Pt received Prolixin Decanoate 18.75mg IM on 4/8/24. No interval changes. Pt tolerating Prolixin MURILLO, denies AH/VH/SI/HI. Denies racing thoughts or delusional thoughts. Her thought process if concrete but goal directed and logical. Dispo: NH, in process.     -Admit to [facility]  -Legal status 2PC  -Routine checks  -Prolixin dec 18.75mg IM, next dose due 4/29/24  -Confirmed med list from patient's pharmacy [address]:    -Losartan 100/25 (losartan/hctz???)    -Metoprolol ER 50mg QD    -Cogentin 1mg BID    -Xarelto 20mg with dinner.    -Amlodipine 5mg QD    -Levothyroxine 50 micrograms QD  -Will resume Metoprolol with hold parameters. Initially held Losartan, Amlodipine and monitor BP, may resume if indicated.   -Resume Losartan, starting at 25mg QD with hold parameters, titrate as needed.

## 2024-04-18 NOTE — BH INPATIENT PSYCHIATRY PROGRESS NOTE - NSBHMSESPABN_PSY_A_CORE
Loud volume

## 2024-04-18 NOTE — BH INPATIENT PSYCHIATRY PROGRESS NOTE - NSBHMSEREMMEM_PSY_A_CORE
Unable to assess
Other
Unable to assess
Other
Unable to assess
Other
Unable to assess
Unable to assess
Other
Unable to assess
Unable to assess
Other
Unable to assess
Other
Unable to assess
Unable to assess
Other
Unable to assess
Other
Other

## 2024-04-18 NOTE — BH INPATIENT PSYCHIATRY PROGRESS NOTE - NSBHMSEKNOW_PSY_A_CORE
Other
Unable to assess
Other
Unable to assess
Other
Unable to assess
Other
Other
Unable to assess
Other
Unable to assess
Other
Unable to assess
Other
Unable to assess
Unable to assess
Other
Other
Unable to assess
Other
Other
Unable to assess
Unable to assess

## 2024-04-18 NOTE — BH INPATIENT PSYCHIATRY PROGRESS NOTE - NSTXDCOTHRGOAL_PSY_ALL_CORE
Pt will comply with medication and unit activity, with an improvement in her symptoms and resumption of baseline functioning
Pt will comply with medication and unit activity, with an improvement in her symptoms and resumption of baseline functioning
Pt will maintain compliance with medication and unit routine with sustained improvement in symptoms, helping her to participate in her care consistently at [place]
Pt will comply with medication including MURILLO and present with improved symptoms, helping her to participate in safe DC planning and maintain stability
Pt will comply with medication and plan for MURILLO, with improved symptoms, and resumption of baseline functioning.
Pt will comply with medication and plan for MURILLO, with improved symptoms, and resumption of baseline functioning.
Pt will comply with medication including MURILLO, with improved symptoms, helping her to participate in safe DC planning and maintain stability
Pt will comply with medication and plan for MURILLO, with improved symptoms, and resumption of baseline functioning.
Pt will comply with medication including MURILLO, with improved symptoms, helping her to participate in safe DC planning and maintain stability
Pt will comply with medication and plan for MURILLO, with improved symptoms, and resumption of baseline functioning.
Pt will comply with medication and plan for MURILLO, with improved symptoms and resumption of baseline functioning
Pt will comply with medication, with an improvement in symptoms, helping her to progress toward her baseline
Pt will comply with medication and plan for MURILLO, with improved symptoms, and resumption of baseline functioning.
Pt will comply with medication including MURILLO and present with improved symptoms, helping her to participate in safe DC planning and maintain stability
Pt will maintain compliance with medications and participate in unit routine, with sustained improvement in symptoms, helping her to function at DC with supports in place
Pt will comply with medication including MURILLO, with improved symptoms, helping her to participate in safe DC planning and maintain stability
Pt will comply with medication, with an improvement in symptoms, helping her to progress toward her baseline
Pt will comply with medication and plan for MURILLO, with improved symptoms and resumption of baseline functioning
Pt will maintain compliance with medication and unit routine with sustained improvement in symptoms, helping her to participate in her care consistently at [place]
Pt will comply with medication and plan for MURILLO, with improved symptoms, and resumption of baseline functioning.
Pt will comply with medication and unit activity, with an improvement in her symptoms and resumption of baseline functioning
Pt will comply with medication including MURILLO and present with improved symptoms, helping her to participate in safe DC planning and maintain stability
Pt will comply with medication and unit activity, with an improvement in her symptoms and resumption of baseline functioning
Pt will maintain compliance with medications and participate in unit routine, with sustained improvement in symptoms, helping her to function at DC with supports in place
Pt will maintain compliance with medications and participate in unit routine, with sustained improvement in symptoms, helping her to function at DC with supports in place
Pt will comply with medication, with an improvement in symptoms, helping her to accept care consistently at home to maintain stability
Pt will comply with medication and plan for MURILLO, with improved symptoms, and resumption of baseline functioning.
Pt will comply with medication, with an improvement in symptoms, helping her to accept care consistently at home to maintain stability
Pt will comply with medication, with an improvement in symptoms, helping her to accept care consistently at home to maintain stability
Pt will comply with medication and plan for MURILLO, with improved symptoms and resumption of baseline functioning
Pt will comply with medication and plan for MURILLO, with improved symptoms and resumption of baseline functioning
Pt will comply with medication, with an improvement in symptoms, helping her to progress toward her baseline
Pt will comply with medication, with an improvement in symptoms, helping her to progress toward her baseline
Pt will comply with medication including MURILLO and present with improved symptoms, helping her to participate in safe DC planning and maintain stability
Pt will comply with medication, with an improvement in symptoms, helping her to progress toward her baseline
Pt will comply with medication and plan for MURILLO, with improved symptoms, and resumption of baseline functioning.
Pt will comply with medication including MURILLO and present with improved symptoms, helping her to participate in safe DC planning and maintain stability

## 2024-04-18 NOTE — BH INPATIENT PSYCHIATRY PROGRESS NOTE - NSBHATTESTTYPEVISIT_PSY_A_CORE
Attending Only

## 2024-04-18 NOTE — BH INPATIENT PSYCHIATRY PROGRESS NOTE - PRN MEDS
MEDICATIONS  (PRN):  acetaminophen     Tablet .. 650 milliGRAM(s) Oral every 6 hours PRN Temp greater or equal to 38C (100.4F), Mild Pain (1 - 3), Moderate Pain (4 - 6)  fluPHENAZine 2.5 milliGRAM(s) Oral every 8 hours PRN Agitation stemming from psychosis  fluPHENAZine  Injectable 2 milliGRAM(s) IntraMuscular once PRN Severe agitation stemming from psychosis  melatonin 3 milliGRAM(s) Oral at bedtime PRN Insomnia  senna 2 Tablet(s) Oral daily PRN constipation  
MEDICATIONS  (PRN):  clonazePAM  Tablet 0.5 milliGRAM(s) Oral daily PRN anxiety, agitation  fluPHENAZine 2.5 milliGRAM(s) Oral every 8 hours PRN Agitation stemming from psychosis  fluPHENAZine  Injectable 2 milliGRAM(s) IntraMuscular once PRN Severe agitation stemming from psychosis  
MEDICATIONS  (PRN):  acetaminophen     Tablet .. 650 milliGRAM(s) Oral every 6 hours PRN Temp greater or equal to 38C (100.4F), Mild Pain (1 - 3), Moderate Pain (4 - 6)  fluPHENAZine 2.5 milliGRAM(s) Oral every 8 hours PRN Agitation stemming from psychosis  fluPHENAZine  Injectable 2 milliGRAM(s) IntraMuscular once PRN Severe agitation stemming from psychosis  melatonin 3 milliGRAM(s) Oral at bedtime PRN Insomnia  senna 2 Tablet(s) Oral daily PRN constipation  
MEDICATIONS  (PRN):  fluPHENAZine 2.5 milliGRAM(s) Oral every 8 hours PRN Agitation stemming from psychosis  fluPHENAZine  Injectable 2 milliGRAM(s) IntraMuscular once PRN Severe agitation stemming from psychosis  senna 2 Tablet(s) Oral daily PRN constipation  
MEDICATIONS  (PRN):  clonazePAM  Tablet 0.5 milliGRAM(s) Oral daily PRN anxiety, agitation  fluPHENAZine 2.5 milliGRAM(s) Oral every 8 hours PRN Agitation stemming from psychosis  fluPHENAZine  Injectable 2 milliGRAM(s) IntraMuscular once PRN Severe agitation stemming from psychosis  senna 2 Tablet(s) Oral daily PRN constipation  
MEDICATIONS  (PRN):  clonazePAM  Tablet 0.5 milliGRAM(s) Oral daily PRN anxiety, agitation  fluPHENAZine 2.5 milliGRAM(s) Oral every 8 hours PRN Agitation stemming from psychosis  fluPHENAZine  Injectable 2 milliGRAM(s) IntraMuscular once PRN Severe agitation stemming from psychosis  senna 2 Tablet(s) Oral daily PRN constipation  
MEDICATIONS  (PRN):  acetaminophen     Tablet .. 650 milliGRAM(s) Oral every 6 hours PRN Temp greater or equal to 38C (100.4F), Mild Pain (1 - 3), Moderate Pain (4 - 6)  fluPHENAZine 2.5 milliGRAM(s) Oral every 8 hours PRN Agitation stemming from psychosis  fluPHENAZine  Injectable 2 milliGRAM(s) IntraMuscular once PRN Severe agitation stemming from psychosis  melatonin 3 milliGRAM(s) Oral at bedtime PRN Insomnia  senna 2 Tablet(s) Oral daily PRN constipation  
MEDICATIONS  (PRN):  clonazePAM  Tablet 0.5 milliGRAM(s) Oral daily PRN anxiety, agitation  fluPHENAZine 2.5 milliGRAM(s) Oral every 8 hours PRN Agitation stemming from psychosis  fluPHENAZine  Injectable 2 milliGRAM(s) IntraMuscular once PRN Severe agitation stemming from psychosis  senna 2 Tablet(s) Oral daily PRN constipation  
MEDICATIONS  (PRN):  acetaminophen     Tablet .. 650 milliGRAM(s) Oral every 6 hours PRN Temp greater or equal to 38C (100.4F), Mild Pain (1 - 3), Moderate Pain (4 - 6)  fluPHENAZine 2.5 milliGRAM(s) Oral every 8 hours PRN Agitation stemming from psychosis  fluPHENAZine  Injectable 2 milliGRAM(s) IntraMuscular once PRN Severe agitation stemming from psychosis  melatonin 3 milliGRAM(s) Oral at bedtime PRN Insomnia  senna 2 Tablet(s) Oral daily PRN constipation  
MEDICATIONS  (PRN):  clonazePAM  Tablet 0.5 milliGRAM(s) Oral daily PRN anxiety, agitation  fluPHENAZine 2.5 milliGRAM(s) Oral every 8 hours PRN Agitation stemming from psychosis  fluPHENAZine  Injectable 2 milliGRAM(s) IntraMuscular once PRN Severe agitation stemming from psychosis  
MEDICATIONS  (PRN):  fluPHENAZine 2.5 milliGRAM(s) Oral every 8 hours PRN Agitation stemming from psychosis  fluPHENAZine  Injectable 2 milliGRAM(s) IntraMuscular once PRN Severe agitation stemming from psychosis  senna 2 Tablet(s) Oral daily PRN constipation  
MEDICATIONS  (PRN):  clonazePAM  Tablet 0.5 milliGRAM(s) Oral daily PRN anxiety, agitation  fluPHENAZine 2.5 milliGRAM(s) Oral every 8 hours PRN Agitation stemming from psychosis  fluPHENAZine  Injectable 2 milliGRAM(s) IntraMuscular once PRN Severe agitation stemming from psychosis  senna 2 Tablet(s) Oral daily PRN constipation  
MEDICATIONS  (PRN):  clonazePAM  Tablet 0.5 milliGRAM(s) Oral daily PRN anxiety, agitation  fluPHENAZine 2.5 milliGRAM(s) Oral every 8 hours PRN Agitation stemming from psychosis  fluPHENAZine  Injectable 2 milliGRAM(s) IntraMuscular once PRN Severe agitation stemming from psychosis  senna 2 Tablet(s) Oral daily PRN constipation  
MEDICATIONS  (PRN):  acetaminophen     Tablet .. 650 milliGRAM(s) Oral every 6 hours PRN Temp greater or equal to 38C (100.4F), Mild Pain (1 - 3), Moderate Pain (4 - 6)  fluPHENAZine 2.5 milliGRAM(s) Oral every 8 hours PRN Agitation stemming from psychosis  fluPHENAZine  Injectable 2 milliGRAM(s) IntraMuscular once PRN Severe agitation stemming from psychosis  melatonin 3 milliGRAM(s) Oral at bedtime PRN Insomnia  senna 2 Tablet(s) Oral daily PRN constipation  
MEDICATIONS  (PRN):  acetaminophen     Tablet .. 650 milliGRAM(s) Oral every 6 hours PRN Temp greater or equal to 38C (100.4F), Mild Pain (1 - 3), Moderate Pain (4 - 6)  fluPHENAZine 2.5 milliGRAM(s) Oral every 8 hours PRN Agitation stemming from psychosis  fluPHENAZine  Injectable 2 milliGRAM(s) IntraMuscular once PRN Severe agitation stemming from psychosis  senna 2 Tablet(s) Oral daily PRN constipation  
MEDICATIONS  (PRN):  clonazePAM  Tablet 0.5 milliGRAM(s) Oral daily PRN anxiety, agitation  fluPHENAZine 2.5 milliGRAM(s) Oral every 8 hours PRN Agitation stemming from psychosis  fluPHENAZine  Injectable 2 milliGRAM(s) IntraMuscular once PRN Severe agitation stemming from psychosis  senna 2 Tablet(s) Oral daily PRN constipation  
MEDICATIONS  (PRN):  acetaminophen     Tablet .. 650 milliGRAM(s) Oral every 6 hours PRN Temp greater or equal to 38C (100.4F), Mild Pain (1 - 3), Moderate Pain (4 - 6)  fluPHENAZine 2.5 milliGRAM(s) Oral every 8 hours PRN Agitation stemming from psychosis  fluPHENAZine  Injectable 2 milliGRAM(s) IntraMuscular once PRN Severe agitation stemming from psychosis  melatonin 3 milliGRAM(s) Oral at bedtime PRN Insomnia  senna 2 Tablet(s) Oral daily PRN constipation  
MEDICATIONS  (PRN):  clonazePAM  Tablet 0.5 milliGRAM(s) Oral daily PRN anxiety, agitation  fluPHENAZine 2.5 milliGRAM(s) Oral every 8 hours PRN Agitation stemming from psychosis  fluPHENAZine  Injectable 2 milliGRAM(s) IntraMuscular once PRN Severe agitation stemming from psychosis  senna 2 Tablet(s) Oral daily PRN constipation  
MEDICATIONS  (PRN):  acetaminophen     Tablet .. 650 milliGRAM(s) Oral every 6 hours PRN Temp greater or equal to 38C (100.4F), Mild Pain (1 - 3), Moderate Pain (4 - 6)  fluPHENAZine 2.5 milliGRAM(s) Oral every 8 hours PRN Agitation stemming from psychosis  fluPHENAZine  Injectable 2 milliGRAM(s) IntraMuscular once PRN Severe agitation stemming from psychosis  melatonin 3 milliGRAM(s) Oral at bedtime PRN Insomnia  senna 2 Tablet(s) Oral daily PRN constipation  
MEDICATIONS  (PRN):  clonazePAM  Tablet 0.5 milliGRAM(s) Oral daily PRN anxiety, agitation  fluPHENAZine 2.5 milliGRAM(s) Oral every 8 hours PRN Agitation stemming from psychosis  fluPHENAZine  Injectable 2 milliGRAM(s) IntraMuscular once PRN Severe agitation stemming from psychosis  senna 2 Tablet(s) Oral daily PRN constipation  
MEDICATIONS  (PRN):  acetaminophen     Tablet .. 650 milliGRAM(s) Oral every 6 hours PRN Temp greater or equal to 38C (100.4F), Mild Pain (1 - 3), Moderate Pain (4 - 6)  fluPHENAZine 2.5 milliGRAM(s) Oral every 8 hours PRN Agitation stemming from psychosis  fluPHENAZine  Injectable 2 milliGRAM(s) IntraMuscular once PRN Severe agitation stemming from psychosis  melatonin 3 milliGRAM(s) Oral at bedtime PRN Insomnia  senna 2 Tablet(s) Oral daily PRN constipation  
MEDICATIONS  (PRN):  acetaminophen     Tablet .. 650 milliGRAM(s) Oral every 6 hours PRN Temp greater or equal to 38C (100.4F), Mild Pain (1 - 3), Moderate Pain (4 - 6)  fluPHENAZine 2.5 milliGRAM(s) Oral every 8 hours PRN Agitation stemming from psychosis  fluPHENAZine  Injectable 2 milliGRAM(s) IntraMuscular once PRN Severe agitation stemming from psychosis  melatonin 3 milliGRAM(s) Oral at bedtime PRN Insomnia  senna 2 Tablet(s) Oral daily PRN constipation  
MEDICATIONS  (PRN):  acetaminophen     Tablet .. 650 milliGRAM(s) Oral every 6 hours PRN Temp greater or equal to 38C (100.4F), Mild Pain (1 - 3), Moderate Pain (4 - 6)  fluPHENAZine 2.5 milliGRAM(s) Oral every 8 hours PRN Agitation stemming from psychosis  fluPHENAZine  Injectable 2 milliGRAM(s) IntraMuscular once PRN Severe agitation stemming from psychosis  senna 2 Tablet(s) Oral daily PRN constipation  
MEDICATIONS  (PRN):  acetaminophen     Tablet .. 650 milliGRAM(s) Oral every 6 hours PRN Temp greater or equal to 38C (100.4F), Mild Pain (1 - 3), Moderate Pain (4 - 6)  fluPHENAZine 2.5 milliGRAM(s) Oral every 8 hours PRN Agitation stemming from psychosis  fluPHENAZine  Injectable 2 milliGRAM(s) IntraMuscular once PRN Severe agitation stemming from psychosis  melatonin 3 milliGRAM(s) Oral at bedtime PRN Insomnia  senna 2 Tablet(s) Oral daily PRN constipation  
MEDICATIONS  (PRN):  acetaminophen     Tablet .. 650 milliGRAM(s) Oral every 6 hours PRN Temp greater or equal to 38C (100.4F), Mild Pain (1 - 3), Moderate Pain (4 - 6)  fluPHENAZine 2.5 milliGRAM(s) Oral every 8 hours PRN Agitation stemming from psychosis  fluPHENAZine  Injectable 2 milliGRAM(s) IntraMuscular once PRN Severe agitation stemming from psychosis  melatonin 3 milliGRAM(s) Oral at bedtime PRN Insomnia  senna 2 Tablet(s) Oral daily PRN constipation  
MEDICATIONS  (PRN):  clonazePAM  Tablet 0.5 milliGRAM(s) Oral daily PRN anxiety, agitation  fluPHENAZine 2.5 milliGRAM(s) Oral every 8 hours PRN Agitation stemming from psychosis  fluPHENAZine  Injectable 2 milliGRAM(s) IntraMuscular once PRN Severe agitation stemming from psychosis  senna 2 Tablet(s) Oral daily PRN constipation  
MEDICATIONS  (PRN):  acetaminophen     Tablet .. 650 milliGRAM(s) Oral every 6 hours PRN Temp greater or equal to 38C (100.4F), Mild Pain (1 - 3), Moderate Pain (4 - 6)  fluPHENAZine 2.5 milliGRAM(s) Oral every 8 hours PRN Agitation stemming from psychosis  fluPHENAZine  Injectable 2 milliGRAM(s) IntraMuscular once PRN Severe agitation stemming from psychosis  melatonin 3 milliGRAM(s) Oral at bedtime PRN Insomnia  senna 2 Tablet(s) Oral daily PRN constipation  
MEDICATIONS  (PRN):  clonazePAM  Tablet 0.5 milliGRAM(s) Oral daily PRN anxiety, agitation  fluPHENAZine 2.5 milliGRAM(s) Oral every 8 hours PRN Agitation stemming from psychosis  fluPHENAZine  Injectable 2 milliGRAM(s) IntraMuscular once PRN Severe agitation stemming from psychosis  
MEDICATIONS  (PRN):  fluPHENAZine 2.5 milliGRAM(s) Oral every 8 hours PRN Agitation stemming from psychosis  fluPHENAZine  Injectable 2 milliGRAM(s) IntraMuscular once PRN Severe agitation stemming from psychosis  senna 2 Tablet(s) Oral daily PRN constipation

## 2024-04-18 NOTE — BH INPATIENT PSYCHIATRY PROGRESS NOTE - NSTXDCOTHRPROGRES_PSY_ALL_CORE
Improving
No Change
Improving
No Change
Improving
No Change

## 2024-04-18 NOTE — BH INPATIENT PSYCHIATRY PROGRESS NOTE - NSBHMSEINTELL_PSY_A_CORE
Unable to assess
Average
Unable to assess

## 2024-04-18 NOTE — BH INPATIENT PSYCHIATRY PROGRESS NOTE - NSBHMSERECMEM_PSY_A_CORE
Other
Unable to assess
Other
Unable to assess
Other
Other
Unable to assess
Other
Unable to assess
Other
Unable to assess
Other
Unable to assess
Other
Unable to assess
Other
Unable to assess
Unable to assess
Other
Other
Unable to assess
Unable to assess
Other

## 2024-04-18 NOTE — BH INPATIENT PSYCHIATRY PROGRESS NOTE - NSBHMSERELATED_PSY_A_CORE
Poor
Other
Poor

## 2024-04-18 NOTE — BH INPATIENT PSYCHIATRY PROGRESS NOTE - NSBHMSETHTCONTENT_PSY_A_CORE
Delusions/Ruminations
Preoccupations
Preoccupations
Delusions/Ruminations
Preoccupations
Delusions/Ruminations
Preoccupations
Delusions/Ruminations
Preoccupations
Delusions/Ruminations
Preoccupations
Delusions/Ruminations
Ruminations
Delusions/Ruminations
Delusions/Ruminations
Preoccupations
Delusions/Ruminations
Preoccupations
Preoccupations
Delusions/Ruminations

## 2024-04-18 NOTE — BH INPATIENT PSYCHIATRY PROGRESS NOTE - NSBHMSEGROOM_PSY_A_CORE
somewhat disheveled/Poor
somewhat disheveled/Fair
somewhat disheveled/Fair
somewhat disheveled/Poor
somewhat disheveled/Fair
somewhat disheveled/Poor
somewhat disheveled/Fair
somewhat disheveled/Fair
somewhat disheveled/Poor
somewhat disheveled/Fair
somewhat disheveled/Poor
somewhat disheveled/Fair
somewhat disheveled/Poor
somewhat disheveled/Fair
somewhat disheveled/Poor
somewhat disheveled/Poor

## 2024-04-18 NOTE — BH INPATIENT PSYCHIATRY PROGRESS NOTE - NSBHCHARTREVIEWVS_PSY_A_CORE FT
Vital Signs Last 24 Hrs  T(C): --  T(F): --  HR: --  BP: --  BP(mean): --  RR: --  SpO2: --    Orthostatic VS  04-15-24 @ 03:06  Lying BP: 127/63 HR: 63  Sitting BP: 133/66 HR: 71  Standing BP: --/-- HR: --  Site: upper right arm  Mode: electronic  
Vital Signs Last 24 Hrs  T(C): 36.6 (04-10-24 @ 08:17), Max: 36.6 (04-10-24 @ 08:17)  T(F): 97.8 (04-10-24 @ 08:17), Max: 97.8 (04-10-24 @ 08:17)  HR: --  BP: --  BP(mean): --  RR: --  SpO2: --    Orthostatic VS  04-10-24 @ 08:17  Lying BP: --/-- HR: --  Sitting BP: 129/76 HR: 92  Standing BP: 145/88 HR: 107  Site: upper left arm  Mode: --  Orthostatic VS  04-09-24 @ 08:03  Lying BP: --/-- HR: --  Sitting BP: 140/77 HR: 63  Standing BP: 150/72 HR: 66  Site: --  Mode: --  
Vital Signs Last 24 Hrs  T(C): 36.4 (04-04-24 @ 08:37), Max: 36.4 (04-04-24 @ 08:37)  T(F): 97.6 (04-04-24 @ 08:37), Max: 97.6 (04-04-24 @ 08:37)  HR: --  BP: --  BP(mean): --  RR: --  SpO2: --    Orthostatic VS  04-04-24 @ 08:37  Lying BP: --/-- HR: --  Sitting BP: 118/77 HR: 67  Standing BP: 121/79 HR: 75  Site: --  Mode: --  Orthostatic VS  04-03-24 @ 08:19  Lying BP: --/-- HR: --  Sitting BP: 131/78 HR: 63  Standing BP: 140/75 HR: 70  Site: upper left arm  Mode: electronic  
Vital Signs Last 24 Hrs  T(C): --  T(F): --  HR: --  BP: --  BP(mean): --  RR: --  SpO2: --    Orthostatic VS  04-10-24 @ 08:17  Lying BP: --/-- HR: --  Sitting BP: 129/76 HR: 92  Standing BP: 145/88 HR: 107  Site: upper left arm  Mode: --  
Vital Signs Last 24 Hrs  T(C): 36.2 (03-18-24 @ 07:49), Max: 36.2 (03-18-24 @ 07:49)  T(F): 97.1 (03-18-24 @ 07:49), Max: 97.1 (03-18-24 @ 07:49)  HR: --  BP: --  BP(mean): --  RR: --  SpO2: --    Orthostatic VS  03-18-24 @ 07:49  Lying BP: --/-- HR: --  Sitting BP: 146/73 HR: 70  Standing BP: 150/80 HR: 80  Site: --  Mode: --  Orthostatic VS  03-17-24 @ 08:13  Lying BP: --/-- HR: --  Sitting BP: 126/74 HR: 69  Standing BP: 116/69 HR: 75  Site: --  Mode: --  
Vital Signs Last 24 Hrs  T(C): 36.7 (03-05-24 @ 07:06), Max: 36.7 (03-05-24 @ 07:06)  T(F): 98.1 (03-05-24 @ 07:06), Max: 98.1 (03-05-24 @ 07:06)  HR: 77 (03-05-24 @ 07:06) (77 - 77)  BP: 144/73 (03-05-24 @ 07:06) (144/73 - 144/73)  BP(mean): --  RR: --  SpO2: --    Orthostatic VS  03-04-24 @ 07:57  Lying BP: --/-- HR: --  Sitting BP: 139/81 HR: 63  Standing BP: 143/79 HR: 65  Site: --  Mode: --  
Vital Signs Last 24 Hrs  T(C): 36.1 (02-25-24 @ 05:45), Max: 36.1 (02-25-24 @ 05:45)  T(F): 97 (02-25-24 @ 05:45), Max: 97 (02-25-24 @ 05:45)  HR: --  BP: --  BP(mean): --  RR: --  SpO2: --    Orthostatic VS  02-25-24 @ 05:45  Lying BP: --/-- HR: --  Sitting BP: 108/61 HR: 66  Standing BP: --/-- HR: --  Site: --  Mode: --  Orthostatic VS  02-24-24 @ 05:57  Lying BP: --/-- HR: --  Sitting BP: 171/80 HR: 90  Standing BP: 166/83 HR: 100  Site: upper right arm  Mode: electronic  
Vital Signs Last 24 Hrs  T(C): 36.2 (03-01-24 @ 07:51), Max: 36.2 (03-01-24 @ 07:51)  T(F): 97.1 (03-01-24 @ 07:51), Max: 97.1 (03-01-24 @ 07:51)  HR: --  BP: 132/93 (03-01-24 @ 07:51) (132/93 - 132/93)  BP(mean): 55 (03-01-24 @ 07:51) (55 - 55)  RR: --  SpO2: --    Orthostatic VS  02-29-24 @ 07:50  Lying BP: --/-- HR: --  Sitting BP: 120/61 HR: 65  Standing BP: 123/65 HR: 68  Site: --  Mode: --  
Vital Signs Last 24 Hrs  T(C): 36.8 (03-28-24 @ 07:44), Max: 36.8 (03-28-24 @ 07:44)  T(F): 98.2 (03-28-24 @ 07:44), Max: 98.2 (03-28-24 @ 07:44)  HR: --  BP: --  BP(mean): --  RR: --  SpO2: --    Orthostatic VS  03-28-24 @ 07:44  Lying BP: --/-- HR: --  Sitting BP: 124/68 HR: 73  Standing BP: 119/73 HR: 94  Site: --  Mode: --  Orthostatic VS  03-27-24 @ 07:49  Lying BP: --/-- HR: --  Sitting BP: 129/66 HR: 78  Standing BP: --/-- HR: --  Site: --  Mode: --  
Vital Signs Last 24 Hrs  T(C): 36.4 (03-26-24 @ 07:56), Max: 36.4 (03-26-24 @ 07:56)  T(F): 97.5 (03-26-24 @ 07:56), Max: 97.5 (03-26-24 @ 07:56)  HR: --  BP: --  BP(mean): --  RR: --  SpO2: --    Orthostatic VS  03-26-24 @ 07:56  Lying BP: --/-- HR: --  Sitting BP: 140/77 HR: 83  Standing BP: 146/77 HR: 87  Site: --  Mode: --  Orthostatic VS  03-25-24 @ 07:49  Lying BP: --/-- HR: --  Sitting BP: 138/65 HR: 69  Standing BP: 137/67 HR: 75  Site: --  Mode: --  
Vital Signs Last 24 Hrs  T(C): 36.7 (03-08-24 @ 07:55), Max: 36.7 (03-08-24 @ 07:55)  T(F): 98.1 (03-08-24 @ 07:55), Max: 98.1 (03-08-24 @ 07:55)  HR: --  BP: --  BP(mean): --  RR: --  SpO2: --    Orthostatic VS  03-08-24 @ 07:55  Lying BP: --/-- HR: --  Sitting BP: 149/83 HR: 72  Standing BP: 122/66 HR: 99  Site: --  Mode: --  Orthostatic VS  03-07-24 @ 05:44  Lying BP: --/-- HR: --  Sitting BP: 127/68 HR: 67  Standing BP: 121/66 HR: 70  Site: --  Mode: --  
Vital Signs Last 24 Hrs  T(C): 36.7 (04-12-24 @ 07:52), Max: 36.7 (04-12-24 @ 07:52)  T(F): 98.1 (04-12-24 @ 07:52), Max: 98.1 (04-12-24 @ 07:52)  HR: --  BP: --  BP(mean): --  RR: --  SpO2: --    Orthostatic VS  04-12-24 @ 07:52  Lying BP: --/-- HR: --  Sitting BP: 136/80 HR: 71  Standing BP: 119/73 HR: 83  Site: --  Mode: --  
Vital Signs Last 24 Hrs  T(C): 36.9 (03-20-24 @ 07:54), Max: 36.9 (03-20-24 @ 07:54)  T(F): 98.4 (03-20-24 @ 07:54), Max: 98.4 (03-20-24 @ 07:54)  HR: --  BP: --  BP(mean): --  RR: --  SpO2: --    Orthostatic VS  03-20-24 @ 07:54  Lying BP: --/-- HR: --  Sitting BP: 139/81 HR: 76  Standing BP: 141/70 HR: 80  Site: --  Mode: --  Orthostatic VS  03-19-24 @ 05:36  Lying BP: --/-- HR: --  Sitting BP: 145/66 HR: 66  Standing BP: 136/80 HR: 72  Site: --  Mode: --  
Vital Signs Last 24 Hrs  T(C): 36.4 (03-04-24 @ 07:57), Max: 36.4 (03-04-24 @ 07:57)  T(F): 97.6 (03-04-24 @ 07:57), Max: 97.6 (03-04-24 @ 07:57)  HR: --  BP: --  BP(mean): --  RR: --  SpO2: --    Orthostatic VS  03-04-24 @ 07:57  Lying BP: --/-- HR: --  Sitting BP: 139/81 HR: 63  Standing BP: 143/79 HR: 65  Site: --  Mode: --  
Vital Signs Last 24 Hrs  T(C): 36.8 (04-02-24 @ 07:36), Max: 36.8 (04-02-24 @ 07:36)  T(F): 98.2 (04-02-24 @ 07:36), Max: 98.2 (04-02-24 @ 07:36)  HR: --  BP: --  BP(mean): --  RR: --  SpO2: --    Orthostatic VS  04-02-24 @ 07:36  Lying BP: --/-- HR: --  Sitting BP: 133/74 HR: 69  Standing BP: 133/69 HR: 73  Site: --  Mode: --  Orthostatic VS  04-01-24 @ 07:57  Lying BP: --/-- HR: --  Sitting BP: 134/73 HR: 67  Standing BP: 133/78 HR: 78  Site: --  Mode: --  
Vital Signs Last 24 Hrs  T(C): 36.4 (02-29-24 @ 07:50), Max: 36.4 (02-29-24 @ 07:50)  T(F): 97.6 (02-29-24 @ 07:50), Max: 97.6 (02-29-24 @ 07:50)  HR: --  BP: --  BP(mean): --  RR: 16 (02-29-24 @ 07:50) (16 - 16)  SpO2: --    Orthostatic VS  02-29-24 @ 07:50  Lying BP: --/-- HR: --  Sitting BP: 120/61 HR: 65  Standing BP: 123/65 HR: 68  Site: --  Mode: --  Orthostatic VS  02-28-24 @ 07:53  Lying BP: --/-- HR: --  Sitting BP: 133/82 HR: 64  Standing BP: 147/82 HR: 66  Site: upper left arm  Mode: electronic  
Vital Signs Last 24 Hrs  T(C): 35.6 (02-27-24 @ 05:50), Max: 35.6 (02-27-24 @ 05:50)  T(F): 96.1 (02-27-24 @ 05:50), Max: 96.1 (02-27-24 @ 05:50)  HR: --  BP: --  BP(mean): --  RR: --  SpO2: --    Orthostatic VS  02-27-24 @ 05:50  Lying BP: --/-- HR: --  Sitting BP: 118/-- HR: --  Standing BP: 120/75 HR: 86  Site: upper left arm  Mode: electronic  
Vital Signs Last 24 Hrs  T(C): 36.5 (03-19-24 @ 05:36), Max: 36.5 (03-19-24 @ 05:36)  T(F): 97.7 (03-19-24 @ 05:36), Max: 97.7 (03-19-24 @ 05:36)  HR: --  BP: --  BP(mean): --  RR: --  SpO2: --    Orthostatic VS  03-19-24 @ 05:36  Lying BP: --/-- HR: --  Sitting BP: 145/66 HR: 66  Standing BP: 136/80 HR: 72  Site: --  Mode: --  Orthostatic VS  03-18-24 @ 07:49  Lying BP: --/-- HR: --  Sitting BP: 146/73 HR: 70  Standing BP: 150/80 HR: 80  Site: --  Mode: --  
Vital Signs Last 24 Hrs  T(C): 36.6 (03-13-24 @ 07:57), Max: 36.6 (03-13-24 @ 07:57)  T(F): 97.8 (03-13-24 @ 07:57), Max: 97.8 (03-13-24 @ 07:57)  HR: --  BP: --  BP(mean): --  RR: --  SpO2: --    Orthostatic VS  03-13-24 @ 07:57  Lying BP: --/-- HR: --  Sitting BP: 126/71 HR: 71  Standing BP: 145/68 HR: 76  Site: --  Mode: --  Orthostatic VS  03-12-24 @ 07:54  Lying BP: --/-- HR: --  Sitting BP: 122/68 HR: 57  Standing BP: 126/72 HR: 70  Site: --  Mode: --  
Vital Signs Last 24 Hrs  T(C): 36.8 (04-01-24 @ 07:57), Max: 36.8 (04-01-24 @ 07:57)  T(F): 98.3 (04-01-24 @ 07:57), Max: 98.3 (04-01-24 @ 07:57)  HR: --  BP: --  BP(mean): --  RR: --  SpO2: --    Orthostatic VS  04-01-24 @ 07:57  Lying BP: --/-- HR: --  Sitting BP: 134/73 HR: 67  Standing BP: 133/78 HR: 78  Site: --  Mode: --  Orthostatic VS  03-31-24 @ 07:56  Lying BP: --/-- HR: --  Sitting BP: 117/68 HR: 69  Standing BP: 114/72 HR: 79  Site: --  Mode: --  
Vital Signs Last 24 Hrs  T(C): 36.7 (03-22-24 @ 08:22), Max: 36.7 (03-22-24 @ 08:22)  T(F): 98 (03-22-24 @ 08:22), Max: 98 (03-22-24 @ 08:22)  HR: --  BP: --  BP(mean): --  RR: --  SpO2: --    Orthostatic VS  03-22-24 @ 08:22  Lying BP: --/-- HR: --  Sitting BP: 129/81 HR: 70  Standing BP: 126/80 HR: 75  Site: upper left arm  Mode: electronic  Orthostatic VS  03-21-24 @ 08:23  Lying BP: --/-- HR: --  Sitting BP: 141/72 HR: 70  Standing BP: 128/77 HR: 80  Site: --  Mode: --  
Vital Signs Last 24 Hrs  T(C): 36.4 (03-15-24 @ 07:59), Max: 36.4 (03-15-24 @ 07:59)  T(F): 97.6 (03-15-24 @ 07:59), Max: 97.6 (03-15-24 @ 07:59)  HR: --  BP: --  BP(mean): --  RR: --  SpO2: --    Orthostatic VS  03-15-24 @ 07:59  Lying BP: --/-- HR: --  Sitting BP: 135/76 HR: 74  Standing BP: 135/79 HR: 79  Site: --  Mode: --  Orthostatic VS  03-14-24 @ 07:46  Lying BP: --/-- HR: --  Sitting BP: 140/70 HR: 75  Standing BP: 131/69 HR: 83  Site: --  Mode: --  
Vital Signs Last 24 Hrs  T(C): 36.6 (03-06-24 @ 07:50), Max: 36.6 (03-06-24 @ 07:50)  T(F): 97.9 (03-06-24 @ 07:50), Max: 97.9 (03-06-24 @ 07:50)  HR: --  BP: --  BP(mean): --  RR: --  SpO2: --    Orthostatic VS  03-06-24 @ 07:50  Lying BP: --/-- HR: --  Sitting BP: 149/78 HR: 68  Standing BP: --/-- HR: --  Site: --  Mode: --  
Vital Signs Last 24 Hrs  T(C): 36.6 (03-11-24 @ 07:42), Max: 36.6 (03-11-24 @ 07:42)  T(F): 97.8 (03-11-24 @ 07:42), Max: 97.8 (03-11-24 @ 07:42)  HR: --  BP: 136/73 (03-11-24 @ 07:42) (136/73 - 136/73)  BP(mean): 71 (03-11-24 @ 07:42) (71 - 71)  RR: --  SpO2: --    Orthostatic VS  03-10-24 @ 07:40  Lying BP: --/-- HR: --  Sitting BP: 140/71 HR: 68  Standing BP: 121/81 HR: 80  Site: --  Mode: --  
Vital Signs Last 24 Hrs  T(C): 36.3 (04-05-24 @ 07:50), Max: 36.3 (04-05-24 @ 07:50)  T(F): 97.4 (04-05-24 @ 07:50), Max: 97.4 (04-05-24 @ 07:50)  HR: --  BP: --  BP(mean): --  RR: 19 (04-05-24 @ 07:50) (19 - 19)  SpO2: --    Orthostatic VS  04-05-24 @ 07:50  Lying BP: --/-- HR: --  Sitting BP: 144/76 HR: 69  Standing BP: 129/77 HR: 76  Site: --  Mode: --  Orthostatic VS  04-04-24 @ 08:37  Lying BP: --/-- HR: --  Sitting BP: 118/77 HR: 67  Standing BP: 121/79 HR: 75  Site: --  Mode: --  
Vital Signs Last 24 Hrs  T(C): 36.6 (04-08-24 @ 07:44), Max: 36.6 (04-08-24 @ 07:44)  T(F): 97.9 (04-08-24 @ 07:44), Max: 97.9 (04-08-24 @ 07:44)  HR: --  BP: --  BP(mean): --  RR: --  SpO2: --    Orthostatic VS  04-08-24 @ 07:44  Lying BP: --/-- HR: --  Sitting BP: 140/75 HR: 67  Standing BP: 129/77 HR: 72  Site: --  Mode: --  Orthostatic VS  04-07-24 @ 07:58  Lying BP: --/-- HR: --  Sitting BP: 107/68 HR: 74  Standing BP: 125/76 HR: 78  Site: --  Mode: --  
Vital Signs Last 24 Hrs  T(C): 36.4 (02-26-24 @ 08:06), Max: 36.4 (02-26-24 @ 08:06)  T(F): 97.5 (02-26-24 @ 08:06), Max: 97.5 (02-26-24 @ 08:06)  HR: 68 (02-26-24 @ 08:06) (68 - 68)  BP: 120/57 (02-26-24 @ 08:06) (120/57 - 120/57)  BP(mean): --  RR: --  SpO2: --    Orthostatic VS  02-25-24 @ 05:45  Lying BP: --/-- HR: --  Sitting BP: 108/61 HR: 66  Standing BP: --/-- HR: --  Site: --  Mode: --  
Vital Signs Last 24 Hrs  T(C): 36.3 (04-18-24 @ 07:49), Max: 36.3 (04-18-24 @ 07:49)  T(F): 97.4 (04-18-24 @ 07:49), Max: 97.4 (04-18-24 @ 07:49)  HR: --  BP: --  BP(mean): --  RR: --  SpO2: --    Orthostatic VS  04-18-24 @ 07:49  Lying BP: --/-- HR: --  Sitting BP: 127/69 HR: 63  Standing BP: 120/71 HR: 71  Site: --  Mode: --  Orthostatic VS  04-17-24 @ 08:04  Lying BP: --/-- HR: --  Sitting BP: 124/76 HR: 72  Standing BP: 116/72 HR: 77  Site: --  Mode: --  
Vital Signs Last 24 Hrs  T(C): 36.3 (03-25-24 @ 07:49), Max: 36.3 (03-25-24 @ 07:49)  T(F): 97.4 (03-25-24 @ 07:49), Max: 97.4 (03-25-24 @ 07:49)  HR: --  BP: --  BP(mean): --  RR: --  SpO2: --    Orthostatic VS  03-25-24 @ 07:49  Lying BP: --/-- HR: --  Sitting BP: 138/65 HR: 69  Standing BP: 137/67 HR: 75  Site: --  Mode: --  Orthostatic VS  03-24-24 @ 07:47  Lying BP: --/-- HR: --  Sitting BP: 129/62 HR: 70  Standing BP: 122/71 HR: 77  Site: --  Mode: --  
Vital Signs Last 24 Hrs  T(C): 36.3 (03-29-24 @ 07:45), Max: 36.3 (03-29-24 @ 07:45)  T(F): 97.3 (03-29-24 @ 07:45), Max: 97.3 (03-29-24 @ 07:45)  HR: --  BP: --  BP(mean): --  RR: --  SpO2: --    Orthostatic VS  03-29-24 @ 07:45  Lying BP: --/-- HR: --  Sitting BP: 184/94 HR: 74  Standing BP: 169/95 HR: 79  Site: --  Mode: --  Orthostatic VS  03-28-24 @ 07:44  Lying BP: --/-- HR: --  Sitting BP: 124/68 HR: 73  Standing BP: 119/73 HR: 94  Site: --  Mode: --  
Vital Signs Last 24 Hrs  T(C): 36.4 (03-14-24 @ 07:46), Max: 36.4 (03-14-24 @ 07:46)  T(F): 97.5 (03-14-24 @ 07:46), Max: 97.5 (03-14-24 @ 07:46)  HR: --  BP: --  BP(mean): --  RR: --  SpO2: --    Orthostatic VS  03-14-24 @ 07:46  Lying BP: --/-- HR: --  Sitting BP: 140/70 HR: 75  Standing BP: 131/69 HR: 83  Site: --  Mode: --  Orthostatic VS  03-13-24 @ 07:57  Lying BP: --/-- HR: --  Sitting BP: 126/71 HR: 71  Standing BP: 145/68 HR: 76  Site: --  Mode: --  
Vital Signs Last 24 Hrs  T(C): 36.6 (04-15-24 @ 09:38), Max: 36.9 (04-15-24 @ 06:19)  T(F): 97.8 (04-15-24 @ 09:38), Max: 98.5 (04-15-24 @ 06:19)  HR: 63 (04-15-24 @ 09:38) (60 - 72)  BP: 133/78 (04-15-24 @ 09:38) (124/77 - 150/68)  BP(mean): --  RR: 18 (04-15-24 @ 09:38) (16 - 18)  SpO2: 98% (04-15-24 @ 09:38) (97% - 100%)    Orthostatic VS  04-15-24 @ 03:06  Lying BP: 127/63 HR: 63  Sitting BP: 133/66 HR: 71  Standing BP: --/-- HR: --  Site: upper right arm  Mode: electronic  Orthostatic VS  04-14-24 @ 09:40  Lying BP: --/-- HR: --  Sitting BP: 135/76 HR: 72  Standing BP: 128/72 HR: 78  Site: --  Mode: --  
Vital Signs Last 24 Hrs  T(C): 36.7 (04-03-24 @ 08:19), Max: 36.7 (04-03-24 @ 08:19)  T(F): 98.1 (04-03-24 @ 08:19), Max: 98.1 (04-03-24 @ 08:19)  HR: --  BP: --  BP(mean): --  RR: --  SpO2: --    Orthostatic VS  04-03-24 @ 08:19  Lying BP: --/-- HR: --  Sitting BP: 131/78 HR: 63  Standing BP: 140/75 HR: 70  Site: upper left arm  Mode: electronic  Orthostatic VS  04-02-24 @ 07:36  Lying BP: --/-- HR: --  Sitting BP: 133/74 HR: 69  Standing BP: 133/69 HR: 73  Site: --  Mode: --  
Vital Signs Last 24 Hrs  T(C): 36.5 (02-24-24 @ 05:57), Max: 36.5 (02-24-24 @ 05:57)  T(F): 97.7 (02-24-24 @ 05:57), Max: 97.7 (02-24-24 @ 05:57)  HR: --  BP: --  BP(mean): --  RR: --  SpO2: --    Orthostatic VS  02-24-24 @ 05:57  Lying BP: --/-- HR: --  Sitting BP: 171/80 HR: 90  Standing BP: 166/83 HR: 100  Site: upper right arm  Mode: electronic  Orthostatic VS  02-23-24 @ 05:55  Lying BP: 114/64 HR: 64  Sitting BP: 113/73 HR: 71  Standing BP: --/-- HR: --  Site: upper right arm  Mode: electronic  
Vital Signs Last 24 Hrs  T(C): 36 (03-21-24 @ 08:23), Max: 36 (03-21-24 @ 08:23)  T(F): 96.8 (03-21-24 @ 08:23), Max: 96.8 (03-21-24 @ 08:23)  HR: --  BP: --  BP(mean): --  RR: --  SpO2: --    Orthostatic VS  03-21-24 @ 08:23  Lying BP: --/-- HR: --  Sitting BP: 141/72 HR: 70  Standing BP: 128/77 HR: 80  Site: --  Mode: --  Orthostatic VS  03-20-24 @ 07:54  Lying BP: --/-- HR: --  Sitting BP: 139/81 HR: 76  Standing BP: 141/70 HR: 80  Site: --  Mode: --  
Vital Signs Last 24 Hrs  T(C): 36.4 (03-07-24 @ 05:44), Max: 36.4 (03-07-24 @ 05:44)  T(F): 97.5 (03-07-24 @ 05:44), Max: 97.5 (03-07-24 @ 05:44)  HR: --  BP: --  BP(mean): --  RR: --  SpO2: --    Orthostatic VS  03-07-24 @ 05:44  Lying BP: --/-- HR: --  Sitting BP: 127/68 HR: 67  Standing BP: 121/66 HR: 70  Site: --  Mode: --  Orthostatic VS  03-06-24 @ 07:50  Lying BP: --/-- HR: --  Sitting BP: 149/78 HR: 68  Standing BP: --/-- HR: --  Site: --  Mode: --  
Vital Signs Last 24 Hrs  T(C): 36.6 (03-12-24 @ 07:54), Max: 36.6 (03-12-24 @ 07:54)  T(F): 97.9 (03-12-24 @ 07:54), Max: 97.9 (03-12-24 @ 07:54)  HR: --  BP: --  BP(mean): --  RR: 18 (03-12-24 @ 11:45) (18 - 18)  SpO2: --    Orthostatic VS  03-12-24 @ 07:54  Lying BP: --/-- HR: --  Sitting BP: 122/68 HR: 57  Standing BP: 126/72 HR: 70  Site: --  Mode: --  
Vital Signs Last 24 Hrs  T(C): 36.8 (04-17-24 @ 08:04), Max: 36.8 (04-17-24 @ 08:04)  T(F): 98.2 (04-17-24 @ 08:04), Max: 98.2 (04-17-24 @ 08:04)  HR: --  BP: --  BP(mean): --  RR: --  SpO2: --    Orthostatic VS  04-17-24 @ 08:04  Lying BP: --/-- HR: --  Sitting BP: 124/76 HR: 72  Standing BP: 116/72 HR: 77  Site: --  Mode: --  
Vital Signs Last 24 Hrs  T(C): 36.8 (02-28-24 @ 07:53), Max: 36.8 (02-28-24 @ 07:53)  T(F): 98.2 (02-28-24 @ 07:53), Max: 98.2 (02-28-24 @ 07:53)  HR: --  BP: --  BP(mean): --  RR: --  SpO2: --    Orthostatic VS  02-28-24 @ 07:53  Lying BP: --/-- HR: --  Sitting BP: 133/82 HR: 64  Standing BP: 147/82 HR: 66  Site: upper left arm  Mode: electronic  Orthostatic VS  02-27-24 @ 05:50  Lying BP: --/-- HR: --  Sitting BP: 118/-- HR: --  Standing BP: 120/75 HR: 86  Site: upper left arm  Mode: electronic  
Vital Signs Last 24 Hrs  T(C): 36.8 (03-27-24 @ 07:49), Max: 36.8 (03-27-24 @ 07:49)  T(F): 98.2 (03-27-24 @ 07:49), Max: 98.2 (03-27-24 @ 07:49)  HR: 84 (03-27-24 @ 07:49) (84 - 84)  BP: 126/72 (03-27-24 @ 07:49) (126/72 - 126/72)  BP(mean): --  RR: 16 (03-27-24 @ 07:49) (16 - 16)  SpO2: --    Orthostatic VS  03-27-24 @ 07:49  Lying BP: --/-- HR: --  Sitting BP: 129/66 HR: 78  Standing BP: --/-- HR: --  Site: --  Mode: --  Orthostatic VS  03-26-24 @ 07:56  Lying BP: --/-- HR: --  Sitting BP: 140/77 HR: 83  Standing BP: 146/77 HR: 87  Site: --  Mode: --

## 2024-04-18 NOTE — BH INPATIENT PSYCHIATRY PROGRESS NOTE - NSBHMSEAFFRANGE_PSY_A_CORE
Constricted
Other
Constricted
Other
Constricted
Other
Other
Constricted
Other
Constricted
Other
Constricted
Constricted
Other
Constricted
Constricted
Other
Constricted
Other
Other
Constricted
Other
Constricted
Constricted

## 2024-04-18 NOTE — BH INPATIENT PSYCHIATRY PROGRESS NOTE - NSTXPSYCHOGOAL_PSY_ALL_CORE
Make at least 5 reality based statements/requests to staff and/or peers
Make at least 5 reality based statements/requests to staff and/or peers
Will be able to report experiencing hallucinations to staff
Make at least 5 reality based statements/requests to staff and/or peers
Will be able to report experiencing hallucinations to staff
Will be able to report experiencing hallucinations to staff
Make at least 5 reality based statements/requests to staff and/or peers
Make at least 5 reality based statements/requests to staff and/or peers
Will be able to report experiencing hallucinations to staff
Will be able to report experiencing hallucinations to staff
Make at least 5 reality based statements/requests to staff and/or peers
Will be able to report experiencing hallucinations to staff
Will be able to report experiencing hallucinations to staff
Make at least 5 reality based statements/requests to staff and/or peers
Will be able to report experiencing hallucinations to staff
Make at least 5 reality based statements/requests to staff and/or peers
Will be able to report experiencing hallucinations to staff
Make at least 5 reality based statements/requests to staff and/or peers
Will be able to report experiencing hallucinations to staff
Make at least 5 reality based statements/requests to staff and/or peers
Make at least 5 reality based statements/requests to staff and/or peers
Will identify 1 trigger/stressor that exacerbates thoughts
Make at least 5 reality based statements/requests to staff and/or peers
Will be able to report experiencing hallucinations to staff
Make at least 5 reality based statements/requests to staff and/or peers

## 2024-04-18 NOTE — BH INPATIENT PSYCHIATRY PROGRESS NOTE - NSBHMSEIMPULSE_PSY_A_CORE
Other

## 2024-04-18 NOTE — BH INPATIENT PSYCHIATRY PROGRESS NOTE - NSTXCOPEPROGRES_PSY_ALL_CORE
Improving
No Change
No Change
Improving
Improving
No Change
Improving
Improving
No Change
Improving
Improving
No Change
Improving
No Change
Improving
No Change
Improving
No Change
Improving
Improving
No Change
Improving
No Change
Improving
No Change

## 2024-04-18 NOTE — BH INPATIENT PSYCHIATRY PROGRESS NOTE - NSTXDCOTHRDATETRGT_PSY_ALL_CORE
17-Apr-2024
10-Apr-2024
24-Apr-2024
20-Mar-2024
20-Mar-2024
01-Mar-2024
08-Mar-2024
01-Mar-2024
08-Mar-2024
15-Mar-2024
27-Mar-2024
01-Mar-2024
27-Mar-2024
20-Mar-2024
27-Mar-2024
24-Apr-2024
27-Mar-2024
01-Mar-2024
10-Apr-2024
20-Mar-2024
08-Mar-2024
01-Mar-2024
17-Apr-2024
27-Mar-2024
17-Apr-2024
27-Mar-2024
08-Mar-2024
27-Mar-2024
10-Apr-2024
08-Mar-2024
15-Mar-2024
05-Apr-2024
15-Mar-2024
17-Apr-2024
01-Mar-2024
01-Mar-2024
05-Apr-2024
05-Apr-2024
27-Mar-2024
17-Apr-2024

## 2024-04-18 NOTE — BH INPATIENT PSYCHIATRY PROGRESS NOTE - NSBHMSELANG_PSY_A_CORE
No abnormalities noted
Other
No abnormalities noted

## 2024-04-18 NOTE — BH INPATIENT PSYCHIATRY PROGRESS NOTE - NSTXPSYCHODATEEST_PSY_ALL_CORE
23-Feb-2024
23-Feb-2024
14-Apr-2024
02-Apr-2024
02-Apr-2024
23-Feb-2024
02-Apr-2024
23-Feb-2024
02-Apr-2024
23-Feb-2024
23-Feb-2024
14-Apr-2024
23-Feb-2024
18-Apr-2024
23-Feb-2024
23-Feb-2024
02-Apr-2024
23-Feb-2024
14-Apr-2024
23-Feb-2024
02-Apr-2024
02-Apr-2024
23-Feb-2024
23-Feb-2024
02-Apr-2024
23-Feb-2024

## 2024-04-18 NOTE — BH INPATIENT PSYCHIATRY PROGRESS NOTE - NSTXPSYCHODATETRGT_PSY_ALL_CORE
01-Mar-2024
09-Apr-2024
01-Mar-2024
01-Mar-2024
11-Apr-2024
01-Mar-2024
21-Apr-2024
01-Mar-2024
11-Apr-2024
11-Apr-2024
21-Apr-2024
11-Apr-2024
01-Mar-2024
25-Apr-2024
11-Apr-2024
01-Mar-2024
09-Apr-2024
01-Mar-2024
11-Apr-2024
21-Apr-2024
01-Mar-2024

## 2024-04-18 NOTE — BH INPATIENT PSYCHIATRY PROGRESS NOTE - NSBHMSEAFFCONG_PSY_A_CORE
Congruent
Non-congruent
Congruent

## 2024-04-18 NOTE — BH INPATIENT PSYCHIATRY PROGRESS NOTE - NSBHANTIPSYCHOTIC_PSY_ALL_CORE
Yes...

## 2024-04-18 NOTE — BH INPATIENT PSYCHIATRY PROGRESS NOTE - NSBHPSYCHOLCOGORIENT_PSY_A_CORE
guarded, paranoid/Unable to assess

## 2024-04-18 NOTE — BH INPATIENT PSYCHIATRY PROGRESS NOTE - NSBHMSEHYG_PSY_A_CORE
Fair
Poor
Fair
Fair
Poor
Fair
Poor
Fair
Fair
Poor
Fair
Poor
Poor
Fair
Poor
Fair
Poor
Fair
Poor
Fair
Poor

## 2024-04-18 NOTE — BH INPATIENT PSYCHIATRY PROGRESS NOTE - NSBHMSEBODY_PSY_A_CORE
Overweight

## 2024-04-18 NOTE — BH INPATIENT PSYCHIATRY PROGRESS NOTE - NSBHMSESPEECH_PSY_A_CORE
Abnormal as indicated, otherwise normal...

## 2024-04-18 NOTE — BH INPATIENT PSYCHIATRY PROGRESS NOTE - NSCGISEVERILLNESS_PSY_ALL_CORE
6 = Severely ill - disruptive pathology, behavior and function are frequently influenced by symptoms, may require assistance from others

## 2024-04-18 NOTE — BH INPATIENT PSYCHIATRY PROGRESS NOTE - NSBHLEGALSTATUSCHANGE_PSY_ALL_CORE
No

## 2024-04-18 NOTE — BH INPATIENT PSYCHIATRY PROGRESS NOTE - NSBHATTESTBILLING_PSY_A_CORE
39345-Zplbkhpysb OBS or IP - low complexity OR 25-34 mins
43791-Sgbnxianms OBS or IP - moderate complexity OR 35-49 mins
98299-Xxngxbidzq OBS or IP - low complexity OR 25-34 mins
34646-Iwfbclmbwe OBS or IP - moderate complexity OR 35-49 mins
47495-Ldjkcmswda OBS or IP - moderate complexity OR 35-49 mins
70056-Rdvxluonwm OBS or IP - low complexity OR 25-34 mins
87701-Eimbovvzjt OBS or IP - low complexity OR 25-34 mins
29049-Yjkmksakqh OBS or IP - moderate complexity OR 35-49 mins
68778-Ptimgxoibb OBS or IP - low complexity OR 25-34 mins
73480-Vnivubdunj OBS or IP - low complexity OR 25-34 mins
91162-Opavunxcuk OBS or IP - moderate complexity OR 35-49 mins
20124-Mincbyihcg OBS or IP - moderate complexity OR 35-49 mins
55178-Ubjxnxgxem OBS or IP - low complexity OR 25-34 mins
23630-Hmmxpkkrza OBS or IP - moderate complexity OR 35-49 mins
74133-Dgfimfydtz OBS or IP - moderate complexity OR 35-49 mins
84927-Enxrqkjihq OBS or IP - moderate complexity OR 35-49 mins
22996-Cbjzajcrau OBS or IP - moderate complexity OR 35-49 mins
31026-Iqzfmraobo OBS or IP - low complexity OR 25-34 mins
49723-Mxncjvyiyc OBS or IP - moderate complexity OR 35-49 mins
48589-Twldcyndrp OBS or IP - moderate complexity OR 35-49 mins
99549-Gmxdlvoyzp OBS or IP - moderate complexity OR 35-49 mins
55494-Zuibeiqmdo OBS or IP - moderate complexity OR 35-49 mins
00427-Ksvgmbenlj OBS or IP - moderate complexity OR 35-49 mins
42984-Kmchqsixhc OBS or IP - moderate complexity OR 35-49 mins
70539-Geiykqjgwr OBS or IP - moderate complexity OR 35-49 mins
34393-Qfhdpalrrf OBS or IP - low complexity OR 25-34 mins
26753-Uoxnbatqih OBS or IP - low complexity OR 25-34 mins
23066-Lxsdeaexwi OBS or IP - low complexity OR 25-34 mins
65727-Yurvwjsvwq OBS or IP - low complexity OR 25-34 mins
66887-Rjdtlakqen OBS or IP - moderate complexity OR 35-49 mins
34331-Tsygxtgqkg OBS or IP - low complexity OR 25-34 mins
64840-Mevnyihwgq OBS or IP - moderate complexity OR 35-49 mins
76752-Fnfidcvzvd OBS or IP - moderate complexity OR 35-49 mins
99689-Ggjggsvuqc OBS or IP - low complexity OR 25-34 mins
80407-Hxhsbvqykc OBS or IP - low complexity OR 25-34 mins
88029-Jrpburnfps OBS or IP - moderate complexity OR 35-49 mins
63291-Iyqwoplbgb OBS or IP - moderate complexity OR 35-49 mins
98830-Xvxnncfwti OBS or IP - moderate complexity OR 35-49 mins
19370-Qvoducsony OBS or IP - low complexity OR 25-34 mins
94779-Cdwrzznlml OBS or IP - moderate complexity OR 35-49 mins

## 2024-04-18 NOTE — BH INPATIENT PSYCHIATRY PROGRESS NOTE - NSCGIIMPROVESX_PSY_ALL_CORE
4 = No change - symptoms remain essentially unchanged

## 2024-04-18 NOTE — BH INPATIENT PSYCHIATRY PROGRESS NOTE - NSBHMSEBEHAV_PSY_A_CORE
Cooperative
Hostile/Other
Cooperative
Hostile/Other
Cooperative
Cooperative
Hostile/Other
Hostile/Other
Other
Cooperative
Cooperative
Hostile/Other
Cooperative
Hostile/Other
Cooperative
Cooperative
Hostile/Other
Hostile/Other
Cooperative
Hostile/Other
Hostile/Other
Cooperative
Hostile/Other

## 2024-04-18 NOTE — BH INPATIENT PSYCHIATRY PROGRESS NOTE - NSBHMSEAFFQUAL_PSY_A_CORE
Irritable
Anxious
Anxious
Irritable
Anxious
Irritable
Anxious
Anxious
Irritable
Anxious
Irritable
Anxious
Irritable
Anxious
Irritable
Anxious
Irritable

## 2024-04-19 ENCOUNTER — TRANSCRIPTION ENCOUNTER (OUTPATIENT)
Age: 64
End: 2024-04-19

## 2024-04-19 VITALS — TEMPERATURE: 98 F

## 2024-04-19 PROCEDURE — 99238 HOSP IP/OBS DSCHRG MGMT 30/<: CPT

## 2024-04-19 RX ORDER — BENZTROPINE MESYLATE 1 MG
1 TABLET ORAL
Refills: 0 | DISCHARGE

## 2024-04-19 RX ORDER — LOSARTAN POTASSIUM 100 MG/1
1 TABLET, FILM COATED ORAL
Qty: 0 | Refills: 0 | DISCHARGE
Start: 2024-04-19

## 2024-04-19 RX ORDER — LEVOTHYROXINE SODIUM 125 MCG
1 TABLET ORAL
Qty: 0 | Refills: 0 | DISCHARGE
Start: 2024-04-19

## 2024-04-19 RX ORDER — FLUPHENAZINE HYDROCHLORIDE 1 MG/1
18.75 TABLET, FILM COATED ORAL
Qty: 0 | Refills: 0 | DISCHARGE
Start: 2024-04-19

## 2024-04-19 RX ORDER — METFORMIN HYDROCHLORIDE 850 MG/1
1 TABLET ORAL
Qty: 0 | Refills: 0 | DISCHARGE
Start: 2024-04-19

## 2024-04-19 RX ORDER — BENZTROPINE MESYLATE 1 MG
1 TABLET ORAL
Qty: 0 | Refills: 0 | DISCHARGE
Start: 2024-04-19

## 2024-04-19 RX ORDER — FOLIC ACID 0.8 MG
1 TABLET ORAL
Refills: 0 | DISCHARGE

## 2024-04-19 RX ORDER — SENNA PLUS 8.6 MG/1
2 TABLET ORAL
Qty: 0 | Refills: 0 | DISCHARGE
Start: 2024-04-19

## 2024-04-19 RX ORDER — LANOLIN ALCOHOL/MO/W.PET/CERES
1 CREAM (GRAM) TOPICAL
Qty: 0 | Refills: 0 | DISCHARGE
Start: 2024-04-19

## 2024-04-19 RX ORDER — METOPROLOL TARTRATE 50 MG
1 TABLET ORAL
Refills: 0 | DISCHARGE

## 2024-04-19 RX ORDER — ATORVASTATIN CALCIUM 80 MG/1
1 TABLET, FILM COATED ORAL
Qty: 0 | Refills: 0 | DISCHARGE
Start: 2024-04-19

## 2024-04-19 RX ORDER — METOPROLOL TARTRATE 50 MG
1 TABLET ORAL
Qty: 0 | Refills: 0 | DISCHARGE
Start: 2024-04-19

## 2024-04-19 RX ORDER — RIVAROXABAN 15 MG-20MG
2 KIT ORAL
Refills: 0 | DISCHARGE

## 2024-04-19 RX ORDER — LEVOTHYROXINE SODIUM 125 MCG
1 TABLET ORAL
Refills: 0 | DISCHARGE

## 2024-04-19 RX ORDER — INSULIN LISPRO 100/ML
0 VIAL (ML) SUBCUTANEOUS
Qty: 0 | Refills: 0 | DISCHARGE

## 2024-04-19 RX ORDER — ATORVASTATIN CALCIUM 80 MG/1
1 TABLET, FILM COATED ORAL
Refills: 0 | DISCHARGE

## 2024-04-19 RX ORDER — TRAZODONE HCL 50 MG
1 TABLET ORAL
Refills: 0 | DISCHARGE

## 2024-04-19 RX ORDER — METFORMIN HYDROCHLORIDE 850 MG/1
0 TABLET ORAL
Qty: 0 | Refills: 0 | DISCHARGE

## 2024-04-19 RX ORDER — METFORMIN HYDROCHLORIDE 850 MG/1
1 TABLET ORAL
Refills: 0 | DISCHARGE

## 2024-04-19 RX ADMIN — METFORMIN HYDROCHLORIDE 850 MILLIGRAM(S): 850 TABLET ORAL at 09:42

## 2024-04-19 RX ADMIN — Medication 50 MICROGRAM(S): at 06:16

## 2024-04-19 RX ADMIN — Medication 2 MILLIGRAM(S): at 09:41

## 2024-04-19 RX ADMIN — Medication 50 MILLIGRAM(S): at 09:41

## 2024-04-19 RX ADMIN — LOSARTAN POTASSIUM 25 MILLIGRAM(S): 100 TABLET, FILM COATED ORAL at 09:42

## 2024-04-19 NOTE — BH DISCHARGE NOTE NURSING/SOCIAL WORK/PSYCH REHAB - NSBHDCAGENCY1FT_PSY_A_CORE
Onsite psychiatric consultant at [facility] Onsite psychiatric consultant at [facility]  Dr. Jagdish Franz

## 2024-04-19 NOTE — BH DISCHARGE NOTE NURSING/SOCIAL WORK/PSYCH REHAB - NSDCPRGOAL_PSY_ALL_CORE
Over the course of her admission, pt made noted gains towards her psych rehab goals. Patient reported feeling less irritable and less dysphoric. Patient was calmer and demonstrated greater frustration tolerance. Patient with minimal to moderate encouragement participated in most of the rehab group activities. During activities patient was cooperative, pleasant and minimally to moderately verbal. Patient was able to stay task focused with minimal redirection. Patient was compliant with her medications, and ate her meals with the community.  Pt ambulated independently. However, patient at times became isolative and was minimally social with her peers.

## 2024-04-19 NOTE — BH INPATIENT PSYCHIATRY DISCHARGE NOTE - NSBHASSESSSUMMFT_PSY_ALL_CORE
Patient is a 64 y/o woman,  x 10 years with PMHx of Schizophrenia, HTN, Afib, Hypothyroidism, NPH and hx of hypovolemic/septic shock from UTI admitted for management of paranoia in the context of treatment non-adherence.  Pt unable to engage in intake interview during initial encounter, fixated on obtaining a shower and refusing to engage in other topics. As per collateral patient has decompensated since being discharged from [facility] in December and being lost to f/u in terms of mental health treatment. From collateral pt presenting sx suggestive of psychotic decompensation including paranoid delusions, disorganized behavior and suspected AH in the context of non-adherence to pharmacotherapy. At home she was refusing meals, refusing to let HHA in and not leaving the house due to paranoia    On admission pt was irritable, demanding, presented pressured speech, would interrupt people speaking to her and speak over them, showing very little impulse control. She would request her medications and then refuse them when they were brought to her, or request items or services in [place] for her to take her meds. Pt initially refused meds or took them very inconsistently. She required several PRN medications at the beginning of her hospitalization for management of agitation.     Patient's collateral, long time friend and AA sponsor Luciano indicated pt was last stable on Prolixin decanoate up until it was stopped during a rehab stay and pt quickly decompensated after returning home. Pt was started on Prolixin, titrated up to 5mg BID. Received Prolixin dec 12.5mg IM on 3/18 and 18.75mg IM on 4/8/24. Dose was increased due to relapse in symptoms after tapering PO Prolixin after 1st MURILLO. Next dose due on 4/29/24. Since her second MURILLO dose pt has been fully tapered off of oral Prolixin successfully with full symptom remission. Disposition was complicated given homecare needs. Pt agreed to transition to a NH temporarily until her homecare needs can be assessed and established.    Medically, on 4/15 pt rolled out of bed and hit her head on the floor. She was transferred to the ED, workup was normal and head CT revealed no bleeding or trauma.     Pt has been stable on his current regimen, denies Auditory-Visual hallucinations as well as suicidal/homicidal ideation, intent or plans.  Patient did not self-harm on the unit. Patient remained actively engaged in treatment. Patient participated in individual, group, and milieu therapy. Patient got along appropriately with staff and peers. Patient was seen by PT for difficulty with ambulation; no indication for inpatient rehab or assistive devices.     On day of discharge, the patient has improved significantly and no longer requires inpatient treatment and care. Patient denies all suicidal and aggressive ideation, intent and plan. Patient displays no psychotic symptoms. Patient is not judged to be an acute danger to self or others at this time.

## 2024-04-19 NOTE — BH DISCHARGE NOTE NURSING/SOCIAL WORK/PSYCH REHAB - [facility]
Returned call to patient after d/w provider. Recommendations are to discontinue metoprolol at this time. Patient instructed to continue to monitor blood pressure and return call to clinic with changes. If BP starts to increase, will consider adding amlodipine 5 mg daily. Patient verbalizes understanding and agrees with plan of care. No further questions at this time. Med list updated.    Unit Name: [address]                       Unit Phone Number: [phone]

## 2024-04-19 NOTE — BH INPATIENT PSYCHIATRY DISCHARGE NOTE - NSBHFUPINTERVALHXFT_PSY_A_CORE
Patient is seen and evaluated for f/u for Schizophrenia. Chart reviewed and discussed with nursing staff. VSS. Adherent with oral meds. No overnight events or prns needed. Received Prolixin dec 12.5mg IM on 3/18 and 18.75mg IM on 4/8/24. Dose was increased due to relapse in symptoms after tapering PO Prolixin after 1st MURILLO. Next dose due on 4/29/24.    No interval changes. Pt is awake, alert on approach. Reports stable mood, denies sx of depression. She has been tending to her ADLs and verbalizing her needs to staff. Pt denies auditory hallucinations, racing thoughts or paranoia. Tolerating Prolixin MURILLO.

## 2024-04-19 NOTE — BH INPATIENT PSYCHIATRY DISCHARGE NOTE - HPI (INCLUDE ILLNESS QUALITY, SEVERITY, DURATION, TIMING, CONTEXT, MODIFYING FACTORS, ASSOCIATED SIGNS AND SYMPTOMS)
Patient is a 62 y/o woman,  x 10 years with PMHx of Schizophrenia, HTN, Afib, Hypothyroidism, NPH and hx of hypovolemic/septic shock from UTI admitted for management of paranoia in the context of treatment non-adherence.    Ms. Acosta has no children or family and lives alone in a private house (facing foreclosure from the bank due to unpaid debt). Receives A services Mon-Friday for 3 hours/day and has 1 friend (AA sponsor Shai ) who brought her into the hospital this time. As per Shai pt had been stable for years after her last d/c from [facility] on Prolixin dec. However pt had to go into rehab for 2 months, discharged late Dec 2023 and in there she did not receive her MURILLO, as per Shai "they gave her another equivalent medication but it wasn't the same. Since coming out of rehab pt was lost to mental health follow up and slowly decompensated. As per Shai she has been refusing to let aides in the house, refusing food delivery, making bizarre statements such as there being babies coming out of her basement and people coming into the house to rape her.     During encounter this morning pt refused to engage and speak to writer. Appeared fixated on obtaining practical things such as towels and "the good shampoo" to have a shower. Refused to speak with writer about events leading up to her hospitalization, insisting on speaking about her shower only. Requesting writer to help her shower. Provided redirection.       As per ED eval:    Past Psych hx: PUMA mohan 2022 for 3 months, on SSD, with psychiatric history of Schizophrenia and Alcohol Abuse, hx of Cannabis Abuse, Personality Disorder, multiple psychiatric hospitalizations last at [facility] 11/2023, last at [facility] from 8/2018-11/2018, attended [facility] starting on 11/26/2018 - 04/28/2023 (discharged for failure to attend appointments), no history of SI, SA, or HI. No history of aggression or legal issues, per records, drinking 3-6 beers nightly and using marijuana on the weekends, no known history of withdrawal symptoms/DTs or seizures, PMH of DM2, hypothyroidism, Afib on Xarelto, hx of hypovolemic and septic shock secondary to UTI requiring pressors 01/20/23; NPH (normal pressure hydrocephalus; onset of difficulty walking 12/22; [facility] discharge recommendation to f/u with neuro as outpt for large volume LP on 01/20/23).    Per referral: Pt is a 63yr old F with schizophrenia, alcohol use disorder. 5 prior admissions. Last admitted to [facility] due to psychosis December 2023. Patient presents with disorganized speech and thought, pt was irritable and argumentative. Pt presented with multiple delusions, reporting that someone left something in her head stating that a procedure was conducted. She reports possibly being drugged with LSD. Pt stated that her house was haunted and described being scared and confused. Pt denied SI/HI. Denied past SA and hx of NSSIB.    Patient oriented to person.  Patient states "call Shai .. [phone] and tell [employer] to go make meatballs." When asked why she presented she continues to be confrontational and asks why are you asking that? States that she knows why she is here and asks provider why she wants to know.  States that she missed her medication only today and has "always take her injection."  States that they want to medicate her "because they are jealous and just want the house."  Report that her  passed away of heart disease years ago.  Patient continued to interject and told this provider to "stop it .. why are you asking that ... I want a ."  When asked about suicidal/homicidal ideations, intent or plan she replied "Why would I do that?"     Per previous note from 8/23. COLLATERAL FROM FRIEND/AA SPONSOR Shai Herrera at [phone]: he has known Patient for >9 years; he last saw Patient well 4 days ago. At baseline, Patient is "normal" does her own shopping, takes care of herself, carries a fluent conversation and functioning swell. Patient usually calls him everyday at 6pm to let him know how she is doing. At times, Mr Wolf goes over to run an errand for her, grocery shopping etc. In the last few days, he has found Patient on the floor after falling and no being able to get back up. Shai ambivalent into what exactly Patient needs - says "she needs help. whatever you guys think."  On one hand, he does not think Patient "needs to be locked into psychiatry" at this time if she does not want to, and he sent her in to get "services" such as more help at home. Shai thinks Patient needs to go to a sub acute rehab facility to get her strength back, be medically tuned up and also psychiatrically treated. No aggression, no violence, no suicidality.    Please see  note for additional current collateral information obtained by SW. Reason for ed visit: shai brought pt to [facility] crisis center to get injection which she hasn’t received in 2 months. he says pt’s symptoms have worsened for the past week and has been paranoid, delusional and not caring for herself.  Symptoms/hx: no si or hi reported. He is unaware of any past suicide attempts. he says that pt does endorse hearing voices of a eric she dated in high school. He says pt is paranoid saying people are trying to get into the house at night and rape her. He reports pt hasn’t let the aid in the house some days due to the paranoia. He also reports pt is delusional saying someone opened her skull and put something inside her. Pt also tells shai that she has babies coming out of the basement. He says pt’s appetite has decreased, hygiene is okay and pt wakes up in the night screaming which is effecting her sleep.

## 2024-04-19 NOTE — BH DISCHARGE NOTE NURSING/SOCIAL WORK/PSYCH REHAB - NSCDUDCCRISIS_PSY_A_CORE
.National Suicide Prevention Lifeline 6 [phone]/.  [facility]  [phone]/.  [facility]  [address] [place] [address]  [phone]   Hours:  Monday through Friday from 9 AM to 3 PM/988 Suicide and Crisis Lifeline

## 2024-04-19 NOTE — BH DISCHARGE NOTE NURSING/SOCIAL WORK/PSYCH REHAB - PATIENT PORTAL LINK FT
You can access the FollowMyHealth Patient Portal offered by [facility] by registering at the following website: [email or web address]. By joining [employer]’s FollowMyHealth portal, you will also be able to view your health information using other applications (apps) compatible with our system.

## 2024-04-19 NOTE — BH INPATIENT PSYCHIATRY DISCHARGE NOTE - NSBHDCHANDOFFFT_PSY_ALL_CORE
Pt will be discharged to [facility] which has an onsite psychiatric consultant. Discharge summary and handoff given to them along with contact information for this writer in the event future questions arise.

## 2024-04-19 NOTE — DISCHARGE NOTE NURSING/CASE MANAGEMENT/SOCIAL WORK - NSDCVIVACCINE_GEN_ALL_CORE_FT
influenza, injectable, quadrivalent, preservative free; 05-Nov-2018 11:00; Chanel Alva (RN); [employer]; [id number] (Exp. Date: 30-Jun-2019); IntraMuscular; Deltoid Left.; 0.5 milliLiter(s); VIS (VIS Published: 07-Aug-2015, VIS Presented: 05-Nov-2018);   Tdap; 12-Apr-2019 19:46; Kathrin Barfield (RN); [employer]; [id number] (Exp. Date: 12-Mar-2020); IntraMuscular; Deltoid Left.; 0.5 milliLiter(s); VIS (VIS Published: 09-May-2013, VIS Presented: 12-Apr-2019);

## 2024-04-19 NOTE — BH INPATIENT PSYCHIATRY DISCHARGE NOTE - OTHER PAST PSYCHIATRIC HISTORY (INCLUDE DETAILS REGARDING ONSET, COURSE OF ILLNESS, INPATIENT/OUTPATIENT TREATMENT)
No
Pt has a long history of formal psychiatric treatment for schizophrenia with multiple admissions and reliance on outpatient services.  Pt with no current outpatient psychiatric follow up.  Pt has been in  [facility] (discharged 4/2023) and [facility] (last episode in 2015).  Pt  with ETOH abuse in the past with possible current use.  Pt with substance abuse rehab stay in 1999.  Also rehab stay at [facility] following spouse's death as per her friend.  Pt's last psychiatric admission was in [facility] last fall.  Pt was referred to [facility] by [place], as part of their DC,  but didn't link. Pt broke her hand while at [facility], then went to [facility] for PUMA x three months, discharging 12/23.  Pt went off MURILLO medication while at [facility], and stopped taking po when medication ran out.

## 2024-04-19 NOTE — BH DISCHARGE NOTE NURSING/SOCIAL WORK/PSYCH REHAB - DISCHARGE INSTRUCTIONS AFTERCARE APPOINTMENTS
In order to check the location, date, or time of your aftercare appointment, please refer to your Discharge Instructions Document given to you upon leaving the hospital.  If you have lost the instructions please call [phone]

## 2024-04-19 NOTE — DISCHARGE NOTE NURSING/CASE MANAGEMENT/SOCIAL WORK - PATIENT PORTAL LINK FT
You can access the FollowMyHealth Patient Portal offered by [facility] by registering at the following website: [email or web address]. By joining [employer]’s FollowMyHealth portal, you will also be able to view your health information using other applications (apps) compatible with our system.

## 2024-04-19 NOTE — BH DISCHARGE NOTE NURSING/SOCIAL WORK/PSYCH REHAB - NSDCVIVACCINE_GEN_ALL_CORE_FT
influenza, injectable, quadrivalent, preservative free; 05-Nov-2018 11:00; Chanel Alva (RN); [employer]; [id number] (Exp. Date: 30-Jun-2019); IntraMuscular; Deltoid Left.; 0.5 milliLiter(s); VIS (VIS Published: 07-Aug-2015, VIS Presented: 05-Nov-2018);   Tdap; 12-Apr-2019 19:46; Kathrin Barfield (RN); [employer]; [id number] (Exp. Date: 12-Mar-2020); IntraMuscular; Deltoid Left.; 0.5 milliLiter(s); VIS (VIS Published: 09-May-2013, VIS Presented: 12-Apr-2019);

## 2024-04-19 NOTE — BH INPATIENT PSYCHIATRY DISCHARGE NOTE - NSBHSAALC_PSY_A_CORE FT
Encephalopathy, unspecified Encephalopathy, unspecified Pt reportedly drinks 3-6 beers nightly Encephalopathy, unspecified Encephalopathy, unspecified

## 2024-04-19 NOTE — BH INPATIENT PSYCHIATRY DISCHARGE NOTE - HOSPITAL COURSE
Admission dates: 2/22/24-4/19/24    Patient is a 62 y/o woman,  x 10 years with PMHx of Schizophrenia, HTN, Afib, Hypothyroidism, NPH and hx of hypovolemic/septic shock from UTI admitted for management of paranoia in the context of treatment non-adherence.  Pt unable to engage in intake interview during initial encounter, fixated on obtaining a shower and refusing to engage in other topics. As per collateral patient has decompensated since being discharged from [facility] in December and being lost to f/u in terms of mental health treatment. From collateral pt presenting sx suggestive of psychotic decompensation including paranoid delusions, disorganized behavior and suspected AH in the context of non-adherence to pharmacotherapy. At home she was refusing meals, refusing to let HHA in and not leaving the house due to paranoia    On admission pt was irritable, demanding, presented pressured speech, would interrupt people speaking to her and speak over them, showing very little impulse control. She would request her medications and then refuse them when they were brought to her, or request items or services in [facility] for her to take her meds. Pt initially refused meds or took them very inconsistently. She required several PRN medications at the beginning of her hospitalization for management of agitation.     Patient's collateral, long time friend and AA sponsor Luciano indicated pt was last stable on Prolixin decanoate up until it was stopped during a rehab stay and pt quickly decompensated after returning home. Pt was started on Prolixin, titrated up to 5mg BID. Received Prolixin dec 12.5mg IM on 3/18 and 18.75mg IM on 4/8/24. Dose was increased due to relapse in symptoms after tapering PO Prolixin after 1st MURILLO. Next dose due on 4/29/24. Since her second MURILLO dose pt has been fully tapered off of oral Prolixin successfully with full symptom remission. Disposition was complicated given homecare needs. Pt agreed to transition to a NH temporarily until her homecare needs can be assessed and established.    Medically, on 4/15 pt rolled out of bed and hit her head on the floor. She was transferred to the ED, workup was normal and head CT revealed no bleeding or trauma.     Pt has been stable on his current regimen, denies Auditory-Visual hallucinations as well as suicidal/homicidal ideation, intent or plans.  Patient did not self-harm on the unit. Patient remained actively engaged in treatment. Patient participated in individual, group, and milieu therapy. Patient got along appropriately with staff and peers. Patient was seen by PT for difficulty with ambulation; no indication for inpatient rehab or assistive devices.     On day of discharge, the patient has improved significantly and no longer requires inpatient treatment and care. Patient denies all suicidal and aggressive ideation, intent and plan. Patient displays no psychotic symptoms. Patient is not judged to be an acute danger to self or others at this time.     Risk assessment on discharge: Pt has chronic risk factors of psychiatric diagnosis, past alcohol abuse, prior hospitalizations, limited social support and age with acute risk factors of recent lapse in medication adherence, recent paranoid delusions and AH, now treated with inpatient care consisting of medication management and psychotherapeutic interventions. Future risk factor mitigated with reintroduction of MURILLO treatment which will facilitate treatment adherence in the community. Protective factors of help-seeking behavior, supportive friend, future-orientation, treatment engagement, medication adherence. Pt has consistently denied suicidality and homicidality, auditory or visual hallucinations, command auditory hallucination, is emotionally regulated with good behavioral control, and is caring for ADLs. Pt is CHRONIC risk of harm, but is NO longer an acute or imminent risk of harm to self or others, pt can be discharged home with outpatient follow up.    Discharge meds:  Prolixin dec 18.75mg IM every 3 weeks, next dose due 4/29/24  Benztropine 2mg QD  Buspar 15mg QHS

## 2024-04-19 NOTE — BH INPATIENT PSYCHIATRY DISCHARGE NOTE - NSDCMRMEDTOKEN_GEN_ALL_CORE_FT
Admelog 100 units/mL injectable solution: injectable 3 times a day sliding scale  amLODIPine 5 mg oral tablet: 1 tab(s) orally once a day  atorvastatin 80 mg oral tablet: 1 tab(s) orally once a day (at bedtime)  benztropine 0.5 mg oral tablet: 1 tab(s) orally 2 times a day  Cogentin 1 mg oral tablet: 1 tab(s) orally every 8 hours as needed for EPS symptoms  Cogentin 1 mg oral tablet: 1 tab(s) orally 2 times a day  fluPHENAZine 2.5 mg oral tablet: 3 tab(s) orally once a day (at bedtime) Take 7.5mg daily at bedtime.  fluPHENAZine 2.5 mg oral tablet: 1 tab(s) orally once a day Once daily every morning.  folic acid 1 mg oral tablet: 1 tab(s) orally once a day  folic acid 1 mg oral tablet: 1 tab(s) orally once a day  Glucophage 850 mg oral tablet: 1 tab(s) orally once a day  levothyroxine 50 mcg (0.05 mg) oral tablet: 1 tab(s) orally once a day  Lipitor 80 mg oral tablet: 1 tab(s) orally once a day (at bedtime)  losartan 25 mg oral tablet: 1 tab(s) orally once a day  METFORMIN  MG TABLET: take 1 tablet by mouth once daily  metoprolol succinate 50 mg oral tablet, extended release: 1 tab(s) orally once a day  Multiple Vitamins oral tablet: 1 tab(s) orally once a day  nystatin 100,000 units/g topical powder: 1 Apply topically to affected area 2 times a day  OLANZapine: 2.5 milligram(s) orally 2 times a day  OLANZapine 10 mg intramuscular injection: 1.25 milligram(s) intramuscular every 6 hours As needed Agitation  rivaroxaban 20 mg oral tablet: 1 tab(s) orally once a day (before a meal)  Synthroid 50 mcg (0.05 mg) oral tablet: 1 tab(s) orally once a day  Toprol-XL 50 mg oral tablet, extended release: 1 tab(s) orally once a day  traZODone 50 mg oral tablet: 1 tab(s) orally once a day (at bedtime) as needed for  insomnia  Tylenol 325 mg oral tablet: 3 tab(s) orally every 8 hours as needed for  pain  Xarelto 10 mg oral tablet: 2 tab(s) orally once a day   benztropine 2 mg oral tablet: 1 tab(s) orally once a day  busPIRone 15 mg oral tablet: 1 tab(s) orally once a day (at bedtime)  fluPHENAZine decanoate 25 mg/mL injectable solution: 18.75 injectable once a month Next dose due on 4/29/24, due every 3 weeks  Lipitor 40 mg oral tablet: 1 tab(s) orally once a day  losartan 25 mg oral tablet: 1 tab(s) orally once a day  melatonin 3 mg oral tablet: 1 tab(s) orally once a day (at bedtime) As needed Insomnia  metFORMIN 850 mg oral tablet: 1 tab(s) orally once a day  Metoprolol Succinate ER 50 mg oral tablet, extended release: 1 tab(s) orally once a day  Multiple Vitamins oral tablet: 1 tab(s) orally once a day  rivaroxaban 20 mg oral tablet: 1 tab(s) orally once a day with dinner  senna leaf extract oral tablet: 2 tab(s) orally once a day As needed constipation  Synthroid 50 mcg (0.05 mg) oral tablet: 1 tab(s) orally once a day  Tylenol 325 mg oral tablet: 3 tab(s) orally every 8 hours as needed for  pain

## 2024-04-19 NOTE — BH INPATIENT PSYCHIATRY DISCHARGE NOTE - NSBHMETABOLIC_PSY_ALL_CORE_FT
BMI: BMI (kg/m2): 26.6 (04-15-24 @ 04:07)  HbA1c: A1C with Estimated Average Glucose Result: 5.5 % (02-26-24 @ 08:01)    Glucose: POCT Blood Glucose.: 103 mg/dL (04-12-24 @ 08:08)    BP: --Vital Signs Last 24 Hrs  T(C): 36.5 (04-19-24 @ 06:53), Max: 36.5 (04-19-24 @ 06:53)  T(F): 97.7 (04-19-24 @ 06:53), Max: 97.7 (04-19-24 @ 06:53)  HR: --  BP: --  BP(mean): --  RR: --  SpO2: --    Orthostatic VS  04-19-24 @ 06:53  Lying BP: --/-- HR: --  Sitting BP: 125/66 HR: 53  Standing BP: 115/61 HR: 62  Site: upper right arm  Mode: electronic  Orthostatic VS  04-18-24 @ 07:49  Lying BP: --/-- HR: --  Sitting BP: 127/69 HR: 63  Standing BP: 120/71 HR: 71  Site: --  Mode: --    Lipid Panel: Date/Time: 08-26-23 @ 13:54  Cholesterol, Serum: 155  LDL Cholesterol Calculated: 99  HDL Cholesterol, Serum: 35  Total Cholesterol/HDL Ration Measurement: --  Triglycerides, Serum: 105

## 2024-04-22 NOTE — BH SOCIAL WORK CONFIRMATION FOLLOW UP NOTE - NSLINKEDTOLOC_PSY_ALL_CORE
Providers    
Date of Admission: 03/11/24    
Primary Care Physician:     
Dr. Billy Ramos,     
    
Consultations    
    
03/11/24 14:52    
Consult: Nephrology Routine     
   Consulting Provider: Rolf Monet    
   Reason for Consult: ESRD on HD, DUE FOR HD today    
   EMERGENT Consult: No    
   MD Notified: Yes    
   Date Notified: 03/11/24    
   Time Notified: 13:29    
   Method of Notification: ED Physician Initiated    
    
    
    
Reason For Visit: SOB, HYPOXIA    
    
Diagnosis    
Discharge Diagnosis    
(1) Bilateral pneumonia:     
      Status: Acute    
      Code(s):    
J18.9 - Pneumonia, unspecified organism    
      Qualifiers:    
        Lung location: lower lobe of lung  Pneumonia type: due to unspecified   
organism  Qualified Code(s): J18.9 - Pneumonia, unspecified organism    
    
Plan    
This is a 52-year-old female being admitted for acute on chronic shortness of   
breath and URI symptoms and chest x-ray finding suggestive of bilateral lung   
bases pneumonia.    
    
1.  Mild bilateral pneumonia: Patient is being admitted in PCU.  Flu COVID and   
RSV PCR are negative.  Pneumonia workup with a sputum culture and respiratory   
panel ordered.  DuoNeb as needed.  Patient started on IV ceftriaxone and   
azithromycin.  Mild leukocytosis.  Patient was last admitted in February 2023   
for right lower lobe pneumonia.    
3/12: From [employer], CM told me that patient is on continuous 3 L of   
oxygen but patient uses 1 L as needed.  Today, the patient on 2 L of oxygen.    
Patient did not have significant cough or respiratory distress or shortness of   
breath.  Patient had 2 doses of IV ceftriaxone and Zithromax.  Discharged on   
Levaquin for 5 more days.    
    
2.  ESRD on hemodialysis : She is due for hemodialysis today but could not make   
it to the dialysis center because of hypoxia and shortness of breath.    
Nephrologist is consulted.    
3/12: Nephrologist was consulted.  Patient got dialysis and felt better with   
regards to shortness of breath.    
    
3.  Mild acute on chronic HFpEF with suspicion of mild pulmonary venous   
congestion/small pleural effusion: It seems patient has chronic HFpEF but she is  
unaware.  Last echo in November 2019 shows EF 55%, stage II diastolic   
dysfunction, no RWMA 1-2+ TR.  PASP 42 mmHg.Patient had CATRACHITO on December 2019 and  
EF 65% reported with no obvious vegetation.   repeat echo today    
3/12: 2D echo is pending    
    
4.  Anemia of chronic disease due to ESRD: Hemoglobin is on the baseline H&H   
9.5/30%.    
    
5.  Amyloidosis: Patient s/p stem cell transplant in remission, encourage   
continued outpatient follow-up.    
    
6   Anxiety and depression: continue patient home citalopram regimen.    
    
7.  Hypertension: Blood pressure profile is better after Susan-en-Y gastric   
bypass surgery.  Patient was on  on nifedipine and propranolol, but all of these  
medications after gastric bypass currently only on amlodipine 10 mg daily.    
Resumed.     
    
8.  Multiple other comorbidities include dyslipidemia and GERD, obstructive   
sleep apnea on CPAP 13 cm of water and hypothyroidism.  Patient follows in   
pulmonary clinic with Dr. Larry.  TSH and free T4 ordered.    
DVT prophylaxis: Heparin 5/30/2020 subcutaneous 3 times daily.  Discontinue if   
platelet count drops less than 50,000 or hemoglobin less than 8 g%    
    
    
Clinical Impression(s) from Imaging Studies    
    
Chest X-Ray  03/11/24 07:59    
IMPRESSION:    
Bilateral infiltrations worse at the left lung base with small bilateral    
pleural effusions superimposed on CHF.    
     
Living will/advanced directive/end of life care: Patient not have living will or  
advanced directive.   near the bedside in ED is power of  for   
health.  After discussion of benefits/risks procedures involved with  full code,  
DNR CC arrest and DNR CC, the patient opted for full code.    
Patient  does want artificial life support including intubation, tube feed,   
ventilator and/chest compression, central venous catheter, vasopressor and DC   
shock if needed    
   Total time spent in face-to-face encounter in discussion of advanced   
directive 17 minutes.    
    
                           Microbiology Past 72 Hours    
    
03/11/24 08:23   Mucosa - Nasopharyngeal   SARS-CoV-2, Influenza & RSV (PCR) -   
Final    
    
                               Laboratory Results    
    
03/11/24 08:23: WBC 11.6 H, RBC 3.17 L, Hgb 9.5 L, Hct 30.3 L, MCV 95.6, MCH   
30.0, MCHC 31.4 L, RDW Std Deviation 58.4 H, RDW Coeff of Mihir 16.5 H, Plt Count   
345, MPV 9.0, Immature Gran % (Auto) 0.600, Neut % (Auto) 83.6 H, Lymph % (Auto)  
7.2 L, Mono % (Auto) 6.6, Eos % (Auto) 1.7, Baso % (Auto) 0.3, Absolute Neuts   
(auto) 9.7 H, Absolute Lymphs (auto) 0.83, Nucleated RBC % 0, Sodium 136,   
Potassium 4.5, Chloride 94 L, Carbon Dioxide 29.0, Anion Gap 13, BUN 61 H,   
Creatinine 8.41 H*, Estim Creat Clear Calc 7.81, Est GFR (MDRD) Af Amer 6 L, Est  
GFR (MDRD) Non-Af 5 L, BUN/Creatinine Ratio 7.3 L, Glucose 113 H, Calcium 10.4 H  
, Phosphorus 8.5 H, Magnesium 2.8 H    
    
    
Clinical Impression(s) from Imaging Studies    
    
Chest X-Ray  03/11/24 07:59    
IMPRESSION:    
Bilateral infiltrations worse at the left lung base with small bilateral    
pleural effusions superimposed on CHF.    
     
Electronically Signed:    
Harrison Klein MD    
2024/03/11 at 10:27 EDT    
Reading Location ID and State: 603 / [place]    
Tel [phone], Service support  [phone], Fax [phone]    
     
    
    
    
    
Medications at Discharge    
Home Medications    
    
citalopram 20 mg tablet 20 mg PO DAILY Depression 02/01/18     
cinacalcet 60 mg tablet (Sensipar) 60 mg PO DAILY 09/02/21     
cholecalciferol (vitamin D3) 25 mcg (1,000 unit) capsule 100 mcg PO DAILY 03/0 1/22     
albuterol sulfate 90 mcg/actuation aerosol inhaler 2 puff inhalation Q6H PRN   
shortness of breath or wheezing #8.5 grams 02/25/23     
levothyroxine 50 mcg tablet 50 mcg PO DAILY 04/18/23     
amlodipine 10 mg tablet 10 mg PO DAILY 03/11/24     
lanthanum 1,000 mg chewable tablet 1,000 mg PO TID 03/11/24     
vitamin B complex and vitamin C no.20-folic acid 1 mg capsule (Renal Caps) 1 cap  
PO DAILY 03/11/24     
zinc acetate 50 mg (zinc) capsule 50 mg PO DAILY 03/11/24     
levofloxacin 500 mg tablet 500 mg PO Q48H 6 days #3 tabs 03/12/24     
neomycin-polymyxin-hydrocort 3.5 mg-10,000 unit/mL-1 % ear drops,susp 4 drp otic  
(ear) Q6H 1 week #10 mL 03/12/24     
    
    
    
Physical Exam    
Narrative    
Patient complained of right ear pain yesterday but got worse last night about 8-  
10/10 intensity.  It is better today.  Right ear examined.  Patient on 2 L of   
oxygen.  Shortness of breath much improved after dialysis    
    
    
Seen and examined.    
General: Alert, Oriented x3, Cooperative    
HEENT: Right ear: Mild congestion, light reflex not clear, foggy.  Bony   
structures seen.  Left ear light reflex slight foggy but better.  TM intact.    
Atraumatic, PERRLA, EOMI, Normocephalic    
Oral: Oral mucosa dry no Gingival or Mucosal Lesions/ Ulcerations    
Neck: Supple, No JVD, Negative Carotid Bruits    
Chest wall/Lungs:  Air entry diminished in bilateral lung bases.  Lungs clear.    
No crepitation/rhonchi.    
Cardiovascular: Regular rate, Regular Rhythm, Normal S1, Normal S2, systolic   
murmur over LLSB and right second ICS.    
Abdomen: Bowel Sounds Present, Soft, Non Tender, Non-Distended    
:Left arm AV fistula.  Functioning.  Baseline patient is an uric  No renal   
angle tenderness.  No suprapubic tenderness.    
Extremities: .  No edema, Capillary Refill Less than 3 Seconds    
Skin: No rashes, No breakdown    
Musculoskeletal: No Tenderness to Palpation of Joints or Extremities    
Neurological: Cranial nerves II-XII grossly intact, DTR  2+/4.  No acute focal   
neurological deficit.    
Psych/Mental Status: Flat affect.    
    
Weight / BMI    
                                     Weight    
    
    
    
Weight:                        171 lb 15.369 oz                                   
    
     
Body Mass Index (BMI)          31.6                                               
    
    
    
    
    
ABG / Lab / Microbiology Data    
    
                                                        03/12/24 07:24              
    
                                                        03/12/24 07:24              
    
Laboratory:     
                         Laboratory Results - last 24 hr    
    
03/11/24 08:23: WBC 11.6 H, RBC 3.17 L, Hgb 9.5 L, Hct 30.3 L, MCV 95.6, MCH   
30.0, MCHC 31.4 L, RDW Std Deviation 58.4 H, RDW Coeff of Mihir 16.5 H, Plt Count   
345, MPV 9.0, Immature Gran % (Auto) 0.600, Neut % (Auto) 83.6 H, Lymph % (Auto)  
7.2 L, Mono % (Auto) 6.6, Eos % (Auto) 1.7, Baso % (Auto) 0.3, Absolute Neuts   
(auto) 9.7 H, Absolute Lymphs (auto) 0.83, Nucleated RBC % 0, Sodium 136,   
Potassium 4.5, Chloride 94 L, Carbon Dioxide 29.0, Anion Gap 13, BUN 61 H,   
Creatinine 8.41 H*, Estim Creat Clear Calc 7.81, Est GFR (MDRD) Af Amer 6 L, Est  
GFR (MDRD) Non-Af 5 L, BUN/Creatinine Ratio 7.3 L, Glucose 113 H, Calcium 10.4 H  
, Phosphorus 8.5 H, Magnesium 2.8 H    
    
    
Microbiology:     
                                  Microbiology    
    
03/11/24 14:50   Mucosa - Nose   Respiratory Panel (PCR) - Final    
03/11/24 08:23   Mucosa - Nasopharyngeal   SARS-CoV-2, Influenza & RSV (PCR) -   
Final    
    
    
Radiography    
Diagnostic Testing:     
                              Radiology Impression    
    
Chest X-Ray  03/11/24 07:59    
IMPRESSION:    
Bilateral infiltrations worse at the left lung base with small bilateral    
pleural effusions superimposed on CHF.    
     
Electronically Signed:    
Harrison Klein MD    
2024/03/11 at 10:27 EDT    
Reading Location ID and State: 3 / [place]    
Tel [phone], Service support  [phone], Fax [phone]    
     
    
    
    
    
D/C Instructions    
Discharge Diet: No restrictions    
Weight Bearing Status: Weight bearing as tolerated    
Call your doctor if you observe: Fever of 101 or Higher, Coldness, Increased   
Pain, Numbness or Tingling, Change in Color, Inability to urinate, Inability to   
have a bowel movement, Using more than 1 pad per hour, Shortness of breath,   
Dizziness, Fainting spells, Swelling in the ankles, Chest pain, Prolonged   
hiccupping, Increased palpitations (irregular heartbeat) and Calf discomfort    
When: IN 2 WEEKS    
    
Discharge Plan    
Admission    
Admit Date/Time: 03/11/24 10:26    
    
Primary Reason for Your Visit: Pneumonia    
    
Attending Provider: Roc Johnson    
    
Primary Care Provider: Billy Ramos    
    
Consulting Providers: Rolf Monet    
    
Discharge Orders/Prescriptions    
Prescriptions:    
New    
  neomycin-polymyxin-HC 3.5-10,000-1 mg/mL-unit/mL-% Drops,Suspension     
   4 drp otic (ear) Q6H 7 Days Qty: 10 0RF    
   Rx Instructions:    
   Both ears    
  levofloxacin 500 mg tablet     
   500 mg PO Q48H 6 Days Qty: 3 0RF    
    
Continued    
  cinacalcet [Sensipar] 60 mg tablet     
   60 mg PO DAILY     
  cholecalciferol (vitamin D3) 25 mcg (1,000 unit) capsule     
   100 mcg PO DAILY     
  levothyroxine 50 mcg tablet     
   50 mcg PO DAILY     
  citalopram 20 MG tablet     
   20 mg PO DAILY     
  albuterol sulfate 90 mcg/actuation HFA aerosol inhaler     
   2 puff inhalation Q6H PRN (Reason: shortness of breath or wheezing) Qty: 8.5   
0RF    
  lanthanum 1,000 mg tablet,chewable     
   1,000 mg PO TID     
  Renal Caps 1 mg capsule     
   1 cap PO DAILY     
  zinc acetate 50 mg (zinc) capsule     
   50 mg PO DAILY     
  amlodipine 10 mg tablet     
   10 mg PO DAILY     
    
Referrals / Follow Up:    
Johann Noel MD [Med Staff - Active Staff] - Within 1 Week (Right ear   
pain, otitis media)    
Rolf Monet MD [Med Staff - Consulting] - Within 1 Month    
Billy Ramos DO [Primary Care Provider] -     
    
Disposition    
Disposition (needs filled in before D/C Order can be placed): Home, Self Care [facility] - on site

## 2024-04-26 NOTE — PROVIDER CONTACT NOTE (FALL NOTIFICATION) - ASSESSMENT
Juliana speaking to Dr. Eubanks    Pt A&oX1. VSS. Pt. denies SOB, chest pain, difficulty breathing. in NAD

## 2024-04-28 ENCOUNTER — RX RENEWAL (OUTPATIENT)
Age: 64
End: 2024-04-28

## 2024-04-28 RX ORDER — METOPROLOL SUCCINATE 50 MG/1
50 TABLET, EXTENDED RELEASE ORAL
Qty: 90 | Refills: 1 | Status: ACTIVE | COMMUNITY
Start: 2021-04-22 | End: 1900-01-01

## 2024-06-11 ENCOUNTER — APPOINTMENT (OUTPATIENT)
Dept: ENDOCRINOLOGY | Facility: CLINIC | Age: 64
End: 2024-06-11
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
## 2024-08-06 NOTE — BH SAFETY PLAN - STEP 3 DISTRACTION NAME
Faxed over 08/06/2024 last clinical note from May 10, 2024 Dr. Blue. Faxing it over to [facility] [phone].  Thank you,  Anne    .

## 2024-08-11 ENCOUNTER — EMERGENCY (EMERGENCY)
Facility: HOSPITAL | Age: 64
LOS: 1 days | Discharge: HOME | End: 2024-08-11
Admitting: EMERGENCY MEDICINE
Payer: MEDICARE

## 2024-08-11 VITALS
OXYGEN SATURATION: 100 % | RESPIRATION RATE: 17 BRPM | HEART RATE: 97 BPM | SYSTOLIC BLOOD PRESSURE: 142 MMHG | TEMPERATURE: 98 F | DIASTOLIC BLOOD PRESSURE: 84 MMHG

## 2024-08-11 VITALS
HEART RATE: 82 BPM | SYSTOLIC BLOOD PRESSURE: 153 MMHG | TEMPERATURE: 98 F | OXYGEN SATURATION: 97 % | DIASTOLIC BLOOD PRESSURE: 90 MMHG | RESPIRATION RATE: 16 BRPM

## 2024-08-11 LAB
ALBUMIN SERPL ELPH-MCNC: 4 G/DL — SIGNIFICANT CHANGE UP (ref 3.3–5)
ALP SERPL-CCNC: 119 U/L — SIGNIFICANT CHANGE UP (ref 40–120)
ALT FLD-CCNC: 14 U/L — SIGNIFICANT CHANGE UP (ref 4–33)
AMPHET UR-MCNC: NEGATIVE — SIGNIFICANT CHANGE UP
ANION GAP SERPL CALC-SCNC: 13 MMOL/L — SIGNIFICANT CHANGE UP (ref 7–14)
APAP SERPL-MCNC: <10 UG/ML — LOW (ref 15–25)
APPEARANCE UR: CLEAR — SIGNIFICANT CHANGE UP
AST SERPL-CCNC: 17 U/L — SIGNIFICANT CHANGE UP (ref 4–32)
BACTERIA # UR AUTO: ABNORMAL /HPF
BARBITURATES UR SCN-MCNC: NEGATIVE — SIGNIFICANT CHANGE UP
BASOPHILS # BLD AUTO: 0.04 K/UL — SIGNIFICANT CHANGE UP (ref 0–0.2)
BASOPHILS NFR BLD AUTO: 0.6 % — SIGNIFICANT CHANGE UP (ref 0–2)
BENZODIAZ UR-MCNC: NEGATIVE — SIGNIFICANT CHANGE UP
BILIRUB SERPL-MCNC: 0.3 MG/DL — SIGNIFICANT CHANGE UP (ref 0.2–1.2)
BILIRUB UR-MCNC: NEGATIVE — SIGNIFICANT CHANGE UP
BUN SERPL-MCNC: 9 MG/DL — SIGNIFICANT CHANGE UP (ref 7–23)
CALCIUM SERPL-MCNC: 9 MG/DL — SIGNIFICANT CHANGE UP (ref 8.4–10.5)
CAST: 1 /LPF — SIGNIFICANT CHANGE UP (ref 0–4)
CHLORIDE SERPL-SCNC: 104 MMOL/L — SIGNIFICANT CHANGE UP (ref 98–107)
CO2 SERPL-SCNC: 20 MMOL/L — LOW (ref 22–31)
COCAINE METAB.OTHER UR-MCNC: NEGATIVE — SIGNIFICANT CHANGE UP
COLOR SPEC: YELLOW — SIGNIFICANT CHANGE UP
CREAT SERPL-MCNC: 0.6 MG/DL — SIGNIFICANT CHANGE UP (ref 0.5–1.3)
CREATININE URINE RESULT, DAU: 18 MG/DL — SIGNIFICANT CHANGE UP
DIFF PNL FLD: NEGATIVE — SIGNIFICANT CHANGE UP
EGFR: 100 ML/MIN/1.73M2 — SIGNIFICANT CHANGE UP
EOSINOPHIL # BLD AUTO: 0.2 K/UL — SIGNIFICANT CHANGE UP (ref 0–0.5)
EOSINOPHIL NFR BLD AUTO: 2.9 % — SIGNIFICANT CHANGE UP (ref 0–6)
ETHANOL SERPL-MCNC: <10 MG/DL — SIGNIFICANT CHANGE UP
FENTANYL UR QL SCN: NEGATIVE — SIGNIFICANT CHANGE UP
GLUCOSE SERPL-MCNC: 146 MG/DL — HIGH (ref 70–99)
GLUCOSE UR QL: NEGATIVE MG/DL — SIGNIFICANT CHANGE UP
HCT VFR BLD CALC: 36 % — SIGNIFICANT CHANGE UP (ref 34.5–45)
HGB BLD-MCNC: 11.9 G/DL — SIGNIFICANT CHANGE UP (ref 11.5–15.5)
IANC: 3.83 K/UL — SIGNIFICANT CHANGE UP (ref 1.8–7.4)
IMM GRANULOCYTES NFR BLD AUTO: 0.1 % — SIGNIFICANT CHANGE UP (ref 0–0.9)
KETONES UR-MCNC: NEGATIVE MG/DL — SIGNIFICANT CHANGE UP
LEUKOCYTE ESTERASE UR-ACNC: ABNORMAL
LYMPHOCYTES # BLD AUTO: 1.61 K/UL — SIGNIFICANT CHANGE UP (ref 1–3.3)
LYMPHOCYTES # BLD AUTO: 23.6 % — SIGNIFICANT CHANGE UP (ref 13–44)
MCHC RBC-ENTMCNC: 30.1 PG — SIGNIFICANT CHANGE UP (ref 27–34)
MCHC RBC-ENTMCNC: 33.1 GM/DL — SIGNIFICANT CHANGE UP (ref 32–36)
MCV RBC AUTO: 90.9 FL — SIGNIFICANT CHANGE UP (ref 80–100)
METHADONE UR-MCNC: NEGATIVE — SIGNIFICANT CHANGE UP
MONOCYTES # BLD AUTO: 1.13 K/UL — HIGH (ref 0–0.9)
MONOCYTES NFR BLD AUTO: 16.6 % — HIGH (ref 2–14)
NEUTROPHILS # BLD AUTO: 3.83 K/UL — SIGNIFICANT CHANGE UP (ref 1.8–7.4)
NEUTROPHILS NFR BLD AUTO: 56.2 % — SIGNIFICANT CHANGE UP (ref 43–77)
NITRITE UR-MCNC: NEGATIVE — SIGNIFICANT CHANGE UP
NRBC # BLD: 0 /100 WBCS — SIGNIFICANT CHANGE UP (ref 0–0)
NRBC # FLD: 0 K/UL — SIGNIFICANT CHANGE UP (ref 0–0)
OPIATES UR-MCNC: NEGATIVE — SIGNIFICANT CHANGE UP
OXYCODONE UR-MCNC: NEGATIVE — SIGNIFICANT CHANGE UP
PCP SPEC-MCNC: SIGNIFICANT CHANGE UP
PCP UR-MCNC: NEGATIVE — SIGNIFICANT CHANGE UP
PH UR: 6.5 — SIGNIFICANT CHANGE UP (ref 5–8)
PLATELET # BLD AUTO: 265 K/UL — SIGNIFICANT CHANGE UP (ref 150–400)
POTASSIUM SERPL-MCNC: 4.1 MMOL/L — SIGNIFICANT CHANGE UP (ref 3.5–5.3)
POTASSIUM SERPL-SCNC: 4.1 MMOL/L — SIGNIFICANT CHANGE UP (ref 3.5–5.3)
PROT SERPL-MCNC: 6.7 G/DL — SIGNIFICANT CHANGE UP (ref 6–8.3)
PROT UR-MCNC: NEGATIVE MG/DL — SIGNIFICANT CHANGE UP
RBC # BLD: 3.96 M/UL — SIGNIFICANT CHANGE UP (ref 3.8–5.2)
RBC # FLD: 13.2 % — SIGNIFICANT CHANGE UP (ref 10.3–14.5)
RBC CASTS # UR COMP ASSIST: 0 /HPF — SIGNIFICANT CHANGE UP (ref 0–4)
SALICYLATES SERPL-MCNC: <0.3 MG/DL — LOW (ref 15–30)
SODIUM SERPL-SCNC: 137 MMOL/L — SIGNIFICANT CHANGE UP (ref 135–145)
SP GR SPEC: 1.01 — SIGNIFICANT CHANGE UP (ref 1–1.03)
SQUAMOUS # UR AUTO: 0 /HPF — SIGNIFICANT CHANGE UP (ref 0–5)
THC UR QL: NEGATIVE — SIGNIFICANT CHANGE UP
TOXICOLOGY SCREEN, DRUGS OF ABUSE, SERUM RESULT: SIGNIFICANT CHANGE UP
TSH SERPL-MCNC: 0.77 UIU/ML — SIGNIFICANT CHANGE UP (ref 0.27–4.2)
UROBILINOGEN FLD QL: 0.2 MG/DL — SIGNIFICANT CHANGE UP (ref 0.2–1)
WBC # BLD: 6.82 K/UL — SIGNIFICANT CHANGE UP (ref 3.8–10.5)
WBC # FLD AUTO: 6.82 K/UL — SIGNIFICANT CHANGE UP (ref 3.8–10.5)
WBC UR QL: 42 /HPF — HIGH (ref 0–5)

## 2024-08-11 PROCEDURE — 93010 ELECTROCARDIOGRAM REPORT: CPT

## 2024-08-11 PROCEDURE — 99284 EMERGENCY DEPT VISIT MOD MDM: CPT

## 2024-08-11 RX ORDER — CHLORPROMAZINE HCL 200 MG
50 TABLET ORAL ONCE
Refills: 0 | Status: COMPLETED | OUTPATIENT
Start: 2024-08-11 | End: 2024-08-11

## 2024-08-11 RX ORDER — SULFAMETHOXAZOLE AND TRIMETHOPRIM 400; 80 MG/1; MG/1
1 TABLET ORAL ONCE
Refills: 0 | Status: COMPLETED | OUTPATIENT
Start: 2024-08-11 | End: 2024-08-11

## 2024-08-11 RX ORDER — SULFAMETHOXAZOLE AND TRIMETHOPRIM 400; 80 MG/1; MG/1
1 TABLET ORAL
Qty: 20 | Refills: 0
Start: 2024-08-11 | End: 2024-08-20

## 2024-08-11 RX ORDER — ACETAMINOPHEN 500 MG
650 TABLET ORAL ONCE
Refills: 0 | Status: COMPLETED | OUTPATIENT
Start: 2024-08-11 | End: 2024-08-11

## 2024-08-11 RX ADMIN — SULFAMETHOXAZOLE AND TRIMETHOPRIM 1 TABLET(S): 400; 80 TABLET ORAL at 17:31

## 2024-08-11 RX ADMIN — Medication 50 MILLIGRAM(S): at 16:08

## 2024-08-11 RX ADMIN — Medication 650 MILLIGRAM(S): at 15:13

## 2024-08-11 NOTE — ED ADULT NURSE REASSESSMENT NOTE - NS ED NURSE REASSESS COMMENT FT1
[facility] arrived at this time for transportation. Vitals as charted, respirations are even and unlabored, pt calm and cooperative. PT to be transported at this time.
Received report from day RN. Pt resting comfortably in bed, respirations are even and unlabored. Pt endorsing no complaints at this time, calm and cooperative. Pt pending dispo

## 2024-08-11 NOTE — ED ADULT NURSE NOTE - NSFALLUNIVINTERV_ED_ALL_ED
Bed/Stretcher in lowest position, wheels locked, appropriate side rails in place/Call bell, personal items and telephone in reach/Instruct patient to call for assistance before getting out of bed/chair/stretcher/Non-slip footwear applied when patient is off stretcher/[place] to call system/Physically safe environment - no spills, clutter or unnecessary equipment/Purposeful proactive rounding/Room/bathroom lighting operational, light cord in reach
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
## 2024-08-11 NOTE — ED PROVIDER NOTE - CLINICAL SUMMARY MEDICAL DECISION MAKING FREE TEXT BOX
Dx UTI   Labs, Urine Tox/UA, EKG . Urine Culture. Large Leuks on UA. Bactrim 1 tab BID.  D/C to Assisted living via EMS 62 y/o F hx HTN, HLD  BIBA from NH secondary to agitation and acting out behaviour.  Denies any physical altercation.  Denies falling, punching or kicking any objects. Explicitly denies SI/HI/AH/VH.  Denies use of alochol or illicit drugs. Denies pain, SOB, fever, chills, chest /abdominal discomfort. No evidence of physical injuries, broken  skin or deformities.    Dx UTI   Labs, Urine Tox/UA, EKG . Urine Culture. Large Leuks on UA. Bactrim 1 tab BID.  D/C to Assisted living via EMS  Medical evaluation performed. There is no clinical evidence of intoxication or any acute medical problem requiring immediate intervention.

## 2024-08-11 NOTE — ED PROVIDER NOTE - OBJECTIVE STATEMENT
62 y/o M   BIBA secondary to agitation and acting out behaviour.  Admits that he was upset with staff at [facility].  Denies any physical altercation.  Denies falling, punching or kicking any objects. Explicitly denies SI/HI/AH/VH.  Denies use of alochol or illicit drugs. Denies pain, SOB, fever, chills, chest /abdominal discomfort. No evidence of physical injuries, broken  skin or deformities. 62 y/o F hx HTN, HLD  BIBA from NH secondary to agitation and acting out behaviour.  Denies any physical altercation.  Denies falling, punching or kicking any objects. Explicitly denies SI/HI/AH/VH.  Denies use of alochol or illicit drugs. Denies pain, SOB, fever, chills, chest /abdominal discomfort. No evidence of physical injuries, broken  skin or deformities.
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
## 2024-08-11 NOTE — ED PROVIDER NOTE - PATIENT PORTAL LINK FT
You can access the FollowMyHealth Patient Portal offered by [facility] by registering at the following website: [email or web address] By joining [employer]’s FollowMyHealth portal, you will also be able to view your health information using other applications (apps) compatible with our system.
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
## 2024-08-11 NOTE — ED PROVIDER NOTE - ATTESTATION, MLM
Patient expressed no known problems or needs I have reviewed and confirmed nurses' notes for patient's medications, allergies, medical history, and surgical history.

## 2024-08-11 NOTE — ED PROVIDER NOTE - CARE PLAN
Principal Discharge DX:	UTI (urinary tract infection)   1 Principal Discharge DX:	UTI (urinary tract infection)  Secondary Diagnosis:	Adjustment disorder

## 2024-08-13 NOTE — ED POST DISCHARGE NOTE - REASON FOR FOLLOW-UP
Other Called patient for routine follow-up admin call after ED visit. Patient did not answer but left voice mail.

## 2024-08-14 LAB
CULTURE RESULTS: ABNORMAL
SPECIMEN SOURCE: SIGNIFICANT CHANGE UP

## 2024-08-16 LAB
-  AMOXICILLIN/CLAVULANIC ACID: SIGNIFICANT CHANGE UP
-  AMPICILLIN/SULBACTAM: SIGNIFICANT CHANGE UP
-  AMPICILLIN: SIGNIFICANT CHANGE UP
-  AZTREONAM: SIGNIFICANT CHANGE UP
-  CEFAZOLIN: SIGNIFICANT CHANGE UP
-  CEFEPIME: SIGNIFICANT CHANGE UP
-  CEFOXITIN: SIGNIFICANT CHANGE UP
-  CEFTRIAXONE: SIGNIFICANT CHANGE UP
-  CEFUROXIME: SIGNIFICANT CHANGE UP
-  CIPROFLOXACIN: SIGNIFICANT CHANGE UP
-  ERTAPENEM: SIGNIFICANT CHANGE UP
-  GENTAMICIN: SIGNIFICANT CHANGE UP
-  IMIPENEM: SIGNIFICANT CHANGE UP
-  LEVOFLOXACIN: SIGNIFICANT CHANGE UP
-  MEROPENEM: SIGNIFICANT CHANGE UP
-  NITROFURANTOIN: SIGNIFICANT CHANGE UP
-  PIPERACILLIN/TAZOBACTAM: SIGNIFICANT CHANGE UP
-  TOBRAMYCIN: SIGNIFICANT CHANGE UP
-  TRIMETHOPRIM/SULFAMETHOXAZOLE: SIGNIFICANT CHANGE UP
METHOD TYPE: SIGNIFICANT CHANGE UP
ORGANISM # SPEC MICROSCOPIC CNT: ABNORMAL
ORGANISM # SPEC MICROSCOPIC CNT: ABNORMAL

## 2024-12-11 NOTE — BEHAVIORAL HEALTH ASSESSMENT NOTE - SOURCE OF INFORMATION
[de-identified] : 25 y/o male presenting with lower back pain x 4 weeks. His pain began after he was doing weighted leg presses at the gym. The pain is worse on the right side and radiates into his right hip. He has been resting and taking ibuprofen with some relief.  denies recent illness, fevers, numbness, weakness, balance problems, saddle anesthesia, urinary retention or fecal incontinence.  Patient

## 2024-12-18 NOTE — ED PROVIDER NOTE - NS ED NOTE AC HIGH RISK COUNTRIES
Post-Discharge Transitional Care Management Progress Note      Zach Capellan   YOB: 1979    Date of Office Visit:  12/18/2024  Date of Hospital Admission: 12/09/2024  Date of Hospital Discharge: 12/13/2024    Care management risk score Rising risk (score 2-5) and Complex Care (Scores >=6): No Risk Score On File     Non face to face  following discharge, date last encounter closed (first attempt may have been earlier): 12/13/2024 12/13/2024    Call initiated 2 business days of discharge: Yes    ASSESSMENT/PLAN:   Hospital discharge follow-up  -     [place] DISCHARGE MEDS RECONCILED W/ CURRENT OUTPATIENT MED LIST    Medical Decision Making: moderate complexity  Return in 1 month (on 1/18/2025).    On this date 12/18/2024 I have spent 45 minutes reviewing previous notes, test results and face to face with the patient discussing the diagnosis and importance of compliance with the treatment plan as well as documenting on the day of the visit.     Subjective:   HPI:  Follow up of Hospital problems/diagnosis(es): Osteomyelitis of left foot, cellulitis of left foot and progression of a diabetic foot ulcer    Inpatient course: Discharge summary reviewed- see chart.    Interval history/Current status: Stable    Patient Active Problem List   Diagnosis    Open fracture of left tibia and fibula    Anxiety disorder    Bipolar disorder (HCC)    Uncontrolled type 2 diabetes mellitus    Primary insomnia     Mixed hyperlipidemia    Type 2 diabetes mellitus       Medications listed as ordered at the time of discharge from hospital     Medication List            Accurate as of December 18, 2024 10:23 AM. If you have any questions, ask your nurse or doctor.                CONTINUE taking these medications      ceFAZolin 1 g injection  Commonly known as: ANCEF     citalopram 40 MG tablet  Commonly known as: CELEXA  TAKE 1 TABLET BY MOUTH DAILY     glipiZIDE 10 MG extended release tablet  Commonly known as: GLUCOTROL XL  TAKE 
No

## 2025-02-11 ENCOUNTER — EMERGENCY (EMERGENCY)
Facility: HOSPITAL | Age: 65
LOS: 1 days | Discharge: HOME | End: 2025-02-11
Attending: STUDENT IN AN ORGANIZED HEALTH CARE EDUCATION/TRAINING PROGRAM | Admitting: STUDENT IN AN ORGANIZED HEALTH CARE EDUCATION/TRAINING PROGRAM
Payer: MEDICARE

## 2025-02-11 VITALS
TEMPERATURE: 98 F | SYSTOLIC BLOOD PRESSURE: 117 MMHG | DIASTOLIC BLOOD PRESSURE: 69 MMHG | HEART RATE: 66 BPM | RESPIRATION RATE: 18 BRPM | OXYGEN SATURATION: 97 %

## 2025-02-11 LAB
ALBUMIN SERPL ELPH-MCNC: 3.6 G/DL — SIGNIFICANT CHANGE UP (ref 3.3–5)
ALP SERPL-CCNC: 60 U/L — SIGNIFICANT CHANGE UP (ref 40–120)
ALT FLD-CCNC: 9 U/L — SIGNIFICANT CHANGE UP (ref 4–33)
ANION GAP SERPL CALC-SCNC: 12 MMOL/L — SIGNIFICANT CHANGE UP (ref 7–14)
ANISOCYTOSIS BLD QL: SIGNIFICANT CHANGE UP
APAP SERPL-MCNC: <10 UG/ML — LOW (ref 15–25)
APTT BLD: 40.8 SEC — HIGH (ref 24.5–35.6)
AST SERPL-CCNC: 24 U/L — SIGNIFICANT CHANGE UP (ref 4–32)
BASOPHILS # BLD AUTO: 0.14 K/UL — SIGNIFICANT CHANGE UP (ref 0–0.2)
BASOPHILS NFR BLD AUTO: 1.8 % — SIGNIFICANT CHANGE UP (ref 0–2)
BILIRUB SERPL-MCNC: 0.4 MG/DL — SIGNIFICANT CHANGE UP (ref 0.2–1.2)
BUN SERPL-MCNC: 18 MG/DL — SIGNIFICANT CHANGE UP (ref 7–23)
BURR CELLS BLD QL SMEAR: PRESENT — SIGNIFICANT CHANGE UP
CALCIUM SERPL-MCNC: 9.3 MG/DL — SIGNIFICANT CHANGE UP (ref 8.4–10.5)
CHLORIDE SERPL-SCNC: 100 MMOL/L — SIGNIFICANT CHANGE UP (ref 98–107)
CO2 SERPL-SCNC: 20 MMOL/L — LOW (ref 22–31)
CREAT SERPL-MCNC: 0.75 MG/DL — SIGNIFICANT CHANGE UP (ref 0.5–1.3)
EGFR: 88 ML/MIN/1.73M2 — SIGNIFICANT CHANGE UP
EOSINOPHIL # BLD AUTO: 0.13 K/UL — SIGNIFICANT CHANGE UP (ref 0–0.5)
EOSINOPHIL NFR BLD AUTO: 1.7 % — SIGNIFICANT CHANGE UP (ref 0–6)
ETHANOL SERPL-MCNC: <10 MG/DL — SIGNIFICANT CHANGE UP
FLUAV AG NPH QL: SIGNIFICANT CHANGE UP
FLUBV AG NPH QL: SIGNIFICANT CHANGE UP
GLUCOSE SERPL-MCNC: 92 MG/DL — SIGNIFICANT CHANGE UP (ref 70–99)
HCT VFR BLD CALC: 38.6 % — SIGNIFICANT CHANGE UP (ref 34.5–45)
HGB BLD-MCNC: 13.4 G/DL — SIGNIFICANT CHANGE UP (ref 11.5–15.5)
IANC: 4.89 K/UL — SIGNIFICANT CHANGE UP (ref 1.8–7.4)
INR BLD: 1.26 RATIO — HIGH (ref 0.85–1.16)
LYMPHOCYTES # BLD AUTO: 0.55 K/UL — LOW (ref 1–3.3)
LYMPHOCYTES # BLD AUTO: 7 % — LOW (ref 13–44)
MACROCYTES BLD QL: SIGNIFICANT CHANGE UP
MAGNESIUM SERPL-MCNC: 1.6 MG/DL — SIGNIFICANT CHANGE UP (ref 1.6–2.6)
MANUAL SMEAR VERIFICATION: SIGNIFICANT CHANGE UP
MCHC RBC-ENTMCNC: 31.9 PG — SIGNIFICANT CHANGE UP (ref 27–34)
MCHC RBC-ENTMCNC: 34.7 G/DL — SIGNIFICANT CHANGE UP (ref 32–36)
MCV RBC AUTO: 91.9 FL — SIGNIFICANT CHANGE UP (ref 80–100)
MONOCYTES # BLD AUTO: 1.64 K/UL — HIGH (ref 0–0.9)
MONOCYTES NFR BLD AUTO: 21 % — HIGH (ref 2–14)
NEUTROPHILS # BLD AUTO: 5.27 K/UL — SIGNIFICANT CHANGE UP (ref 1.8–7.4)
NEUTROPHILS NFR BLD AUTO: 62.3 % — SIGNIFICANT CHANGE UP (ref 43–77)
NEUTS BAND # BLD: 5.3 % — SIGNIFICANT CHANGE UP (ref 0–6)
NEUTS BAND NFR BLD: 5.3 % — SIGNIFICANT CHANGE UP (ref 0–6)
PHOSPHATE SERPL-MCNC: 3.1 MG/DL — SIGNIFICANT CHANGE UP (ref 2.5–4.5)
PLAT MORPH BLD: ABNORMAL
PLATELET # BLD AUTO: 144 K/UL — LOW (ref 150–400)
PLATELET COUNT - ESTIMATE: ABNORMAL
POIKILOCYTOSIS BLD QL AUTO: SIGNIFICANT CHANGE UP
POLYCHROMASIA BLD QL SMEAR: SLIGHT — SIGNIFICANT CHANGE UP
POTASSIUM SERPL-MCNC: 4.8 MMOL/L — SIGNIFICANT CHANGE UP (ref 3.5–5.3)
POTASSIUM SERPL-SCNC: 4.8 MMOL/L — SIGNIFICANT CHANGE UP (ref 3.5–5.3)
PROT SERPL-MCNC: 6.7 G/DL — SIGNIFICANT CHANGE UP (ref 6–8.3)
PROTHROM AB SERPL-ACNC: 15 SEC — HIGH (ref 9.9–13.4)
RBC # BLD: 4.2 M/UL — SIGNIFICANT CHANGE UP (ref 3.8–5.2)
RBC # FLD: 12.7 % — SIGNIFICANT CHANGE UP (ref 10.3–14.5)
RBC BLD AUTO: SIGNIFICANT CHANGE UP
RSV RNA NPH QL NAA+NON-PROBE: DETECTED
SALICYLATES SERPL-MCNC: <0.3 MG/DL — LOW (ref 15–30)
SARS-COV-2 RNA SPEC QL NAA+PROBE: SIGNIFICANT CHANGE UP
SODIUM SERPL-SCNC: 132 MMOL/L — LOW (ref 135–145)
TSH SERPL-MCNC: 3.91 UIU/ML — SIGNIFICANT CHANGE UP (ref 0.27–4.2)
VARIANT LYMPHS # BLD: 0.9 % — SIGNIFICANT CHANGE UP (ref 0–6)
VARIANT LYMPHS NFR BLD MANUAL: 0.9 % — SIGNIFICANT CHANGE UP (ref 0–6)
WBC # BLD: 7.8 K/UL — SIGNIFICANT CHANGE UP (ref 3.8–10.5)
WBC # FLD AUTO: 7.8 K/UL — SIGNIFICANT CHANGE UP (ref 3.8–10.5)

## 2025-02-11 PROCEDURE — 99285 EMERGENCY DEPT VISIT HI MDM: CPT

## 2025-02-11 RX ORDER — BACTERIOSTATIC SODIUM CHLORIDE 0.9 %
1000 VIAL (ML) INJECTION ONCE
Refills: 0 | Status: COMPLETED | OUTPATIENT
Start: 2025-02-11 | End: 2025-02-11

## 2025-02-11 RX ADMIN — Medication 1000 MILLILITER(S): at 22:23

## 2025-02-11 NOTE — ED ADULT TRIAGE NOTE - PAIN: PRESENCE, MLM
denies pain/discomfort (Rating = 0) Gabapentin Pregnancy And Lactation Text: This medication is Pregnancy Category C and isn't considered safe during pregnancy. It is excreted in breast milk.

## 2025-02-11 NOTE — ED ADULT NURSE NOTE - NSFALLHARMRISKINTERV_ED_ALL_ED
Assistance OOB with selected safe patient handling equipment if applicable/Assistance with ambulation/Communicate risk of Fall with Harm to all staff, patient, and family/Monitor gait and stability/Monitor for mental status changes and reorient to person, place, and time, as needed/Move patient closer to nursing station/within visual sight of ED staff/Provide visual cue: red socks, yellow wristband, yellow gown, etc/Reinforce activity limits and safety measures with patient and family/Toileting schedule using arm’s reach rule for commode and bathroom/Use of alarms - bed, stretcher, chair and/or video monitoring/Bed in lowest position, wheels locked, appropriate side rails in place/Call bell, personal items and telephone in reach/Instruct patient to call for assistance before getting out of bed/chair/stretcher/Non-slip footwear applied when patient is off stretcher/[place] to call system/Physically safe environment - no spills, clutter or unnecessary equipment/Purposeful Proactive Rounding/Room/bathroom lighting operational, light cord in reach

## 2025-02-11 NOTE — ED ADULT NURSE NOTE - OBJECTIVE STATEMENT
pt is a 64y old female with hx schizophrenia received awake and responsive, c/o of generalized weakness for past few weeks, pt denies any discomfort @ present

## 2025-02-11 NOTE — ED PROVIDER NOTE - OBJECTIVE STATEMENT
64F with hx of schizophrenia, vascular dementia, DM, HLD, hypothyroidism, Afib on Xarelto, presents to the ED from [facility] for "decompensated schizophrenia and decrease in functional status". Patient denies chest pain, abdominal pain, difficulty breathing, fever, nausea, vomiting, diarrhea. Patient reports generalized fatigue. Patient is a poor historian with very limited answers.

## 2025-02-11 NOTE — ED PROVIDER NOTE - CARE PLAN
1 Principal Discharge DX:	UTI (urinary tract infection)  Secondary Diagnosis:	Respiratory syncytial virus (RSV)  Secondary Diagnosis:	Sinusitis

## 2025-02-11 NOTE — ED PROVIDER NOTE - PHYSICAL EXAMINATION
GEN: NAD, awake, eyes open spontaneously  EYES: normal conjunctiva, perrl  ENT: NCAT, dry MM, Trachea midline  CHEST/LUNGS: Non-tachypneic, CTAB, bilateral breath sounds  CARDIAC: Non-tachycardic, normal perfusion  ABDOMEN: Soft, NTND, No rebound/guarding  MSK: No edema, no gross deformity of extremities  SKIN: No rashes, no petechiae, no vesicles  NEURO: Moving all extremities. No focal deficits. Following commands.

## 2025-02-11 NOTE — ED PROVIDER NOTE - PATIENT PORTAL LINK FT
You can access the FollowMyHealth Patient Portal offered by [facility] by registering at the following website: [email or web address]. By joining [employer]’s FollowMyHealth portal, you will also be able to view your health information using other applications (apps) compatible with our system.

## 2025-02-11 NOTE — ED PROVIDER NOTE - NSFOLLOWUPINSTRUCTIONS_ED_ALL_ED_FT
You tested positive for RSV. The CAT scan of your head showed sinusitis, likely related to the respiratory syncytial virus. Your urine was positive for a urinary tract infection. You were given 1 dose of Ceftriaxone in the Emergency Department. Please continue Cefpodoxime 200mg every 12 hours at home for a total of 7 days.     Follow up with your primary care doctor within 24-48 hours.     Return to the Emergency Department for worsening symptoms or concerns. You tested positive for RSV. The CAT scan of your head showed sinusitis, likely related to the respiratory syncytial virus. Your urine was positive for a urinary tract infection. You were given 1 dose of Ceftriaxone in the Emergency Department. Please continue Augmentin (Amoxicillin-Clavulanate 875/125) every 12 hours for a total of 7 days.     Follow up with your primary care doctor within 24-48 hours.     Return to the Emergency Department for worsening symptoms or concerns.

## 2025-02-11 NOTE — ED ADULT NURSE REASSESSMENT NOTE - NS ED NURSE REASSESS COMMENT FT1
Pt lying in stretcher, denies any complaints at this time. respirations are even and unlabored, 20G IV placed in left AC, labs drawn and sent. Bed in lowest position, safety maintained. pending CT.

## 2025-02-11 NOTE — ED PROVIDER NOTE - PROGRESS NOTE DETAILS
Geni Barton DO (PGY-2): Spoke with RN from [facility]. Reports patient with decreased functional status and generalized weakness progressively worsening over the past 4 months. States at baseline she is A&Ox2, conversational, ambulating in hallways with walker and eating. She has had recurrent cystitis over the past few months as well, no current abx. Geni Barton DO (PGY-2): Patient with + RSV and urinary tract infection. CT head showing sinusitis likely related to RSV. Patient safe to be discharged back to facility. Called [facility] and discussed findings.

## 2025-02-11 NOTE — ED PROVIDER NOTE - ATTENDING CONTRIBUTION TO CARE
64-year-old female history of schizophrenia, vascular dementia, diabetes, A-fib on Xarelto presents to the ED from [facility] due to "decompensated schizophrenia and decreased functional status".  History is visiting from patient as she is a poor historian.    Physical exam:  Appears older than stated age, no acute distress  Heart is regular rate and rhythm without murmurs or gallops  Lungs are clear to auscultation lungs without wheeze rales rhonchi  Abdomen soft nontender nondistended  Dry mucous membrane  Psych: Flat affect  Neuro: Moving all extremity spontaneously, no focal deficits, following commands    MDM:  Patient presents to the ED due to "decompensated schizophrenia as well as decreased functional status.    Vital signs are stable and afebrile.  Blood work reviewed.  No anemia or leukocytosis.  Mild hyponatremia.  RSV positive.  No electrolyte abnormalities.  Patient pending a CT of the brain, ethanol level, salicylate, acetaminophen level and signed out to Dr. Watkins.

## 2025-02-11 NOTE — ED PROVIDER NOTE - CLINICAL SUMMARY MEDICAL DECISION MAKING FREE TEXT BOX
64F with [facility]x of [facility]. Patient unable to provide additional history. Will obtain collateral from facility. Nontoxic appearing, vitals WNL. Exam nonfocal. Will obtain labs/urine to eval for metabolic/infectious etiology, CT head, psych c/s and reassess. 64F with hx of schizophrenia sent from [facility] for decline in functional status. Patient unable to provide additional history. Will obtain collateral from facility. Nontoxic appearing, vitals WNL. Exam nonfocal. Will obtain labs/urine to eval for metabolic/infectious etiology, CT head, and reassess.

## 2025-02-12 VITALS
OXYGEN SATURATION: 97 % | DIASTOLIC BLOOD PRESSURE: 71 MMHG | HEART RATE: 55 BPM | TEMPERATURE: 97 F | RESPIRATION RATE: 18 BRPM | SYSTOLIC BLOOD PRESSURE: 125 MMHG

## 2025-02-12 LAB
APPEARANCE UR: ABNORMAL
BACTERIA # UR AUTO: ABNORMAL /HPF
BILIRUB UR-MCNC: NEGATIVE — SIGNIFICANT CHANGE UP
CAST: 11 /LPF — HIGH (ref 0–4)
COLOR SPEC: YELLOW — SIGNIFICANT CHANGE UP
DIFF PNL FLD: ABNORMAL
GLUCOSE UR QL: NEGATIVE MG/DL — SIGNIFICANT CHANGE UP
KETONES UR-MCNC: 15 MG/DL
LEUKOCYTE ESTERASE UR-ACNC: ABNORMAL
NITRITE UR-MCNC: POSITIVE
PH UR: 6 — SIGNIFICANT CHANGE UP (ref 5–8)
PROT UR-MCNC: 30 MG/DL
RBC CASTS # UR COMP ASSIST: 11 /HPF — HIGH (ref 0–4)
REVIEW: SIGNIFICANT CHANGE UP
SP GR SPEC: 1.02 — SIGNIFICANT CHANGE UP (ref 1–1.03)
SQUAMOUS # UR AUTO: 2 /HPF — SIGNIFICANT CHANGE UP (ref 0–5)
UROBILINOGEN FLD QL: 0.2 MG/DL — SIGNIFICANT CHANGE UP (ref 0.2–1)
WBC UR QL: 1590 /HPF — HIGH (ref 0–5)

## 2025-02-12 PROCEDURE — 70450 CT HEAD/BRAIN W/O DYE: CPT | Mod: 26

## 2025-02-12 RX ORDER — CEFTRIAXONE 250 MG/1
1000 INJECTION, POWDER, FOR SOLUTION INTRAMUSCULAR; INTRAVENOUS ONCE
Refills: 0 | Status: COMPLETED | OUTPATIENT
Start: 2025-02-12 | End: 2025-02-12

## 2025-02-12 RX ADMIN — CEFTRIAXONE 100 MILLIGRAM(S): 250 INJECTION, POWDER, FOR SOLUTION INTRAMUSCULAR; INTRAVENOUS at 03:48

## 2025-02-12 NOTE — ED ADULT NURSE REASSESSMENT NOTE - NS ED NURSE REASSESS COMMENT FT1
break coverage RN: Pt A&Ox3, resting in stretcher in no acute distress. pt verbalizes no complaints at this time, denies chest pain, headache, SOB, N/V/D, dizziness. respirations even and unlabored. safety maintained throughout, pt pending urine sample. pt made aware

## 2025-02-12 NOTE — ED ADULT NURSE REASSESSMENT NOTE - NS ED NURSE REASSESS COMMENT FT1
Pt lying in stretcher, not in acute distress. Respirations are even and unlabored, IV removed. Bed in lowest position, comfort measures provided, safety maintained. Pending transportation back to nursing home.

## 2025-02-12 NOTE — PROVIDER CONTACT NOTE (OTHER) - ASSESSMENT
Pt is returning to [facility]; needs BLS ambulance due to neurocognitive disorder/dementia, schizophrenia.

## 2025-02-12 NOTE — ED ADULT NURSE REASSESSMENT NOTE - NS ED NURSE REASSESS COMMENT FT1
Received rpt from [facility]. Awaiting ambulance transport back to her nursing home. Vitals stable, breathing well on RA. Respirations even and unlabored. Pt. has no complaints at this time.

## 2025-02-14 LAB
-  AMOXICILLIN/CLAVULANIC ACID: SIGNIFICANT CHANGE UP
-  AMPICILLIN/SULBACTAM: SIGNIFICANT CHANGE UP
-  AMPICILLIN: SIGNIFICANT CHANGE UP
-  AZTREONAM: SIGNIFICANT CHANGE UP
-  CEFAZOLIN: SIGNIFICANT CHANGE UP
-  CEFEPIME: SIGNIFICANT CHANGE UP
-  CEFOXITIN: SIGNIFICANT CHANGE UP
-  CEFTRIAXONE: SIGNIFICANT CHANGE UP
-  CEFUROXIME: SIGNIFICANT CHANGE UP
-  CIPROFLOXACIN: SIGNIFICANT CHANGE UP
-  ERTAPENEM: SIGNIFICANT CHANGE UP
-  GENTAMICIN: SIGNIFICANT CHANGE UP
-  IMIPENEM: SIGNIFICANT CHANGE UP
-  LEVOFLOXACIN: SIGNIFICANT CHANGE UP
-  MEROPENEM: SIGNIFICANT CHANGE UP
-  NITROFURANTOIN: SIGNIFICANT CHANGE UP
-  PIPERACILLIN/TAZOBACTAM: SIGNIFICANT CHANGE UP
-  TOBRAMYCIN: SIGNIFICANT CHANGE UP
-  TRIMETHOPRIM/SULFAMETHOXAZOLE: SIGNIFICANT CHANGE UP
CULTURE RESULTS: ABNORMAL
METHOD TYPE: SIGNIFICANT CHANGE UP
ORGANISM # SPEC MICROSCOPIC CNT: ABNORMAL
ORGANISM # SPEC MICROSCOPIC CNT: ABNORMAL
SPECIMEN SOURCE: SIGNIFICANT CHANGE UP

## 2025-03-08 NOTE — DIETITIAN INITIAL EVALUATION ADULT - OBTAIN CURRENT WEIGHT
Goal Outcome Evaluation:      Plan of Care Reviewed With: patient    Overall Patient Progress: improvingOverall Patient Progress: improving    Outcome Evaluation: Alert throughout shift. Denies pain. Tolerting PO- appetite fair. Oriented to self. Restless throuhgout shift- ambulating halls frequently with sitter.  SW following for safe dispo planning.      Problem: Adult Inpatient Plan of Care  Goal: Plan of Care Review  Description: The Plan of Care Review/Shift note should be completed every shift.  The Outcome Evaluation is a brief statement about your assessment that the patient is improving, declining, or no change.  This information will be displayed automatically on your shift  note.  Recent Flowsheet Documentation  Taken 3/8/2025 1524 by Elayne Vo RN  Outcome Evaluation: Alert throughout shift. Denies pain. Tolerting PO- appetite fair. Oriented to self. Restless throuhgout shift- ambulating halls frequently with sitter.  SW following for safe dispo planning.  Plan of Care Reviewed With: patient  Overall Patient Progress: improving  Goal: Absence of Hospital-Acquired Illness or Injury  Intervention: Identify and Manage Fall Risk  Recent Flowsheet Documentation  Taken 3/8/2025 0900 by Elayne Vo, RN  Safety Promotion/Fall Prevention:   activity supervised   clutter free environment maintained   nonskid shoes/slippers when out of bed   safety round/check completed      yes

## 2025-05-05 NOTE — ED PROVIDER NOTE - NS ED MD EM SELECTION
Likely due to UTI as above.  No other appreciable source of infection.  Improved.    Continue antibiotics as above   [id number] Comprehensive
